# Patient Record
Sex: FEMALE | Race: WHITE | NOT HISPANIC OR LATINO | Employment: OTHER | URBAN - METROPOLITAN AREA
[De-identification: names, ages, dates, MRNs, and addresses within clinical notes are randomized per-mention and may not be internally consistent; named-entity substitution may affect disease eponyms.]

---

## 2018-01-12 NOTE — PROGRESS NOTES
Assessment    1  Encounter for preventive health examination (V70 0) (Z00 00)    Plan  Essential hypertension    · (1) CBC/ PLT (NO DIFF); Status:Active; Requested for:02Aug2016;    · (1) COMPREHENSIVE METABOLIC PANEL; Status:Active; Requested for:02Aug2016;    · (1) TSH WITH FT4 REFLEX; Status:Active; Requested for:02Aug2016;    · (1) URINALYSIS (will reflex a microscopy if leukocytes, occult blood, protein or nitrites  are not within normal limits); Status:Active; Requested for:02Aug2016; Health Maintenance    · Alcohol misuse can be a problem with advancing age ; Status:Complete;   Done:  01Aug2016   · Eat a low fat and low cholesterol diet ; Status:Complete;   Done: 70WLK4000   · It is important that you drink enough fluids to stay healthy ; Status:Complete;   Done:  90XLB6257   · The plan of care for falls is detailed in the plan and/or discussion section of today's note ;  Status:Complete;   Done: 01Aug2016   · There are many exercise options for seniors ; Status:Complete;   Done: 60RFT1448   · There ways to avoid falling ; Status:Complete;   Done: 08XQX2985   · These are things you can do to prevent falls in and around the home ; Status:Complete;    Done: 51CET4575   · We encourage you to begin to make lifestyle changes to help control your blood  pressure  These may include losing weight, increasing your activity level, limiting salt in  your diet, decreasing alcohol intake, and eating a diet low in fat and rich in fruits  and vegetables ; Status:Complete;   Done: 91FIC4013   · We recommend that you create an advance directive ; Status:Complete;   Done:  77HCL7001   · We recommend that you follow these steps to lower your risk of osteoporosis  ;  Status:Complete;   Done: 35MCT4778  Hyperlipidemia    · (1) LIPID PANEL FASTING W DIRECT LDL REFLEX; Status:Active; Requested  for:02Aug2016;   Nicotine dependence, Shortness of breath on exertion, Wheezing    · Complete PFT with DLCO; Status:Active;  Requested for:01Aug2016;   Screening for breast cancer    · * MAMMO SCREENING BILATERAL W CAD; Status:Hold For - Scheduling; Requested  for:01Aug2016;   Screening for colon cancer    · (1) OCCULT BLOOD, FECAL IMMUNOCHEMICAL TEST; Status:Active; Requested  for:01Aug2016;   Screening for genitourinary condition    · *VB-Urinary Incontinence Screen (Dx V81 6 Screen for UI); Status:Active; Requested  for:01Aug2016;   Screening for neurological condition    · *VB - Fall Risk Assessment  (Dx V80 09 Screen for Neurologic Disorder); Status:Active; Requested for:01Aug2016;   Screening for osteoporosis    · * DXA BONE DENSITY SPINE HIP AND PELVIS; Status:Hold For - Scheduling;  Requested for:01Aug2016;     Discussion/Summary    Depression screen - negative     screen - negative    Fall screen - negative    INGRAM, wheezing and nicotine dependence - patient not interested in quitting  Advised checking PFTs  Impression: Initial Annual Wellness Visit, with preventive exam as well as age and risk appropriate counseling completed  Cardiovascular screening and counseling: the risks and benefits of screening were discussed and screening is current  Diabetes screening and counseling: the risks and benefits of screening were discussed and screening is current  Colorectal cancer screening and counseling: the risks and benefits of screening were discussed, due for a colonoscopy (low risk) and due for fecal occult blood testing  Breast cancer screening and counseling: the risks and benefits of screening were discussed and due for a screening mammogram    Cervical cancer screening and counseling: screening not indicated and due to advanced age  Osteoporosis screening and counseling: the risks and benefits of screening were discussed, counseling was given on obtaining adequate amounts of calcium and vitamin D on a daily basis, counseling was given on the importance of regular weightbearing exercise and due for DXA axial skeleton  Abdominal aortic aneurysm screening and counseling: screening not indicated  Glaucoma screening and counseling: the risks and benefits of screening were discussed, screening is current and ophthalmologist referral    HIV screening and counseling: screening not indicated  Immunizations: influenza vaccine is up to date this year, influenza vaccination is recommended annually, the risks and benefits of pneumococcal vaccination were discussed with the patient, received prevnar 15, due for pneumovax 23 10/2016, hepatitis B vaccination series is not indicated at this time due to the patient's low risk of gabriela the disease, the risks and benefits of the Zostavax vaccine were discussed with the patient, the patient declines the Zostavax vaccine, the risks and benefits of the Td vaccine were discussed with the patient, the patient declines the Td vaccine, the risks and benefits of the Tdap vaccine were discussed with the patient and the patient declines the Tdap vaccine  Tobacco Cessation: an intermediate session is done today  Advance Directive Planning: not complete, she was encouraged to follow-up with me to discuss her questions and/or decisions  Advice and education were given regarding alcohol use, fall risk reduction, increasing physical activity and nutrition (non-diabetic)  She was referred to none today  Medical Equipment/Suppliers: none today  Patient Discussion: plan discussed with the patient, plan discussed with the patient's family, follow-up visit needed in 4 months, 40 minute visit, greater than half of the time was spent on counseling  History of Present Illness  HPI: 74yo female with h/o HTN, HLD and nicotine dependence here for annual wellness visit  She presents with her daughter  Welcome to [employer] and Wellness Visits: The patient is being seen for the welcome to medicare visit     Medicare Screening and Risk Factors   Hospitalizations: she has been previously hospitalizied, she has been hospitalized 1 times and Hospitalized 11/2015 for hyponatremia and ADIS  Once per lifetime medicare screening tests: not eligible  Medicare Screening Tests Risk Questions   Abdominal aortic aneurysm risk assessment: none indicated  Osteoporosis risk assessment: , female gender, over 48years of age, tobacco use and alcohol use  HIV risk assessment: none indicated  Drug and Alcohol Use: The patient is a current cigarette smoker  She smoked for 25 years  She is cutting back on tobacco use  The patient reports never drinking alcohol and rare alcohol use  Diet and Physical Activity: Current diet includes low fat food choices and low salt food choices  She exercises infrequently  Exercise: walking  (regular diet)   Mood Disorder and Cognitive Impairment Screening:   Depression screening was done using phq2 and score was 0  Functional Ability/Level of Safety: Hearing is normal bilaterally  The patient is currently unable to drive, but able to do activities of daily living without limitations, able to do instrumental activities of daily living without limitations and able to participate in social activities without limitations  Activities of daily living details: never learned to drive, but does not need help shopping, no meal preparation help needed, does not need help doing housework, does not need help doing laundry, does not need help managing medications and does not need help managing money  Fall risk factors: The patient fell 0 times in the past 12 months  Home safety risk factors:  Lives on 5th floor apartment, but no loose rugs, no poor household lighting, no uneven floors, no household clutter and grab bars in the bathroom  Advance Directives: Advance directives: no living will, no durable power of  for health care directives and no advance directives     Co-Managers and Medical Equipment/Suppliers: See Patient Care Team   Preventive Quality Program 65 and Older: Falls Risk: The patient fell 0 times in the past 12 months  The patient currently has no urinary incontinence symptoms  Date of last glaucoma screen was 2016      Patient Care Team    Care Team Member Role Specialty Office Number   [phone] [facility] [address] [facility] Attending Internal Medicine [phone]     Review of Systems    Constitutional: negative  ENT: negative  Cardiovascular: negative  Respiratory: shortness of breath during exertion, but no wheezing and no cough  Gastrointestinal: negative  Genitourinary: negative  Neurological: negative  Psychiatric: negative  Active Problems    1  Cataracts, bilateral (366 9) (H26 9)   2  Essential hypertension (401 9) (I10)   3  Hyperlipidemia (272 4) (E78 5)   4  Need for immunization against influenza (V04 81) (Z23)   5  Need for pneumococcal vaccination (V03 82) (Z23)   6  Preoperative clearance (V72 84) (Z01 818)    Past Medical History    · History of Elevated serum creatinine (790 99) (R79 89)   · History of acute bronchitis (V12 69) (Z87 09)   · History of acute renal failure (V13 09) (Z87 448)   · History of Hyponatremia (276 1) (E87 1)    The active problems and past medical history were reviewed and updated today  Surgical History    · Denied: History of Surgery    The surgical history was reviewed and updated today  Family History  Mother    · Family history of peptic ulcer (V18 59) (Z83 79)  Brother    · Family history of diabetes mellitus (V18 0) (Z83 3)   · Family history of stroke (V17 1) (Z82 3)    The family history was reviewed and updated today  Social History    · Alcohol use (V49 89) (Z78 9)   · Cigarette smoker (305 1) (F17 210)   · Occupation   · [address] [place] [address]   ·   The social history was reviewed and is unchanged  Current Meds   1  AmLODIPine Besylate 10 MG Oral Tablet; TAKE 1 TABLET DAILY AS DIRECTED; Therapy: 17RYZ9334 to (Evaluate:21Jan2017)  Requested for: 44Kiz7008; Last   Rx:[id number] Ordered   2  Atorvastatin Calcium 40 MG Oral Tablet; TAKE 1 TABLET DAILY; Therapy: 56RPW9360 to (Juju Price)  Requested for: 07SXC5763; Last   Rx:69Bft9285 Ordered   3  Cephalexin 250 MG Oral Tablet; TAKE 1 TABLET EVERY 6 HOURS DAILY; Therapy: 89ASW9524 to (Loco Noriega)  Requested for: 31YCL2674; Last   Rx:29Mar2016 Ordered   4  Dextromethorphan-Guaifenesin  MG/5ML Oral Solution; TAKE 5 ML EVERY 4   HOURS AS NEEDED; Therapy: 00EOW4794 to (Loco Noriega)  Requested for: 72HIB5241; Last   Rx:29Mar2016 Ordered   5  Lisinopril 20 MG Oral Tablet; TAKE 1 TABLET DAILY; Therapy: 03WLG1063 to (Evaluate:23Oct2016)  Requested for: 26Apr2016; Last   Rx:26Apr2016 Ordered   6  Metoprolol Tartrate 50 MG Oral Tablet; TAKE 1 and 1/2 TABLET EVERY 12 HOURS; Therapy: (Recorded:12Nov2015) to Recorded    The medication list was reviewed and updated today  Allergies    1  No Known Drug Allergies    Immunizations   1    Influenza  26-Oct-2015    PCV  26-Oct-2015     Vitals  Signs    Systolic: 034, RUE, Sitting  Diastolic: 72, RUE, Sitting   Systolic: 804  Diastolic: 82  Heart Rate: 82  Respiration: 16  Temperature: 98 3 F  O2 Saturation: 98  Height: 4 ft 11 in  Weight: 184 lb 2 08 oz  BMI Calculated: 37 19  BSA Calculated: 1 78    Physical Exam    Constitutional   General appearance: No acute distress, well appearing and well nourished  smells of smoke  Head and Face   Head and face: Normal   wears glasses  Eyes   Conjunctiva and lids: No swelling, erythema or discharge  Ears, Nose, Mouth, and Throat   External inspection of ears and nose: Normal     Otoscopic examination: Tympanic membranes translucent with normal light reflex  Canals patent without erythema  Hearing: Normal     Nasal mucosa, septum, and turbinates: Normal without edema or erythema  Lips, teeth, and gums: Abnormal   Examination of the lip showed they were normal with no lesions  Examination of the teeth revealed upper dentures  Oropharynx: Normal with no erythema, edema, exudate or lesions  Neck   Neck: Supple, symmetric, trachea midline, no masses  Thyroid: Normal, no thyromegaly  Pulmonary   Respiratory effort: No increased work of breathing or signs of respiratory distress  Auscultation of lungs: Abnormal   faint wheezing throughout  Cardiovascular   Auscultation of heart: Normal rate and rhythm, normal S1 and S2, no murmurs  Carotid pulses: 2+ bilaterally  Pedal pulses: 2+ bilaterally  Examination of extremities for edema and/or varicosities: Normal     Chest deferred by patient  Abdomen   Abdomen: Non-tender, no masses  The abdomen was obese  Bowel sounds were normal  The abdomen was soft and nontender  The abdomen was normal to percussion  Lymphatic   Palpation of lymph nodes in neck: No lymphadenopathy  Musculoskeletal   Gait and station: Normal     Neurologic No focal deficit  Cortical function: Normal mental status  There is no cognitive impairment  The patient achieved a score of 29 / 30 on the MMSE  Psychiatric   Orientation to person, place, and time: Normal     Mood and affect: Normal   Mood and Affect: anxious  Results/Data  PHQ-2 Adult Depression Screening [id number] 01:32PM User, s     Test Name Result Flag Reference   PHQ-2 Adult Depression Score 0     Over the last two weeks, how often have you been bothered by any of the following problems?   Little interest or pleasure in doing things: Not at all - 0  Feeling down, depressed, or hopeless: Not at all - 0   PHQ-2 Adult Depression Screening Negative         Procedure    Procedure: Visual Acuity Test    Results: 20/50 in both eyes without corrective device, 20/70 in the right eye without corrective device, 20/50 in the left eye without corrective device, 20/40 in both eyes with corrective device, 20/30 in the right eye with corrective device, 20/40 in the left eye with corrective device Wears glasses   Color vision was and the results were normal  [facility]        Future Appointments    Date/Time Provider Specialty Site   12/06/2016 01:00 PM Narendra Montes De Oca DO Internal Medicine [facility] [address] [facility] INTERNAL MED     Signatures   Electronically signed by : Carola Irvin DO; Aug  1 2016  3:14PM EST                       (Author)

## 2018-02-22 DIAGNOSIS — E78.2 MIXED HYPERLIPIDEMIA: Primary | ICD-10-CM

## 2018-02-22 RX ORDER — ATORVASTATIN CALCIUM 40 MG/1
TABLET, FILM COATED ORAL
Qty: 90 TABLET | Refills: 1 | Status: ON HOLD | OUTPATIENT
Start: 2018-02-22 | End: 2019-07-18 | Stop reason: SDUPTHER

## 2019-06-19 ENCOUNTER — APPOINTMENT (EMERGENCY)
Dept: RADIOLOGY | Facility: HOSPITAL | Age: 79
DRG: 037 | End: 2019-06-19
Payer: COMMERCIAL

## 2019-06-19 ENCOUNTER — HOSPITAL ENCOUNTER (INPATIENT)
Facility: HOSPITAL | Age: 79
LOS: 14 days | DRG: 037 | End: 2019-07-03
Attending: EMERGENCY MEDICINE | Admitting: INTERNAL MEDICINE
Payer: COMMERCIAL

## 2019-06-19 ENCOUNTER — TELEPHONE (OUTPATIENT)
Dept: UROLOGY | Facility: CLINIC | Age: 79
End: 2019-06-19

## 2019-06-19 DIAGNOSIS — R93.89 ABNORMAL CT SCAN: ICD-10-CM

## 2019-06-19 DIAGNOSIS — I65.22 CAROTID STENOSIS, LEFT: ICD-10-CM

## 2019-06-19 DIAGNOSIS — D64.9 ANEMIA, UNSPECIFIED TYPE: ICD-10-CM

## 2019-06-19 DIAGNOSIS — I65.22 SYMPTOMATIC STENOSIS OF LEFT CAROTID ARTERY: ICD-10-CM

## 2019-06-19 DIAGNOSIS — R29.90 STROKE-LIKE SYMPTOM: ICD-10-CM

## 2019-06-19 DIAGNOSIS — R53.1 WEAKNESS: Primary | ICD-10-CM

## 2019-06-19 DIAGNOSIS — R93.89 ABNORMAL CT SCAN: Primary | ICD-10-CM

## 2019-06-19 LAB
ABO GROUP BLD: NORMAL
ALBUMIN SERPL BCP-MCNC: 3 G/DL (ref 3.5–5)
ALP SERPL-CCNC: 99 U/L (ref 46–116)
ALT SERPL W P-5'-P-CCNC: 16 U/L (ref 12–78)
ANION GAP SERPL CALCULATED.3IONS-SCNC: 5 MMOL/L (ref 4–13)
APTT PPP: 25 SECONDS (ref 26–38)
AST SERPL W P-5'-P-CCNC: 31 U/L (ref 5–45)
ATRIAL RATE: 57 BPM
BILIRUB DIRECT SERPL-MCNC: 0.21 MG/DL (ref 0–0.2)
BILIRUB SERPL-MCNC: 0.49 MG/DL (ref 0.2–1)
BLD GP AB SCN SERPL QL: NEGATIVE
BUN SERPL-MCNC: 13 MG/DL (ref 5–25)
CALCIUM SERPL-MCNC: 8.1 MG/DL (ref 8.3–10.1)
CHLORIDE SERPL-SCNC: 106 MMOL/L (ref 100–108)
CO2 SERPL-SCNC: 22 MMOL/L (ref 21–32)
CREAT SERPL-MCNC: 1.01 MG/DL (ref 0.6–1.3)
ERYTHROCYTE [DISTWIDTH] IN BLOOD BY AUTOMATED COUNT: 22.9 % (ref 11.6–15.1)
FERRITIN SERPL-MCNC: 2 NG/ML (ref 8–388)
FERRITIN SERPL-MCNC: 3 NG/ML (ref 8–388)
FOLATE SERPL-MCNC: >20 NG/ML (ref 3.1–17.5)
GFR SERPL CREATININE-BSD FRML MDRD: 53 ML/MIN/1.73SQ M
GLUCOSE SERPL-MCNC: 117 MG/DL (ref 65–140)
GLUCOSE SERPL-MCNC: 150 MG/DL (ref 65–140)
HCT VFR BLD AUTO: 21.8 % (ref 34.8–46.1)
HGB BLD-MCNC: 5.3 G/DL (ref 11.5–15.4)
HGB BLD-MCNC: 5.8 G/DL (ref 11.5–15.4)
INR PPP: 1.14 (ref 0.86–1.17)
IRON SATN MFR SERPL: 2 %
IRON SERPL-MCNC: 9 UG/DL (ref 50–170)
MCH RBC QN AUTO: 15.2 PG (ref 26.8–34.3)
MCHC RBC AUTO-ENTMCNC: 24.3 G/DL (ref 31.4–37.4)
MCV RBC AUTO: 63 FL (ref 82–98)
P AXIS: 73 DEGREES
PLATELET # BLD AUTO: 248 THOUSANDS/UL (ref 149–390)
POTASSIUM SERPL-SCNC: 3.8 MMOL/L (ref 3.5–5.3)
PR INTERVAL: 150 MS
PROT SERPL-MCNC: 6.3 G/DL (ref 6.4–8.2)
PROTHROMBIN TIME: 14.7 SECONDS (ref 11.8–14.2)
QRS AXIS: 19 DEGREES
QRSD INTERVAL: 78 MS
QT INTERVAL: 434 MS
QTC INTERVAL: 422 MS
RBC # BLD AUTO: 3.49 MILLION/UL (ref 3.81–5.12)
RH BLD: POSITIVE
SODIUM SERPL-SCNC: 133 MMOL/L (ref 136–145)
SPECIMEN EXPIRATION DATE: NORMAL
T WAVE AXIS: 60 DEGREES
TIBC SERPL-MCNC: 381 UG/DL (ref 250–450)
VENTRICULAR RATE: 57 BPM
VIT B12 SERPL-MCNC: 417 PG/ML (ref 100–900)
WBC # BLD AUTO: 5.87 THOUSAND/UL (ref 4.31–10.16)

## 2019-06-19 PROCEDURE — 83540 ASSAY OF IRON: CPT | Performed by: INTERNAL MEDICINE

## 2019-06-19 PROCEDURE — 80076 HEPATIC FUNCTION PANEL: CPT | Performed by: INTERNAL MEDICINE

## 2019-06-19 PROCEDURE — 86900 BLOOD TYPING SEROLOGIC ABO: CPT | Performed by: EMERGENCY MEDICINE

## 2019-06-19 PROCEDURE — 84165 PROTEIN E-PHORESIS SERUM: CPT | Performed by: PATHOLOGY

## 2019-06-19 PROCEDURE — 93005 ELECTROCARDIOGRAM TRACING: CPT

## 2019-06-19 PROCEDURE — 82728 ASSAY OF FERRITIN: CPT | Performed by: INTERNAL MEDICINE

## 2019-06-19 PROCEDURE — 36415 COLL VENOUS BLD VENIPUNCTURE: CPT | Performed by: EMERGENCY MEDICINE

## 2019-06-19 PROCEDURE — 84165 PROTEIN E-PHORESIS SERUM: CPT | Performed by: INTERNAL MEDICINE

## 2019-06-19 PROCEDURE — 86850 RBC ANTIBODY SCREEN: CPT | Performed by: EMERGENCY MEDICINE

## 2019-06-19 PROCEDURE — 85610 PROTHROMBIN TIME: CPT | Performed by: EMERGENCY MEDICINE

## 2019-06-19 PROCEDURE — 99223 1ST HOSP IP/OBS HIGH 75: CPT | Performed by: INTERNAL MEDICINE

## 2019-06-19 PROCEDURE — 30233N1 TRANSFUSION OF NONAUTOLOGOUS RED BLOOD CELLS INTO PERIPHERAL VEIN, PERCUTANEOUS APPROACH: ICD-10-PCS | Performed by: INTERNAL MEDICINE

## 2019-06-19 PROCEDURE — 96374 THER/PROPH/DIAG INJ IV PUSH: CPT

## 2019-06-19 PROCEDURE — 96376 TX/PRO/DX INJ SAME DRUG ADON: CPT

## 2019-06-19 PROCEDURE — 99285 EMERGENCY DEPT VISIT HI MDM: CPT | Performed by: EMERGENCY MEDICINE

## 2019-06-19 PROCEDURE — 86901 BLOOD TYPING SEROLOGIC RH(D): CPT | Performed by: EMERGENCY MEDICINE

## 2019-06-19 PROCEDURE — 99222 1ST HOSP IP/OBS MODERATE 55: CPT | Performed by: UROLOGY

## 2019-06-19 PROCEDURE — 99285 EMERGENCY DEPT VISIT HI MDM: CPT

## 2019-06-19 PROCEDURE — 82607 VITAMIN B-12: CPT | Performed by: INTERNAL MEDICINE

## 2019-06-19 PROCEDURE — P9016 RBC LEUKOCYTES REDUCED: HCPCS

## 2019-06-19 PROCEDURE — 80048 BASIC METABOLIC PNL TOTAL CA: CPT | Performed by: EMERGENCY MEDICINE

## 2019-06-19 PROCEDURE — 86920 COMPATIBILITY TEST SPIN: CPT

## 2019-06-19 PROCEDURE — 74177 CT ABD & PELVIS W/CONTRAST: CPT

## 2019-06-19 PROCEDURE — 85730 THROMBOPLASTIN TIME PARTIAL: CPT | Performed by: EMERGENCY MEDICINE

## 2019-06-19 PROCEDURE — 70498 CT ANGIOGRAPHY NECK: CPT

## 2019-06-19 PROCEDURE — 70496 CT ANGIOGRAPHY HEAD: CPT

## 2019-06-19 PROCEDURE — 96361 HYDRATE IV INFUSION ADD-ON: CPT

## 2019-06-19 PROCEDURE — 36430 TRANSFUSION BLD/BLD COMPNT: CPT

## 2019-06-19 PROCEDURE — NS001 PR NO SIGNATURE OR ATTESTATION: Performed by: SURGERY

## 2019-06-19 PROCEDURE — 85027 COMPLETE CBC AUTOMATED: CPT | Performed by: EMERGENCY MEDICINE

## 2019-06-19 PROCEDURE — 82746 ASSAY OF FOLIC ACID SERUM: CPT | Performed by: INTERNAL MEDICINE

## 2019-06-19 PROCEDURE — 83550 IRON BINDING TEST: CPT | Performed by: INTERNAL MEDICINE

## 2019-06-19 PROCEDURE — 85018 HEMOGLOBIN: CPT | Performed by: INTERNAL MEDICINE

## 2019-06-19 PROCEDURE — 93010 ELECTROCARDIOGRAM REPORT: CPT | Performed by: INTERNAL MEDICINE

## 2019-06-19 PROCEDURE — 71260 CT THORAX DX C+: CPT

## 2019-06-19 PROCEDURE — 82948 REAGENT STRIP/BLOOD GLUCOSE: CPT

## 2019-06-19 RX ORDER — ATORVASTATIN CALCIUM 40 MG/1
40 TABLET, FILM COATED ORAL DAILY
Status: DISCONTINUED | OUTPATIENT
Start: 2019-06-19 | End: 2019-06-20

## 2019-06-19 RX ORDER — LANOLIN ALCOHOL/MO/W.PET/CERES
3 CREAM (GRAM) TOPICAL ONCE
Status: COMPLETED | OUTPATIENT
Start: 2019-06-19 | End: 2019-06-19

## 2019-06-19 RX ORDER — ONDANSETRON 2 MG/ML
4 INJECTION INTRAMUSCULAR; INTRAVENOUS ONCE
Status: COMPLETED | OUTPATIENT
Start: 2019-06-19 | End: 2019-06-19

## 2019-06-19 RX ORDER — METOPROLOL TARTRATE 50 MG/1
50 TABLET, FILM COATED ORAL EVERY 12 HOURS SCHEDULED
COMMUNITY
End: 2019-07-22 | Stop reason: HOSPADM

## 2019-06-19 RX ORDER — ONDANSETRON 2 MG/ML
INJECTION INTRAMUSCULAR; INTRAVENOUS
Status: DISPENSED
Start: 2019-06-19 | End: 2019-06-20

## 2019-06-19 RX ORDER — ACETAMINOPHEN 325 MG/1
650 TABLET ORAL EVERY 6 HOURS PRN
Status: DISCONTINUED | OUTPATIENT
Start: 2019-06-19 | End: 2019-06-30

## 2019-06-19 RX ORDER — ASPIRIN 81 MG/1
81 TABLET, CHEWABLE ORAL DAILY
COMMUNITY

## 2019-06-19 RX ORDER — ONDANSETRON 2 MG/ML
INJECTION INTRAMUSCULAR; INTRAVENOUS
Status: COMPLETED
Start: 2019-06-19 | End: 2019-06-19

## 2019-06-19 RX ORDER — PANTOPRAZOLE SODIUM 40 MG/1
40 TABLET, DELAYED RELEASE ORAL
Status: DISCONTINUED | OUTPATIENT
Start: 2019-06-20 | End: 2019-07-03 | Stop reason: HOSPADM

## 2019-06-19 RX ORDER — METOPROLOL TARTRATE 50 MG/1
50 TABLET, FILM COATED ORAL EVERY 12 HOURS SCHEDULED
Status: DISCONTINUED | OUTPATIENT
Start: 2019-06-19 | End: 2019-06-28

## 2019-06-19 RX ADMIN — ONDANSETRON 4 MG: 2 INJECTION INTRAMUSCULAR; INTRAVENOUS at 12:14

## 2019-06-19 RX ADMIN — IODIXANOL 80 ML: 320 INJECTION, SOLUTION INTRAVASCULAR at 14:19

## 2019-06-19 RX ADMIN — METOPROLOL TARTRATE 50 MG: 50 TABLET, FILM COATED ORAL at 21:23

## 2019-06-19 RX ADMIN — IOHEXOL 85 ML: 350 INJECTION, SOLUTION INTRAVENOUS at 12:15

## 2019-06-19 RX ADMIN — ONDANSETRON 4 MG: 2 INJECTION INTRAMUSCULAR; INTRAVENOUS at 14:30

## 2019-06-19 RX ADMIN — ATORVASTATIN CALCIUM 40 MG: 40 TABLET, FILM COATED ORAL at 16:12

## 2019-06-19 RX ADMIN — SODIUM CHLORIDE 1000 ML: 0.9 INJECTION, SOLUTION INTRAVENOUS at 13:18

## 2019-06-19 RX ADMIN — MELATONIN 3 MG: at 21:57

## 2019-06-19 NOTE — ED NOTES
Blood bank called at this time and will call primary RN once blood is ready to be requested        Justice Franks, MATTHEW  06/19/19 0515 Please order labs to be linked to appt

## 2019-06-19 NOTE — QUICK NOTE
Extreme iron deficiency, ferritin 3  Consult oncology  Consider to start venofer in am (receiving blood today)

## 2019-06-19 NOTE — H&P
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, will consult Urology    Anemia, unspecified  Assessment & Plan  Unclear, will obtain peripheral smear to rule out hemolysis, obtain LFTs  Iron storages, folate and B12 requested and pending  Guaiac in the ER negative, obtain FOBT x3  Consult Gastroenterology  One results of multiple status back including UPEP and SPEP consideration if appropriate to consult Hematology  Receiving blood transfusion in the ER, x2 units, H&H q 8 hours for 24 hours and then if stable discontinue  Start Protonix  Clear liquid diet for now    Nicotine dependence  Assessment & Plan  Extensive counseling was offered, at this time declined nicotine patch    Essential hypertension  Assessment & Plan  Continue with beta-blocker as per home dose, monitor  Chronic stroke on CT scan, off window for permissive hypertension    * Stroke-like symptom  Assessment & Plan  Presenting with right-sided weakness and right facial droop, CT scan of the head positive for left parietal lobe subacute to chronic stroke  The case was discussed by ED with Neurology, off window for tPA, last well known even before obtaining scan  Admitted with stroke protocol with appropriate vital signs and neuro checks  Obtain MRI  Obtain echocardiogram  Formal neurology consult is pending  For now will hold aspirin for possible GI bleed    Abnormal CT of the neck with 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, requested evaluation by vascular surgery for reviewing images especially concern of the left side    PT and OT evaluation  Fall precautions    VTE Prophylaxis: Hold until GI bleed is ruled out  / sequential compression device   Code Status:  Full code  POLST: POLST is not applicable to this patient  Discussion with family:  Daughters at bedside    Anticipated Length of Stay:  Patient will be admitted on an Inpatient basis with an anticipated length of stay of  greater than 2 midnights  Justification for Hospital Stay:  Refer to above    Total Time for Visit, including Counseling / Coordination of Care: 1 hour  Greater than 50% of this total time spent on direct patient counseling and coordination of care  Chief Complaint:   Weakness    History of Present Illness:    Mirza Carr is a 78 y o  female who presents with weakness  This is a 77-year-old female with past medical history of hypertension, hyperlipidemia, history of smoking, previous history of hyponatremia, who came to the hospital today complaining of extreme fatigue  She came to the hospital today because she knew that something was wrong  Upon further questioning she has not been feeling well for the past few weeks  However, family at bedside gives much better description of the events that led today, as per family, the patient had upper respiratory tract infection with weakness and cough about 2 weeks ago and she was seen by primary care physician and diagnosed with bronchitis and treated with supportive care and brief course of antibiotics which she has completed  She recovered slowly from the bronchitis, she is an everyday smoker, however, for the past few days she appears to be more weak and fatigued  The family asked multiple times the patient if there was any issues however she kept on declining  At this point, the patient was living at home independently  Today, the patient called her daughter stating that she was stumbling and unable to walk and she was also unable to hold objects with the right upper extremity  EMS was called    In ED, she was noted to have right-sided weakness, and EMS reported also facial droop on the right which was not present at the time of admission to the ER  Currently the right facial droop is not present  Pertinent positive in the ED, off window for tPA, positive CT of the head for subacute to chronic stroke, complete occlusion of the right carotid, abnormal CT of the right lung, severe anemia requiring blood transfusion  Review of Systems:    Review of Systems   Constitutional: Positive for fatigue  HENT:        No bleeding from mouth or nose   Respiratory: Negative for shortness of breath  Cardiovascular: Negative for chest pain  Gastrointestinal: Negative for blood in stool  Genitourinary: Negative for hematuria and vaginal bleeding  Skin:        No bruising or bleeding   Neurological: Positive for dizziness and weakness  All other systems reviewed and are negative  Past Medical and Surgical History:     Past Medical History:   Diagnosis Date    Hypertension        History reviewed  No pertinent surgical history  Meds/Allergies:    Prior to Admission medications    Medication Sig Start Date End Date Taking?  Authorizing Provider   aspirin 81 mg chewable tablet Chew 81 mg daily   Yes Historical Provider, MD   Garlic 359 MG TABS Take 100 mg by mouth daily   Yes Historical Provider, MD   metoprolol tartrate (LOPRESSOR) 50 mg tablet Take 50 mg by mouth every 12 (twelve) hours   Yes Historical Provider, MD   atorvastatin (LIPITOR) 40 mg tablet TAKE ONE TABLET BY MOUTH ONCE DAILY  Patient not taking: Reported on 6/19/2019 2/22/18   Diana Cote DO     Reconciled by ED    Allergies: No Known Allergies    Social History:     Marital Status: Single     Substance Use History:   Social History     Substance and Sexual Activity   Alcohol Use Not Currently     Social History     Tobacco Use   Smoking Status Current Every Day Smoker    Packs/day: 0 25    Years: 15 00    Pack years: 3 75    Types: Cigarettes     Social History     Substance and Sexual Activity   Drug Use Never       Family History:    non-contributory    Physical Exam:     Vitals:   Blood Pressure: (!) 185/80 (06/19/19 1545)  Pulse: 56 (06/19/19 1545)  Temperature: 97 7 °F (36 5 °C) (06/19/19 1530)  Temp Source: Oral (06/19/19 1530)  Respirations: 20 (06/19/19 1545)  Weight - Scale: 74 8 kg (165 lb) (06/19/19 1226)  SpO2: 95 % (06/19/19 1545)    Physical Exam   Constitutional: She is oriented to person, place, and time  She appears well-developed  No distress  HENT:   Head: Normocephalic and atraumatic  Eyes: Right eye exhibits no discharge  Left eye exhibits no discharge  Cardiovascular: Normal rate, regular rhythm and normal heart sounds  Exam reveals no friction rub  No murmur heard  Pulmonary/Chest: Effort normal and breath sounds normal  No stridor  No respiratory distress  She has no wheezes  Abdominal: Soft  She exhibits no distension  There is no tenderness  There is no guarding  Neurological: She is alert and oriented to person, place, and time  No cranial nerve deficit  Strength in right upper lower extremity 4/5, ataxia of both upper lower extremity noted   Skin: Skin is warm  There is pallor  Psychiatric: She has a normal mood and affect  Judgment and thought content normal            Additional Data:     Lab Results: I have personally reviewed pertinent reports        Results from last 7 days   Lab Units 06/19/19  1203   WBC Thousand/uL 5 87   HEMOGLOBIN g/dL 5 3*   HEMATOCRIT % 21 8*   PLATELETS Thousands/uL 248     Results from last 7 days   Lab Units 06/19/19  1203   SODIUM mmol/L 133*   POTASSIUM mmol/L 3 8   CHLORIDE mmol/L 106   CO2 mmol/L 22   BUN mg/dL 13   CREATININE mg/dL 1 01   ANION GAP mmol/L 5   CALCIUM mg/dL 8 1*   GLUCOSE RANDOM mg/dL 117     Results from last 7 days   Lab Units 06/19/19  1203   INR  1 14     Results from last 7 days   Lab Units 06/19/19  1238   POC GLUCOSE mg/dl 150*               Imaging: I have personally reviewed pertinent reports  CT chest abdomen pelvis w contrast   Final Result by Cindy Licea MD (06/19 5571)   No solid visceral injury seen      No free fluid seen      Area of groundglass density seen in the right upper lobe with Micro nodularity, probably on an inflammatory basis  Short interval follow-up suggested in 3 months demonstrate resolution      Rounded density seen in relation to the pubic symphysis, measuring about 2 7 cm,   This may represent a distended bursa related to arthritic changes in the symphysis pubis less likely due to urethral diverticulum               Workstation performed: [id number]         CT recon only cervical spine (No Charge)   Final Result by Eugene Lama MD (06/19 4123)      No acute osseous injury in the cervical spine                   Workstation performed: [id number]         CTA head and neck w wo contrast   Final Result by Eugene Lama MD (06/19 0537)      1  Area of cortical/subcortical hypoattenuation in the left parietal lobe, could represent subacute to chronic infarct  2   Occluded right internal carotid artery from its origin with retrograde filling of the intracranial supraclinoid right ICA  3   Remainder of major vasculature of [place] are patent without critical stenosis  4   Atherosclerotic disease at the left cervical carotid bifurcation and proximal cervical internal carotid artery with ulcerated plaque at the bifurcation  There is approximately 50% stenosis by NASCET  criteria  5   Mild area of groundglass and nodular opacity in the periphery of partially imaged right upper lobe, may indicate inflammatory or infectious etiology        6   Sphenoid and ethmoidal sinus disease as described                I personally discussed this study with PAULA Vera on 6/19/2019 at 1:35PM                         Workstation performed: [id number]         MRI Inpatient Order    (Results Pending)         Allscripts / Epic Records Reviewed: Yes     ** Please Note: This note has been constructed using a voice recognition system   **

## 2019-06-19 NOTE — ED ATTENDING ATTESTATION
Krystal Thompson MD, saw and evaluated the patient  All available labs and X-rays were ordered by me or the resident and have been reviewed by myself  I discussed the patient with the resident / non-physician and agree with the resident's / non-physician practitioner's findings and plan as documented in the resident's / non-physician practicitioner's note, except where noted  At this point, I agree with the current assessment done in the ED  I was present during key portions of all procedures performed unless otherwise stated  Chief Complaint   Patient presents with   [address] 21     Pt here after last time known well 06/14/19     This is a 51-year-old female presenting for evaluation of stroke-like symptoms  In the last 2 weeks she has had multiple falls  There is a questionable fall maybe today  She is on aspirin  There has been concerns that there in the family is noticing that she is not really using her right upper extremity normally, it has been going on for between 1 day and 1 week  They noticed a right eyebrow droop as well  There noticing that when she is trying to make coffee she is not using her right upper extremity appropriately but in the end still able to make coffee  Patient denies any fevers chills nausea vomiting chest pain shortness of breath  There have been multiple head strikes in the past 2 weeks but is unclear if there is any in the last 24 hours  PE:  Vitals:    06/24/19 0959 06/24/19 1000 06/24/19 1209 06/24/19 1515   BP: (!) 182/88 144/88 102/58 153/68   Pulse: 65 70 (!) 54 (!) 51   Resp:    18   Temp:    97 9 °F (36 6 °C)   TempSrc:    Oral   SpO2:  97%  98%   Weight:       Height:       General: VS reviewed  Appears in NAD  awake, alert  Well-nourished, well-developed  Appears stated age  Speaking normally in full sentences  Head: Normocephalic, atraumatic, nontender  Eyes: EOM-I  No diplopia  No hyphema  No subconjunctival hemorrhages    Symmetrical lids    ENT: Atraumatic external nose and ears  MMM  No malocclusion  No stridor  Normal phonation  No drooling  Normal swallowing  Neck: No JVD  CV: No pallor noted  Peripheral pulses +2 throughout  No chest wall tenderness  Lungs:   No tachypnea  No respiratory distress  MSK:   FROM   Skin: Dry, intact  Neuro: Awake, alert, GCS15, CN II-XII grossly intact  +pronator drift on the right  4+/5 strength RUE, 5/5 on Left UE  Sensation grossly intact  No obvious facial droop  Motor grossly intact  Psychiatric/Behavioral: Appropriate mood and affect   Exam: deferred  A:  - Stroke like symptoms? P:  - CTH for traumatic SDH?  - cardiac workup  - admit  - 13 point ROS was performed and all are normal unless stated in the history above  - Nursing note reviewed  Vitals reviewed  - Orders placed by myself and/or advanced practitioner / resident     - Previous chart was reviewed  - No language barrier    - History obtained from patient, EMS  - There are no limitations to the history obtained  - Critical care time: Not applicable for this patient  - Patient does not need initiation of IV thrombolytics: Last Known well was unknown    Final Diagnosis:  1  Weakness    2  Abnormal CT scan    3  Anemia, unspecified type    4  Stroke-like symptom    5   Symptomatic stenosis of left carotid artery        ED Course as of Jun 24 1749   Wed Jun 19, 2019   1334 L patietral infarct  Cta; r ica occluded from origin  Recon flow          1335 RUL might have pna        Medications   metoprolol tartrate (LOPRESSOR) tablet 50 mg (50 mg Oral Given 6/24/19 0959)   acetaminophen (TYLENOL) tablet 650 mg (650 mg Oral Given 6/24/19 0957)   pantoprazole (PROTONIX) EC tablet 40 mg (40 mg Oral Not Given 6/24/19 0509)   regadenoson (LEXISCAN) 0 4 mg/5 mL injection **ADS Override Pull** (has no administration in time range)   atorvastatin (LIPITOR) tablet 80 mg (80 mg Oral Given 6/24/19 1637)   melatonin tablet 6 mg (6 mg Oral Given 6/23/19 2101)   hydrALAZINE (APRESOLINE) injection 10 mg (10 mg Intravenous Given 6/23/19 1517)   ondansetron (ZOFRAN) injection 4 mg (4 mg Intravenous Given 6/24/19 0535)   oxyCODONE (ROXICODONE) IR tablet 2 5 mg (2 5 mg Oral Given 6/24/19 0535)   iron sucrose (VENOFER) 300 mg in sodium chloride 0 9 % 250 mL IVPB (300 mg Intravenous New Bag 6/24/19 1427)   ondansetron (ZOFRAN) injection 4 mg (4 mg Intravenous Given 6/19/19 1214)   iohexol (OMNIPAQUE) 350 MG/ML injection (MULTI-DOSE) 85 mL (85 mL Intravenous Given 6/19/19 1215)   sodium chloride 0 9 % bolus 1,000 mL (0 mL Intravenous Stopped 6/19/19 1525)   iodixanol (VISIPAQUE) 320 MG/ML injection 80 mL (80 mL Intravenous Given 6/19/19 1419)   ondansetron (ZOFRAN) injection 4 mg (4 mg Intravenous Given 6/19/19 1430)   melatonin tablet 3 mg (3 mg Oral Given 6/19/19 2157)   regadenoson (LEXISCAN) injection 0 4 mg (0 4 mg Intravenous Given 6/20/19 1523)   potassium chloride (K-DUR,KLOR-CON) CR tablet 40 mEq (40 mEq Oral Given 6/21/19 1027)   polyethylene glycol (GOLYTELY) bowel prep 4,000 mL (4,000 mL Oral Given 6/23/19 1809)   ALPRAZolam (XANAX) tablet 0 25 mg (0 25 mg Oral Given 6/22/19 2223)   simethicone (MYLICON) 40 mg in sterile water 60 mL (40 mg Irrigation Given 6/24/19 0842)     CT head wo contrast   Final Result      1  Right frontal extracranial soft tissue hematoma  No calvarial fracture or acute intracranial injury  2   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                    Workstation performed: [id number]         MRI brain wo contrast   Final Result      Superficial cortical infarct in the left parietal region and small deep watershed infarct in the left frontal region representing acute infarct in the distribution left MCA      No acute hemorrhage seen   No mass effect or midline shift seen   Flow void in the petrous and cavernous portion of the right ICA is not identified related to occlusion seen on the recent CT      Workstation performed: [id number]         VAS carotid complete study   Final Result      CT chest abdomen pelvis w contrast   Final Result   No solid visceral injury seen      No free fluid seen      Area of groundglass density seen in the right upper lobe with Micro nodularity, probably on an inflammatory basis  Short interval follow-up suggested in 3 months demonstrate resolution      Rounded density seen in relation to the pubic symphysis, measuring about 2 7 cm,   This may represent a distended bursa related to arthritic changes in the symphysis pubis less likely due to urethral diverticulum               Workstation performed: [id number]         CT recon only cervical spine (No Charge)   Final Result      No acute osseous injury in the cervical spine                   Workstation performed: [id number]         CTA head and neck w wo contrast   Final Result      1  Area of cortical/subcortical hypoattenuation in the left parietal lobe, could represent subacute to chronic infarct  2   Occluded right internal carotid artery from its origin with retrograde filling of the intracranial supraclinoid right ICA  3   Remainder of major vasculature of [place] are patent without critical stenosis  4   Atherosclerotic disease at the left cervical carotid bifurcation and proximal cervical internal carotid artery with ulcerated plaque at the bifurcation  There is approximately 50% stenosis by NASCET  criteria  5   Mild area of groundglass and nodular opacity in the periphery of partially imaged right upper lobe, may indicate inflammatory or infectious etiology        6   Sphenoid and ethmoidal sinus disease as described                I personally discussed this study with Ratna Donaldson on 6/19/2019 at 1:35PM                         Workstation performed: [id number]           Orders Placed This Encounter   Procedures    Occult blood 1-3, stool    CTA head and neck w wo contrast    CT recon only cervical spine (No Charge)    CT chest abdomen pelvis w contrast    MRI brain wo contrast    CT head wo contrast    Basic metabolic panel    CBC and Platelet    Protime-INR    APTT    Hepatic function panel    Lipid Panel with Direct LDL reflex    Hemoglobin A1c w/EAG Estimation    Protein electrophoresis, serum    Protein electrophoresis, urine    Ferritin    Hemoglobin Electrophoresis    Vitamin B12    Folate    Hemoglobin    Magnesium    Phosphorus    Basic metabolic panel    Iron Saturation %    Ferritin    CBC and differential    CBC and differential    Basic metabolic panel    Basic metabolic panel    CBC and differential    Hemolysis Smear    CBC and differential    Basic metabolic panel    Diet Regular; Regular House    Continuous cardiac monitoring    Continuous pulse oximetry    Fingerstick Glucose (POCT)    Vital Signs    Notify physician and Hold transfusion if:    Nursing communcation Continue IV as ordered   Notify admitting physician    Notify admitting physician on arrival    Neuro checks    Provide Stroke Education to Patients    Baseline NIH stroke scale on Admission    Reassess NIH stroke scale every 24 hours for 2 days    NIH stroke scale at Discharge    Incentive spirometry    Vital Signs q 1h x 4 hours    Vital Signs q 2 h x 4 hours    Vital Signs q 4h x 72 hours    Vital Signs q 8h if stable    Up with assistance    Daily weights    I/O    Insert peripheral IV    Maintain IV access    Place sequential compression device    Nursing Communication Do not use for medications  Order will not be received by Pharmacy or dispensed to patient   As long as she supervision, she may shower    Insert peripheral IV    Level 1-Full Code: all life saving measures are indicated    Inpatient consult to Neurology    Inpatient consult to Vascular Surgery    Inpatient consult to gastroenterology    Inpatient consult to Case Management    Inpatient Consult to Nutrition Services    Inpatient consult to Urology    Inpatient consult to Oncology    Inpatient consult to Cardiology    OT eval and treat    PT eval and treat    EKG RESULTS    NM myocardial perfusion spect (rx stress and/or rest)    Stress strip    ECG 12 lead    ECG 12 lead    Echo complete with contrast if indicated    Type and screen    Prepare RBC:Has consent been obtained? Yes, 2 Units    Inpatient Admission (expected length of stay for this patient Order details is greater than two midnights)    Fall precautions    Aspiration precautions    EGD    Colonoscopy     Labs Reviewed   BASIC METABOLIC PANEL - Abnormal       Result Value Ref Range Status    Sodium 133 (*) 136 - 145 mmol/L Final    Potassium 3 8  3 5 - 5 3 mmol/L Final    Chloride 106  100 - 108 mmol/L Final    CO2 22  21 - 32 mmol/L Final    ANION GAP 5  4 - 13 mmol/L Final    BUN 13  5 - 25 mg/dL Final    Creatinine 1 01  0 60 - 1 30 mg/dL Final    Comment: Standardized to IDMS reference method    Glucose 117  65 - 140 mg/dL Final    Comment:   If the patient is fasting, the ADA then defines impaired fasting glucose as > 100 mg/dL and diabetes as > or equal to 123 mg/dL  Specimen collection should occur prior to Sulfasalazine administration due to the potential for falsely depressed results  Specimen collection should occur prior to Sulfapyridine administration due to the potential for falsely elevated results      Calcium 8 1 (*) 8 3 - 10 1 mg/dL Final    eGFR 53  ml/min/1 73sq m Final    Narrative:     [place] guidelines for Chronic Kidney Disease (CKD):     Stage 1 with normal or high GFR (GFR > 90 mL/min/1 73 square meters)    Stage 2 Mild CKD (GFR = 60-89 mL/min/1 73 square meters)    Stage 3A Moderate CKD (GFR = 45-59 mL/min/1 73 square meters)    Stage 3B Moderate CKD (GFR = 30-44 mL/min/1 73 square meters)    Stage 4 Severe CKD (GFR = 15-29 mL/min/1 73 square meters)    Stage 5 End Stage CKD (GFR <15 mL/min/1 73 square meters)  Note: GFR calculation is accurate only with a steady state creatinine   CBC AND PLATELET - Abnormal    WBC 5 87  4 31 - 10 16 Thousand/uL Final    RBC 3 49 (*) 3 81 - 5 12 Million/uL Final    Hemoglobin 5 3 (*) 11 5 - 15 4 g/dL Final    Comment: This result has been called to [facility] by Joceline Joy on 06 19 2019 at 1226, and has been read back  Hematocrit 21 8 (*) 34 8 - 46 1 % Final    MCV 63 (*) 82 - 98 fL Final    MCH 15 2 (*) 26 8 - 34 3 pg Final    MCHC 24 3 (*) 31 4 - 37 4 g/dL Final    RDW 22 9 (*) 11 6 - 15 1 % Final    Platelets 786  091 - 390 Thousands/uL Final   PROTIME-INR - Abnormal    Protime 14 7 (*) 11 8 - 14 2 seconds Final    INR 1 14  0 86 - 1 17 Final   APTT - Abnormal    PTT 25 (*) 26 - 38 seconds Final    Comment: Therapeutic Heparin Range =  60-90 seconds   HEMOGLOBIN - Abnormal    Hemoglobin 5 8 (*) 11 5 - 15 4 g/dL Final    Comment: Verified by repeat analysis  This result has been called to [facility] by Minerva Tracy on 06 19 2019 at [phone], and has been read back  IRON SATURATION - Abnormal    Iron Saturation 2  % Final    TIBC 381  250 - 450 ug/dL Final    Iron 9 (*) 50 - 170 ug/dL Final    Comment:   Patients treated with metal-binding drugs (ie  Deferoxamine) may have depressed iron values  FERRITIN - Abnormal    Ferritin 3 (*) 8 - 388 ng/mL Final   POCT GLUCOSE - Abnormal    POC Glucose 150 (*) 65 - 140 mg/dl Final   OCCULT BLOOD 1-3, STOOL (DIAGNOSTIC)   PROTEIN ELECTROPHORESIS, URINE   HEMOGLOBIN ELECTROPHORESIS   TYPE AND SCREEN    ABO Grouping O   Final    Rh Factor Positive   Final    Antibody Screen Negative   Final    Specimen Expiration Date [id number]   Final   IRON PANEL    Narrative: The following orders were created for panel order Iron Panel    Procedure                               Abnormality         Status                     --------- -----------         ------                     Iron Saturation %[[id number]]            Abnormal            Final result               Ferritin[[id number]]                     Abnormal            Final result                 Please view results for these tests on the individual orders  Time reflects when diagnosis was documented in both MDM as applicable and the Disposition within this note     Time User Action Codes Description Comment    6/19/2019 11:50 AM Leon Purip, Hank Juares Add [R53 1] Weakness     6/19/2019  3:48 PM Jenny Faden Add [R93 89] Abnormal CT scan     6/19/2019  4:42 PM Jenny Faden Add [D64 9] Anemia, unspecified type     6/19/2019  5:44 PM Petrona Lat Add [R29 90] Stroke-like symptom     6/21/2019  2:41 PM Amrita Gibbons Add [I65 22] Symptomatic stenosis of left carotid artery       ED Disposition     ED Disposition Condition Date/Time Comment    Admit Stable Wed Jun 19, 2019  3:04 PM Case was discussed with MERRY and the patient's admission status was agreed to be Admission Status: inpatient status to the service of Dr Jarett Marshall   Follow-up Information    None       Current Discharge Medication List      CONTINUE these medications which have NOT CHANGED    Details   aspirin 81 mg chewable tablet Chew 81 mg daily      Garlic 105 MG TABS Take 100 mg by mouth daily      metoprolol tartrate (LOPRESSOR) 50 mg tablet Take 50 mg by mouth every 12 (twelve) hours      atorvastatin (LIPITOR) 40 mg tablet TAKE ONE TABLET BY MOUTH ONCE DAILY  Qty: 90 tablet, Refills: 1    Associated Diagnoses: Mixed hyperlipidemia           No discharge procedures on file  Prior to Admission Medications   Prescriptions Last Dose Informant Patient Reported? Taking?    Garlic 126 MG TABS   Yes Yes   Sig: Take 100 mg by mouth daily   aspirin 81 mg chewable tablet   Yes Yes   Sig: Chew 81 mg daily   atorvastatin (LIPITOR) 40 mg tablet Not Taking at Unknown time  No No   Sig: TAKE ONE TABLET BY MOUTH ONCE DAILY Patient not taking: Reported on 6/19/2019   metoprolol tartrate (LOPRESSOR) 50 mg tablet   Yes Yes   Sig: Take 50 mg by mouth every 12 (twelve) hours      Facility-Administered Medications: None       Portions of the record may have been created with voice recognition software  Occasional wrong word or "sound a like" substitutions may have occurred due to the inherent limitations of voice recognition software  Read the chart carefully and recognize, using context, where substitutions have occurred      Electronically signed by:  Adriana Juares

## 2019-06-19 NOTE — ASSESSMENT & PLAN NOTE
Continue with beta-blocker as per home dose, monitor  Chronic stroke on CT scan, off window for permissive hypertension

## 2019-06-19 NOTE — ED NOTES
Blood bank called x2 at this time  States will call primary RN when blood is ready to be released        Darshana Jacobs RN  06/19/19 7845

## 2019-06-19 NOTE — ASSESSMENT & PLAN NOTE
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

## 2019-06-19 NOTE — CONSULTS
Consultation - Vascular Surgery   Karel Cifuentes 78 y o  female MRN: [id number]  Unit/Bed#: ED 21 Encounter: [id number]    Assessment/Plan     Assessment:  Pt is a 79y o  F with new right sided weakness and facial droop found to have an occluded R ICA and 50% stenosis of her LCA    Plan:  -Recommend carotid duplex to evaluate remaining carotid, especially with right sided symptoms and soft appearing plaque on CTA  -Wound recommend anticoagulation for L ORI if cleared by neurology and internal medicine if Hg remains stable  -Will likely need L CEA once medically stable  -Cardiology pre-op clearance  -Neurology following  -F/u ECHO  -F/u MRI brain  -Primary per SLIM    History of Present Illness     HPI:  Karel Cifuentes is a 78 y o  female who presents with pmh HTN, HLD, who takes ASA at home and presents with new onset right sided weakness and facial droop which has been present for 1 week, but worsened today  She was evaluated by neurology and wasn't a candidate for TPA  She was also having several weeks of fatigue and falls  She had no symptoms of amaurosis fugax or previous stroke like symptoms  She is nauseous today, otherwise in her normal state of health  CTA head/neck: L parietal lobe subacute/chronic infarct  Occluded right internal carotid artery from its origin with retrograde filling into the intracranial R ICA  50% L ORI  Inpatient consult to Vascular Surgery  Consult performed by: Dori Luevano MD  Consult ordered by: Richie Waters MD          Review of Systems   Constitutional: Positive for activity change  Negative for appetite change, fatigue and fever  HENT: Negative  Eyes: Negative  Respiratory: Negative for chest tightness and shortness of breath  Cardiovascular: Negative  Gastrointestinal: Positive for nausea and vomiting  Endocrine: Negative  Genitourinary: Negative  Musculoskeletal: Negative  Skin: Negative      Neurological: Positive for facial asymmetry and weakness  Negative for dizziness, speech difficulty, light-headedness, numbness and headaches  Hematological: Negative  Psychiatric/Behavioral: Negative  Historical Information   Past Medical History:   Diagnosis Date    Hypertension      History reviewed  No pertinent surgical history  Social History   Social History     Substance and Sexual Activity   Alcohol Use Not Currently     Social History     Substance and Sexual Activity   Drug Use Never     Social History     Tobacco Use   Smoking Status Current Every Day Smoker    Packs/day: 0 25    Years: 15 00    Pack years: 3 75    Types: Cigarettes     Family History: non-contributory    Meds/Allergies   all current active meds have been reviewed  No Known Allergies    Objective   First Vitals:   Blood Pressure: (!) 207/100 (06/19/19 1154)  Pulse: 81 (06/19/19 1154)  Temperature: 98 °F (36 7 °C) (06/19/19 1226)  Temp Source: Oral (06/19/19 1226)  Respirations: (!) 32 (06/19/19 1154)  Weight - Scale: 74 8 kg (165 lb) (06/19/19 1226)  SpO2: 99 % (06/19/19 1154)    Current Vitals:   Blood Pressure: 167/73 (06/19/19 1600)  Pulse: (!) 52 (06/19/19 1600)  Temperature: 97 7 °F (36 5 °C) (06/19/19 1530)  Temp Source: Oral (06/19/19 1530)  Respirations: 22 (06/19/19 1600)  Weight - Scale: 74 8 kg (165 lb) (06/19/19 1226)  SpO2: 99 % (06/19/19 1600)      Intake/Output Summary (Last 24 hours) at 6/19/2019 1621  Last data filed at 6/19/2019 1525  Gross per 24 hour   Intake 1000 ml   Output    Net 1000 ml       Invasive Devices     Peripheral Intravenous Line            Peripheral IV 06/19/19 Left Antecubital less than 1 day    Peripheral IV 06/19/19 Left Forearm less than 1 day                Physical Exam   Constitutional: She is oriented to person, place, and time  She appears well-developed and well-nourished  No distress  HENT:   Head: Normocephalic and atraumatic  Eyes: Pupils are equal, round, and reactive to light  Neck: Normal range of motion  Cardiovascular: Normal rate and regular rhythm  Pulmonary/Chest: Effort normal  No respiratory distress  Abdominal: Soft  She exhibits no distension  There is no tenderness  Musculoskeletal: She exhibits no edema  4/5 RUE strength, 5/5 LUE strength  5/5 b/l lower extremity strength  Right sided facial droop   Neurological: She is alert and oriented to person, place, and time  Skin: Skin is warm and dry  Capillary refill takes less than 2 seconds  She is not diaphoretic  Psychiatric: She has a normal mood and affect  Lab Results:   I have personally reviewed pertinent lab results  , CBC:   Lab Results   Component Value Date    WBC 5 87 06/19/2019    HGB 5 8 (LL) 06/19/2019    HCT 21 8 (L) 06/19/2019    MCV 63 (L) 06/19/2019     06/19/2019    MCH 15 2 (L) 06/19/2019    MCHC 24 3 (L) 06/19/2019    RDW 22 9 (H) 06/19/2019   , CMP:     EKG, Pathology, and Other Studies: I have personally reviewed pertinent reports

## 2019-06-19 NOTE — ASSESSMENT & PLAN NOTE
Unclear, will obtain peripheral smear to rule out hemolysis, obtain LFTs  Iron storages, folate and B12 requested and pending  Guaiac in the ER negative, obtain FOBT x3  Consult Gastroenterology  One results of multiple status back including UPEP and SPEP consideration if appropriate to consult Hematology  Receiving blood transfusion in the ER, x2 units, H&H q 8 hours for 24 hours and then if stable discontinue  Start Protonix  Clear liquid diet for now

## 2019-06-19 NOTE — ED PROVIDER NOTES
History  Chief Complaint   Patient presents with   Vanessa 21     Pt here after last time known well 06/14/19     Patient is 70-year-old female with a history of hypertension, baby aspirin use who presents with right sided weakness in the setting of 2 weeks of recurrent falls  Initial EMS report was that patient had had several falls in the past week +/- a fall today and was noted to have right hand weakness and right facial droop  Also noted to have increased confusion from baseline  Stroke alert called but on arrival patient reported approximately 5 days of symptoms  She has had trouble grasping items with her right hand  Symptoms are moderate in severity and have been constant since onset  No associated headache, blurry/double vision, chest pain, shortness of breath  Prior to Admission Medications   Prescriptions Last Dose Informant Patient Reported? Taking? Garlic 011 MG TABS   Yes Yes   Sig: Take 100 mg by mouth daily   aspirin 81 mg chewable tablet   Yes Yes   Sig: Chew 81 mg daily   atorvastatin (LIPITOR) 40 mg tablet Not Taking at Unknown time  No No   Sig: TAKE ONE TABLET BY MOUTH ONCE DAILY   Patient not taking: Reported on 6/19/2019   metoprolol tartrate (LOPRESSOR) 50 mg tablet   Yes Yes   Sig: Take 50 mg by mouth every 12 (twelve) hours      Facility-Administered Medications: None       Past Medical History:   Diagnosis Date    Hypertension        History reviewed  No pertinent surgical history  History reviewed  No pertinent family history  I have reviewed and agree with the history as documented      Social History     Tobacco Use    Smoking status: Current Every Day Smoker     Packs/day: 0 25     Years: 50 00     Pack years: 12 50     Types: Cigarettes    Smokeless tobacco: Never Used   Substance Use Topics    Alcohol use: Yes     Frequency: Monthly or less     Drinks per session: 1 or 2    Drug use: Never        Review of Systems   Constitutional: Negative for chills, fatigue and fever  HENT: Negative for congestion and sore throat  Eyes: Negative for visual disturbance  Respiratory: Negative for cough and shortness of breath  Cardiovascular: Negative for chest pain  Gastrointestinal: Negative for abdominal pain, diarrhea, nausea and vomiting  Endocrine: Negative for polyuria  Genitourinary: Negative for difficulty urinating and dysuria  Musculoskeletal: Negative for arthralgias  Skin: Positive for pallor  Negative for rash  Neurological: Positive for weakness, light-headedness and numbness  Negative for dizziness, speech difficulty and headaches  All other systems reviewed and are negative  Physical Exam  ED Triage Vitals   Temperature Pulse Respirations Blood Pressure SpO2   06/19/19 1226 06/19/19 1154 06/19/19 1154 06/19/19 1154 06/19/19 1154   98 °F (36 7 °C) 81 (!) 32 (!) 207/100 99 %      Temp Source Heart Rate Source Patient Position - Orthostatic VS BP Location FiO2 (%)   06/19/19 1226 06/19/19 1226 06/19/19 1226 06/19/19 1226 --   Oral Monitor Lying Right arm       Pain Score       06/19/19 1315       No Pain             Orthostatic Vital Signs  Vitals:    06/25/19 1515 06/25/19 2148 06/26/19 0718 06/26/19 1532   BP: 161/73 154/72 157/71 (!) 178/79   Pulse: 58 60 66 59   Patient Position - Orthostatic VS:           Physical Exam   Constitutional: She is oriented to person, place, and time  She appears well-developed and well-nourished  No distress  HENT:   Head: Normocephalic and atraumatic  Eyes: Pupils are equal, round, and reactive to light  EOM are normal    Neck: Normal range of motion  Neck supple  Cardiovascular: Normal rate, regular rhythm and normal heart sounds  Pulmonary/Chest: Effort normal and breath sounds normal  No respiratory distress  Abdominal: Soft  Bowel sounds are normal  There is no tenderness  Musculoskeletal: Normal range of motion  Neurological: She is alert and oriented to person, place, and time  3+/5 strength RUE  Pronator drift RUE  No appreciable facial droop   Skin: Skin is warm and dry  There is pallor  Psychiatric: She has a normal mood and affect  Nursing note and vitals reviewed        ED Medications  Medications   metoprolol tartrate (LOPRESSOR) tablet 50 mg (50 mg Oral Given 6/26/19 0837)   acetaminophen (TYLENOL) tablet 650 mg (650 mg Oral Given 6/26/19 0837)   pantoprazole (PROTONIX) EC tablet 40 mg (40 mg Oral Given 6/26/19 0540)   regadenoson (LEXISCAN) 0 4 mg/5 mL injection **ADS Override Pull** (has no administration in time range)   atorvastatin (LIPITOR) tablet 80 mg (80 mg Oral Given 6/26/19 1729)   melatonin tablet 6 mg (6 mg Oral Given 6/25/19 2148)   hydrALAZINE (APRESOLINE) injection 10 mg (10 mg Intravenous Given 6/23/19 1517)   ondansetron (ZOFRAN) injection 4 mg (4 mg Intravenous Given 6/24/19 0535)   oxyCODONE (ROXICODONE) IR tablet 2 5 mg (2 5 mg Oral Given 6/24/19 0535)   iron sucrose (VENOFER) 300 mg in sodium chloride 0 9 % 250 mL IVPB (300 mg Intravenous New Bag 6/26/19 0838)   aspirin (ECOTRIN LOW STRENGTH) EC tablet 81 mg (81 mg Oral Given 6/26/19 0837)   enoxaparin (LOVENOX) subcutaneous injection 40 mg (40 mg Subcutaneous Given 6/26/19 0838)   ondansetron (ZOFRAN) injection 4 mg (4 mg Intravenous Given 6/19/19 1214)   iohexol (OMNIPAQUE) 350 MG/ML injection (MULTI-DOSE) 85 mL (85 mL Intravenous Given 6/19/19 1215)   sodium chloride 0 9 % bolus 1,000 mL (0 mL Intravenous Stopped 6/19/19 1525)   iodixanol (VISIPAQUE) 320 MG/ML injection 80 mL (80 mL Intravenous Given 6/19/19 1419)   ondansetron (ZOFRAN) injection 4 mg (4 mg Intravenous Given 6/19/19 1430)   melatonin tablet 3 mg (3 mg Oral Given 6/19/19 2157)   regadenoson (LEXISCAN) injection 0 4 mg (0 4 mg Intravenous Given 6/20/19 1523)   potassium chloride (K-DUR,KLOR-CON) CR tablet 40 mEq (40 mEq Oral Given 6/21/19 1027)   polyethylene glycol (GOLYTELY) bowel prep 4,000 mL (4,000 mL Oral Given 6/23/19 8555) ALPRAZolam Kandee Wadsworth) tablet 0 25 mg (0 25 mg Oral Given 6/22/19 2223)   simethicone (MYLICON) 40 mg in sterile water 60 mL (40 mg Irrigation Given 6/24/19 0842)       Diagnostic Studies  Results Reviewed     Procedure Component Value Units Date/Time    Hemoglobin [[id number]]  (Abnormal) Collected:  06/25/19 0936    Lab Status:  Final result Specimen:  Blood from Arm, Left Updated:  06/25/19 0945     Hemoglobin 9 1 g/dL     Hemoglobin [[id number]]  (Abnormal) Collected:  06/25/19 0051    Lab Status:  Final result Specimen:  Blood from Hand, Left Updated:  06/25/19 0103     Hemoglobin 8 5 g/dL     Hemoglobin [[id number]]  (Abnormal) Collected:  06/24/19 1736    Lab Status:  Final result Specimen:  Blood from Arm, Left Updated:  06/24/19 1757     Hemoglobin 9 5 g/dL     Hemoglobin [[id number]]  (Abnormal) Collected:  06/24/19 1057    Lab Status:  Final result Specimen:  Blood from Arm, Left Updated:  06/24/19 1106     Hemoglobin 9 3 g/dL     Hemoglobin [[id number]]  (Abnormal) Collected:  06/24/19 0108    Lab Status:  Final result Specimen:  Blood from Arm, Right Updated:  06/24/19 0119     Hemoglobin 8 7 g/dL     Hemoglobin [[id number]]  (Abnormal) Collected:  06/23/19 1601    Lab Status:  Final result Specimen:  Blood from Arm, Right Updated:  06/23/19 1609     Hemoglobin 9 2 g/dL     Hemoglobin [[id number]]  (Abnormal) Collected:  06/23/19 0820    Lab Status:  Final result Specimen:  Blood from Arm, Right Updated:  06/23/19 0829     Hemoglobin 8 9 g/dL     Hemoglobin [[id number]]  (Abnormal) Collected:  06/23/19 0041    Lab Status:  Final result Specimen:  Blood from Arm, Right Updated:  06/23/19 0050     Hemoglobin 8 4 g/dL     Hemoglobin [[id number]]  (Abnormal) Collected:  06/22/19 1613    Lab Status:  Final result Specimen:  Blood from Arm, Right Updated:  06/22/19 1634     Hemoglobin 8 7 g/dL     Hemoglobin [[id number]]  (Abnormal) Collected:  06/22/19 0850    Lab Status:  Final result Specimen:  Blood from Arm, Right Updated:  06/22/19 0911     Hemoglobin 8 9 g/dL     Hemoglobin [[id number]]  (Abnormal) Collected:  06/22/19 0003    Lab Status:  Final result Specimen:  Blood from Arm, Right Updated:  06/22/19 0017     Hemoglobin 8 3 g/dL     Hemoglobin [[id number]]  (Abnormal) Collected:  06/21/19 1626    Lab Status:  Final result Specimen:  Blood from Arm, Left Updated:  06/21/19 1636     Hemoglobin 8 8 g/dL     Protein electrophoresis, serum [[id number]]  (Abnormal) Collected:  06/19/19 1524    Lab Status:  Final result Specimen:  Blood from Arm, Left Updated:  06/21/19 1540     A/G Ratio 1 03     Albumin Electrophoresis 50 7 %      Albumin CONC 3 30 g/dl      Alpha 1 4 8 %      ALPHA 1 CONC 0 31 g/dL      Alpha 2 16 3 %      ALPHA 2 CONC 1 06 g/dL      Beta-1 6 4 %      BETA 1 CONC 0 42 g/dL      Beta-2 4 7 %      BETA 2 CONC 0 31 g/dL      Gamma Globulin 17 1 %      GAMMA CONC 1 11 g/dL      Total Protein 6 5 g/dL      SPEP Interpretation No monoclonal bands noted  Reviewed by: Zeina Disla MD **Electronic Signature**    Narrative:       **                                                                                                                                                                                                                                                                                                                                                                                                                                                                                                                                                                                                                                                                                                                                                                                                                                                                                               No monoclonal bands noted  Reviewed by: Nguyen Walden MD **Electronic Signature**    Hemoglobin [[id number]]  (Abnormal) Collected:  06/21/19 0835    Lab Status:  Final result Specimen:  Blood from Arm, Left Updated:  06/21/19 0850     Hemoglobin 9 0 g/dL     Hemoglobin [[id number]]  (Abnormal) Collected:  06/21/19 0015    Lab Status:  Final result Specimen:  Blood from Arm, Left Updated:  06/21/19 0023     Hemoglobin 8 3 g/dL     Hemoglobin [[id number]]  (Abnormal) Collected:  06/20/19 1809    Lab Status:  Final result Specimen:  Blood from Arm, Left Updated:  06/20/19 1821     Hemoglobin 8 6 g/dL     Hemoglobin [[id number]]  (Abnormal) Collected:  06/20/19 0855    Lab Status:  Final result Specimen:  Blood from Arm, Right Updated:  06/20/19 0912     Hemoglobin 8 4 g/dL     Hemoglobin A1c w/EAG Estimation [[id number]] Collected:  06/20/19 0547    Lab Status:  Final result Specimen:  Blood from Arm, Right Updated:  06/20/19 0730     Hemoglobin A1C 5 9 %       mg/dl     Lipid Panel with Direct LDL reflex [[id number]]  (Abnormal) Collected:  06/20/19 0547    Lab Status:  Final result Specimen:  Blood from Arm, Right Updated:  06/20/19 0716     Cholesterol 166 mg/dL      Triglycerides 115 mg/dL      HDL, Direct 37 mg/dL      LDL Calculated 106 mg/dL     Magnesium [[id number]]  (Normal) Collected:  06/20/19 0547    Lab Status:  Final result Specimen:  Blood from Arm, Right Updated:  06/20/19 0716     Magnesium 2 1 mg/dL     Phosphorus [[id number]]  (Normal) Collected:  06/20/19 0547    Lab Status:  Final result Specimen:  Blood from Arm, Right Updated:  06/20/19 0716     Phosphorus 2 9 mg/dL     Basic metabolic panel [[id number]]  (Abnormal) Collected:  06/20/19 0547    Lab Status:  Final result Specimen:  Blood from Arm, Right Updated:  06/20/19 0716     Sodium 139 mmol/L      Potassium 3 6 mmol/L      Chloride 111 mmol/L      CO2 24 mmol/L      ANION GAP 4 mmol/L      BUN 9 mg/dL      Creatinine 0 97 mg/dL      Glucose 80 mg/dL      Calcium 8 8 mg/dL eGFR 56 ml/min/1 73sq m     Narrative:       [place] guidelines for Chronic Kidney Disease (CKD):     Stage 1 with normal or high GFR (GFR > 90 mL/min/1 73 square meters)    Stage 2 Mild CKD (GFR = 60-89 mL/min/1 73 square meters)    Stage 3A Moderate CKD (GFR = 45-59 mL/min/1 73 square meters)    Stage 3B Moderate CKD (GFR = 30-44 mL/min/1 73 square meters)    Stage 4 Severe CKD (GFR = 15-29 mL/min/1 73 square meters)    Stage 5 End Stage CKD (GFR <15 mL/min/1 73 square meters)  Note: GFR calculation is accurate only with a steady state creatinine    Ferritin [[id number]]  (Abnormal) Collected:  06/19/19 1203    Lab Status:  Final result Specimen:  Blood from Arm, Left Updated:  06/19/19 1818     Ferritin 2 ng/mL     Vitamin B12 [[id number]]  (Normal) Collected:  06/19/19 1203    Lab Status:  Final result Specimen:  Blood from Arm, Left Updated:  06/19/19 1818     Vitamin B-12 417 pg/mL     Folate [[id number]]  (Abnormal) Collected:  06/19/19 1203    Lab Status:  Final result Specimen:  Blood from Arm, Left Updated:  06/19/19 1818     Folate >20 0 ng/mL     Hepatic function panel [[id number]]  (Abnormal) Collected:  06/19/19 1203    Lab Status:  Final result Specimen:  Blood from Arm, Left Updated:  06/19/19 1818     Total Bilirubin 0 49 mg/dL      Bilirubin, Direct 0 21 mg/dL      Alkaline Phosphatase 99 U/L      AST 31 U/L      ALT 16 U/L      Total Protein 6 3 g/dL      Albumin 3 0 g/dL     Iron Saturation % [[id number]]  (Abnormal) Collected:  06/19/19 1612    Lab Status:  Final result Specimen:  Blood Updated:  06/19/19 1630     Iron Saturation 2 %      TIBC 381 ug/dL      Iron 9 ug/dL     Ferritin [[id number]]  (Abnormal) Collected:  06/19/19 1612    Lab Status:  Final result Specimen:  Blood Updated:  06/19/19 1630     Ferritin 3 ng/mL     Hemoglobin [[id number]]  (Abnormal) Collected:  06/19/19 1524    Lab Status:  Final result Specimen:  Blood from Arm, Left Updated:  06/19/19 1559     Hemoglobin 5 8 g/dL     Protein electrophoresis, urine [[id number]]     Lab Status:  No result Specimen:  Urine     Hemoglobin Electrophoresis [[id number]]     Lab Status:  No result Specimen:  Blood     Occult blood 1-3, stool [[id number]]     Lab Status:  No result Specimen:  Stool     Fingerstick Glucose (POCT) [[id number]]  (Abnormal) Collected:  06/19/19 1238    Lab Status:  Final result Updated:  06/19/19 1240     POC Glucose 150 mg/dl     Basic metabolic panel [[id number]]  (Abnormal) Collected:  06/19/19 1203    Lab Status:  Final result Specimen:  Blood from Arm, Left Updated:  06/19/19 1238     Sodium 133 mmol/L      Potassium 3 8 mmol/L      Chloride 106 mmol/L      CO2 22 mmol/L      ANION GAP 5 mmol/L      BUN 13 mg/dL      Creatinine 1 01 mg/dL      Glucose 117 mg/dL      Calcium 8 1 mg/dL      eGFR 53 ml/min/1 73sq m     Narrative:       [place] guidelines for Chronic Kidney Disease (CKD):     Stage 1 with normal or high GFR (GFR > 90 mL/min/1 73 square meters)    Stage 2 Mild CKD (GFR = 60-89 mL/min/1 73 square meters)    Stage 3A Moderate CKD (GFR = 45-59 mL/min/1 73 square meters)    Stage 3B Moderate CKD (GFR = 30-44 mL/min/1 73 square meters)    Stage 4 Severe CKD (GFR = 15-29 mL/min/1 73 square meters)    Stage 5 End Stage CKD (GFR <15 mL/min/1 73 square meters)  Note: GFR calculation is accurate only with a steady state creatinine    Protime-INR [[id number]]  (Abnormal) Collected:  06/19/19 1203    Lab Status:  Final result Specimen:  Blood from Arm, Left Updated:  06/19/19 1231     Protime 14 7 seconds      INR 1 14    APTT [[id number]]  (Abnormal) Collected:  06/19/19 1203    Lab Status:  Final result Specimen:  Blood from Arm, Left Updated:  06/19/19 1231     PTT 25 seconds     CBC and Platelet [[id number]]  (Abnormal) Collected:  06/19/19 1203    Lab Status:  Final result Specimen:  Blood from Arm, Left Updated:  06/19/19 1226     WBC 5 87 Thousand/uL RBC 3 49 Million/uL      Hemoglobin 5 3 g/dL      Hematocrit 21 8 %      MCV 63 fL      MCH 15 2 pg      MCHC 24 3 g/dL      RDW 22 9 %      Platelets 309 Thousands/uL                  CT head wo contrast   Final Result by Sanaz Bermeo MD (06/24 5828)      1  Right frontal extracranial soft tissue hematoma  No calvarial fracture or acute intracranial injury  2   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  Workstation performed: [id number]         MRI brain wo contrast   Final Result by Em Monsivais MD (06/21 1235)      Superficial cortical infarct in the left parietal region and small deep watershed infarct in the left frontal region representing acute infarct in the distribution left MCA      No acute hemorrhage seen   No mass effect or midline shift seen   Flow void in the petrous and cavernous portion of the right ICA is not identified related to occlusion seen on the recent CT      Workstation performed: [id number]         VAS carotid complete study   Final Result by Kyle Vaughn MD (06/21 4345)      CT chest abdomen pelvis w contrast   Final Result by Em Monsivais MD (06/19 1912)   No solid visceral injury seen      No free fluid seen      Area of groundglass density seen in the right upper lobe with Micro nodularity, probably on an inflammatory basis  Short interval follow-up suggested in 3 months demonstrate resolution      Rounded density seen in relation to the pubic symphysis, measuring about 2 7 cm,     This may represent a distended bursa related to arthritic changes in the symphysis pubis less likely due to urethral diverticulum               Workstation performed: [id number]         CT recon only cervical spine (No Charge)   Final Result by Jacob Dior MD (06/19 3722)      No acute osseous injury in the cervical spine                   Workstation performed: [id number]         CTA head and neck w wo contrast   Final Result by Jess Peters MD Gadiel (06/19 2696)      1  Area of cortical/subcortical hypoattenuation in the left parietal lobe, could represent subacute to chronic infarct  2   Occluded right internal carotid artery from its origin with retrograde filling of the intracranial supraclinoid right ICA  3   Remainder of major vasculature of [place] are patent without critical stenosis  4   Atherosclerotic disease at the left cervical carotid bifurcation and proximal cervical internal carotid artery with ulcerated plaque at the bifurcation  There is approximately 50% stenosis by NASCET  criteria  5   Mild area of groundglass and nodular opacity in the periphery of partially imaged right upper lobe, may indicate inflammatory or infectious etiology  6   Sphenoid and ethmoidal sinus disease as described                I personally discussed this study with Della Hutchins on 6/19/2019 at 1:35PM                         Workstation performed: [id number]               Procedures  Procedures        ED Course                               MDM  Number of Diagnoses or Management Options  Weakness:   Diagnosis management comments: Patient is 68-year-old female with a history of hypertension, baby aspirin use who presents with right sided weakness in the setting of 2 weeks of recurrent falls  Exam shows 4/5 strength with drift RUE and RLE; neuro exam otherwise grossly intact; pallor; active emesis; no tenderness, deformity, or ecchymosis  Patient taken directly to CT for [facility] and CTA, which shows "Area of cortical/subcortical hypoattenuation in the left parietal lobe, could represent subacute to chronic infarct  Occluded right internal carotid artery from its origin with retrograde filling of the intracranial supraclinoid right ICA " TPA not indicated due to prolonged time from last normal   Labs performed and show anemia with HGB 5 3  CATHY hemoccult negative   2 U PRBCs ordered and patient admitted to medicine for further care  Disposition  Final diagnoses:   Weakness     Time reflects when diagnosis was documented in both MDM as applicable and the Disposition within this note     Time User Action Codes Description Comment    6/19/2019 11:50 AM Ayaz Jim Seen Add [R53 1] Weakness     6/19/2019  3:48 PM Chandni Blow Add [R93 89] Abnormal CT scan     6/19/2019  4:42 PM Chandni Blow Add [D64 9] Anemia, unspecified type     6/19/2019  5:44 PM Thom Hensley Add [R29 90] Stroke-like symptom     6/21/2019  2:41 PM Sheng Gustafson Add [I65 22] Symptomatic stenosis of left carotid artery       ED Disposition     ED Disposition Condition Date/Time Comment    Admit Stable Wed Jun 19, 2019  3:04 PM Case was discussed with MERRY and the patient's admission status was agreed to be Admission Status: inpatient status to the service of Dr Teresa Loving   Follow-up Information    None         Current Discharge Medication List      CONTINUE these medications which have NOT CHANGED    Details   aspirin 81 mg chewable tablet Chew 81 mg daily      Garlic 349 MG TABS Take 100 mg by mouth daily      metoprolol tartrate (LOPRESSOR) 50 mg tablet Take 50 mg by mouth every 12 (twelve) hours      atorvastatin (LIPITOR) 40 mg tablet TAKE ONE TABLET BY MOUTH ONCE DAILY  Qty: 90 tablet, Refills: 1    Associated Diagnoses: Mixed hyperlipidemia           No discharge procedures on file  ED Provider  Attending physically available and evaluated Shabana Porshagabriela  LOUIS managed the patient along with the ED Attending      Electronically Signed by         Adi Arechiga MD  06/26/19 9464

## 2019-06-19 NOTE — ED NOTES
Per request from blood bank, blood request sent to the blood bank at this time        Graciela Higuera RN  06/19/19 7679

## 2019-06-19 NOTE — CONSULTS
UROLOGY CONSULTATION NOTE     Patient Identifiers: Mana Lanza (MRN [id number])  Service Requesting Consultation: SLIM  Service Providing Consultation:  Urology, Aashish Arana MD    Date of Service: 6/19/2019    Reason for Consultation: Pelvic soft tissue lesion      ASSESSMENT:     78 y o  old female with  pelvic soft tissue area anterior to the urethra  PLAN:     Patient has acute inpatient needs unrelated to this pelvic lesion  Unlikely to be related to the urinary system given the lack of filling with contrast and the lack of a history of urinary symptoms or hematuria  Can certainly offer the patient outpatient cystoscopic evaluation with pelvic exam or pelvic MRI in the outpatient setting  Will recommend follow-up in our office  No acute urologic needs at this current time  Please call with questions    History of Present Illness:     Mana Lanza is a 78 y o  old with a history of tobacco abuse  Patient presented with stroke-like symptoms with right-sided weakness and droop  CT scan demonstrated a positive subacute to chronic stroke  Patient was admitted to the hospital   Abdomen pelvis CT scan demonstrated a small soft tissue lesion underneath the pubic symphysis anterior to the urethra; given the possibility of urethral diverticulum urology was consulted  Patient denies prior history of urologic issues with urinary frequency urgency, purulent drainage from the urethra, prior urinary tract infections or hematuria  She has never seen a urologist     Past Medical, Past Surgical History:     Past Medical History:   Diagnosis Date    Hypertension    :    History reviewed   No pertinent surgical history :    Medications, Allergies:     Current Facility-Administered Medications:     acetaminophen (TYLENOL) tablet 650 mg, 650 mg, Oral, Q6H PRN, Rosa Courtney MD    atorvastatin (LIPITOR) tablet 40 mg, 40 mg, Oral, Daily, Rosa Courtney MD, 40 mg at 06/19/19 1612    metoprolol tartrate (LOPRESSOR) tablet 50 mg, 50 mg, Oral, Q12H [address], Hortencia aWrner MD  [place]  [START ON 6/20/2019] pantoprazole (PROTONIX) EC tablet 40 mg, 40 mg, Oral, Early Morning, Hortencia Warner MD    Allergies:  No Known Allergies:    Social and Family History:   Social History:   Social History     Tobacco Use    Smoking status: Current Every Day Smoker     Packs/day: 0 25     Years: 50 00     Pack years: 12 50     Types: Cigarettes    Smokeless tobacco: Never Used   Substance Use Topics    Alcohol use: Yes     Frequency: Monthly or less     Drinks per session: 1 or 2    Drug use: Never     Social History     Tobacco Use   Smoking Status Current Every Day Smoker    Packs/day: 0 25    Years: 50 00    Pack years: 12 50    Types: Cigarettes   Smokeless Tobacco Never Used       Family History:  History reviewed  No pertinent family history :     Review of Systems:     General: Fever, chills, or night sweats: negative  Cardiac: Negative for chest pain  Pulmonary: Negative for shortness of breath  Gastrointestinal: Abdominal pain negative  Nausea, vomiting, or diarrhea negative,  Genitourinary: See HPI above  Patient does not have hematuria  All other systems queried were negative  Physical Exam:   General: Patient is pleasant and in NAD  Awake and alert  BP (!) 153/104   Pulse 63   Temp 97 7 °F (36 5 °C)   Resp 18   Wt 74 8 kg (165 lb)   SpO2 98%   BMI 33 33 kg/m²   Cardiac: Peripheral edema: negative  Pulmonary: Non-labored breathing  Abdomen: Soft, non-tender, non-distended  No surgical scars  No masses, tenderness, hernias noted  Genitourinary: Negative CVA tenderness, negative suprapubic tenderness      Labs:     Lab Results   Component Value Date    HGB 5 8 (LL) 06/19/2019    HCT 21 8 (L) 06/19/2019    WBC 5 87 06/19/2019     06/19/2019   ]    Lab Results   Component Value Date     01/04/2016    K 3 8 06/19/2019     06/19/2019    CO2 22 06/19/2019    BUN 13 06/19/2019 CREATININE 1 01 06/19/2019    CALCIUM 8 1 (L) 06/19/2019    GLUCOSE 97 01/04/2016   ]    Imaging:   I personally reviewed the images and report of the following studies, and reviewed them with the patient:    6/19/19  CT CHEST, ABDOMEN AND PELVIS WITH IV CONTRAST     INDICATION:   fall, severe anemia      COMPARISON:  None      TECHNIQUE: CT examination of the chest, abdomen and pelvis was performed  Axial, sagittal, and coronal 2D reformatted images were created from the source data and submitted for interpretation      Radiation dose length product (DLP) for this visit:  833 34 mGy-cm   This examination, like all CT scans performed in the [facility], was performed utilizing techniques to minimize radiation dose exposure, including the use of iterative   reconstruction and automated exposure control      IV Contrast:  80 mL of iodixanol (VISIPAQUE)  Enteric Contrast: Enteric contrast was administered      FINDINGS:     CHEST     LUNGS:  Areas of groundglass density seen in the right upper lobe with mild Micro nodularity  This is seen in image 55 series 604     PLEURA:  Unremarkable      HEART/GREAT VESSELS:  The ascending aorta measures 3 cm  The arch appears unremarkable in the descending aorta appears unremarkable     MEDIASTINUM AND LOUISE:  No significant mediastinal lymph node enlargement seen     CHEST WALL AND LOWER NECK:   Unremarkable      ABDOMEN     LIVER/BILIARY TREE:  Unremarkable      GALLBLADDER:  No calcified gallstones  No pericholecystic inflammatory change      SPLEEN:  Unremarkable      PANCREAS:  Unremarkable      ADRENAL GLANDS:  Unremarkable      KIDNEYS/URETERS:  Unremarkable  No hydronephrosis      STOMACH AND BOWEL:  Unremarkable      APPENDIX:  No findings to suggest appendicitis      ABDOMINOPELVIC CAVITY:  No ascites or free intraperitoneal air   No lymphadenopathy      VESSELS:  Unremarkable for patient's age      PELVIS     REPRODUCTIVE ORGANS:  The uterus is not identified     URINARY BLADDER:  The urinary bladder appear unremarkable     ABDOMINAL WALL/INGUINAL REGIONS:  Unremarkable      OSSEOUS STRUCTURES:  A rounded density seen inferior to the pubic symphysis, this probably represents a distended bursa related to arthritic changes  This is seen in image 119 series 2, less likely this may be a urethral diverticulum     IMPRESSION:  No solid visceral injury seen     No free fluid seen     Area of groundglass density seen in the right upper lobe with Micro nodularity, probably on an inflammatory basis  Short interval follow-up suggested in 3 months demonstrate resolution     Rounded density seen in relation to the pubic symphysis, measuring about 2 7 cm,   This may represent a distended bursa related to arthritic changes in the symphysis pubis less likely due to urethral diverticulum      Thank you for allowing me to participate in this patients care  Please do not hesitate to call with any additional questions    Irene Daniels MD

## 2019-06-20 ENCOUNTER — APPOINTMENT (INPATIENT)
Dept: RADIOLOGY | Facility: HOSPITAL | Age: 79
DRG: 037 | End: 2019-06-20
Payer: COMMERCIAL

## 2019-06-20 ENCOUNTER — APPOINTMENT (INPATIENT)
Dept: NON INVASIVE DIAGNOSTICS | Facility: HOSPITAL | Age: 79
DRG: 037 | End: 2019-06-20
Payer: COMMERCIAL

## 2019-06-20 LAB
ABO GROUP BLD BPU: NORMAL
ABO GROUP BLD BPU: NORMAL
ANION GAP SERPL CALCULATED.3IONS-SCNC: 4 MMOL/L (ref 4–13)
BASOPHILS # BLD AUTO: 0.09 THOUSANDS/ΜL (ref 0–0.1)
BASOPHILS NFR BLD AUTO: 1 % (ref 0–1)
BPU ID: NORMAL
BPU ID: NORMAL
BUN SERPL-MCNC: 9 MG/DL (ref 5–25)
CALCIUM SERPL-MCNC: 8.8 MG/DL (ref 8.3–10.1)
CHLORIDE SERPL-SCNC: 111 MMOL/L (ref 100–108)
CHOLEST SERPL-MCNC: 166 MG/DL (ref 50–200)
CO2 SERPL-SCNC: 24 MMOL/L (ref 21–32)
CREAT SERPL-MCNC: 0.97 MG/DL (ref 0.6–1.3)
CROSSMATCH: NORMAL
CROSSMATCH: NORMAL
EOSINOPHIL # BLD AUTO: 0.21 THOUSAND/ΜL (ref 0–0.61)
EOSINOPHIL NFR BLD AUTO: 2 % (ref 0–6)
ERYTHROCYTE [DISTWIDTH] IN BLOOD BY AUTOMATED COUNT: 24 % (ref 11.6–15.1)
EST. AVERAGE GLUCOSE BLD GHB EST-MCNC: 123 MG/DL
GFR SERPL CREATININE-BSD FRML MDRD: 56 ML/MIN/1.73SQ M
GLUCOSE SERPL-MCNC: 80 MG/DL (ref 65–140)
HBA1C MFR BLD: 5.9 % (ref 4.2–6.3)
HCT VFR BLD AUTO: 30.2 % (ref 34.8–46.1)
HDLC SERPL-MCNC: 37 MG/DL (ref 40–60)
HGB BLD-MCNC: 8.1 G/DL (ref 11.5–15.4)
HGB BLD-MCNC: 8.4 G/DL (ref 11.5–15.4)
HGB BLD-MCNC: 8.6 G/DL (ref 11.5–15.4)
IMM GRANULOCYTES # BLD AUTO: 0.06 THOUSAND/UL (ref 0–0.2)
IMM GRANULOCYTES NFR BLD AUTO: 1 % (ref 0–2)
LDLC SERPL CALC-MCNC: 106 MG/DL (ref 0–100)
LYMPHOCYTES # BLD AUTO: 1.67 THOUSANDS/ΜL (ref 0.6–4.47)
LYMPHOCYTES NFR BLD AUTO: 13 % (ref 14–44)
MAGNESIUM SERPL-MCNC: 2.1 MG/DL (ref 1.6–2.6)
MCH RBC QN AUTO: 17.4 PG (ref 26.8–34.3)
MCHC RBC AUTO-ENTMCNC: 26.8 G/DL (ref 31.4–37.4)
MCV RBC AUTO: 65 FL (ref 82–98)
MONOCYTES # BLD AUTO: 1.07 THOUSAND/ΜL (ref 0.17–1.22)
MONOCYTES NFR BLD AUTO: 9 % (ref 4–12)
NEUTROPHILS # BLD AUTO: 9.51 THOUSANDS/ΜL (ref 1.85–7.62)
NEUTS SEG NFR BLD AUTO: 74 % (ref 43–75)
NRBC BLD AUTO-RTO: 0 /100 WBCS
PHOSPHATE SERPL-MCNC: 2.9 MG/DL (ref 2.3–4.1)
PLATELET # BLD AUTO: 256 THOUSANDS/UL (ref 149–390)
POTASSIUM SERPL-SCNC: 3.6 MMOL/L (ref 3.5–5.3)
RBC # BLD AUTO: 4.65 MILLION/UL (ref 3.81–5.12)
SODIUM SERPL-SCNC: 139 MMOL/L (ref 136–145)
TRIGL SERPL-MCNC: 115 MG/DL
UNIT DISPENSE STATUS: NORMAL
UNIT DISPENSE STATUS: NORMAL
UNIT PRODUCT CODE: NORMAL
UNIT PRODUCT CODE: NORMAL
UNIT RH: NORMAL
UNIT RH: NORMAL
WBC # BLD AUTO: 12.61 THOUSAND/UL (ref 4.31–10.16)

## 2019-06-20 PROCEDURE — 78452 HT MUSCLE IMAGE SPECT MULT: CPT

## 2019-06-20 PROCEDURE — 84100 ASSAY OF PHOSPHORUS: CPT | Performed by: INTERNAL MEDICINE

## 2019-06-20 PROCEDURE — 99222 1ST HOSP IP/OBS MODERATE 55: CPT | Performed by: INTERNAL MEDICINE

## 2019-06-20 PROCEDURE — 83735 ASSAY OF MAGNESIUM: CPT | Performed by: INTERNAL MEDICINE

## 2019-06-20 PROCEDURE — 78452 HT MUSCLE IMAGE SPECT MULT: CPT | Performed by: INTERNAL MEDICINE

## 2019-06-20 PROCEDURE — 99223 1ST HOSP IP/OBS HIGH 75: CPT | Performed by: SURGERY

## 2019-06-20 PROCEDURE — 93018 CV STRESS TEST I&R ONLY: CPT | Performed by: INTERNAL MEDICINE

## 2019-06-20 PROCEDURE — 80048 BASIC METABOLIC PNL TOTAL CA: CPT | Performed by: INTERNAL MEDICINE

## 2019-06-20 PROCEDURE — 93017 CV STRESS TEST TRACING ONLY: CPT

## 2019-06-20 PROCEDURE — 99232 SBSQ HOSP IP/OBS MODERATE 35: CPT | Performed by: INTERNAL MEDICINE

## 2019-06-20 PROCEDURE — 85018 HEMOGLOBIN: CPT | Performed by: INTERNAL MEDICINE

## 2019-06-20 PROCEDURE — 85025 COMPLETE CBC W/AUTO DIFF WBC: CPT | Performed by: INTERNAL MEDICINE

## 2019-06-20 PROCEDURE — 80061 LIPID PANEL: CPT | Performed by: INTERNAL MEDICINE

## 2019-06-20 PROCEDURE — 93306 TTE W/DOPPLER COMPLETE: CPT | Performed by: INTERNAL MEDICINE

## 2019-06-20 PROCEDURE — 99223 1ST HOSP IP/OBS HIGH 75: CPT | Performed by: PSYCHIATRY & NEUROLOGY

## 2019-06-20 PROCEDURE — 93306 TTE W/DOPPLER COMPLETE: CPT

## 2019-06-20 PROCEDURE — 93016 CV STRESS TEST SUPVJ ONLY: CPT | Performed by: INTERNAL MEDICINE

## 2019-06-20 PROCEDURE — A9502 TC99M TETROFOSMIN: HCPCS

## 2019-06-20 PROCEDURE — 83036 HEMOGLOBIN GLYCOSYLATED A1C: CPT | Performed by: INTERNAL MEDICINE

## 2019-06-20 RX ORDER — ATORVASTATIN CALCIUM 80 MG/1
80 TABLET, FILM COATED ORAL
Status: DISCONTINUED | OUTPATIENT
Start: 2019-06-20 | End: 2019-07-03 | Stop reason: HOSPADM

## 2019-06-20 RX ORDER — ROSUVASTATIN CALCIUM 10 MG/1
40 TABLET, COATED ORAL
Status: DISCONTINUED | OUTPATIENT
Start: 2019-06-20 | End: 2019-06-20

## 2019-06-20 RX ORDER — ASPIRIN 81 MG/1
81 TABLET ORAL DAILY
Status: DISCONTINUED | OUTPATIENT
Start: 2019-06-20 | End: 2019-06-24

## 2019-06-20 RX ORDER — ATORVASTATIN CALCIUM 80 MG/1
80 TABLET, FILM COATED ORAL
Status: DISCONTINUED | OUTPATIENT
Start: 2019-06-21 | End: 2019-06-20

## 2019-06-20 RX ORDER — AMINOPHYLLINE DIHYDRATE 25 MG/ML
INJECTION, SOLUTION INTRAVENOUS
Status: DISCONTINUED
Start: 2019-06-20 | End: 2019-06-20 | Stop reason: WASHOUT

## 2019-06-20 RX ORDER — LANOLIN ALCOHOL/MO/W.PET/CERES
6 CREAM (GRAM) TOPICAL
Status: DISCONTINUED | OUTPATIENT
Start: 2019-06-20 | End: 2019-06-22 | Stop reason: SDUPTHER

## 2019-06-20 RX ORDER — ASPIRIN 81 MG/1
81 TABLET ORAL DAILY
Status: DISCONTINUED | OUTPATIENT
Start: 2019-06-20 | End: 2019-06-20

## 2019-06-20 RX ADMIN — PANTOPRAZOLE SODIUM 40 MG: 40 TABLET, DELAYED RELEASE ORAL at 05:40

## 2019-06-20 RX ADMIN — ATORVASTATIN CALCIUM 80 MG: 80 TABLET, FILM COATED ORAL at 17:56

## 2019-06-20 RX ADMIN — ATORVASTATIN CALCIUM 40 MG: 40 TABLET, FILM COATED ORAL at 09:43

## 2019-06-20 RX ADMIN — MELATONIN 6 MG: at 22:58

## 2019-06-20 RX ADMIN — METOPROLOL TARTRATE 50 MG: 50 TABLET, FILM COATED ORAL at 09:43

## 2019-06-20 RX ADMIN — ASPIRIN 81 MG: 81 TABLET, COATED ORAL at 17:48

## 2019-06-20 RX ADMIN — REGADENOSON 0.4 MG: 0.08 INJECTION, SOLUTION INTRAVENOUS at 15:23

## 2019-06-20 RX ADMIN — METOPROLOL TARTRATE 50 MG: 50 TABLET, FILM COATED ORAL at 21:52

## 2019-06-20 NOTE — PROGRESS NOTES
Progress Note - Vascular Surgery   Jenny Hayes 78 y o  female MRN: [id number]  Unit/Bed#: [facility] 721-01 Encounter: [id number]    Assessment:  Pt is a 79y o  F with new right sided weakness and facial droop found to have an occluded R ICA and 50% stenosis of her LCA    Plan:  F/u carotid duplex  Recommend anticoagulation once cleared by primary team and neurology  F/u MRI/ECHO  Cardiology consult for pre-op clearance  Plan for L CEA, timing TBD  Primary per SLIM    Subjective/Objective   Chief Complaint:     Subjective: DEN  Right sided weakness greatly improved  No new symptoms  Objective:     Blood pressure 133/70, pulse 59, temperature 98 2 °F (36 8 °C), resp  rate 18, weight 74 8 kg (165 lb), SpO2 96 %  ,Body mass index is 33 33 kg/m²  Intake/Output Summary (Last 24 hours) at 6/20/2019 0536  Last data filed at 6/20/2019 0400  Gross per 24 hour   Intake 1700 ml   Output 1250 ml   Net 450 ml       Invasive Devices     Peripheral Intravenous Line            Peripheral IV 06/19/19 Left Forearm 1 day    Peripheral IV 06/19/19 Left Antecubital less than 1 day                Physical Exam: NAD  Atraumatic/normocephalic  AAOX3  Normal mood and affect  Normal respiratory effort  Soft, NT, ND  Skin warm/dry  Right strength 4+/5, Left 5/5      Lab, Imaging and other studies:  I have personally reviewed pertinent lab results    , CBC:   Lab Results   Component Value Date    WBC 12 61 (H) 06/20/2019    HGB 8 1 (L) 06/20/2019    HCT 30 2 (L) 06/20/2019    MCV 65 (L) 06/20/2019     06/20/2019    MCH 17 4 (L) 06/20/2019    MCHC 26 8 (L) 06/20/2019    RDW 24 0 (H) 06/20/2019    NRBC 0 06/20/2019   , CMP:   Lab Results   Component Value Date    SODIUM 133 (L) 06/19/2019    K 3 8 06/19/2019     06/19/2019    CO2 22 06/19/2019    BUN 13 06/19/2019    CREATININE 1 01 06/19/2019    CALCIUM 8 1 (L) 06/19/2019    AST 31 06/19/2019    ALT 16 06/19/2019    ALKPHOS 99 06/19/2019    EGFR 53 06/19/2019   VTE Mechanical Prophylaxis: sequential compression device

## 2019-06-20 NOTE — ASSESSMENT & PLAN NOTE
Unclear, will obtain peripheral smear to rule out hemolysis, obtain LFTs  Iron storages, folate and B12 requested and pending  Guaiac in the ER negative, obtain FOBT x3  Gastroenterology will do colonoscopy and EGD on  Monday, NPO past midnight Sunday, needs to be on dual antiplatelet therapy after CEA  One results of multiple status back including UPEP and SPEP consideration if appropriate to consult Hematology  Receiving blood transfusion in the ER, x2 units, H&H q 8 hours for 24 hours and then if stable discontinue  Started Protonix  Clear liquid diet for now

## 2019-06-20 NOTE — CONSULTS
Consultation - [facility]  Isabel Hernández 78 y o  female MRN: [id number]  Unit/Bed#: [facility] 56-36 Encounter: [id number]        Consults    ASSESSMENT/ PLAN:    77 yo female with pmh HTN, tobacco abuse who presented to the ED with right sided weakness found to have left parietal lobe subacute to chronic stroke being evaluated for anemia     1  Microcytic anemia: baseline Hgb around 9-10 admitted with Hgb of 5 3  She has been having brown BMs and denies melena or hematochezia and stool was heme negative  She was given 2 units of PRBCs and her Hgb responded appropriately to 8 4 today  She has had poor appetite recently  She was found to have occluded right ICA and 50% stenosis of her left ICA, vascular surgery is planning carotid endarterectomy  Recommend EGD and colonoscopy after vascular surgery intervention  She thinks last colonoscopy was done 5 years ago  -monitor H&H  -transfuse as necessary  -f/u hemolysis smear   -okay to continue aspirin   -recommend heparin if anticoagulation needed  -continue regular diet  -plan for EGD/colonoscopy after carotid endarterectomy     Reason for Consult / Principal Problem: anemia    HPI: Isabel Hernández is a 77 yo female with a pmh HTN, tobacco abuse who presented to the ED with right sided weakness  GI was consulted for microcytic anemia  She admits to poor balance and falls over the past few weeks, this progressed to right sided arm weakness and facial droop  She called her daughter who called EMS  On presentation to the ED she had CT head and neck that revealed area of hypo attenuation in the left parietal lobe that could represent a subacute to chronic infarct  It also showed occluded right internal carotid artery and 50% stenosis of left internal carotid artery  Her Hgb on presentation was found to be 5 3, which is significantly decreased from her baseline of around 9-10  She denies any melena or hematochezia, abdominal pain   She does admit to decreased appetite recently  She has never had an EGD  She thinks her last colonoscopy was about 5 years ago and was normal      REVIEW OF SYSTEMS:     CONSTITUTIONAL: Denies any fever, chills, or rigors  poor appetite  No recent weight loss  HEENT: No earache or tinnitus  Denies hearing loss or visual disturbances  CARDIOVASCULAR: No chest pain or palpitations  RESPIRATORY: Denies any cough, hemoptysis, shortness of breath or dyspnea on exertion  GASTROINTESTINAL: As noted in the History of Present Illness  GENITOURINARY: No problems with urination  Denies any hematuria or dysuria  NEUROLOGIC: No dizziness or vertigo, denies headaches  MUSCULOSKELETAL: Denies any muscle or joint pain  SKIN: Denies skin rashes or itching  ENDOCRINE: Denies excessive thirst  Denies intolerance to heat or cold  PSYCHOSOCIAL: Denies depression or anxiety  Denies any recent memory loss  Historical Information   Past Medical History:   Diagnosis Date    Hypertension      History reviewed  No pertinent surgical history  Social History   Social History     Substance and Sexual Activity   Alcohol Use Yes    Frequency: Monthly or less    Drinks per session: 1 or 2     Social History     Substance and Sexual Activity   Drug Use Never     Social History     Tobacco Use   Smoking Status Current Every Day Smoker    Packs/day: 0 25    Years: 50 00    Pack years: 12 50    Types: Cigarettes   Smokeless Tobacco Never Used     History reviewed  No pertinent family history      Meds/Allergies     Medications Prior to Admission   Medication    aspirin 81 mg chewable tablet    Garlic 472 MG TABS    metoprolol tartrate (LOPRESSOR) 50 mg tablet    atorvastatin (LIPITOR) 40 mg tablet     Current Facility-Administered Medications   Medication Dose Route Frequency    acetaminophen (TYLENOL) tablet 650 mg  650 mg Oral Q6H PRN    atorvastatin (LIPITOR) tablet 40 mg  40 mg Oral Daily    metoprolol tartrate (LOPRESSOR) tablet 50 mg  50 mg Oral Q12H [address]    pantoprazole (PROTONIX) EC tablet 40 mg  40 mg Oral Early Morning       No Known Allergies        Objective     Blood pressure 156/85, pulse (!) 53, temperature 97 5 °F (36 4 °C), resp  rate 18, height 5' 2" (1 575 m), weight 74 6 kg (164 lb 7 4 oz), SpO2 97 %  Intake/Output Summary (Last 24 hours) at 6/20/2019 1119  Last data filed at 6/20/2019 6949  Gross per 24 hour   Intake 1940 ml   Output 1750 ml   Net 190 ml         PHYSICAL EXAM:      General Appearance:   A&Ox3, cooperative, no distress, appears stated age    HEENT:   Normocephalic, atraumatic  Right eye exhibits no discharge  Left eye exhibits no discharge  No scleral icterus     Neck:  Supple, symmetrical, trachea midline, no stridor    Lungs:   Clear to auscultation bilaterally; no rales, rhonchi or wheezing    Heart[de-identified]   S1 and S2 normal; regular rate and rhythm; no murmur, rub, or gallop     Abdomen:   Soft, non-tender, non-distended; normal bowel sounds; no masses, no organomegaly    Genitalia:   Deferred    Rectal:   Deferred    Extremities:  No cyanosis, clubbing or edema        Skin:  Skin color, texture, turgor normal, no rashes or lesions          Lab Results:   Admission on 06/19/2019   Component Date Value    Sodium 06/19/2019 133*    Potassium 06/19/2019 3 8     Chloride 06/19/2019 106     CO2 06/19/2019 22     ANION GAP 06/19/2019 5     BUN 06/19/2019 13     Creatinine 06/19/2019 1 01     Glucose 06/19/2019 117     Calcium 06/19/2019 8 1*    eGFR 06/19/2019 53     WBC 06/19/2019 5 87     RBC 06/19/2019 3 49*    Hemoglobin 06/19/2019 5 3*    Hematocrit 06/19/2019 21 8*    MCV 06/19/2019 63*    MCH 06/19/2019 15 2*    MCHC 06/19/2019 24 3*    RDW 06/19/2019 22 9*    Platelets 40/46/3501 248     Protime 06/19/2019 14 7*    INR 06/19/2019 1 14     PTT 06/19/2019 25*    ABO Grouping 06/19/2019 O     Rh Factor 06/19/2019 Positive     Antibody Screen 06/19/2019 Negative     Specimen Expiration Date 06/19/2019 20291686     Unit Product Code 06/20/2019 Q9366L05     Unit Number 06/20/2019 [id number]     Unit ABO 06/20/2019 O     Unit RH 06/20/2019 POS     Crossmatch 06/20/2019 Compatible     Unit Dispense Status 06/20/2019 Presumed Trans     Unit Product Code 06/20/2019 [id number]     Unit Number 06/20/2019 [id number]     Unit ABO 06/20/2019 O     Unit RH 06/20/2019 POS     Crossmatch 06/20/2019 Compatible     Unit Dispense Status 06/20/2019 Presumed Trans     POC Glucose 06/19/2019 150*    Ventricular Rate 06/19/2019 57     Atrial Rate 06/19/2019 57     NJ Interval 06/19/2019 150     QRSD Interval 06/19/2019 78     QT Interval 06/19/2019 434     QTC Interval 06/19/2019 422     P Axis 06/19/2019 73     QRS Axis 06/19/2019 19     T Wave Axis 06/19/2019 60     Total Bilirubin 06/19/2019 0 49     Bilirubin, Direct 06/19/2019 0 21*    Alkaline Phosphatase 06/19/2019 99     AST 06/19/2019 31     ALT 06/19/2019 16     Total Protein 06/19/2019 6 3*    Albumin 06/19/2019 3 0*    Hemoglobin 06/19/2019 5 8*    Ferritin 06/19/2019 2*    Vitamin B-12 06/19/2019 417     Folate 06/19/2019 >20 0*    Iron Saturation 06/19/2019 2     TIBC 06/19/2019 381     Iron 06/19/2019 9*    Ferritin 06/19/2019 3*    WBC 06/20/2019 12 61*    RBC 06/20/2019 4 65     Hemoglobin 06/20/2019 8 1*    Hematocrit 06/20/2019 30 2*    MCV 06/20/2019 65*    MCH 06/20/2019 17 4*    MCHC 06/20/2019 26 8*    RDW 06/20/2019 24 0*    Platelets 60/19/9157 256     nRBC 06/20/2019 0     Neutrophils Relative 06/20/2019 74     Immat GRANS % 06/20/2019 1     Lymphocytes Relative 06/20/2019 13*    Monocytes Relative 06/20/2019 9     Eosinophils Relative 06/20/2019 2     Basophils Relative 06/20/2019 1     Neutrophils Absolute 06/20/2019 9 51*    Immature Grans Absolute 06/20/2019 0 06     Lymphocytes Absolute 06/20/2019 1 67     Monocytes Absolute 06/20/2019 1 07     Eosinophils Absolute 06/20/2019 0 21     Basophils Absolute 06/20/2019 0 09     Cholesterol 06/20/2019 166     Triglycerides 06/20/2019 115     HDL, Direct 06/20/2019 37*    LDL Calculated 06/20/2019 106*    Hemoglobin A1C 06/20/2019 5 9     EAG 06/20/2019 123     Magnesium 06/20/2019 2 1     Phosphorus 06/20/2019 2 9     Sodium 06/20/2019 139     Potassium 06/20/2019 3 6     Chloride 06/20/2019 111*    CO2 06/20/2019 24     ANION GAP 06/20/2019 4     BUN 06/20/2019 9     Creatinine 06/20/2019 0 97     Glucose 06/20/2019 80     Calcium 06/20/2019 8 8     eGFR 06/20/2019 56     Hemoglobin 06/20/2019 8 4*       Imaging Studies: I have personally reviewed pertinent imaging studies  Cta Head And Neck W Wo Contrast    Result Date: 6/19/2019  Impression: 1  Area of cortical/subcortical hypoattenuation in the left parietal lobe, could represent subacute to chronic infarct  2   Occluded right internal carotid artery from its origin with retrograde filling of the intracranial supraclinoid right ICA  3   Remainder of major vasculature of [place] are patent without critical stenosis  4   Atherosclerotic disease at the left cervical carotid bifurcation and proximal cervical internal carotid artery with ulcerated plaque at the bifurcation  There is approximately 50% stenosis by NASCET  criteria  5   Mild area of groundglass and nodular opacity in the periphery of partially imaged right upper lobe, may indicate inflammatory or infectious etiology  6   Sphenoid and ethmoidal sinus disease as described     Ct Chest Abdomen Pelvis W Contrast    Result Date: 6/19/2019  Impression: No solid visceral injury seen No free fluid seen Area of groundglass density seen in the right upper lobe with Micro nodularity, probably on an inflammatory basis  Short interval follow-up suggested in 3 months demonstrate resolution Rounded density seen in relation to the pubic symphysis, measuring about 2 7 cm,     This may represent a distended bursa related to arthritic changes in the symphysis pubis less likely due to urethral diverticulum     Ct Recon Only Cervical Spine (no Charge)    Result Date: 6/19/2019  Impression: No acute osseous injury in the cervical spine          This patient was seen and examined by Dr Alvaro Baer  All angel medical decisions were made by Dr Alvaro Baer  Thank you for allowing us to participate in the care of this patient  We will follow up closely with you

## 2019-06-20 NOTE — PLAN OF CARE
Problem: PAIN - ADULT  Goal: Verbalizes/displays adequate comfort level or baseline comfort level  Description  Interventions:  - Encourage patient to monitor pain and request assistance  - Assess pain using appropriate pain scale  - Administer analgesics based on type and severity of pain and evaluate response  - Implement non-pharmacological measures as appropriate and evaluate response  - Consider cultural and social influences on pain and pain management  - Notify physician/advanced practitioner if interventions unsuccessful or patient reports new pain  Outcome: Progressing     Problem: DISCHARGE PLANNING  Goal: Discharge to home or other facility with appropriate resources  Description  INTERVENTIONS:  - Identify barriers to discharge w/patient and caregiver  - Arrange for needed discharge resources and transportation as appropriate  - Identify discharge learning needs (meds, wound care, etc )  - Arrange for interpretive services to assist at discharge as needed  - Refer to Case Management Department for coordinating discharge planning if the patient needs post-hospital services based on physician/advanced practitioner order or complex needs related to functional status, cognitive ability, or social support system  Outcome: Progressing     Problem: Knowledge Deficit  Goal: Patient/family/caregiver demonstrates understanding of disease process, treatment plan, medications, and discharge instructions  Description  Complete learning assessment and assess knowledge base  Interventions:  - Provide teaching at level of understanding  - Provide teaching via preferred learning methods  Outcome: Progressing     Problem: Neurological Deficit  Goal: Neurological status is stable or improving  Description  Interventions:  - Monitor and assess patient's level of consciousness, motor function, sensory function, and level of assistance needed for ADLs  - Monitor and report changes from baseline   Collaborate with interdisciplinary team to initiate plan and implement interventions as ordered  - Provide and maintain a safe environment  - Utilize seizure precautions  - Utilize fall precautions  - Utilize aspiration precautions  - Utilize bleeding precautions    Outcome: Progressing

## 2019-06-20 NOTE — SOCIAL WORK
CM met with patient to explain role and discuss DC planning  Patient lives alone in a senior high rise apt on the 5th floor with elevator access  There are 0 ABRAHAN  Patient functions independent PTA with no use of DME  No Hx of HHC or IP SNF  Patient reports to drinking only 1 drink on holidays and no other drug/substance use  No Hx of mental health  Patient relies on her daughters for transportation  PCP is Dr Russell Ye, she has prescription coverage, and prefers using [facility] on [address] in [place]  Upon DC, patient states one of her 3 daughters are able to provide transportation  Contact: DaughterRenay Phone: [phone]  Contact: DaughterEleonora Phone: [phone]    CM reviewed d/c planning process including the following: identifying help at home, patient preference for d/c planning needs, Discharge Lounge, [facility] Meds to Bed program, availability of treatment team to discuss questions or concerns patient and/or family may have regarding understanding medications and recognizing signs and symptoms once discharged  CM also encouraged patient to follow up with all recommended appointments after discharge  Patient advised of importance for patient and family to participate in managing patients medical well being  Patient/caregiver received discharge checklist   Content reviewed  Patient/caregiver encouraged to participate in discharge plan of care prior to discharge home  Sandra Cortes (Student)  Reviewed and approved by KACIE Downey

## 2019-06-20 NOTE — OCCUPATIONAL THERAPY NOTE
Occupational Therapy         Patient Name: Janine Reyes  YQTYQ'H Date: 6/20/2019        ATTEMPTED TO SEE PT FOR OT EVAL - PT OFF FLOOR AT THIS TIME- WILL RE-ATTEMPT AT [facility], OT

## 2019-06-20 NOTE — ASSESSMENT & PLAN NOTE
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

## 2019-06-20 NOTE — PHYSICAL THERAPY NOTE
Physical Therapy Cancellation Note  ORDERS FOR PT EVALUATION RECEIVED AND CHART REVIEW COMPLETED  PT CURRENTLY OFF FLOOR / STRESS TESTING  WILL CONTINUE TO FOLLOW AND COMPLETE PT EVALUATION AS MEDICAL STATUS DICTATES     Wong Gonzales, PT

## 2019-06-20 NOTE — RESTORATIVE TECHNICIAN NOTE
Restorative Specialist Mobility Note       Activity: Ambulate in pope, Ambulate in room, Bathroom privileges, Chair, Dangle, Stand at bedside(Educated/encouraged pt to ambulate with assistance 3-4 x's/day   Pt callbell, phone/tray within reach )     Assistive Device: None          Nahid HENRIQUEZ, Restorative Technician, [employer]

## 2019-06-20 NOTE — CONSULTS
Consultation - Yuriy Garcia 78 y o  female MRN: [id number]    Unit/Bed#: [facility] 721-01 Encounter: [id number]      Assessment/Plan     Assessment:  In summary, this is a 66-year-old female history of recently detected iron deficiency anemia  GI evaluation is planned  Parental iron replacement is arranged  We reviewed that some of her symptoms relate to iron deficiency anemia and should improve with replacement  Further treatment plan to be developed once her GI evaluation is completed  I reviewed the above with the patient and her daughter  They voiced understanding and agreement  Plan:  See above    History of Present Illness     HPI: Yuriy Garcia is a 78y o  year old female who presents with Increasing weakness  She had recently been treated for cough and upper respiratory infection  She was having increasing trouble with gait  Family noted some right-sided weakness  She presented to the hospital for evaluation  Contrasted CT of the head showed findings consistent with CVA  CT chest abdomen pelvis showed ground-glass density in right upper lobe with nodularity  Soft tissue near the pubic symphysis, distended bursa versus urethral diverticulum  On admission WBC 5 8, hemoglobin 5 3, MCV 63, platelet count 942  Iron saturation 2%, ferritin 3  Vitamin B12 417, folate greater than 20  Inpatient consult to Oncology  Consult performed by: Abimbola Cool DO  Consult ordered by: Denis Serna MD          Review of Systems   Constitutional: Negative for appetite change, diaphoresis, fatigue and fever  HENT: Negative for sinus pain  Eyes: Negative for discharge  Respiratory: Negative for cough and shortness of breath  Cardiovascular: Negative for chest pain  Gastrointestinal: Negative for abdominal pain, constipation and diarrhea  Endocrine: Negative for cold intolerance  Genitourinary: Negative for difficulty urinating and hematuria  Musculoskeletal: Negative for joint swelling  Skin: Negative for rash  Allergic/Immunologic: Negative for environmental allergies  Neurological: Negative for dizziness and headaches  Hematological: Negative for adenopathy  Psychiatric/Behavioral: Negative for agitation  Historical Information   Past Medical History:   Diagnosis Date    Hypertension      History reviewed  No pertinent surgical history  Social History   Social History     Substance and Sexual Activity   Alcohol Use Yes    Frequency: Monthly or less    Drinks per session: 1 or 2     Social History     Substance and Sexual Activity   Drug Use Never     Social History     Tobacco Use   Smoking Status Current Every Day Smoker    Packs/day: 0 25    Years: 50 00    Pack years: 12 50    Types: Cigarettes   Smokeless Tobacco Never Used     Family History: History reviewed  No pertinent family history  Meds/Allergies   all current active meds have been reviewed  No Known Allergies    Objective   Vitals: Blood pressure 109/71, pulse 64, temperature 98 1 °F (36 7 °C), resp  rate 18, height 5' 2" (1 575 m), weight 74 6 kg (164 lb 7 4 oz), SpO2 98 %  Intake/Output Summary (Last 24 hours) at 6/20/2019 1732  Last data filed at 6/20/2019 1230  Gross per 24 hour   Intake 1180 ml   Output 2150 ml   Net -970 ml     Invasive Devices     Peripheral Intravenous Line            Peripheral IV 06/19/19 Left Antecubital 1 day    Peripheral IV 06/19/19 Left Forearm 1 day                Physical Exam   Constitutional: She appears well-developed  HENT:   Head: Normocephalic  Eyes: Conjunctivae are normal    Neck: Neck supple  Cardiovascular: Normal rate  Pulmonary/Chest: Effort normal    Abdominal: Soft  Musculoskeletal: She exhibits no edema  Lymphadenopathy:     She has no cervical adenopathy  She has no axillary adenopathy  Neurological: She is alert  Skin: Skin is warm  Psychiatric: She has a normal mood and affect         Lab Results: I have personally reviewed pertinent reports  Imaging Studies: I have personally reviewed pertinent reports  EKG, Pathology, and Other Studies: I have personally reviewed pertinent reports  Code Status: Level 1 - Full Code  Advance Directive and Living Will:      Power of :    POLST:      Counseling / Coordination of Care  Total floor / unit time spent today 40 minutes  Greater than 50% of total time was spent with the patient and / or family counseling and / or coordination of care  A description of the counseling / coordination of care: see note

## 2019-06-20 NOTE — UTILIZATION REVIEW
Initial Clinical Review    Admission: Date/Time/Statement: 6/19/19 @ 1506     Orders Placed This Encounter   Procedures    Inpatient Admission (expected length of stay for this patient Order details is greater than two midnights)     Standing Status:   Standing     Number of Occurrences:   1     Order Specific Question:   Admitting Physician     Answer:   Aziza Oconnor [[id number]]     Order Specific Question:   Level of Care     Answer:   Med Surg [[id number]]     Order Specific Question:   Estimated length of stay     Answer:   More than 2 Midnights     Order Specific Question:   Certification     Answer:   I certify that inpatient services are medically necessary for this patient for a duration of greater than two midnights  See H&P and MD Progress Notes for additional information about the patient's course of treatment  ED Arrival Information     Expected Arrival Acuity Means of Arrival Escorted By Service Admission Type    - 6/19/2019 11:49 Emergent Ambulance [facility] Hospitalist Emergency    Arrival Complaint    -        Chief Complaint   Patient presents with   [address] 21     Pt here after last time known well 06/14/19     Assessment/Plan: MRS BURNETTE IS A 79 YO FEMALE WHO PRESENTS TO THE ED VIA EMS FROM HOME WITH C/O INCREASING FATIGUE, WEAKNESS AND FEELING SOMETHING WAS WRONG  HAS NOT FELT WELL FOR SEVERAL WEEKS  SHE DID HAVE BRONCHITIS AND URI AND WAS TREATED WITH ANTIBIOTICS  PT TOLD FAMILY SHE WAS STUMBLING, UNABLE TO WALK, UNABLE TO HOLD OBJECTS WITH RUE, R FACIAL DROOP PER EMS  IN ED, R FACIAL DROOP WAS NOT SEEN, PT HAD R SIDE WEAKNESS  OUT OF TIME FRAME FOR TPA  CT + SUBACUTE TO CHRONIC STROKE, COMPLETE OCCLUSION R CAROTID, ABNORMAL CT R LUNG, SEVERE ANEMIA HGB 5 3 ON ADMISTTION  - REQUIRING BLOOD TRANSFUSION  SHE IS ADMITTED TO MED SURG WITH STROKE LIKE SYMPTOMS - NEURO CHECKS, MRI BRAIN, ECHO, NEURO CONSULT, HOLD ASA  HTN - HOME BETA BLOCKER    ABNORMAL CT RUQ LUNG - CONS PULMONARY  ABNORMAL PELVIS - CONS UROLOGY  PMH:  HTN, HLD, H/O SMOKING, HYPONATREMIA      6/20 POSSIBLE R CAROTIDENDARTERECTOMY (CEA) - TIME UNDECIDED        ED Triage Vitals   Temperature Pulse Respirations Blood Pressure SpO2   06/19/19 1226 06/19/19 1154 06/19/19 1154 06/19/19 1154 06/19/19 1154   98 °F (36 7 °C) 81 (!) 32 (!) 207/100 99 %      Temp Source Heart Rate Source Patient Position - Orthostatic VS BP Location FiO2 (%)   06/19/19 1226 06/19/19 1226 06/19/19 1226 06/19/19 1226 --   Oral Monitor Lying Right arm       Pain Score       06/19/19 1315       No Pain        Wt Readings from Last 1 Encounters:   06/20/19 74 6 kg (164 lb 7 4 oz)     Additional Vital Signs:   06/20/19 11:14:39  97 5 °F (36 4 °C)  53Abnormal     156/85  109  97 %     06/20/19 07:46:27  97 9 °F (36 6 °C)  61    124/71  89  96 %     06/20/19 03:37:29  98 2 °F (36 8 °C)  59  18  133/70  91  96 %     06/20/19 02:16:37    63    142/61  88  96 %     06/20/19 0000  97 3 °F (36 3 °C)Abnormal   58  20  194/71Abnormal     96 %     06/19/19 2351  97 7 °F (36 5 °C)  54Abnormal   20  192/84Abnormal          06/19/19 23:11:50    52Abnormal     180/151Abnormal   161  98 %     06/19/19 22:00:33    54Abnormal     169/72  104  96 %     06/19/19 21:25:31    59    155/89  111  97 %     06/19/19 20:05:20  97 7 °F (36 5 °C)  60  18  164/64  97  97 %     06/19/19 1945      18           06/19/19 19:44:42  97 9 °F (36 6 °C)  61    167/64  98  98 %     06/19/19 18:57:22  97 7 °F (36 5 °C)  63  18  153/104Abnormal   120  98 %     06/19/19 1751    59  18  200/81Abnormal      97 %  None (Room air)   06/19/19 1645    50Abnormal   22  173/70Abnormal     98 %  None (Room air)   06/19/19 1640    56  20  201/83Abnormal      99 %  None (Room air)   06/19/19 1600    52Abnormal   22  167/73    99 %  None (Room air)   06/19/19 1545    56  20  185/80Abnormal     95 %  None (Room air)   06/19/19 1530  97 7 °F (36 5 °C)  56  16 183/75Abnormal     99 %  None (Room air)   06/19/19 1515  97 6 °F (36 4 °C)  52Abnormal   21  178/75Abnormal     100 %  None (Room air)   06/19/19 1500  97 4 °F (36 3 °C)Abnormal   54Abnormal   24Abnormal   180/74Abnormal     100 %  None (Room air)   06/19/19 1445  97 4 °F (36 3 °C)Abnormal   55  24Abnormal   184/74Abnormal     99 %  None (Room air)   06/19/19 1430    52Abnormal   24Abnormal   145/100    99 %  None (Room air)   06/19/19 1400    52Abnormal   27Abnormal   172/83Abnormal     99 %  None (Room air)   06/19/19 1345    52Abnormal   20  175/70Abnormal     99 %  None (Room air)   06/19/19 1330    54Abnormal   26Abnormal   176/77Abnormal     98 %  None (Room air)   06/19/19 1315    56  27Abnormal   189/89Abnormal     98 %  None (Room air)   06/19/19 1300    52Abnormal   20  170/70    97 %  None (Room air)   06/19/19 1245    58  29Abnormal   158/71    98 %  None (Room air)   06/19/19 1230    58  24Abnormal   156/66    98 %  None (Room air)   06/19/19 1226  98 °F (36 7 °C)  59  22  172/77Abnormal     96 %  None (Room air)   06/19/19 1210    81  22  170/83    97 %  None (Room air)     Pertinent Labs/Diagnostic Test Results:     6/19 CTA HEAD, NECK - 1  Area of cortical/subcortical hypoattenuation in the left parietal lobe, could represent subacute to chronic infarct  2   Occluded right internal carotid artery from its origin with retrograde filling of the intracranial supraclinoid right ICA  3   Remainder of major vasculature of [place] are patent without critical stenosis  4   Atherosclerotic disease at the left cervical carotid bifurcation and proximal cervical internal carotid artery with ulcerated plaque at the bifurcation  There is approximately 50% stenosis by NASCET  criteria  5   Mild area of groundglass and nodular opacity in the periphery of partially imaged right upper lobe, may indicate inflammatory or infectious etiology     6   Sphenoid and ethmoidal sinus disease as described    6/19 CT RECON C SPINE - No acute osseous injury in the cervical spine  6/19 CT CHEST, ABD, PELVIS - No solid visceral injury seen  No free fluid seen  Area of groundglass density seen in the right upper lobe with Micro nodularity, probably on an inflammatory basis  Short interval follow-up suggested in 3 months demonstrate resolution  Rounded density seen in relation to the pubic symphysis, measuring about 2 7 cm,   This may represent a distended bursa related to arthritic changes in the symphysis pubis less likely due to urethral diverticulum        6/19 MRI BRAIN - PENDING     6/19 CAROTID DUPLEX - PENDING     6/19 ECG - Sinus bradycardia  Otherwise normal ECG  When compared with ECG of 06-NOV-2015 11:36,  No significant change was found    6/20 NM STRESS TEST - PENDING     6/20 CARDIAC ECHO - PENDING     Results from last 7 days   Lab Units 06/20/19  0855 06/20/19  0222 06/19/19  1524 06/19/19  1203   WBC Thousand/uL  --  12 61*  --  5 87   HEMOGLOBIN g/dL 8 4* 8 1* 5 8* 5 3*   HEMATOCRIT %  --  30 2*  --  21 8*   PLATELETS Thousands/uL  --  256  --  248   NEUTROS ABS Thousands/µL  --  9 51*  --   --      Results from last 7 days   Lab Units 06/20/19  0547 06/19/19  1203   SODIUM mmol/L 139 133*   POTASSIUM mmol/L 3 6 3 8   CHLORIDE mmol/L 111* 106   CO2 mmol/L 24 22   ANION GAP mmol/L 4 5   BUN mg/dL 9 13   CREATININE mg/dL 0 97 1 01   EGFR ml/min/1 73sq m 56 53   CALCIUM mg/dL 8 8 8 1*   MAGNESIUM mg/dL 2 1  --    PHOSPHORUS mg/dL 2 9  --      Results from last 7 days   Lab Units 06/19/19  1203   AST U/L 31   ALT U/L 16   ALK PHOS U/L 99   TOTAL PROTEIN g/dL 6 3*   ALBUMIN g/dL 3 0*   TOTAL BILIRUBIN mg/dL 0 49   BILIRUBIN DIRECT mg/dL 0 21*     Results from last 7 days   Lab Units 06/19/19  1238   POC GLUCOSE mg/dl 150*     Results from last 7 days   Lab Units 06/20/19  0547 06/19/19  1203   GLUCOSE RANDOM mg/dL 80 117     Results from last 7 days   Lab Units 06/20/19  0572 HEMOGLOBIN A1C % 5 9   EAG mg/dl 123     Results from last 7 days   Lab Units 06/19/19  1203   PROTIME seconds 14 7*   INR  1 14   PTT seconds 25*     Results from last 7 days   Lab Units 06/19/19  1612 06/19/19  1203   FERRITIN ng/mL 3* 2*     ED Treatment:   Medication Administration from 06/19/2019 1145 to 06/19/2019 1809    Date/Time Order Dose Route Action   06/19/2019 1214 ondansetron (ZOFRAN) injection 4 mg 4 mg Intravenous Given   06/19/2019 1215 iohexol (OMNIPAQUE) 350 MG/ML injection (MULTI-DOSE) 85 mL 85 mL Intravenous Given   06/19/2019 1318 sodium chloride 0 9 % bolus 1,000 mL 1,000 mL Intravenous New Bag   06/19/2019 1419 iodixanol (VISIPAQUE) 320 MG/ML injection 80 mL 80 mL Intravenous Given   06/19/2019 1430 ondansetron (ZOFRAN) injection 4 mg 4 mg Intravenous Given   06/19/2019 1612 atorvastatin (LIPITOR) tablet 40 mg 40 mg Oral Given        Past Medical History:   Diagnosis Date    Hypertension      Present on Admission:   Stroke-like symptom   Anemia, unspecified   Abnormal CT scan   Essential hypertension   Nicotine dependence    Admitting Diagnosis: Weakness [R53 1]  Abnormal CT scan [R93 89]  Stroke-like symptom [R29 90]  Anemia, unspecified type [D64 9]     Age/Sex: 78 y o  female  Admission Orders:    Current Facility-Administered Medications:  acetaminophen 650 mg Oral Q6H PRN   atorvastatin 40 mg Oral Daily   metoprolol tartrate 50 mg Oral Q12H [facility]   pantoprazole 40 mg Oral Early Morning     TELE  Neuro checks Every 1 hour x 4 hours, then every 2 hours x 4, then every 4 hours x 72 hours - 6/22 Q 8 HR IF STABLE                  UP W/ ASSIST   DAILY WT   NIH STROKE SCALE   TRANSFUSION GUIDELINES   ASPIRATION PREC  FALL PREC  IP CONSULT TO NEUROLOGY  IP CONSULT TO VASCULAR SURGERY  IP CONSULT TO GASTROENTEROLOGY  IP CONSULT TO CASE MANAGEMENT  IP CONSULT TO NUTRITION SERVICES  IP CONSULT TO UROLOGY  IP CONSULT TO ONCOLOGY  IP CONSULT TO CARDIOLOGY  REGULAR DIET   H/H Q 8 HR   NM STRESS TEST   PT/OT/ST EVAL/TX     Network Utilization Review Department  Phone: [phone]; Fax [phone]  [email or web address]  ATTENTION: Please call with any questions or concerns to [phone]  and carefully listen to the prompts so that you are directed to the right person  Send all requests for admission clinical reviews, approved or denied determinations and any other requests to fax [phone]   All voicemails are confidential

## 2019-06-20 NOTE — PROGRESS NOTES
Progress Note Troy Cooper 1940, 78 y o  female MRN: [id number]    Unit/Bed#: 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, will consult Urology    Anemia, unspecified  Assessment & Plan  Unclear, will obtain peripheral smear to rule out hemolysis, obtain LFTs  Iron storages, folate and B12 requested and pending  Guaiac in the ER negative, obtain FOBT x3  Gastroenterology will do colonoscopy and EGD on  Monday, NPO past midnight Sunday, needs to be on dual antiplatelet therapy after CEA  One results of multiple status back including UPEP and SPEP consideration if appropriate to consult Hematology  Receiving blood transfusion in the ER, x2 units, H&H q 8 hours for 24 hours and then if stable discontinue  Started Protonix  Clear liquid diet for now    Nicotine dependence  Assessment & Plan  Extensive counseling was offered, at this time declined nicotine patch    Essential hypertension  Assessment & Plan  Continue with beta-blocker as per home dose, monitor  Chronic stroke on CT scan, off window for permissive hypertension    * Stroke-like symptom  Assessment & Plan  Presenting with right-sided weakness and right facial droop, CT scan of the head positive for left parietal lobe subacute to chronic stroke  The case was discussed by ED with Neurology, off window for tPA, last well known even before obtaining scan  Admitted with stroke protocol with appropriate vital signs and neuro checks  Obtain MRI  Obtain echocardiogram  Formal neurology consult is pending  For now will hold aspirin for possible GI bleed    Abnormal CT of the neck with 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, requested evaluation by vascular surgery for reviewing images especially concern of the left side    PT and OT evaluation  Fall precautions      VTE Pharmacologic Prophylaxis:   Pharmacologic: Heparin  Mechanical VTE Prophylaxis in Place: Yes    Patient Centered Rounds: I have performed bedside rounds with nursing staff today  Discussions with Specialists or Other Care Team Provider: discussed with GI, Cardiology notes reviewed,     Education and Discussions with Family / Patient: patient    Time Spent for Care: 30 minutes  More than 50% of total time spent on counseling and coordination of care as described above  Current Length of Stay: 1 day(s)    Current Patient Status: Inpatient   Certification Statement: The patient will continue to require additional inpatient hospital stay due to anemia, CEA repair after stroke    Discharge Plan: need CEA after colonoscopy and endoscopy, she is getting stress test 1st, and clearance from cardiology  Code Status: Level 1 - Full Code      Subjective:   I am doing well  I did not have stroke  I have no issues  Objective:     Vitals:   Temp (24hrs), Av 8 °F (36 6 °C), Min:97 3 °F (36 3 °C), Max:98 2 °F (36 8 °C)    Temp:  [97 3 °F (36 3 °C)-98 2 °F (36 8 °C)] 98 1 °F (36 7 °C)  HR:  [52-64] 64  Resp:  [18-20] 18  BP: (109-194)/() 109/71  SpO2:  [96 %-98 %] 98 %  Body mass index is 30 08 kg/m²  Input and Output Summary (last 24 hours): Intake/Output Summary (Last 24 hours) at 2019 1839  Last data filed at 2019 1749  Gross per 24 hour   Intake 1030 ml   Output 1500 ml   Net -470 ml       Physical Exam:     Physical Exam   Constitutional: She is oriented to person, place, and time  She appears well-developed and well-nourished  HENT:   Head: Normocephalic and atraumatic  Right Ear: External ear normal    Left Ear: External ear normal    Nose: Nose normal    Mouth/Throat: Oropharynx is clear and moist  No oropharyngeal exudate  Eyes: Conjunctivae and EOM are normal  Right eye exhibits no discharge  Left eye exhibits no discharge  No scleral icterus  Neck: Normal range of motion  Neck supple  Cardiovascular: Normal rate, regular rhythm, normal heart sounds and intact distal pulses  Exam reveals no friction rub  No murmur heard  Pulmonary/Chest: Effort normal and breath sounds normal  No stridor  No respiratory distress  She has no wheezes  She has no rales  She exhibits no tenderness  Abdominal: Soft  Bowel sounds are normal  She exhibits no distension and no mass  There is no tenderness  There is no rebound and no guarding  No hernia  Musculoskeletal: Normal range of motion  She exhibits no edema, tenderness or deformity  Neurological: She is alert and oriented to person, place, and time  She displays normal reflexes  No cranial nerve deficit or sensory deficit  She exhibits normal muscle tone  Coordination normal    Skin: Skin is warm and dry  No rash noted  No erythema  No pallor  Psychiatric: She has a normal mood and affect  Nursing note and vitals reviewed  Additional Data:     Labs:    Results from last 7 days   Lab Units 06/20/19  1809  06/20/19  0222   WBC Thousand/uL  --   --  12 61*   HEMOGLOBIN g/dL 8 6*   < > 8 1*   HEMATOCRIT %  --   --  30 2*   PLATELETS Thousands/uL  --   --  256   NEUTROS PCT %  --   --  74   LYMPHS PCT %  --   --  13*   MONOS PCT %  --   --  9   EOS PCT %  --   --  2    < > = values in this interval not displayed  Results from last 7 days   Lab Units 06/20/19  0547 06/19/19  1203   POTASSIUM mmol/L 3 6 3 8   CHLORIDE mmol/L 111* 106   CO2 mmol/L 24 22   BUN mg/dL 9 13   CREATININE mg/dL 0 97 1 01   CALCIUM mg/dL 8 8 8 1*   ALK PHOS U/L  --  99   ALT U/L  --  16   AST U/L  --  31     Results from last 7 days   Lab Units 06/19/19  1203   INR  1 14       * I Have Reviewed All Lab Data Listed Above  * Additional Pertinent Lab Tests Reviewed:  [place] 66 Admission Reviewed    Imaging:    Imaging Reports Reviewed Today Include:    Imaging Personally Reviewed by Myself Includes:       Recent Cultures (last 7 days):           Last 24 Hours Medication List:     Current Facility-Administered Medications:  acetaminophen 650 mg Oral Q6H PRN Irineo Verdin MD   aspirin 81 mg Oral Daily Radha Felix PA-C   atorvastatin 80 mg Oral Daily With Maria E Sun DO   metoprolol tartrate 50 mg Oral Q12H [facility] Irineo Verdin MD   pantoprazole 40 mg Oral Early Morning MD kayleigh Brandonadensonya            Today, Patient Was Seen By: Imtiaz Savage MD    ** Please Note: Dictation voice to text software may have been used in the creation of this document   **

## 2019-06-20 NOTE — CONSULTS
Consultation - Neurology   Kelsea Bruner 78 y o  female MRN: [id number]  Unit/Bed#: [facility] 721-01 Encounter: [id number]      Assessment/Plan   Assessment:  Kelsea Bruner is a 78 y o  female with vascular risk factors who was found to have a subacute/chronic infarction noted on CTA, right carotid occlusion, and left ICA stenosis  Suspect stroke secondary to symptomatic left ICA stenosis  Plan as follows  Plan:  - Stroke pathway   MRI brain pending    Echo pending    Carotid ultrasound pending    Safe to restart ASA from neurologic standpoint and per discussion with GI as she did not exhibit any active bleeding  Will defer to Vascular surgery if and when they would like to add Plavix to regimen  Agree with Plavix from neurologic standpoint   Atorvastatin 40 mg   Continue telemetry   PT/OT/ST   Frequent neuro checks  Continue to monitor and notify neurology with any changes  - GI planning for scope Monday   - Medical management and supportive care per primary team  Correction of any metabolic or infectious disturbances  Results:   Hemoglobin A1c: 5 9  Lipid panel: , HDL 37, otherwise within normal limits  History of Present Illness     Reason for Consult / Principal Problem: Stroke  Hx and PE limited by: Marquise historian     HPI: Kelsea Bruner is a 78 y o  female with a past medical history of hypertension, hyperlipidemia, tobacco abuse, and recent fall 2 weeks ago with head trauma, who presented to the ED yesterday afternoon for evaluation of stroke-like symptoms  Her chief complaint was that she was extremely fatigued, but she had also noted that her coordination and had been persistently affected since her fall 2 weeks ago  Her family had reported that today she called her daughter stating that she was having difficulty walking more than usual and not able to hold onto objects more than usual   Two weeks ago, the patient experienced a fall  She did hit the right side of her head    She was taking aspirin at home during that time  For approximately 1 week, the patient had been experiencing difficulty using her right hand  She noted that she was more clumsy than having difficulty coordinating movements  She has been having difficulty  performing daily activities like making coffee, and these things had worsened yesterday  Family noted that she had had multiple falls over the past 2 weeks  On the day she presented to the hospital, the patient's family described that she was also having more difficulty organizing her thoughts  The family reports that recently the patient had an upper respiratory infection at progressed into bronchitis  They had felt that she was declining over the past 2 weeks, combined with multiple falls  At the patient's baseline, she lives at home independently  She does not typically experience falls  A stroke alert was called on the patient's arrival to the ED, but was subsequently canceled due to 2 weeks of symptoms  EMS reportedly noted a right sided facial droop and right sided weakness on exam  On arrival to the ED, the patient's blood pressure was 207/100  She was normal sinus rhythm, heart rate in the 80s  Blood sugar per EMS was 146  CTA was significant for an area of cortical/subcortical hypoattenuation in the left parietal lobe, representing a subacute to chronic infarct  She is found to have an occluded right ICA  She was also found to have approximately 50% stenosis at the left cervical carotid bifurcation and proximal cervical ICA  In addition, she was found to have a ground-glass appearing nodular area in the right upper lobe  Her lab work was significant for hemoglobin 5 3  Hemoccult negative  She was mildly hyponatremic at 133  Calcium 8 1  Since admission, the patient has been evaluated by vascular surgery in regards to the findings on CTA  Vascular recommend dual antiplatelets when cleared by Neurology and Internal Medicine   Patient is to go for stress test today for cardiac clearance for procedure, so that she can undergo a Left CEA  Gastroenterology has been following the patient as well who determined that her anemia is likely secondary to anemia of chronic disease  They are planning to do an endoscopy/colonoscopy on Monday  Inpatient consult to Neurology  Consult performed by: Jordon Billy PA-C  Consult ordered by: Donovan Khoury MD          Review of Systems   A 12 point ROS was completed  Other than the above mentioned complaints in the HPI and those commented on below, all remaining systems were negative:  No complaints  Still with slight clumsiness with right hand  Historical Information   Past Medical History:   Diagnosis Date    Hypertension      History reviewed  No pertinent surgical history  Social History   Social History     Substance and Sexual Activity   Alcohol Use Yes    Frequency: Monthly or less    Drinks per session: 1 or 2     Social History     Substance and Sexual Activity   Drug Use Never     Social History     Tobacco Use   Smoking Status Current Every Day Smoker    Packs/day: 0 25    Years: 50 00    Pack years: 12 50    Types: Cigarettes   Smokeless Tobacco Never Used     Family History: History reviewed  No pertinent family history  Review of previous medical records was completed  Meds/Allergies   all current active meds have been reviewed, current meds:   Current Facility-Administered Medications   Medication Dose Route Frequency    acetaminophen (TYLENOL) tablet 650 mg  650 mg Oral Q6H PRN    atorvastatin (LIPITOR) tablet 40 mg  40 mg Oral Daily    metoprolol tartrate (LOPRESSOR) tablet 50 mg  50 mg Oral Q12H [facility]    pantoprazole (PROTONIX) EC tablet 40 mg  40 mg Oral Early Morning    and PTA meds:   Prior to Admission Medications   Prescriptions Last Dose Informant Patient Reported? Taking?    Garlic 800 MG TABS   Yes Yes   Sig: Take 100 mg by mouth daily   aspirin 81 mg chewable tablet   Yes Yes   Sig: Chew 81 mg daily   atorvastatin (LIPITOR) 40 mg tablet Not Taking at Unknown time  No No   Sig: TAKE ONE TABLET BY MOUTH ONCE DAILY   Patient not taking: Reported on 6/19/2019   metoprolol tartrate (LOPRESSOR) 50 mg tablet   Yes Yes   Sig: Take 50 mg by mouth every 12 (twelve) hours      Facility-Administered Medications: None       No Known Allergies    Objective   Vitals:Blood pressure 156/85, pulse (!) 53, temperature 97 5 °F (36 4 °C), resp  rate 18, height 5' 2" (1 575 m), weight 74 6 kg (164 lb 7 4 oz), SpO2 97 %  ,Body mass index is 30 08 kg/m²  Intake/Output Summary (Last 24 hours) at 6/20/2019 1134  Last data filed at 6/20/2019 1133  Gross per 24 hour   Intake 1940 ml   Output 2150 ml   Net -210 ml       Invasive Devices: Invasive Devices     Peripheral Intravenous Line            Peripheral IV 06/19/19 Left Forearm 1 day    Peripheral IV 06/19/19 Left Antecubital less than 1 day                Physical Exam   Constitutional: She is oriented to person, place, and time  She appears well-developed and well-nourished  No distress  Resting comfortably in the chair  HENT:   Head: Normocephalic and atraumatic  Right Ear: External ear normal    Left Ear: External ear normal    Nose: Nose normal    Mouth/Throat: Oropharynx is clear and moist    Eyes: Pupils are equal, round, and reactive to light  Conjunctivae and EOM are normal  Right eye exhibits no discharge  Left eye exhibits no discharge  No scleral icterus  Neck: Normal range of motion  Neck supple  Cardiovascular: Normal rate, regular rhythm and normal heart sounds  Exam reveals no gallop and no friction rub  No murmur heard  Pulmonary/Chest: Effort normal and breath sounds normal  No stridor  No respiratory distress  She has no wheezes  Musculoskeletal: Normal range of motion  She exhibits no edema, tenderness or deformity  Neurological: She is alert and oriented to person, place, and time  No sensory deficit  Finger-nose-finger test: Clumsy, but no ataxia  Reflex Scores:       Bicep reflexes are 2+ on the right side and 1+ on the left side  Brachioradialis reflexes are 2+ on the right side and 1+ on the left side  Patellar reflexes are 0 on the right side and 0 on the left side  Skin: Skin is warm and dry  No rash noted  She is not diaphoretic  Patchy areas of ecchymosis    Psychiatric: She has a normal mood and affect  Her behavior is normal  Judgment and thought content normal    Nursing note and vitals reviewed  Neurologic Exam     Mental Status   Oriented to person, place, and time  Level of consciousness: alert  Able to name object  Performs simple commands without difficulty  No evidence of dysarthria or aphasia  Cranial Nerves     CN II   Visual acuity: (Grossly intact)    CN III, IV, VI   Pupils are equal, round, and reactive to light  Extraocular motions are normal    Nystagmus: none   Conjugate gaze: present    CN V   Facial sensation intact  CN VII   Facial expression full, symmetric  CN VIII   Hearing: intact    CN IX, X   Palate: symmetric    CN XI   Right sternocleidomastoid strength: normal  Left sternocleidomastoid strength: normal    CN XII   Tongue: not atrophic  Fasciculations: absent  Tongue deviation: none    Motor Exam   Muscle bulk: normal  Right arm pronator drift: present  Left arm pronator drift: absent  Strength 4/5 right  strength, 5/5 right biceps and triceps  5/5 strength of LUE  Sensory Exam   Light touch normal      Gait, Coordination, and Reflexes     Gait  Gait: (Deferred)    Coordination   Finger-nose-finger test: Clumsy, but no ataxia  Tremor   Resting tremor: absent  Intention tremor: absent  Action tremor: absent    Reflexes   Right brachioradialis: 2+  Left brachioradialis: 1+  Right biceps: 2+  Left biceps: 1+  Right patellar: 0  Left patellar: 0      Lab Results: I have personally reviewed pertinent reports      Imaging Studies: I have personally reviewed pertinent reports  and I have personally reviewed pertinent films in PACS  EKG, Pathology, and Other Studies: I have personally reviewed pertinent reports      VTE Prophylaxis: Sequential compression device (Venodyne)     Code Status: Level 1 - Full Code

## 2019-06-20 NOTE — CONSULTS
Consultation - Cardiology Team One  Efrain Sandoval 78 y o  female MRN: [id number]  Unit/Bed#: [facility] 476-14 Encounter: [id number]    Inpatient consult to Cardiology  Consult performed by: Johanna Fagan PA-C  Consult ordered by: Anastasia Thayer MD        Physician Requesting Consult: Jovanni Martínez MD  Reason for Consult / Principal Problem:  Preoperative cardiac risk assessment  Assessment:  Preoperative cardiac risk assessment: For right-sided carotid endarterectomy  Patient denies any angina, anginal equivalent or signs or symptoms of decompensated CHF  She is able to carry out 4 Mets of activity prior to the right weakness she experiences this last week  Carotid artery stenosis/occlusion:  CTA revealed occluded right ICA with 50% stenosis of left ICA  Evaluated by vascular surgery with plans for right-sided endarterectomy timing TBD  Stroke-like symptoms:  Presented with right-sided weakness and right facial droop  Patient has had right-sided weakness for at least 1 week prior to seeking medical attention  Imaging revealed Subacute-chronic CVA of left parietal lobe  Patient outside tPA window  Neurology consulted  Severe anemia:  Hemoglobin 5 3 on admission s/p 2 units PRBCs  Ferritin 2, folate >20  Hemoglobin 8 1 today  Oncology consulted  Right upper lobe density:  Noted on CT for which Pulmonary has been consulted  Pelvic density:  2 7 cm new pubic symphysis noted on CT for which urology was consulted  No acute urological problems were felt therefore patient was advised to follow up outpatient for cystoscopic evaluation and pelvic MRI  Hypertension:  Average /80 on Lopressor 50 mg b i d  Hyperlipidemia: , , HDL 37,  on atorvastatin 40 mg  Tobacco abuse:  Complete smoking cessation advised    Plan/Recommendations:  · Patient without active signs or symptoms of CAD or CHF    · Echocardiogram done, report pending  · Nuclear stress test for ischemic evaluation prior to vascular surgery  · Further recommendations pending echocardiogram and stress test results  __________________________________________________________    CC:  Stroke alert, right-sided weakness    History of Present Illness   HPI: Mateusz Tellez is a 78y o  year old female who has a history of hypertension, hyperlipidemia, tobacco abuse presented to the emergency room at [place] [address] as a stroke alert 6/19 symptoms of right-sided weakness and right facial droop  Patient was treated for a URI 2 weeks ago  She lives independently and called her daughter yesterday stating that she was stumbling and unable to walk and unable to hold things in her right hand  EMS was summoned and noted right facial droop with right-sided weakness  By the time patient came to the ER the right facial droop was gone but she continue with right-sided weakness  In the ER CTA of the head and neck revealed area of cortical/subcortical hypoattenuation the left parietal lobe that could represent subacute to chronic infarct, occluded right ICA, ground-glass/nodular opacity right upper lobe  CT of the neck revealed no acute cervical abnormality  CT of the chest/abdomen/pelvis revealed ground-glass density right upper lobe, 2 7 cm density near this pubic symphysis  Labs revealed sodium 133, direct bilirubin 0 21, albumin 3 0, calcium 8 1, ferritin 2, folate >20 0, RBC 3 49, hemoglobin 5 3, 21 8  Sinus bradycardia was noted on EKG  Due to the severe anemia patient was transfused 2 units PRBCs  Patient was not given tPA as she was felt out of the window  Vascular surgery, Neurology, Urology and pulmonary have been consulted for findings on imaging  Patient was evaluated by vascular surgery with plans for left carotid endarterectomy timing to be determined  Cardiology has been consulted for preoperative cardiac risk assessment      Patient's home medication regimen includes aspirin 81 mg, Lopressor 50 mg b i d , atorvastatin 40 mg daily  Patient out of bed sitting in chair during consultation and denies any history of CAD or CHF  She has never seen a cardiologist before nor has she ever had any cardiac testing including stress test or echocardiogram   She lives by herself in an apartment and uses an elevator not stairs  She does her own cooking and cleaning  Her own shopping and is able to walk holding a cart through the whole store without any symptoms of chest pain or shortness of breath  Patient admits that over the last week she has been having weakness in her right arm and stumbling but this got worse yesterday prompting her to call her daughter  She has a history of hypertension hyperlipidemia for which she takes medications mentioned above  She denies any history of diabetes  She sees her family doctor at least 2 times a year  Family history:  Denies any family history of CAD or sudden cardiac death  Her brother had a stroke and is wheelchair-bound  Social history:  Smokes 0 5 PPD times 30 years  Drinks alcohol very rarely  Denies any illicit drug use  EKG reviewed personally:    6/19/19-sinus bradycardia at 57 beats per minute with no acute ischemic change  No significant change when compared to EKG from 11/2015  Telemetry reviewed personally:  Sinus rhythm with 2 short runs of PAT        Review of Systems   Constitution: Negative  Negative for chills  Cardiovascular: Negative for chest pain, dyspnea on exertion, leg swelling, near-syncope, orthopnea, palpitations, paroxysmal nocturnal dyspnea and syncope  Respiratory: Negative  Negative for shortness of breath  Musculoskeletal:        Right-sided arm weakness   Gastrointestinal: Negative for diarrhea, nausea and vomiting  Neurological: Positive for focal weakness  Negative for dizziness, light-headedness and weakness  Right upper extremity weakness   Psychiatric/Behavioral: Negative for altered mental status     All other systems reviewed and are negative  Historical Information   Past Medical History:   Diagnosis Date    Hypertension      History reviewed  No pertinent surgical history  Social History     Substance and Sexual Activity   Alcohol Use Yes    Frequency: Monthly or less    Drinks per session: 1 or 2     Social History     Substance and Sexual Activity   Drug Use Never     Social History     Tobacco Use   Smoking Status Current Every Day Smoker    Packs/day: 0 25    Years: 50 00    Pack years: 12 50    Types: Cigarettes   Smokeless Tobacco Never Used     Family History: History reviewed  No pertinent family history  Meds/Allergies   all current active meds have been reviewed, current meds:   Current Facility-Administered Medications   Medication Dose Route Frequency    acetaminophen (TYLENOL) tablet 650 mg  650 mg Oral Q6H PRN    atorvastatin (LIPITOR) tablet 40 mg  40 mg Oral Daily    metoprolol tartrate (LOPRESSOR) tablet 50 mg  50 mg Oral Q12H [address]    pantoprazole (PROTONIX) EC tablet 40 mg  40 mg Oral Early Morning    and PTA meds:   Prior to Admission Medications   Prescriptions Last Dose Informant Patient Reported? Taking? Garlic 480 MG TABS   Yes Yes   Sig: Take 100 mg by mouth daily   aspirin 81 mg chewable tablet   Yes Yes   Sig: Chew 81 mg daily   atorvastatin (LIPITOR) 40 mg tablet Not Taking at Unknown time  No No   Sig: TAKE ONE TABLET BY MOUTH ONCE DAILY   Patient not taking: Reported on 6/19/2019   metoprolol tartrate (LOPRESSOR) 50 mg tablet   Yes Yes   Sig: Take 50 mg by mouth every 12 (twelve) hours      Facility-Administered Medications: None          No Known Allergies    Objective   Vitals: Blood pressure 124/71, pulse 61, temperature 97 9 °F (36 6 °C), resp  rate 18, height 5' 2" (1 575 m), weight 74 6 kg (164 lb 7 4 oz), SpO2 96 %  ,     Body mass index is 30 08 kg/m²  ,     Systolic (80RUC), IOC:890 , Min:124 , BEZ:689     Diastolic (85ZPG), WFR:85, Min:61, Max:151      Intake/Output Summary (Last 24 hours) at 6/20/2019 0846  Last data filed at 6/20/2019 9974  Gross per 24 hour   Intake 1940 ml   Output 1750 ml   Net 190 ml     Weight (last 2 days)     Date/Time   Weight    06/20/19 0600   74 6 (164 46)    06/19/19 1226   74 8 (165)            Invasive Devices     Peripheral Intravenous Line            Peripheral IV 06/19/19 Left Forearm 1 day    Peripheral IV 06/19/19 Left Antecubital less than 1 day              Physical Exam   Constitutional: She is oriented to person, place, and time  She appears well-developed and well-nourished  On room air in no acute distress   HENT:   Head: Normocephalic and atraumatic  Neck: Normal range of motion  Neck supple  No JVD present  Cardiovascular: Normal rate, regular rhythm, normal heart sounds and intact distal pulses  No murmur heard  Pulmonary/Chest: Effort normal and breath sounds normal  No respiratory distress  She has no wheezes  She has no rales  Abdominal: Soft  Bowel sounds are normal  She exhibits no distension  There is no tenderness  Musculoskeletal: Normal range of motion  She exhibits no edema  Neurological: She is alert and oriented to person, place, and time  Right upper extremity strength 4/5  Left upper extremity strength 5/5   Skin: Skin is warm and dry  Psychiatric: She has a normal mood and affect  Her behavior is normal    Nursing note and vitals reviewed          LABORATORY RESULTS:      CBC with diff:   Results from last 7 days   Lab Units 06/20/19  0222 06/19/19  1524 06/19/19  1203   WBC Thousand/uL 12 61*  --  5 87   HEMOGLOBIN g/dL 8 1* 5 8* 5 3*   HEMATOCRIT % 30 2*  --  21 8*   MCV fL 65*  --  63*   PLATELETS Thousands/uL 256  --  248   MCH pg 17 4*  --  15 2*   MCHC g/dL 26 8*  --  24 3*   RDW % 24 0*  --  22 9*   NRBC AUTO /100 WBCs 0  --   --        CMP:  Results from last 7 days   Lab Units 06/20/19  0547 06/19/19  1203   POTASSIUM mmol/L 3 6 3 8   CHLORIDE mmol/L 111* 106 CO2 mmol/L 24 22   BUN mg/dL 9 13   CREATININE mg/dL 0 97 1 01   CALCIUM mg/dL 8 8 8 1*   AST U/L  --  31   ALT U/L  --  16   ALK PHOS U/L  --  99   EGFR ml/min/1 73sq m 56 53       BMP:  Results from last 7 days   Lab Units 06/20/19  0547 06/19/19  1203   POTASSIUM mmol/L 3 6 3 8   CHLORIDE mmol/L 111* 106   CO2 mmol/L 24 22   BUN mg/dL 9 13   CREATININE mg/dL 0 97 1 01   CALCIUM mg/dL 8 8 8 1*          No results found for: NTBNP         Results from last 7 days   Lab Units 06/20/19  0547   MAGNESIUM mg/dL 2 1          Results from last 7 days   Lab Units 06/20/19  0547   HEMOGLOBIN A1C % 5 9       Results from last 7 days   Lab Units 06/19/19  1203   INR  1 14     Lipid Profile:   Lab Results   Component Value Date    CHOL 320 10/29/2015     Lab Results   Component Value Date    HDL 37 (L) 06/20/2019    HDL 45 10/29/2015     Lab Results   Component Value Date    LDLCALC 106 (H) 06/20/2019    LDLCALC 220 (H) 10/29/2015     Lab Results   Component Value Date    TRIG 115 06/20/2019    TRIG 276 10/29/2015         Cardiac testing:   No results found for this or any previous visit  No results found for this or any previous visit  No procedure found  No results found for this or any previous visit  Imaging: I have personally reviewed pertinent reports  Cta Head And Neck W Wo Contrast    Result Date: 6/19/2019  Narrative: CTA NECK AND BRAIN WITH AND WITHOUT CONTRAST INDICATION: TIA, initial screening COMPARISON:   None  TECHNIQUE:  Routine CT imaging of the Brain without contrast   Post contrast imaging was performed after administration of iodinated contrast through the neck and brain  Post contrast axial 0 625 mm images timed to opacify the arterial system  3D rendering was performed on an independent workstation  MIP reconstructions performed  Coronal reconstructions were performed of the noncontrast portion of the brain  Radiation dose length product (DLP) for this visit:  446 58 mGy-cm     This examination, like all CT scans performed in the [facility], was performed utilizing techniques to minimize radiation dose exposure, including the use of iterative  reconstruction and automated exposure control  IV Contrast:  85 mL of iohexol (OMNIPAQUE)  IMAGE QUALITY:   Diagnostic FINDINGS: NONCONTRAST BRAIN PARENCHYMA:  No acute parenchymal hemorrhage  No intracranial mass, mass effect or midline shift  Area of cortical/subcortical hypoattenuation in the left parietal lobe VENTRICLES AND EXTRA-AXIAL SPACES:  Normal for the patient's age  VISUALIZED ORBITS PARANASAL SINUSES:  The orbits are unremarkable    Mild opacification of sphenoid sinus and posterior ethmoidal cells with aerated Magnevist suggestive of acute sinusitis  CERVICAL VASCULATURE AORTIC ARCH AND GREAT VESSELS:   Atherosclerotic calcification of aortic arch  Great vessel origins and visualized subclavian arteries are unremarkable  RIGHT VERTEBRAL ARTERY CERVICAL SEGMENT: Mild atherosclerotic narrowing at the origin  The vessel is patent throughout the neck without critical stenosis  LEFT VERTEBRAL ARTERY CERVICAL SEGMENT: Normal origin  Mild multifocal atherosclerotic disease without critical stenosis RIGHT EXTRACRANIAL CAROTID SEGMENT:Unremarkable common carotid artery  Occluded cervical and intracranial internal carotid artery with retrograde filling of supraclinoid ICA  LEFT EXTRACRANIAL CAROTID SEGMENT: Unremarkable common carotid artery partially calcified atherosclerotic disease at the bifurcation and proximal cervical internal carotid artery with ulcerated plaque at the bifurcation  Focal stenosis at the proximal internal carotid artery measuring 2 4mm at the point of maximal stenosis  Comparing this to the more distal cervical internal carotid artery which measures4 9mm, this corresponds to an approximately 50% stenosis  NASCET criteria was used to determine the degree of internal carotid artery diameter stenosis  INTRACRANIAL VASCULATURE INTERNAL CAROTID ARTERIES:Occluded intracranial right internal carotid artery with retrograde filling of supraclinoid ICA  There is mild to moderate atherosclerotic disease of cavernous and supraclinoid left internal carotid artery  Normal ophthalmic artery origins  ANTERIOR CIRCULATION:Hypoplastic right A1 segment  Normal left A1 segment  Bilateral anterior cerebral arteries are unremarkable MIDDLE CEREBRAL ARTERY CIRCULATION:  Bilateral M1 segments and major M2 branches are patent without critical stenosis  DISTAL VERTEBRAL ARTERIES:  Normal enhancing distal vertebral arteries  Posterior inferior cerebellar artery origins are patent  BASILAR ARTERY:  Basilar artery is normal in caliber  Normal superior cerebellar arteries  POSTERIOR CEREBRAL ARTERIES: Normal enhancement of bilateral posterior cerebral arteries without critical stenosis  Normal posterior communicating arteries  DURAL VENOUS SINUSES:  Normal  NON VASCULAR ANATOMY BONY STRUCTURES: No acute or aggressive appearing osseous abnormality  SOFT TISSUES OF THE NECK:  Normal   THORACIC INLET:  Mild area of groundglass and nodular opacity in the periphery of partially imaged right upper lobe  Impression: 1  Area of cortical/subcortical hypoattenuation in the left parietal lobe, could represent subacute to chronic infarct  2   Occluded right internal carotid artery from its origin with retrograde filling of the intracranial supraclinoid right ICA  3   Remainder of major vasculature of [place] are patent without critical stenosis  4   Atherosclerotic disease at the left cervical carotid bifurcation and proximal cervical internal carotid artery with ulcerated plaque at the bifurcation  There is approximately 50% stenosis by NASCET  criteria  5   Mild area of groundglass and nodular opacity in the periphery of partially imaged right upper lobe, may indicate inflammatory or infectious etiology   6   Sphenoid and ethmoidal sinus disease as described  I personally discussed this study with Sharyle Hammond on 6/19/2019 at 1:35PM  Workstation performed: [id number]     Ct Chest Abdomen Pelvis W Contrast    Result Date: 6/19/2019  Narrative: CT CHEST, ABDOMEN AND PELVIS WITH IV CONTRAST INDICATION:   fall, severe anemia  COMPARISON:  None  TECHNIQUE: CT examination of the chest, abdomen and pelvis was performed  Axial, sagittal, and coronal 2D reformatted images were created from the source data and submitted for interpretation  Radiation dose length product (DLP) for this visit:  833 34 mGy-cm   This examination, like all CT scans performed in the [facility], was performed utilizing techniques to minimize radiation dose exposure, including the use of iterative  reconstruction and automated exposure control  IV Contrast:  80 mL of iodixanol (VISIPAQUE) Enteric Contrast: Enteric contrast was administered  FINDINGS: CHEST LUNGS:  Areas of groundglass density seen in the right upper lobe with mild Micro nodularity  This is seen in image 55 series 604 PLEURA:  Unremarkable  HEART/GREAT VESSELS:  The ascending aorta measures 3 cm The arch appears unremarkable in the descending aorta appears unremarkable MEDIASTINUM AND LOUISE:  No significant mediastinal lymph node enlargement seen CHEST WALL AND LOWER NECK:   Unremarkable  ABDOMEN LIVER/BILIARY TREE:  Unremarkable  GALLBLADDER:  No calcified gallstones  No pericholecystic inflammatory change  SPLEEN:  Unremarkable  PANCREAS:  Unremarkable  ADRENAL GLANDS:  Unremarkable  KIDNEYS/URETERS:  Unremarkable  No hydronephrosis  STOMACH AND BOWEL:  Unremarkable  APPENDIX:  No findings to suggest appendicitis  ABDOMINOPELVIC CAVITY:  No ascites or free intraperitoneal air  No lymphadenopathy  VESSELS:  Unremarkable for patient's age   PELVIS REPRODUCTIVE ORGANS:  The uterus is not identified URINARY BLADDER:  The urinary bladder appear unremarkable ABDOMINAL WALL/INGUINAL REGIONS:  Unremarkable  OSSEOUS STRUCTURES:  A rounded density seen inferior to the pubic symphysis, this probably represents a distended bursa related to arthritic changes  This is seen in image 119 series 2, less likely this may be a urethral diverticulum     Impression: No solid visceral injury seen No free fluid seen Area of groundglass density seen in the right upper lobe with Micro nodularity, probably on an inflammatory basis  Short interval follow-up suggested in 3 months demonstrate resolution Rounded density seen in relation to the pubic symphysis, measuring about 2 7 cm,   This may represent a distended bursa related to arthritic changes in the symphysis pubis less likely due to urethral diverticulum Workstation performed: [id number]     Ct Recon Only Cervical Spine (no Charge)    Result Date: 6/19/2019  Narrative: CT CERVICAL SPINE - WITHOUT CONTRAST INDICATION:   TIA, initial screening fall   COMPARISON:  None  TECHNIQUE: Reconstructed CT examination of the cervical spine from the same date CTA head and neck Radiation dose length product (DLP) for this visit:  0 mGy-cm   This examination, like all CT scans performed in the [facility], was performed utilizing techniques to minimize radiation dose exposure, including the use of iterative reconstruction and automated exposure control  IMAGE QUALITY:  Diagnostic  FINDINGS: ALIGNMENT:  Levo scoliotic curvature of cervical spine  There is grade 1 anterolisthesis of C4 on C5  VERTEBRAL BODIES:  No fracture  DEGENERATIVE CHANGES:  Multilevel disc osteophyte complexes  More pronounced at level C6-7 with mild to moderate canal narrowing  PREVERTEBRAL AND PARASPINAL SOFT TISSUES:  Unremarkable  Occluded right cervical internal carotid artery, described in same date CTA head and neck   THORACIC INLET:  Normal      Impression: No acute osseous injury in the cervical spine  Workstation performed: [id number]       Counseling / Coordination of Care  Total floor / unit time spent today 45 minutes  Greater than 50% of total time was spent with the patient and / or family counseling and / or coordination of care  A description of the counseling / coordination of care: Review of history, current assessment, development of a plan  Code Status: Level 1 - Full Code    ** Please Note: Dragon 360 Dictation voice to text software may have been used in the creation of this document   **

## 2019-06-21 ENCOUNTER — APPOINTMENT (INPATIENT)
Dept: NON INVASIVE DIAGNOSTICS | Facility: HOSPITAL | Age: 79
DRG: 037 | End: 2019-06-21
Payer: COMMERCIAL

## 2019-06-21 ENCOUNTER — APPOINTMENT (INPATIENT)
Dept: RADIOLOGY | Facility: HOSPITAL | Age: 79
DRG: 037 | End: 2019-06-21
Payer: COMMERCIAL

## 2019-06-21 LAB
ALBUMIN SERPL ELPH-MCNC: 3.3 G/DL (ref 3.5–5)
ALBUMIN SERPL ELPH-MCNC: 50.7 % (ref 52–65)
ALPHA1 GLOB SERPL ELPH-MCNC: 0.31 G/DL (ref 0.1–0.4)
ALPHA1 GLOB SERPL ELPH-MCNC: 4.8 % (ref 2.5–5)
ALPHA2 GLOB SERPL ELPH-MCNC: 1.06 G/DL (ref 0.4–1.2)
ALPHA2 GLOB SERPL ELPH-MCNC: 16.3 % (ref 7–13)
ANION GAP SERPL CALCULATED.3IONS-SCNC: 7 MMOL/L (ref 4–13)
BASOPHILS # BLD AUTO: 0.06 THOUSANDS/ΜL (ref 0–0.1)
BASOPHILS NFR BLD AUTO: 1 % (ref 0–1)
BETA GLOB ABNORMAL SERPL ELPH-MCNC: 0.42 G/DL (ref 0.4–0.8)
BETA1 GLOB SERPL ELPH-MCNC: 6.4 % (ref 5–13)
BETA2 GLOB SERPL ELPH-MCNC: 4.7 % (ref 2–8)
BETA2+GAMMA GLOB SERPL ELPH-MCNC: 0.31 G/DL (ref 0.2–0.5)
BUN SERPL-MCNC: 8 MG/DL (ref 5–25)
CALCIUM SERPL-MCNC: 8.8 MG/DL (ref 8.3–10.1)
CHEST PAIN STATEMENT: NORMAL
CHLORIDE SERPL-SCNC: 111 MMOL/L (ref 100–108)
CO2 SERPL-SCNC: 25 MMOL/L (ref 21–32)
CREAT SERPL-MCNC: 0.99 MG/DL (ref 0.6–1.3)
EOSINOPHIL # BLD AUTO: 0.56 THOUSAND/ΜL (ref 0–0.61)
EOSINOPHIL NFR BLD AUTO: 6 % (ref 0–6)
ERYTHROCYTE [DISTWIDTH] IN BLOOD BY AUTOMATED COUNT: 24.5 % (ref 11.6–15.1)
GAMMA GLOB ABNORMAL SERPL ELPH-MCNC: 1.11 G/DL (ref 0.5–1.6)
GAMMA GLOB SERPL ELPH-MCNC: 17.1 % (ref 12–22)
GFR SERPL CREATININE-BSD FRML MDRD: 54 ML/MIN/1.73SQ M
GLUCOSE SERPL-MCNC: 84 MG/DL (ref 65–140)
HCT VFR BLD AUTO: 32.4 % (ref 34.8–46.1)
HGB BLD-MCNC: 8.3 G/DL (ref 11.5–15.4)
HGB BLD-MCNC: 8.6 G/DL (ref 11.5–15.4)
HGB BLD-MCNC: 8.8 G/DL (ref 11.5–15.4)
HGB BLD-MCNC: 9 G/DL (ref 11.5–15.4)
IGG/ALB SER: 1.03 {RATIO} (ref 1.1–1.8)
IMM GRANULOCYTES # BLD AUTO: 0.04 THOUSAND/UL (ref 0–0.2)
IMM GRANULOCYTES NFR BLD AUTO: 0 % (ref 0–2)
LYMPHOCYTES # BLD AUTO: 1.01 THOUSANDS/ΜL (ref 0.6–4.47)
LYMPHOCYTES NFR BLD AUTO: 10 % (ref 14–44)
MAX DIASTOLIC BP: 74 MMHG
MAX HEART RATE: 77 BPM
MAX PREDICTED HEART RATE: 141 BPM
MAX. SYSTOLIC BP: 158 MMHG
MCH RBC QN AUTO: 17.4 PG (ref 26.8–34.3)
MCHC RBC AUTO-ENTMCNC: 26.5 G/DL (ref 31.4–37.4)
MCV RBC AUTO: 66 FL (ref 82–98)
MONOCYTES # BLD AUTO: 0.69 THOUSAND/ΜL (ref 0.17–1.22)
MONOCYTES NFR BLD AUTO: 7 % (ref 4–12)
NEUTROPHILS # BLD AUTO: 7.56 THOUSANDS/ΜL (ref 1.85–7.62)
NEUTS SEG NFR BLD AUTO: 76 % (ref 43–75)
NRBC BLD AUTO-RTO: 0 /100 WBCS
PLATELET # BLD AUTO: 252 THOUSANDS/UL (ref 149–390)
POTASSIUM SERPL-SCNC: 3.4 MMOL/L (ref 3.5–5.3)
PROT PATTERN SERPL ELPH-IMP: ABNORMAL
PROT SERPL-MCNC: 6.5 G/DL (ref 6.4–8.2)
PROTOCOL NAME: NORMAL
RBC # BLD AUTO: 4.95 MILLION/UL (ref 3.81–5.12)
REASON FOR TERMINATION: NORMAL
SODIUM SERPL-SCNC: 143 MMOL/L (ref 136–145)
TARGET HR FORMULA: NORMAL
TIME IN EXERCISE PHASE: NORMAL
WBC # BLD AUTO: 9.92 THOUSAND/UL (ref 4.31–10.16)

## 2019-06-21 PROCEDURE — 93880 EXTRACRANIAL BILAT STUDY: CPT

## 2019-06-21 PROCEDURE — 85018 HEMOGLOBIN: CPT | Performed by: INTERNAL MEDICINE

## 2019-06-21 PROCEDURE — 99232 SBSQ HOSP IP/OBS MODERATE 35: CPT | Performed by: INTERNAL MEDICINE

## 2019-06-21 PROCEDURE — 70551 MRI BRAIN STEM W/O DYE: CPT

## 2019-06-21 PROCEDURE — 99233 SBSQ HOSP IP/OBS HIGH 50: CPT | Performed by: SURGERY

## 2019-06-21 PROCEDURE — 99232 SBSQ HOSP IP/OBS MODERATE 35: CPT | Performed by: PSYCHIATRY & NEUROLOGY

## 2019-06-21 PROCEDURE — 80048 BASIC METABOLIC PNL TOTAL CA: CPT | Performed by: INTERNAL MEDICINE

## 2019-06-21 PROCEDURE — G8978 MOBILITY CURRENT STATUS: HCPCS

## 2019-06-21 PROCEDURE — G8988 SELF CARE GOAL STATUS: HCPCS

## 2019-06-21 PROCEDURE — 93880 EXTRACRANIAL BILAT STUDY: CPT | Performed by: SURGERY

## 2019-06-21 PROCEDURE — 85025 COMPLETE CBC W/AUTO DIFF WBC: CPT | Performed by: INTERNAL MEDICINE

## 2019-06-21 PROCEDURE — G8987 SELF CARE CURRENT STATUS: HCPCS

## 2019-06-21 PROCEDURE — 97167 OT EVAL HIGH COMPLEX 60 MIN: CPT

## 2019-06-21 PROCEDURE — G8979 MOBILITY GOAL STATUS: HCPCS

## 2019-06-21 PROCEDURE — 97163 PT EVAL HIGH COMPLEX 45 MIN: CPT

## 2019-06-21 RX ORDER — POTASSIUM CHLORIDE 20 MEQ/1
40 TABLET, EXTENDED RELEASE ORAL ONCE
Status: COMPLETED | OUTPATIENT
Start: 2019-06-21 | End: 2019-06-21

## 2019-06-21 RX ADMIN — MELATONIN 6 MG: at 23:11

## 2019-06-21 RX ADMIN — ASPIRIN 81 MG: 81 TABLET, COATED ORAL at 09:00

## 2019-06-21 RX ADMIN — ATORVASTATIN CALCIUM 80 MG: 80 TABLET, FILM COATED ORAL at 15:55

## 2019-06-21 RX ADMIN — POTASSIUM CHLORIDE 40 MEQ: 1500 TABLET, EXTENDED RELEASE ORAL at 10:27

## 2019-06-21 RX ADMIN — METOPROLOL TARTRATE 50 MG: 50 TABLET, FILM COATED ORAL at 09:00

## 2019-06-21 RX ADMIN — METOPROLOL TARTRATE 50 MG: 50 TABLET, FILM COATED ORAL at 21:22

## 2019-06-21 RX ADMIN — PANTOPRAZOLE SODIUM 40 MG: 40 TABLET, DELAYED RELEASE ORAL at 06:14

## 2019-06-21 NOTE — PLAN OF CARE
Problem: PHYSICAL THERAPY ADULT  Goal: Performs mobility at highest level of function for planned discharge setting  See evaluation for individualized goals  Description  Treatment/Interventions: Functional transfer training, LE strengthening/ROM, Therapeutic exercise, Endurance training, Patient/family training, Equipment eval/education, Bed mobility, Gait training, Spoke to nursing  Equipment Recommended: [facility]  [facility]       See flowsheet documentation for full assessment, interventions and recommendations  Note:   Prognosis: Good  Problem List: Decreased strength, Impaired balance, Decreased endurance, Decreased mobility, Impaired judgement, Decreased safety awareness, Impaired vision, Decreased coordination  Assessment: Pt seen for high complexity PT evaluation due to decrease in functional mobility status compared to baseline  Pt with active PT eval/treat and up with assist orders at this time  Pt is a 77 yo F who presented to [facility] with extreme fatigue and R sided weakness on 6/19/19  PMH significant for HTN, HLD, hx smoking, hx hyponatremia  Pt resides alone in senior living high rise apartment on 5th floor with ramp and elevator entrance  Pt was I PTA with no AD for mobility  Pt presents with significant impulsivity, R>L LE strength deficits, balance impairments, decreased safety awareness and decreased insight into current status that contribute to limitations in functional mobility and transfers and put pt at increased risk for falls  Pt able to perform transfers with Min A, ambulate with chair follow and RW with Min A with LOB requiring Mod A to correct  Pt with significant gait deficits as noted above  Pt left upright in bedside chair with chair alarm intact, daughter present, and with all needs in reach  Pt will benefit from skilled therapy in order to address current impairments and functional limitations  PT to follow pt and recommending rehab once medically cleared    Barriers to Discharge: Decreased caregiver support     Recommendation: Other (Comment)(rehab)     PT - OK to Discharge: Yes    See flowsheet documentation for full assessment

## 2019-06-21 NOTE — PROGRESS NOTES
General Cardiology   Progress Note -  [facility]   Marga Aldridge 78 y o  female MRN: [id number]    Unit/Bed#: ZRQM 012-52 Encounter: [id number]    Assessment:  Preoperative cardiac risk assessment: For right-sided carotid endarterectomy  Patient denies any angina, anginal equivalent or signs or symptoms of decompensated CHF  She is able to carry out 4 Mets of activity prior to the right weakness she experiences this last week  · Nuclear stress test 6/20/19:  Normal perfusion imaging at rest and with stress  · Echocardiogram:  Preserved biventricular systolic function EF 40%, no wall motion abnormality, no significant valvular abnormality  Carotid artery stenosis/occlusion:  CTA revealed occluded right ICA with 50% stenosis of left ICA  Evaluated by vascular surgery with plans for right-sided endarterectomy timing TBD  Stroke-like symptoms:  Presented with right-sided weakness and right facial droop  Patient has had right-sided weakness for at least 1 week prior to seeking medical attention  Imaging revealed Subacute-chronic CVA of left parietal lobe  Patient outside tPA window  Neurology consulted  Severe anemia:  Hemoglobin 5 3 on admission s/p 2 units PRBCs  Ferritin 2, folate >20  Hemoglobin 8 1 today  Oncology and GI consulted  Right upper lobe density:  Noted on CT for which Pulmonary has been consulted  Pelvic density:  2 7 cm new pubic symphysis noted on CT for which urology was consulted  No acute urological problems were felt therefore patient was advised to follow up outpatient for cystoscopic evaluation and pelvic MRI  Hypertension:  Average /70 on Lopressor 50 mg b i d  Hyperlipidemia: , , HDL 37,  on atorvastatin 40 mg  Tobacco abuse:  Complete smoking cessation advised     Plan/Recommendations:  · Patient without signs or symptoms of CAD or acute decompensated heart failure    · Nuclear stress test echocardiogram revealed no evidence of ischemia with preserved biventricular systolic function  · Patient is medically optimized to proceed with surgery without any further cardiac testing  Subjective    Patient seen and examined  No acute events overnight  Patient denies any chest pain, shortness of breath, palpitations, lightheadedness or dizziness  Review of Systems   Constitution: Negative  Negative for chills  Cardiovascular: Negative for chest pain, dyspnea on exertion, leg swelling, near-syncope, orthopnea, palpitations, paroxysmal nocturnal dyspnea and syncope  Respiratory: Negative  Negative for shortness of breath  Gastrointestinal: Negative for diarrhea, nausea and vomiting  Neurological: Positive for weakness  Negative for dizziness and light-headedness  Right upper extremity   Psychiatric/Behavioral: Negative for altered mental status  All other systems reviewed and are negative  Objective:   Vitals: Blood pressure 153/70, pulse 59, temperature 98 1 °F (36 7 °C), resp  rate 18, height 5' 2" (1 575 m), weight 74 kg (163 lb 2 3 oz), SpO2 95 %  ,       Body mass index is 29 84 kg/m²  ,     Systolic (02LLH), JDB:809 , Min:109 , HTY:138     Diastolic (76TAW), ELD:76, Min:67, Max:71      Intake/Output Summary (Last 24 hours) at 6/21/2019 1212  Last data filed at 6/21/2019 0800  Gross per 24 hour   Intake 680 ml   Output    Net 680 ml     Weight (last 2 days)     Date/Time   Weight    06/21/19 0605   74 (163 14)    06/20/19 0600   74 6 (164 46)    06/19/19 1226   74 8 (165)            Telemetry Review:  Normal sinus rhythm      Physical Exam   Constitutional: She is oriented to person, place, and time  She appears well-developed and well-nourished  On room air in no acute distress   HENT:   Head: Normocephalic and atraumatic  Neck: Normal range of motion  Neck supple  No JVD present  Cardiovascular: Normal rate, regular rhythm, normal heart sounds and intact distal pulses  No murmur heard    Pulmonary/Chest: Effort normal and breath sounds normal  No respiratory distress  She has no wheezes  She has no rales  Abdominal: Soft  Bowel sounds are normal  She exhibits no distension  There is no tenderness  Musculoskeletal: Normal range of motion  She exhibits no edema  Neurological: She is alert and oriented to person, place, and time  Skin: Skin is warm and dry  Psychiatric: She has a normal mood and affect  Her behavior is normal    Nursing note and vitals reviewed        LABORATORY RESULTS      CBC with diff:   Results from last 7 days   Lab Units 06/21/19  0835 06/21/19  0445 06/21/19  0015 06/20/19  1809 06/20/19  0855 06/20/19  0222 06/19/19  1524 06/19/19  1203   WBC Thousand/uL  --  9 92  --   --   --  12 61*  --  5 87   HEMOGLOBIN g/dL 9 0* 8 6* 8 3* 8 6* 8 4* 8 1* 5 8* 5 3*   HEMATOCRIT %  --  32 4*  --   --   --  30 2*  --  21 8*   MCV fL  --  66*  --   --   --  65*  --  63*   PLATELETS Thousands/uL  --  252  --   --   --  256  --  248   MCH pg  --  17 4*  --   --   --  17 4*  --  15 2*   MCHC g/dL  --  26 5*  --   --   --  26 8*  --  24 3*   RDW %  --  24 5*  --   --   --  24 0*  --  22 9*   NRBC AUTO /100 WBCs  --  0  --   --   --  0  --   --        CMP:  Results from last 7 days   Lab Units 06/21/19 0445 06/20/19  0547 06/19/19  1203   POTASSIUM mmol/L 3 4* 3 6 3 8   CHLORIDE mmol/L 111* 111* 106   CO2 mmol/L 25 24 22   BUN mg/dL 8 9 13   CREATININE mg/dL 0 99 0 97 1 01   CALCIUM mg/dL 8 8 8 8 8 1*   AST U/L  --   --  31   ALT U/L  --   --  16   ALK PHOS U/L  --   --  99   EGFR ml/min/1 73sq m 54 56 53       BMP:  Results from last 7 days   Lab Units 06/21/19  0445 06/20/19  0547 06/19/19  1203   POTASSIUM mmol/L 3 4* 3 6 3 8   CHLORIDE mmol/L 111* 111* 106   CO2 mmol/L 25 24 22   BUN mg/dL 8 9 13   CREATININE mg/dL 0 99 0 97 1 01   CALCIUM mg/dL 8 8 8 8 8 1*       No results found for: NTBNP          Results from last 7 days   Lab Units 06/20/19  0547   MAGNESIUM mg/dL 2 1          Results from last 7 days   Lab Units 19  0547   HEMOGLOBIN A1C % 5 9              Results from last 7 days   Lab Units 19  1203   INR  1 14       Lipid Profile:   Lab Results   Component Value Date    CHOL 320 10/29/2015     Lab Results   Component Value Date    HDL 37 (L) 2019    HDL 45 10/29/2015     Lab Results   Component Value Date    LDLCALC 106 (H) 2019    LDLCALC 220 (H) 10/29/2015     Lab Results   Component Value Date    TRIG 115 2019    TRIG 276 10/29/2015       Cardiac testing:   Results for orders placed during the hospital encounter of 19   Echo complete with contrast if indicated    Narrative [address]  [facility], [address] [facility]  [phone]    Transthoracic Echocardiogram  2D, M-mode, Doppler, and Color Doppler    Study date:  2019    Patient: Martin Hills  MR number: [id number]  Account number: [de-identified]  : 1940  Age: 78 years  Gender: Female  Status: Inpatient  Location: Bedside  Height: 59 in  Weight: 165 lb  BP: 133/ 70 mmHg    Indications: Transient ischemic attack  Diagnoses: G45 9 - Transient cerebral ischemic attack, unspecified    Sonographer:  Harrison Arndt RDCS  Primary Physician:  Clare Ahn DO  Referring Physician:  Aubrey Aviles MD  Group:  [facility]  Interpreting Physician:  Jennifer Narayanan MD    SUMMARY    LEFT VENTRICLE:  Systolic function was normal  Ejection fraction was estimated to be 65 %  There were no regional wall motion abnormalities  AORTIC VALVE:  There was trace regurgitation  HISTORY: PRIOR HISTORY: Hypertension  Anemia  Hyperlipidemia  Smoker  PROCEDURE: The procedure was performed at the bedside  This was a routine study  The transthoracic approach was used  The study included complete 2D imaging, M-mode, complete spectral Doppler, and color Doppler  The heart rate was 65 bpm,  at the start of the study   Images were obtained from the parasternal, apical, subcostal, and suprasternal notch acoustic windows  Image quality was adequate  LEFT VENTRICLE: Size was normal  Systolic function was normal  Ejection fraction was estimated to be 65 %  There were no regional wall motion abnormalities  Wall thickness was normal  DOPPLER: There was an increased relative contribution  of atrial contraction to ventricular filling  Left ventricular diastolic function parameters were normal for the patient's age  RIGHT VENTRICLE: The size was normal  Systolic function was normal     LEFT ATRIUM: The atrium was dilated  RIGHT ATRIUM: Size was normal     MITRAL VALVE: Valve structure was normal  There was normal leaflet separation  DOPPLER: There was no evidence for stenosis  There was trace regurgitation  AORTIC VALVE: The valve was probably trileaflet  Leaflets exhibited mildly increased thickness, mild calcification, and normal cuspal separation  DOPPLER: There was no evidence for stenosis  There was trace regurgitation  TRICUSPID VALVE: The valve structure was normal  There was normal leaflet separation  DOPPLER: There was no evidence for stenosis  There was trace regurgitation  Estimated peak PA pressure was 30 mmHg  PULMONIC VALVE: Leaflets exhibited normal thickness, no calcification, and normal cuspal separation  DOPPLER: The transpulmonic velocity was within the normal range  There was trace regurgitation  PERICARDIUM: There was no pericardial effusion  The pericardium was normal in appearance  AORTA: The root exhibited normal size  Not well visualized      SYSTEM MEASUREMENT TABLES    2D  %FS: 35 72 %  Ao Diam: 2 7 cm  EDV(Teich): 82 55 ml  EF(Teich): 65 55 %  ESV(Teich): 28 44 ml  IVSd: 1 09 cm  LA Area: 22 09 cm2  LA Diam: 3 73 cm  LVEDV MOD A4C: 89 92 ml  LVEF MOD A4C: 68 86 %  LVESV MOD A4C: 28 ml  LVIDd: 4 29 cm  LVIDs: 2 76 cm  LVLd A4C: 7 45 cm  LVLs A4C: 5 89 cm  LVPWd: 0 87 cm  RA Area: 15 45 cm2  RVIDd: 3 48 cm  SV MOD A4C: 61 92 ml  SV(Teich): 54 11 ml    CW  TR Vmax: 2 57 m/s  TR maxP 33 mmHg    MM  TAPSE: 2 81 cm    PW  E': 0 07 m/s  E/E': 10 39  MV A Trell: 1 m/s  MV Dec [place]: 2 76 m/s2  MV DecT: 269 37 ms  MV E Trell: 0 74 m/s  MV E/A Ratio: 0 74  MV PHT: 78 12 ms  MVA By PHT: 2 82 cm2    [employer] Accredited Echocardiography Laboratory    Prepared and electronically signed by    Yanet Belle MD  Signed 2019 15:02:29       No results found for this or any previous visit  No results found for this or any previous visit  No procedure found  No results found for this or any previous visit  Meds/Allergies   all current active meds have been reviewed, current meds:   Current Facility-Administered Medications   Medication Dose Route Frequency    acetaminophen (TYLENOL) tablet 650 mg  650 mg Oral Q6H PRN    aspirin (ECOTRIN LOW STRENGTH) EC tablet 81 mg  81 mg Oral Daily    atorvastatin (LIPITOR) tablet 80 mg  80 mg Oral Daily With Dinner    melatonin tablet 6 mg  6 mg Oral HS    metoprolol tartrate (LOPRESSOR) tablet 50 mg  50 mg Oral Q12H RAYNA    pantoprazole (PROTONIX) EC tablet 40 mg  40 mg Oral Early Morning    [START ON 2019] polyethylene glycol (GOLYTELY) bowel prep 4,000 mL  4,000 mL Oral Once    and PTA meds:   Prior to Admission Medications   Prescriptions Last Dose Informant Patient Reported? Taking?    Garlic 186 MG TABS   Yes Yes   Sig: Take 100 mg by mouth daily   aspirin 81 mg chewable tablet   Yes Yes   Sig: Chew 81 mg daily   atorvastatin (LIPITOR) 40 mg tablet Not Taking at Unknown time  No No   Sig: TAKE ONE TABLET BY MOUTH ONCE DAILY   Patient not taking: Reported on 2019   metoprolol tartrate (LOPRESSOR) 50 mg tablet   Yes Yes   Sig: Take 50 mg by mouth every 12 (twelve) hours      Facility-Administered Medications: None     Medications Prior to Admission   Medication    aspirin 81 mg chewable tablet    Garlic 447 MG TABS    metoprolol tartrate (LOPRESSOR) 50 mg tablet    atorvastatin (LIPITOR) 40 mg tablet         Counseling / Coordination of Care  Total floor / unit time spent today 20 minutes  Greater than 50% of total time was spent with the patient and / or family counseling and / or coordination of care  ** Please Note: Dragon 360 Dictation voice to text software may have been used in the creation of this document   **

## 2019-06-21 NOTE — PHYSICAL THERAPY NOTE
PHYSICAL THERAPY EVALUATION          Patient Name: Efrain Sandoval  AQNMB'Y Date: 6/21/2019 06/21/19 1350   Note Type   Note type Eval only   Pain Assessment   Pain Assessment No/denies pain   Pain Score No Pain   Home Living   Type of [address] One level;Elevator   Bathroom Shower/Tub Tub/shower unit   Bathroom Toilet Standard   Bathroom Equipment Grab bars in [facility]   (none)   Additional Comments Pt lives alone in 5th floor senior high rise apartment with elevator/ramps to enter  Pt reports no use of DME for mobility PTA  Prior Function   Level of [place] Independent with ADLs and functional mobility   Lives With Alone   ADL Assistance Independent   IADLs Independent   Falls in the last 6 months 0   Vocational   (Pt daughter reports pt enjoys activities in community)   Comments Pt resides alone in senior living apartment on 5th floor with ramp/elevator enterance  Pt/daughter reports pt was I PTA  Restrictions/Precautions   Weight Bearing Precautions Per Order No   Other Precautions Impulsive; Fall Risk;Telemetry; Chair Alarm; Bed Alarm   General   Family/Caregiver Present Yes   Cognition   Arousal/Participation Alert   Orientation Level Oriented X4   Following Commands Follows one step commands with increased time or repetition   Comments Pt extremely impulsive resulting in difficulty following commands and decreased safety with use of RW for ambulation  Concerns for pt safety to return home alone and perform mobility at current functional status     RLE Assessment   RLE Assessment   (grossly 3+/5)   LLE Assessment   LLE Assessment   (grossly 4-/5)   Coordination   Movements are Fluid and Coordinated 0   Coordination and Movement Description dysmetria, dysdiadochokinesia   Bed Mobility   Additional Comments Pt received upright in chair and left upright in chair with all needs in reach and daughter present  Transfers   Sit to Stand 4  Minimal assistance   Additional items Assist x 1;Verbal cues; Impulsive   Stand to Sit 4  Minimal assistance   Additional items Verbal cues; Impulsive   Additional Comments RW  Pt with significant impulsivity requiring cues for position of RW, hand placement, safety  Ambulation/Elevation   Gait pattern Trendelburg; Foward flexed;Scissoring;Decreased foot clearance;Narrow LUIS; Improper Weight shift  (R trendelenburg, slow, R LE in ER)   Gait Assistance 4  Minimal assist  (Mod A upon LOB)   Additional items Assist x 1;Verbal cues  (assist of another for chair follow)   Assistive Device Rolling walker   Distance 30 ft x 2   Stair Management Assistance Not tested   Balance   Static Sitting Good   Dynamic Sitting Fair +   Static Standing Fair -   Dynamic Standing Poor +   Ambulatory Poor +   Endurance Deficit   Endurance Deficit Yes   Endurance Deficit Description fatigue, weakness   Activity Tolerance   Activity Tolerance Patient tolerated treatment well   Nurse Made Aware MATTHEW Montaño cleared pt to be seen by PT   Assessment   Prognosis Good   Problem List Decreased strength; Impaired balance;Decreased endurance;Decreased mobility; Impaired judgement;Decreased safety awareness; Impaired vision;Decreased coordination   Assessment Pt seen for high complexity PT evaluation due to decrease in functional mobility status compared to baseline  Pt with active PT eval/treat and up with assist orders at this time  Pt is a 77 yo F who presented to [place] [id number] with extreme fatigue and R sided weakness on 6/19/19  PMH significant for HTN, HLD, hx smoking, hx hyponatremia  Pt resides alone in senior living high rise apartment on 5th floor with ramp and elevator entrance  Pt was I PTA with no AD for mobility    Pt presents with significant impulsivity, R>L LE strength deficits, balance impairments, decreased safety awareness and decreased insight into current status that contribute to limitations in functional mobility and transfers and put pt at increased risk for falls  Pt able to perform transfers with Min A, ambulate with chair follow and RW with Min A with LOB requiring Mod A to correct  Pt with significant gait deficits as noted above  Pt left upright in bedside chair with chair alarm intact, daughter present, and with all needs in reach  Pt will benefit from skilled therapy in order to address current impairments and functional limitations  PT to follow pt and recommending rehab once medically cleared  Barriers to Discharge Decreased caregiver support   Goals   Patient Goals to go home   STG Expiration Date 07/05/19   Short Term Goal #1 1  Pt will demonstrate ability to perform all aspects of bed mobility with Mod I in order to return to home safely  2  Pt will demonstrate ability to perform functional transfers with Mod I in order to return to PLOF in home safely  3  Pt will demonstrate ability to ambulate 200 ft with least restrictive AD with Mod I in order to increase independence  4  Pt will demonstrate improved sitting/standing balance by one grade order to decrease risk of falls in home  6  Pt will increase b/l LE strength by one grade in order to increase ease of functional mobility and transfers  Plan   Treatment/Interventions Functional transfer training;LE strengthening/ROM; Therapeutic exercise; Endurance training;Patient/family training;Equipment eval/education; Bed mobility;Gait training;Spoke to nursing   PT Frequency Other (Comment)  (4-6x/wk)   Recommendation   Recommendation Other (Comment)  (rehab)   Equipment Recommended Yovanny Ricks   PT - OK to Discharge Yes   Additional Comments to rehab once medically cleared   Modified Mirian Scale   Modified Mirian Scale 4   Barthel Index   Feeding 5   Bathing 0   Grooming Score 0   Dressing Score 5   Bladder Score 10   Bowels Score 10   Toilet Use Score 5   Transfers (Bed/Chair) Score 10   Mobility (Level Surface) Score 0 Stairs Score 0   Barthel Index Score 45   Maggi Mcgrath, PT

## 2019-06-21 NOTE — PLAN OF CARE
Problem: OCCUPATIONAL THERAPY ADULT  Goal: Performs self-care activities at highest level of function for planned discharge setting  See evaluation for individualized goals  Description  Treatment Interventions: ADL retraining, Functional transfer training, UE strengthening/ROM, Endurance training, Cognitive reorientation, Patient/family training, Equipment evaluation/education, Compensatory technique education, Continued evaluation, Activityengagement          See flowsheet documentation for full assessment, interventions and recommendations  Note:   Limitation: Decreased ADL status, Decreased UE strength, Decreased Safe judgement during ADL, Decreased cognition, Decreased endurance, Decreased self-care trans, Decreased high-level ADLs  Prognosis: Good  Assessment: Pt is a 78year old female seen for initial OT evaluation s/p admission to [facility] 6/19/19 with 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 to symptomatic L ICA stenosis  MRI brain shows acute infarction (watershed distribution)  Additional active problems include anemia, nicotine dependence, and HTN  Pt with active OT orders and up with assistance orders  Per RN Nedra Cortes, pt okay to see for OT and OOB mobility  Pt resides in an apartment in a [facility] with ramped entrance and elevator access  Pt was I with ADLs, IADLs, and functional mobility without use of DME PTA  Pt is currently demonstrating the following occupational deficits: eating with set-up, grooming with set-up, UB bathing with Tara, LB bathing with modA, UB dressing with Tara, LB dressing with modA, toileting with Tara, functional transfers with Tara, and functional mobility with Tara  Factors currently limiting pt's independence in these areas include: cognitive deficits, poor safety awareness, poor insight into deficits, functional mobility, balance, R sided weakness, R sided coordination deficits, and unsupportive home environment (alone)    Overall, pt scored 45/100 on the Barthel Index  From an OT standpoint, recommend STR upon d/c  Pt is to continue to benefit from skilled occupational therapy services while in the hospital to maximize functioning and independence with daily activities  See below for OT goals to be addressed 3-5x/wk       OT Discharge Recommendation: Short Term Rehab  OT - OK to Discharge: Yes(if to STR when medically stable)

## 2019-06-21 NOTE — PLAN OF CARE
Problem: PAIN - ADULT  Goal: Verbalizes/displays adequate comfort level or baseline comfort level  Description  Interventions:  - Encourage patient to monitor pain and request assistance  - Assess pain using appropriate pain scale  - Administer analgesics based on type and severity of pain and evaluate response  - Implement non-pharmacological measures as appropriate and evaluate response  - Consider cultural and social influences on pain and pain management  - Notify physician/advanced practitioner if interventions unsuccessful or patient reports new pain  Outcome: Progressing     Problem: DISCHARGE PLANNING  Goal: Discharge to home or other facility with appropriate resources  Description  INTERVENTIONS:  - Identify barriers to discharge w/patient and caregiver  - Arrange for needed discharge resources and transportation as appropriate  - Identify discharge learning needs (meds, wound care, etc )  - Arrange for interpretive services to assist at discharge as needed  - Refer to Case Management Department for coordinating discharge planning if the patient needs post-hospital services based on physician/advanced practitioner order or complex needs related to functional status, cognitive ability, or social support system  Outcome: Progressing     Problem: Knowledge Deficit  Goal: Patient/family/caregiver demonstrates understanding of disease process, treatment plan, medications, and discharge instructions  Description  Complete learning assessment and assess knowledge base  Interventions:  - Provide teaching at level of understanding  - Provide teaching via preferred learning methods  Outcome: Progressing     Problem: Neurological Deficit  Goal: Neurological status is stable or improving  Description  Interventions:  - Monitor and assess patient's level of consciousness, motor function, sensory function, and level of assistance needed for ADLs  - Monitor and report changes from baseline   Collaborate with interdisciplinary team to initiate plan and implement interventions as ordered  - Provide and maintain a safe environment  - Utilize seizure precautions  - Utilize fall precautions  - Utilize aspiration precautions  - Utilize bleeding precautions  Outcome: Progressing     Problem: Nutrition/Hydration-ADULT  Goal: Nutrient/Hydration intake appropriate for improving, restoring or maintaining nutritional needs  Description  Monitor and assess patient's nutrition/hydration status for malnutrition (ex- brittle hair, bruises, dry skin, pale skin and conjunctiva, muscle wasting, smooth red tongue, and disorientation)  Collaborate with interdisciplinary team and initiate plan and interventions as ordered  Monitor patient's weight and dietary intake as ordered or per policy  Utilize nutrition screening tool and intervene per policy  Determine patient's food preferences and provide high-protein, high-caloric foods as appropriate  INTERVENTIONS:  - Monitor oral intake, urinary output, labs, and treatment plans  - Assess nutrition and hydration status and recommend course of action  - Evaluate amount of meals eaten  - Assist patient with eating if necessary   - Allow adequate time for meals  - Recommend/ encourage appropriate diets, oral nutritional supplements, and vitamin/mineral supplements  - Order, calculate, and assess calorie counts as needed  - Recommend, monitor, and adjust tube feedings and TPN/PPN based on assessed needs  - Assess need for intravenous fluids  - Provide specific nutrition/hydration education as appropriate  - Include patient/family/caregiver in decisions related to nutrition  Outcome: Progressing     Problem: Potential for Falls  Goal: Patient will remain free of falls  Description  INTERVENTIONS:  - Assess patient frequently for physical needs  -  Identify cognitive and physical deficits and behaviors that affect risk of falls  -  [place] fall precautions as indicated by assessment    - Educate patient/family on patient safety including physical limitations  - Instruct patient to call for assistance with activity based on assessment  - Modify environment to reduce risk of injury  - Consider OT/PT consult to assist with strengthening/mobility  Outcome: Progressing

## 2019-06-21 NOTE — PROGRESS NOTES
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, will consult Urology    Anemia, unspecified  Assessment & Plan  Unclear, will obtain peripheral smear to rule out hemolysis, obtain LFTs  Iron storages, folate and B12 requested and pending  Guaiac in the ER negative, obtain FOBT x3  Gastroenterology will do colonoscopy and EGD on  Monday, NPO past midnight Sunday, needs to be on dual antiplatelet therapy after CEA  One results of multiple status back including UPEP and SPEP consideration if appropriate to consult Hematology  Receiving blood transfusion in the ER, x2 units, H&H q 8 hours for 24 hours and then if stable discontinue  Started Protonix  Clear liquid diet for now    Nicotine dependence  Assessment & Plan  Extensive counseling was offered, at this time declined nicotine patch    Essential hypertension  Assessment & Plan  Continue with beta-blocker as per home dose, monitor  Chronic stroke on CT scan, off window for permissive hypertension    * Stroke-like symptom  Assessment & Plan  Presenting with right-sided weakness and right facial droop, CT scan of the head positive for left parietal lobe subacute to chronic stroke  The case was discussed by ED with Neurology, off window for tPA, last well known even before obtaining scan  Admitted with stroke protocol with appropriate vital signs and neuro checks  Obtain MRI  Obtain echocardiogram  Formal neurology consult is pending  For now will hold aspirin for possible GI bleed    Abnormal CT of the neck with 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, requested evaluation by vascular surgery for reviewing images especially concern of the left side    PT and OT evaluation  Fall precautions           VTE Pharmacologic Prophylaxis:   Pharmacologic: Pharmacologic VTE Prophylaxis contraindicated due to stroke  Mechanical VTE Prophylaxis in Place: Yes    Patient Centered Rounds: I have performed bedside rounds with nursing staff today  Discussions with Specialists or Other Care Team Provider: neurology    Education and Discussions with Family / Patient: patient    Time Spent for Care: 30 minutes  More than 50% of total time spent on counseling and coordination of care as described above  Current Length of Stay: 2 day(s)    Current Patient Status: Inpatient   Certification Statement: The patient will continue to require additional inpatient hospital stay due to need EGD, colonoscopy on monday, CEA on wed    Discharge Plan: pending above procedures     Code Status: Level 1 - Full Code      Subjective:   I am doing well  No complaints today  Eating well  No issues with pain, breathing  Objective:     Vitals:   Temp (24hrs), Av 9 °F (36 6 °C), Min:97 2 °F (36 2 °C), Max:98 2 °F (36 8 °C)    Temp:  [97 2 °F (36 2 °C)-98 2 °F (36 8 °C)] 97 2 °F (36 2 °C)  HR:  [59-70] 60  Resp:  [16-18] 18  BP: (109-175)/(64-71) 172/64  SpO2:  [95 %-100 %] 100 %  Body mass index is 29 84 kg/m²  Input and Output Summary (last 24 hours): Intake/Output Summary (Last 24 hours) at 2019 1628  Last data filed at 2019 1200  Gross per 24 hour   Intake 680 ml   Output    Net 680 ml       Physical Exam:     Physical Exam   Constitutional: She is oriented to person, place, and time  She appears well-developed and well-nourished  No distress  HENT:   Head: Normocephalic and atraumatic  Right Ear: External ear normal    Left Ear: External ear normal    Nose: Nose normal    Mouth/Throat: Oropharynx is clear and moist  No oropharyngeal exudate     Eyes: Pupils are equal, round, and reactive to light  Conjunctivae and EOM are normal  Right eye exhibits no discharge  Left eye exhibits no discharge  No scleral icterus  Neck: Normal range of motion  Neck supple  No JVD present  No tracheal deviation present  No thyromegaly present  Cardiovascular: Normal rate, regular rhythm, normal heart sounds and intact distal pulses  Exam reveals no gallop and no friction rub  No murmur heard  Pulmonary/Chest: Effort normal and breath sounds normal  No stridor  No respiratory distress  She has no wheezes  She has no rales  She exhibits no tenderness  Abdominal: Soft  Bowel sounds are normal  She exhibits no distension and no mass  There is no tenderness  There is no rebound and no guarding  Musculoskeletal: Normal range of motion  She exhibits no edema, tenderness or deformity  Lymphadenopathy:     She has no cervical adenopathy  Neurological: She is alert and oriented to person, place, and time  She displays normal reflexes  No cranial nerve deficit or sensory deficit  She exhibits normal muscle tone  Coordination normal    Skin: Skin is warm and dry  She is not diaphoretic  No erythema  No pallor  Psychiatric: She has a normal mood and affect  Her behavior is normal  Judgment and thought content normal    Nursing note and vitals reviewed        Additional Data:     Labs:    Results from last 7 days   Lab Units 06/21/19  0835 06/21/19  0445   WBC Thousand/uL  --  9 92   HEMOGLOBIN g/dL 9 0* 8 6*   HEMATOCRIT %  --  32 4*   PLATELETS Thousands/uL  --  252   NEUTROS PCT %  --  76*   LYMPHS PCT %  --  10*   MONOS PCT %  --  7   EOS PCT %  --  6     Results from last 7 days   Lab Units 06/21/19  0445  06/19/19  1203   POTASSIUM mmol/L 3 4*   < > 3 8   CHLORIDE mmol/L 111*   < > 106   CO2 mmol/L 25   < > 22   BUN mg/dL 8   < > 13   CREATININE mg/dL 0 99   < > 1 01   CALCIUM mg/dL 8 8   < > 8 1*   ALK PHOS U/L  --   --  99   ALT U/L  --   --  16   AST U/L  --   --  31    < > = values in this interval not displayed  Results from last 7 days   Lab Units 06/19/19  1203   INR  1 14       * I Have Reviewed All Lab Data Listed Above  * Additional Pertinent Lab Tests Reviewed: Williams Abel Admission Reviewed    Imaging:    Imaging Reports Reviewed Today Include:    Imaging Personally Reviewed by Myself Includes:     Recent Cultures (last 7 days):           Last 24 Hours Medication List:     Current Facility-Administered Medications:  acetaminophen 650 mg Oral Q6H PRN Carloz Price MD   aspirin 81 mg Oral Daily Radha Felix PA-C   atorvastatin 80 mg Oral Daily With Maria E Sun DO   melatonin 6 mg Oral HS Juanita Bowens PA-C   metoprolol tartrate 50 mg Oral Q12H [address] Carloz Price MD   pantoprazole 40 mg Oral Early Morning Carloz Price MD   [START ON 6/23/2019] polyethylene glycol 4,000 mL Oral Once Caleb Llamas PA-C        Today, Patient Was Seen By: Monique Torres MD    ** Please Note: Dictation voice to text software may have been used in the creation of this document   **

## 2019-06-21 NOTE — RESTORATIVE TECHNICIAN NOTE
Restorative Specialist Mobility Note       Activity: Ambulate in pope, Ambulate in room, Bathroom privileges, Chair, Dangle, Stand at bedside(Educated/encouraged pt to ambulate with assistance 3-4 x's/day   Pt callbell, phone/tray within reach )     Assistive Device: Other (Comment)(HHA x1)             Saul Culp BS, Restorative Technician, [employer]

## 2019-06-21 NOTE — PROGRESS NOTES
Progress Note - Neurology   Radha King 78 y o  female [id number]  Unit/Bed#: [facility] 721/[facility] 721-01    Assessment:  Radha King is a 78 y o  female found to have an acute infarction in the left parietal infarction, secondary to symptomatic left ICA stenosis  I personally reviewed MRI brain which shows the acute infarction (watershed distribution)    Plan:  - Continue aspirin 81 mg and atorvastatin 40 mg for stroke prevention for now  - Agree with Plavix, pending clearance from GI  - Vascular surgery planning for left CEA this coming wednesday  - GI following, plan for endoscopy/colonoscopy on Monday   - Continue telemetry   - PT/OT as needed   -  An outpatient hospital follow up appointment has been requested with the stroke team  Office will call the patient to schedule     Neurology will sign off  Please call us back if you have any further questions/concerns  Subjective:   No significant events overnight  She is doing well and says that her right sided symptoms have improved, she states  No new symptoms  He has intermittently become hypertensive since admission  Most recent blood pressure was 153/70  Past Medical History:   Diagnosis Date    Hypertension      History reviewed  No pertinent surgical history  History reviewed  No pertinent family history    Social History     Socioeconomic History    Marital status: Single     Spouse name: None    Number of children: None    Years of education: None    Highest education level: None   Occupational History    None   Social Needs    Financial resource strain: None    Food insecurity:     Worry: None     Inability: None    Transportation needs:     Medical: None     Non-medical: None   Tobacco Use    Smoking status: Current Every Day Smoker     Packs/day: 0 25     Years: 50 00     Pack years: 12 50     Types: Cigarettes    Smokeless tobacco: Never Used   Substance and Sexual Activity    Alcohol use: Yes     Frequency: Monthly or less Drinks per session: 1 or 2    Drug use: Never    Sexual activity: Not Currently   Lifestyle    Physical activity:     Days per week: None     Minutes per session: None    Stress: None   Relationships    Social connections:     Talks on phone: None     Gets together: None     Attends Alevism service: None     Active member of club or organization: None     Attends meetings of clubs or organizations: None     Relationship status: None    Intimate partner violence:     Fear of current or ex partner: None     Emotionally abused: None     Physically abused: None     Forced sexual activity: None   Other Topics Concern    None   Social History Narrative    None         Medications: All current active meds have been reviewed and current meds:  Scheduled Meds:  Current Facility-Administered Medications:  acetaminophen 650 mg Oral Q6H PRN Hortencia Warner MD   aspirin 81 mg Oral Daily Radha Felix PA-C   atorvastatin 80 mg Oral Daily With Maria E Sun DO   melatonin 6 mg Oral HS Juanita Bowens PA-C   metoprolol tartrate 50 mg Oral Q12H [address] Hortencia Warner MD   pantoprazole 40 mg Oral Early Morning Hortencia Warner MD   [START ON 6/23/2019] polyethylene glycol 4,000 mL Oral Once Javid Mendoza PA-C     Continuous Infusions:   PRN Meds:   acetaminophen       ROS:   Review of Systems  Review of systems negative except mentioned above in HPI  Vitals:   /70   Pulse 59   Temp 98 1 °F (36 7 °C)   Resp 18   Ht 5' 2" (1 575 m)   Wt 74 kg (163 lb 2 3 oz)   SpO2 95%   BMI 29 84 kg/m²     Physical Exam:   Physical Exam   General - follows commands  Speech - fluent, no aphasia noted    Neuro:   Cranial nerves: PERRL, EOMI, R facial droop noted, facial sensation intact, tongue midline  Motor: 4-/5 RUE and RLE    Labs: I have personally reviewed pertinent reports     Recent Results (from the past 24 hour(s))   Stress strip    Collection Time: 06/20/19  3:23 PM   Result Value Ref Range Protocol Name [address] SIT     Time In Exercise Phase 00:03:31     MAX   SYSTOLIC  mmHg    Max Diastolic Bp 74 mmHg    Max Heart Rate 77 BPM    Max Predicted Heart Rate 141 BPM    Reason for Termination Protocol Complete     Test Indication      Target Hr Formular (220 - Age)*100%     Arrhy During Ex      ECG Interp Before Ex      ECG Interp during Ex      Ex Summary Comment      Chest Pain Statement none     Overall Hr Response To Exercise      Overall BP Response To Exercise     Hemoglobin    Collection Time: 06/20/19  6:09 PM   Result Value Ref Range    Hemoglobin 8 6 (L) 11 5 - 15 4 g/dL   Hemoglobin    Collection Time: 06/21/19 12:15 AM   Result Value Ref Range    Hemoglobin 8 3 (L) 11 5 - 15 4 g/dL   CBC and differential    Collection Time: 06/21/19  4:45 AM   Result Value Ref Range    WBC 9 92 4 31 - 10 16 Thousand/uL    RBC 4 95 3 81 - 5 12 Million/uL    Hemoglobin 8 6 (L) 11 5 - 15 4 g/dL    Hematocrit 32 4 (L) 34 8 - 46 1 %    MCV 66 (L) 82 - 98 fL    MCH 17 4 (L) 26 8 - 34 3 pg    MCHC 26 5 (L) 31 4 - 37 4 g/dL    RDW 24 5 (H) 11 6 - 15 1 %    Platelets 180 070 - 005 Thousands/uL    nRBC 0 /100 WBCs    Neutrophils Relative 76 (H) 43 - 75 %    Immat GRANS % 0 0 - 2 %    Lymphocytes Relative 10 (L) 14 - 44 %    Monocytes Relative 7 4 - 12 %    Eosinophils Relative 6 0 - 6 %    Basophils Relative 1 0 - 1 %    Neutrophils Absolute 7 56 1 85 - 7 62 Thousands/µL    Immature Grans Absolute 0 04 0 00 - 0 20 Thousand/uL    Lymphocytes Absolute 1 01 0 60 - 4 47 Thousands/µL    Monocytes Absolute 0 69 0 17 - 1 22 Thousand/µL    Eosinophils Absolute 0 56 0 00 - 0 61 Thousand/µL    Basophils Absolute 0 06 0 00 - 0 10 Thousands/µL   Basic metabolic panel    Collection Time: 06/21/19  4:45 AM   Result Value Ref Range    Sodium 143 136 - 145 mmol/L    Potassium 3 4 (L) 3 5 - 5 3 mmol/L    Chloride 111 (H) 100 - 108 mmol/L    CO2 25 21 - 32 mmol/L    ANION GAP 7 4 - 13 mmol/L    BUN 8 5 - 25 mg/dL    Creatinine 0 99 0 60 - 1 30 mg/dL    Glucose 84 65 - 140 mg/dL    Calcium 8 8 8 3 - 10 1 mg/dL    eGFR 54 ml/min/1 73sq m   Hemoglobin    Collection Time: 06/21/19  8:35 AM   Result Value Ref Range    Hemoglobin 9 0 (L) 11 5 - 15 4 g/dL       Imaging: I have personally reviewed pertinent imaging and I have personally reviewed PACS reports  EKG, Pathology, and Other Studies: I have personally reviewed pertinent reports         VTE Prophylaxis: Sequential compression device Alan Quinteros     N/A

## 2019-06-21 NOTE — PROGRESS NOTES
Progress Note - Vascular Surgery   Diego Beatty 78 y o  female MRN: [id number]  Unit/Bed#: [facility] 721-01 Encounter: [id number]    Assessment:  67yo F presenting with left hemispheric stroke/TIA, manifestign as right arm weakness, noted to have occluded R ICA And 50% stenosis of her left ICA with a vulnerable appearing plaque and likely source of atheroembolic stroke  Plan:  F/u carotid duplex  Recommend anticoagulation once cleared by primary team/neuro/GI  GI Monday for EGD/c-scope  F/u MRI  Cardiology consult for pre-op clearance  Plan for L CEA, timing should follow GI intervention  Primary per SLIM    Subjective/Objective   Chief Complaint:     Subjective: only small amt right sided weakness remains in forearm, otherwise well, had echo and stress test yesterday which were wnl    Objective:     Blood pressure 152/68, pulse 63, temperature 98 2 °F (36 8 °C), resp  rate 18, height 5' 2" (1 575 m), weight 74 kg (163 lb 2 3 oz), SpO2 95 %  ,Body mass index is 29 84 kg/m²  Intake/Output Summary (Last 24 hours) at 6/21/2019 0658  Last data filed at 6/20/2019 1749  Gross per 24 hour   Intake 680 ml   Output 900 ml   Net -220 ml       Invasive Devices     Peripheral Intravenous Line            Peripheral IV 06/19/19 Left Antecubital 1 day                Physical Exam:  NAD, alert and oriented x3  Normocephalic, atraumatic  MMM, EOMI, PERRLA  Norm resp effort on RA  RRR  Abd soft, NT/ND  No calf tenderness or peripheral edema  Motor/sensation intact in distal extremities  4+/5 strength RUE, 5/5 strength throughout rest  CN grossly intact  -rash/lesions        Lab, Imaging and other studies:  I have personally reviewed pertinent lab results    , CBC:   Lab Results   Component Value Date    WBC 9 92 06/21/2019    HGB 8 6 (L) 06/21/2019    HCT 32 4 (L) 06/21/2019    MCV 66 (L) 06/21/2019     06/21/2019    MCH 17 4 (L) 06/21/2019    MCHC 26 5 (L) 06/21/2019    RDW 24 5 (H) 06/21/2019    NRBC 0 06/21/2019   , CMP: Lab Results   Component Value Date    SODIUM 143 06/21/2019    K 3 4 (L) 06/21/2019     (H) 06/21/2019    CO2 25 06/21/2019    BUN 8 06/21/2019    CREATININE 0 99 06/21/2019    CALCIUM 8 8 06/21/2019    EGFR 54 06/21/2019   VTE Mechanical Prophylaxis: sequential compression device

## 2019-06-21 NOTE — OCCUPATIONAL THERAPY NOTE
[address] Evaluation     Patient Name: Andreina MEI Date: 6/21/2019  Problem List  Patient Active Problem List   Diagnosis    Cataracts, bilateral    Essential hypertension    Hyperlipidemia    Nicotine dependence    Stroke-like symptom    Anemia, unspecified    Abnormal CT scan     Past Medical History  Past Medical History:   Diagnosis Date    Hypertension      Past Surgical History  History reviewed  No pertinent surgical history  06/21/19 1351   Note Type   Note type Eval only   Restrictions/Precautions   Weight Bearing Precautions Per Order No   Other Precautions Cognitive; Chair Alarm; Bed Alarm;Multiple lines;Telemetry; Impulsive; Fall Risk;Aspiration   Pain Assessment   Pain Assessment No/denies pain   Pain Score No Pain   Home Living   Type of Home Apartment  (0STE)   Home Layout Able to live on main level with bedroom/bathroom; Elevator;Ramped entrance   Bathroom Shower/Tub Tub/shower unit   Bathroom Toilet Standard   Bathroom Equipment Grab bars in [facility]   (no use of DME PTA)   Additional Comments Pt resides alone in an apartment with ramped entrance and elevator access  Prior Function   Level of [place] Independent with ADLs and functional mobility   Lives With Alone   ADL Assistance Independent   IADLs Independent   Falls in the last 6 months 0   Vocational Retired   Lifestyle   Autonomy Pt reports being I with ADLs, IADLs, and functional mobility without use of DME PTA  Reciprocal Relationships reports she has tons of friends "both men and women" in her building   Service to Others retired   Intrinsic Gratification reports that she enjoys gossiping   Psychosocial   Psychosocial (WDL) WDL   Subjective   Subjective Pt very easily distractable and impulsive, often requiring redirection and cues for safety     ADL   Eating Assistance 5  Supervision/Setup   Grooming Assistance 5  Supervision/Setup   UB Bathing Assistance 4  Minimal Assistance   LB Bathing Assistance 3  Moderate Assistance   UB Dressing Assistance 4  Minimal [address] 3  Moderate [address] [place] [address]  4  Minimal Assistance   Bed Mobility   Additional Comments Pt seated upright in recliner upon therapist entry  Pt left seated upright in recliner with all needs within reach, chair alarm activated  Transfers   Sit to Stand 4  Minimal assistance   Additional items Assist x 1; Increased time required;Verbal cues   Stand to Sit 4  Minimal assistance   Additional items Assist x 1; Increased time required;Verbal cues   Functional Mobility   Functional Mobility 3  Moderate assistance   Additional Comments Ax1 with RW  Narrow stride with scissoring  Unsteady and requires chair follow  Requires frequent cues for safe rw use  Additional items Rolling walker   Balance   Static Sitting Fair +   Dynamic Sitting Fair   Static Standing Fair -   Dynamic Standing Poor   Ambulatory Poor   Activity Tolerance   Activity Tolerance Patient tolerated treatment well   Medical Staff Made Aware Spoke with CM regarding d/c rec   Nurse Made Aware Per RN Zigmund Najjar, pt okay to see for OT and OOB mobility   RUE Assessment   RUE Assessment X  (4-/5 grossly)   LUE Assessment   LUE Assessment WFL  (5/5)   Hand Function   Gross Motor Coordination   (R side ataxic)   Fine Motor Coordination   (R side impairments)   Vision-Basic Assessment   Current Vision Wears glasses all the time   Visual History   (cataract in L eye, R eye pt reports 20/20)   Patient Visual Report Other (Comment)  (reports no changes in vision)   Vision - Complex Assessment   Ocular Range of Motion WFL   Head Position WDL   Tracking Able to track stimulus in all quads without difficulty   Perception   Inattention/Neglect   (potentially mild inattention of R side)   Cognition   Overall Cognitive Status Impaired   Arousal/Participation Alert; Cooperative;Responsive   Attention Attends with cues to redirect   Orientation Level Oriented X4   Memory Decreased recall of recent events;Decreased recall of precautions   Following Commands Follows one step commands with increased time or repetition   Comments Pt very impulsive and with poor safety awareness, requiring frequent cues for safety  Pt easily distracted, requiring cues for attention  Poor insight into deficits  Assessment   Limitation Decreased ADL status; Decreased UE strength;Decreased Safe judgement during ADL;Decreased cognition;Decreased endurance;Decreased self-care trans;Decreased high-level ADLs   Prognosis Good   Assessment Pt is a 78year old female seen for initial OT evaluation s/p admission to [facility] 6/19/19 with 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 to symptomatic L ICA stenosis  MRI brain shows acute infarction (watershed distribution)  Additional active problems include anemia, nicotine dependence, and HTN  Pt with active OT orders and up with assistance orders  Per RN Flor Maier, pt okay to see for OT and OOB mobility  Pt resides in an apartment in a [facility] with ramped entrance and elevator access  Pt was I with ADLs, IADLs, and functional mobility without use of DME PTA  Pt is currently demonstrating the following occupational deficits: eating with set-up, grooming with set-up, UB bathing with Tara, LB bathing with modA, UB dressing with Tara, LB dressing with modA, toileting with Tara, functional transfers with Tara, and functional mobility with Tara  Factors currently limiting pt's independence in these areas include: cognitive deficits, poor safety awareness, poor insight into deficits, functional mobility, balance, R sided weakness, R sided coordination deficits, and unsupportive home environment (alone)  Overall, pt scored 45/100 on the Barthel Index  From an OT standpoint, recommend STR upon d/c    Pt is to continue to benefit from skilled occupational therapy services while in the hospital to maximize functioning and independence with daily activities  See below for OT goals to be addressed 3-5x/wk  Goals   Patient Goals to go home   Plan   Treatment Interventions ADL retraining;Functional transfer training;UE strengthening/ROM; Endurance training;Cognitive reorientation;Patient/family training;Equipment evaluation/education; Compensatory technique education;Continued evaluation; Activityengagement   Goal Expiration Date 06/30/19   Treatment Day 1   OT Frequency 3-5x/wk   Recommendation   OT Discharge Recommendation Short Term Rehab   OT - OK to Discharge Yes  (if to STR when medically stable)   Barthel Index   Feeding 5   Bathing 0   Grooming Score 0   Dressing Score 5   Bladder Score 10   Bowels Score 10   Toilet Use Score 5   Transfers (Bed/Chair) Score 10   Mobility (Level Surface) Score 0   Stairs Score 0   Barthel Index Score 45   Modified Warrick Scale   Modified Warrick Scale 4     GOALS    1) Pt will improve activity tolerance to G for min 30 min txment sessions for increase engagement in functional tasks    2) Pt will complete UB/LB dressing/self care (including fasteners) w/ mod I using adaptive device and DME as needed    3) Pt will complete bathing w/ Mod I w/ use of AE and DME as needed    4) Pt will complete toileting w/ mod I w/ G hygiene/thoroughness using DME as needed    5) Pt will improve functional transfers to Mod I on/off all surfaces using DME as needed w/ G balance/safety     6) Pt will improve functional mobility during ADL/IADL/leisure tasks to Mod I using DME as needed w/ G balance/safety     7) Pt will participate in simulated IADL management task to increase independence to Mod I w/ G safety and endurance    8) Pt will be attentive 100% of the time during ongoing cognitive assessment w/ G participation to assist w/ safe d/c planning/recommendations    9) Pt will demonstrate G carryover of pt/caregiver education and training as appropriate w/o cues w/ good tolerance to increase safety during functional tasks    10) Pt will maintain standing upright I'ly with use of DME as appropriate for at least 10 minutes while completing a dynamic functional activity with good safety, balance, and endurance  11) Pt will attend to a functional activity for at least 20 minutes with no more than 1 cue for attention/redirection      MATEUSZ Grier, OTR/L

## 2019-06-21 NOTE — SOCIAL WORK
Met with pt to discuss her discharge plan  Informed her of therapy's recommendation for inpt acute rehab  List provided for her to review  She stated that she is not sure if she would like to go to inpt rehab  Informed her of options of home therapy and outpt therapy  She stated that she would like to discuss the inpt acute rehab option with her dgts and requested CM to follow up with her in regards to her which option she choose of rehab

## 2019-06-22 LAB
ANION GAP SERPL CALCULATED.3IONS-SCNC: 6 MMOL/L (ref 4–13)
BASOPHILS # BLD AUTO: 0.08 THOUSANDS/ΜL (ref 0–0.1)
BASOPHILS NFR BLD AUTO: 1 % (ref 0–1)
BLD SMEAR INTERP: NORMAL
BUN SERPL-MCNC: 10 MG/DL (ref 5–25)
CALCIUM SERPL-MCNC: 8.8 MG/DL (ref 8.3–10.1)
CHLORIDE SERPL-SCNC: 112 MMOL/L (ref 100–108)
CO2 SERPL-SCNC: 26 MMOL/L (ref 21–32)
CREAT SERPL-MCNC: 0.92 MG/DL (ref 0.6–1.3)
EOSINOPHIL # BLD AUTO: 0.66 THOUSAND/ΜL (ref 0–0.61)
EOSINOPHIL NFR BLD AUTO: 9 % (ref 0–6)
ERYTHROCYTE [DISTWIDTH] IN BLOOD BY AUTOMATED COUNT: 25.2 % (ref 11.6–15.1)
GFR SERPL CREATININE-BSD FRML MDRD: 59 ML/MIN/1.73SQ M
GLUCOSE SERPL-MCNC: 70 MG/DL (ref 65–140)
HCT VFR BLD AUTO: 31 % (ref 34.8–46.1)
HGB BLD-MCNC: 8.3 G/DL (ref 11.5–15.4)
HGB BLD-MCNC: 8.4 G/DL (ref 11.5–15.4)
HGB BLD-MCNC: 8.7 G/DL (ref 11.5–15.4)
HGB BLD-MCNC: 8.9 G/DL (ref 11.5–15.4)
IMM GRANULOCYTES # BLD AUTO: 0.03 THOUSAND/UL (ref 0–0.2)
IMM GRANULOCYTES NFR BLD AUTO: 0 % (ref 0–2)
LYMPHOCYTES # BLD AUTO: 1.1 THOUSANDS/ΜL (ref 0.6–4.47)
LYMPHOCYTES NFR BLD AUTO: 15 % (ref 14–44)
MCH RBC QN AUTO: 17.9 PG (ref 26.8–34.3)
MCHC RBC AUTO-ENTMCNC: 27.1 G/DL (ref 31.4–37.4)
MCV RBC AUTO: 66 FL (ref 82–98)
MONOCYTES # BLD AUTO: 0.81 THOUSAND/ΜL (ref 0.17–1.22)
MONOCYTES NFR BLD AUTO: 11 % (ref 4–12)
NEUTROPHILS # BLD AUTO: 4.89 THOUSANDS/ΜL (ref 1.85–7.62)
NEUTS SEG NFR BLD AUTO: 64 % (ref 43–75)
NRBC BLD AUTO-RTO: 0 /100 WBCS
PLATELET # BLD AUTO: 267 THOUSANDS/UL (ref 149–390)
POTASSIUM SERPL-SCNC: 3.7 MMOL/L (ref 3.5–5.3)
RBC # BLD AUTO: 4.68 MILLION/UL (ref 3.81–5.12)
SODIUM SERPL-SCNC: 144 MMOL/L (ref 136–145)
WBC # BLD AUTO: 7.57 THOUSAND/UL (ref 4.31–10.16)

## 2019-06-22 PROCEDURE — 99232 SBSQ HOSP IP/OBS MODERATE 35: CPT | Performed by: INTERNAL MEDICINE

## 2019-06-22 PROCEDURE — 80048 BASIC METABOLIC PNL TOTAL CA: CPT | Performed by: PHYSICIAN ASSISTANT

## 2019-06-22 PROCEDURE — 85018 HEMOGLOBIN: CPT | Performed by: INTERNAL MEDICINE

## 2019-06-22 PROCEDURE — 85025 COMPLETE CBC W/AUTO DIFF WBC: CPT | Performed by: INTERNAL MEDICINE

## 2019-06-22 RX ORDER — HYDRALAZINE HYDROCHLORIDE 20 MG/ML
5 INJECTION INTRAMUSCULAR; INTRAVENOUS EVERY 6 HOURS PRN
Status: DISCONTINUED | OUTPATIENT
Start: 2019-06-22 | End: 2019-06-22

## 2019-06-22 RX ORDER — LANOLIN ALCOHOL/MO/W.PET/CERES
6 CREAM (GRAM) TOPICAL
Status: DISCONTINUED | OUTPATIENT
Start: 2019-06-22 | End: 2019-07-03 | Stop reason: HOSPADM

## 2019-06-22 RX ORDER — HYDRALAZINE HYDROCHLORIDE 20 MG/ML
10 INJECTION INTRAMUSCULAR; INTRAVENOUS EVERY 4 HOURS PRN
Status: DISCONTINUED | OUTPATIENT
Start: 2019-06-22 | End: 2019-06-28

## 2019-06-22 RX ORDER — ALPRAZOLAM 0.25 MG/1
0.25 TABLET ORAL ONCE AS NEEDED
Status: COMPLETED | OUTPATIENT
Start: 2019-06-22 | End: 2019-06-22

## 2019-06-22 RX ORDER — LANOLIN ALCOHOL/MO/W.PET/CERES
6 CREAM (GRAM) TOPICAL
Status: DISCONTINUED | OUTPATIENT
Start: 2019-06-22 | End: 2019-06-22

## 2019-06-22 RX ADMIN — MELATONIN 6 MG: at 23:25

## 2019-06-22 RX ADMIN — ASPIRIN 81 MG: 81 TABLET, COATED ORAL at 09:08

## 2019-06-22 RX ADMIN — METOPROLOL TARTRATE 50 MG: 50 TABLET, FILM COATED ORAL at 09:08

## 2019-06-22 RX ADMIN — ALPRAZOLAM 0.25 MG: 0.25 TABLET ORAL at 22:23

## 2019-06-22 RX ADMIN — HYDRALAZINE HYDROCHLORIDE 5 MG: 20 INJECTION INTRAMUSCULAR; INTRAVENOUS at 18:57

## 2019-06-22 RX ADMIN — PANTOPRAZOLE SODIUM 40 MG: 40 TABLET, DELAYED RELEASE ORAL at 05:25

## 2019-06-22 RX ADMIN — METOPROLOL TARTRATE 50 MG: 50 TABLET, FILM COATED ORAL at 21:17

## 2019-06-22 RX ADMIN — ATORVASTATIN CALCIUM 80 MG: 80 TABLET, FILM COATED ORAL at 18:15

## 2019-06-22 NOTE — PROGRESS NOTES
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, will consult Urology    Anemia, unspecified  Assessment & Plan  Unclear, will obtain peripheral smear to rule out hemolysis, obtain LFTs  Iron storages, folate and B12 requested and pending  Guaiac in the ER negative, obtain FOBT x3  One results of multiple status back including UPEP and SPEP consideration if appropriate to consult Hematology  Receiving blood transfusion in the ER, x2 units, H&H q 8 hours for 24 hours and then if stable discontinue  Started Protonix  Will start clear liquid diet Sunday, she will get her prep starting tomorrow for colonoscopy    Gastroenterology will do colonoscopy and EGD on  Monday, NPO past midnight Sunday, needs to be on dual antiplatelet therapy after CEA  Plan for CEA on Wed    Nicotine dependence  Assessment & Plan  Extensive counseling was offered, at this time declined nicotine patch    Essential hypertension  Assessment & Plan  Continue with beta-blocker as per home dose, monitor  Chronic stroke on CT scan, off window for permissive hypertension    * Stroke-like symptom  Assessment & Plan  Presenting with right-sided weakness and right facial droop, CT scan of the head positive for left parietal lobe subacute to chronic stroke  The case was discussed by ED with Neurology, off window for tPA, last well known even before obtaining scan  Admitted with stroke protocol with appropriate vital signs and neuro checks  Obtain MRI  Obtain echocardiogram  Formal neurology consult is pending  For now will hold aspirin for possible GI bleed    Abnormal CT of the neck with 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, requested evaluation by vascular surgery for reviewing images especially concern of the left side    PT and OT evaluation  Fall precautions         VTE Pharmacologic Prophylaxis:   Pharmacologic: Pharmacologic VTE Prophylaxis contraindicated due to stroke  Mechanical VTE Prophylaxis in Place: Yes    Patient Centered Rounds: I have performed bedside rounds with nursing staff today  Discussions with Specialists or Other Care Team Provider: neurology    Education and Discussions with Family / Patient: patient    Time Spent for Care: 30 minutes  More than 50% of total time spent on counseling and coordination of care as described above  Current Length of Stay: 3 day(s)    Current Patient Status: Inpatient   Certification Statement: The patient will continue to require additional inpatient hospital stay due to need EGD, colonoscopy on monday, CEA on wed    Discharge Plan: pending above procedures     Code Status: Level 1 - Full Code      Subjective:   I am doing well  No complaints today  Eating well  No issues with pain, breathing  Objective:     Vitals:   Temp (24hrs), Av 7 °F (36 5 °C), Min:97 2 °F (36 2 °C), Max:98 1 °F (36 7 °C)    Temp:  [97 2 °F (36 2 °C)-98 1 °F (36 7 °C)] 97 9 °F (36 6 °C)  HR:  [53-62] 62  Resp:  [18-20] 18  BP: (134-172)/(64-82) 159/81  SpO2:  [98 %-100 %] 98 %  Body mass index is 29 6 kg/m²  Input and Output Summary (last 24 hours): Intake/Output Summary (Last 24 hours) at 2019 1022  Last data filed at 2019 0920  Gross per 24 hour   Intake 720 ml   Output 1600 ml   Net -880 ml       Physical Exam:     Physical Exam   Constitutional: She is oriented to person, place, and time  She appears well-developed and well-nourished  No distress  HENT:   Head: Normocephalic and atraumatic     Right Ear: External ear normal    Left Ear: External ear normal    Nose: Nose normal  Mouth/Throat: Oropharynx is clear and moist  No oropharyngeal exudate  Eyes: Pupils are equal, round, and reactive to light  Conjunctivae and EOM are normal  Right eye exhibits no discharge  Left eye exhibits no discharge  No scleral icterus  Neck: Normal range of motion  Neck supple  No JVD present  No tracheal deviation present  No thyromegaly present  Cardiovascular: Normal rate, regular rhythm, normal heart sounds and intact distal pulses  Exam reveals no gallop and no friction rub  No murmur heard  Pulmonary/Chest: Effort normal and breath sounds normal  No stridor  No respiratory distress  She has no wheezes  She has no rales  She exhibits no tenderness  Abdominal: Soft  Bowel sounds are normal  She exhibits no distension and no mass  There is no tenderness  There is no rebound and no guarding  Musculoskeletal: Normal range of motion  She exhibits no edema, tenderness or deformity  Lymphadenopathy:     She has no cervical adenopathy  Neurological: She is alert and oriented to person, place, and time  She displays normal reflexes  No cranial nerve deficit or sensory deficit  She exhibits normal muscle tone  Coordination normal    Skin: Skin is warm and dry  She is not diaphoretic  No erythema  No pallor  Psychiatric: She has a normal mood and affect  Her behavior is normal  Judgment and thought content normal    Nursing note and vitals reviewed        Additional Data:     Labs:    Results from last 7 days   Lab Units 06/22/19  0850 06/22/19  0530   WBC Thousand/uL  --  7 57   HEMOGLOBIN g/dL 8 9* 8 4*   HEMATOCRIT %  --  31 0*   PLATELETS Thousands/uL  --  267   NEUTROS PCT %  --  64   LYMPHS PCT %  --  15   MONOS PCT %  --  11   EOS PCT %  --  9*     Results from last 7 days   Lab Units 06/22/19  0530  06/19/19  1203   POTASSIUM mmol/L 3 7   < > 3 8   CHLORIDE mmol/L 112*   < > 106   CO2 mmol/L 26   < > 22   BUN mg/dL 10   < > 13   CREATININE mg/dL 0 92   < > 1 01   CALCIUM mg/dL 8 8   < > 8 1*   ALK PHOS U/L  --   --  99   ALT U/L  --   --  16   AST U/L  --   --  31    < > = values in this interval not displayed  Results from last 7 days   Lab Units 06/19/19  1203   INR  1 14       * I Have Reviewed All Lab Data Listed Above  * Additional Pertinent Lab Tests Reviewed: [address] Admission Reviewed    Imaging:    Imaging Reports Reviewed Today Include:    Imaging Personally Reviewed by Myself Includes:     Recent Cultures (last 7 days):           Last 24 Hours Medication List:     Current Facility-Administered Medications:  acetaminophen 650 mg Oral Q6H PRN Richie Waters MD   aspirin 81 mg Oral Daily Radha Felix PA-C   atorvastatin 80 mg Oral Daily With Maria E Sun DO   melatonin 6 mg Oral HS Juanita Bowens PA-C   metoprolol tartrate 50 mg Oral Q12H [address] Richie Waters MD   pantoprazole 40 mg Oral Early Morning Richie Waters MD   [START ON 6/23/2019] polyethylene glycol 4,000 mL Oral Once Shirley Carter PA-C        Today, Patient Was Seen By: Kira García MD    ** Please Note: Dictation voice to text software may have been used in the creation of this document   **

## 2019-06-22 NOTE — PLAN OF CARE
Problem: PAIN - ADULT  Goal: Verbalizes/displays adequate comfort level or baseline comfort level  Description  Interventions:  - Encourage patient to monitor pain and request assistance  - Assess pain using appropriate pain scale  - Administer analgesics based on type and severity of pain and evaluate response  - Implement non-pharmacological measures as appropriate and evaluate response  - Consider cultural and social influences on pain and pain management  - Notify physician/advanced practitioner if interventions unsuccessful or patient reports new pain  Outcome: Progressing     Problem: DISCHARGE PLANNING  Goal: Discharge to home or other facility with appropriate resources  Description  INTERVENTIONS:  - Identify barriers to discharge w/patient and caregiver  - Arrange for needed discharge resources and transportation as appropriate  - Identify discharge learning needs (meds, wound care, etc )  - Arrange for interpretive services to assist at discharge as needed  - Refer to Case Management Department for coordinating discharge planning if the patient needs post-hospital services based on physician/advanced practitioner order or complex needs related to functional status, cognitive ability, or social support system  Outcome: Progressing     Problem: Knowledge Deficit  Goal: Patient/family/caregiver demonstrates understanding of disease process, treatment plan, medications, and discharge instructions  Description  Complete learning assessment and assess knowledge base  Interventions:  - Provide teaching at level of understanding  - Provide teaching via preferred learning methods  Outcome: Progressing     Problem: Neurological Deficit  Goal: Neurological status is stable or improving  Description  Interventions:  - Monitor and assess patient's level of consciousness, motor function, sensory function, and level of assistance needed for ADLs  - Monitor and report changes from baseline   Collaborate with interdisciplinary team to initiate plan and implement interventions as ordered  - Provide and maintain a safe environment  - Utilize seizure precautions  - Utilize fall precautions  - Utilize aspiration precautions  - Utilize bleeding precautions  Outcome: Progressing     Problem: Nutrition/Hydration-ADULT  Goal: Nutrient/Hydration intake appropriate for improving, restoring or maintaining nutritional needs  Description  Monitor and assess patient's nutrition/hydration status for malnutrition (ex- brittle hair, bruises, dry skin, pale skin and conjunctiva, muscle wasting, smooth red tongue, and disorientation)  Collaborate with interdisciplinary team and initiate plan and interventions as ordered  Monitor patient's weight and dietary intake as ordered or per policy  Utilize nutrition screening tool and intervene per policy  Determine patient's food preferences and provide high-protein, high-caloric foods as appropriate  INTERVENTIONS:  - Monitor oral intake, urinary output, labs, and treatment plans  - Assess nutrition and hydration status and recommend course of action  - Evaluate amount of meals eaten  - Assist patient with eating if necessary   - Allow adequate time for meals  - Recommend/ encourage appropriate diets, oral nutritional supplements, and vitamin/mineral supplements  - Order, calculate, and assess calorie counts as needed  - Recommend, monitor, and adjust tube feedings and TPN/PPN based on assessed needs  - Assess need for intravenous fluids  - Provide specific nutrition/hydration education as appropriate  - Include patient/family/caregiver in decisions related to nutrition  Outcome: Progressing     Problem: Potential for Falls  Goal: Patient will remain free of falls  Description  INTERVENTIONS:  - Assess patient frequently for physical needs  -  Identify cognitive and physical deficits and behaviors that affect risk of falls  -  Whick fall precautions as indicated by assessment    - Educate patient/family on patient safety including physical limitations  - Instruct patient to call for assistance with activity based on assessment  - Modify environment to reduce risk of injury  - Consider OT/PT consult to assist with strengthening/mobility  Outcome: Progressing

## 2019-06-22 NOTE — ASSESSMENT & PLAN NOTE
Presenting with right-sided weakness and right facial droop, CT scan of the head positive for left parietal lobe subacute to chronic stroke  The case was discussed by ED with Neurology, out of window for tPA, last well known even before obtaining scan  Admitted with stroke protocol with appropriate vital signs and neuro checks  MRI confirmed superficial cortical infarct in the left parietal region and small deep watershed infarct in the left frontal region representing acute infarct in the distribution left MCA; no acute hemorrhage seen; no mass effect or midline shift seen  Echocardiogram showed EF of 65%  For now will hold aspirin for possible GI bleed, plavix as well  Colonoscopy significant for polypectomy with clipping to prevent excess bleeding   EGD showed multiple AVM's in the stomach and duodenum induced coagulation with argon plasma   Abnormal CT of the neck with 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, requested evaluation by vascular surgery for reviewing images especially concern of the left side    Plan for CEA per vascular surgery  PT and OT evaluation  Fall precautions

## 2019-06-22 NOTE — QUICK NOTE
Was contacted by patient's nurse that patient's BP was 189/77  That was over 2 hours ago  Repeat manual pressure 182/90  I discussed with the on-call Neurologist, Dr Kanika Elliott, who advised goal SBP at this time 140-160 given her time since stroke  PRN IV hydralazine ordered

## 2019-06-22 NOTE — ASSESSMENT & PLAN NOTE
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

## 2019-06-22 NOTE — ASSESSMENT & PLAN NOTE
Continue with beta-blocker as per home dose, monitor  Keep -160 for permissive hypertension, continue with metoprolol 50 mg q12hrs  Hydralazine prn 10 mg q4hrs

## 2019-06-23 LAB
HGB BLD-MCNC: 8.4 G/DL (ref 11.5–15.4)
HGB BLD-MCNC: 8.9 G/DL (ref 11.5–15.4)
HGB BLD-MCNC: 9.2 G/DL (ref 11.5–15.4)

## 2019-06-23 PROCEDURE — 85018 HEMOGLOBIN: CPT | Performed by: INTERNAL MEDICINE

## 2019-06-23 PROCEDURE — 99232 SBSQ HOSP IP/OBS MODERATE 35: CPT | Performed by: SURGERY

## 2019-06-23 PROCEDURE — 99233 SBSQ HOSP IP/OBS HIGH 50: CPT | Performed by: INTERNAL MEDICINE

## 2019-06-23 RX ADMIN — HYDRALAZINE HYDROCHLORIDE 10 MG: 20 INJECTION INTRAMUSCULAR; INTRAVENOUS at 15:17

## 2019-06-23 RX ADMIN — METOPROLOL TARTRATE 50 MG: 50 TABLET, FILM COATED ORAL at 20:46

## 2019-06-23 RX ADMIN — ATORVASTATIN CALCIUM 80 MG: 80 TABLET, FILM COATED ORAL at 15:25

## 2019-06-23 RX ADMIN — MELATONIN 6 MG: at 21:01

## 2019-06-23 RX ADMIN — METOPROLOL TARTRATE 50 MG: 50 TABLET, FILM COATED ORAL at 08:34

## 2019-06-23 RX ADMIN — ASPIRIN 81 MG: 81 TABLET, COATED ORAL at 08:34

## 2019-06-23 RX ADMIN — POLYETHYLENE GLYCOL 3350, SODIUM SULFATE ANHYDROUS, SODIUM BICARBONATE, SODIUM CHLORIDE, POTASSIUM CHLORIDE 4000 ML: 236; 22.74; 6.74; 5.86; 2.97 POWDER, FOR SOLUTION ORAL at 18:09

## 2019-06-23 RX ADMIN — PANTOPRAZOLE SODIUM 40 MG: 40 TABLET, DELAYED RELEASE ORAL at 05:30

## 2019-06-23 NOTE — RESTORATIVE TECHNICIAN NOTE
Restorative Specialist Mobility Note       Activity: Ambulate in pope, Ambulate in room, Bathroom privileges, Chair, Dangle, Stand at bedside(Educated/encouraged pt to ambulate with assistance 3-4 x's/day   Pt callbell, phone/tray within reach )     Assistive Device: Front wheel walker          [employer] BS, Restorative Technician, [employer]

## 2019-06-23 NOTE — PLAN OF CARE
Problem: PAIN - ADULT  Goal: Verbalizes/displays adequate comfort level or baseline comfort level  Description  Interventions:  - Encourage patient to monitor pain and request assistance  - Assess pain using appropriate pain scale  - Administer analgesics based on type and severity of pain and evaluate response  - Implement non-pharmacological measures as appropriate and evaluate response  - Consider cultural and social influences on pain and pain management  - Notify physician/advanced practitioner if interventions unsuccessful or patient reports new pain  Outcome: Progressing     Problem: DISCHARGE PLANNING  Goal: Discharge to home or other facility with appropriate resources  Description  INTERVENTIONS:  - Identify barriers to discharge w/patient and caregiver  - Arrange for needed discharge resources and transportation as appropriate  - Identify discharge learning needs (meds, wound care, etc )  - Arrange for interpretive services to assist at discharge as needed  - Refer to Case Management Department for coordinating discharge planning if the patient needs post-hospital services based on physician/advanced practitioner order or complex needs related to functional status, cognitive ability, or social support system  Outcome: Progressing     Problem: Knowledge Deficit  Goal: Patient/family/caregiver demonstrates understanding of disease process, treatment plan, medications, and discharge instructions  Description  Complete learning assessment and assess knowledge base  Interventions:  - Provide teaching at level of understanding  - Provide teaching via preferred learning methods  Outcome: Progressing     Problem: Neurological Deficit  Goal: Neurological status is stable or improving  Description  Interventions:  - Monitor and assess patient's level of consciousness, motor function, sensory function, and level of assistance needed for ADLs  - Monitor and report changes from baseline   Collaborate with interdisciplinary team to initiate plan and implement interventions as ordered  - Provide and maintain a safe environment  - Utilize seizure precautions  - Utilize fall precautions  - Utilize aspiration precautions  - Utilize bleeding precautions  Outcome: Progressing     Problem: Nutrition/Hydration-ADULT  Goal: Nutrient/Hydration intake appropriate for improving, restoring or maintaining nutritional needs  Description  Monitor and assess patient's nutrition/hydration status for malnutrition (ex- brittle hair, bruises, dry skin, pale skin and conjunctiva, muscle wasting, smooth red tongue, and disorientation)  Collaborate with interdisciplinary team and initiate plan and interventions as ordered  Monitor patient's weight and dietary intake as ordered or per policy  Utilize nutrition screening tool and intervene per policy  Determine patient's food preferences and provide high-protein, high-caloric foods as appropriate  INTERVENTIONS:  - Monitor oral intake, urinary output, labs, and treatment plans  - Assess nutrition and hydration status and recommend course of action  - Evaluate amount of meals eaten  - Assist patient with eating if necessary   - Allow adequate time for meals  - Recommend/ encourage appropriate diets, oral nutritional supplements, and vitamin/mineral supplements  - Order, calculate, and assess calorie counts as needed  - Recommend, monitor, and adjust tube feedings and TPN/PPN based on assessed needs  - Assess need for intravenous fluids  - Provide specific nutrition/hydration education as appropriate  - Include patient/family/caregiver in decisions related to nutrition  Outcome: Progressing     Problem: Potential for Falls  Goal: Patient will remain free of falls  Description  INTERVENTIONS:  - Assess patient frequently for physical needs  -  Identify cognitive and physical deficits and behaviors that affect risk of falls  -  [place] fall precautions as indicated by assessment    - Educate patient/family on patient safety including physical limitations  - Instruct patient to call for assistance with activity based on assessment  - Modify environment to reduce risk of injury  - Consider OT/PT consult to assist with strengthening/mobility  Outcome: Progressing

## 2019-06-23 NOTE — PROGRESS NOTES
Progress Note - Stephanie Enciso 78 y o  female MRN: [id number]    Unit/Bed#: [facility] 721-01 Encounter: [id number]      Assessment:  Symptomatic Left carotid stenosis (approximately 50% with ulcerated appearing plaque) with hypo attenuation left parietal lobe suspicious for subacute/chronic infarct  Occluded R internal carotid artery  HTN  Anemia  Hyperlipidemia    Plan:  Left carotid intervention later this week  Nuclear stress test with EF 63% with no obvious perfusion defects  Appreciate cardiology input  Patient scheduled for endoscopy tomorrow  Subjective:   Patient offers no complaints  Patient feels that right-sided weakness resolved  Objective:     Vitals: Blood pressure 158/75, pulse 66, temperature 97 9 °F (36 6 °C), resp  rate 18, height 5' 2" (1 575 m), weight 73 4 kg (161 lb 13 1 oz), SpO2 97 %  ,Body mass index is 29 6 kg/m²        Intake/Output Summary (Last 24 hours) at 6/23/2019 1023  Last data filed at 6/23/2019 0848  Gross per 24 hour   Intake 320 ml   Output 1375 ml   Net -1055 ml       Physical Exam: /75   Pulse 66   Temp 97 9 °F (36 6 °C)   Resp 18   Ht 5' 2" (1 575 m)   Wt 73 4 kg (161 lb 13 1 oz)   SpO2 97%   BMI 29 60 kg/m²     General Appearance:    Alert, cooperative, no distress, appears stated age   Head:    Normocephalic, without obvious abnormality, atraumatic   Eyes:    PERRL, conjunctiva/corneas clear, EOM's intact, fundi     benign, both eyes   Ears:    Normal TM's and external ear canals, both ears   Nose:   Nares normal, septum midline, mucosa normal, no drainage    or sinus tenderness   Throat:   Lips, mucosa, and tongue normal; teeth and gums normal   Neck:   Supple, symmetrical, trachea midline, no adenopathy;     thyroid:  no enlargement/tenderness/nodules; no carotid    bruit or JVD   Back:     Symmetric, no curvature, ROM normal, no CVA tenderness   Lungs:     Clear to auscultation bilaterally, respirations unlabored   Chest Wall:    No tenderness or deformity    Heart:    Regular rate and rhythm, S1 and S2 normal, no murmur, rub   or gallop       Abdomen:     Soft, non-tender, bowel sounds active all four quadrants,     no masses, no organomegaly           Extremities:   Extremities normal, atraumatic, no cyanosis or edema   Pulses:   2+ and symmetric all extremities   Skin:   Skin color, texture, turgor normal, no rashes or lesions   Lymph nodes:   Cervical, supraclavicular, and axillary nodes normal   Neurologic:   CNII-XII intact, normal strength, sensation and reflexes     throughout        Invasive Devices     Peripheral Intravenous Line            Peripheral IV 06/21/19 Left Wrist 2 days                Lab, Imaging and other studies: I have personally reviewed pertinent reports     and I have personally reviewed pertinent films in PACS

## 2019-06-23 NOTE — PROGRESS NOTES
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, will consult Urology    Anemia, unspecified  Assessment & Plan  Unclear, will obtain peripheral smear to rule out hemolysis, obtain LFTs  Iron storages, folate and B12 requested has iron def anemia  Guaiac in the ER negative, obtain FOBT x3  One results of multiple status back including UPEP and SPEP consideration if appropriate to consult Hematology  Receiving blood transfusion in the ER, x2 units, H&H q 8 hours for 24 hours and then if stable discontinue  Started Protonix  Will start clear liquid diet Sunday, she will get her prep starting tomorrow for colonoscopy    Gastroenterology will do colonoscopy and EGD on  Monday, NPO past midnight Sunday, needs to be on dual antiplatelet therapy after CEA  Plan for CEA on Wed  Hematology will follow     Nicotine dependence  Assessment & Plan  Extensive counseling was offered, at this time declined nicotine patch    Essential hypertension  Assessment & Plan  Continue with beta-blocker as per home dose, monitor  Keep -160 for permissive hypertension, continue with metoprolol 50 mg q12hrs  Hydralazine prn 10 mg q4hrs     * Stroke-like symptom  Assessment & Plan  Presenting with right-sided weakness and right facial droop, CT scan of the head positive for left parietal lobe subacute to chronic stroke  The case was discussed by ED with Neurology, off window for tPA, last well known even before obtaining scan  Admitted with stroke protocol with appropriate vital signs and neuro checks  Obtain MRI  Obtain echocardiogram  For now will hold aspirin for possible GI bleed, plavix as well  She is going for colonoscopy and EGD tomorrow    Abnormal CT of the neck with 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, requested evaluation by vascular surgery for reviewing images especially concern of the left side    PT and OT evaluation  Fall precautions         VTE Pharmacologic Prophylaxis:   Pharmacologic: Pharmacologic VTE Prophylaxis contraindicated due to stroke  Mechanical VTE Prophylaxis in Place: Yes    Patient Centered Rounds: I have performed bedside rounds with nursing staff today  Discussions with Specialists or Other Care Team Provider: neurology    Education and Discussions with Family / Patient: patient    Time Spent for Care: 30 minutes  More than 50% of total time spent on counseling and coordination of care as described above  Current Length of Stay: 4 day(s)    Current Patient Status: Inpatient   Certification Statement: The patient will continue to require additional inpatient hospital stay due to need EGD, colonoscopy on monday, CEA on wed    Discharge Plan: pending above procedures     Code Status: Level 1 - Full Code      Subjective:   I am doing well  No complaints today  Eating well  No issues with pain, breathing  Objective:     Vitals:   Temp (24hrs), Av 7 °F (36 5 °C), Min:97 2 °F (36 2 °C), Max:98 1 °F (36 7 °C)    Temp:  [97 2 °F (36 2 °C)-98 1 °F (36 7 °C)] 97 7 °F (36 5 °C)  HR:  [45-68] 63  Resp:  [18] 18  BP: (151-195)/() 190/80  SpO2:  [88 %-99 %] 90 %  Body mass index is 29 6 kg/m²  Input and Output Summary (last 24 hours): Intake/Output Summary (Last 24 hours) at 2019 1529  Last data filed at 2019 1239  Gross per 24 hour   Intake 440 ml   Output 1375 ml   Net -935 ml       Physical Exam:     Physical Exam   Constitutional: She is oriented to person, place, and time  She appears well-developed and well-nourished  No distress     HENT:   Head: Normocephalic and atraumatic  Right Ear: External ear normal    Left Ear: External ear normal    Nose: Nose normal    Mouth/Throat: Oropharynx is clear and moist  No oropharyngeal exudate  Eyes: Pupils are equal, round, and reactive to light  Conjunctivae and EOM are normal  Right eye exhibits no discharge  Left eye exhibits no discharge  No scleral icterus  Neck: Normal range of motion  Neck supple  No JVD present  No tracheal deviation present  No thyromegaly present  Cardiovascular: Normal rate, regular rhythm, normal heart sounds and intact distal pulses  Exam reveals no gallop and no friction rub  No murmur heard  Pulmonary/Chest: Effort normal and breath sounds normal  No stridor  No respiratory distress  She has no wheezes  She has no rales  She exhibits no tenderness  Abdominal: Soft  Bowel sounds are normal  She exhibits no distension and no mass  There is no tenderness  There is no rebound and no guarding  Musculoskeletal: Normal range of motion  She exhibits no edema, tenderness or deformity  Lymphadenopathy:     She has no cervical adenopathy  Neurological: She is alert and oriented to person, place, and time  She displays normal reflexes  No cranial nerve deficit or sensory deficit  She exhibits normal muscle tone  Coordination normal    Skin: Skin is warm and dry  She is not diaphoretic  No erythema  No pallor  Psychiatric: She has a normal mood and affect  Her behavior is normal  Judgment and thought content normal    Nursing note and vitals reviewed  Additional Data:     Labs:    Results from last 7 days   Lab Units 06/23/19  0820  06/22/19  0530   WBC Thousand/uL  --   --  7 57   HEMOGLOBIN g/dL 8 9*   < > 8 4*   HEMATOCRIT %  --   --  31 0*   PLATELETS Thousands/uL  --   --  267   NEUTROS PCT %  --   --  64   LYMPHS PCT %  --   --  15   MONOS PCT %  --   --  11   EOS PCT %  --   --  9*    < > = values in this interval not displayed       Results from last 7 days   Lab Units 06/22/19  0530  06/19/19  1203   POTASSIUM mmol/L 3 7   < > 3 8   CHLORIDE mmol/L 112*   < > 106   CO2 mmol/L 26   < > 22   BUN mg/dL 10   < > 13   CREATININE mg/dL 0 92   < > 1 01   CALCIUM mg/dL 8 8   < > 8 1*   ALK PHOS U/L  --   --  99   ALT U/L  --   --  16   AST U/L  --   --  31    < > = values in this interval not displayed  Results from last 7 days   Lab Units 06/19/19  1203   INR  1 14       * I Have Reviewed All Lab Data Listed Above  * Additional Pertinent Lab Tests Reviewed: [facility] Admission Reviewed    Imaging:    Imaging Reports Reviewed Today Include:    Imaging Personally Reviewed by Myself Includes:     Recent Cultures (last 7 days):           Last 24 Hours Medication List:     Current Facility-Administered Medications:  acetaminophen 650 mg Oral Q6H PRN Jaime Beckman MD   aspirin 81 mg Oral Daily Radha Felix PA-C   atorvastatin 80 mg Oral Daily With Maria E Sun DO   hydrALAZINE 10 mg Intravenous Q4H PRN Juanita Bowens PA-C   melatonin 6 mg Oral HS Juanita Bowens PA-C   metoprolol tartrate 50 mg Oral Q12H [facility] Jaime Beckman MD   pantoprazole 40 mg Oral Early Morning Jaime Beckman MD   polyethylene glycol 4,000 mL Oral Once Aron Saldaña PA-C        Today, Patient Was Seen By: Nenita Izaguirre MD    ** Please Note: Dictation voice to text software may have been used in the creation of this document   **

## 2019-06-23 NOTE — PROGRESS NOTES
Pt very anxious; express concerned regarding upcoming schedule procedures    BP currently elevated; schedule BP medication administered, will recheck BP, SLIM made aware

## 2019-06-24 ENCOUNTER — APPOINTMENT (INPATIENT)
Dept: RADIOLOGY | Facility: HOSPITAL | Age: 79
DRG: 037 | End: 2019-06-24
Payer: COMMERCIAL

## 2019-06-24 ENCOUNTER — ANESTHESIA (INPATIENT)
Dept: GASTROENTEROLOGY | Facility: HOSPITAL | Age: 79
DRG: 037 | End: 2019-06-24
Payer: COMMERCIAL

## 2019-06-24 ENCOUNTER — APPOINTMENT (OUTPATIENT)
Dept: GASTROENTEROLOGY | Facility: HOSPITAL | Age: 79
DRG: 037 | End: 2019-06-24
Payer: COMMERCIAL

## 2019-06-24 ENCOUNTER — ANESTHESIA EVENT (INPATIENT)
Dept: GASTROENTEROLOGY | Facility: HOSPITAL | Age: 79
DRG: 037 | End: 2019-06-24
Payer: COMMERCIAL

## 2019-06-24 PROBLEM — I65.22 SYMPTOMATIC STENOSIS OF LEFT CAROTID ARTERY: Status: ACTIVE | Noted: 2019-06-19

## 2019-06-24 PROBLEM — D50.9 IRON DEFICIENCY ANEMIA: Status: ACTIVE | Noted: 2019-06-24

## 2019-06-24 LAB
ANION GAP SERPL CALCULATED.3IONS-SCNC: 7 MMOL/L (ref 4–13)
BASOPHILS # BLD AUTO: 0.06 THOUSANDS/ΜL (ref 0–0.1)
BASOPHILS NFR BLD AUTO: 1 % (ref 0–1)
BUN SERPL-MCNC: 9 MG/DL (ref 5–25)
CALCIUM SERPL-MCNC: 8.9 MG/DL (ref 8.3–10.1)
CHLORIDE SERPL-SCNC: 108 MMOL/L (ref 100–108)
CO2 SERPL-SCNC: 26 MMOL/L (ref 21–32)
CREAT SERPL-MCNC: 0.81 MG/DL (ref 0.6–1.3)
EOSINOPHIL # BLD AUTO: 0.29 THOUSAND/ΜL (ref 0–0.61)
EOSINOPHIL NFR BLD AUTO: 3 % (ref 0–6)
ERYTHROCYTE [DISTWIDTH] IN BLOOD BY AUTOMATED COUNT: 26.6 % (ref 11.6–15.1)
GFR SERPL CREATININE-BSD FRML MDRD: 69 ML/MIN/1.73SQ M
GLUCOSE SERPL-MCNC: 122 MG/DL (ref 65–140)
HCT VFR BLD AUTO: 32.9 % (ref 34.8–46.1)
HGB BLD-MCNC: 8.7 G/DL (ref 11.5–15.4)
HGB BLD-MCNC: 8.7 G/DL (ref 11.5–15.4)
HGB BLD-MCNC: 9.3 G/DL (ref 11.5–15.4)
HGB BLD-MCNC: 9.5 G/DL (ref 11.5–15.4)
IMM GRANULOCYTES # BLD AUTO: 0.05 THOUSAND/UL (ref 0–0.2)
IMM GRANULOCYTES NFR BLD AUTO: 1 % (ref 0–2)
LYMPHOCYTES # BLD AUTO: 0.83 THOUSANDS/ΜL (ref 0.6–4.47)
LYMPHOCYTES NFR BLD AUTO: 9 % (ref 14–44)
MCH RBC QN AUTO: 17.4 PG (ref 26.8–34.3)
MCHC RBC AUTO-ENTMCNC: 26.4 G/DL (ref 31.4–37.4)
MCV RBC AUTO: 66 FL (ref 82–98)
MONOCYTES # BLD AUTO: 0.74 THOUSAND/ΜL (ref 0.17–1.22)
MONOCYTES NFR BLD AUTO: 8 % (ref 4–12)
NEUTROPHILS # BLD AUTO: 7.01 THOUSANDS/ΜL (ref 1.85–7.62)
NEUTS SEG NFR BLD AUTO: 78 % (ref 43–75)
NRBC BLD AUTO-RTO: 0 /100 WBCS
PLATELET # BLD AUTO: 302 THOUSANDS/UL (ref 149–390)
POTASSIUM SERPL-SCNC: 3.2 MMOL/L (ref 3.5–5.3)
RBC # BLD AUTO: 4.99 MILLION/UL (ref 3.81–5.12)
SODIUM SERPL-SCNC: 141 MMOL/L (ref 136–145)
WBC # BLD AUTO: 8.98 THOUSAND/UL (ref 4.31–10.16)

## 2019-06-24 PROCEDURE — 43270 EGD LESION ABLATION: CPT | Performed by: INTERNAL MEDICINE

## 2019-06-24 PROCEDURE — 70450 CT HEAD/BRAIN W/O DYE: CPT

## 2019-06-24 PROCEDURE — 85018 HEMOGLOBIN: CPT | Performed by: INTERNAL MEDICINE

## 2019-06-24 PROCEDURE — 45385 COLONOSCOPY W/LESION REMOVAL: CPT | Performed by: INTERNAL MEDICINE

## 2019-06-24 PROCEDURE — 0W3P8ZZ CONTROL BLEEDING IN GASTROINTESTINAL TRACT, VIA NATURAL OR ARTIFICIAL OPENING ENDOSCOPIC: ICD-10-PCS | Performed by: INTERNAL MEDICINE

## 2019-06-24 PROCEDURE — 88305 TISSUE EXAM BY PATHOLOGIST: CPT | Performed by: PATHOLOGY

## 2019-06-24 PROCEDURE — 85025 COMPLETE CBC W/AUTO DIFF WBC: CPT | Performed by: INTERNAL MEDICINE

## 2019-06-24 PROCEDURE — 80048 BASIC METABOLIC PNL TOTAL CA: CPT | Performed by: INTERNAL MEDICINE

## 2019-06-24 PROCEDURE — 99233 SBSQ HOSP IP/OBS HIGH 50: CPT | Performed by: SURGERY

## 2019-06-24 PROCEDURE — 0DBN8ZZ EXCISION OF SIGMOID COLON, VIA NATURAL OR ARTIFICIAL OPENING ENDOSCOPIC: ICD-10-PCS | Performed by: INTERNAL MEDICINE

## 2019-06-24 PROCEDURE — 99232 SBSQ HOSP IP/OBS MODERATE 35: CPT

## 2019-06-24 RX ORDER — SODIUM CHLORIDE 9 MG/ML
INJECTION, SOLUTION INTRAVENOUS CONTINUOUS PRN
Status: DISCONTINUED | OUTPATIENT
Start: 2019-06-24 | End: 2019-06-24 | Stop reason: SURG

## 2019-06-24 RX ORDER — PROPOFOL 10 MG/ML
INJECTION, EMULSION INTRAVENOUS CONTINUOUS PRN
Status: DISCONTINUED | OUTPATIENT
Start: 2019-06-24 | End: 2019-06-24 | Stop reason: SURG

## 2019-06-24 RX ORDER — LIDOCAINE HYDROCHLORIDE 10 MG/ML
INJECTION, SOLUTION INFILTRATION; PERINEURAL AS NEEDED
Status: DISCONTINUED | OUTPATIENT
Start: 2019-06-24 | End: 2019-06-24 | Stop reason: SURG

## 2019-06-24 RX ORDER — ONDANSETRON 2 MG/ML
4 INJECTION INTRAMUSCULAR; INTRAVENOUS EVERY 4 HOURS PRN
Status: DISCONTINUED | OUTPATIENT
Start: 2019-06-24 | End: 2019-06-28

## 2019-06-24 RX ORDER — OXYCODONE HYDROCHLORIDE 5 MG/1
2.5 TABLET ORAL EVERY 4 HOURS PRN
Status: DISCONTINUED | OUTPATIENT
Start: 2019-06-24 | End: 2019-07-03 | Stop reason: HOSPADM

## 2019-06-24 RX ORDER — PROPOFOL 10 MG/ML
INJECTION, EMULSION INTRAVENOUS AS NEEDED
Status: DISCONTINUED | OUTPATIENT
Start: 2019-06-24 | End: 2019-06-24 | Stop reason: SURG

## 2019-06-24 RX ADMIN — ONDANSETRON 4 MG: 2 INJECTION INTRAMUSCULAR; INTRAVENOUS at 05:35

## 2019-06-24 RX ADMIN — ACETAMINOPHEN 650 MG: 325 TABLET ORAL at 09:57

## 2019-06-24 RX ADMIN — OXYCODONE HYDROCHLORIDE 2.5 MG: 5 TABLET ORAL at 05:35

## 2019-06-24 RX ADMIN — LIDOCAINE HYDROCHLORIDE 100 MG: 10 INJECTION, SOLUTION INFILTRATION; PERINEURAL at 08:13

## 2019-06-24 RX ADMIN — MELATONIN 6 MG: at 22:30

## 2019-06-24 RX ADMIN — METOPROLOL TARTRATE 50 MG: 50 TABLET, FILM COATED ORAL at 22:30

## 2019-06-24 RX ADMIN — METOPROLOL TARTRATE 50 MG: 50 TABLET, FILM COATED ORAL at 09:59

## 2019-06-24 RX ADMIN — SODIUM CHLORIDE: 9 INJECTION, SOLUTION INTRAVENOUS at 08:08

## 2019-06-24 RX ADMIN — Medication 40 MG: at 08:42

## 2019-06-24 RX ADMIN — IRON SUCROSE 300 MG: 20 INJECTION, SOLUTION INTRAVENOUS at 14:27

## 2019-06-24 RX ADMIN — PROPOFOL 100 MG: 10 INJECTION, EMULSION INTRAVENOUS at 08:14

## 2019-06-24 RX ADMIN — PROPOFOL 100 MCG/KG/MIN: 10 INJECTION, EMULSION INTRAVENOUS at 08:16

## 2019-06-24 RX ADMIN — ATORVASTATIN CALCIUM 80 MG: 80 TABLET, FILM COATED ORAL at 16:37

## 2019-06-24 NOTE — PROGRESS NOTES
Progress Note - Vascular Surgery   Beny Inch 78 y o  female MRN: [id number]  Unit/Bed#: [facility] 721-01 Encounter: [id number]    Assessment:  Symptomatic Left carotid stenosis (approximately 50% with ulcerated appearing plaque) with hypo attenuation left parietal lobe suspicious for subacute/chronic infarct  Occluded R internal carotid artery, HTN, Anemia, Hyperlipidemia    Plan:  GI for EGD/C scope today to evaluate for source of anemia  Plan for Left carotid intervention later this week  ASA/statin  Cardiology- stress test-no ischemia or scar, proceed with acceptable risk  Primary per SLIM    Subjective/Objective   Chief Complaint:     Subjective: Hymera Cadet yesterday night, hematoma over right eyebrow, CT head with no intracranial bleeding  Otherwise full strength regained on right side  Objective:     Blood pressure 97/66, pulse 56, temperature 97 5 °F (36 4 °C), resp  rate 20, height 5' 2" (1 575 m), weight 70 kg (154 lb 5 2 oz), SpO2 100 %  ,Body mass index is 28 23 kg/m²  Intake/Output Summary (Last 24 hours) at 6/24/2019 0645  Last data filed at 6/24/2019 0340  Gross per 24 hour   Intake 2360 ml   Output 1800 ml   Net 560 ml       Invasive Devices     Peripheral Intravenous Line            Peripheral IV 06/21/19 Left Wrist 2 days                Physical Exam: NAD  Atraumatic/normocephalic  AAOX3  Normal mood and affect  Normal respiratory effort  Soft, NT, ND  Skin warm/dry  Cap refil <2 sec  Right forehead hematoma, firm, purple  B/l upper extremities 5/5 strength     Lab, Imaging and other studies:  I have personally reviewed pertinent lab results    , CBC:   Lab Results   Component Value Date    HGB 8 7 (L) 06/24/2019   , CMP: No results found for: SODIUM, K, CL, CO2, ANIONGAP, BUN, CREATININE, GLUCOSE, CALCIUM, AST, ALT, ALKPHOS, PROT, BILITOT, EGFR  VTE Mechanical Prophylaxis: sequential compression device

## 2019-06-24 NOTE — PROGRESS NOTES
Evaluated patient after unwitnessed fall  Patient states she was sitting on the toilet when she leaned forward to reach for toilet paper and fell forward, striking her head on the floor  She denies LOC,  lightheadedness, dizziness, HA, neck pain, or any other injury  Patient with possible GI bleed going for colonoscopy and EGD today  Takes 81 mg ASA daily  On exam, patient has full ROM of head and neck without pain  C-spine non-tender to palpation  She has a ecchymotic, raised abrasion on the R frontal area that is tender when palpated  No other visible signs of injury and she offers no other complaints  Will d/c ASA for the time being and send for stat CTH

## 2019-06-24 NOTE — PROGRESS NOTES
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, Urology recommending outpatient cystoscopic evaluation     Anemia, unspecified  Assessment & Plan  Peripheral smear showed schistocytes   Fe and ferritin low   Will replete with Venofer infusion   Guaiac in the ER negative, obtain FOBT x3  Receiving blood transfusion in the ER, x2 units, H&H q 8 hours for 24 hours and then if stable discontinue  Started Protonix  Gastroenterology will do colonoscopy and EGD on  Monday, NPO past midnight Sunday, needs to be on dual antiplatelet therapy after CEA  Findings as above  Plan for CEA on Wed  Hematology will follow     Nicotine dependence  Assessment & Plan  Extensive counseling was offered, at this time declined nicotine patch    Essential hypertension  Assessment & Plan  Continue with beta-blocker as per home dose, monitor  Keep -160 for permissive hypertension, continue with metoprolol 50 mg q12hrs  Hydralazine prn 10 mg q4hrs     * Stroke-like symptom  Assessment & Plan  Presenting with right-sided weakness and right facial droop, CT scan of the head positive for left parietal lobe subacute to chronic stroke  The case was discussed by ED with Neurology, out of window for tPA, last well known even before obtaining scan  Admitted with stroke protocol with appropriate vital signs and neuro checks  MRI confirmed superficial cortical infarct in the left parietal region and small deep watershed infarct in the left frontal region representing acute infarct in the distribution left MCA; no acute hemorrhage seen; no mass effect or midline shift seen  Echocardiogram showed EF of 65%  For now will hold aspirin for possible GI bleed, plavix as well  Colonoscopy significant for polypectomy with clipping to prevent excess bleeding   EGD showed multiple AVM's in the stomach and duodenum induced coagulation with argon plasma   Abnormal CT of the neck with 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, requested evaluation by vascular surgery for reviewing images especially concern of the left side  Plan for CEA Wednesday    PT and OT evaluation  Fall precautions      VTE Pharmacologic Prophylaxis:   Pharmacologic: none in setting of GI bleed  Mechanical VTE Prophylaxis in Place: Yes    Patient Centered Rounds: I have performed bedside rounds with nursing staff today  Discussions with Specialists or Other Care Team Provider: GI    Education and Discussions with Family / Patient: patient    Time Spent for Care: 15 minutes  More than 50% of total time spent on counseling and coordination of care as described above  Current Length of Stay: 5 day(s)    Current Patient Status: Inpatient   Certification Statement: The patient will continue to require additional inpatient hospital stay due to vascular intervention     Discharge Plan / Estimated Discharge Date: after surgery     Code Status: Level 1 - Full Code      Subjective:   Mrs Gwendolyn Peña is our 79 yo female who presented with stroke like symptoms  Objective:     Vitals:   Temp (24hrs), Av 6 °F (36 4 °C), Min:97 5 °F (36 4 °C), Max:97 7 °F (36 5 °C)    Temp:  [97 5 °F (36 4 °C)-97 7 °F (36 5 °C)] 97 6 °F (36 4 °C)  HR:  [54-70] 54  Resp:  [16-20] 18  BP: ()/() 102/58  SpO2:  [90 %-100 %] 97 %  Body mass index is 28 23 kg/m²  Input and Output Summary (last 24 hours):        Intake/Output Summary (Last 24 hours) at 2019 1340  Last data filed at 2019 1209  Gross per 24 hour Intake 2320 ml   Output 2000 ml   Net 320 ml       Physical Exam:     Physical Exam   Constitutional: She is oriented to person, place, and time  She appears well-developed and well-nourished  HENT:   Head: Normocephalic and atraumatic  Eyes: Pupils are equal, round, and reactive to light  EOM are normal    Cardiovascular: Normal rate and regular rhythm  Neurological: She is alert and oriented to person, place, and time  Psychiatric: She has a normal mood and affect  Her behavior is normal  Judgment and thought content normal        Additional Data:     Labs:    Results from last 7 days   Lab Units 06/24/19  1057 06/24/19  0610   WBC Thousand/uL  --  8 98   HEMOGLOBIN g/dL 9 3* 8 7*   HEMATOCRIT %  --  32 9*   PLATELETS Thousands/uL  --  302   NEUTROS PCT %  --  78*   LYMPHS PCT %  --  9*   MONOS PCT %  --  8   EOS PCT %  --  3     Results from last 7 days   Lab Units 06/24/19  0610  06/19/19  1203   POTASSIUM mmol/L 3 2*   < > 3 8   CHLORIDE mmol/L 108   < > 106   CO2 mmol/L 26   < > 22   BUN mg/dL 9   < > 13   CREATININE mg/dL 0 81   < > 1 01   CALCIUM mg/dL 8 9   < > 8 1*   ALK PHOS U/L  --   --  99   ALT U/L  --   --  16   AST U/L  --   --  31    < > = values in this interval not displayed  Results from last 7 days   Lab Units 06/19/19  1203   INR  1 14       * I Have Reviewed All Lab Data Listed Above  * Additional Pertinent Lab Tests Reviewed:  [facility] Admission Reviewed    Imaging:    Imaging Reports Reviewed Today Include: none  Imaging Personally Reviewed by Myself Includes:  MRI    Recent Cultures (last 7 days):           Last 24 Hours Medication List:     Current Facility-Administered Medications:  acetaminophen 650 mg Oral Q6H PRN Behzad Delgado MD   atorvastatin 80 mg Oral Daily With Mikey Barrera MD   hydrALAZINE 10 mg Intravenous Q4H PRN Behzad Delgado MD   iron sucrose 300 mg Intravenous Once per day on Mon Wed Fri Marquis Steele MD   melatonin 6 mg Oral HS Tiffany Ronquillo MD   metoprolol tartrate 50 mg Oral Q12H [address] Tiffany Ronquillo MD   ondansetron 4 mg Intravenous Q4H PRN Tiffany Ronquillo MD   oxyCODONE 2 5 mg Oral Q4H PRN Tiffany Ronquillo MD   pantoprazole 40 mg Oral Early Morning Tiffany Ronquillo MD        Today, Patient Was Seen By: Kayy Solis    ** Please Note: This note has been constructed using a voice recognition system   **

## 2019-06-24 NOTE — NURSING NOTE
Was called by charge nurse and informed that patient had unwitnessed fall in the bathroom  Patient reported that she fell forward from sitting position on the toilet and hit her head  Right frontal forehead does have black and blue radha and firm raised bump  SLIM TERESA Grant Held paged to make aware     Awaiting call back

## 2019-06-24 NOTE — UTILIZATION REVIEW
Continued Stay Review    Date: 6-24-19                         Current Patient Class: inpatient  Current Level of Care: medical surgical     HPI:79 y o  female initially admitted on 6-19-19     Assessment/Plan    78year old female admitted for stroke symptoms and  symptomatic left carotid stenosis  MRI confirmed superficial cortical infarct in the left parietal region and small deep watershed infarct in the left frontal region representing acute infarct in the distribution left MCA; no acute hemorrhage seen; no mass effect or midline shift seen  CTA  Occluded right interal carotid artery  Plan CEA on Wednesday 6-26  Anemia of unclear etiology  [facility] will do EGD and colonoscopy today for evaluate the source of her anemia  Peripheral smear showed schistocytes  guaiac negative  Patient  fell off the toilet last night and has hematoma over her right eyebrow  No other injury  Receiving IV iron sucrose today          Pertinent Labs/Diagnostic Results:   Results from last 7 days   Lab Units 06/24/19  1057 06/24/19  0610 06/24/19  0108 06/23/19  1601 06/22/19  0530 06/21/19  0445 06/20/19  0222   WBC Thousand/uL  --  8 98  --   --  7 57 9 92 12 61*   HEMOGLOBIN g/dL 9 3* 8 7* 8 7* 9 2* 8 4* 8 6* 8 1*   HEMATOCRIT %  --  32 9*  --   --  31 0* 32 4* 30 2*   PLATELETS Thousands/uL  --  302  --   --  267 252 256   NEUTROS ABS Thousands/µL  --  7 01  --   --  4 89 7 56 9 51*         Results from last 7 days   Lab Units 06/24/19  0610 06/22/19  0530 06/21/19  0445 06/20/19  0547   SODIUM mmol/L 141 144 143 139   POTASSIUM mmol/L 3 2* 3 7 3 4* 3 6   CHLORIDE mmol/L 108 112* 111* 111*   CO2 mmol/L 26 26 25 24   ANION GAP mmol/L 7 6 7 4   BUN mg/dL 9 10 8 9   CREATININE mg/dL 0 81 0 92 0 99 0 97   EGFR ml/min/1 73sq m 69 59 54 56   CALCIUM mg/dL 8 9 8 8 8 8 8 8   MAGNESIUM mg/dL  --   --   --  2 1   PHOSPHORUS mg/dL  --   --   --  2 9     Results from last 7 days   Lab Units 06/19/19  1524 06/19/19  1203   AST U/L  --  31 ALT U/L  --  16   ALK PHOS U/L  --  99   TOTAL PROTEIN g/dL 6 5 6 3*   ALBUMIN g/dL  --  3 0*   TOTAL BILIRUBIN mg/dL  --  0 49   BILIRUBIN DIRECT mg/dL  --  0 21*     Results from last 7 days   Lab Units 06/19/19  1238   POC GLUCOSE mg/dl 150*     Results from last 7 days   Lab Units 06/24/19  0610 06/22/19  0530 06/21/19  0445 06/20/19  0547 06/19/19  1203   GLUCOSE RANDOM mg/dL 122 70 84 80 117     Results from last 7 days   Lab Units 06/20/19  0547   HEMOGLOBIN A1C % 5 9   EAG mg/dl 123       Results from last 7 days   Lab Units 06/19/19  1203   PROTIME seconds 14 7*   INR  1 14   PTT seconds 25*     Results from last 7 days   Lab Units 06/19/19  1612 06/19/19  1203   FERRITIN ng/mL 3* 2*     Vital Signs:     Date/Time  Temp  Pulse  Resp  BP  MAP (mmHg)  SpO2  O2 Device   06/24/19 1209    54Abnormal     102/58  73       06/24/19 1000    70    144/88  107  97 %     06/24/19 0959    65    182/88Abnormal          06/24/19 0910    61  18  140/65    99 %  None (Room air)   06/24/19 0855    57  16  145/67    100 %  None (Room air)   06/24/19 0756  97 6 °F (36 4 °C)  64  16  173/93Abnormal     98 %  None (Room air)     Intake/Output      06/22/19 0700 - 06/23/19 0659 06/23/19 0700 - 06/24/19 0659 06/24/19 0700 - 06/25/19 0659   Intake (ml) 440 2360 200   Output (ml) 1225 1800 700   Net (ml) -785 560 -500   Last Weight  70 kg (154 lb 5 2 oz)          Medications:     acetaminophen 650 mg Oral Q6H PRN    atorvastatin 80 mg Oral Daily With Dinner    hydrALAZINE 10 mg Intravenous Q4H PRN    iron sucrose 300 mg Intravenous Once per day on Mon Wed Fri Last Rate: 300 mg (06/24/19 1427)   melatonin 6 mg Oral HS    metoprolol tartrate 50 mg Oral Q12H RAYNA    ondansetron 4 mg Intravenous Q4H PRN    oxyCODONE 2 5 mg Oral Q4H PRN    pantoprazole 40 mg Oral Early Morning        Discharge Plan: To be determined     Network Utilization Review Department  Phone: [phone];  Fax [phone]  [email or web address]  ATTENTION: Please call with any questions or concerns to [phone]  and carefully listen to the prompts so that you are directed to the right person  Send all requests for admission clinical reviews, approved or denied determinations and any other requests to fax [phone]   All voicemails are confidential

## 2019-06-24 NOTE — SOCIAL WORK
Met with pt to discuss her discharge plan and follow up on her decision for inpt rehab  She stated that she spoke with her Shai Galindo and they would like to wait till after her carotid surgery to determine her needs post op

## 2019-06-25 LAB
ANION GAP SERPL CALCULATED.3IONS-SCNC: 8 MMOL/L (ref 4–13)
BASOPHILS # BLD AUTO: 0.09 THOUSANDS/ΜL (ref 0–0.1)
BASOPHILS NFR BLD AUTO: 1 % (ref 0–1)
BUN SERPL-MCNC: 10 MG/DL (ref 5–25)
CALCIUM SERPL-MCNC: 9.1 MG/DL (ref 8.3–10.1)
CHLORIDE SERPL-SCNC: 112 MMOL/L (ref 100–108)
CO2 SERPL-SCNC: 22 MMOL/L (ref 21–32)
CREAT SERPL-MCNC: 1.02 MG/DL (ref 0.6–1.3)
EOSINOPHIL # BLD AUTO: 0.61 THOUSAND/ΜL (ref 0–0.61)
EOSINOPHIL NFR BLD AUTO: 6 % (ref 0–6)
ERYTHROCYTE [DISTWIDTH] IN BLOOD BY AUTOMATED COUNT: 26.9 % (ref 11.6–15.1)
GFR SERPL CREATININE-BSD FRML MDRD: 52 ML/MIN/1.73SQ M
GLUCOSE SERPL-MCNC: 86 MG/DL (ref 65–140)
HCT VFR BLD AUTO: 34.5 % (ref 34.8–46.1)
HGB BLD-MCNC: 8.5 G/DL (ref 11.5–15.4)
HGB BLD-MCNC: 9.1 G/DL (ref 11.5–15.4)
HGB BLD-MCNC: 9.1 G/DL (ref 11.5–15.4)
IMM GRANULOCYTES # BLD AUTO: 0.03 THOUSAND/UL (ref 0–0.2)
IMM GRANULOCYTES NFR BLD AUTO: 0 % (ref 0–2)
LYMPHOCYTES # BLD AUTO: 2.27 THOUSANDS/ΜL (ref 0.6–4.47)
LYMPHOCYTES NFR BLD AUTO: 24 % (ref 14–44)
MCH RBC QN AUTO: 17.8 PG (ref 26.8–34.3)
MCHC RBC AUTO-ENTMCNC: 26.4 G/DL (ref 31.4–37.4)
MCV RBC AUTO: 68 FL (ref 82–98)
MONOCYTES # BLD AUTO: 0.9 THOUSAND/ΜL (ref 0.17–1.22)
MONOCYTES NFR BLD AUTO: 10 % (ref 4–12)
NEUTROPHILS # BLD AUTO: 5.59 THOUSANDS/ΜL (ref 1.85–7.62)
NEUTS SEG NFR BLD AUTO: 59 % (ref 43–75)
NRBC BLD AUTO-RTO: 0 /100 WBCS
PLATELET # BLD AUTO: 318 THOUSANDS/UL (ref 149–390)
POTASSIUM SERPL-SCNC: 3.8 MMOL/L (ref 3.5–5.3)
RBC # BLD AUTO: 5.11 MILLION/UL (ref 3.81–5.12)
SODIUM SERPL-SCNC: 142 MMOL/L (ref 136–145)
WBC # BLD AUTO: 9.49 THOUSAND/UL (ref 4.31–10.16)

## 2019-06-25 PROCEDURE — 85018 HEMOGLOBIN: CPT | Performed by: INTERNAL MEDICINE

## 2019-06-25 PROCEDURE — 99232 SBSQ HOSP IP/OBS MODERATE 35: CPT | Performed by: INTERNAL MEDICINE

## 2019-06-25 PROCEDURE — 99233 SBSQ HOSP IP/OBS HIGH 50: CPT | Performed by: SURGERY

## 2019-06-25 PROCEDURE — 97116 GAIT TRAINING THERAPY: CPT

## 2019-06-25 PROCEDURE — 85025 COMPLETE CBC W/AUTO DIFF WBC: CPT | Performed by: INTERNAL MEDICINE

## 2019-06-25 PROCEDURE — 80048 BASIC METABOLIC PNL TOTAL CA: CPT | Performed by: INTERNAL MEDICINE

## 2019-06-25 PROCEDURE — 97530 THERAPEUTIC ACTIVITIES: CPT

## 2019-06-25 PROCEDURE — 97110 THERAPEUTIC EXERCISES: CPT

## 2019-06-25 PROCEDURE — 97535 SELF CARE MNGMENT TRAINING: CPT

## 2019-06-25 RX ORDER — ASPIRIN 81 MG/1
81 TABLET ORAL DAILY
Status: DISCONTINUED | OUTPATIENT
Start: 2019-06-25 | End: 2019-07-03 | Stop reason: HOSPADM

## 2019-06-25 RX ADMIN — ATORVASTATIN CALCIUM 80 MG: 80 TABLET, FILM COATED ORAL at 15:33

## 2019-06-25 RX ADMIN — METOPROLOL TARTRATE 50 MG: 50 TABLET, FILM COATED ORAL at 21:48

## 2019-06-25 RX ADMIN — MELATONIN 6 MG: at 21:48

## 2019-06-25 RX ADMIN — PANTOPRAZOLE SODIUM 40 MG: 40 TABLET, DELAYED RELEASE ORAL at 06:11

## 2019-06-25 RX ADMIN — METOPROLOL TARTRATE 50 MG: 50 TABLET, FILM COATED ORAL at 08:38

## 2019-06-25 RX ADMIN — ASPIRIN 81 MG: 81 TABLET, COATED ORAL at 11:18

## 2019-06-25 NOTE — PROGRESS NOTES
Progress Note - Vascular Surgery   Yuriy Garcia 78 y o  female MRN: [id number]  Unit/Bed#: [facility] 56-36 Encounter: [id number]    Assessment:  78year old F with left MCA infarct, with occluded R ICA and   50% stenosis of L ICA, also with anemia s/p EGD and colonoscopy    Plan:  Continue aspirin and statin  Trend hemoglobin  Surgical planning - plan for Left carotid endarterectomy on Friday    Subjective/Objective     Subjective: No acute events  Feels well  Objective:    Blood pressure 152/68, pulse 57, temperature 97 9 °F (36 6 °C), temperature source Oral, resp  rate 18, height 5' 2" (1 575 m), weight 70 kg (154 lb 5 2 oz), SpO2 95 %  ,Body mass index is 28 23 kg/m²  Intake/Output Summary (Last 24 hours) at 6/25/2019 0635  Last data filed at 6/24/2019 1741  Gross per 24 hour   Intake 705 05 ml   Output 900 ml   Net -194 95 ml       Invasive Devices     Peripheral Intravenous Line            Peripheral IV 06/24/19 Right Hand less than 1 day                Physical Exam:   General: NAD, AAOx3  Eyes: PERRL  ENT: moist mucous membranes  Tongue midline  Neck: supple  CV: RRR +S1/S2  Chest: breath sounds bilaterally  Abdomen: soft, NT ND  Extremities: 5/5  strength, elbow flexion and extension BUE      Results from last 7 days   Lab Units 06/25/19  0051 06/24/19  1736 06/24/19  1057 06/24/19  0610  06/22/19  0530  06/21/19  0445   WBC Thousand/uL  --   --   --  8 98  --  7 57  --  9 92   HEMOGLOBIN g/dL 8 5* 9 5* 9 3* 8 7*   < > 8 4*   < > 8 6*   HEMATOCRIT %  --   --   --  32 9*  --  31 0*  --  32 4*   PLATELETS Thousands/uL  --   --   --  302  --  267  --  252    < > = values in this interval not displayed       Results from last 7 days   Lab Units 06/24/19  0610 06/22/19  0530 06/21/19  0445   POTASSIUM mmol/L 3 2* 3 7 3 4*   CHLORIDE mmol/L 108 112* 111*   CO2 mmol/L 26 26 25   BUN mg/dL 9 10 8   CREATININE mg/dL 0 81 0 92 0 99   CALCIUM mg/dL 8 9 8 8 8 8     Results from last 7 days   Lab Units 06/19/19  1203   INR  1 14   PTT seconds 25*

## 2019-06-25 NOTE — PROGRESS NOTES
Met with patient to discuss follow up care with outpatient neurology  Discharge planning per CM  Patient may be discharged to inpatient rehab when medically cleared, not determined as of yet  Afterwards she plans to return home where she resides alone  I provided her a stroke education binder and my card  I reviewed stroke types, symptoms, medications, risk factor management, and resources with her  Answered all her questions  She verbalizes understanding of information  She does not have any additional questions or concerns at this time  Will follow up after discharge

## 2019-06-25 NOTE — PLAN OF CARE
Problem: PHYSICAL THERAPY ADULT  Goal: Performs mobility at highest level of function for planned discharge setting  See evaluation for individualized goals  Description  Treatment/Interventions: Functional transfer training, LE strengthening/ROM, Therapeutic exercise, Endurance training, Patient/family training, Equipment eval/education, Bed mobility, Gait training, Spoke to nursing  Equipment Recommended: Fay Matos       See flowsheet documentation for full assessment, interventions and recommendations  Outcome: Progressing  Note:   Prognosis: Good  Problem List: Decreased strength, Decreased endurance, Impaired balance, Decreased mobility, Decreased coordination, Impaired judgement, Decreased safety awareness  Assessment: Pt is making steady progress with the use of a rolling walker  Cues required for hand placement with transfers  Gait is limited by R sided weakness as well as trendelenburg gait  Min assist required for walker management  Impulsive as well while using rolling walker  Completed seated BLE exercises to conclude session  Chair alarm active post session  Pt would benefit from continued physical therapy to maximize functional mobility and safety  Barriers to Discharge: Decreased caregiver support     Recommendation: (Rehab)     PT - OK to Discharge: Yes    See flowsheet documentation for full assessment

## 2019-06-25 NOTE — PROGRESS NOTES
Progress Note - Andreina Ocampo 78 y o  female MRN: [id number]    Unit/Bed#: [facility] 721-01 Encounter: [id number]    Subjective:     Patient seen and examined at bedside  She reports feeling well today  She denies any BMs since the procedure  She denies abdominal pain, nausea, vomiting  She ate breakfast      Objective:     Vitals: Blood pressure 152/79, pulse 69, temperature (!) 97 3 °F (36 3 °C), resp  rate 18, height 5' 2" (1 575 m), weight 70 kg (154 lb 5 2 oz), SpO2 93 %  ,Body mass index is 28 23 kg/m²  Intake/Output Summary (Last 24 hours) at 6/25/2019 1159  Last data filed at 6/25/2019 0801  Gross per 24 hour   Intake 505 05 ml   Output 1200 ml   Net -694 95 ml       Physical Exam:     General Appearance: Alert, appears stated age and cooperative  Lungs: Clear to auscultation bilaterally  Heart: Regular rate and rhythm  Abdomen: Soft, non-tender, non-distended; bowel sounds normal  Extremities: No cyanosis, edema    Invasive Devices     Peripheral Intravenous Line            Peripheral IV 06/24/19 Right Hand 1 day                Lab Results:  Results from last 7 days   Lab Units 06/25/19  0936 06/25/19  0604   WBC Thousand/uL  --  9 49   HEMOGLOBIN g/dL 9 1* 9 1*   HEMATOCRIT %  --  34 5*   PLATELETS Thousands/uL  --  318   NEUTROS PCT %  --  59   LYMPHS PCT %  --  24   MONOS PCT %  --  10   EOS PCT %  --  6     Results from last 7 days   Lab Units 06/25/19  0604  06/19/19  1203   POTASSIUM mmol/L 3 8   < > 3 8   CHLORIDE mmol/L 112*   < > 106   CO2 mmol/L 22   < > 22   BUN mg/dL 10   < > 13   CREATININE mg/dL 1 02   < > 1 01   CALCIUM mg/dL 9 1   < > 8 1*   ALK PHOS U/L  --   --  99   ALT U/L  --   --  16   AST U/L  --   --  31    < > = values in this interval not displayed  Results from last 7 days   Lab Units 06/19/19  1203   INR  1 14           Imaging Studies: I have personally reviewed pertinent imaging studies  Cta Head And Neck W Wo Contrast    Result Date: 6/19/2019  Impression: 1    Area of cortical/subcortical hypoattenuation in the left parietal lobe, could represent subacute to chronic infarct  2   Occluded right internal carotid artery from its origin with retrograde filling of the intracranial supraclinoid right ICA  3   Remainder of major vasculature of [place] are patent without critical stenosis  4   Atherosclerotic disease at the left cervical carotid bifurcation and proximal cervical internal carotid artery with ulcerated plaque at the bifurcation  There is approximately 50% stenosis by NASCET  criteria  5   Mild area of groundglass and nodular opacity in the periphery of partially imaged right upper lobe, may indicate inflammatory or infectious etiology  6   Sphenoid and ethmoidal sinus disease as described  I personally discussed this study with Brown Chavez on 6/19/2019 at 1:35PM  Workstation performed: [id number]     Ct Head Wo Contrast    Result Date: 6/24/2019  Impression: 1  Right frontal extracranial soft tissue hematoma  No calvarial fracture or acute intracranial injury  2   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  Workstation performed: [id number]     Mri Brain Wo Contrast    Result Date: 6/21/2019  Impression: Superficial cortical infarct in the left parietal region and small deep watershed infarct in the left frontal region representing acute infarct in the distribution left MCA No acute hemorrhage seen No mass effect or midline shift seen Flow void in the petrous and cavernous portion of the right ICA is not identified related to occlusion seen on the recent CT Workstation performed: [id number]     Ct Chest Abdomen Pelvis W Contrast    Result Date: 6/19/2019  Impression: No solid visceral injury seen No free fluid seen Area of groundglass density seen in the right upper lobe with Micro nodularity, probably on an inflammatory basis    Short interval follow-up suggested in 3 months demonstrate resolution Rounded density seen in relation to the pubic symphysis, measuring about 2 7 cm,   This may represent a distended bursa related to arthritic changes in the symphysis pubis less likely due to urethral diverticulum Workstation performed: [id number]     Ct Recon Only Cervical Spine (no Charge)    Result Date: 6/19/2019  Impression: No acute osseous injury in the cervical spine  Workstation performed: [id number]       Assessment and Plan:    Discussed plan with SLIM    1) Iron deficiency anemia: status post EGD and colonoscopy 6/24 with findings of 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

## 2019-06-25 NOTE — PLAN OF CARE
Problem: OCCUPATIONAL THERAPY ADULT  Goal: Performs self-care activities at highest level of function for planned discharge setting  See evaluation for individualized goals  Description  Treatment Interventions: ADL retraining, Functional transfer training, UE strengthening/ROM, Endurance training, Cognitive reorientation, Patient/family training, Equipment evaluation/education, Compensatory technique education, Continued evaluation, Activityengagement          See flowsheet documentation for full assessment, interventions and recommendations  Outcome: Progressing  Note:   Limitation: Decreased ADL status, Decreased UE strength, Decreased Safe judgement during ADL, Decreased cognition, Decreased endurance, Decreased self-care trans, Decreased high-level ADLs  Prognosis: Good  Assessment: Pt participated in occupational therapy with focus on activity tolerance, functional transfers/mob, standing balance and tolerance for pt engagement in functional self-care task/UB grooming  Pt cleared by RN for pt participation in occupational therapy  Pt received sitting out of bed to bedside chair and agreeable to therapy following pt Identifiers confirmed  Pt reported her goal was to return to home and that she did not want to go to rehab  Pt stated " I do everything at home"   Pt requires   for sitting balance 2* pt pushing with  UE and pt balance deficits  Pt tolerated session well without complaints of pain, fatigue or loss of balance noted  Pt will benefit from in-pt rehab to continue to address pt noted deficits with decreased overall strength, balance and coordination which currently impair pt ADL and functional mob          OT Discharge Recommendation: Short Term Rehab(pt likely to refuse )  OT - OK to Discharge: Yes(if to STR when medically stable)

## 2019-06-25 NOTE — PROGRESS NOTES
Progress Note Marixa Marc 1940, 78 y o  female MRN: [id number]    Unit/Bed#: [facility] 721-01 Encounter: [id number]    Primary Care Provider: Jaime Llamas DO   Date and time admitted to hospital: 6/19/2019 11:50 AM        * Stroke-like symptom  Assessment & Plan  Presenting with right-sided weakness and right facial droop, CT scan of the head positive for left parietal lobe subacute to chronic stroke  The case was discussed by ED with Neurology, out of window for tPA, last well known even before obtaining scan  Admitted with stroke protocol with appropriate vital signs and neuro checks  MRI confirmed superficial cortical infarct in the left parietal region and small deep watershed infarct in the left frontal region representing acute infarct in the distribution left MCA; no acute hemorrhage seen; no mass effect or midline shift seen  Echocardiogram showed EF of 65%  For now will hold aspirin for possible GI bleed, plavix as well  Colonoscopy significant for polypectomy with clipping to prevent excess bleeding   EGD showed multiple AVM's in the stomach and duodenum induced coagulation with argon plasma   Abnormal CT of the neck with 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, requested evaluation by vascular surgery for reviewing images especially concern of the left side    Plan for CEA per vascular surgery  PT and OT evaluation  Fall precautions    Iron deficiency anemia  Assessment & Plan  IV Venofer  Outpatient Hematology follow-up    Abnormal CT scan  Assessment & Plan  Abnormal upper portion of the right lung, patient is currently a smoker outpatient Pulmonary follow-up    Abnormal pelvis, 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, Urology recommending outpatient cystoscopic evaluation     Anemia, unspecified  Assessment & Plan  Iron-deficiency anemia  IV iron  Hematology input noted      Nicotine dependence  Assessment & Plan  Smoking cessation discussed    Essential hypertension  Assessment & Plan  Continue with beta-blocker as per home dose, monitor  Keep -160 for permissive hypertension, continue with metoprolol 50 mg q12hrs  Hydralazine prn 10 mg q4hrs         VTE Pharmacologic Prophylaxis:   Pharmacologic: Enoxaparin (Lovenox)  Mechanical VTE Prophylaxis in Place: Yes    Patient Centered Rounds: I have performed bedside rounds with nursing staff today  Discussions with Specialists or Other Care Team Provider:     Education and Discussions with Family / Patient:  Discussed with the patient    Time Spent for Care: 30 minutes  More than 50% of total time spent on counseling and coordination of care as described above  Current Length of Stay: 6 day(s)    Current Patient Status: Inpatient   Certification Statement: The patient will continue to require additional inpatient hospital stay due to As mentioned    Discharge Plan:  Awaiting CEA by vascular surgery    Code Status: Level 1 - Full Code      Subjective:     Comfortably sitting up in chair  Reports feeling okay  Ambulating the room and hallway  History chart labs medications reviewed    Objective:     Vitals:   Temp (24hrs), Av 8 °F (36 6 °C), Min:97 3 °F (36 3 °C), Max:98 2 °F (36 8 °C)    Temp:  [97 3 °F (36 3 °C)-98 2 °F (36 8 °C)] 98 2 °F (36 8 °C)  HR:  [57-74] 58  Resp:  [18] 18  BP: (146-161)/(68-79) 161/73  SpO2:  [93 %-95 %] 94 %  Body mass index is 28 23 kg/m²  Input and Output Summary (last 24 hours):        Intake/Output Summary (Last 24 hours) at 2019 1656  Last data filed at 2019 1300  Gross per 24 hour   Intake 580 ml   Output 300 ml   Net 280 ml       Physical Exam:     Physical Exam    Comfortably sitting up in chair  Neck supple  Lungs diminished breath sounds bases  Heart sounds S1-S2 noted  Abdomen soft  Awake obeys simple commands  Pulses noted  No pedal edema  No rash        Additional Data:     Labs:    Results from last 7 days   Lab Units 19  0936 19  0604   WBC Thousand/uL  --  9 49 HEMOGLOBIN g/dL 9 1* 9 1*   HEMATOCRIT %  --  34 5*   PLATELETS Thousands/uL  --  318   NEUTROS PCT %  --  59   LYMPHS PCT %  --  24   MONOS PCT %  --  10   EOS PCT %  --  6     Results from last 7 days   Lab Units 06/25/19  0604  06/19/19  1203   SODIUM mmol/L 142   < > 133*   POTASSIUM mmol/L 3 8   < > 3 8   CHLORIDE mmol/L 112*   < > 106   CO2 mmol/L 22   < > 22   BUN mg/dL 10   < > 13   CREATININE mg/dL 1 02   < > 1 01   ANION GAP mmol/L 8   < > 5   CALCIUM mg/dL 9 1   < > 8 1*   ALBUMIN g/dL  --   --  3 0*   TOTAL BILIRUBIN mg/dL  --   --  0 49   ALK PHOS U/L  --   --  99   ALT U/L  --   --  16   AST U/L  --   --  31   GLUCOSE RANDOM mg/dL 86   < > 117    < > = values in this interval not displayed  Results from last 7 days   Lab Units 06/19/19  1203   INR  1 14     Results from last 7 days   Lab Units 06/19/19  1238   POC GLUCOSE mg/dl 150*     Results from last 7 days   Lab Units 06/20/19  0547   HEMOGLOBIN A1C % 5 9               * I Have Reviewed All Lab Data Listed Above  * Additional Pertinent Lab Tests Reviewed:  All Labs Within Last 24 Hours Reviewed    Imaging:    Imaging Reports Reviewed Today Include:  Imaging studies reviewed  Imaging Personally Reviewed by Myself Includes:      Recent Cultures (last 7 days):           Last 24 Hours Medication List:     Current Facility-Administered Medications:  acetaminophen 650 mg Oral Q6H PRN Cheryl Rangel MD    aspirin 81 mg Oral Daily Sary Caly PA-C    atorvastatin 80 mg Oral Daily With Maribeth Duran MD    hydrALAZINE 10 mg Intravenous Q4H PRN Cheryl Rangel MD    iron sucrose 300 mg Intravenous Once per day on Mon Wed Fri Juanita De Santiago MD Last Rate: Stopped (06/24/19 1557)   melatonin 6 mg Oral HS Cheryl Rangel MD    metoprolol tartrate 50 mg Oral Q12H [address] Cheryl Rangel MD    ondansetron 4 mg Intravenous Q4H PRN Cheryl Rangel MD    oxyCODONE 2 5 mg Oral Q4H PRN Cheryl Rangel MD    pantoprazole 40 mg Oral Early Morning Delano Jonny Arenas MD         Today, Patient Was Seen By: Paco Sandoval MD    ** Please Note: Dictation voice to text software may have been used in the creation of this document   **

## 2019-06-25 NOTE — OCCUPATIONAL THERAPY NOTE
Occupational Therapy Treatment Note     06/25/19 1720   Restrictions/Precautions   Weight Bearing Precautions Per Order No   Other Precautions Chair Alarm; Fall Risk   Lifestyle   Autonomy Pt reports being I with ADLs, IADLs, and functional mobility without use of DME PTA  Reciprocal Relationships reports she has tons of friends "both men and women" in her building   Service to Others retired   Intrinsic Gratification reports that she enjoys gossiping   Pain Assessment   Pain Assessment No/denies pain   Pain Score No Pain   ADL   Where Assessed Standing at sink   Grooming Assistance 5  Supervision/Setup   Grooming Deficit Setup;Verbal cueing;Standing with assistive device; Increased time to complete;Supervision/safety   Transfers   Sit to Stand 4  Minimal assistance   Additional items Assist x 1   Stand to Sit 5  Supervision   Additional items Increased time required   Functional Mobility   Functional Mobility 4  Minimal assistance   Additional items Rolling walker   Cognition   Overall Cognitive Status WFL   Arousal/Participation Alert; Cooperative   Attention Attends with cues to redirect   Orientation Level Oriented X4   Memory Decreased recall of recent events;Decreased recall of precautions   Following Commands Follows one step commands with increased time or repetition   Assessment   Assessment Pt participated in occupational therapy with focus on activity tolerance, functional transfers/mob, standing balance and tolerance for pt engagement in functional self-care task/UB grooming  Pt cleared by RN for pt participation in occupational therapy  Pt received sitting out of bed to bedside chair and agreeable to therapy following pt Identifiers confirmed  Pt reported her goal was to return to home and that she did not want to go to rehab  Pt stated " I do everything at home"   Pt requires   for sitting balance 2* pt pushing with  UE and pt balance deficits   Pt tolerated session well without complaints of pain, fatigue or loss of balance noted  Pt will benefit from in-pt rehab to continue to address pt noted deficits with decreased overall strength, balance and coordination which currently impair pt ADL and functional mob  Plan   Treatment Interventions ADL retraining;Functional transfer training;Patient/family training; Endurance training   Goal Expiration Date 06/30/19   Treatment Day 2   OT Frequency 3-5x/wk   Recommendation   OT Discharge Recommendation Short Term Rehab  (pt likely to refuse )   Barthel Index   Feeding 5   Bathing 0   Grooming Score 0   Dressing Score 5   Bladder Score 10   Bowels Score 10   Toilet Use Score 5   Transfers (Bed/Chair) Score 10   Mobility (Level Surface) Score 0   Stairs Score 0   Barthel Index Score 45   Modified [place] Scale   Modified [place]ian Scale 4       Malcom Schulz  MORENO/L

## 2019-06-25 NOTE — PHYSICAL THERAPY NOTE
Physical Therapy Progress Note     06/25/19 1055   Pain Assessment   Pain Assessment No/denies pain   Pain Score No Pain   Restrictions/Precautions   Weight Bearing Precautions Per Order No   Other Precautions Chair Alarm; Fall Risk   General   Family/Caregiver Present No   Cognition   Overall Cognitive Status WFL   Arousal/Participation Alert; Cooperative   Subjective   Subjective Pt denies pain and agrees to participate  Transfers   Sit to Stand 4  Minimal assistance   Additional items Assist x 1;Verbal cues   Stand to Sit 5  Supervision   Additional items Assist x 1;Verbal cues   Ambulation/Elevation   Gait pattern   (R trendelenburg, RLE external rotation, slow)   Gait Assistance 4  Minimal assist   Additional items Assist x 1;Verbal cues   Assistive Device Rolling walker   Distance 30 feet and 60 feet x 2   Stair Management Assistance Not tested   Balance   Static Sitting Fair   Static Standing Fair -   Dynamic Standing Poor +   Ambulatory Poor +   Endurance Deficit   Endurance Deficit Yes   Endurance Deficit Description R sided weakness   Activity Tolerance   Activity Tolerance Patient limited by fatigue   Nurse [facility] to see per RN [facility]   Exercises   Hip Flexion Sitting;20 reps;AROM; Bilateral   Knee AROM Long Arc Quad Sitting;20 reps;AROM; Bilateral   Ankle Pumps Sitting;20 reps;AROM; Bilateral   Assessment   Prognosis Good   Problem List Decreased strength;Decreased endurance; Impaired balance;Decreased mobility; Decreased coordination; Impaired judgement;Decreased safety awareness   Assessment Pt is making steady progress with the use of a rolling walker  Cues required for hand placement with transfers  Gait is limited by R sided weakness as well as trendelenburg gait  Min assist required for walker management  Impulsive as well while using rolling walker  Completed seated BLE exercises to conclude session  Chair alarm active post session    Pt would benefit from continued physical therapy to maximize functional mobility and safety  Goals   Patient Goals To go home   STG Expiration Date 07/05/19   Short Term Goal #1 1  Pt will demonstrate ability to perform all aspects of bed mobility with Mod I in order to return to home safely  2  Pt will demonstrate ability to perform functional transfers with Mod I in order to return to PLOF in home safely  3  Pt will demonstrate ability to ambulate 200 ft with least restrictive AD with Mod I in order to increase independence  4  Pt will demonstrate improved sitting/standing balance by one grade order to decrease risk of falls in home  6  Pt will increase b/l LE strength by one grade in order to increase ease of functional mobility and transfers  Treatment Day 1   Plan   Treatment/Interventions Functional transfer training;LE strengthening/ROM; Therapeutic exercise; Endurance training;Patient/family training;Bed mobility;Gait training;Spoke to nursing   Progress Progressing toward goals   PT Frequency   (4-6x/week)   Recommendation   Recommendation   (Rehab)   Equipment Recommended Conan Boxer  (SANDRA)     Luisk Nichelle, PTA

## 2019-06-26 LAB
ERYTHROCYTE [DISTWIDTH] IN BLOOD BY AUTOMATED COUNT: 26.7 % (ref 11.6–15.1)
HCT VFR BLD AUTO: 30.2 % (ref 34.8–46.1)
HGB BLD-MCNC: 8.1 G/DL (ref 11.5–15.4)
MCH RBC QN AUTO: 18 PG (ref 26.8–34.3)
MCHC RBC AUTO-ENTMCNC: 26.8 G/DL (ref 31.4–37.4)
MCV RBC AUTO: 67 FL (ref 82–98)
PLATELET # BLD AUTO: 282 THOUSANDS/UL (ref 149–390)
RBC # BLD AUTO: 4.51 MILLION/UL (ref 3.81–5.12)
WBC # BLD AUTO: 7.9 THOUSAND/UL (ref 4.31–10.16)

## 2019-06-26 PROCEDURE — 99232 SBSQ HOSP IP/OBS MODERATE 35: CPT | Performed by: SURGERY

## 2019-06-26 PROCEDURE — 85027 COMPLETE CBC AUTOMATED: CPT | Performed by: PHYSICIAN ASSISTANT

## 2019-06-26 PROCEDURE — 99232 SBSQ HOSP IP/OBS MODERATE 35: CPT | Performed by: INTERNAL MEDICINE

## 2019-06-26 RX ADMIN — MELATONIN 6 MG: at 21:11

## 2019-06-26 RX ADMIN — ATORVASTATIN CALCIUM 80 MG: 80 TABLET, FILM COATED ORAL at 17:29

## 2019-06-26 RX ADMIN — ENOXAPARIN SODIUM 40 MG: 40 INJECTION SUBCUTANEOUS at 08:38

## 2019-06-26 RX ADMIN — IRON SUCROSE 300 MG: 20 INJECTION, SOLUTION INTRAVENOUS at 08:38

## 2019-06-26 RX ADMIN — ASPIRIN 81 MG: 81 TABLET, COATED ORAL at 08:37

## 2019-06-26 RX ADMIN — PANTOPRAZOLE SODIUM 40 MG: 40 TABLET, DELAYED RELEASE ORAL at 05:40

## 2019-06-26 RX ADMIN — ACETAMINOPHEN 650 MG: 325 TABLET ORAL at 08:37

## 2019-06-26 RX ADMIN — METOPROLOL TARTRATE 50 MG: 50 TABLET, FILM COATED ORAL at 21:11

## 2019-06-26 RX ADMIN — METOPROLOL TARTRATE 50 MG: 50 TABLET, FILM COATED ORAL at 08:37

## 2019-06-26 NOTE — PLAN OF CARE
Problem: PAIN - ADULT  Goal: Verbalizes/displays adequate comfort level or baseline comfort level  Description  Interventions:  - Encourage patient to monitor pain and request assistance  - Assess pain using appropriate pain scale  - Administer analgesics based on type and severity of pain and evaluate response  - Implement non-pharmacological measures as appropriate and evaluate response  - Consider cultural and social influences on pain and pain management  - Notify physician/advanced practitioner if interventions unsuccessful or patient reports new pain  Outcome: Progressing     Problem: DISCHARGE PLANNING  Goal: Discharge to home or other facility with appropriate resources  Description  INTERVENTIONS:  - Identify barriers to discharge w/patient and caregiver  - Arrange for needed discharge resources and transportation as appropriate  - Identify discharge learning needs (meds, wound care, etc )  - Arrange for interpretive services to assist at discharge as needed  - Refer to Case Management Department for coordinating discharge planning if the patient needs post-hospital services based on physician/advanced practitioner order or complex needs related to functional status, cognitive ability, or social support system  Outcome: Progressing     Problem: Knowledge Deficit  Goal: Patient/family/caregiver demonstrates understanding of disease process, treatment plan, medications, and discharge instructions  Description  Complete learning assessment and assess knowledge base  Interventions:  - Provide teaching at level of understanding  - Provide teaching via preferred learning methods  Outcome: Progressing     Problem: Neurological Deficit  Goal: Neurological status is stable or improving  Description  Interventions:  - Monitor and assess patient's level of consciousness, motor function, sensory function, and level of assistance needed for ADLs  - Monitor and report changes from baseline   Collaborate with interdisciplinary team to initiate plan and implement interventions as ordered  - Provide and maintain a safe environment  - Utilize seizure precautions  - Utilize fall precautions  - Utilize aspiration precautions  - Utilize bleeding precautions  Outcome: Progressing     Problem: Nutrition/Hydration-ADULT  Goal: Nutrient/Hydration intake appropriate for improving, restoring or maintaining nutritional needs  Description  Monitor and assess patient's nutrition/hydration status for malnutrition (ex- brittle hair, bruises, dry skin, pale skin and conjunctiva, muscle wasting, smooth red tongue, and disorientation)  Collaborate with interdisciplinary team and initiate plan and interventions as ordered  Monitor patient's weight and dietary intake as ordered or per policy  Utilize nutrition screening tool and intervene per policy  Determine patient's food preferences and provide high-protein, high-caloric foods as appropriate  INTERVENTIONS:  - Monitor oral intake, urinary output, labs, and treatment plans  - Assess nutrition and hydration status and recommend course of action  - Evaluate amount of meals eaten  - Assist patient with eating if necessary   - Allow adequate time for meals  - Recommend/ encourage appropriate diets, oral nutritional supplements, and vitamin/mineral supplements  - Order, calculate, and assess calorie counts as needed  - Recommend, monitor, and adjust tube feedings and TPN/PPN based on assessed needs  - Assess need for intravenous fluids  - Provide specific nutrition/hydration education as appropriate  - Include patient/family/caregiver in decisions related to nutrition  Outcome: Progressing     Problem: Potential for Falls  Goal: Patient will remain free of falls  Description  INTERVENTIONS:  - Assess patient frequently for physical needs  -  Identify cognitive and physical deficits and behaviors that affect risk of falls  -  Ewen fall precautions as indicated by assessment    - Educate patient/family on patient safety including physical limitations  - Instruct patient to call for assistance with activity based on assessment  - Modify environment to reduce risk of injury  - Consider OT/PT consult to assist with strengthening/mobility  Outcome: Progressing     Problem: Prexisting or High Potential for Compromised Skin Integrity  Goal: Skin integrity is maintained or improved  Description  INTERVENTIONS:  - Identify patients at risk for skin breakdown  - Assess and monitor skin integrity  - Assess and monitor nutrition and hydration status  - Monitor labs (i e  albumin)  - Assess for incontinence   - Turn and reposition patient  - Assist with mobility/ambulation  - Relieve pressure over bony prominences  - Avoid friction and shearing  - Provide appropriate hygiene as needed including keeping skin clean and dry  - Evaluate need for skin moisturizer/barrier cream  - Collaborate with interdisciplinary team (i e  Nutrition, Rehabilitation, etc )   - Patient/family teaching  Outcome: Progressing

## 2019-06-26 NOTE — PROGRESS NOTES
Progress Note - Vascular Surgery   Mirza Carr 78 y o  female MRN: [id number]  Unit/Bed#: [facility] 56-36 Encounter: [id number]    Assessment:  78year old F with left MCA infarct, with occluded R ICA and   50% stenosis of L ICA, also with anemia s/p EGD and colonoscopy    Plan:  L CEA on Friday  Cont ASA and statin, plavix postop    Subjective/Objective     Subjective: DEN  No deficits  Some head pain from fall  Objective:    Blood pressure 157/71, pulse 66, temperature 97 9 °F (36 6 °C), resp  rate 16, height 5' 2" (1 575 m), weight 72 3 kg (159 lb 6 3 oz), SpO2 97 %  ,Body mass index is 29 15 kg/m²  Intake/Output Summary (Last 24 hours) at 6/26/2019 0805  Last data filed at 6/26/2019 0553  Gross per 24 hour   Intake 440 ml   Output 400 ml   Net 40 ml       Invasive Devices     Peripheral Intravenous Line            Peripheral IV 06/24/19 Right Hand 1 day                Physical Exam:   Gen: NAD, AAOx3  Neuro: No deficits  CV: RRR  Pulm: no resp distress  Abd: Soft, non-distended, non-tender        Results from last 7 days   Lab Units 06/26/19  0547 06/25/19  0936 06/25/19  0604  06/24/19  0610   WBC Thousand/uL 7 90  --  9 49  --  8 98   HEMOGLOBIN g/dL 8 1* 9 1* 9 1*   < > 8 7*   HEMATOCRIT % 30 2*  --  34 5*  --  32 9*   PLATELETS Thousands/uL 282  --  318  --  302    < > = values in this interval not displayed       Results from last 7 days   Lab Units 06/25/19  0604 06/24/19  0610 06/22/19  0530   POTASSIUM mmol/L 3 8 3 2* 3 7   CHLORIDE mmol/L 112* 108 112*   CO2 mmol/L 22 26 26   BUN mg/dL 10 9 10   CREATININE mg/dL 1 02 0 81 0 92   CALCIUM mg/dL 9 1 8 9 8 8     Results from last 7 days   Lab Units 06/19/19  1203   INR  1 14   PTT seconds 25*

## 2019-06-26 NOTE — PROGRESS NOTES
Vascular Nurse Navigator Education Rounds    Very pleasant patient is appropriate and accepting to education  Nurse Navigator role explained  Patient was educated with Review of written materials provided, Teachback, Explanation, Demonstration and Question & Answer  Patient is a smoker, as such Smoking effects on the lungs, tobacco triggers and Smoking cessation was reviewed  She states she quit last Wednesday, and her plan is to try and quit cold turkey  I strongly encouraged her to follow up with her PCP after discharge to include him in her transition of care and  explore additional cessation support as statistically speaking cold turkey is not the most effective option  She agrees to calling her PCP  Education on what to expect in the hospital on Friday and Saturday, infection prevention, activity limitations, when to call the office, importance of follow up, and incisional care complete  Patient is noted to have a fall while in the bathroom this admission  She states has 3 daughters and multiple grandchildren in the area that can assist her once she gets home, so I suggested she use their help to get settled in at home over the first few days post-discharge  She agreed to me calling her daughter Regino Stallworth to also review information  Provided patient with copy of discharge instructions to continue to review

## 2019-06-26 NOTE — PROGRESS NOTES
Progress Note Uli Slaughter 1940, 78 y o  female MRN: [id number]    Unit/Bed#: [facility] 721-01 Encounter: [id number]    Primary Care Provider: Michi Cabral DO   Date and time admitted to hospital: 6/19/2019 11:50 AM        * Stroke-like symptom  Assessment & Plan  Presenting with right-sided weakness and right facial droop, CT scan of the head positive for left parietal lobe subacute to chronic stroke  The case was discussed by ED with Neurology, out of window for tPA, last well known even before obtaining scan  Admitted with stroke protocol with appropriate vital signs and neuro checks  MRI confirmed superficial cortical infarct in the left parietal region and small deep watershed infarct in the left frontal region representing acute infarct in the distribution left MCA; no acute hemorrhage seen; no mass effect or midline shift seen  Echocardiogram showed EF of 65%  For now will hold aspirin for possible GI bleed, plavix as well  Colonoscopy significant for polypectomy with clipping to prevent excess bleeding   EGD showed multiple AVM's in the stomach and duodenum induced coagulation with argon plasma   Abnormal CT of the neck with 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, requested evaluation by vascular surgery for reviewing images especially concern of the left side    Plan for CEA per vascular surgery  PT and OT evaluation  Fall precautions    Iron deficiency anemia  Assessment & Plan  IV Venofer  Outpatient Hematology follow-up    Abnormal CT scan  Assessment & Plan  Abnormal upper portion of the right lung, patient is currently a smoker outpatient Pulmonary follow-up    Abnormal pelvis, 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, Urology recommending outpatient cystoscopic evaluation     Anemia, unspecified  Assessment & Plan  Iron-deficiency anemia  Iron supplementation  Hematology input noted    Nicotine dependence  Assessment & Plan  Smoking cessation discussed        VTE Pharmacologic Prophylaxis: Pharmacologic: Enoxaparin (Lovenox)  Mechanical VTE Prophylaxis in Place: Yes    Patient Centered Rounds: I have performed bedside rounds with nursing staff today  Discussions with Specialists or Other Care Team Provider:     Education and Discussions with Family / Patient:  Discussed with the patient    Time Spent for Care: 30 minutes  More than 50% of total time spent on counseling and coordination of care as described above  Current Length of Stay: 7 day(s)    Current Patient Status: Inpatient   Certification Statement: The patient will continue to require additional inpatient hospital stay due to As mentioned    Discharge Plan:  Awaiting surgery per vascular    Code Status: Level 1 - Full Code      Subjective:     Sitting up in chair  Reports feeling okay    Objective:     Vitals:   Temp (24hrs), Av 1 °F (36 7 °C), Min:97 9 °F (36 6 °C), Max:98 2 °F (36 8 °C)    Temp:  [97 9 °F (36 6 °C)-98 2 °F (36 8 °C)] 97 9 °F (36 6 °C)  HR:  [58-66] 66  Resp:  [16-18] 16  BP: (154-161)/(71-73) 157/71  SpO2:  [94 %-97 %] 97 %  Body mass index is 29 15 kg/m²  Input and Output Summary (last 24 hours): Intake/Output Summary (Last 24 hours) at 2019 1503  Last data filed at 2019 0900  Gross per 24 hour   Intake 460 ml   Output 400 ml   Net 60 ml       Physical Exam:     Physical Exam    Comfortably sitting up in chair  Neck supple  Lungs diminished breath sounds at the bases  Heart sounds S1 and S2 noted  Abdomen soft nontender  Awake alert obeys simple commands  Pulses noted  No pedal edema  No rash    Additional Data:     Labs:    Results from last 7 days   Lab Units 19  0547  19  0604   WBC Thousand/uL 7 90  --  9 49   HEMOGLOBIN g/dL 8 1*   < > 9 1*   HEMATOCRIT % 30 2*  --  34 5*   PLATELETS Thousands/uL 282  --  318   NEUTROS PCT %  --   --  59   LYMPHS PCT %  --   --  24   MONOS PCT %  --   --  10   EOS PCT %  --   --  6    < > = values in this interval not displayed  Results from last 7 days   Lab Units 06/25/19  0604   SODIUM mmol/L 142   POTASSIUM mmol/L 3 8   CHLORIDE mmol/L 112*   CO2 mmol/L 22   BUN mg/dL 10   CREATININE mg/dL 1 02   ANION GAP mmol/L 8   CALCIUM mg/dL 9 1   GLUCOSE RANDOM mg/dL 86             Results from last 7 days   Lab Units 06/20/19  0547   HEMOGLOBIN A1C % 5 9               * I Have Reviewed All Lab Data Listed Above  * Additional Pertinent Lab Tests Reviewed: All Labs Within Last 24 Hours Reviewed    Imaging:    Imaging Reports Reviewed Today Include:   Imaging Personally Reviewed by Myself Includes:     Recent Cultures (last 7 days):           Last 24 Hours Medication List:     Current Facility-Administered Medications:  acetaminophen 650 mg Oral Q6H PRN Ileana Patton MD    aspirin 81 mg Oral Daily Barbara Garcia PA-C    atorvastatin 80 mg Oral Daily With Ragland MD Pamela    enoxaparin 40 mg Subcutaneous Q24H Edith Crowder MD    hydrALAZINE 10 mg Intravenous Q4H PRN Ileana Patton MD    iron sucrose 300 mg Intravenous Once per day on Mon Wed Fri Marina Reid MD Last Rate: 300 mg (06/26/19 0838)   melatonin 6 mg Oral HS Ileana Patton MD    metoprolol tartrate 50 mg Oral Q12H [address] Ileana Patton MD    ondansetron 4 mg Intravenous Q4H PRN Ileana Patton MD    oxyCODONE 2 5 mg Oral Q4H PRN Ileana Patton MD    pantoprazole 40 mg Oral Early Morning Ileana Patton MD         Today, Patient Was Seen By: Pamela Carvajal MD    ** Please Note: Dictation voice to text software may have been used in the creation of this document   **

## 2019-06-27 LAB
ABO GROUP BLD: NORMAL
ANION GAP SERPL CALCULATED.3IONS-SCNC: 7 MMOL/L (ref 4–13)
BLD GP AB SCN SERPL QL: NEGATIVE
BUN SERPL-MCNC: 19 MG/DL (ref 5–25)
CALCIUM SERPL-MCNC: 9 MG/DL (ref 8.3–10.1)
CHLORIDE SERPL-SCNC: 112 MMOL/L (ref 100–108)
CO2 SERPL-SCNC: 25 MMOL/L (ref 21–32)
CREAT SERPL-MCNC: 0.96 MG/DL (ref 0.6–1.3)
ERYTHROCYTE [DISTWIDTH] IN BLOOD BY AUTOMATED COUNT: 27.1 % (ref 11.6–15.1)
GFR SERPL CREATININE-BSD FRML MDRD: 56 ML/MIN/1.73SQ M
GLUCOSE SERPL-MCNC: 79 MG/DL (ref 65–140)
HCT VFR BLD AUTO: 28.8 % (ref 34.8–46.1)
HGB BLD-MCNC: 7.7 G/DL (ref 11.5–15.4)
MCH RBC QN AUTO: 18.2 PG (ref 26.8–34.3)
MCHC RBC AUTO-ENTMCNC: 26.7 G/DL (ref 31.4–37.4)
MCV RBC AUTO: 68 FL (ref 82–98)
PLATELET # BLD AUTO: 255 THOUSANDS/UL (ref 149–390)
POTASSIUM SERPL-SCNC: 3.7 MMOL/L (ref 3.5–5.3)
RBC # BLD AUTO: 4.24 MILLION/UL (ref 3.81–5.12)
RH BLD: POSITIVE
SODIUM SERPL-SCNC: 144 MMOL/L (ref 136–145)
SPECIMEN EXPIRATION DATE: NORMAL
WBC # BLD AUTO: 7.98 THOUSAND/UL (ref 4.31–10.16)

## 2019-06-27 PROCEDURE — 86923 COMPATIBILITY TEST ELECTRIC: CPT

## 2019-06-27 PROCEDURE — 85027 COMPLETE CBC AUTOMATED: CPT | Performed by: PHYSICIAN ASSISTANT

## 2019-06-27 PROCEDURE — NC001 PR NO CHARGE: Performed by: SURGERY

## 2019-06-27 PROCEDURE — 86900 BLOOD TYPING SEROLOGIC ABO: CPT | Performed by: PHYSICIAN ASSISTANT

## 2019-06-27 PROCEDURE — 86850 RBC ANTIBODY SCREEN: CPT | Performed by: PHYSICIAN ASSISTANT

## 2019-06-27 PROCEDURE — 86901 BLOOD TYPING SEROLOGIC RH(D): CPT | Performed by: PHYSICIAN ASSISTANT

## 2019-06-27 PROCEDURE — 99232 SBSQ HOSP IP/OBS MODERATE 35: CPT | Performed by: INTERNAL MEDICINE

## 2019-06-27 PROCEDURE — 80048 BASIC METABOLIC PNL TOTAL CA: CPT | Performed by: PHYSICIAN ASSISTANT

## 2019-06-27 RX ORDER — SODIUM CHLORIDE 9 MG/ML
84 INJECTION, SOLUTION INTRAVENOUS CONTINUOUS
Status: DISCONTINUED | OUTPATIENT
Start: 2019-06-28 | End: 2019-06-30

## 2019-06-27 RX ORDER — CHLORHEXIDINE GLUCONATE 0.12 MG/ML
15 RINSE ORAL EVERY 12 HOURS SCHEDULED
Status: DISCONTINUED | OUTPATIENT
Start: 2019-06-27 | End: 2019-06-28 | Stop reason: HOSPADM

## 2019-06-27 RX ADMIN — ASPIRIN 81 MG: 81 TABLET, COATED ORAL at 09:00

## 2019-06-27 RX ADMIN — METOPROLOL TARTRATE 50 MG: 50 TABLET, FILM COATED ORAL at 21:34

## 2019-06-27 RX ADMIN — SODIUM CHLORIDE 84 ML/HR: 0.9 INJECTION, SOLUTION INTRAVENOUS at 23:57

## 2019-06-27 RX ADMIN — CHLORHEXIDINE GLUCONATE 0.12% ORAL RINSE 15 ML: 1.2 LIQUID ORAL at 17:52

## 2019-06-27 RX ADMIN — ENOXAPARIN SODIUM 40 MG: 40 INJECTION SUBCUTANEOUS at 09:00

## 2019-06-27 RX ADMIN — METOPROLOL TARTRATE 50 MG: 50 TABLET, FILM COATED ORAL at 09:00

## 2019-06-27 RX ADMIN — MELATONIN 6 MG: at 21:34

## 2019-06-27 RX ADMIN — ATORVASTATIN CALCIUM 80 MG: 80 TABLET, FILM COATED ORAL at 17:52

## 2019-06-27 RX ADMIN — PANTOPRAZOLE SODIUM 40 MG: 40 TABLET, DELAYED RELEASE ORAL at 06:14

## 2019-06-27 NOTE — QUICK NOTE
Vascular re-consulted GI due to decline in hemoglobin (9 1 -> 8 1 -> 7 7)  Patient states she is having brown stool  No evidence of overt GI bleeding  It is still OK to start Plavix post-operatively from GI standpoint  She should monitor for overt GI bleeding  Recommend checking hemoglobin every week x 1 month, then follow-up in GI office  If hemoglobin declines or there is evidence of overt GI bleeding, we would order capsule endoscopy to assess for small bowel AVMs

## 2019-06-27 NOTE — PROGRESS NOTES
Progress Note Mouna Rdz 1940, 78 y o  female MRN: [id number]    Unit/Bed#: [facility] 721-01 Encounter: [id number]    Primary Care Provider: Dayle Meigs, DO   Date and time admitted to hospital: 6/19/2019 11:50 AM        * Stroke-like symptom  Assessment & Plan  Presenting with right-sided weakness and right facial droop, CT scan of the head positive for left parietal lobe subacute to chronic stroke  The case was discussed by ED with Neurology, out of window for tPA, last well known even before obtaining scan  Admitted with stroke protocol with appropriate vital signs and neuro checks  MRI confirmed superficial cortical infarct in the left parietal region and small deep watershed infarct in the left frontal region representing acute infarct in the distribution left MCA; no acute hemorrhage seen; no mass effect or midline shift seen  Echocardiogram showed EF of 65%  For now will hold aspirin for possible GI bleed, plavix as well  Colonoscopy significant for polypectomy with clipping to prevent excess bleeding   EGD showed multiple AVM's in the stomach and duodenum induced coagulation with argon plasma   Abnormal CT of the neck with 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, requested evaluation by vascular surgery for reviewing images especially concern of the left side    Plan for CEA per vascular surgery  PT and OT evaluation  Fall precautions    Iron deficiency anemia  Assessment & Plan  IV Venofer  Outpatient Hematology follow-up    Abnormal CT scan  Assessment & Plan  Abnormal upper portion of the right lung, patient is currently a smoker outpatient Pulmonary follow-up    Abnormal pelvis, 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, Urology recommending outpatient cystoscopic evaluation     Anemia, unspecified  Assessment & Plan  Iron-deficiency anemia  Iron supplementation  Hematology input noted    Nicotine dependence  Assessment & Plan  Smoking cessation discussed        VTE Pharmacologic Prophylaxis: Pharmacologic: Enoxaparin (Lovenox)  Mechanical VTE Prophylaxis in Place: Yes    Patient Centered Rounds: I have performed bedside rounds with nursing staff today  Discussions with Specialists or Other Care Team Provider:     Education and Discussions with Family / Patient:  Discussed with the patient, daughter at bedside updated    Time Spent for Care: 30 minutes  More than 50% of total time spent on counseling and coordination of care as described above  Current Length of Stay: 8 day(s)    Current Patient Status: Inpatient   Certification Statement: The patient will continue to require additional inpatient hospital stay due to As mentioned    Discharge Plan:  Awaiting surgery per vascular    Code Status: Level 1 - Full Code      Subjective:     Sitting up in chair  Reports feeling better  Denies chest pain shortness of palpitations  Agreeable to ambulation as able    Objective:     Vitals:   Temp (24hrs), Av 8 °F (36 6 °C), Min:97 °F (36 1 °C), Max:98 6 °F (37 °C)    Temp:  [97 °F (36 1 °C)-98 6 °F (37 °C)] 98 6 °F (37 °C)  HR:  [56-60] 60  Resp:  [16-19] 16  BP: (138-175)/(63-80) 146/64  SpO2:  [98 %-99 %] 99 %  Body mass index is 28 99 kg/m²  Input and Output Summary (last 24 hours):        Intake/Output Summary (Last 24 hours) at 2019 1555  Last data filed at 2019 1300  Gross per 24 hour   Intake 537 ml   Output 2 ml   Net 535 ml       Physical Exam:     Physical Exam    Comfortably sitting up in chair  Neck supple  Lungs diminished breath sounds bilateral bases , no additional sounds sounds  Heart sounds S1-S2 noted  Abdomen soft  Awake alert obeys simple verbal commands  Pulses noted  No pedal edema  No rash      Additional Data:     Labs:    Results from last 7 days   Lab Units 19  0529  19  0604   WBC Thousand/uL 7 98   < > 9 49   HEMOGLOBIN g/dL 7 7*   < > 9 1*   HEMATOCRIT % 28 8*   < > 34 5*   PLATELETS Thousands/uL 255   < > 318   NEUTROS PCT %  --   --  59   LYMPHS PCT %  --   --  24   MONOS PCT %  --   --  10   EOS PCT %  --   --  6    < > = values in this interval not displayed  Results from last 7 days   Lab Units 06/27/19  0529   SODIUM mmol/L 144   POTASSIUM mmol/L 3 7   CHLORIDE mmol/L 112*   CO2 mmol/L 25   BUN mg/dL 19   CREATININE mg/dL 0 96   ANION GAP mmol/L 7   CALCIUM mg/dL 9 0   GLUCOSE RANDOM mg/dL 79                           * I Have Reviewed All Lab Data Listed Above  * Additional Pertinent Lab Tests Reviewed: All Labs Within Last 24 Hours Reviewed    Imaging:    Imaging Reports Reviewed Today Include:   Imaging Personally Reviewed by Myself Includes:     Recent Cultures (last 7 days):           Last 24 Hours Medication List:     Current Facility-Administered Medications:  acetaminophen 650 mg Oral Q6H PRN Mendez Hernandez MD    aspirin 81 mg Oral Daily Efrain Primrose, PA-C    atorvastatin 80 mg Oral Daily With Elvira Gann MD    chlorhexidine 15 mL Swish & Spit Q12H [address] Efrain Primrose, PA-C    enoxaparin 40 mg Subcutaneous Q24H Edison Guaman MD    hydrALAZINE 10 mg Intravenous Q4H PRN Mendez Hernandez MD    iron sucrose 300 mg Intravenous Once per day on Mon Wed Fri Jeovany Moreno MD Last Rate: 300 mg (06/26/19 0838)   melatonin 6 mg Oral HS Mendez Hernandez MD    metoprolol tartrate 50 mg Oral Q12H [address] Mendez Hernandez MD    ondansetron 4 mg Intravenous Q4H PRN Mendez Hernandez MD    oxyCODONE 2 5 mg Oral Q4H PRN Mendez Hernandez MD    pantoprazole 40 mg Oral Early Morning Mendez Hernandez MD    [START ON 6/28/2019] sodium chloride 84 mL/hr Intravenous Continuous Efrain Primrose, PA-C         Today, Patient Was Seen By: Ramirez Brito MD    ** Please Note: Dictation voice to text software may have been used in the creation of this document   **

## 2019-06-27 NOTE — PLAN OF CARE
Problem: PAIN - ADULT  Goal: Verbalizes/displays adequate comfort level or baseline comfort level  Description  Interventions:  - Encourage patient to monitor pain and request assistance  - Assess pain using appropriate pain scale  - Administer analgesics based on type and severity of pain and evaluate response  - Implement non-pharmacological measures as appropriate and evaluate response  - Consider cultural and social influences on pain and pain management  - Notify physician/advanced practitioner if interventions unsuccessful or patient reports new pain  Outcome: Progressing     Problem: DISCHARGE PLANNING  Goal: Discharge to home or other facility with appropriate resources  Description  INTERVENTIONS:  - Identify barriers to discharge w/patient and caregiver  - Arrange for needed discharge resources and transportation as appropriate  - Identify discharge learning needs (meds, wound care, etc )  - Arrange for interpretive services to assist at discharge as needed  - Refer to Case Management Department for coordinating discharge planning if the patient needs post-hospital services based on physician/advanced practitioner order or complex needs related to functional status, cognitive ability, or social support system  Outcome: Progressing     Problem: Knowledge Deficit  Goal: Patient/family/caregiver demonstrates understanding of disease process, treatment plan, medications, and discharge instructions  Description  Complete learning assessment and assess knowledge base  Interventions:  - Provide teaching at level of understanding  - Provide teaching via preferred learning methods  Outcome: Progressing     Problem: Neurological Deficit  Goal: Neurological status is stable or improving  Description  Interventions:  - Monitor and assess patient's level of consciousness, motor function, sensory function, and level of assistance needed for ADLs  - Monitor and report changes from baseline   Collaborate with interdisciplinary team to initiate plan and implement interventions as ordered  - Provide and maintain a safe environment  - Utilize seizure precautions  - Utilize fall precautions  - Utilize aspiration precautions  - Utilize bleeding precautions  Outcome: Progressing     Problem: Nutrition/Hydration-ADULT  Goal: Nutrient/Hydration intake appropriate for improving, restoring or maintaining nutritional needs  Description  Monitor and assess patient's nutrition/hydration status for malnutrition (ex- brittle hair, bruises, dry skin, pale skin and conjunctiva, muscle wasting, smooth red tongue, and disorientation)  Collaborate with interdisciplinary team and initiate plan and interventions as ordered  Monitor patient's weight and dietary intake as ordered or per policy  Utilize nutrition screening tool and intervene per policy  Determine patient's food preferences and provide high-protein, high-caloric foods as appropriate  INTERVENTIONS:  - Monitor oral intake, urinary output, labs, and treatment plans  - Assess nutrition and hydration status and recommend course of action  - Evaluate amount of meals eaten  - Assist patient with eating if necessary   - Allow adequate time for meals  - Recommend/ encourage appropriate diets, oral nutritional supplements, and vitamin/mineral supplements  - Order, calculate, and assess calorie counts as needed  - Recommend, monitor, and adjust tube feedings and TPN/PPN based on assessed needs  - Assess need for intravenous fluids  - Provide specific nutrition/hydration education as appropriate  - Include patient/family/caregiver in decisions related to nutrition  Outcome: Progressing     Problem: Potential for Falls  Goal: Patient will remain free of falls  Description  INTERVENTIONS:  - Assess patient frequently for physical needs  -  Identify cognitive and physical deficits and behaviors that affect risk of falls  -  [place] fall precautions as indicated by assessment    - Educate patient/family on patient safety including physical limitations  - Instruct patient to call for assistance with activity based on assessment  - Modify environment to reduce risk of injury  - Consider OT/PT consult to assist with strengthening/mobility  Outcome: Progressing     Problem: Prexisting or High Potential for Compromised Skin Integrity  Goal: Skin integrity is maintained or improved  Description  INTERVENTIONS:  - Identify patients at risk for skin breakdown  - Assess and monitor skin integrity  - Assess and monitor nutrition and hydration status  - Monitor labs (i e  albumin)  - Assess for incontinence   - Turn and reposition patient  - Assist with mobility/ambulation  - Relieve pressure over bony prominences  - Avoid friction and shearing  - Provide appropriate hygiene as needed including keeping skin clean and dry  - Evaluate need for skin moisturizer/barrier cream  - Collaborate with interdisciplinary team (i e  Nutrition, Rehabilitation, etc )   - Patient/family teaching  Outcome: Progressing

## 2019-06-27 NOTE — PROGRESS NOTES
06/27/19 Adeola Sinclair Involvement Patient active with [employer]   Spiritual Beliefs/Perceptions   Concept of God Accepting   Support Systems Children   Stress Factors   Patient Stress Factors Health changes   Coping Responses   Patient Coping Open/discussion   Plan of Care   Comments Cultivated a relationship of care and support with patient  Explored support system and provided prayer     Assessment Completed by: Unit visit

## 2019-06-27 NOTE — DISCHARGE INSTRUCTIONS
DISCHARGE INSTRUCTIONS                       CAROTID ENDARTERECTOMY  Following discharge from the hospital, you may have some questions about your operation, your activities or your general condition  These instructions may answer some of your questions and help you adjust during the first few weeks following your operation  ACTIVITY: Resume your normal activities and exercise schedule as tolerated  If you were driving prior to surgery, you may resume driving one week following discharge from the hospital  You may ride in a car upon discharge  DIET: Resume your normal diet  Try to eat low fat and low cholesterol foods  RECURRENT SYMPTOMS: If you develop any new numbness, weakness, blindness or difficulty with speech after discharge call 911 or go to an emergency department immediately  INCISION: Your surgeon may have chosen to use a type of adhesive glue to close your incision  The glue is used to cover the incision, assist in closure, and prevent contamination  This adhesive will darken and peel away on its own within one to two weeks  You may shower the day after your surgery, but do not scrub the incision  If you do not have this adhesive glue, you may include the operated area in a shower on the third day following surgery  It is normal to have swelling or discoloration around the incision  If increasing redness or pain develops, call our office immediately  Numbness in the region of the incision may occur following the surgery  This normally resolves in six to twelve months  FOLLOW UP STUDIES: Doppler ultrasound studies are very important to your post-operative care  Your surgeon will arrange them at your first postoperative visit  The first study is due 3 months after surgery      Appt w/ Dr Emely Guy Doctor: 7/17/19 at 9:30am, [facility] [employer]  [address] [place] [address] [address] [place] Siu, [address] [place] [address]    [phone] Willa Fears [address] [place] [address] , [address], [place], [address]  [address], [address], [place], [address]  [address], JORGE LUIS [address]  [address] [facility], [facility], [address]  [address]  [address], [address]  [facility] 19, [address] [facility], [address] [facility] [address]   [address] [facility], [address], [place], [address]  One [facility], [address] [facility], [facility], Bautista Molina 6

## 2019-06-27 NOTE — PROGRESS NOTES
Progress Note - Vascular Surgery   Virgel Aschoff 78 y o  female MRN: [id number]  [facility] Encounter: [id number]    Assessment:  78 F with left MCA infarct, 70 - 84% LICA stenosis, and R ICA occlusion    Plan:  Plan for left carotid endarterectomy Friday  NPO after midnight tonight  ASA/Statin  Plan to start plavix post-operatively    Subjective/Objective     Subjective: No acute events  Feels well  Objective:    Blood pressure 139/64, pulse 56, temperature (!) 97 °F (36 1 °C), resp  rate 19, height 5' 2" (1 575 m), weight 71 9 kg (158 lb 8 2 oz), SpO2 99 %  ,Body mass index is 28 99 kg/m²        Intake/Output Summary (Last 24 hours) at 6/27/2019 0810  Last data filed at 6/26/2019 2059  Gross per 24 hour   Intake 540 ml   Output 3 ml   Net 537 ml       Invasive Devices     Peripheral Intravenous Line            Peripheral IV 06/24/19 Right Hand 2 days                Physical Exam:   General: NAD, AAOx3  Eyes: PERRL  Right eye ecchymosis  ENT: moist mucous membranes  Neck: supple  CV: RRR +S1/S2  Chest: breath sounds bilaterally  Abdomen: soft, NT ND  Extremities: atraumatic, no edema      Results from last 7 days   Lab Units 06/27/19  0529 06/26/19  0547 06/25/19  0936 06/25/19  0604   WBC Thousand/uL 7 98 7 90  --  9 49   HEMOGLOBIN g/dL 7 7* 8 1* 9 1* 9 1*   HEMATOCRIT % 28 8* 30 2*  --  34 5*   PLATELETS Thousands/uL 255 282  --  318     Results from last 7 days   Lab Units 06/27/19  0529 06/25/19  0604 06/24/19  0610   POTASSIUM mmol/L 3 7 3 8 3 2*   CHLORIDE mmol/L 112* 112* 108   CO2 mmol/L 25 22 26   BUN mg/dL 19 10 9   CREATININE mg/dL 0 96 1 02 0 81   CALCIUM mg/dL 9 0 9 1 8 9

## 2019-06-28 ENCOUNTER — ANESTHESIA EVENT (INPATIENT)
Dept: PERIOP | Facility: HOSPITAL | Age: 79
DRG: 037 | End: 2019-06-28
Payer: COMMERCIAL

## 2019-06-28 ENCOUNTER — ANESTHESIA (INPATIENT)
Dept: PERIOP | Facility: HOSPITAL | Age: 79
DRG: 037 | End: 2019-06-28
Payer: COMMERCIAL

## 2019-06-28 ENCOUNTER — APPOINTMENT (INPATIENT)
Dept: NON INVASIVE DIAGNOSTICS | Facility: HOSPITAL | Age: 79
DRG: 037 | End: 2019-06-28
Payer: COMMERCIAL

## 2019-06-28 DIAGNOSIS — K55.20 AVM (ARTERIOVENOUS MALFORMATION) OF SMALL BOWEL, ACQUIRED: ICD-10-CM

## 2019-06-28 DIAGNOSIS — K31.819 AVM (ARTERIOVENOUS MALFORMATION) OF STOMACH, ACQUIRED: Primary | ICD-10-CM

## 2019-06-28 LAB
ANION GAP SERPL CALCULATED.3IONS-SCNC: 7 MMOL/L (ref 4–13)
BUN SERPL-MCNC: 16 MG/DL (ref 5–25)
CALCIUM SERPL-MCNC: 8.7 MG/DL (ref 8.3–10.1)
CHLORIDE SERPL-SCNC: 112 MMOL/L (ref 100–108)
CO2 SERPL-SCNC: 27 MMOL/L (ref 21–32)
CREAT SERPL-MCNC: 0.92 MG/DL (ref 0.6–1.3)
ERYTHROCYTE [DISTWIDTH] IN BLOOD BY AUTOMATED COUNT: 27.5 % (ref 11.6–15.1)
GFR SERPL CREATININE-BSD FRML MDRD: 59 ML/MIN/1.73SQ M
GLUCOSE SERPL-MCNC: 155 MG/DL (ref 65–140)
GLUCOSE SERPL-MCNC: 80 MG/DL (ref 65–140)
HCT VFR BLD AUTO: 29.2 % (ref 34.8–46.1)
HGB BLD-MCNC: 7.7 G/DL (ref 11.5–15.4)
KCT BLD-ACNC: 224 SEC (ref 89–137)
KCT BLD-ACNC: 224 SEC (ref 89–137)
KCT BLD-ACNC: 248 SEC (ref 89–137)
MCH RBC QN AUTO: 18 PG (ref 26.8–34.3)
MCHC RBC AUTO-ENTMCNC: 26.4 G/DL (ref 31.4–37.4)
MCV RBC AUTO: 68 FL (ref 82–98)
PLATELET # BLD AUTO: 245 THOUSANDS/UL (ref 149–390)
POTASSIUM SERPL-SCNC: 3.7 MMOL/L (ref 3.5–5.3)
RBC # BLD AUTO: 4.27 MILLION/UL (ref 3.81–5.12)
SODIUM SERPL-SCNC: 146 MMOL/L (ref 136–145)
SPECIMEN SOURCE: ABNORMAL
WBC # BLD AUTO: 9.77 THOUSAND/UL (ref 4.31–10.16)

## 2019-06-28 PROCEDURE — 85347 COAGULATION TIME ACTIVATED: CPT

## 2019-06-28 PROCEDURE — 85027 COMPLETE CBC AUTOMATED: CPT | Performed by: PHYSICIAN ASSISTANT

## 2019-06-28 PROCEDURE — 03CJ0ZZ EXTIRPATION OF MATTER FROM LEFT COMMON CAROTID ARTERY, OPEN APPROACH: ICD-10-PCS | Performed by: INTERNAL MEDICINE

## 2019-06-28 PROCEDURE — 93882 EXTRACRANIAL UNI/LTD STUDY: CPT

## 2019-06-28 PROCEDURE — 97530 THERAPEUTIC ACTIVITIES: CPT

## 2019-06-28 PROCEDURE — 03CN0ZZ EXTIRPATION OF MATTER FROM LEFT EXTERNAL CAROTID ARTERY, OPEN APPROACH: ICD-10-PCS | Performed by: INTERNAL MEDICINE

## 2019-06-28 PROCEDURE — NC001 PR NO CHARGE: Performed by: SURGERY

## 2019-06-28 PROCEDURE — 82948 REAGENT STRIP/BLOOD GLUCOSE: CPT

## 2019-06-28 PROCEDURE — 35301 RECHANNELING OF ARTERY: CPT | Performed by: SURGERY

## 2019-06-28 PROCEDURE — C1781 MESH (IMPLANTABLE): HCPCS | Performed by: SURGERY

## 2019-06-28 PROCEDURE — 97110 THERAPEUTIC EXERCISES: CPT

## 2019-06-28 PROCEDURE — 99232 SBSQ HOSP IP/OBS MODERATE 35: CPT | Performed by: INTERNAL MEDICINE

## 2019-06-28 PROCEDURE — 03UL0KZ SUPPLEMENT LEFT INTERNAL CAROTID ARTERY WITH NONAUTOLOGOUS TISSUE SUBSTITUTE, OPEN APPROACH: ICD-10-PCS | Performed by: INTERNAL MEDICINE

## 2019-06-28 PROCEDURE — 03CL0ZZ EXTIRPATION OF MATTER FROM LEFT INTERNAL CAROTID ARTERY, OPEN APPROACH: ICD-10-PCS | Performed by: INTERNAL MEDICINE

## 2019-06-28 PROCEDURE — 80048 BASIC METABOLIC PNL TOTAL CA: CPT | Performed by: PHYSICIAN ASSISTANT

## 2019-06-28 DEVICE — XENOSURE BIOLOGIC PATCH, 0.8CM X 8CM, EIFU
Type: IMPLANTABLE DEVICE | Site: CAROTID | Status: FUNCTIONAL
Brand: XENOSURE BIOLOGIC PATCH

## 2019-06-28 RX ORDER — ALBUMIN, HUMAN INJ 5% 5 %
SOLUTION INTRAVENOUS CONTINUOUS PRN
Status: DISCONTINUED | OUTPATIENT
Start: 2019-06-28 | End: 2019-06-28

## 2019-06-28 RX ORDER — ALBUMIN, HUMAN INJ 5% 5 %
12.5 SOLUTION INTRAVENOUS ONCE
Status: COMPLETED | OUTPATIENT
Start: 2019-06-28 | End: 2019-06-28

## 2019-06-28 RX ORDER — SODIUM CHLORIDE, SODIUM LACTATE, POTASSIUM CHLORIDE, CALCIUM CHLORIDE 600; 310; 30; 20 MG/100ML; MG/100ML; MG/100ML; MG/100ML
INJECTION, SOLUTION INTRAVENOUS CONTINUOUS PRN
Status: DISCONTINUED | OUTPATIENT
Start: 2019-06-28 | End: 2019-06-28 | Stop reason: SURG

## 2019-06-28 RX ORDER — HEPARIN SODIUM 1000 [USP'U]/ML
INJECTION, SOLUTION INTRAVENOUS; SUBCUTANEOUS AS NEEDED
Status: DISCONTINUED | OUTPATIENT
Start: 2019-06-28 | End: 2019-06-28 | Stop reason: SURG

## 2019-06-28 RX ORDER — HYDRALAZINE HYDROCHLORIDE 20 MG/ML
15 INJECTION INTRAMUSCULAR; INTRAVENOUS
Status: DISCONTINUED | OUTPATIENT
Start: 2019-06-28 | End: 2019-06-30

## 2019-06-28 RX ORDER — PROTAMINE SULFATE 10 MG/ML
INJECTION, SOLUTION INTRAVENOUS AS NEEDED
Status: DISCONTINUED | OUTPATIENT
Start: 2019-06-28 | End: 2019-06-28 | Stop reason: SURG

## 2019-06-28 RX ORDER — SODIUM CHLORIDE, SODIUM LACTATE, POTASSIUM CHLORIDE, CALCIUM CHLORIDE 600; 310; 30; 20 MG/100ML; MG/100ML; MG/100ML; MG/100ML
75 INJECTION, SOLUTION INTRAVENOUS CONTINUOUS
Status: DISCONTINUED | OUTPATIENT
Start: 2019-06-28 | End: 2019-06-30

## 2019-06-28 RX ORDER — ONDANSETRON 2 MG/ML
4 INJECTION INTRAMUSCULAR; INTRAVENOUS ONCE AS NEEDED
Status: COMPLETED | OUTPATIENT
Start: 2019-06-28 | End: 2019-06-28

## 2019-06-28 RX ORDER — LIDOCAINE HYDROCHLORIDE 10 MG/ML
INJECTION, SOLUTION INFILTRATION; PERINEURAL AS NEEDED
Status: DISCONTINUED | OUTPATIENT
Start: 2019-06-28 | End: 2019-06-28 | Stop reason: SURG

## 2019-06-28 RX ORDER — ROCURONIUM BROMIDE 10 MG/ML
INJECTION, SOLUTION INTRAVENOUS AS NEEDED
Status: DISCONTINUED | OUTPATIENT
Start: 2019-06-28 | End: 2019-06-28 | Stop reason: SURG

## 2019-06-28 RX ORDER — OXYCODONE HYDROCHLORIDE 5 MG/1
5 TABLET ORAL EVERY 4 HOURS PRN
Status: DISCONTINUED | OUTPATIENT
Start: 2019-06-28 | End: 2019-07-03 | Stop reason: HOSPADM

## 2019-06-28 RX ORDER — SODIUM CHLORIDE 9 MG/ML
INJECTION, SOLUTION INTRAVENOUS CONTINUOUS PRN
Status: DISCONTINUED | OUTPATIENT
Start: 2019-06-28 | End: 2019-06-28

## 2019-06-28 RX ORDER — BUPIVACAINE HYDROCHLORIDE 5 MG/ML
INJECTION, SOLUTION PERINEURAL AS NEEDED
Status: DISCONTINUED | OUTPATIENT
Start: 2019-06-28 | End: 2019-06-28 | Stop reason: HOSPADM

## 2019-06-28 RX ORDER — LABETALOL 20 MG/4 ML (5 MG/ML) INTRAVENOUS SYRINGE
5
Status: COMPLETED | OUTPATIENT
Start: 2019-06-28 | End: 2019-06-29

## 2019-06-28 RX ORDER — FENTANYL CITRATE/PF 50 MCG/ML
25 SYRINGE (ML) INJECTION
Status: DISCONTINUED | OUTPATIENT
Start: 2019-06-28 | End: 2019-06-28 | Stop reason: HOSPADM

## 2019-06-28 RX ORDER — PROPOFOL 10 MG/ML
INJECTION, EMULSION INTRAVENOUS AS NEEDED
Status: DISCONTINUED | OUTPATIENT
Start: 2019-06-28 | End: 2019-06-28 | Stop reason: SURG

## 2019-06-28 RX ORDER — SODIUM CHLORIDE 9 MG/ML
100 INJECTION, SOLUTION INTRAVENOUS CONTINUOUS
Status: DISCONTINUED | OUTPATIENT
Start: 2019-06-28 | End: 2019-06-30

## 2019-06-28 RX ORDER — GLYCOPYRROLATE 0.2 MG/ML
INJECTION INTRAMUSCULAR; INTRAVENOUS AS NEEDED
Status: DISCONTINUED | OUTPATIENT
Start: 2019-06-28 | End: 2019-06-28 | Stop reason: SURG

## 2019-06-28 RX ORDER — ONDANSETRON 2 MG/ML
INJECTION INTRAMUSCULAR; INTRAVENOUS AS NEEDED
Status: DISCONTINUED | OUTPATIENT
Start: 2019-06-28 | End: 2019-06-28 | Stop reason: SURG

## 2019-06-28 RX ORDER — EPHEDRINE SULFATE 50 MG/ML
INJECTION INTRAVENOUS AS NEEDED
Status: DISCONTINUED | OUTPATIENT
Start: 2019-06-28 | End: 2019-06-28 | Stop reason: SURG

## 2019-06-28 RX ORDER — ONDANSETRON 2 MG/ML
4 INJECTION INTRAMUSCULAR; INTRAVENOUS EVERY 6 HOURS PRN
Status: DISCONTINUED | OUTPATIENT
Start: 2019-06-28 | End: 2019-07-03 | Stop reason: HOSPADM

## 2019-06-28 RX ORDER — DEXAMETHASONE SODIUM PHOSPHATE 10 MG/ML
INJECTION, SOLUTION INTRAMUSCULAR; INTRAVENOUS AS NEEDED
Status: DISCONTINUED | OUTPATIENT
Start: 2019-06-28 | End: 2019-06-28 | Stop reason: SURG

## 2019-06-28 RX ORDER — DIPHENHYDRAMINE HYDROCHLORIDE 50 MG/ML
12.5 INJECTION INTRAMUSCULAR; INTRAVENOUS ONCE AS NEEDED
Status: DISCONTINUED | OUTPATIENT
Start: 2019-06-28 | End: 2019-06-28 | Stop reason: HOSPADM

## 2019-06-28 RX ORDER — CEFAZOLIN SODIUM 1 G/50ML
SOLUTION INTRAVENOUS AS NEEDED
Status: DISCONTINUED | OUTPATIENT
Start: 2019-06-28 | End: 2019-06-28 | Stop reason: SURG

## 2019-06-28 RX ORDER — CEFAZOLIN SODIUM 1 G/50ML
1000 SOLUTION INTRAVENOUS ONCE
Status: DISCONTINUED | OUTPATIENT
Start: 2019-06-28 | End: 2019-06-28 | Stop reason: HOSPADM

## 2019-06-28 RX ORDER — NEOSTIGMINE METHYLSULFATE 1 MG/ML
INJECTION INTRAVENOUS AS NEEDED
Status: DISCONTINUED | OUTPATIENT
Start: 2019-06-28 | End: 2019-06-28 | Stop reason: SURG

## 2019-06-28 RX ORDER — FENTANYL CITRATE 50 UG/ML
INJECTION, SOLUTION INTRAMUSCULAR; INTRAVENOUS AS NEEDED
Status: DISCONTINUED | OUTPATIENT
Start: 2019-06-28 | End: 2019-06-28 | Stop reason: SURG

## 2019-06-28 RX ORDER — METOPROLOL TARTRATE 50 MG/1
50 TABLET, FILM COATED ORAL EVERY 12 HOURS SCHEDULED
Status: DISCONTINUED | OUTPATIENT
Start: 2019-06-28 | End: 2019-06-30

## 2019-06-28 RX ORDER — HYDROMORPHONE HCL/PF 1 MG/ML
0.5 SYRINGE (ML) INJECTION
Status: DISCONTINUED | OUTPATIENT
Start: 2019-06-28 | End: 2019-06-28 | Stop reason: HOSPADM

## 2019-06-28 RX ORDER — CLOPIDOGREL BISULFATE 75 MG/1
75 TABLET ORAL DAILY
Status: DISCONTINUED | OUTPATIENT
Start: 2019-06-29 | End: 2019-07-03 | Stop reason: HOSPADM

## 2019-06-28 RX ADMIN — HEPARIN SODIUM 1000 UNITS: 1000 INJECTION INTRAVENOUS; SUBCUTANEOUS at 16:37

## 2019-06-28 RX ADMIN — ENOXAPARIN SODIUM 40 MG: 40 INJECTION SUBCUTANEOUS at 09:33

## 2019-06-28 RX ADMIN — ALBUMIN (HUMAN): 12.5 SOLUTION INTRAVENOUS at 14:42

## 2019-06-28 RX ADMIN — PROPOFOL 30 MG: 10 INJECTION, EMULSION INTRAVENOUS at 16:01

## 2019-06-28 RX ADMIN — SODIUM CHLORIDE 84 ML/HR: 0.9 INJECTION, SOLUTION INTRAVENOUS at 13:18

## 2019-06-28 RX ADMIN — IRON SUCROSE 300 MG: 20 INJECTION, SOLUTION INTRAVENOUS at 09:33

## 2019-06-28 RX ADMIN — LIDOCAINE HYDROCHLORIDE 50 MG: 10 INJECTION, SOLUTION INFILTRATION; PERINEURAL at 14:21

## 2019-06-28 RX ADMIN — EPHEDRINE SULFATE 10 MG: 50 INJECTION, SOLUTION INTRAVENOUS at 14:33

## 2019-06-28 RX ADMIN — EPHEDRINE SULFATE 5 MG: 50 INJECTION, SOLUTION INTRAVENOUS at 17:33

## 2019-06-28 RX ADMIN — CEFAZOLIN SODIUM 1000 MG: 1 SOLUTION INTRAVENOUS at 14:45

## 2019-06-28 RX ADMIN — PHENYLEPHRINE HYDROCHLORIDE 200 MCG: 10 INJECTION INTRAVENOUS at 15:21

## 2019-06-28 RX ADMIN — EPHEDRINE SULFATE 5 MG: 50 INJECTION, SOLUTION INTRAVENOUS at 14:38

## 2019-06-28 RX ADMIN — PHENYLEPHRINE HYDROCHLORIDE 20 MCG/MIN: 10 INJECTION INTRAVENOUS at 15:31

## 2019-06-28 RX ADMIN — ASPIRIN 81 MG: 81 TABLET, COATED ORAL at 09:33

## 2019-06-28 RX ADMIN — ONDANSETRON 4 MG: 2 INJECTION INTRAMUSCULAR; INTRAVENOUS at 17:11

## 2019-06-28 RX ADMIN — FENTANYL CITRATE 50 MCG: 50 INJECTION, SOLUTION INTRAMUSCULAR; INTRAVENOUS at 14:29

## 2019-06-28 RX ADMIN — ALBUMIN (HUMAN) 12.5 G: 12.5 SOLUTION INTRAVENOUS at 18:27

## 2019-06-28 RX ADMIN — PROPOFOL 100 MG: 10 INJECTION, EMULSION INTRAVENOUS at 14:21

## 2019-06-28 RX ADMIN — GLYCOPYRROLATE 0.2 MG: 0.2 INJECTION, SOLUTION INTRAMUSCULAR; INTRAVENOUS at 14:40

## 2019-06-28 RX ADMIN — ROCURONIUM BROMIDE 10 MG: 10 INJECTION, SOLUTION INTRAVENOUS at 16:17

## 2019-06-28 RX ADMIN — CHLORHEXIDINE GLUCONATE 0.12% ORAL RINSE 15 ML: 1.2 LIQUID ORAL at 09:33

## 2019-06-28 RX ADMIN — HEPARIN SODIUM 7000 UNITS: 1000 INJECTION INTRAVENOUS; SUBCUTANEOUS at 15:46

## 2019-06-28 RX ADMIN — SODIUM CHLORIDE: 0.9 INJECTION, SOLUTION INTRAVENOUS at 14:13

## 2019-06-28 RX ADMIN — FENTANYL CITRATE 50 MCG: 50 INJECTION, SOLUTION INTRAMUSCULAR; INTRAVENOUS at 14:21

## 2019-06-28 RX ADMIN — PROTAMINE SULFATE 40 MG: 10 INJECTION, SOLUTION INTRAVENOUS at 17:11

## 2019-06-28 RX ADMIN — GLYCOPYRROLATE 0.4 MG: 0.2 INJECTION, SOLUTION INTRAMUSCULAR; INTRAVENOUS at 17:47

## 2019-06-28 RX ADMIN — ONDANSETRON 4 MG: 2 INJECTION INTRAMUSCULAR; INTRAVENOUS at 18:36

## 2019-06-28 RX ADMIN — ROCURONIUM BROMIDE 50 MG: 10 INJECTION, SOLUTION INTRAVENOUS at 14:21

## 2019-06-28 RX ADMIN — SODIUM CHLORIDE, SODIUM LACTATE, POTASSIUM CHLORIDE, AND CALCIUM CHLORIDE: .6; .31; .03; .02 INJECTION, SOLUTION INTRAVENOUS at 14:27

## 2019-06-28 RX ADMIN — NEOSTIGMINE METHYLSULFATE 3 MG: 1 INJECTION, SOLUTION INTRAVENOUS at 17:47

## 2019-06-28 RX ADMIN — DEXAMETHASONE SODIUM PHOSPHATE 5 MG: 10 INJECTION, SOLUTION INTRAMUSCULAR; INTRAVENOUS at 14:31

## 2019-06-28 RX ADMIN — FENTANYL CITRATE 25 MCG: 50 INJECTION INTRAMUSCULAR; INTRAVENOUS at 18:45

## 2019-06-28 RX ADMIN — SODIUM CHLORIDE, SODIUM LACTATE, POTASSIUM CHLORIDE, AND CALCIUM CHLORIDE 75 ML/HR: .6; .31; .03; .02 INJECTION, SOLUTION INTRAVENOUS at 19:35

## 2019-06-28 RX ADMIN — HEPARIN SODIUM 1000 UNITS: 1000 INJECTION INTRAVENOUS; SUBCUTANEOUS at 16:30

## 2019-06-28 RX ADMIN — EPHEDRINE SULFATE 10 MG: 50 INJECTION, SOLUTION INTRAVENOUS at 17:40

## 2019-06-28 RX ADMIN — PANTOPRAZOLE SODIUM 40 MG: 40 TABLET, DELAYED RELEASE ORAL at 05:49

## 2019-06-28 RX ADMIN — ALBUMIN (HUMAN): 12.5 SOLUTION INTRAVENOUS at 15:01

## 2019-06-28 RX ADMIN — METOPROLOL TARTRATE 50 MG: 50 TABLET, FILM COATED ORAL at 09:33

## 2019-06-28 RX ADMIN — PHENYLEPHRINE HYDROCHLORIDE 25 MCG/MIN: 10 INJECTION INTRAVENOUS at 18:45

## 2019-06-28 NOTE — PLAN OF CARE
Problem: PAIN - ADULT  Goal: Verbalizes/displays adequate comfort level or baseline comfort level  Description  Interventions:  - Encourage patient to monitor pain and request assistance  - Assess pain using appropriate pain scale  - Administer analgesics based on type and severity of pain and evaluate response  - Implement non-pharmacological measures as appropriate and evaluate response  - Consider cultural and social influences on pain and pain management  - Notify physician/advanced practitioner if interventions unsuccessful or patient reports new pain  Outcome: Progressing     Problem: DISCHARGE PLANNING  Goal: Discharge to home or other facility with appropriate resources  Description  INTERVENTIONS:  - Identify barriers to discharge w/patient and caregiver  - Arrange for needed discharge resources and transportation as appropriate  - Identify discharge learning needs (meds, wound care, etc )  - Arrange for interpretive services to assist at discharge as needed  - Refer to Case Management Department for coordinating discharge planning if the patient needs post-hospital services based on physician/advanced practitioner order or complex needs related to functional status, cognitive ability, or social support system  Outcome: Progressing     Problem: Knowledge Deficit  Goal: Patient/family/caregiver demonstrates understanding of disease process, treatment plan, medications, and discharge instructions  Description  Complete learning assessment and assess knowledge base  Interventions:  - Provide teaching at level of understanding  - Provide teaching via preferred learning methods  Outcome: Progressing     Problem: Neurological Deficit  Goal: Neurological status is stable or improving  Description  Interventions:  - Monitor and assess patient's level of consciousness, motor function, sensory function, and level of assistance needed for ADLs  - Monitor and report changes from baseline   Collaborate with interdisciplinary team to initiate plan and implement interventions as ordered  - Provide and maintain a safe environment  - Utilize seizure precautions  - Utilize fall precautions  - Utilize aspiration precautions  - Utilize bleeding precautions  Outcome: Progressing     Problem: Nutrition/Hydration-ADULT  Goal: Nutrient/Hydration intake appropriate for improving, restoring or maintaining nutritional needs  Description  Monitor and assess patient's nutrition/hydration status for malnutrition (ex- brittle hair, bruises, dry skin, pale skin and conjunctiva, muscle wasting, smooth red tongue, and disorientation)  Collaborate with interdisciplinary team and initiate plan and interventions as ordered  Monitor patient's weight and dietary intake as ordered or per policy  Utilize nutrition screening tool and intervene per policy  Determine patient's food preferences and provide high-protein, high-caloric foods as appropriate  INTERVENTIONS:  - Monitor oral intake, urinary output, labs, and treatment plans  - Assess nutrition and hydration status and recommend course of action  - Evaluate amount of meals eaten  - Assist patient with eating if necessary   - Allow adequate time for meals  - Recommend/ encourage appropriate diets, oral nutritional supplements, and vitamin/mineral supplements  - Order, calculate, and assess calorie counts as needed  - Recommend, monitor, and adjust tube feedings and TPN/PPN based on assessed needs  - Assess need for intravenous fluids  - Provide specific nutrition/hydration education as appropriate  - Include patient/family/caregiver in decisions related to nutrition  Outcome: Progressing     Problem: Potential for Falls  Goal: Patient will remain free of falls  Description  INTERVENTIONS:  - Assess patient frequently for physical needs  -  Identify cognitive and physical deficits and behaviors that affect risk of falls  -  [place] fall precautions as indicated by assessment    - Educate patient/family on patient safety including physical limitations  - Instruct patient to call for assistance with activity based on assessment  - Modify environment to reduce risk of injury  - Consider OT/PT consult to assist with strengthening/mobility  Outcome: Progressing     Problem: Prexisting or High Potential for Compromised Skin Integrity  Goal: Skin integrity is maintained or improved  Description  INTERVENTIONS:  - Identify patients at risk for skin breakdown  - Assess and monitor skin integrity  - Assess and monitor nutrition and hydration status  - Monitor labs (i e  albumin)  - Assess for incontinence   - Turn and reposition patient  - Assist with mobility/ambulation  - Relieve pressure over bony prominences  - Avoid friction and shearing  - Provide appropriate hygiene as needed including keeping skin clean and dry  - Evaluate need for skin moisturizer/barrier cream  - Collaborate with interdisciplinary team (i e  Nutrition, Rehabilitation, etc )   - Patient/family teaching  Outcome: Progressing

## 2019-06-28 NOTE — PROGRESS NOTES
Progress Note - Vascular Surgery   Mana Lanza 78 y o  female MRN: [id number]  Unit/Bed#: [facility] 721-01 Encounter: [id number]    Assessment:  78 F with left MCA infarct, 70 - 89% LICA stenosis, and R ICA occlusion    Plan:  Left carotid endarterectomy today  NPO  ASA/Statin continue  Plavix to start post-operatively  Appreciate GI input  F/u AM Hgb    Subjective/Objective     Subjective:   No acute events  No deficits  No complaints  Objective:    Blood pressure 149/66, pulse 57, temperature 98 6 °F (37 °C), resp  rate 16, height 5' 2" (1 575 m), weight 72 3 kg (159 lb 6 3 oz), SpO2 99 %  ,Body mass index is 29 15 kg/m²  Intake/Output Summary (Last 24 hours) at 6/28/2019 0719  Last data filed at 6/28/2019 0136  Gross per 24 hour   Intake 1654 ml   Output 150 ml   Net 1504 ml       Invasive Devices     Peripheral Intravenous Line            Peripheral IV 06/24/19 Right Hand 3 days    Peripheral IV 06/27/19 Left Hand less than 1 day                Physical Exam:   Gen: NAD, AAOx3  Neuro: No deficits     CV: RRR  Pulm: no resp distress  Abd: Soft, non-distended, non-tender        Results from last 7 days   Lab Units 06/28/19  0548 06/27/19  0529 06/26/19  0547   WBC Thousand/uL 9 77 7 98 7 90   HEMOGLOBIN g/dL 7 7* 7 7* 8 1*   HEMATOCRIT % 29 2* 28 8* 30 2*   PLATELETS Thousands/uL 245 255 282     Results from last 7 days   Lab Units 06/28/19  0548 06/27/19  0529 06/25/19  0604   POTASSIUM mmol/L 3 7 3 7 3 8   CHLORIDE mmol/L 112* 112* 112*   CO2 mmol/L 27 25 22   BUN mg/dL 16 19 10   CREATININE mg/dL 0 92 0 96 1 02   CALCIUM mg/dL 8 7 9 0 9 1

## 2019-06-28 NOTE — OP NOTE
OPERATIVE REPORT  PATIENT NAME: Virgel Aschoff    :  1940  MRN: [id number]  Pt Location: [facility] OR ROOM 03    SURGERY DATE: 2019    Surgeon(s) and Role:     Jesse Solis MD - Primary     * Osmany Carson MD - Assisting    Preop Diagnosis:  L MCA CVA  Symptomatic stenosis of left carotid artery [I65 22]    Post-Op Diagnosis Codes:  same    Procedure(s) (LRB):  Left carotid endarterectomy with bovine patch angioplasty    Specimen(s):  None    Estimated Blood Loss:   100 mL    Drains:  Urethral Catheter Latex 16 Fr  (Active)   Number of days: 0       Anesthesia Type:   General ETA    Operative Indications:  Patient is a 79 yo F who presented with RUE weakness/decreased hand dextermity, nearly resolved now, found to have R ICA occlusion, L ICA stenosis, and L CVA  Presents for surgical management of left symptomatic ICA stenosis    Operative Findings:  High grade stenosis of the left internal carotid artery origin with mixed calcific and soft appearing plaque in the lumen    Complications:   None    Procedure and Technique:  After informed consent was obtained, the patient was brought to the operating room and placed in the supine position  Perioperative IV antibiotics were given  She was given anesthesia and endotracheally intubated  Ultrasound was used to examine the carotid artery  The patient was placed with the head turned laterally and slightly extended, with a shoulder roll  She was then prepped and draped in the usual sterile fashion exposing the neck  A timeout was performed  A marking pen was used to radha the incision overlying the carotid artery  A 15-blade was used to make the incision  Cautery was used to dissect through the soft tissue and platysma  The sternocleidomastoid muscle was identified, dissected free along its medial border, and retracted laterally  Next, the internal jugular vein was identified and freed along its medial border    There was a large number of lymph nodes superiorly  Several smaller facial veins were identified, ligated with 2-0 silk sutures, and transected  The internal jugular vein was then retracted laterally, exposing the carotid artery  The dissection was continued at the common carotid artery  It was freed circumferentially and encircled with a vessel loop  Next, the external carotid artery and superior thyroid artery were identified, freed circumferentially, and encircled with vessel loops  The external carotid artery was noted to be heavily calcified proximally and tortuous  Lastly, the internal carotid artery was dissected free and encircled distally with a vessel loop  8000 units of IV heparin were given  After this was given time to circulate, an ACT was performed  An additional 1000 units were given later in the procedure  The vessel loops were pulled taught, starting with the internal carotid artery  An 11-blade was used to make a longitudinal arteriotomy in the common carotid artery and this was extended distally along the internal carotid artery with Cedeño scissors  An 8-shunt was selected and a silk suture was tied around its midpoint  The shunt was then inserted, first into the internal carotid artery, and then into the common carotid artery  Excellent back-bleeding was noted from the internal carotid artery  Next, an endarterectomy spatula was used to create the endarterectomy plane  This was continued towards the common carotid artery  The plaque was sharply transected with Cedeño scissors  The plane was then continued distally in the internal carotid artery  Again the plaque was sharply transected with Cedeño scissors  Lastly, the endarterectomy was continued to the external carotid artery, which was everted, and the plaque was pulled free  The endarterectomy bed was flushed with heparinized saline and all loose debris were pulled free  All endpoints were checked and noted to be adherent without any flaps or debris    A Xenosure bovine pericardial patch was selected and was sewn in using two 6-0 prolene sutures, tacked down at either end and run to the center of the patch  Prior to completion, the shunt was removed, the arteries were bled and back-bled, and the endarterectomy bed was flushed copiously with heparinized saline  The patch was completed and the vessel loops were loosened, starting with the external carotid, followed by the common carotid artery  After a few seconds, the internal carotid artery was released and cerebral flow was restored  Next, intraoperative duplex ultrasound was performed, which showed widely patent common and internal carotid arteries with appropriate waveforms throughout the carotid system  The internal carotid velocities remained somewhat high at 446 systolic, however, the lumen was widely patent  Elevated velocities likely due to tortuosity  The wound was then copiously irrigated with antibiotic saline  It was then checked for hemostasis  40mg of protamine was given  Floseal was used to aid with hemostasis  The platysma was then closed with 3-0 vicryl running suture and the skin was closed with 4-0 monocryl running subcuticular layer  The incision was dressed with Histoacryl glue  The patient was allowed to awaken and was extubated  She was moving all four extremities and following commands  She was then transferred to the PACU for postoperative care       I was present for the entire procedure    Patient Disposition:  PACU     SIGNATURE: Bettie Solis MD  DATE: June 28, 2019  TIME: 6:01 PM    Vascular Quality Initiative - Carotid Endarterectomy     Urgency:  Urgent    Anesthesia: General Type: Conventional    Side: left    Patch Type: Bovine Pericardial     If Prosthetic, Patch : Kobi    Shunt: Yes- Routine    Skin Prep: Chlorhexidine    Drain: no  Heparin: yes    Protamine: yes      Dextran: no     Re-explore artery after closure: no    Total Procedure time: 150min    Monitoring:   EEG: no   Stump Pressure: no   Other: no    Completion Study:   Doppler: no   Duplex: yes   Arteriogram: no    Concomitant Procedure:   Proximal Endovascular: no   Distal Endovascular: no   CABG: no   Other Arterial Op: no

## 2019-06-28 NOTE — OCCUPATIONAL THERAPY NOTE
Occupational Therapy Cancel Note:    Occupational therapy attempted however pt off floor at this time  OT will attempt again next treatment       Sulaiman Carrington

## 2019-06-28 NOTE — ASSESSMENT & PLAN NOTE
Presenting with right-sided weakness and right facial droop, CT scan of the head positive for left parietal lobe subacute to chronic stroke  The case was discussed by ED with Neurology, out of window for tPA, last well known even before obtaining scan  Admitted with stroke protocol with appropriate vital signs and neuro checks  MRI confirmed superficial cortical infarct in the left parietal region and small deep watershed infarct in the left frontal region representing acute infarct in the distribution left MCA; no acute hemorrhage seen; no mass effect or midline shift seen  Echocardiogram showed EF of 65%  For now will hold aspirin for possible GI bleed, plavix as well  Colonoscopy significant for polypectomy with clipping to prevent excess bleeding   EGD showed multiple AVM's in the stomach and duodenum induced coagulation with argon plasma   Abnormal CT of the neck with 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, requested evaluation by vascular surgery for reviewing images especially concern of the left side    Plan for CEA per vascular surgery today  PT and OT evaluation  Fall precautions

## 2019-06-28 NOTE — PROGRESS NOTES
Progress Note Delos Goodell 1940, 78 y o  female MRN: [id number]    Unit/Bed#: [facility] 721-01 Encounter: [id number]    Primary Care Provider: Alla Champion DO   Date and time admitted to hospital: 6/19/2019 11:50 AM        * Stroke-like symptom  Assessment & Plan  Presenting with right-sided weakness and right facial droop, CT scan of the head positive for left parietal lobe subacute to chronic stroke  The case was discussed by ED with Neurology, out of window for tPA, last well known even before obtaining scan  Admitted with stroke protocol with appropriate vital signs and neuro checks  MRI confirmed superficial cortical infarct in the left parietal region and small deep watershed infarct in the left frontal region representing acute infarct in the distribution left MCA; no acute hemorrhage seen; no mass effect or midline shift seen  Echocardiogram showed EF of 65%  For now will hold aspirin for possible GI bleed, plavix as well  Colonoscopy significant for polypectomy with clipping to prevent excess bleeding   EGD showed multiple AVM's in the stomach and duodenum induced coagulation with argon plasma   Abnormal CT of the neck with 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, requested evaluation by vascular surgery for reviewing images especially concern of the left side    Plan for CEA per vascular surgery today  PT and OT evaluation  Fall precautions    Iron deficiency anemia  Assessment & Plan  IV Venofer  Outpatient Hematology follow-up    Abnormal CT scan  Assessment & Plan  Abnormal upper portion of the right lung, patient is currently a smoker outpatient Pulmonary follow-up    Abnormal pelvis, 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, Urology recommending outpatient cystoscopic evaluation     Anemia, unspecified  Assessment & Plan  Iron-deficiency anemia  Iron supplementation  Hematology input noted    Nicotine dependence  Assessment & Plan  Smoking cessation discussed    Essential hypertension  Assessment & Plan  Monitor blood pressures  Avoid hypotension        VTE Pharmacologic Prophylaxis:   Pharmacologic: Enoxaparin (Lovenox)  Mechanical VTE Prophylaxis in Place: Yes    Patient Centered Rounds: I have performed bedside rounds with nursing staff today  Discussions with Specialists or Other Care Team Provider:     Education and Discussions with Family / Patient:  Discussed with the patient    Time Spent for Care: 30 minutes  More than 50% of total time spent on counseling and coordination of care as described above  Current Length of Stay: 9 day(s)    Current Patient Status: Inpatient   Certification Statement: The patient will continue to require additional inpatient hospital stay due to As mentioned    Discharge Plan:  Awaiting surgery by vascular today    Code Status: Level 1 - Full Code      Subjective:     Comfortably sitting up in chair  Reports feeling okay  Denies chest pain shortness breath palpitations  Ambulating the room and hallway    Objective:     Vitals:   Temp (24hrs), Av 1 °F (36 7 °C), Min:97 5 °F (36 4 °C), Max:98 6 °F (37 °C)    Temp:  [97 5 °F (36 4 °C)-98 6 °F (37 °C)] 97 5 °F (36 4 °C)  HR:  [57-75] 75  Resp:  [16-18] 18  BP: (142-149)/(64-80) 142/80  SpO2:  [99 %] 99 %  Body mass index is 29 15 kg/m²  Input and Output Summary (last 24 hours):        Intake/Output Summary (Last 24 hours) at 2019 1203  Last data filed at 2019 0900  Gross per 24 hour   Intake 1417 ml   Output 350 ml   Net 1067 ml       Physical Exam:     Physical Exam    Comfortably sitting up in chair  Neck supple  Lungs diminished breath sounds at the bases no additional sounds  Heart sounds S1 and S2 noted  Abdomen soft  Awake alert obeys simple commands  Pulses noted  No pedal edema  No rash    Additional Data:     Labs:    Results from last 7 days   Lab Units 19  0548  19  0604   WBC Thousand/uL 9 77   < > 9 49   HEMOGLOBIN g/dL 7 7*   < > 9 1*   HEMATOCRIT % 29 2*   < > 34 5*   PLATELETS Thousands/uL 245   < > 318   NEUTROS PCT %  --   --  59   LYMPHS PCT %  --   --  24   MONOS PCT %  --   --  10   EOS PCT %  --   --  6    < > = values in this interval not displayed  Results from last 7 days   Lab Units 06/28/19  0548   SODIUM mmol/L 146*   POTASSIUM mmol/L 3 7   CHLORIDE mmol/L 112*   CO2 mmol/L 27   BUN mg/dL 16   CREATININE mg/dL 0 92   ANION GAP mmol/L 7   CALCIUM mg/dL 8 7   GLUCOSE RANDOM mg/dL 80         * I Have Reviewed All Lab Data Listed Above  * Additional Pertinent Lab Tests Reviewed: All Labs Within Last 24 Hours Reviewed    Imaging:    Imaging Reports Reviewed Today Include:   Imaging Personally Reviewed by Myself Includes:     Recent Cultures (last 7 days):           Last 24 Hours Medication List:     Current Facility-Administered Medications:  acetaminophen 650 mg Oral Q6H PRN Kelli Castle MD    aspirin 81 mg Oral Daily Kojo Stallings PA-C    atorvastatin 80 mg Oral Daily With Aster Smith MD    chlorhexidine 15 mL Swish & Spit Q12H [address] Kojo Stallings PA-C    enoxaparin 40 mg Subcutaneous Q24H Chavez Jackson MD    hydrALAZINE 10 mg Intravenous Q4H PRN Kelli Castle MD    iron sucrose 300 mg Intravenous Once per day on Mon Wed Fri Sai Mera MD Last Rate: 300 mg (06/28/19 0933)   melatonin 6 mg Oral HS Kelli Castle MD    metoprolol tartrate 50 mg Oral Q12H [address] Delano Rodriguez MD    ondansetron 4 mg Intravenous Q4H PRN Kelli Castle MD    oxyCODONE 2 5 mg Oral Q4H PRN Kelli Castle MD    pantoprazole 40 mg Oral Early Morning Kelli Castle MD    sodium chloride 84 mL/hr Intravenous Continuous Kojo Stallings PA-C Last Rate: 84 mL/hr (06/27/19 3227)        Today, Patient Was Seen By: Casey Briceno MD    ** Please Note: Dictation voice to text software may have been used in the creation of this document   **

## 2019-06-28 NOTE — PLAN OF CARE
Problem: PHYSICAL THERAPY ADULT  Goal: Performs mobility at highest level of function for planned discharge setting  See evaluation for individualized goals  Description  Treatment/Interventions: Functional transfer training, LE strengthening/ROM, Therapeutic exercise, Endurance training, Patient/family training, Equipment eval/education, Bed mobility, Gait training, Spoke to nursing  Equipment Recommended: Laverne Gonzales       See flowsheet documentation for full assessment, interventions and recommendations  Outcome: Progressing  Note:   Prognosis: Good  Problem List: Decreased strength, Decreased endurance, Impaired balance, Decreased mobility, Decreased coordination, Impaired judgement, Decreased safety awareness  Assessment: Pt is making steady progress with the use of a rolling walker  Pt requires cues for safety with transfers  Min assist required with ambulation trials due to walker management  Completed seated BLE exercises to conclude session  Chair alarm active post session  Pt would benefit from continued physical therapy to maximize functional mobility and safety  Barriers to Discharge: Decreased caregiver support     Recommendation: (Rehab)     PT - OK to Discharge: Yes    See flowsheet documentation for full assessment

## 2019-06-28 NOTE — PHYSICAL THERAPY NOTE
Physical Therapy Progress Note     06/28/19 1022   Pain Assessment   Pain Assessment No/denies pain   Pain Score No Pain   Restrictions/Precautions   Weight Bearing Precautions Per Order No   Other Precautions Chair Alarm; Fall Risk   General   Family/Caregiver Present No   Cognition   Overall Cognitive Status WFL   Arousal/Participation Alert;Arousable   Subjective   Subjective Pt denies pain and agrees to participate  Transfers   Sit to Stand 4  Minimal assistance   Additional items Assist x 1;Verbal cues   Stand to Sit 5  Supervision   Additional items Assist x 1;Verbal cues   Ambulation/Elevation   Gait pattern   (R trendelenburg, RLE externally rotated, slow)   Gait Assistance 4  Minimal assist   Additional items Assist x 1;Verbal cues   Assistive Device Rolling walker   Distance 80 feet x 2   Balance   Static Sitting Fair   Static Standing Fair -   Dynamic Standing Poor +   Ambulatory Poor +   Activity Tolerance   Activity Tolerance Patient tolerated treatment well   Nurse [address] Roman to see per MATTHEW Johnston   Exercises   Hip Flexion Sitting;20 reps;AROM; Bilateral   Knee AROM Long Arc Quad Sitting;20 reps;AROM; Bilateral   Ankle Pumps Sitting;20 reps;AROM; Bilateral   Assessment   Prognosis Good   Problem List Decreased strength;Decreased endurance; Impaired balance;Decreased mobility; Decreased coordination; Impaired judgement;Decreased safety awareness   Assessment Pt is making steady progress with the use of a rolling walker  Pt requires cues for safety with transfers  Min assist required with ambulation trials due to walker management  Completed seated BLE exercises to conclude session  Chair alarm active post session  Pt would benefit from continued physical therapy to maximize functional mobility and safety  Goals   Patient Goals To go home   STG Expiration Date 07/05/19   Short Term Goal #1 1   Pt will demonstrate ability to perform all aspects of bed mobility with Mod I in order to return to home safely  2  Pt will demonstrate ability to perform functional transfers with Mod I in order to return to PLOF in home safely  3  Pt will demonstrate ability to ambulate 200 ft with least restrictive AD with Mod I in order to increase independence  4  Pt will demonstrate improved sitting/standing balance by one grade order to decrease risk of falls in home  6  Pt will increase b/l LE strength by one grade in order to increase ease of functional mobility and transfers  Treatment Day 2   Plan   Treatment/Interventions LE strengthening/ROM; Functional transfer training; Therapeutic exercise; Endurance training;Patient/family training;Bed mobility;Gait training;Spoke to nursing   Progress Progressing toward goals   PT Frequency   (3-5x/week)   Recommendation   Recommendation   (Rehab)   Equipment Recommended Gavino Letters  (RW)     Kj Reeves PTA

## 2019-06-29 LAB
ANION GAP SERPL CALCULATED.3IONS-SCNC: 8 MMOL/L (ref 4–13)
APTT PPP: 34 SECONDS (ref 23–37)
BUN SERPL-MCNC: 13 MG/DL (ref 5–25)
CALCIUM SERPL-MCNC: 8.2 MG/DL (ref 8.3–10.1)
CHLORIDE SERPL-SCNC: 111 MMOL/L (ref 100–108)
CO2 SERPL-SCNC: 23 MMOL/L (ref 21–32)
CREAT SERPL-MCNC: 0.84 MG/DL (ref 0.6–1.3)
ERYTHROCYTE [DISTWIDTH] IN BLOOD BY AUTOMATED COUNT: 27.9 % (ref 11.6–15.1)
GFR SERPL CREATININE-BSD FRML MDRD: 66 ML/MIN/1.73SQ M
GLUCOSE SERPL-MCNC: 142 MG/DL (ref 65–140)
HCT VFR BLD AUTO: 23.5 % (ref 34.8–46.1)
HCT VFR BLD AUTO: 24 % (ref 34.8–46.1)
HCT VFR BLD AUTO: 28.7 % (ref 34.8–46.1)
HCT VFR BLD AUTO: 28.8 % (ref 34.8–46.1)
HGB BLD-MCNC: 6.4 G/DL (ref 11.5–15.4)
HGB BLD-MCNC: 6.4 G/DL (ref 11.5–15.4)
HGB BLD-MCNC: 7.9 G/DL (ref 11.5–15.4)
HGB BLD-MCNC: 8.3 G/DL (ref 11.5–15.4)
INR PPP: 1.24 (ref 0.84–1.19)
MCH RBC QN AUTO: 18.3 PG (ref 26.8–34.3)
MCHC RBC AUTO-ENTMCNC: 26.7 G/DL (ref 31.4–37.4)
MCV RBC AUTO: 69 FL (ref 82–98)
PLATELET # BLD AUTO: 220 THOUSANDS/UL (ref 149–390)
POTASSIUM SERPL-SCNC: 3.9 MMOL/L (ref 3.5–5.3)
PROTHROMBIN TIME: 15.2 SECONDS (ref 11.6–14.5)
RBC # BLD AUTO: 3.49 MILLION/UL (ref 3.81–5.12)
SODIUM SERPL-SCNC: 142 MMOL/L (ref 136–145)
WBC # BLD AUTO: 11.5 THOUSAND/UL (ref 4.31–10.16)

## 2019-06-29 PROCEDURE — 85730 THROMBOPLASTIN TIME PARTIAL: CPT | Performed by: SURGERY

## 2019-06-29 PROCEDURE — 85027 COMPLETE CBC AUTOMATED: CPT | Performed by: SURGERY

## 2019-06-29 PROCEDURE — P9016 RBC LEUKOCYTES REDUCED: HCPCS

## 2019-06-29 PROCEDURE — 85018 HEMOGLOBIN: CPT | Performed by: PHYSICIAN ASSISTANT

## 2019-06-29 PROCEDURE — 99232 SBSQ HOSP IP/OBS MODERATE 35: CPT | Performed by: INTERNAL MEDICINE

## 2019-06-29 PROCEDURE — 80048 BASIC METABOLIC PNL TOTAL CA: CPT | Performed by: SURGERY

## 2019-06-29 PROCEDURE — 85014 HEMATOCRIT: CPT | Performed by: PHYSICIAN ASSISTANT

## 2019-06-29 PROCEDURE — 85610 PROTHROMBIN TIME: CPT | Performed by: SURGERY

## 2019-06-29 PROCEDURE — 85014 HEMATOCRIT: CPT | Performed by: INTERNAL MEDICINE

## 2019-06-29 PROCEDURE — NC001 PR NO CHARGE: Performed by: SURGERY

## 2019-06-29 PROCEDURE — 85018 HEMOGLOBIN: CPT | Performed by: INTERNAL MEDICINE

## 2019-06-29 PROCEDURE — 99024 POSTOP FOLLOW-UP VISIT: CPT | Performed by: SURGERY

## 2019-06-29 RX ADMIN — ENOXAPARIN SODIUM 40 MG: 40 INJECTION SUBCUTANEOUS at 09:14

## 2019-06-29 RX ADMIN — METOPROLOL TARTRATE 50 MG: 50 TABLET, FILM COATED ORAL at 09:14

## 2019-06-29 RX ADMIN — MELATONIN 6 MG: at 21:02

## 2019-06-29 RX ADMIN — ATORVASTATIN CALCIUM 80 MG: 80 TABLET, FILM COATED ORAL at 16:31

## 2019-06-29 RX ADMIN — PANTOPRAZOLE SODIUM 40 MG: 40 TABLET, DELAYED RELEASE ORAL at 04:37

## 2019-06-29 RX ADMIN — SODIUM CHLORIDE, SODIUM LACTATE, POTASSIUM CHLORIDE, AND CALCIUM CHLORIDE 75 ML/HR: .6; .31; .03; .02 INJECTION, SOLUTION INTRAVENOUS at 22:22

## 2019-06-29 RX ADMIN — ASPIRIN 81 MG: 81 TABLET, COATED ORAL at 09:14

## 2019-06-29 RX ADMIN — METOPROLOL TARTRATE 50 MG: 50 TABLET, FILM COATED ORAL at 21:02

## 2019-06-29 RX ADMIN — CLOPIDOGREL BISULFATE 75 MG: 75 TABLET ORAL at 09:14

## 2019-06-29 RX ADMIN — ACETAMINOPHEN 650 MG: 325 TABLET ORAL at 04:37

## 2019-06-29 RX ADMIN — LABETALOL 20 MG/4 ML (5 MG/ML) INTRAVENOUS SYRINGE 5 MG: at 21:05

## 2019-06-29 RX ADMIN — LABETALOL 20 MG/4 ML (5 MG/ML) INTRAVENOUS SYRINGE 5 MG: at 16:33

## 2019-06-29 RX ADMIN — HYDRALAZINE HYDROCHLORIDE 15 MG: 20 INJECTION INTRAMUSCULAR; INTRAVENOUS at 22:21

## 2019-06-29 NOTE — PROGRESS NOTES
Progress Note Berenice Causey 1940, 78 y o  female MRN: [id number]    Unit/Bed#: [facility] 506-01 Encounter: [id number]    Primary Care Provider: Grisel Mayers,    Date and time admitted to hospital: 6/19/2019 11:50 AM        * Stroke-like symptom  Assessment & Plan  Presenting with right-sided weakness and right facial droop, CT scan of the head positive for left parietal lobe subacute to chronic stroke  The case was discussed by ED with Neurology, out of window for tPA, last well known even before obtaining scan  Admitted with stroke protocol with appropriate vital signs and neuro checks  MRI confirmed superficial cortical infarct in the left parietal region and small deep watershed infarct in the left frontal region representing acute infarct in the distribution left MCA; no acute hemorrhage seen; no mass effect or midline shift seen  Echocardiogram showed EF of 65%  For now will hold aspirin for possible GI bleed, plavix as well  Colonoscopy significant for polypectomy with clipping to prevent excess bleeding   EGD showed multiple AVM's in the stomach and duodenum induced coagulation with argon plasma   Abnormal CT of the neck with 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, requested evaluation by vascular surgery for reviewing images especially concern of the left side    Status post carotid endarterectomy  Postoperative care per vascular surgery    Symptomatic stenosis of left carotid artery  Assessment & Plan  As above    Anemia, unspecified  Assessment & Plan  Iron-deficiency anemia  Iron supplementation  Status post transfusion, 1 unit packed red cells, continue to monitor hemoglobin closely      Essential hypertension  Assessment & Plan  Monitor blood pressures  Avoid hypotension    Iron deficiency anemia  Assessment & Plan  Received IV Venofer  Transfusion of packed red cells early this morning  Continue to monitor hemoglobin    Abnormal CT scan  Assessment & Plan  Abnormal upper portion of the right lung, patient is currently a smoker outpatient Pulmonary follow-up    Abnormal pelvis, 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, Urology recommending outpatient cystoscopic evaluation     Nicotine dependence  Assessment & Plan  Smoking cessation discussed        VTE Pharmacologic Prophylaxis:   Pharmacologic: Heparin  Mechanical VTE Prophylaxis in Place: Yes    Patient Centered Rounds: I have performed bedside rounds with nursing staff today  Education and Discussions with Family / Patient:  Patient, plan of care    Time Spent for Care: 20 minutes  More than 50% of total time spent on counseling and coordination of care as described above  Current Length of Stay: 10 day(s)    Current Patient Status: Inpatient   Certification Statement: The patient will continue to require additional inpatient hospital stay due to Monitor hemoglobin, postoperative care    Discharge Plan:  SNF when stable    Code Status: Level 1 - Full Code      Subjective:   Denies any acute complaints  Completed her transfusion today without incident  Objective:     Vitals:   Temp (24hrs), Av 7 °F (36 5 °C), Min:97 3 °F (36 3 °C), Max:98 1 °F (36 7 °C)    Temp:  [97 3 °F (36 3 °C)-98 1 °F (36 7 °C)] 98 °F (36 7 °C)  HR:  [48-88] 67  Resp:  [15-22] 16  BP: (107-169)/(36-77) 169/77  SpO2:  [95 %-100 %] 100 %  Body mass index is 31 21 kg/m²  Input and Output Summary (last 24 hours): Intake/Output Summary (Last 24 hours) at 2019 1514  Last data filed at 2019 1436  Gross per 24 hour   Intake 5829 3 ml   Output 1825 ml   Net 4004 3 ml       Physical Exam:     Physical Exam   Constitutional: She is oriented to person, place, and time  She appears well-developed and well-nourished  HENT:   Head: Normocephalic and atraumatic  Eyes: Pupils are equal, round, and reactive to light  EOM are normal    Neck:   Incisions clean and dry   Cardiovascular: Normal rate and regular rhythm     Pulmonary/Chest: Effort normal    Abdominal: Soft    Musculoskeletal: Normal range of motion  She exhibits no edema  Neurological: She is alert and oriented to person, place, and time  Skin: Skin is warm and dry  Additional Data:     Labs:    Results from last 7 days   Lab Units 06/29/19  1003  06/29/19  0430  06/25/19  0604   WBC Thousand/uL  --   --  11 50*   < > 9 49   HEMOGLOBIN g/dL 7 9*   < > 6 4*   < > 9 1*   HEMATOCRIT % 28 7*   < > 24 0*   < > 34 5*   PLATELETS Thousands/uL  --   --  220   < > 318   NEUTROS PCT %  --   --   --   --  59   LYMPHS PCT %  --   --   --   --  24   MONOS PCT %  --   --   --   --  10   EOS PCT %  --   --   --   --  6    < > = values in this interval not displayed  Results from last 7 days   Lab Units 06/29/19  0430   SODIUM mmol/L 142   POTASSIUM mmol/L 3 9   CHLORIDE mmol/L 111*   CO2 mmol/L 23   BUN mg/dL 13   CREATININE mg/dL 0 84   ANION GAP mmol/L 8   CALCIUM mg/dL 8 2*   GLUCOSE RANDOM mg/dL 142*     Results from last 7 days   Lab Units 06/29/19  0430   INR  1 24*     Results from last 7 days   Lab Units 06/28/19  1842   POC GLUCOSE mg/dl 155*                   * I Have Reviewed All Lab Data Listed Above  * Additional Pertinent Lab Tests Reviewed:  All Labs Within Last 24 Hours Reviewed      Recent Cultures (last 7 days):           Last 24 Hours Medication List:     Current Facility-Administered Medications:  acetaminophen 650 mg Oral Q6H PRN Robbin Garza MD    aspirin 81 mg Oral Daily Robbin Garza MD    atorvastatin 80 mg Oral Daily With Patricio Munoz MD    clopidogrel 75 mg Oral Daily Robbin Garza MD    enoxaparin 40 mg Subcutaneous Q24H [facility] Robbin Garza MD    Labetalol HCl 5 mg Intravenous Q15 Min PRN Robbin Garza MD    And        hydrALAZINE 15 mg Intravenous Q15 Min PRN Robbin Garza MD    And        niCARdipine 1-15 mg/hr Intravenous Continuous PRN Robbin Garza MD    iron sucrose 300 mg Intravenous Once per day on Mon Wed Fri Robbin Garza MD Last Rate: 300 mg (06/28/19 0933)   lactated ringers 75 mL/hr Intravenous Continuous Osmany Carson MD Last Rate: 75 mL/hr (06/29/19 0955)   melatonin 6 mg Oral HS Osmany Crason MD    metoprolol tartrate 50 mg Oral Q12H Asha Landrum MD    ondansetron 4 mg Intravenous Q6H PRN Osmany Carson MD    oxyCODONE 2 5 mg Oral Q4H PRN Osmany Carson MD    oxyCODONE 5 mg Oral Q4H PRN Osmany Carson MD    pantoprazole 40 mg Oral Early Morning Osmany Carson MD    phenol 1 spray Mouth/Throat Q2H PRN Kar Ochoa MD    sodium chloride 500 mL Intravenous Once Osmany Carson MD Last Rate: Stopped (06/28/19 2346)   Followed by        phenylephine  mcg/min Intravenous Titrated Osmany Carson MD Last Rate: Stopped (06/28/19 1900)   sodium chloride 84 mL/hr Intravenous Continuous Osmany Carson MD Last Rate: 84 mL/hr (06/28/19 1318)   sodium chloride 100 mL/hr Intravenous Continuous Elliott Moncada CRNA Last Rate: Stopped (06/28/19 1934)        Today, Patient Was Seen By: Diego Murphy MD    ** Please Note: Dictation voice to text software may have been used in the creation of this document   **

## 2019-06-29 NOTE — PHYSICAL THERAPY NOTE
Physical Therapy Cancellation Note    Pt's Hgb 6 4 this morning, currently receiving blood transfusion  Will hold PT at this time and f/u as able/as appropriate      Angel Perez, PT

## 2019-06-29 NOTE — ASSESSMENT & PLAN NOTE
Presenting with right-sided weakness and right facial droop, CT scan of the head positive for left parietal lobe subacute to chronic stroke  The case was discussed by ED with Neurology, out of window for tPA, last well known even before obtaining scan  Admitted with stroke protocol with appropriate vital signs and neuro checks  MRI confirmed superficial cortical infarct in the left parietal region and small deep watershed infarct in the left frontal region representing acute infarct in the distribution left MCA; no acute hemorrhage seen; no mass effect or midline shift seen  Echocardiogram showed EF of 65%  For now will hold aspirin for possible GI bleed, plavix as well  Colonoscopy significant for polypectomy with clipping to prevent excess bleeding   EGD showed multiple AVM's in the stomach and duodenum induced coagulation with argon plasma   Abnormal CT of the neck with 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, requested evaluation by vascular surgery for reviewing images especially concern of the left side    Status post carotid endarterectomy  Postoperative care per vascular surgery

## 2019-06-29 NOTE — ASSESSMENT & PLAN NOTE
Iron-deficiency anemia  Iron supplementation  Status post transfusion, 1 unit packed red cells, continue to monitor hemoglobin closely

## 2019-06-29 NOTE — ASSESSMENT & PLAN NOTE
Received IV Venofer  Transfusion of packed red cells early this morning  Continue to monitor hemoglobin

## 2019-06-29 NOTE — PLAN OF CARE
Problem: PAIN - ADULT  Goal: Verbalizes/displays adequate comfort level or baseline comfort level  Description  Interventions:  - Encourage patient to monitor pain and request assistance  - Assess pain using appropriate pain scale  - Administer analgesics based on type and severity of pain and evaluate response  - Implement non-pharmacological measures as appropriate and evaluate response  - Consider cultural and social influences on pain and pain management  - Notify physician/advanced practitioner if interventions unsuccessful or patient reports new pain  Outcome: Progressing     Problem: DISCHARGE PLANNING  Goal: Discharge to home or other facility with appropriate resources  Description  INTERVENTIONS:  - Identify barriers to discharge w/patient and caregiver  - Arrange for needed discharge resources and transportation as appropriate  - Identify discharge learning needs (meds, wound care, etc )  - Arrange for interpretive services to assist at discharge as needed  - Refer to Case Management Department for coordinating discharge planning if the patient needs post-hospital services based on physician/advanced practitioner order or complex needs related to functional status, cognitive ability, or social support system  Outcome: Progressing     Problem: Knowledge Deficit  Goal: Patient/family/caregiver demonstrates understanding of disease process, treatment plan, medications, and discharge instructions  Description  Complete learning assessment and assess knowledge base  Interventions:  - Provide teaching at level of understanding  - Provide teaching via preferred learning methods  Outcome: Progressing     Problem: Neurological Deficit  Goal: Neurological status is stable or improving  Description  Interventions:  - Monitor and assess patient's level of consciousness, motor function, sensory function, and level of assistance needed for ADLs  - Monitor and report changes from baseline   Collaborate with interdisciplinary team to initiate plan and implement interventions as ordered  - Provide and maintain a safe environment  - Utilize seizure precautions  - Utilize fall precautions  - Utilize aspiration precautions  - Utilize bleeding precautions  Outcome: Progressing     Problem: Nutrition/Hydration-ADULT  Goal: Nutrient/Hydration intake appropriate for improving, restoring or maintaining nutritional needs  Description  Monitor and assess patient's nutrition/hydration status for malnutrition (ex- brittle hair, bruises, dry skin, pale skin and conjunctiva, muscle wasting, smooth red tongue, and disorientation)  Collaborate with interdisciplinary team and initiate plan and interventions as ordered  Monitor patient's weight and dietary intake as ordered or per policy  Utilize nutrition screening tool and intervene per policy  Determine patient's food preferences and provide high-protein, high-caloric foods as appropriate  INTERVENTIONS:  - Monitor oral intake, urinary output, labs, and treatment plans  - Assess nutrition and hydration status and recommend course of action  - Evaluate amount of meals eaten  - Assist patient with eating if necessary   - Allow adequate time for meals  - Recommend/ encourage appropriate diets, oral nutritional supplements, and vitamin/mineral supplements  - Order, calculate, and assess calorie counts as needed  - Recommend, monitor, and adjust tube feedings and TPN/PPN based on assessed needs  - Assess need for intravenous fluids  - Provide specific nutrition/hydration education as appropriate  - Include patient/family/caregiver in decisions related to nutrition  Outcome: Progressing     Problem: Potential for Falls  Goal: Patient will remain free of falls  Description  INTERVENTIONS:  - Assess patient frequently for physical needs  -  Identify cognitive and physical deficits and behaviors that affect risk of falls  -  [place] fall precautions as indicated by assessment    - Educate patient/family on patient safety including physical limitations  - Instruct patient to call for assistance with activity based on assessment  - Modify environment to reduce risk of injury  - Consider OT/PT consult to assist with strengthening/mobility  Outcome: Progressing     Problem: Prexisting or High Potential for Compromised Skin Integrity  Goal: Skin integrity is maintained or improved  Description  INTERVENTIONS:  - Identify patients at risk for skin breakdown  - Assess and monitor skin integrity  - Assess and monitor nutrition and hydration status  - Monitor labs (i e  albumin)  - Assess for incontinence   - Turn and reposition patient  - Assist with mobility/ambulation  - Relieve pressure over bony prominences  - Avoid friction and shearing  - Provide appropriate hygiene as needed including keeping skin clean and dry  - Evaluate need for skin moisturizer/barrier cream  - Collaborate with interdisciplinary team (i e  Nutrition, Rehabilitation, etc )   - Patient/family teaching  Outcome: Progressing

## 2019-06-30 LAB
ABO GROUP BLD BPU: NORMAL
ANION GAP SERPL CALCULATED.3IONS-SCNC: 7 MMOL/L (ref 4–13)
BASOPHILS # BLD AUTO: 0.07 THOUSANDS/ΜL (ref 0–0.1)
BASOPHILS NFR BLD AUTO: 1 % (ref 0–1)
BPU ID: NORMAL
BUN SERPL-MCNC: 10 MG/DL (ref 5–25)
CALCIUM SERPL-MCNC: 8.6 MG/DL (ref 8.3–10.1)
CHLORIDE SERPL-SCNC: 108 MMOL/L (ref 100–108)
CO2 SERPL-SCNC: 25 MMOL/L (ref 21–32)
CREAT SERPL-MCNC: 0.74 MG/DL (ref 0.6–1.3)
CROSSMATCH: NORMAL
EOSINOPHIL # BLD AUTO: 0.26 THOUSAND/ΜL (ref 0–0.61)
EOSINOPHIL NFR BLD AUTO: 2 % (ref 0–6)
ERYTHROCYTE [DISTWIDTH] IN BLOOD BY AUTOMATED COUNT: 30.4 % (ref 11.6–15.1)
GFR SERPL CREATININE-BSD FRML MDRD: 77 ML/MIN/1.73SQ M
GLUCOSE SERPL-MCNC: 114 MG/DL (ref 65–140)
HCT VFR BLD AUTO: 29.7 % (ref 34.8–46.1)
HGB BLD-MCNC: 8.4 G/DL (ref 11.5–15.4)
IMM GRANULOCYTES # BLD AUTO: 0.17 THOUSAND/UL (ref 0–0.2)
IMM GRANULOCYTES NFR BLD AUTO: 1 % (ref 0–2)
LYMPHOCYTES # BLD AUTO: 1.97 THOUSANDS/ΜL (ref 0.6–4.47)
LYMPHOCYTES NFR BLD AUTO: 15 % (ref 14–44)
MCH RBC QN AUTO: 20.4 PG (ref 26.8–34.3)
MCHC RBC AUTO-ENTMCNC: 28.3 G/DL (ref 31.4–37.4)
MCV RBC AUTO: 72 FL (ref 82–98)
MONOCYTES # BLD AUTO: 1.4 THOUSAND/ΜL (ref 0.17–1.22)
MONOCYTES NFR BLD AUTO: 11 % (ref 4–12)
NEUTROPHILS # BLD AUTO: 9.3 THOUSANDS/ΜL (ref 1.85–7.62)
NEUTS SEG NFR BLD AUTO: 70 % (ref 43–75)
NRBC BLD AUTO-RTO: 0 /100 WBCS
PLATELET # BLD AUTO: 232 THOUSANDS/UL (ref 149–390)
POTASSIUM SERPL-SCNC: 3.3 MMOL/L (ref 3.5–5.3)
RBC # BLD AUTO: 4.12 MILLION/UL (ref 3.81–5.12)
SODIUM SERPL-SCNC: 140 MMOL/L (ref 136–145)
UNIT DISPENSE STATUS: NORMAL
UNIT PRODUCT CODE: NORMAL
UNIT RH: NORMAL
WBC # BLD AUTO: 13.17 THOUSAND/UL (ref 4.31–10.16)

## 2019-06-30 PROCEDURE — 85025 COMPLETE CBC W/AUTO DIFF WBC: CPT | Performed by: INTERNAL MEDICINE

## 2019-06-30 PROCEDURE — G8979 MOBILITY GOAL STATUS: HCPCS

## 2019-06-30 PROCEDURE — 80048 BASIC METABOLIC PNL TOTAL CA: CPT | Performed by: INTERNAL MEDICINE

## 2019-06-30 PROCEDURE — 99024 POSTOP FOLLOW-UP VISIT: CPT | Performed by: SURGERY

## 2019-06-30 PROCEDURE — 99232 SBSQ HOSP IP/OBS MODERATE 35: CPT | Performed by: INTERNAL MEDICINE

## 2019-06-30 PROCEDURE — 97168 OT RE-EVAL EST PLAN CARE: CPT

## 2019-06-30 PROCEDURE — 97164 PT RE-EVAL EST PLAN CARE: CPT

## 2019-06-30 PROCEDURE — G8978 MOBILITY CURRENT STATUS: HCPCS

## 2019-06-30 RX ORDER — SENNOSIDES 8.6 MG
2 TABLET ORAL ONCE
Status: COMPLETED | OUTPATIENT
Start: 2019-06-30 | End: 2019-06-30

## 2019-06-30 RX ORDER — LABETALOL 20 MG/4 ML (5 MG/ML) INTRAVENOUS SYRINGE
10 EVERY 4 HOURS PRN
Status: DISCONTINUED | OUTPATIENT
Start: 2019-06-30 | End: 2019-07-03 | Stop reason: HOSPADM

## 2019-06-30 RX ORDER — CALCIUM CARBONATE 200(500)MG
500 TABLET,CHEWABLE ORAL DAILY PRN
Status: DISCONTINUED | OUTPATIENT
Start: 2019-06-30 | End: 2019-07-03 | Stop reason: HOSPADM

## 2019-06-30 RX ORDER — NIFEDIPINE 30 MG/1
30 TABLET, EXTENDED RELEASE ORAL DAILY
Status: DISCONTINUED | OUTPATIENT
Start: 2019-06-30 | End: 2019-07-03 | Stop reason: HOSPADM

## 2019-06-30 RX ORDER — POTASSIUM CHLORIDE 20 MEQ/1
20 TABLET, EXTENDED RELEASE ORAL 2 TIMES DAILY
Status: COMPLETED | OUTPATIENT
Start: 2019-06-30 | End: 2019-07-02

## 2019-06-30 RX ORDER — CARVEDILOL 6.25 MG/1
6.25 TABLET ORAL 2 TIMES DAILY WITH MEALS
Status: DISCONTINUED | OUTPATIENT
Start: 2019-06-30 | End: 2019-07-01

## 2019-06-30 RX ORDER — HYDRALAZINE HYDROCHLORIDE 20 MG/ML
10 INJECTION INTRAMUSCULAR; INTRAVENOUS EVERY 6 HOURS PRN
Status: DISCONTINUED | OUTPATIENT
Start: 2019-06-30 | End: 2019-07-03 | Stop reason: HOSPADM

## 2019-06-30 RX ADMIN — POTASSIUM CHLORIDE 20 MEQ: 1500 TABLET, EXTENDED RELEASE ORAL at 17:18

## 2019-06-30 RX ADMIN — HYDRALAZINE HYDROCHLORIDE 15 MG: 20 INJECTION INTRAMUSCULAR; INTRAVENOUS at 05:05

## 2019-06-30 RX ADMIN — Medication 1 SPRAY: at 10:03

## 2019-06-30 RX ADMIN — ATORVASTATIN CALCIUM 80 MG: 80 TABLET, FILM COATED ORAL at 15:36

## 2019-06-30 RX ADMIN — HYDRALAZINE HYDROCHLORIDE 10 MG: 20 INJECTION INTRAMUSCULAR; INTRAVENOUS at 14:42

## 2019-06-30 RX ADMIN — METOPROLOL TARTRATE 50 MG: 50 TABLET, FILM COATED ORAL at 08:12

## 2019-06-30 RX ADMIN — CARVEDILOL 6.25 MG: 6.25 TABLET, FILM COATED ORAL at 15:36

## 2019-06-30 RX ADMIN — ASPIRIN 81 MG: 81 TABLET, COATED ORAL at 08:12

## 2019-06-30 RX ADMIN — CLOPIDOGREL BISULFATE 75 MG: 75 TABLET ORAL at 08:12

## 2019-06-30 RX ADMIN — SENNOSIDES 17.2 MG: 8.6 TABLET, FILM COATED ORAL at 10:05

## 2019-06-30 RX ADMIN — MELATONIN 6 MG: at 21:01

## 2019-06-30 RX ADMIN — ONDANSETRON 4 MG: 2 INJECTION INTRAMUSCULAR; INTRAVENOUS at 09:58

## 2019-06-30 RX ADMIN — ENOXAPARIN SODIUM 40 MG: 40 INJECTION SUBCUTANEOUS at 08:12

## 2019-06-30 RX ADMIN — PANTOPRAZOLE SODIUM 40 MG: 40 TABLET, DELAYED RELEASE ORAL at 05:17

## 2019-06-30 RX ADMIN — ACETAMINOPHEN 650 MG: 325 TABLET ORAL at 06:44

## 2019-06-30 RX ADMIN — NIFEDIPINE 30 MG: 30 TABLET, FILM COATED, EXTENDED RELEASE ORAL at 15:36

## 2019-06-30 NOTE — PLAN OF CARE
Problem: PHYSICAL THERAPY ADULT  Goal: Performs mobility at highest level of function for planned discharge setting  See evaluation for individualized goals  Description  Treatment/Interventions: Functional transfer training, LE strengthening/ROM, Therapeutic exercise, Endurance training, Patient/family training, Equipment eval/education, Bed mobility, Gait training, Spoke to nursing  Equipment Recommended: Jun [place]       See flowsheet documentation for full assessment, interventions and recommendations  Note:   Prognosis: Good  Problem List: Decreased strength, Decreased endurance, Decreased mobility, Impaired balance, Decreased coordination, Impaired judgement, Decreased safety awareness, Obesity  Assessment: Patient seen today for a physical therapy re-evaluation  Patient is a 78year old female who presented to [facility] with extreme fatigue and R sided weakness on 6/19/19  PMH significant for HTN, HLD, hx smoking, hx hyponatremia  Pt resides alone in senior living high rise apartment on 5th floor with ramp and elevator entrance  Pt was I PTA with no AD for mobility  Strength testing found the following findings: RLE: 4-/5, LLE: 4/5 and pt  Standing dynamic balance is poor+  Patient transferred from sit to stand w/ mod A x1 today and ambulated 15'  w/ RW and min Ax1  While ambulating, pt demonstrated decreased ishan, decreased foot clearance and poor walker safety with vc for hand positioning  Overall, patient presents with strength deficits, balance impairments, decreased safety awareness and decreased insight into current status that contribute to limitations in functional mobility and transfers and put pt at increased risk for falls  Patient will benefit from skilled physical therapy to maximize functional potential  Recommend D/C to rehab at this time     Barriers to Discharge: Decreased caregiver support  Barriers to Discharge Comments: decrease in mobility from baseline  Recommendation: (rehab)     PT - OK to Discharge: Yes(to rehab when medically stable)    See flowsheet documentation for full assessment

## 2019-06-30 NOTE — PLAN OF CARE
Problem: PAIN - ADULT  Goal: Verbalizes/displays adequate comfort level or baseline comfort level  Description  Interventions:  - Encourage patient to monitor pain and request assistance  - Assess pain using appropriate pain scale  - Administer analgesics based on type and severity of pain and evaluate response  - Implement non-pharmacological measures as appropriate and evaluate response  - Consider cultural and social influences on pain and pain management  - Notify physician/advanced practitioner if interventions unsuccessful or patient reports new pain  Outcome: Progressing     Problem: DISCHARGE PLANNING  Goal: Discharge to home or other facility with appropriate resources  Description  INTERVENTIONS:  - Identify barriers to discharge w/patient and caregiver  - Arrange for needed discharge resources and transportation as appropriate  - Identify discharge learning needs (meds, wound care, etc )  - Arrange for interpretive services to assist at discharge as needed  - Refer to Case Management Department for coordinating discharge planning if the patient needs post-hospital services based on physician/advanced practitioner order or complex needs related to functional status, cognitive ability, or social support system  Outcome: Progressing     Problem: Knowledge Deficit  Goal: Patient/family/caregiver demonstrates understanding of disease process, treatment plan, medications, and discharge instructions  Description  Complete learning assessment and assess knowledge base  Interventions:  - Provide teaching at level of understanding  - Provide teaching via preferred learning methods  Outcome: Progressing     Problem: Neurological Deficit  Goal: Neurological status is stable or improving  Description  Interventions:  - Monitor and assess patient's level of consciousness, motor function, sensory function, and level of assistance needed for ADLs  - Monitor and report changes from baseline   Collaborate with interdisciplinary team to initiate plan and implement interventions as ordered  - Provide and maintain a safe environment  - Utilize seizure precautions  - Utilize fall precautions  - Utilize aspiration precautions  - Utilize bleeding precautions  Outcome: Progressing     Problem: Nutrition/Hydration-ADULT  Goal: Nutrient/Hydration intake appropriate for improving, restoring or maintaining nutritional needs  Description  Monitor and assess patient's nutrition/hydration status for malnutrition (ex- brittle hair, bruises, dry skin, pale skin and conjunctiva, muscle wasting, smooth red tongue, and disorientation)  Collaborate with interdisciplinary team and initiate plan and interventions as ordered  Monitor patient's weight and dietary intake as ordered or per policy  Utilize nutrition screening tool and intervene per policy  Determine patient's food preferences and provide high-protein, high-caloric foods as appropriate  INTERVENTIONS:  - Monitor oral intake, urinary output, labs, and treatment plans  - Assess nutrition and hydration status and recommend course of action  - Evaluate amount of meals eaten  - Assist patient with eating if necessary   - Allow adequate time for meals  - Recommend/ encourage appropriate diets, oral nutritional supplements, and vitamin/mineral supplements  - Order, calculate, and assess calorie counts as needed  - Recommend, monitor, and adjust tube feedings and TPN/PPN based on assessed needs  - Assess need for intravenous fluids  - Provide specific nutrition/hydration education as appropriate  - Include patient/family/caregiver in decisions related to nutrition  Outcome: Progressing     Problem: Potential for Falls  Goal: Patient will remain free of falls  Description  INTERVENTIONS:  - Assess patient frequently for physical needs  -  Identify cognitive and physical deficits and behaviors that affect risk of falls  -  [place] fall precautions as indicated by assessment    - Educate patient/family on patient safety including physical limitations  - Instruct patient to call for assistance with activity based on assessment  - Modify environment to reduce risk of injury  - Consider OT/PT consult to assist with strengthening/mobility  Outcome: Progressing     Problem: Prexisting or High Potential for Compromised Skin Integrity  Goal: Skin integrity is maintained or improved  Description  INTERVENTIONS:  - Identify patients at risk for skin breakdown  - Assess and monitor skin integrity  - Assess and monitor nutrition and hydration status  - Monitor labs (i e  albumin)  - Assess for incontinence   - Turn and reposition patient  - Assist with mobility/ambulation  - Relieve pressure over bony prominences  - Avoid friction and shearing  - Provide appropriate hygiene as needed including keeping skin clean and dry  - Evaluate need for skin moisturizer/barrier cream  - Collaborate with interdisciplinary team (i e  Nutrition, Rehabilitation, etc )   - Patient/family teaching  Outcome: Progressing

## 2019-06-30 NOTE — OCCUPATIONAL THERAPY NOTE
[address] RE- Evaluation     Patient Name: Gwendolyn Peña  EMWSH'X Date: 6/30/2019  Problem List  Patient Active Problem List   Diagnosis    Cataracts, bilateral    Essential hypertension    Hyperlipidemia    Nicotine dependence    Stroke-like symptom    Anemia, unspecified    Abnormal CT scan    Symptomatic stenosis of left carotid artery    Iron deficiency anemia     Past Medical History  Past Medical History:   Diagnosis Date    Hypertension      Past Surgical History  History reviewed  No pertinent surgical history  06/30/19 1440   Note Type   Note type Re-eval   Restrictions/Precautions   Weight Bearing Precautions Per Order No   Other Precautions Chair Alarm; Bed Alarm;Cognitive; Impulsive; Fall Risk;Telemetry   Pain Assessment   Pain Assessment No/denies pain   Pain Score No Pain   Home Living   Type of Home Apartment   Home Layout Able to live on main level with bedroom/bathroom; Ramped entrance;Elevator   Bathroom Shower/Tub Tub/shower unit   Bathroom Toilet Standard   Bathroom Equipment Grab bars in shower   Prior Function   Level of [place] Independent with ADLs and functional mobility   Lives With Alone   ADL Assistance Independent   IADLs Independent   Falls in the last 6 months 0   Vocational Retired   Psychosocial   Psychosocial (WDL) X   Patient Behaviors/Mood Cooperative   Psychosocial Additional Assessments Yes   Subjective   Subjective " yeah this arm is crazy"   ADL   Grooming Assistance 5  Supervision/Setup   UB Bathing Assistance 4  Minimal Assistance   UB Bathing Deficit Right arm;Left arm; Chest;Abdomen   LB Bathing Assistance 3  Moderate Assistance   UB Dressing Assistance 3  Moderate Assistance   UB Dressing Deficit Fasteners   LB Dressing Assistance 2  Maximal Assistance   LB Dressing Deficit Don/doff L sock; Don/doff R sock; Tie shoes   Toileting Assistance  4  Minimal Assistance   Transfers   Sit to Stand 4  Minimal assistance   Additional items Assist x 1;Increased time required;Verbal cues; Impulsive   Stand to Sit 4  Minimal assistance   Additional items Assist x 1; Increased time required;Verbal cues; Bed elevated   Stand pivot 4  Minimal assistance   Additional items Assist x 1; Increased time required;Verbal cues   Functional Mobility   Functional Mobility 4  Minimal assistance   Additional Comments functional transfer/mobility assessment/training: Pt mobilized short distances within room using RW with min A and 50% visual/verbal cues for safety with device and using safe body positions  Pt demosntrate very poor hand placement and udnerstanding of how to manage RW for SPt tranfers  Pt descends into chair before beinga t a safe distance to do so  Balance   Static Standing Fair -   Activity Tolerance   Activity Tolerance Patient limited by fatigue   Nurse Made Aware Yes   RUE Assessment   RUE Assessment X   Edema   RUE Edema None   LUE Assessment   LUE Assessment WNL   Hand Function   Gross Motor Coordination Impaired   Fine Motor Coordination Impaired-OT provided patient with several activities/exercises to perform to improve R hand motor planning/FMC/dexterity  (ace wrap to tie/untie knots, gauze to roll, and turn pages in book with R hand)   Sensation   Light Touch Partial deficits in the RUE   Sharp/Dull No apparent deficits   Stereognosis Severe deficits in the RUE   Additional Comments RUE: dysmetria, dyspraxia, poor kinesthesia/proprioceptive awareness   Proprioception   Proprioception Severe deficits in the RUE   Vision-Basic Assessment   Current Vision Wears glasses all the time   Vision - Complex Assessment   Ocular Range of Motion [facility]   Visual Fields   (intact)   Additional Comments clock drawing: numbers were illegible 2* to poor R hand dexterity/FMC  nubmers were not aligned in correct place    Pt then asked to perform a cancellation scanning sheet with G attention to the Glorine Danyel during mobility tasks pt neglects to attend to R hemibody Perception   Inattention/Neglect Cues to attend right visual field;Cues to attend to right side of body   Motor Planning   (RUE dysprasia)   Cognition   Overall Cognitive Status Impaired   Arousal/Participation Alert; Cooperative   Attention Difficulty attending to directions   Orientation Level Oriented X4   Memory Decreased recall of recent events;Decreased recall of precautions   Following Commands Follows one step commands with increased time or repetition   Comments Pt is impulsive, demonstrates poor insight into her deficits and consequences of her balance deficits and how this impats her overall safety  Assessment   Limitation Decreased ADL status; Decreased Safe judgement during ADL;Decreased cognition;Decreased endurance;Decreased fine motor control;Decreased self-care trans;Decreased high-level ADLs   Prognosis Good   Assessment Pt is a 78 y o  female seen for OT RE evaluation s/p admit to [facility] on 6/19/2019 w/ Stroke-like symptom- RUE weakness/decreased hand dexterity  Pt is POD #2 LCEA  Past Medical History significant for known R ICA occlusion, LICA stenosis, HTN  Pt with active OT orders and up with assistance orders  Performed at least two patient identifiers during session including name and wristband  Prior to admission, pt was independent living alone in an apartment, I with self care/AdLS, IADLs, + driving  Upon RE evaluation: Pt requires min A for functional transfers/mobility with RW, LB ADLs mod/max A, toileting min/mod A, UB ADLS min A, grooming min A 2* the following deficits impacting occupational performance: decreased ROM, decreased strength, decreased balance, decreased tolerance, impaired FMC, impaired sensation, impaired depth perception, impaired proprioception, impaired attention, impaired memory, impaired sequencing, impaired problem solving, impulsivity and decreased safety awareness   Pt to benefit from continued skilled OT tx while in the hospital to address deficits as defined above and maximize level of functional independence w ADL's and functional mobility  Occupational Performance areas to address include: eating, grooming, bathing/shower, toilet hygiene, dressing, functional mobility, community mobility and clothing management  OT reviewed established goals from IE and updated them as appropriate  Plan is still acute rehab to maximize I in ADLs/IADLs for safe transition back in the community  Goals   Short Term Goal #1 see below for updated OT goals   Plan   Treatment Interventions ADL retraining;Functional transfer training;Cognitive reorientation;Patient/family training; Endurance training;Neuromuscular reeducation; Activityengagement; Energy conservation;Continued evaluation; Fine motor coordination activities   Goal Expiration Date 07/10/19   OT Frequency 3-5x/wk   Recommendation   OT Discharge Recommendation Short Term Rehab   OT - OK to Discharge Yes   Barthel Index   Feeding 5   Bathing 0   Grooming Score 5   Dressing Score 5   Bladder Score 10   Bowels Score 10   Toilet Use Score 5   Transfers (Bed/Chair) Score 10   Mobility (Level Surface) Score 0   Stairs Score 0   Barthel Index Score 50       REVIEW OF OT GOALS:      1) Pt will improve activity tolerance to G for min 30 min txment sessions for increase engagement in functional tasks: MET     2) Pt will complete UB/LB dressing/self care (including fasteners) w/ mod I using adaptive device and DME as needed: NOT MET, ONGOING     3) Pt will complete bathing w/ Mod I w/ use of AE and DME as needed: NOT MET, ONGOING     4) Pt will complete toileting w/ mod I w/ G hygiene/thoroughness using DME as needed: NOT MET, ONGOING     5) Pt will improve functional transfers to Mod I on/off all surfaces using DME as needed w/ G balance/safety: NOT MET, ONGOING     6) Pt will improve functional mobility during ADL/IADL/leisure tasks to Mod I using DME as needed w/ G balance/safety, NOT MET ONGOING     7) Pt will participate in simulated IADL management task to increase independence to Mod I w/ G safety and endurance, NOT MET, ONGONIG     8) Pt will be attentive 100% of the time during ongoing cognitive assessment w/ G participation to assist w/ safe d/c planning/recommendation: NOT MET, ONGOING     9) Pt will demonstrate G carryover of pt/caregiver education and training as appropriate w/o cues w/ good tolerance to increase safety during functional tasks: NOT MET, ONGOING     10) Pt will maintain standing upright I'ly with use of DME as appropriate for at least 10 minutes while completing a dynamic functional activity with good safety, balance, and endurance  NOT MET, ONGOING     11) Pt will attend to a functional activity for at least 20 minutes with no more than 1 cue for attention/redirection  NOT MET, ONGOING      CONTINUE WITH THE ABOVE GOALS TO  WITHIN 10 DAYS FROM THIS RE          Kathleen Lam MS,OTR/L

## 2019-06-30 NOTE — PROGRESS NOTES
Progress Note Diana Yañez 1940, 78 y o  female MRN: [id number]    Unit/Bed#: [facility] 506-01 Encounter: [id number]    Primary Care Provider: Jose Flores DO   Date and time admitted to hospital: 6/19/2019 11:50 AM        * Stroke-like symptom  Assessment & Plan  Presenting with right-sided weakness and right facial droop, CT scan of the head positive for left parietal lobe subacute to chronic stroke  The case was discussed by ED with Neurology, out of window for tPA, last well known even before obtaining scan  Admitted with stroke protocol with appropriate vital signs and neuro checks  MRI confirmed superficial cortical infarct in the left parietal region and small deep watershed infarct in the left frontal region representing acute infarct in the distribution left MCA; no acute hemorrhage seen; no mass effect or midline shift seen  Echocardiogram showed EF of 65%  For now will hold aspirin for possible GI bleed, plavix as well  Colonoscopy significant for polypectomy with clipping to prevent excess bleeding   EGD showed multiple AVM's in the stomach and duodenum induced coagulation with argon plasma   Abnormal CT of the neck with 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, requested evaluation by vascular surgery for reviewing images especially concern of the left side    Status post carotid endarterectomy  Postoperative care per vascular surgery    Symptomatic stenosis of left carotid artery  Assessment & Plan  As above    Anemia, unspecified  Assessment & Plan  Iron-deficiency anemia, likely secondary to gastric AVMs which have been cauterized  Continue iron supplementation  Status post transfusion, 1 unit packed red cells, continue to monitor hemoglobin closely      Essential hypertension  Assessment & Plan  Monitor blood pressures  Avoid hypotension    Iron deficiency anemia  Assessment & Plan  Received IV Venofer  Transfusion of packed red cells early this morning  Continue to monitor hemoglobin    Abnormal CT scan  Assessment & Plan  Abnormal upper portion of the right lung, patient is currently a smoker outpatient Pulmonary follow-up    Abnormal pelvis, 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, Urology recommending outpatient cystoscopic evaluation         VTE Pharmacologic Prophylaxis:   Pharmacologic: Heparin  Mechanical VTE Prophylaxis in Place: Yes    Patient Centered Rounds: I have performed bedside rounds with nursing staff today  Education and Discussions with Family / Patient:  Patient, plan of care    Time Spent for Care: 15 minutes  More than 50% of total time spent on counseling and coordination of care as described above  Current Length of Stay: 11 day(s)    Current Patient Status: Inpatient   Certification Statement: The patient will continue to require additional inpatient hospital stay due to Discharge planning    Discharge Plan:  Possible rehab    Code Status: Level 1 - Full Code      Subjective:   Reports mild nausea this morning without vomiting or abdominal pain  Objective:     Vitals:   Temp (24hrs), Av 3 °F (36 8 °C), Min:97 6 °F (36 4 °C), Max:98 6 °F (37 °C)    Temp:  [97 6 °F (36 4 °C)-98 6 °F (37 °C)] 98 5 °F (36 9 °C)  HR:  [57-83] 62  Resp:  [18] 18  BP: (138-188)/(59-94) 168/72  SpO2:  [92 %-98 %] 95 %  Body mass index is 32 58 kg/m²  Input and Output Summary (last 24 hours): Intake/Output Summary (Last 24 hours) at 2019 1212  Last data filed at 2019 1037  Gross per 24 hour   Intake 1913 75 ml   Output 2100 ml   Net -186 25 ml       Physical Exam:     Physical Exam   Constitutional: She is oriented to person, place, and time  She appears well-developed and well-nourished  HENT:   Head: Normocephalic and atraumatic  Eyes: Pupils are equal, round, and reactive to light  EOM are normal    Neck: Neck supple  Cardiovascular: Normal rate and regular rhythm  Pulmonary/Chest: Effort normal    Abdominal: Soft     Musculoskeletal: Normal range of motion  She exhibits no edema  Neurological: She is alert and oriented to person, place, and time  Skin: Skin is warm  Capillary refill takes less than 2 seconds  Additional Data:     Labs:    Results from last 7 days   Lab Units 06/30/19  0512   WBC Thousand/uL 13 17*   HEMOGLOBIN g/dL 8 4*   HEMATOCRIT % 29 7*   PLATELETS Thousands/uL 232   NEUTROS PCT % 70   LYMPHS PCT % 15   MONOS PCT % 11   EOS PCT % 2     Results from last 7 days   Lab Units 06/30/19  0512   SODIUM mmol/L 140   POTASSIUM mmol/L 3 3*   CHLORIDE mmol/L 108   CO2 mmol/L 25   BUN mg/dL 10   CREATININE mg/dL 0 74   ANION GAP mmol/L 7   CALCIUM mg/dL 8 6   GLUCOSE RANDOM mg/dL 114     Results from last 7 days   Lab Units 06/29/19  0430   INR  1 24*     Results from last 7 days   Lab Units 06/28/19  1842   POC GLUCOSE mg/dl 155*                   * I Have Reviewed All Lab Data Listed Above  * Additional Pertinent Lab Tests Reviewed:  All Labs Within Last 24 Hours Reviewed    Recent Cultures (last 7 days):           Last 24 Hours Medication List:     Current Facility-Administered Medications:  aspirin 81 mg Oral Daily Shani Sidhu MD    atorvastatin 80 mg Oral Daily With Murphy Salvador MD    calcium carbonate 500 mg Oral Daily PRN Gustavo Lan MD    clopidogrel 75 mg Oral Daily Shani Sidhu MD    enoxaparin 40 mg Subcutaneous Q24H [address] Shani Sidhu MD    iron sucrose 300 mg Intravenous Once per day on Mon Wed Fri Shani Sidhu MD Last Rate: 300 mg (06/28/19 0933)   melatonin 6 mg Oral HS Shani Sidhu MD    metoprolol tartrate 50 mg Oral Q12H [address] Shani Sidhu MD    ondansetron 4 mg Intravenous Q6H PRN Shani Sidhu MD    oxyCODONE 2 5 mg Oral Q4H PRN Shani Sidhu MD    oxyCODONE 5 mg Oral Q4H PRN Shani Sidhu MD    pantoprazole 40 mg Oral Early Morning Shani Sidhu MD    phenol 1 spray Mouth/Throat Q2H PRN Gustavo Lan MD         Today, Patient Was Seen By: Herlinda Ardon MD    ** Please Note: Dictation voice to text software may have been used in the creation of this document   **

## 2019-06-30 NOTE — PHYSICAL THERAPY NOTE
Physical Therapy Evaluation     Patient's Name: Janine Reyes    Admitting Diagnosis  Weakness [R53 1]  Abnormal CT scan [R93 89]  Stroke-like symptom [R29 90]  Anemia, unspecified type [D64 9]    Problem List  Patient Active Problem List   Diagnosis    Cataracts, bilateral    Essential hypertension    Hyperlipidemia    Nicotine dependence    Stroke-like symptom    Anemia, unspecified    Abnormal CT scan    Symptomatic stenosis of left carotid artery    Iron deficiency anemia       Past Medical History  Past Medical History:   Diagnosis Date    Hypertension        Past Surgical History  History reviewed  No pertinent surgical history  06/30/19 1154   Note Type   Note type Re-eval   Pain Assessment   Pain Assessment No/denies pain   Pain Score No Pain   Home Living   Type of Home Apartment   Home Layout Able to live on main level with bedroom/bathroom; Ramped entrance;Elevator   Additional Comments pt  resides alone in apartment with elevator access/ramped enterance   Prior Function   Level of [place] Independent with ADLs and functional mobility   Lives With Alone   ADL Assistance Independent   IADLs Independent   Falls in the last 6 months 0   Vocational Retired   Restrictions/Precautions   [facility] Bearing Precautions Per Order No   Other Precautions Cognitive; Impulsive; Bed Alarm; Chair Alarm; Fall Risk;Telemetry  (nausea)   General   Family/Caregiver Present No   Cognition   Overall Cognitive Status Impaired   Arousal/Participation Alert   Attention Attends with cues to redirect   Orientation Level Oriented X4   Following Commands Follows one step commands with increased time or repetition   RLE Assessment   RLE Assessment WFL   LLE Assessment   LLE Assessment WFL   Bed Mobility   Additional Comments pt  seated upright in recliner upon entry  Transfers   Sit to Stand 3  Moderate assistance   Additional items Assist x 1; Increased time required;Verbal cues   Stand to Sit 4  Minimal assistance Additional items Assist x 1  (uncontrolled descent)   Additional Comments pt  required cues for hand placement on walker   Ambulation/Elevation   Gait pattern Excessively slow; Short stride  (R trendelenberg)   Gait Assistance 4  Minimal assist   Additional items Assist x 1   Assistive Device Rolling walker   Distance 15'   (pt  limited by nausea during re-eval)   Stair Management Assistance Not tested   Balance   Static Sitting Fair   Dynamic Sitting Fair   Static Standing Fair -   Dynamic Standing Poor +   Ambulatory Poor +   Endurance Deficit   Endurance Deficit Yes   Endurance Deficit Description R sided weakness, limited activity tolerance   Activity Tolerance   Activity Tolerance Patient limited by fatigue   Nurse Made Aware yes   Assessment   Prognosis Good   Problem List Decreased strength;Decreased endurance;Decreased mobility; Impaired balance;Decreased coordination; Impaired judgement;Decreased safety awareness; Obesity   Assessment Patient seen today for a physical therapy re-evaluation  Patient is a 78year old female who presented to [facility] with extreme fatigue and R sided weakness on 6/19/19  PMH significant for HTN, HLD, hx smoking, hx hyponatremia  Pt resides alone in senior living high rise apartment on 5th floor with ramp and elevator entrance  Pt was I PTA with no AD for mobility  Strength testing found the following findings: RLE: 4-/5, LLE: 4/5 and pt  Standing dynamic balance is poor+  Patient transferred from sit to stand w/ mod A x1 today and ambulated 15'  w/ RW and min Ax1  While ambulating, pt demonstrated decreased ishna, decreased foot clearance and poor walker safety with vc for hand positioning  Overall, patient presents with strength deficits, balance impairments, decreased safety awareness and decreased insight into current status that contribute to limitations in functional mobility and transfers and put pt at increased risk for falls   Patient will benefit from skilled physical therapy to maximize functional potential  Recommend D/C to rehab at this time  Barriers to Discharge Decreased caregiver support   Barriers to Discharge Comments decrease in mobility from baseline   Goals   Patient Goals to go home   STG Expiration Date/STG 07/14/19    Pt will demonstrate ability to perform all aspects of bed mobility with Mod I in order to return to home safely  2  Pt will demonstrate ability to perform functional transfers with Mod I in order to return to PLOF in home safely  3  Pt will demonstrate ability to ambulate 200 ft with least restrictive AD with Mod I in order to increase independence  4  Pt will demonstrate improved sitting/standing balance by one grade order to decrease risk of falls in home  6  Pt will increase b/l LE strength by one grade in order to increase ease of functional mobility and transfers   Plan   Treatment/Interventions LE strengthening/ROM; Elevations; Therapeutic exercise; Endurance training;Functional transfer training;Bed mobility;Gait training   PT Frequency   (3-5x/week)   Recommendation   Recommendation   (rehab)   Equipment Recommended Walker   PT - OK to Discharge Yes  (to rehab when medically stable)   Barthel Index   Feeding 5   Bathing 0   Grooming Score 5   Dressing Score 5   Bladder Score 10   Bowels Score 10   Toilet Use Score 5   Transfers (Bed/Chair) Score 10   Mobility (Level Surface) Score 0   Stairs Score 0   Barthel Index Score 50         Allyn Dalal, PT

## 2019-06-30 NOTE — PLAN OF CARE
Problem: OCCUPATIONAL THERAPY ADULT  Goal: Performs self-care activities at highest level of function for planned discharge setting  See evaluation for individualized goals  Description  Treatment Interventions: ADL retraining, Functional transfer training, UE strengthening/ROM, Endurance training, Cognitive reorientation, Patient/family training, Equipment evaluation/education, Compensatory technique education, Continued evaluation, Activityengagement          See flowsheet documentation for full assessment, interventions and recommendations  Outcome: Progressing  Note:   Limitation: Decreased ADL status, Decreased Safe judgement during ADL, Decreased cognition, Decreased endurance, Decreased fine motor control, Decreased self-care trans, Decreased high-level ADLs  Prognosis: Good  Assessment: Pt is a 78 y o  female seen for OT RE evaluation s/p admit to [facility] on 6/19/2019 w/ Stroke-like symptom- RUE weakness/decreased hand dexterity  Pt is POD #2 LCEA  Past Medical History significant for known R ICA occlusion, LICA stenosis, HTN  Pt with active OT orders and up with assistance orders  Performed at least two patient identifiers during session including name and wristband  Prior to admission, pt was independent living alone in an apartment, I with self care/AdLS, IADLs, + driving  Upon RE evaluation: Pt requires min A for functional transfers/mobility with RW, LB ADLs mod/max A, toileting min/mod A, UB ADLS min A, grooming min A 2* the following deficits impacting occupational performance: decreased ROM, decreased strength, decreased balance, decreased tolerance, impaired FMC, impaired sensation, impaired depth perception, impaired proprioception, impaired attention, impaired memory, impaired sequencing, impaired problem solving, impulsivity and decreased safety awareness   Pt to benefit from continued skilled OT tx while in the hospital to address deficits as defined above and maximize level of functional independence w ADL's and functional mobility  Occupational Performance areas to address include: eating, grooming, bathing/shower, toilet hygiene, dressing, functional mobility, community mobility and clothing management  OT reviewed established goals from IE and updated them as appropriate  Plan is still acute rehab to maximize I in ADLs/IADLs for safe transition back in the community  OT Discharge Recommendation: Short Term Rehab  OT - OK to Discharge:  Yes

## 2019-06-30 NOTE — PROGRESS NOTES
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% LICA stenosis and R ICA occlusion s/p L CEA    Plan:  Continue ASA/Plavix/Statin  Discontinue AL, rosario, IV fluids  Supportive care per primary    Subjective/Objective     Subjective: No acute events  Some nausea and vomiting this morning  Objective:    Blood pressure 170/70, pulse 83, temperature 98 5 °F (36 9 °C), temperature source Oral, resp  rate 18, height 5' 2" (1 575 m), weight 80 8 kg (178 lb 2 1 oz), SpO2 94 %  ,Body mass index is 32 58 kg/m²  Intake/Output Summary (Last 24 hours) at 6/30/2019 0750  Last data filed at 6/30/2019 0300  Gross per 24 hour   Intake 2959 3 ml   Output 1525 ml   Net 1434 3 ml       Invasive Devices     Peripheral Intravenous Line            Peripheral IV 06/27/19 Left Hand 2 days    Peripheral IV 06/28/19 Right Wrist 1 day          Arterial Line            Arterial Line 06/28/19 Right Radial 1 day          Drain            Urethral Catheter Latex 16 Fr  1 day                Physical Exam:   General: NAD, AAOx3  Eyes: PERRL  ENT: moist mucous membranes  Tongue midline  Neck: supple  Incision c/d/i with mild edema  CV: RRR +S1/S2  Chest: breath sounds bilaterally  Abdomen: soft, NT ND  Extremities: atraumatic, no edema      Results from last 7 days   Lab Units 06/30/19  0512 06/29/19  1630 06/29/19  1003  06/29/19  0430 06/28/19  0548   WBC Thousand/uL 13 17*  --   --   --  11 50* 9 77   HEMOGLOBIN g/dL 8 4* 8 3* 7 9*   < > 6 4* 7 7*   HEMATOCRIT % 29 7* 28 8* 28 7*   < > 24 0* 29 2*   PLATELETS Thousands/uL 232  --   --   --  220 245    < > = values in this interval not displayed       Results from last 7 days   Lab Units 06/30/19  0512 06/29/19  0430 06/28/19  0548   POTASSIUM mmol/L 3 3* 3 9 3 7   CHLORIDE mmol/L 108 111* 112*   CO2 mmol/L 25 23 27   BUN mg/dL 10 13 16   CREATININE mg/dL 0 74 0 84 0 92   CALCIUM mg/dL 8 6 8 2* 8 7     Results from last 7 days   Lab Units 06/29/19  0430   INR  1 24*   PTT seconds 34

## 2019-07-01 PROBLEM — I63.9 CVA (CEREBRAL VASCULAR ACCIDENT) (HCC): Status: ACTIVE | Noted: 2019-06-19

## 2019-07-01 LAB
ANION GAP SERPL CALCULATED.3IONS-SCNC: 7 MMOL/L (ref 4–13)
BASOPHILS # BLD AUTO: 0.09 THOUSANDS/ΜL (ref 0–0.1)
BASOPHILS NFR BLD AUTO: 1 % (ref 0–1)
BUN SERPL-MCNC: 12 MG/DL (ref 5–25)
CALCIUM SERPL-MCNC: 9.4 MG/DL (ref 8.3–10.1)
CHLORIDE SERPL-SCNC: 109 MMOL/L (ref 100–108)
CO2 SERPL-SCNC: 26 MMOL/L (ref 21–32)
CREAT SERPL-MCNC: 0.86 MG/DL (ref 0.6–1.3)
EOSINOPHIL # BLD AUTO: 0.26 THOUSAND/ΜL (ref 0–0.61)
EOSINOPHIL NFR BLD AUTO: 2 % (ref 0–6)
ERYTHROCYTE [DISTWIDTH] IN BLOOD BY AUTOMATED COUNT: 31.4 % (ref 11.6–15.1)
GFR SERPL CREATININE-BSD FRML MDRD: 64 ML/MIN/1.73SQ M
GLUCOSE SERPL-MCNC: 95 MG/DL (ref 65–140)
HCT VFR BLD AUTO: 32.7 % (ref 34.8–46.1)
HGB BLD-MCNC: 9.3 G/DL (ref 11.5–15.4)
IMM GRANULOCYTES # BLD AUTO: 0.11 THOUSAND/UL (ref 0–0.2)
IMM GRANULOCYTES NFR BLD AUTO: 1 % (ref 0–2)
LYMPHOCYTES # BLD AUTO: 2.59 THOUSANDS/ΜL (ref 0.6–4.47)
LYMPHOCYTES NFR BLD AUTO: 21 % (ref 14–44)
MCH RBC QN AUTO: 20.7 PG (ref 26.8–34.3)
MCHC RBC AUTO-ENTMCNC: 28.4 G/DL (ref 31.4–37.4)
MCV RBC AUTO: 73 FL (ref 82–98)
MONOCYTES # BLD AUTO: 1.36 THOUSAND/ΜL (ref 0.17–1.22)
MONOCYTES NFR BLD AUTO: 11 % (ref 4–12)
NEUTROPHILS # BLD AUTO: 8 THOUSANDS/ΜL (ref 1.85–7.62)
NEUTS SEG NFR BLD AUTO: 64 % (ref 43–75)
NRBC BLD AUTO-RTO: 0 /100 WBCS
PLATELET # BLD AUTO: 294 THOUSANDS/UL (ref 149–390)
POTASSIUM SERPL-SCNC: 3.7 MMOL/L (ref 3.5–5.3)
RBC # BLD AUTO: 4.5 MILLION/UL (ref 3.81–5.12)
SODIUM SERPL-SCNC: 142 MMOL/L (ref 136–145)
WBC # BLD AUTO: 12.41 THOUSAND/UL (ref 4.31–10.16)

## 2019-07-01 PROCEDURE — 97110 THERAPEUTIC EXERCISES: CPT

## 2019-07-01 PROCEDURE — 85025 COMPLETE CBC W/AUTO DIFF WBC: CPT | Performed by: INTERNAL MEDICINE

## 2019-07-01 PROCEDURE — 97535 SELF CARE MNGMENT TRAINING: CPT

## 2019-07-01 PROCEDURE — 80048 BASIC METABOLIC PNL TOTAL CA: CPT | Performed by: INTERNAL MEDICINE

## 2019-07-01 PROCEDURE — 99024 POSTOP FOLLOW-UP VISIT: CPT | Performed by: SURGERY

## 2019-07-01 PROCEDURE — 97530 THERAPEUTIC ACTIVITIES: CPT

## 2019-07-01 PROCEDURE — 97116 GAIT TRAINING THERAPY: CPT

## 2019-07-01 PROCEDURE — 99232 SBSQ HOSP IP/OBS MODERATE 35: CPT | Performed by: INTERNAL MEDICINE

## 2019-07-01 PROCEDURE — G0515 COGNITIVE SKILLS DEVELOPMENT: HCPCS

## 2019-07-01 PROCEDURE — 99223 1ST HOSP IP/OBS HIGH 75: CPT | Performed by: PHYSICAL MEDICINE & REHABILITATION

## 2019-07-01 RX ORDER — CARVEDILOL 3.12 MG/1
3.12 TABLET ORAL 2 TIMES DAILY WITH MEALS
Status: DISCONTINUED | OUTPATIENT
Start: 2019-07-01 | End: 2019-07-03 | Stop reason: HOSPADM

## 2019-07-01 RX ADMIN — CARVEDILOL 3.12 MG: 3.12 TABLET, FILM COATED ORAL at 17:28

## 2019-07-01 RX ADMIN — ENOXAPARIN SODIUM 40 MG: 40 INJECTION SUBCUTANEOUS at 08:30

## 2019-07-01 RX ADMIN — CLOPIDOGREL BISULFATE 75 MG: 75 TABLET ORAL at 08:28

## 2019-07-01 RX ADMIN — PANTOPRAZOLE SODIUM 40 MG: 40 TABLET, DELAYED RELEASE ORAL at 05:14

## 2019-07-01 RX ADMIN — POTASSIUM CHLORIDE 20 MEQ: 1500 TABLET, EXTENDED RELEASE ORAL at 08:28

## 2019-07-01 RX ADMIN — POTASSIUM CHLORIDE 20 MEQ: 1500 TABLET, EXTENDED RELEASE ORAL at 17:28

## 2019-07-01 RX ADMIN — IRON SUCROSE 300 MG: 20 INJECTION, SOLUTION INTRAVENOUS at 08:30

## 2019-07-01 RX ADMIN — ATORVASTATIN CALCIUM 80 MG: 80 TABLET, FILM COATED ORAL at 17:28

## 2019-07-01 RX ADMIN — ASPIRIN 81 MG: 81 TABLET, COATED ORAL at 08:28

## 2019-07-01 RX ADMIN — MELATONIN 6 MG: at 21:04

## 2019-07-01 NOTE — ASSESSMENT & PLAN NOTE
Iron-deficiency anemia, likely secondary to gastric AVMs which have been cauterized  Continue iron supplementation  Status post transfusion, 1 unit packed red cells, continue to monitor hemoglobin closely

## 2019-07-01 NOTE — PROGRESS NOTES
Vascular Surgery    Progress Note Qing Joel 1940, 78 y o  female MRN: [id number]    Unit/Bed#: [facility] 506-01 Encounter: [id number]    Primary Care Provider: Wells Sacks, DO   Date and time admitted to hospital: 6/19/2019 11:50 AM        Symptomatic stenosis of left carotid artery  Assessment & Plan  POD#3 Left CEA    Plan:  Continue ASA, Plavix, Lipitor  PT/OT  Case management for rehab planning  [facility]    Anemia, unspecified  Assessment & Plan  AMPARO  Gastric/Duodenal AVMs     S/P EGD argon beam ablation AVM's 6/24    Plan:  Medical Management      Subjective:  Pt doing well, no events overnight    Vitals:  /65 (BP Location: Left arm)   Pulse 86   Temp 97 9 °F (36 6 °C) (Oral)   Resp 18   Ht 5' 2" (1 575 m)   Wt 72 6 kg (160 lb 0 9 oz)   SpO2 93%   BMI 29 27 kg/m²     I/Os:  I/O last 3 completed shifts: In: 4473 8 [P O :587; I V :3536 8;  Blood:350]  Out: 3113 [DUMTX:4169]  I/O this shift:  In: -   Out: 1325 [Urine:1325]    Lab Results and Cultures:   Lab Results   Component Value Date    WBC 12 41 (H) 07/01/2019    HGB 9 3 (L) 07/01/2019    HCT 32 7 (L) 07/01/2019    MCV 73 (L) 07/01/2019     07/01/2019     Lab Results   Component Value Date    GLUCOSE 97 01/04/2016    CALCIUM 9 4 07/01/2019     01/04/2016    K 3 7 07/01/2019    CO2 26 07/01/2019     (H) 07/01/2019    BUN 12 07/01/2019    CREATININE 0 86 07/01/2019     Lab Results   Component Value Date    INR 1 24 (H) 06/29/2019    INR 1 14 06/19/2019    PROTIME 15 2 (H) 06/29/2019    PROTIME 14 7 (H) 06/19/2019        Blood Culture: No results found for: BLOODCX,   Urinalysis:   Lab Results   Component Value Date    COLORU Yellow 11/06/2015    CLARITYU Clear 11/06/2015    SPECGRAV 1 015 11/06/2015    PHUR 5 5 11/06/2015    LEUKOCYTESUR Trace (A) 11/06/2015    NITRITE Negative 11/06/2015    PROTEINUA Negative 11/06/2015    GLUCOSEU Negative 11/06/2015    KETONESU Negative 11/06/2015 BILIRUBINUR Negative 11/06/2015    BLOODU Negative 11/06/2015   ,   Urine Culture: No results found for: URINECX,   Wound Culure: No results found for: WOUNDCULT    Medications:  Current Facility-Administered Medications   Medication Dose Route Frequency    aspirin (ECOTRIN LOW STRENGTH) EC tablet 81 mg  81 mg Oral Daily    atorvastatin (LIPITOR) tablet 80 mg  80 mg Oral Daily With Dinner    calcium carbonate (TUMS) chewable tablet 500 mg  500 mg Oral Daily PRN    carvedilol (COREG) tablet 6 25 mg  6 25 mg Oral BID With Meals    clopidogrel (PLAVIX) tablet 75 mg  75 mg Oral Daily    enoxaparin (LOVENOX) subcutaneous injection 40 mg  40 mg Subcutaneous Q24H RAYNA    hydrALAZINE (APRESOLINE) injection 10 mg  10 mg Intravenous Q6H PRN    iron sucrose (VENOFER) 300 mg in sodium chloride 0 9 % 250 mL IVPB  300 mg Intravenous Once per day on Mon Wed Fri    Labetalol HCl (NORMODYNE) injection 10 mg  10 mg Intravenous Q4H PRN    melatonin tablet 6 mg  6 mg Oral HS    NIFEdipine (PROCARDIA XL) 24 hr tablet 30 mg  30 mg Oral Daily    ondansetron (ZOFRAN) injection 4 mg  4 mg Intravenous Q6H PRN    oxyCODONE (ROXICODONE) IR tablet 2 5 mg  2 5 mg Oral Q4H PRN    oxyCODONE (ROXICODONE) IR tablet 5 mg  5 mg Oral Q4H PRN    pantoprazole (PROTONIX) EC tablet 40 mg  40 mg Oral Early Morning    phenol (CHLORASEPTIC) 1 4 % mucosal liquid 1 spray  1 spray Mouth/Throat Q2H PRN    potassium chloride (K-DUR,KLOR-CON) CR tablet 20 mEq  20 mEq Oral BID     Physical Exam:    General appearance: alert and oriented, in no acute distress  Neurologic: Left tongue deviation, facial droop, Right arm persistently week all present from pre-op   Left Arm/Leg 5/5 M/S  Lungs: clear to auscultation bilaterally  Heart: regular rate and rhythm, S1, S2 normal, no murmur, click, rub or gallop  Abdomen: soft, non-tender; bowel sounds normal; no masses,  no organomegaly  Extremities: extremities normal, warm and well-perfused; no cyanosis, clubbing, or edema    Wound/Incision:  Left neck incision clean, dry, and intact, small hematoma superior to incision        Odin Beckwith PA-C  7/1/2019

## 2019-07-01 NOTE — ASSESSMENT & PLAN NOTE
Blood pressures have been elevated postoperatively  Started on nifedipine 30 mg daily  Will continue  Also metoprolol changed to carvedilol 6 25, will decrease did carvedilol 3 125 today  Continue to monitor BP closely and titrate

## 2019-07-01 NOTE — PLAN OF CARE
Problem: OCCUPATIONAL THERAPY ADULT  Goal: Performs self-care activities at highest level of function for planned discharge setting  See evaluation for individualized goals  Description  Treatment Interventions: ADL retraining, Functional transfer training, UE strengthening/ROM, Endurance training, Cognitive reorientation, Patient/family training, Equipment evaluation/education, Compensatory technique education, Continued evaluation, Activityengagement          See flowsheet documentation for full assessment, interventions and recommendations  Outcome: Progressing  Note:   Limitation: Decreased ADL status, Decreased Safe judgement during ADL, Decreased cognition, Decreased endurance, Decreased fine motor control, Decreased self-care trans, Decreased high-level ADLs  Prognosis: Good  Assessment: Patient participated in skilled OT with focus on adl/self care skills, functional transfer skills and mobility, activity tolerance, functional balance, adaptive techniques for dressing LB  Patient presents seated in recliner agreeable to engage in OT  Patient requires intermittent reorientation to specifics with unfamiliar information  Patient uses white orientation board for discussion related to day, date, attending staff  Patient is pleasant and motivated with all aspects of therapy  Patient would benefit from [address] with focus of increasing functional strength and endurance, increasing safety and independence with functional transfers and mobility, increase independence with self care/adl tasks for carryover into her daily routine        OT Discharge Recommendation: Short Term Rehab  OT - OK to Discharge: (when medically cleared)  Moni [facility]

## 2019-07-01 NOTE — ASSESSMENT & PLAN NOTE
Continue IV iron while inpatient  Status post 1 unit packed red cells postoperatively    Hemoglobin significantly improved

## 2019-07-01 NOTE — CONSULTS
PHYSICAL MEDICINE AND REHABILITATION CONSULT NOTE  Lola Faith 78 y o  female MRN: [id number]  Unit/Bed#: [facility] 506-01 Encounter: [id number]    Requested by (Physician/Service): Becki Charlton MD  Reason for Consultation:  Assessment of rehabilitation needs    Chief complaint: "this right arm is the problem for me, my legs are great"     HPI: Lola Faith is a 78 y o  female with a PMH of HTN, HLD, history of smoking who presented on 6/19/2019 with feeling weak and fatigued  Family reported that she was being treated for bronchitis over the past couple of weeks  EMS noted that she had right sided weakness and right sided facial droop  CT of the head showed subacute to chronic infarcts as well as occlusion of the right carotid artery  MRI showed Superficial cortical infarct in the left parietal region and small deep watershed infarct in the left frontal region representing acute infarct in the distribution left MCA  Vascular was consulted and recommended left CEA once medically stable  She was found to be anemic for which GI and hematology was consulted  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  Induced coagulation was done with argon plasma  She did require a transfusion and is currently on IV iron  On 6/24/2019 she fell off the toilet while reaching for the toilet paper  CT of the head showed right frontal extracranial soft tissue hematoma, no hemorrhage  She denies any dizziness or LOC prior to falling  She is on ASA and plavix for stroke prevention  PM&R were consulted for rehabilitation recommendations        Subjective: The patient was seen in her room  She reports she really is hoping to be at rehab for a short stay as she wants to return home  She reports that she has supportive children that will be able to help her  She lives in a [facility] with elevator access  She reports pain to touch at her incision site and difficulty with fine motor right hand    She denies all other complaints      Review of Systems: A 10-point review of systems was performed  Negative except as listed above  - Chart reviewed  - Labs reviewed  - Imaging reviewed     Assessment/Plan:   Rehab  - Continue PT/OT while inpatient   - DVT ppx:  lovenox and SCD  - The patient is a good candidate for acute inpatient rehabilitation once medically stable  In addition to being able to tolerate 3 hours of therapy she will need medical management of incision care, blood pressure management, hemodynamic monitoring, and neurological monitoring  CVA  - ASA/plavix/statin  - ECHO EF of 65%  - follow up with neurology outpt     Left carotid stenosis  - monitor incision healing  - Plavix/ASA/statin  - Vascular following    Right UE weakness/ataxia  - PT/OT    HTN  - started on nifedipine as blood pressure have been elevated postop  - metoprolol was changed to carvedilol  - BP being monitored closely    Anemia  - due to gastric AVMs that were cauterized  - Continues on iron supplementation  - s/p 1 UPRBCs  - monitoring hemoglobin    Abnormal CT scan lung  - will need pulmonary follow up outpt  - encouraged IS    Thank you for allowing the PM&R service to participate in the care of this patient  We will continue to follow Luz Ramires progress with you  Please do not hesitate to call with questions or concerns       PT OT   Prior Function   Level of [place] Independent with ADLs and functional mobility   Lives With Alone   ADL Assistance Independent   IADLs Independent   Falls in the last 6 months 0   Vocational Retired   Restrictions/Precautions   [facility] Bearing Precautions Per Order No   Other Precautions Cognitive; Impulsive; Bed Alarm; Chair Alarm; Fall Risk;Telemetry  (nausea)   General   Family/Caregiver Present No   Cognition   Overall Cognitive Status Impaired   Arousal/Participation Alert   Attention Attends with cues to redirect   Orientation Level Oriented X4   Following Commands Follows one step commands with increased time or repetition   RLE Assessment   RLE Assessment WFL   LLE Assessment   LLE Assessment WFL   Bed Mobility   Additional Comments pt  seated upright in recliner upon entry  Transfers   Sit to Stand 3  Moderate assistance   Additional items Assist x 1; Increased time required;Verbal cues   Stand to Sit 4  Minimal assistance   Additional items Assist x 1  (uncontrolled descent)   Additional Comments pt  required cues for hand placement on walker   Ambulation/Elevation   Gait pattern Excessively slow; Short stride  (R trendelenberg)   Gait Assistance 4  Minimal assist   Additional items Assist x 1   Assistive Device Rolling walker   Distance 15'   (pt  limited by nausea during re-eval)   Stair Management Assistance Not tested   Balance   Static Sitting Fair   Dynamic Sitting Fair   Static Standing Fair -   Dynamic Standing Poor +   Ambulatory Poor +   Endurance Deficit   Endurance Deficit Yes   Endurance Deficit Description R sided weakness, limited activity tolerance   Activity Tolerance   Activity Tolerance Patient limited by fatigue   Nurse Made Aware yes   Assessment   Prognosis Good   Problem List Decreased strength;Decreased endurance;Decreased mobility; Impaired balance;Decreased coordination; Impaired judgement;Decreased safety awareness; Obesity   Assessment Patient seen today for a physical therapy re-evaluation  Patient is a 78year old female who presented to [facility] with extreme fatigue and R sided weakness on 6/19/19  PMH significant for HTN, HLD, hx smoking, hx hyponatremia  Pt resides alone in senior living high rise apartment on 5th floor with ramp and elevator entrance  Pt was I PTA with no AD for mobility  Strength testing found the following findings: RLE: 4-/5, LLE: 4/5 and pt  Standing dynamic balance is poor+  Patient transferred from sit to stand w/ mod A x1 today and ambulated 15'  w/ RW and min Ax1   While ambulating, pt demonstrated decreased ishan, decreased foot clearance and poor walker safety with vc for hand positioning  Overall, patient presents with strength deficits, balance impairments, decreased safety awareness and decreased insight into current status that contribute to limitations in functional mobility and transfers and put pt at increased risk for falls  Patient will benefit from skilled physical therapy to maximize functional potential  Recommend D/C to rehab at this time  Barriers to Discharge Decreased caregiver support   Barriers to Discharge Comments decrease in mobility from baseline   Goals   Patient Goals to go home   STG Expiration Date/STG 07/14/19     Pt will demonstrate ability to perform all aspects of bed mobility with Mod I in order to return to home safely  2  Pt will demonstrate ability to perform functional transfers with Mod I in order to return to PLOF in home safely   3  Pt will demonstrate ability to ambulate 200 ft with least restrictive AD with Mod I in order to increase independence  4  Pt will demonstrate improved sitting/standing balance by one grade order to decrease risk of falls in home   6  Pt will increase b/l LE strength by one grade in order to increase ease of functional mobility and transfers   Plan   Treatment/Interventions LE strengthening/ROM; Elevations; Therapeutic exercise; Endurance training;Functional transfer training;Bed mobility;Gait training   PT Frequency    (3-5x/week)   Recommendation   Recommendation    (rehab)   Equipment Recommended [place]   PT - OK to Discharge Yes  (to rehab when medically stable)      Subjective   Subjective " yeah this arm is crazy"   ADL   Grooming Assistance 5  Supervision/Setup   UB Bathing Assistance 4  Minimal Assistance   UB Bathing Deficit Right arm;Left arm; Chest;Abdomen   LB Bathing Assistance 3  Moderate Assistance   UB Dressing Assistance 3  Moderate Assistance   UB Dressing Deficit Fasteners   LB Dressing Assistance 2  Maximal Assistance   LB Dressing Deficit Don/doff L sock;Don/doff R sock; Tie shoes   Toileting Assistance  4  Minimal Assistance   Transfers   Sit to Stand 4  Minimal assistance   Additional items Assist x 1; Increased time required;Verbal cues; Impulsive   Stand to Sit 4  Minimal assistance   Additional items Assist x 1; Increased time required;Verbal cues; Bed elevated   Stand pivot 4  Minimal assistance   Additional items Assist x 1; Increased time required;Verbal cues   Functional Mobility   Functional Mobility 4  Minimal assistance   Additional Comments functional transfer/mobility assessment/training: Pt mobilized short distances within room using RW with min A and 50% visual/verbal cues for safety with device and using safe body positions  Pt demosntrate very poor hand placement and udnerstanding of how to manage RW for SPt tranfers  Pt descends into chair before beinga t a safe distance to do so  Balance   Static Standing Fair -   Activity Tolerance   Activity Tolerance Patient limited by fatigue   Nurse Made Aware Yes   RUE Assessment   RUE Assessment X   Edema   RUE Edema None   LUE Assessment   LUE Assessment WNL   Hand Function   Gross Motor Coordination Impaired   Fine Motor Coordination Impaired-OT provided patient with several activities/exercises to perform to improve R hand motor planning/FMC/dexterity  (ace wrap to tie/untie knots, gauze to roll, and turn pages in book with R hand)   Sensation   Light Touch Partial deficits in the RUE   Sharp/Dull No apparent deficits   Stereognosis Severe deficits in the RUE   Additional Comments RUE: dysmetria, dyspraxia, poor kinesthesia/proprioceptive awareness   Proprioception   Proprioception Severe deficits in the RUE   Vision-Basic Assessment   Current Vision Wears glasses all the time   Vision - Complex Assessment   Ocular Range of Motion [facility]   Visual Fields    (intact)   Additional Comments clock drawing: numbers were illegible 2* to poor R hand dexterity/FMC  nubmers were not aligned in correct place    Pt then asked to perform a cancellation scanning sheet with G attention to the Jayson Rasheed during mobility tasks pt neglects to attend to R hemibody   Perception   Inattention/Neglect Cues to attend right visual field;Cues to attend to right side of body   Motor Planning    (RUE dysprasia)   Cognition   Overall Cognitive Status Impaired   Arousal/Participation Alert; Cooperative   Attention Difficulty attending to directions   Orientation Level Oriented X4   Memory Decreased recall of recent events;Decreased recall of precautions   Following Commands Follows one step commands with increased time or repetition   Comments Pt is impulsive, demonstrates poor insight into her deficits and consequences of her balance deficits and how this impats her overall safety  Assessment   Limitation Decreased ADL status; Decreased Safe judgement during ADL;Decreased cognition;Decreased endurance;Decreased fine motor control;Decreased self-care trans;Decreased high-level ADLs   Prognosis Good   Assessment Pt is a 78 y o  female seen for OT RE evaluation s/p admit to [facility] on 6/19/2019 w/ Stroke-like symptom- RUE weakness/decreased hand dexterity  Pt is POD #2 LCEA  Past Medical History significant for known R ICA occlusion, LICA stenosis, HTN  Pt with active OT orders and up with assistance orders  Performed at least two patient identifiers during session including name and wristband  Prior to admission, pt was independent living alone in an apartment, I with self care/AdLS, IADLs, + driving   Upon RE evaluation: Pt requires min A for functional transfers/mobility with RW, LB ADLs mod/max A, toileting min/mod A, UB ADLS min A, grooming min A 2* the following deficits impacting occupational performance: decreased ROM, decreased strength, decreased balance, decreased tolerance, impaired FMC, impaired sensation, impaired depth perception, impaired proprioception, impaired attention, impaired memory, impaired sequencing, impaired problem solving, impulsivity and decreased safety awareness  Pt to benefit from continued skilled OT tx while in the hospital to address deficits as defined above and maximize level of functional independence w ADL's and functional mobility  Occupational Performance areas to address include: eating, grooming, bathing/shower, toilet hygiene, dressing, functional mobility, community mobility and clothing management  OT reviewed established goals from IE and updated them as appropriate  Plan is still acute rehab to maximize I in ADLs/IADLs for safe transition back in the community  Goals   Short Term Goal #1 see below for updated OT goals   Plan   Treatment Interventions ADL retraining;Functional transfer training;Cognitive reorientation;Patient/family training; Endurance training;Neuromuscular reeducation; Activityengagement; Energy conservation;Continued evaluation; Fine motor coordination activities   Goal Expiration Date 07/10/19   OT Frequency 3-5x/wk   Recommendation   OT Discharge Recommendation Short Term Rehab          Physical Exam:  General: alert, no apparent distress, cooperative and comfortable  HEENT:  Head: Normocephalic, no lesions, without obvious abnormality  right eye ecchymosis  CARDIAC:  regular rate and rhythm  ABDOMEN:  soft, non-tender  Bowel sounds normal  No masses, no organomegaly  EXTREMITIES:  ecchymotic areas bilateral arms  NEURO:   RUE weakness and ataxia on FTN testing  PSYCH:  Normal   INCISION:  healing well  Musculoskeletal - Strength:   Right  Left  Site  Right  Left  Site    4 5  S Ab: Shoulder Abductors  5  5  HF: Hip Flexors    4 5  EF: Elbow Flexors  5 5 KF: Knee Flexors    4 5  EE: Elbow Extensors  5  5  KE: Knee Extensors       5  5  DR: Dorsi Flexors    4 5  FF: Finger Flexors  5  5  PF: Plantar Flexors    3+ 5  HI: Hand Intrinsics  5  5  EHL: Extensor Hallucis Longus     Marlene Delarosa Lives with: lives alone    [facility]  The living area: elevator  Equipment in home: None  There No steps to enter the home  Patient/family's goals: Return to previous home/apartment  The patient will have 24 hour ARC Supervision/physical assistance: supervision/physical assistance available upon discharge      Allergies:   No Known Allergies     Past Medical History:   Past Surgical History:   Family History:   Social history:   Past Medical History:   Diagnosis Date    Hypertension     Past Surgical History:   Procedure Laterality Date    CAROTID ENDARTARECTOMY Left 6/28/2019    Procedure: ENDARTERECTOMY ARTERY CAROTID WITH PATCH ANGIOPLASTY;  Surgeon: Emory Solis MD;  Location: [facility];  Service: Vascular     History reviewed  No pertinent family history     Social History     Socioeconomic History    Marital status: Single     Spouse name: None    Number of children: None    Years of education: None    Highest education level: None   Occupational History    None   Social Needs    Financial resource strain: None    Food insecurity:     Worry: None     Inability: None    Transportation needs:     Medical: None     Non-medical: None   Tobacco Use    Smoking status: Current Every Day Smoker     Packs/day: 0 25     Years: 50 00     Pack years: 12 50     Types: Cigarettes    Smokeless tobacco: Never Used   Substance and Sexual Activity    Alcohol use: Yes     Frequency: Monthly or less     Drinks per session: 1 or 2    Drug use: Never    Sexual activity: Not Currently   Lifestyle    Physical activity:     Days per week: None     Minutes per session: None    Stress: None   Relationships    Social connections:     Talks on phone: None     Gets together: None     Attends [employer] witness service: None     Active member of club or organization: None     Attends meetings of clubs or organizations: None     Relationship status: None    Intimate partner violence:     Fear of current or ex partner: None     Emotionally abused: None     Physically abused: None     Forced sexual activity: None   Other Topics Concern    None   Social History Narrative    None          Vital Signs: Reviewed    Temp:  [97 9 °F (36 6 °C)-98 7 °F (37 1 °C)] 98 7 °F (37 1 °C)  HR:  [67-86] 83  Resp:  [18] 18  BP: ()/(56-82) 129/75   Intake/Output Summary (Last 24 hours) at 7/1/2019 1356  Last data filed at 7/1/2019 1100  Gross per 24 hour   Intake 982 ml   Output 2075 ml   Net -1093 ml        Laboratory: Reviewed    Lab Results   Component Value Date    HGB 9 3 (L) 07/01/2019    HGB 10 3 (L) 11/08/2015    HCT 32 7 (L) 07/01/2019    HCT 31 2 (L) 11/08/2015    WBC 12 41 (H) 07/01/2019    WBC 10 52 (H) 11/08/2015     Lab Results   Component Value Date    BUN 12 07/01/2019    BUN 15 01/04/2016     01/04/2016    K 3 7 07/01/2019    K 3 8 01/04/2016     (H) 07/01/2019     (H) 01/04/2016    GLUCOSE 97 01/04/2016    CREATININE 0 86 07/01/2019    CREATININE 1 17 01/04/2016     Lab Results   Component Value Date    PROTIME 15 2 (H) 06/29/2019    INR 1 24 (H) 06/29/2019        Imaging: Reviewed  Cta Head And Neck W Wo Contrast    Result Date: 6/19/2019  Impression: 1  Area of cortical/subcortical hypoattenuation in the left parietal lobe, could represent subacute to chronic infarct  2   Occluded right internal carotid artery from its origin with retrograde filling of the intracranial supraclinoid right ICA  3   Remainder of major vasculature of [place] are patent without critical stenosis  4   Atherosclerotic disease at the left cervical carotid bifurcation and proximal cervical internal carotid artery with ulcerated plaque at the bifurcation  There is approximately 50% stenosis by NASCET  criteria  5   Mild area of groundglass and nodular opacity in the periphery of partially imaged right upper lobe, may indicate inflammatory or infectious etiology   6   Sphenoid and ethmoidal sinus disease as described  I personally discussed this study with Jenaro Payne on 6/19/2019 at 1:35PM  Workstation performed: [id number]     Ct Head Wo Contrast    Result Date: 6/24/2019  Impression: 1  Right frontal extracranial soft tissue hematoma  No calvarial fracture or acute intracranial injury  2   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  Workstation performed: [id number]     Mri Brain Wo Contrast    Result Date: 6/21/2019  Impression: Superficial cortical infarct in the left parietal region and small deep watershed infarct in the left frontal region representing acute infarct in the distribution left MCA No acute hemorrhage seen No mass effect or midline shift seen Flow void in the petrous and cavernous portion of the right ICA is not identified related to occlusion seen on the recent CT Workstation performed: [id number]     Ct Chest Abdomen Pelvis W Contrast    Result Date: 6/19/2019  Impression: No solid visceral injury seen No free fluid seen Area of groundglass density seen in the right upper lobe with Micro nodularity, probably on an inflammatory basis  Short interval follow-up suggested in 3 months demonstrate resolution Rounded density seen in relation to the pubic symphysis, measuring about 2 7 cm,     This may represent a distended bursa related to arthritic changes in the symphysis pubis less likely due to urethral diverticulum Workstation performed: [id number]     Ct Recon Only Cervical Spine (no Charge)    Result Date: 6/19/2019  Impression: No acute osseous injury in the cervical spine  Workstation performed: [id number]       Current Medications:     Current Facility-Administered Medications:     aspirin (ECOTRIN LOW STRENGTH) EC tablet 81 mg, 81 mg, Oral, Daily, Gayla Matt MD, 81 mg at 07/01/19 4116    atorvastatin (LIPITOR) tablet 80 mg, 80 mg, Oral, Daily With Pop Dorado MD, 80 mg at 06/30/19 1536    calcium carbonate (TUMS) chewable tablet 500 mg, 500 mg, Oral, Daily PRN, Destiny Dawkins MD    carvedilol (COREG) tablet 3 125 mg, 3 125 mg, Oral, BID With Meals, Gwendolyn Herring MD    clopidogrel (PLAVIX) tablet 75 mg, 75 mg, Oral, Daily, Laura Hong MD, 75 mg at 07/01/19 0828    enoxaparin (LOVENOX) subcutaneous injection 40 mg, 40 mg, Subcutaneous, Q24H [address], Laura Hong MD, 40 mg at 07/01/19 0830    hydrALAZINE (APRESOLINE) injection 10 mg, 10 mg, Intravenous, Q6H PRN, Gwendolyn Herring MD, 10 mg at 06/30/19 1442    iron sucrose (VENOFER) 300 mg in sodium chloride 0 9 % 250 mL IVPB, 300 mg, Intravenous, Once per day on Mon Wed Fri, Laura Hong MD, Stopped at 07/01/19 1011    Labetalol HCl (NORMODYNE) injection 10 mg, 10 mg, Intravenous, Q4H PRN, Gwendolyn Herring MD    melatonin tablet 6 mg, 6 mg, Oral, HS, Laura Hong MD, 6 mg at 06/30/19 2101    NIFEdipine (PROCARDIA XL) 24 hr tablet 30 mg, 30 mg, Oral, Daily, Gwendolyn Herring MD, 30 mg at 06/30/19 1536    ondansetron (ZOFRAN) injection 4 mg, 4 mg, Intravenous, Q6H PRN, Laura Hong MD, 4 mg at 06/30/19 0958    oxyCODONE (ROXICODONE) IR tablet 2 5 mg, 2 5 mg, Oral, Q4H PRN, Laura Hong MD, 2 5 mg at 06/24/19 0535    oxyCODONE (ROXICODONE) IR tablet 5 mg, 5 mg, Oral, Q4H PRN, Laura Hong MD    pantoprazole (PROTONIX) EC tablet 40 mg, 40 mg, Oral, Early Morning, Laura Hong MD, 40 mg at 07/01/19 0514    phenol (CHLORASEPTIC) 1 4 % mucosal liquid 1 spray, 1 spray, Mouth/Throat, Q2H PRN, Gwendolyn Herring MD, 1 spray at 06/30/19 1003    potassium chloride (K-DUR,KLOR-CON) CR tablet 20 mEq, 20 mEq, Oral, BID, Gwendolyn Herring MD, 20 mEq at 07/01/19 5634

## 2019-07-01 NOTE — ASSESSMENT & PLAN NOTE
AMPARO  Gastric/Duodenal AVMs     S/P EGD argon beam ablation AVM's 6/24    Plan:  Medical Management

## 2019-07-01 NOTE — ASSESSMENT & PLAN NOTE
POD#3 Left CEA    Plan:  Continue ASA, Plavix, Lipitor  PT/OT  Case management for rehab planning  [facility]patient [facility]

## 2019-07-01 NOTE — ASSESSMENT & PLAN NOTE
Presenting with right-sided weakness and right facial droop, CT scan of the head positive for left parietal lobe subacute to chronic stroke  The case was discussed by ED with Neurology, out of window for tPA, last well known even before obtaining scan  Admitted with stroke protocol with appropriate vital signs and neuro checks  MRI confirmed superficial cortical infarct in the left parietal region and small deep watershed infarct in the left frontal region representing acute infarct in the distribution left MCA; no acute hemorrhage seen; no mass effect or midline shift seen  Echocardiogram showed EF of 65%  For now will hold aspirin for possible GI bleed, plavix as well  Colonoscopy significant for polypectomy with clipping to prevent excess bleeding   EGD showed multiple AVM's in the stomach and duodenum induced coagulation with argon plasma   Abnormal CT of the neck with 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, requested evaluation by vascular surgery for reviewing images especially concern of the left side    Status post carotid endarterectomy  Postoperative care per vascular surgery    Discharge planning to acute rehab, consult physical medicine

## 2019-07-01 NOTE — OCCUPATIONAL THERAPY NOTE
Occupational Therapy Treatment Note:       07/01/19 1703   Restrictions/Precautions   Other Precautions Impulsive;Cognitive; Chair Alarm; Bed Alarm;Multiple lines;Telemetry; Fall Risk   Pain Assessment   Pain Assessment No/denies pain   Pain Score No Pain   ADL   Where Assessed Other (Comment)  (seated and in stance)   Grooming Assistance 5  Supervision/Setup   Grooming Deficit Setup;Verbal cueing; Increased time to complete;Wash/dry hands; Wash/dry face   UB Bathing Assistance 4  Minimal Assistance   UB Bathing Deficit Setup;Verbal cueing; Increased time to complete   UB Bathing Comments assist with back area  (seated)   LB Bathing Assistance 3  Moderate Assistance   LB Bathing Deficit Setup; Impulsive;Verbal cueing;Supervision/safety; Increased time to complete;Right upper leg;Right lower leg including foot; Left lower leg including foot   UB Dressing Assistance 4  Minimal Assistance   UB Dressing Deficit Setup;Supervision/safety; Increased time to complete; Thread RUE; Thread LUE   LB Dressing Assistance 3  Moderate Assistance   LB Dressing Deficit Setup; Requires assistive device for steadying; Impulsive;Verbal cueing;Supervision/safety; Increased time to complete   Toileting Assistance  3  Moderate Assistance   Toileting Deficit Setup;Steadying;Verbal cueing;Supervison/safety; Increased time to complete   Transfers   Sit to Stand 4  Minimal assistance   Additional items Assist x 1; Armrests; Increased time required; Impulsive;Verbal cues   Stand to Sit 4  Minimal assistance   Additional items Assist x 1; Armrests; Increased time required; Impulsive;Verbal cues   Stand pivot 4  Minimal assistance   Additional items Assist x 1; Increased time required;Verbal cues   Functional Mobility   Functional Mobility 4  Minimal assistance   Additional items Rolling walker   Cognition   Overall Cognitive Status Impaired   Arousal/Participation Alert; Cooperative   Attention Attends with cues to redirect   Orientation Level Oriented X4   Memory Decreased recall of recent events;Decreased recall of precautions   Following Commands Follows one step commands without difficulty   Activity Tolerance   Activity Tolerance Patient limited by fatigue   Medical Staff Made Aware ok to treat for OT per NSG   Assessment   Assessment Patient participated in skilled OT with focus on adl/self care skills, functional transfer skills and mobility, activity tolerance, functional balance, adaptive techniques for dressing LB  Patient presents seated in recliner agreeable to engage in OT  Patient requires intermittent reorientation to specifics with unfamiliar information  Patient uses white orientation board for discussion related to day, date, attending staff  Patient is pleasant and motivated with all aspects of therapy  Patient would benefit from [address] with focus of increasing functional strength and endurance, increasing safety and independence with functional transfers and mobility, increase independence with self care/adl tasks for carryover into her daily routine  Plan   Treatment Interventions ADL retraining;Functional transfer training; Endurance training;Cognitive reorientation   Goal Expiration Date 07/10/19   Treatment Day 3   OT Frequency 3-5x/wk   Recommendation   OT Discharge Recommendation Short Term Rehab   OT - OK to Discharge   (when medically cleared)   Barthel Index   Feeding 5   Bathing 0   Grooming Score 5   Dressing Score 5   Bladder Score 10   Bowels Score 10   Toilet Use Score 5   Transfers (Bed/Chair) Score 10   Mobility (Level Surface) Score 10   Stairs Score 0   Barthel Index Score 60   Modified Bosque Scale   Modified Mirian Scale 4   TIFFANIE Quinn

## 2019-07-01 NOTE — PROGRESS NOTES
Progress Note Troy Cooper 1940, 78 y o  female MRN: [id number]    Unit/Bed#: [facility] 506-01 Encounter: [id number]    Primary Care Provider: Juan Perla DO   Date and time admitted to hospital: 6/19/2019 11:50 AM        * CVA (cerebral vascular accident) [facility])  Assessment & Plan  Presenting with right-sided weakness and right facial droop, CT scan of the head positive for left parietal lobe subacute to chronic stroke  The case was discussed by ED with Neurology, out of window for tPA, last well known even before obtaining scan  Admitted with stroke protocol with appropriate vital signs and neuro checks  MRI confirmed superficial cortical infarct in the left parietal region and small deep watershed infarct in the left frontal region representing acute infarct in the distribution left MCA; no acute hemorrhage seen; no mass effect or midline shift seen  Echocardiogram showed EF of 65%  For now will hold aspirin for possible GI bleed, plavix as well  Colonoscopy significant for polypectomy with clipping to prevent excess bleeding   EGD showed multiple AVM's in the stomach and duodenum induced coagulation with argon plasma   Abnormal CT of the neck with 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, requested evaluation by vascular surgery for reviewing images especially concern of the left side    Status post carotid endarterectomy  Postoperative care per vascular surgery  Discharge planning to acute rehab, consult physical medicine    Symptomatic stenosis of left carotid artery  Assessment & Plan  As above    Anemia, unspecified  Assessment & Plan  Iron-deficiency anemia, likely secondary to gastric AVMs which have been cauterized  Continue iron supplementation  Status post transfusion, 1 unit packed red cells, continue to monitor hemoglobin closely  Essential hypertension  Assessment & Plan  Blood pressures have been elevated postoperatively  Started on nifedipine 30 mg daily    Will continue  Also metoprolol changed to carvedilol 6 25, will decrease did carvedilol 3 125 today  Continue to monitor BP closely and titrate  Iron deficiency anemia  Assessment & Plan  Continue IV iron while inpatient  Status post 1 unit packed red cells postoperatively  Hemoglobin significantly improved    Abnormal CT scan  Assessment & Plan  Abnormal upper portion of the right lung, patient is currently a smoker outpatient Pulmonary follow-up    Abnormal pelvis, 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, Urology recommending outpatient cystoscopic evaluation         VTE Pharmacologic Prophylaxis:   Pharmacologic: Heparin  Mechanical VTE Prophylaxis in Place: Yes    Patient Centered Rounds: I have performed bedside rounds with nursing staff today  Discussions with Specialists or Other Care Team Provider:  Vascular surgery    Education and Discussions with Family / Patient:  Patient, plan of care    Time Spent for Care: 20 minutes  More than 50% of total time spent on counseling and coordination of care as described above  Current Length of Stay: 12 day(s)    Current Patient Status: Inpatient   Certification Statement: The patient will continue to require additional inpatient hospital stay due to Awaiting bed at rehab center    Discharge Plan:  Medically stable, waiting better rehab center    Code Status: Level 1 - Full Code      Subjective:   Denies any acute complaints  Does reports some mild tenderness at the incision site  Objective:     Vitals:   Temp (24hrs), Av 3 °F (36 8 °C), Min:97 9 °F (36 6 °C), Max:98 7 °F (37 1 °C)    Temp:  [97 9 °F (36 6 °C)-98 7 °F (37 1 °C)] 98 7 °F (37 1 °C)  HR:  [67-86] 83  Resp:  [18] 18  BP: ()/(56-82) 129/75  SpO2:  [93 %-97 %] 96 %  Body mass index is 29 27 kg/m²  Input and Output Summary (last 24 hours):        Intake/Output Summary (Last 24 hours) at 2019 1341  Last data filed at 2019 1100  Gross per 24 hour   Intake 982 ml   Output 2075 ml   Net -1093 ml Physical Exam:     Physical Exam   Constitutional: She is oriented to person, place, and time  She appears well-developed and well-nourished  HENT:   Head: Normocephalic  Ecchymotic area over the right orbit   Eyes: Pupils are equal, round, and reactive to light  EOM are normal    Neck: Neck supple  No JVD present  Incisions clean and dry without drainage   Cardiovascular: Normal rate and regular rhythm  Pulmonary/Chest: Effort normal    Abdominal: Soft  Musculoskeletal: Normal range of motion  She exhibits no edema  Neurological: She is alert and oriented to person, place, and time  No cranial nerve deficit  Decreased fine motor control of the right upper extremity   Skin: Skin is warm and dry  Minimal oleksandr-incisional ecchymosis       Additional Data:     Labs:    Results from last 7 days   Lab Units 07/01/19  0530   WBC Thousand/uL 12 41*   HEMOGLOBIN g/dL 9 3*   HEMATOCRIT % 32 7*   PLATELETS Thousands/uL 294   NEUTROS PCT % 64   LYMPHS PCT % 21   MONOS PCT % 11   EOS PCT % 2     Results from last 7 days   Lab Units 07/01/19  0530   SODIUM mmol/L 142   POTASSIUM mmol/L 3 7   CHLORIDE mmol/L 109*   CO2 mmol/L 26   BUN mg/dL 12   CREATININE mg/dL 0 86   ANION GAP mmol/L 7   CALCIUM mg/dL 9 4   GLUCOSE RANDOM mg/dL 95     Results from last 7 days   Lab Units 06/29/19  0430   INR  1 24*     Results from last 7 days   Lab Units 06/28/19  1842   POC GLUCOSE mg/dl 155*                   * I Have Reviewed All Lab Data Listed Above  * Additional Pertinent Lab Tests Reviewed:  All Labs Within Last 24 Hours Reviewed    Recent Cultures (last 7 days):           Last 24 Hours Medication List:     Current Facility-Administered Medications:  aspirin 81 mg Oral Daily Evan Dalton MD    atorvastatin 80 mg Oral Daily With Dejuan Swift MD    calcium carbonate 500 mg Oral Daily PRN Geri Bolivar MD    carvedilol 3 125 mg Oral BID With Meals Geri Bolivar MD    clopidogrel 75 mg Oral Daily Elyssa Johnston MD    enoxaparin 40 mg Subcutaneous Q24H [address] Elyssa Johnston MD    hydrALAZINE 10 mg Intravenous Q6H PRN Donna Golden MD    iron sucrose 300 mg Intravenous Once per day on Mon Wed Fri Elyssa Johnston MD Last Rate: Stopped (07/01/19 1011)   Labetalol HCl 10 mg Intravenous Q4H PRN Donna Golden MD    melatonin 6 mg Oral HS Elyssa Johnston MD    NIFEdipine 30 mg Oral Daily Donna Golden MD    ondansetron 4 mg Intravenous Q6H PRN Elyssa Johnston MD    oxyCODONE 2 5 mg Oral Q4H PRN Elyssa Johnston MD    oxyCODONE 5 mg Oral Q4H PRN Elyssa Johnston MD    pantoprazole 40 mg Oral Early Morning Elyssa Johnston MD    phenol 1 spray Mouth/Throat Q2H PRN Donna Golden MD    potassium chloride 20 mEq Oral BID Donna Golden MD         Today, Patient Was Seen By: Willow Gomez MD    ** Please Note: Dictation voice to text software may have been used in the creation of this document   **

## 2019-07-01 NOTE — SOCIAL WORK
CM met w/pt, daughter Arturo Ramos [phone] and family member Randi Evleyn @ bedside to discuss rehab choices  Per pt and daughter request, [facility] referrals sent to (1) [facility], (2) [facility] and (3) [facility]  CM following

## 2019-07-01 NOTE — UTILIZATION REVIEW
Elective Surgical Continued Stay Review    Date: 6/29/19    POD#: #1 Left carotid endarterectomy with bovine patch angioplasty     Current Patient Class: IP   Current Level of Care: LEVEL 1 STEPDOWN    Assessment/Plan: 78 y o  female, initial surgery date 6/28/19  PT CAME IN WITH A CVA AND HAD A L CEA ON 6/28 WITH A TRANSFUSION OF 1 U PRBCs POST OP   NEURO EXAM POST OP IS UNCHANGED  STILL RESIDUAL R FACIAL DROOP  SPEECH WNL  Pertinent Labs/Diagnostic Results:   Results for Miles Moulton (MRN [id number]) as of 7/1/2019 13:49   Ref  Range 6/28/2019 05:48 6/29/2019 04:30   Sodium Latest Ref Range: 136 - 145 mmol/L 146 (H) 142   Potassium Latest Ref Range: 3 5 - 5 3 mmol/L 3 7 3 9   Chloride Latest Ref Range: 100 - 108 mmol/L 112 (H) 111 (H)   CO2 Latest Ref Range: 21 - 32 mmol/L 27 23   Anion Gap Latest Ref Range: 4 - 13 mmol/L 7 8   BUN Latest Ref Range: 5 - 25 mg/dL 16 13   Creatinine Latest Ref Range: 0 60 - 1 30 mg/dL 0 92 0 84   Glucose, Random Latest Ref Range: 65 - 140 mg/dL 80 142 (H)   Calcium Latest Ref Range: 8 3 - 10 1 mg/dL 8 7 8 2 (L)   eGFR Latest Units: ml/min/1 73sq m 59 66   Results for Miles Moulton (MRN [id number]) as of 7/1/2019 13:49   Ref   Range 6/28/2019 05:48 6/29/2019 04:30 6/29/2019 05:06 6/29/2019 10:03 6/29/2019 16:30   WBC Latest Ref Range: 4 31 - 10 16 Thousand/uL 9 77 11 50 (H)      Red Blood Cell Count Latest Ref Range: 3 81 - 5 12 Million/uL 4 27 3 49 (L)      Hemoglobin Latest Ref Range: 11 5 - 15 4 g/dL 7 7 (L) 6 4 (LL) 6 4 (LL) 7 9 (L) 8 3 (L)   HCT Latest Ref Range: 34 8 - 46 1 % 29 2 (L) 24 0 (L) 23 5 (L) 28 7 (L) 28 8 (L)     Vital Signs:   06/29/19 2319  97 6 °F (36 4 °C)  78  18  138/59  92 %  None (Room air)   06/29/19 2221        175/94Abnormal        06/29/19 2102    65    183/70Abnormal        06/29/19 1857  98 2 °F (36 8 °C)  61  18  142/66  98 %     06/29/19 1500  98 6 °F (37 °C)  57  18  171/76Abnormal   98 %     06/29/19 1144  98 °F (36 7 °C)  67 16  169/77  100 %     06/29/19 0912  97 5 °F (36 4 °C)  68  18  149/64  96 %  None (Room air)   06/29/19 0700  98 °F (36 7 °C)  83  18  142/66       06/29/19 0638  98 1 °F (36 7 °C)  85  18  138/60       06/29/19 0622  97 7 °F (36 5 °C)  77  18  134/58  96 %  None (Room air)   06/29/19 0605  98 1 °F (36 7 °C)  88  18  132/59       06/29/19 0422    68  18  130/60       06/29/19 0300  97 3 °F (36 3 °C)Abnormal   63  18  124/59  95 %  None (Room air)     Medications:   Scheduled Meds:   Current Facility-Administered Medications:  ACETAMINOPHEN  650 MG  PRN   X 1 6/29 D/C 6/30   aspirin 81 mg Oral Daily     atorvastatin 80 mg Oral Daily With Dinner     calcium carbonate 500 mg Oral Daily PRN     carvedilol 3 125 mg Oral BID With Meals     clopidogrel 75 mg Oral Daily     enoxaparin 40 mg Subcutaneous Q24H RAYNA     hydrALAZINE 10 mg Intravenous Q6H PRN  X1 6/29   iron sucrose 300 mg Intravenous Once per day on Mon Wed Fri Last Rate: Stopped (07/01/19 1011)    Labetalol HCl 10 mg Intravenous Q4H PRN     melatonin 6 mg Oral HS     NIFEdipine 30 mg Oral Daily     ondansetron 4 mg Intravenous Q6H PRN     oxyCODONE 2 5 mg Oral Q4H PRN     oxyCODONE 5 mg Oral Q4H PRN     pantoprazole 40 mg Oral Early Morning     phenol 1 spray Mouth/Throat Q2H PRN     potassium chloride 20 mEq Oral BID       Discharge Plan: [facility] Utilization Review Department  Phone: [phone]; Fax [phone]  [email or web address]  ATTENTION: Please call with any questions or concerns to [phone]  and carefully listen to the prompts so that you are directed to the right person  Send all requests for admission clinical reviews, approved or denied determinations and any other requests to fax [phone]   All voicemails are confidential

## 2019-07-01 NOTE — PLAN OF CARE
Problem: PHYSICAL THERAPY ADULT  Goal: Performs mobility at highest level of function for planned discharge setting  See evaluation for individualized goals  Description  Treatment/Interventions: Functional transfer training, LE strengthening/ROM, Therapeutic exercise, Endurance training, Patient/family training, Equipment eval/education, Bed mobility, Gait training, Spoke to nursing  Equipment Recommended: [place] Raisin       See flowsheet documentation for full assessment, interventions and recommendations  Outcome: Progressing  Note:   Prognosis: Good  Problem List: Decreased strength, Decreased endurance, Decreased mobility, Impaired balance, Decreased coordination, Impaired judgement, Decreased safety awareness, Obesity  Assessment: The pt  was able to progress her ambulatory distance today, but she continues to require instruction and assistance throughout  She did have improved carry-over for instruction for proper use of the rolling walker, but she does continue to require occasional instruction  She also continues to fatigue with ambulation  She remains a high fall risk as she also demonstrated decreased safety awareness and limited insight into her deficits as she is highly impulsive  Barriers to Discharge: Inaccessible home environment, Decreased caregiver support  Barriers to Discharge Comments: decrease in mobility from baseline  Recommendation: Post acute IP rehab     PT - OK to Discharge: Yes(to rehab when medically stable)    See flowsheet documentation for full assessment

## 2019-07-01 NOTE — SOCIAL WORK
PM&R consult requested by [facility]  CM advised SLIM re: same  Pt's family updated  CM rec'd t/c from pt's daughter Nancy Reyes who advised family not agreeable to [facility] or [place]  Family reviewing list for additional choices  [facility] following PM&R consult

## 2019-07-01 NOTE — PHYSICAL THERAPY NOTE
Physical Therapy Progress Note     07/01/19 5095   Pain Assessment   Pain Assessment No/denies pain   Pain Score No Pain   Restrictions/Precautions   Other Precautions Telemetry;Cognitive; Chair Alarm; Bed Alarm; Impulsive; Fall Risk  (Alarm active post session )   Subjective   Subjective The pt  states that she is doing better, but that her right arm is not working  Transfers   Sit to Stand 4  Minimal assistance   Additional items Assist x 1; Increased time required;Verbal cues   Stand to Sit 4  Minimal assistance   Additional items Assist x 1; Increased time required;Verbal cues   Ambulation/Elevation   Gait pattern Excessively slow; Short stride; Inconsistent ishan;Decreased foot clearance  (Right Trendelenberg )   Gait Assistance 4  Minimal assist   Additional items Assist x 1;Verbal cues   Assistive Device Rolling walker   Distance 70 feet, 60 feet  Balance   Static Sitting Fair +   Dynamic Sitting Fair   Static Standing Fair -   Ambulatory Poor +   Activity Tolerance   Activity Tolerance Patient tolerated treatment well;Patient limited by fatigue   Nurse One Viktoria Alberto RN; Shukri Batista RN  Exercises   TKR Sitting;Bilateral;AROM;10 reps  (x2 sets )   Assessment   Prognosis Good   Problem List Decreased strength;Decreased endurance;Decreased mobility; Impaired balance;Decreased coordination; Impaired judgement;Decreased safety awareness; Obesity   Assessment The pt  was able to progress her ambulatory distance today, but she continues to require instruction and assistance throughout  She did have improved carry-over for instruction for proper use of the rolling walker, but she does continue to require occasional instruction  She also continues to fatigue with ambulation  She remains a high fall risk as she also demonstrated decreased safety awareness and limited insight into her deficits as she is highly impulsive     Barriers to Discharge Inaccessible home environment;Decreased caregiver support   Goals   Patient Goals To go home  STG Expiration Date 07/14/19   Treatment Day 3   Plan   Treatment/Interventions LE strengthening/ROM; Functional transfer training; Therapeutic exercise; Endurance training;Patient/family training;Bed mobility;Gait training;Cognitive reorientation   Progress Progressing toward goals   PT Frequency   (4-6x a week )   Recommendation   Recommendation Post acute IP rehab   Equipment Recommended J Luis Crawford PTA

## 2019-07-01 NOTE — SOCIAL WORK
CM updated daughter Terry Meng re: [facility] following  [facility] not in network  [facility] [place] [facility] [place] or [address] [place] [address]  Terry Meng will discuss same with sister and let CM know re: decision

## 2019-07-02 LAB
ANION GAP SERPL CALCULATED.3IONS-SCNC: 6 MMOL/L (ref 4–13)
BASOPHILS # BLD AUTO: 0.08 THOUSANDS/ΜL (ref 0–0.1)
BASOPHILS NFR BLD AUTO: 1 % (ref 0–1)
BUN SERPL-MCNC: 20 MG/DL (ref 5–25)
CALCIUM SERPL-MCNC: 8.6 MG/DL (ref 8.3–10.1)
CHLORIDE SERPL-SCNC: 110 MMOL/L (ref 100–108)
CO2 SERPL-SCNC: 24 MMOL/L (ref 21–32)
CREAT SERPL-MCNC: 0.8 MG/DL (ref 0.6–1.3)
EOSINOPHIL # BLD AUTO: 0.44 THOUSAND/ΜL (ref 0–0.61)
EOSINOPHIL NFR BLD AUTO: 4 % (ref 0–6)
ERYTHROCYTE [DISTWIDTH] IN BLOOD BY AUTOMATED COUNT: 32.2 % (ref 11.6–15.1)
GFR SERPL CREATININE-BSD FRML MDRD: 70 ML/MIN/1.73SQ M
GLUCOSE SERPL-MCNC: 94 MG/DL (ref 65–140)
HCT VFR BLD AUTO: 30 % (ref 34.8–46.1)
HGB BLD-MCNC: 8.3 G/DL (ref 11.5–15.4)
IMM GRANULOCYTES # BLD AUTO: 0.09 THOUSAND/UL (ref 0–0.2)
IMM GRANULOCYTES NFR BLD AUTO: 1 % (ref 0–2)
LYMPHOCYTES # BLD AUTO: 2.06 THOUSANDS/ΜL (ref 0.6–4.47)
LYMPHOCYTES NFR BLD AUTO: 19 % (ref 14–44)
MAGNESIUM SERPL-MCNC: 2 MG/DL (ref 1.6–2.6)
MCH RBC QN AUTO: 20.6 PG (ref 26.8–34.3)
MCHC RBC AUTO-ENTMCNC: 27.7 G/DL (ref 31.4–37.4)
MCV RBC AUTO: 75 FL (ref 82–98)
MONOCYTES # BLD AUTO: 1.22 THOUSAND/ΜL (ref 0.17–1.22)
MONOCYTES NFR BLD AUTO: 11 % (ref 4–12)
NEUTROPHILS # BLD AUTO: 6.83 THOUSANDS/ΜL (ref 1.85–7.62)
NEUTS SEG NFR BLD AUTO: 64 % (ref 43–75)
NRBC BLD AUTO-RTO: 0 /100 WBCS
PLATELET # BLD AUTO: 278 THOUSANDS/UL (ref 149–390)
POTASSIUM SERPL-SCNC: 3.6 MMOL/L (ref 3.5–5.3)
RBC # BLD AUTO: 4.02 MILLION/UL (ref 3.81–5.12)
SODIUM SERPL-SCNC: 140 MMOL/L (ref 136–145)
WBC # BLD AUTO: 10.72 THOUSAND/UL (ref 4.31–10.16)

## 2019-07-02 PROCEDURE — 99232 SBSQ HOSP IP/OBS MODERATE 35: CPT | Performed by: INTERNAL MEDICINE

## 2019-07-02 PROCEDURE — 80048 BASIC METABOLIC PNL TOTAL CA: CPT | Performed by: INTERNAL MEDICINE

## 2019-07-02 PROCEDURE — 97530 THERAPEUTIC ACTIVITIES: CPT

## 2019-07-02 PROCEDURE — 97535 SELF CARE MNGMENT TRAINING: CPT

## 2019-07-02 PROCEDURE — 99024 POSTOP FOLLOW-UP VISIT: CPT | Performed by: SURGERY

## 2019-07-02 PROCEDURE — 85025 COMPLETE CBC W/AUTO DIFF WBC: CPT | Performed by: INTERNAL MEDICINE

## 2019-07-02 PROCEDURE — 83735 ASSAY OF MAGNESIUM: CPT | Performed by: INTERNAL MEDICINE

## 2019-07-02 RX ORDER — FERROUS SULFATE 325(65) MG
325 TABLET ORAL 2 TIMES DAILY WITH MEALS
Status: DISCONTINUED | OUTPATIENT
Start: 2019-07-02 | End: 2019-07-03 | Stop reason: HOSPADM

## 2019-07-02 RX ADMIN — ATORVASTATIN CALCIUM 80 MG: 80 TABLET, FILM COATED ORAL at 17:20

## 2019-07-02 RX ADMIN — NIFEDIPINE 30 MG: 30 TABLET, FILM COATED, EXTENDED RELEASE ORAL at 08:40

## 2019-07-02 RX ADMIN — CARVEDILOL 3.12 MG: 3.12 TABLET, FILM COATED ORAL at 17:20

## 2019-07-02 RX ADMIN — POTASSIUM CHLORIDE 20 MEQ: 1500 TABLET, EXTENDED RELEASE ORAL at 08:38

## 2019-07-02 RX ADMIN — ENOXAPARIN SODIUM 40 MG: 40 INJECTION SUBCUTANEOUS at 08:38

## 2019-07-02 RX ADMIN — CARVEDILOL 3.12 MG: 3.12 TABLET, FILM COATED ORAL at 08:38

## 2019-07-02 RX ADMIN — FERROUS SULFATE TAB 325 MG (65 MG ELEMENTAL FE) 325 MG: 325 (65 FE) TAB at 17:20

## 2019-07-02 RX ADMIN — MELATONIN 6 MG: at 22:08

## 2019-07-02 RX ADMIN — ASPIRIN 81 MG: 81 TABLET, COATED ORAL at 08:40

## 2019-07-02 RX ADMIN — PANTOPRAZOLE SODIUM 40 MG: 40 TABLET, DELAYED RELEASE ORAL at 05:19

## 2019-07-02 RX ADMIN — CLOPIDOGREL BISULFATE 75 MG: 75 TABLET ORAL at 08:40

## 2019-07-02 NOTE — ASSESSMENT & PLAN NOTE
Left carotid artery stenosis, complete right carotid artery stenosis  Status post left CEA on 06/28  Continue aspirin Plavix and statin, outpatient vascular surgery follow-up

## 2019-07-02 NOTE — PROGRESS NOTES
Progress Note Sara Base 1940, 78 y o  female MRN: [id number]    Unit/Bed#: [facility] 506-01 Encounter: [id number]    Primary Care Provider: Don Costa DO   Date and time admitted to hospital: 6/19/2019 11:50 AM        * CVA (cerebral vascular accident) [facility])  Assessment & Plan  Presenting with right-sided weakness and right facial droop, CT scan of the head positive for left parietal lobe subacute to chronic stroke  MRI confirmed superficial cortical infarct in the left parietal region and small deep watershed infarct in the left frontal region representing acute infarct in the distribution left MCA  Echocardiogram showed EF of 65%  Continue aspirin, Plavix and  statin    Awaiting acute rehab placement    Symptomatic stenosis of left carotid artery  Assessment & Plan  Left carotid artery stenosis, complete right carotid artery stenosis  Status post left CEA on 06/28  Continue aspirin Plavix and statin, outpatient vascular surgery follow-up    Iron deficiency anemia  Assessment & Plan  Status post EGD and colonoscopy on 06/24  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 treated with argon plasma coagulation  Status post 1 unit packed red cells postoperatively  Stable H&H  Continue iron supplement      Abnormal CT scan  Assessment & Plan  Abnormal upper portion of the right lung, patient is currently a smoker outpatient Pulmonary follow-up  Repeat chest CT scan 3 months    Abnormal pelvis, 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, Urology recommending outpatient cystoscopic evaluation     Anemia, unspecified  Assessment & Plan  Iron-deficiency anemia, likely secondary to gastric AVMs which have been cauterized  Continue iron supplementation  Status post transfusion, 1 unit packed red cells, continue to monitor hemoglobin closely  Essential hypertension  Assessment & Plan  Blood pressures have been elevated postoperatively  Started on nifedipine 30 mg daily    Will continue  Also metoprolol changed to carvedilol Continue to monitor BP            VTE Pharmacologic Prophylaxis:   Pharmacologic: Enoxaparin (Lovenox)  Mechanical VTE Prophylaxis in Place: Yes    Patient Centered Rounds: I have performed bedside rounds with nursing staff today  Discussions with Specialists or Other Care Team Provider:     Education and Discussions with Family / Patient:  Patient    Time Spent for Care: 30 minutes  More than 50% of total time spent on counseling and coordination of care as described above  Current Length of Stay: 13 day(s)    Current Patient Status: Inpatient   Certification Statement: The patient will continue to require additional inpatient hospital stay due to Awaiting acute rehab placement    Discharge Plan / Estimated Discharge Date:  Awaiting rehab placement    Code Status: Level 1 - Full Code      Subjective:   Patient seen and examined  Comfortable sitting in chair  Right hand weakness improving  No chest pain or shortness of breath    Objective:     Vitals:   Temp (24hrs), Av 2 °F (36 8 °C), Min:97 5 °F (36 4 °C), Max:98 6 °F (37 °C)    Temp:  [97 5 °F (36 4 °C)-98 6 °F (37 °C)] 97 5 °F (36 4 °C)  HR:  [76-83] 83  Resp:  [16-18] 16  BP: (103-163)/(52-71) 126/59  SpO2:  [94 %-98 %] 98 %  Body mass index is 29 23 kg/m²  Input and Output Summary (last 24 hours):        Intake/Output Summary (Last 24 hours) at 2019 1518  Last data filed at 2019 1418  Gross per 24 hour   Intake 1280 ml   Output 1000 ml   Net 280 ml       Physical Exam:     Physical Exam  Patient is awake alert oriented no acute distress  Ecchymosis over right orbit  Left neck incision clean and dry  Lung clear to auscultation bilateral  Heart positive S1-S2 no murmur  Abdomen soft nontender  Lower extremities no edema    Additional Data:     Labs:    Results from last 7 days   Lab Units 19  0449   WBC Thousand/uL 10 72*   HEMOGLOBIN g/dL 8 3*   HEMATOCRIT % 30 0*   PLATELETS Thousands/uL 278   NEUTROS PCT % 64   LYMPHS PCT % 19 MONOS PCT % 11   EOS PCT % 4     Results from last 7 days   Lab Units 07/02/19  0449   POTASSIUM mmol/L 3 6   CHLORIDE mmol/L 110*   CO2 mmol/L 24   BUN mg/dL 20   CREATININE mg/dL 0 80   CALCIUM mg/dL 8 6     Results from last 7 days   Lab Units 06/29/19  0430   INR  1 24*       * I Have Reviewed All Lab Data Listed Above  * Additional Pertinent Lab Tests Reviewed: Rober[address] 66 Admission Reviewed    Imaging:    Imaging Reports Reviewed Today Include:   Imaging Personally Reviewed by Myself Includes:     Recent Cultures (last 7 days):           Last 24 Hours Medication List:     Current Facility-Administered Medications:  aspirin 81 mg Oral Daily Bipin Kruger MD   atorvastatin 80 mg Oral Daily With Ana Cristina Allen MD   calcium carbonate 500 mg Oral Daily PRN José Luis Jeronimo MD   carvedilol 3 125 mg Oral BID With Meals José Luis Jeronimo MD   clopidogrel 75 mg Oral Daily Bipin Kruger MD   enoxaparin 40 mg Subcutaneous Q24H [facility] Bipin Kruger MD   ferrous sulfate 325 mg Oral BID With Meals Herbert Lopez DO   hydrALAZINE 10 mg Intravenous Q6H PRN José Luis Jeronimo MD   Labetalol HCl 10 mg Intravenous Q4H PRN José Luis Jeronimo MD   melatonin 6 mg Oral HS Bipin Kruger MD   NIFEdipine 30 mg Oral Daily José Luis Jeronimo MD   ondansetron 4 mg Intravenous Q6H PRN Bipin Kruger MD   oxyCODONE 2 5 mg Oral Q4H PRN Bipin Kruger MD   oxyCODONE 5 mg Oral Q4H PRN Bipin Kruger MD   pantoprazole 40 mg Oral Early Morning Bipin Kruger MD   phenol 1 spray Mouth/Throat Q2H PRN José Luis Jeronimo MD        Today, Patient Was Seen By: Herbert Lopez DO    ** Please Note: This note has been constructed using a voice recognition system   **

## 2019-07-02 NOTE — INCIDENTAL FINDINGS
The following findings require follow up:  Radiographic finding   Finding:  Lung opacity   Follow up required:  Chest CT scan   Follow up should be done within 3 month(s)    Please notify the following clinician to assist with the follow up:   Dr Recinos Courser, DO

## 2019-07-02 NOTE — ASSESSMENT & PLAN NOTE
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

## 2019-07-02 NOTE — PROGRESS NOTES
Vascular Nurse Navigator Education Rounds    Patient is pleasant appropriate and accepting to education  Patient was educated with Review of written materials provided, Teachback, Explanation, Demonstration and Question & Answer  Patient is a former smoker, quit 2 weeks ago, as such Smoking effects on the lungs, tobacco triggers and Smoking cessation was reviewed  Education on infection prevention, activity limitations, when to call the office, importance of follow up, and incisional care complete  Patient was seen preoperatively during this admission and education was passed on to her daughter

## 2019-07-02 NOTE — PROGRESS NOTES
Vascular Surgery    Progress Note Dee Baker 1940, 78 y o  female MRN: [id number]    Unit/Bed#: [facility] 506-01 Encounter: [id number]    Primary Care Provider: Enmanuel Trotter DO   Date and time admitted to hospital: 6/19/2019 11:50 AM        Symptomatic stenosis of left carotid artery  Assessment & Plan  POD#3 Left CEA    Plan:  Continue ASA, Plavix, Lipitor  PT/OT  Case management for rehab planning  Outpatient follow-up in the Vascular Center    Anemia, unspecified  Assessment & Plan  AMPARO  Gastric/Duodenal AVMs     S/P EGD argon beam ablation AVM's 6/24    Plan:  Medical Management      Subjective:  Pt doing well, no events overnight    Vitals:  /58 (BP Location: Left arm)   Pulse 80   Temp 98 °F (36 7 °C) (Oral)   Resp 18   Ht 5' 2" (1 575 m)   Wt 72 5 kg (159 lb 13 3 oz)   SpO2 94%   BMI 29 23 kg/m²     I/Os:  I/O last 3 completed shifts: In: 2389 5 [P O :807; I V :1317 5;  IV Piggyback:265]  Out: 0373 [Urine:3075]  I/O this shift:  In: 200 [P O :200]  Out: 300 [Urine:300]    Lab Results and Cultures:   Lab Results   Component Value Date    WBC 10 72 (H) 07/02/2019    HGB 8 3 (L) 07/02/2019    HCT 30 0 (L) 07/02/2019    MCV 75 (L) 07/02/2019     07/02/2019     Lab Results   Component Value Date    GLUCOSE 97 01/04/2016    CALCIUM 8 6 07/02/2019     01/04/2016    K 3 6 07/02/2019    CO2 24 07/02/2019     (H) 07/02/2019    BUN 20 07/02/2019    CREATININE 0 80 07/02/2019     Lab Results   Component Value Date    INR 1 24 (H) 06/29/2019    INR 1 14 06/19/2019    PROTIME 15 2 (H) 06/29/2019    PROTIME 14 7 (H) 06/19/2019        Blood Culture: No results found for: BLOODCX,   Urinalysis:   Lab Results   Component Value Date    COLORU Yellow 11/06/2015    CLARITYU Clear 11/06/2015    SPECGRAV 1 015 11/06/2015    PHUR 5 5 11/06/2015    LEUKOCYTESUR Trace (A) 11/06/2015    NITRITE Negative 11/06/2015    PROTEINUA Negative 11/06/2015    GLUCOSEU Negative 11/06/2015    Christian Mixon Negative 11/06/2015    BILIRUBINUR Negative 11/06/2015    BLOODU Negative 11/06/2015   ,   Urine Culture: No results found for: URINECX,   Wound Culure: No results found for: WOUNDCULT    Medications:  Current Facility-Administered Medications   Medication Dose Route Frequency    aspirin (ECOTRIN LOW STRENGTH) EC tablet 81 mg  81 mg Oral Daily    atorvastatin (LIPITOR) tablet 80 mg  80 mg Oral Daily With Dinner    calcium carbonate (TUMS) chewable tablet 500 mg  500 mg Oral Daily PRN    carvedilol (COREG) tablet 3 125 mg  3 125 mg Oral BID With Meals    clopidogrel (PLAVIX) tablet 75 mg  75 mg Oral Daily    enoxaparin (LOVENOX) subcutaneous injection 40 mg  40 mg Subcutaneous Q24H RAYNA    hydrALAZINE (APRESOLINE) injection 10 mg  10 mg Intravenous Q6H PRN    iron sucrose (VENOFER) 300 mg in sodium chloride 0 9 % 250 mL IVPB  300 mg Intravenous Once per day on Mon Wed Fri    Labetalol HCl (NORMODYNE) injection 10 mg  10 mg Intravenous Q4H PRN    melatonin tablet 6 mg  6 mg Oral HS    NIFEdipine (PROCARDIA XL) 24 hr tablet 30 mg  30 mg Oral Daily    ondansetron (ZOFRAN) injection 4 mg  4 mg Intravenous Q6H PRN    oxyCODONE (ROXICODONE) IR tablet 2 5 mg  2 5 mg Oral Q4H PRN    oxyCODONE (ROXICODONE) IR tablet 5 mg  5 mg Oral Q4H PRN    pantoprazole (PROTONIX) EC tablet 40 mg  40 mg Oral Early Morning    phenol (CHLORASEPTIC) 1 4 % mucosal liquid 1 spray  1 spray Mouth/Throat Q2H PRN    potassium chloride (K-DUR,KLOR-CON) CR tablet 20 mEq  20 mEq Oral BID       Physical Exam:    General appearance: alert and oriented, in no acute distress  Neurologic: Persistent RUE weakness and R facial droop, LUE/BLE M/S 5/5  Lungs: clear to auscultation bilaterally  Heart: regular rate and rhythm, S1, S2 normal, no murmur, click, rub or gallop  Abdomen: soft, non-tender; bowel sounds normal; no masses,  no organomegaly  Extremities: extremities normal, warm and well-perfused; no cyanosis, clubbing, or edema    Wound/Incision:  Left neck incision clean, dry, and intact        Ulises Go PA-C  7/2/2019

## 2019-07-02 NOTE — ASSESSMENT & PLAN NOTE
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

## 2019-07-02 NOTE — OCCUPATIONAL THERAPY NOTE
Occupational Therapy Treatment Note:     07/02/19 1100   Restrictions/Precautions   Weight Bearing Precautions Per Order No   Other Precautions Cognitive; Chair Alarm; Fall Risk   Pain Assessment   Pain Score No Pain   ADL   Where Assessed Chair   Grooming Assistance 4  Minimal Assistance   Grooming Deficit Brushing hair   Grooming Comments Requires VC and TC for functional grasp patterns   LB Dressing Assistance 4  Minimal Assistance   LB Dressing Deficit Don/doff R sock; Don/doff L sock; Thread RLE into underwear;Pull up over hips; Thread LLE into underwear   LB Dressing Comments Requires VC and TC for functional grasp patterns   Functional Standing Tolerance   Time ~3 mins static and dynmaic standing   Activity SElf care tasks   Comments RW level   Bed Mobility   Additional Comments Pt seated OOB upon presentation and at end of session   Transfers   Sit to Stand 4  Minimal assistance   Additional items Assist x 1; Increased time required;Verbal cues   Stand to Sit 4  Minimal assistance   Additional items Assist x 1; Increased time required;Verbal cues   Stand pivot 4  Minimal assistance   Additional items Assist x 1; Increased time required;Verbal cues   Additional Comments RW level   Coordination   Fine Motor impaired on R   Dexterity impaired on R   In Hand Manipulation impaired on R   Cognition   Overall Cognitive Status Impaired   Arousal/Participation Alert; Cooperative   Attention Attends with cues to redirect   Orientation Level Oriented X4   Memory Decreased recall of recent events;Decreased recall of precautions   Following Commands Follows one step commands without difficulty   Comments DEcreased insight into deficits, requires increased VC and TC throguhout   Activity Tolerance   Activity Tolerance Patient tolerated treatment well   Medical Staff Made Aware NSG aware   Assessment   Assessment Pt was seen this date for OT tx session focusing on self care tasks, tranfers, energy conservation techniques, KAMRAN coordination and education, overall activity tolerance  PT presents seate dOOB in chair, continues to requires min A for funciontal mobility and trnafer tasks with VC at RW level  PT continues to require VC nd TC for funciotnal greasp and release patterns with self care tasks  Continues to requires cues for safety 2* impulsive tendencidies and poor insight into deficts  Pt resting in chair atne end of session with alalrm on and all needs in reach  Would benefit from conintued OT tx to improve overall funciontal abilities     Plan   Treatment Interventions ADL retraining   Goal Expiration Date 07/10/19   Treatment Day 4   OT Frequency 3-5x/wk   Recommendation   OT Discharge Recommendation Short Term [address] [facility],[address], [address]

## 2019-07-02 NOTE — ASSESSMENT & PLAN NOTE
Presenting with right-sided weakness and right facial droop, CT scan of the head positive for left parietal lobe subacute to chronic stroke  MRI confirmed superficial cortical infarct in the left parietal region and small deep watershed infarct in the left frontal region representing acute infarct in the distribution left MCA  Echocardiogram showed EF of 65%  Continue aspirin, Plavix and  statin      Awaiting acute rehab placement

## 2019-07-02 NOTE — SOCIAL WORK
Cm received messaged from Winston Zimmerman at [facility], pt approved for admission pending insurance auth from [facility]

## 2019-07-02 NOTE — PLAN OF CARE
Problem: OCCUPATIONAL THERAPY ADULT  Goal: Performs self-care activities at highest level of function for planned discharge setting  See evaluation for individualized goals  Description  Treatment Interventions: ADL retraining, Functional transfer training, UE strengthening/ROM, Endurance training, Cognitive reorientation, Patient/family training, Equipment evaluation/education, Compensatory technique education, Continued evaluation, Activityengagement          See flowsheet documentation for full assessment, interventions and recommendations  Outcome: Progressing  Note:   Limitation: Decreased ADL status, Decreased Safe judgement during ADL, Decreased cognition, Decreased endurance, Decreased fine motor control, Decreased self-care trans, Decreased high-level ADLs  Prognosis: Good  Assessment: Pt was seen this date for OT tx session focusing on self care tasks, tranfers, energy conservation techniques, RUE coordination and education, overall activity tolerance  PT presents seate dOOB in chair, continues to requires min A for funciontal mobility and trnafer tasks with VC at RW level  PT continues to require VC nd TC for funciotnal greasp and release patterns with self care tasks  Continues to requires cues for safety 2* impulsive tendencidies and poor insight into deficts  Pt resting in chair atne end of session with estrada on and all needs in reach  Would benefit from conintued OT tx to improve overall funciontal abilities       OT Discharge Recommendation: Short Term Rehab  OT - OK to Discharge: (when medically cleared)

## 2019-07-02 NOTE — ASSESSMENT & PLAN NOTE
Blood pressures have been elevated postoperatively  Started on nifedipine 30 mg daily    Will continue  Also metoprolol changed to carvedilol   Continue to monitor BP

## 2019-07-03 ENCOUNTER — HOSPITAL ENCOUNTER (INPATIENT)
Facility: HOSPITAL | Age: 79
LOS: 19 days | Discharge: HOME | DRG: 057 | End: 2019-07-22
Attending: PHYSICAL MEDICINE & REHABILITATION | Admitting: PHYSICAL MEDICINE & REHABILITATION
Payer: COMMERCIAL

## 2019-07-03 VITALS
TEMPERATURE: 97.3 F | HEART RATE: 74 BPM | BODY MASS INDEX: 29.45 KG/M2 | HEIGHT: 62 IN | WEIGHT: 160.05 LBS | RESPIRATION RATE: 18 BRPM | DIASTOLIC BLOOD PRESSURE: 62 MMHG | OXYGEN SATURATION: 98 % | SYSTOLIC BLOOD PRESSURE: 122 MMHG

## 2019-07-03 DIAGNOSIS — E78.2 MIXED HYPERLIPIDEMIA: ICD-10-CM

## 2019-07-03 DIAGNOSIS — I63.9 CVA (CEREBRAL VASCULAR ACCIDENT) (HCC): Primary | ICD-10-CM

## 2019-07-03 DIAGNOSIS — D64.9 ANEMIA, UNSPECIFIED: ICD-10-CM

## 2019-07-03 DIAGNOSIS — I10 ESSENTIAL HYPERTENSION: ICD-10-CM

## 2019-07-03 PROCEDURE — 99024 POSTOP FOLLOW-UP VISIT: CPT | Performed by: SURGERY

## 2019-07-03 PROCEDURE — 99239 HOSP IP/OBS DSCHRG MGMT >30: CPT | Performed by: INTERNAL MEDICINE

## 2019-07-03 PROCEDURE — NC001 PR NO CHARGE

## 2019-07-03 RX ORDER — CARVEDILOL 3.12 MG/1
3.12 TABLET ORAL 2 TIMES DAILY WITH MEALS
Status: DISCONTINUED | OUTPATIENT
Start: 2019-07-04 | End: 2019-07-22 | Stop reason: HOSPADM

## 2019-07-03 RX ORDER — FERROUS SULFATE 325(65) MG
325 TABLET ORAL 2 TIMES DAILY WITH MEALS
Status: CANCELLED | OUTPATIENT
Start: 2019-07-03

## 2019-07-03 RX ORDER — CALCIUM CARBONATE 200(500)MG
500 TABLET,CHEWABLE ORAL DAILY PRN
Status: CANCELLED | OUTPATIENT
Start: 2019-07-03

## 2019-07-03 RX ORDER — ASPIRIN 81 MG/1
81 TABLET ORAL DAILY
Status: DISCONTINUED | OUTPATIENT
Start: 2019-07-04 | End: 2019-07-22 | Stop reason: HOSPADM

## 2019-07-03 RX ORDER — ATORVASTATIN CALCIUM 80 MG/1
80 TABLET, FILM COATED ORAL
Status: CANCELLED | OUTPATIENT
Start: 2019-07-03

## 2019-07-03 RX ORDER — CALCIUM CARBONATE 200(500)MG
500 TABLET,CHEWABLE ORAL DAILY PRN
Status: DISCONTINUED | OUTPATIENT
Start: 2019-07-03 | End: 2019-07-22 | Stop reason: HOSPADM

## 2019-07-03 RX ORDER — OXYCODONE HYDROCHLORIDE 5 MG/1
5 TABLET ORAL EVERY 4 HOURS PRN
Status: DISCONTINUED | OUTPATIENT
Start: 2019-07-03 | End: 2019-07-19

## 2019-07-03 RX ORDER — LANOLIN ALCOHOL/MO/W.PET/CERES
6 CREAM (GRAM) TOPICAL
Status: CANCELLED | OUTPATIENT
Start: 2019-07-03

## 2019-07-03 RX ORDER — NIFEDIPINE 30 MG/1
30 TABLET, EXTENDED RELEASE ORAL DAILY
Status: DISCONTINUED | OUTPATIENT
Start: 2019-07-04 | End: 2019-07-22 | Stop reason: HOSPADM

## 2019-07-03 RX ORDER — PANTOPRAZOLE SODIUM 40 MG/1
40 TABLET, DELAYED RELEASE ORAL
Status: CANCELLED | OUTPATIENT
Start: 2019-07-04

## 2019-07-03 RX ORDER — ATORVASTATIN CALCIUM 80 MG/1
80 TABLET, FILM COATED ORAL
Status: DISCONTINUED | OUTPATIENT
Start: 2019-07-04 | End: 2019-07-22 | Stop reason: HOSPADM

## 2019-07-03 RX ORDER — CARVEDILOL 3.12 MG/1
3.12 TABLET ORAL 2 TIMES DAILY WITH MEALS
Status: CANCELLED | OUTPATIENT
Start: 2019-07-03

## 2019-07-03 RX ORDER — FERROUS SULFATE 325(65) MG
325 TABLET ORAL 2 TIMES DAILY WITH MEALS
Status: DISCONTINUED | OUTPATIENT
Start: 2019-07-04 | End: 2019-07-22 | Stop reason: HOSPADM

## 2019-07-03 RX ORDER — CLOPIDOGREL BISULFATE 75 MG/1
75 TABLET ORAL DAILY
Status: DISCONTINUED | OUTPATIENT
Start: 2019-07-04 | End: 2019-07-22 | Stop reason: HOSPADM

## 2019-07-03 RX ORDER — OXYCODONE HYDROCHLORIDE 5 MG/1
2.5 TABLET ORAL EVERY 4 HOURS PRN
Status: DISCONTINUED | OUTPATIENT
Start: 2019-07-03 | End: 2019-07-19

## 2019-07-03 RX ORDER — OXYCODONE HYDROCHLORIDE 5 MG/1
5 TABLET ORAL EVERY 4 HOURS PRN
Status: CANCELLED | OUTPATIENT
Start: 2019-07-03

## 2019-07-03 RX ORDER — LANOLIN ALCOHOL/MO/W.PET/CERES
6 CREAM (GRAM) TOPICAL
Status: DISCONTINUED | OUTPATIENT
Start: 2019-07-03 | End: 2019-07-22 | Stop reason: HOSPADM

## 2019-07-03 RX ORDER — NIFEDIPINE 30 MG/1
30 TABLET, EXTENDED RELEASE ORAL DAILY
Status: CANCELLED | OUTPATIENT
Start: 2019-07-04

## 2019-07-03 RX ORDER — CLOPIDOGREL BISULFATE 75 MG/1
75 TABLET ORAL DAILY
Status: CANCELLED | OUTPATIENT
Start: 2019-07-04

## 2019-07-03 RX ORDER — OXYCODONE HYDROCHLORIDE 5 MG/1
2.5 TABLET ORAL EVERY 4 HOURS PRN
Status: CANCELLED | OUTPATIENT
Start: 2019-07-03

## 2019-07-03 RX ORDER — ASPIRIN 81 MG/1
81 TABLET ORAL DAILY
Status: CANCELLED | OUTPATIENT
Start: 2019-07-04

## 2019-07-03 RX ORDER — PANTOPRAZOLE SODIUM 40 MG/1
40 TABLET, DELAYED RELEASE ORAL
Status: DISCONTINUED | OUTPATIENT
Start: 2019-07-04 | End: 2019-07-22 | Stop reason: HOSPADM

## 2019-07-03 RX ADMIN — CLOPIDOGREL BISULFATE 75 MG: 75 TABLET ORAL at 09:48

## 2019-07-03 RX ADMIN — FERROUS SULFATE TAB 325 MG (65 MG ELEMENTAL FE) 325 MG: 325 (65 FE) TAB at 17:10

## 2019-07-03 RX ADMIN — FERROUS SULFATE TAB 325 MG (65 MG ELEMENTAL FE) 325 MG: 325 (65 FE) TAB at 07:32

## 2019-07-03 RX ADMIN — ASPIRIN 81 MG: 81 TABLET, COATED ORAL at 09:48

## 2019-07-03 RX ADMIN — ENOXAPARIN SODIUM 40 MG: 40 INJECTION SUBCUTANEOUS at 09:48

## 2019-07-03 RX ADMIN — MELATONIN 6 MG: at 21:18

## 2019-07-03 RX ADMIN — CARVEDILOL 3.12 MG: 3.12 TABLET, FILM COATED ORAL at 17:10

## 2019-07-03 RX ADMIN — NIFEDIPINE 30 MG: 30 TABLET, FILM COATED, EXTENDED RELEASE ORAL at 09:48

## 2019-07-03 RX ADMIN — CARVEDILOL 3.12 MG: 3.12 TABLET, FILM COATED ORAL at 07:32

## 2019-07-03 RX ADMIN — PANTOPRAZOLE SODIUM 40 MG: 40 TABLET, DELAYED RELEASE ORAL at 05:05

## 2019-07-03 RX ADMIN — ATORVASTATIN CALCIUM 80 MG: 80 TABLET, FILM COATED ORAL at 17:10

## 2019-07-03 NOTE — DISCHARGE SUMMARY
Discharge Summary - [facility] Internal Medicine    Patient Information: Mana Lanza 78 y o  female MRN: [id number]  Unit/Bed#: [facility] 506-01 Encounter: [id number]    Discharging Physician / Practitioner: Danna Davalos DO  PCP: Rakesh Myers DO  Admission Date: 6/19/2019  Discharge Date: 07/03/19    Disposition:     Other: [facility]    Reason for Admission:   Acute left MCA stroke  Symptomatic left carotid artery stenosis    Discharge Diagnoses:     Principal Problem:    CVA (cerebral vascular accident) ([address] )  Active Problems:    Symptomatic stenosis of left carotid artery    Essential hypertension    Nicotine dependence    Anemia, unspecified    Abnormal CT scan    Iron deficiency anemia  Resolved Problems:    * No resolved hospital problems  *      Consultations During Hospital Stay:  · Vascular surgery  · Urology  · Cardiology  · GI  · Neurology  · Hematology  · PMR    Procedures Performed:   Left carotid endarterectomy on 06/28  Cardiac echo with EF 65%  Nuclear stress test negative for ischemia    Brain MRI  Superficial cortical infarct in the left parietal region and small deep watershed infarct in the left frontal region representing acute infarct in the distribution left MCA    The neck CTA with occluded right internal carotid artery and stenosis in left cervical carotid bifurcation  Carotid ultrasound with total occlusion of right internal carotid artery and 70-99% stenosis on the left    Chest abdomen pelvis CT scan  Area of groundglass density seen in the right upper lobe with Micro nodularity, probably on an inflammatory basis  Short interval follow-up suggested in 3 months demonstrate resolution     Rounded density seen in relation to the pubic symphysis, measuring about 2 7 cm,     This may represent a distended bursa related to arthritic changes in the symphysis pubis less likely due to urethral    EGD and colonoscopy on 06/24 with multiple AVM in the stomach and duodenum treated with argon plasma coagulation  Significant Findings / Test Results:     · As above    Incidental Findings:   · Lung density  · Pubic symphysis density    Test Results Pending at Discharge (will require follow up):   · none     Outpatient Tests Requested:  · Repeat chest CT scan in 3 months  · Outpatient urology follow-up    Complications:  none    Hospital Course:     Yuriy Garcia is a 78 y o  female patient who originally presented to the hospital on 6/19/2019 due to weakness she was noted to have right hand weakness as well as right-sided facial droop  Head and neck CTA was consistent with subacute to chronic stroke and complete occlusion of right carotid  Patient was admitted to the hospital under stroke pathway, MRI of the brain confirmed acute stroke in left MCA  Evaluated by Neurology, patient was started on aspirin, Plavix and statin, cardiac echo without source of emboli  Vascular surgery consulted regarding carotid artery disease, patient underwent left carotid endarterectomy on 00/99 without complication    She  was found to have iron deficiency anemia, GI consulted, she underwent EGD and colonoscopy with positive above finding, hematology consulted she was started on IV iron with transition to oral before discharge  Sigmoid colon polyp biopsy consistent with tubular adenoma no sign of malignancy    Nifedepine was added for better blood pressure control      Currently patient is in stable condition, she will be discharged to acute rehab center in [facility]  Normal CT scan finding with recommendation to repeat chest CT scan in 3 months and outpatient urology follow-up    Condition at Discharge: stable     Discharge Day Visit / Exam:     Subjective:    Patient seen and examined earlier today  Comfortable sitting in chair  No event overnight  Vitals: Blood Pressure: 122/62 (07/03/19 1500)  Pulse: 74 (07/03/19 1500)  Temperature: (!) 97 3 °F (36 3 °C) (07/03/19 1500)  Temp Source: Oral (07/03/19 1500)  Respirations: 18 (07/03/19 1500)  Height: 5' 2" (157 5 cm) (06/28/19 1959)  Weight - Scale: 72 6 kg (160 lb 0 9 oz) (07/03/19 0500)  SpO2: 98 % (07/03/19 1500)  Exam:   Physical Exam  Patient is awake alert oriented no acute distress  Ecchymosis over right orbit, improving  Left neck incision clean and dry  Lung clear to auscultation bilateral  Heart positive S1-S2 no murmur  Abdomen soft nontender  Lower extremities no edema  Discussion with Family:  Daughter at bedside    Discharge instructions/Information to patient and family:   See after visit summary for information provided to patient and family  Provisions for Follow-Up Care:  See after visit summary for information related to follow-up care and any pertinent home health orders  Planned Readmission:  no     Discharge Statement:  I spent 38 minutes discharging the patient  This time was spent on the day of discharge  I had direct contact with the patient on the day of discharge  Greater than 50% of the total time was spent examining patient, answering all patient questions, arranging and discussing plan of care with patient as well as directly providing post-discharge instructions  Additional time then spent on discharge activities  Discharge Medications:  See after visit summary for reconciled discharge medications provided to patient and family        ** Please Note: This note has been constructed using a voice recognition system **

## 2019-07-03 NOTE — ASSESSMENT & PLAN NOTE
POD#3 Left CEA    Plan:  Continue ASA, Plavix, Lipitor  PT/OT  Case management for rehab planning, insurance auth for [facility] pending  Outpatient follow-up in the Vascular Center

## 2019-07-03 NOTE — CONSULTS
Consultation - Mateusz Tellez 78 y o  female MRN: [id number]    Unit/Bed#: [facility] 457-01 Encounter: [id number]        History of Present Illness     HPI: Mateusz Tellez is a 78y o  year old female with history of HTN, HLD, smoker, previous hyponatremia who presented with extreme fatigue with a hemoglobin of 5 3, RUE weakness/right facial droop  She was transfused  She was out of the window for tPA  CTA of the head/neck showed an area of cortical/subcortical hypoattenuation in the left parietal lobe (subacute to chronic infarct); occluded right internal carotid artery from its origin with retrograde filling of the intracranial supraclinoid right ICA; left cervical carotid bifurcation and proximal cervical internal carotid artery with ulcerated plaque at the bifurcation with 50% stenosis; mild area of groundglass and nodular opacity in the periphery of partially imaged right upper lobe indicating inflammatory or infectious etiology; sphenoid and ethmoidal sinus disease  MRI of the brain showed infarct left parietal/left frontal region  CT chest, abdomen and pelvis showed an area of groundglass density seen in the right upper lobe with micro nodularity, probably on an inflammatory basis; rounded density seen in relation to the pubic symphysis, measuring about 2 7 cm, representing a distended bursa related to arthritic changes in the symphysis pubis less likely due to urethral diverticulum  Neurology, Vascular surgery and H-O saw in consult  She was started on IV iron by H-O   GI saw and cleared her for ASA  She had an EGD/colonoscopy done showing nonbleeding AVM in stomach duodenum which were treated with APC and a polyp in the sigmoid colon that was cold-snared and Hemoclipped  Cardiology saw her for the purposes of cardiac clearance for CEA and ordered a nuclear stress test which was negative  She had a left CEA on 6/28/19 and GI cleared her to have Plavix post-operatively        At home, she takes Lopressor for her HTN  During her hospital stay the Lopressor was stopped  Coreg and Nifedepine were started  Urology saw for 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 found on the CT scan and they advised just to see them back in the office for possible cystogram  She had a fall on 6/24/19 and she hit her head  CT head was negative  She has remaining ecchymosis of the right orbit and right forehead  Denies CP, SOB, dizziness, headache, blurry vision/vision changes  Last BM was today  Appetite good  No c/o pain    ROS:  As in HPI, otherwise negative 12 point ROS       Historical Information   Past Medical History:   Diagnosis Date    Hypertension      Past Surgical History:   Procedure Laterality Date    CAROTID ENDARTARECTOMY Left 6/28/2019    Procedure: ENDARTERECTOMY ARTERY CAROTID WITH PATCH ANGIOPLASTY;  Surgeon: Donna Solis MD;  Location: [facility];  Service: Vascular     Social History   Social History     Substance and Sexual Activity   Alcohol Use Yes    Frequency: Monthly or less    Drinks per session: 1 or 2     Social History     Substance and Sexual Activity   Drug Use Never     Social History     Tobacco Use   Smoking Status Current Every Day Smoker    Packs/day: 0 25    Years: 50 00    Pack years: 12 50    Types: Cigarettes   Smokeless Tobacco Never Used     No family history on file      Meds/Allergies   current meds:  Current Facility-Administered Medications   Medication Dose Route Frequency    [START ON 7/4/2019] aspirin (ECOTRIN LOW STRENGTH) EC tablet 81 mg  81 mg Oral Daily    [START ON 7/4/2019] atorvastatin (LIPITOR) tablet 80 mg  80 mg Oral Daily With Dinner    calcium carbonate (TUMS) chewable tablet 500 mg  500 mg Oral Daily PRN    [START ON 7/4/2019] carvedilol (COREG) tablet 3 125 mg  3 125 mg Oral BID With Meals    [START ON 7/4/2019] clopidogrel (PLAVIX) tablet 75 mg  75 mg Oral Daily    [START ON 7/4/2019] enoxaparin (LOVENOX) subcutaneous injection 40 mg  40 mg Subcutaneous Q24H [address]    [START ON 7/4/2019] ferrous sulfate tablet 325 mg  325 mg Oral BID With Meals    melatonin tablet 6 mg  6 mg Oral HS    [START ON 7/4/2019] NIFEdipine (PROCARDIA XL) 24 hr tablet 30 mg  30 mg Oral Daily    oxyCODONE (ROXICODONE) IR tablet 2 5 mg  2 5 mg Oral Q4H PRN    oxyCODONE (ROXICODONE) IR tablet 5 mg  5 mg Oral Q4H PRN    [START ON 7/4/2019] pantoprazole (PROTONIX) EC tablet 40 mg  40 mg Oral Early Morning       PTA meds:   Medications Prior to Admission   Medication    aspirin 81 mg chewable tablet    atorvastatin (LIPITOR) 40 mg tablet    Garlic 918 MG TABS    metoprolol tartrate (LOPRESSOR) 50 mg tablet     No Known Allergies    Objective   Vitals: Blood pressure 137/65, pulse 76, temperature 97 5 °F (36 4 °C), temperature source Oral, resp  rate 16, height 5' (1 524 m), SpO2 99 %      Physical Exam      Constitutional:  NAD; pleasant; nontoxic appearing  HEENT:  AT/NC; oropharynx negative for thrush on tongue   Neck: normal movement; trachea midline; no JVD; left CEA incision is healing w/o erythema/drainage = does have some mild swelling  CV:  +S1, S2;  RRR; no rub/murmur  Pulmonary:  BBS without crackles/wheeze/rhonci; resp are unlabored w/o use of accessory muscles  Abdominal:  soft, +BS, ND/NT  Skin:  no rashes  Musculoskeletal:  no edema LEs  :  no rosario  Neurological/Psych:  AAO x 3; KAM 4+/5; no depression/anxiety      Lab Results:   Results from last 7 days   Lab Units 07/02/19  0449 07/01/19  0530   WBC Thousand/uL 10 72* 12 41*   HEMOGLOBIN g/dL 8 3* 9 3*   HEMATOCRIT % 30 0* 32 7*   PLATELETS Thousands/uL 278 294     Results from last 7 days   Lab Units 07/02/19  0449 07/01/19  0530   SODIUM mmol/L 140 142   POTASSIUM mmol/L 3 6 3 7   CHLORIDE mmol/L 110* 109*   CO2 mmol/L 24 26   BUN mg/dL 20 12   CREATININE mg/dL 0 80 0 86   CALCIUM mg/dL 8 6 9 4         Results from last 7 days   Lab Units 06/29/19  0430   INR  1 24*         Results from last 7 days   Lab Units 06/28/19  1842   POC GLUCOSE mg/dl 155*       Labs reviewed    Imaging: reviewed  EKG, Pathology, and Other Studies: I have personally reviewed pertinent reports  VTE Prophylaxis: Enoxaparin (Lovenox)    Code Status: Level 1 - Full Code     Reviewed with patient their advanced directive wishes while being in hospital during this encounter  Patient demonstrates understanding of the directives they wish and these are in line with what is noted in the current hospital record  Level 1: Full Code    Assessment/Plan      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; s/p left CEA 6/28/19:  continue ASA/Plavix, Lipitor = for home Cards advised change Lipitor to Crestor 40mg daily  Iron deficiency anemia; s/p transfusion and iron IV: continue iron PO supplements     AVM stomach/duodenum tx with APC; cold snare sigmoid colon polypectomy: continue PPI    HTN: at home was on Lopressor and now is switched to Coreg and Nifedipine    RUL ground glass density: for Pulm to see OP    Urethral diverticulum: to see Uro as OP for cystogram    Nicotine abuse: counseled for cessation    Fall 2/2 LOB 6/24/19: CT head was negative; has remaining ecchymosis right forehead/orbit    ABLA:  still fluctuating bwtn the 8s and 9s; will watch      Counseling / Coordination of Care  Total time spent: At least 60 minutes, with more than 50% spent counseling/coordinating care  Counseling includes discussion with patient re: progress  and discussion with patient of his/her current medical state/information  Coordination of patient's care was performed in conjunction with primary service  Time invested included review of patient's labs, vitals, and management of their comorbidities with continued monitoring  In addition, this patient was discussed with medical team including physician and advanced extenders  The care of the patient was extensively discussed and appropriate treatment plan was formulated unique for this patient     ** Please Note: Dragon 360 Dictation voice to text software may have been used in the creation of this document   **

## 2019-07-03 NOTE — PROGRESS NOTES
Vascular Surgery    Progress Note Winifred Rivera 1940, 78 y o  female MRN: [id number]    Unit/Bed#: [facility] 506-01 Encounter: [id number]    Primary Care Provider: Tali Ortiz DO   Date and time admitted to hospital: 6/19/2019 11:50 AM      Symptomatic stenosis of left carotid artery  Assessment & Plan  POD#3 Left CEA    Plan:  Continue ASA, Plavix, Lipitor  PT/OT  Case management for rehab planning, insurance auth for [facility] pending  [facility]    Anemia, unspecified  Assessment & Plan  AMPARO  Gastric/Duodenal AVMs     S/P EGD argon beam ablation AVM's 6/24    Plan:  Medical Management      Subjective:  Pt doing well, right hand weakness improving    Vitals:  /68   Pulse 74   Temp 98 5 °F (36 9 °C)   Resp 18   Ht 5' 2" (1 575 m)   Wt 72 6 kg (160 lb 0 9 oz)   SpO2 98%   BMI 29 27 kg/m²     I/Os:  I/O last 3 completed shifts: In: 2035 [P O :1740;  I V :30; IV Piggyback:265]  Out: 1000 [Urine:1000]  I/O this shift:  In: -   Out: 1200 [Urine:1200]    Lab Results and Cultures:   Lab Results   Component Value Date    WBC 10 72 (H) 07/02/2019    HGB 8 3 (L) 07/02/2019    HCT 30 0 (L) 07/02/2019    MCV 75 (L) 07/02/2019     07/02/2019     Lab Results   Component Value Date    GLUCOSE 97 01/04/2016    CALCIUM 8 6 07/02/2019     01/04/2016    K 3 6 07/02/2019    CO2 24 07/02/2019     (H) 07/02/2019    BUN 20 07/02/2019    CREATININE 0 80 07/02/2019     Lab Results   Component Value Date    INR 1 24 (H) 06/29/2019    INR 1 14 06/19/2019    PROTIME 15 2 (H) 06/29/2019    PROTIME 14 7 (H) 06/19/2019        Blood Culture: No results found for: BLOODCX,   Urinalysis:   Lab Results   Component Value Date    COLORU Yellow 11/06/2015    CLARITYU Clear 11/06/2015    SPECGRAV 1 015 11/06/2015    PHUR 5 5 11/06/2015    LEUKOCYTESUR Trace (A) 11/06/2015    NITRITE Negative 11/06/2015    PROTEINUA Negative 11/06/2015    GLUCOSEU Negative 11/06/2015    KETONESU Negative 11/06/2015    BILIRUBINUR Negative 11/06/2015    BLOODU Negative 11/06/2015   ,   Urine Culture: No results found for: URINECX,   Wound Culure: No results found for: WOUNDCULT    Medications:  Current Facility-Administered Medications   Medication Dose Route Frequency    aspirin (ECOTRIN LOW STRENGTH) EC tablet 81 mg  81 mg Oral Daily    atorvastatin (LIPITOR) tablet 80 mg  80 mg Oral Daily With Dinner    calcium carbonate (TUMS) chewable tablet 500 mg  500 mg Oral Daily PRN    carvedilol (COREG) tablet 3 125 mg  3 125 mg Oral BID With Meals    clopidogrel (PLAVIX) tablet 75 mg  75 mg Oral Daily    enoxaparin (LOVENOX) subcutaneous injection 40 mg  40 mg Subcutaneous Q24H [address]    ferrous sulfate tablet 325 mg  325 mg Oral BID With Meals    hydrALAZINE (APRESOLINE) injection 10 mg  10 mg Intravenous Q6H PRN    Labetalol HCl (NORMODYNE) injection 10 mg  10 mg Intravenous Q4H PRN    melatonin tablet 6 mg  6 mg Oral HS    NIFEdipine (PROCARDIA XL) 24 hr tablet 30 mg  30 mg Oral Daily    ondansetron (ZOFRAN) injection 4 mg  4 mg Intravenous Q6H PRN    oxyCODONE (ROXICODONE) IR tablet 2 5 mg  2 5 mg Oral Q4H PRN    oxyCODONE (ROXICODONE) IR tablet 5 mg  5 mg Oral Q4H PRN    pantoprazole (PROTONIX) EC tablet 40 mg  40 mg Oral Early Morning    phenol (CHLORASEPTIC) 1 4 % mucosal liquid 1 spray  1 spray Mouth/Throat Q2H PRN       Physical Exam:    General appearance: alert and oriented, in no acute distress  Neurologic: Right hand  improved, LUE/BLE 5/5  Lungs: clear to auscultation bilaterally  Heart: regular rate and rhythm, S1, S2 normal, no murmur, click, rub or gallop  Abdomen: soft, non-tender; bowel sounds normal; no masses,  no organomegaly  Extremities: extremities normal, warm and well-perfused; no cyanosis, clubbing, or edema    Wound/Incision:  Left neck incision clean, dry, and intact    Ted Hathaway PA-C  7/3/2019

## 2019-07-03 NOTE — SOCIAL WORK
Cm received call from Seth Moeller at [facility], insurance approval received and pt will go to room 457 after 2pm today  Report to be called to [phone]

## 2019-07-03 NOTE — PROGRESS NOTES
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  Date of Onset: 6/19/19   Date of surgery: see below:    Procedures:  6/24/19: EGD and colonoscopy  6/28/19: Left carotid endarterectomy with bovine patch angioplasty    PATIENT INFORMATION  Name: Radha King Phone #: [phone] (home)   Address: [address] [place]  YOB: 1940 Age: 78 y o  SS#   Marital Status: Single  Ethnicity:    Employment Status: retired  Extended Emergency Contact Information  Primary Emergency Contact: [address] Phone: [phone]  Relation: Child  Secondary Emergency Contact: Coleen Noland  Mobile Phone: [phone]  Relation: Daughter  Advance Directive: Level 1 Full Code, Unknown Advanced Directive     INSURANCE/COVERAGE:     Primary Payor: Mirna Perez REP / Plan: Nidhi Phillips [facility] REP / Product Type: Medicare PPO /   Secondary Payer: PRIVATE PAY   Payer Contact: Ruthellen Lanes Payer Contact:   Contact Phone: [phone]  Contact Fax: [phone] Contact Phone:   Authorization #: [id number]  Coverage Dates: 7/3/19 - 7/5/19  LCD: 7/5/19 with update due that day  MEDICARE #: [id number]  Medicare Days:  Medical Record #: [id number]    REFERRAL SOURCE:   Referring provider: Lisy Roach DO  Referring facility: [address] [place] [address]   Room: [facility] 506/[facility] [address]  PCP: Darshana Peterson DO PCP phone number: [phone]    MEDICAL INFORMATION  HPI: Patient is a 78year old female who originally presented to the [facility] on 6/19/19  She has fallen multiple times in the last two weeks   On the day of admission, the patient was noted to have right hand weakness as well as a right sided facial droop  She was unable to hold objects in her hand and with increased confusion from her baseline  Upon admission her blood pressure was also noted to be 207/100  Per the family, the patient has also had an upper respiratory tract infection recently and was diagnosed with bronchitis  At this time she had already completed her course of antibiotics  The patient was noted to be out of the window for tPA  The patient did have a positive CT of the head for subacute to chronic stroke as well as complete occlusion of the right carotid  She was also found to have approximately 50% stenosis at the left cervical carotid bifurcation and proximal cervical ICA  In addition, she was found to have a ground-glass appearing nodular area in the right upper lobe  Her lab work was significant for hemoglobin 5 3  Hemoccult negative  She did require 2 units of PRBCs  Vascular recommend dual antiplatelets when cleared by Neurology and Internal Medicine  Patient was to go for a stress test for cardiac clearance for procedure, so that she can undergo a Left CEA  Gastroenterology has been following the patient as well who determined that her anemia is likely secondary to anemia of chronic disease  An abdominal pelvis CT scan demonstrated a small soft tissue lesion underneath the pubic symphysis anterior to the urethra; given the possibility of urethral diverticulum urology was consulted  On 6/21/19 an echo was completed which revealed preserved biventricular systolic function EF 14%, no wall motion abnormality and no significant valvular abnormality  On 6/24/19 it was noted that the patient fell off of the toilet and hit her head  Her right frontal forehead was noted to have a black and blue radha and firm raised bump  On 6/24/19 the patient had both an EGD as well as a colonoscopy completed  She had AVMs in the stomach and duodenum treated with APC (argon plasma coagulation)    There was also a small polyp removed from the colon  On 6/27/19 Vascular was re-consulted secondary to a decline in her hemoglobin (9 1 à 8 1à 7 7)  Per GI there was no evidence of GI bleeding and it was okay to start Plavix post-operatively, as the patient was scheduled for a CEA on 6/28/19  On 6/28/19 the patient had a left carotid endarterectomy with bovine patch angioplasty which was done by Dr Angela Solis  On 6/29/19 the patients hemoglobin was noted to be 6 4  The patient was transfused with 1 unit of PRBCs and his hemoglobin then duke to 7 9  At this time the patients hemoglobin has stabilized and her attending is clearing the patient for discharge to rehab  Insurance authorization has been obtained and the patient will transfer to [address] [place] [address] in [facility] later on today  Past Medical History:   Past Surgical History:    Allergies:     Past Medical History:   Diagnosis Date    Hypertension     Past Surgical History:   Procedure Laterality Date    CAROTID ENDARTARECTOMY Left 6/28/2019    Procedure: ENDARTERECTOMY ARTERY CAROTID WITH PATCH ANGIOPLASTY;  Surgeon: Angela Solis MD;  Location: [facility];  Service: Vascular     No Known Allergies      Comorbidities: right sided weakness, right sided facial droop, left ICA stenosis, tobacco abuse, acute blood loss anemia, gastric/duodenal AVMs s/p ablation, iron deficiency anemia, hypertension    CURRENT VITAL SIGNS:   Temp:  [97 5 °F (36 4 °C)-98 5 °F (36 9 °C)] 97 5 °F (36 4 °C)  HR:  [74-81] 77  Resp:  [16-18] 18  BP: (146-180)/(68-82) 149/68   Intake/Output Summary (Last 24 hours) at 7/3/2019 1312  Last data filed at 7/3/2019 1248  Gross per 24 hour   Intake 1210 ml   Output 1900 ml   Net -690 ml        LABORATORY RESULTS:      Lab Results   Component Value Date    HGB 8 3 (L) 07/02/2019    HGB 10 3 (L) 11/08/2015    HCT 30 0 (L) 07/02/2019    HCT 31 2 (L) 11/08/2015    WBC 10 72 (H) 07/02/2019    WBC 10 52 (H) 11/08/2015     Lab Results   Component Value Date    BUN 20 07/02/2019    BUN 15 01/04/2016     01/04/2016    K 3 6 07/02/2019    K 3 8 01/04/2016     (H) 07/02/2019     (H) 01/04/2016    GLUCOSE 97 01/04/2016    CREATININE 0 80 07/02/2019    CREATININE 1 17 01/04/2016     Lab Results   Component Value Date    PROTIME 15 2 (H) 06/29/2019    INR 1 24 (H) 06/29/2019        DIAGNOSTIC STUDIES:  Cta Head And Neck W Wo Contrast    Result Date: 6/19/2019  Impression: 1  Area of cortical/subcortical hypoattenuation in the left parietal lobe, could represent subacute to chronic infarct  2   Occluded right internal carotid artery from its origin with retrograde filling of the intracranial supraclinoid right ICA  3   Remainder of major vasculature of [place] are patent without critical stenosis  4   Atherosclerotic disease at the left cervical carotid bifurcation and proximal cervical internal carotid artery with ulcerated plaque at the bifurcation  There is approximately 50% stenosis by NASCET  criteria  5   Mild area of groundglass and nodular opacity in the periphery of partially imaged right upper lobe, may indicate inflammatory or infectious etiology  6   Sphenoid and ethmoidal sinus disease as described  I personally discussed this study with Yanet Balbuena on 6/19/2019 at 1:35PM  Workstation performed: [id number]     Ct Head Wo Contrast    Result Date: 6/24/2019  Impression: 1  Right frontal extracranial soft tissue hematoma  No calvarial fracture or acute intracranial injury  2   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   Workstation performed: [id number]     Mri Brain Wo Contrast    Result Date: 6/21/2019  Impression: Superficial cortical infarct in the left parietal region and small deep watershed infarct in the left frontal region representing acute infarct in the distribution left MCA No acute hemorrhage seen No mass effect or midline shift seen Flow void in the petrous and cavernous portion of the right ICA is not identified related to occlusion seen on the recent CT Workstation performed: [id number]     Ct Chest Abdomen Pelvis W Contrast    Result Date: 6/19/2019  Impression: No solid visceral injury seen No free fluid seen Area of groundglass density seen in the right upper lobe with Micro nodularity, probably on an inflammatory basis  Short interval follow-up suggested in 3 months demonstrate resolution Rounded density seen in relation to the pubic symphysis, measuring about 2 7 cm,     This may represent a distended bursa related to arthritic changes in the symphysis pubis less likely due to urethral diverticulum Workstation performed: [id number]     Ct Recon Only Cervical Spine (no Charge)    Result Date: 6/19/2019  Impression: No acute osseous injury in the cervical spine  Workstation performed: [id number]       PRECAUTIONS/SPECIAL NEEDS:  Tobacco:   Social History     Tobacco Use   Smoking Status Current Every Day Smoker    Packs/day: 0 25    Years: 50 00    Pack years: 12 50    Types: Cigarettes   Smokeless Tobacco Never Used   , Alcohol:    Social History     Substance and Sexual Activity   Alcohol Use Yes    Frequency: Monthly or less    Drinks per session: 1 or 2   , Edema Management, Safety Concerns, Pain Management, IV: Type: peripheral Location: right antecubital Reason: medications and fluids, Language Preference: English and fall precautions     MEDICATIONS:     Current Facility-Administered Medications:     aspirin (ECOTRIN LOW STRENGTH) EC tablet 81 mg, 81 mg, Oral, Daily, Irma Gonzalez MD, 81 mg at 07/03/19 0948    atorvastatin (LIPITOR) tablet 80 mg, 80 mg, Oral, Daily With Miky Herrera MD, 80 mg at 07/02/19 1720    calcium carbonate (TUMS) chewable tablet 500 mg, 500 mg, Oral, Daily PRN, Erika Low MD    carvedilol (COREG) tablet 3 125 mg, 3 125 mg, Oral, BID With Meals, Erika Low MD, 3 125 mg at 07/03/19 0732   clopidogrel (PLAVIX) tablet 75 mg, 75 mg, Oral, Daily, Galilea Kulkarni MD, 75 mg at 07/03/19 0948    enoxaparin (LOVENOX) subcutaneous injection 40 mg, 40 mg, Subcutaneous, Q24H [address], Galilea Kulkarni MD, 40 mg at 07/03/19 1902    ferrous sulfate tablet 325 mg, 325 mg, Oral, BID With Meals, Ashleigh Calabrese DO, 325 mg at 07/03/19 0732    hydrALAZINE (APRESOLINE) injection 10 mg, 10 mg, Intravenous, Q6H PRN, Obie Brandt MD, 10 mg at 06/30/19 1442    Labetalol HCl (NORMODYNE) injection 10 mg, 10 mg, Intravenous, Q4H PRN, Obie Brandt MD    melatonin tablet 6 mg, 6 mg, Oral, HS, Galilea Kulkarni MD, 6 mg at 07/02/19 2208    NIFEdipine (PROCARDIA XL) 24 hr tablet 30 mg, 30 mg, Oral, Daily, Obie Brandt MD, 30 mg at 07/03/19 0948    ondansetron (ZOFRAN) injection 4 mg, 4 mg, Intravenous, Q6H PRN, Galilea Kulkarni MD, 4 mg at 06/30/19 2893    oxyCODONE (ROXICODONE) IR tablet 2 5 mg, 2 5 mg, Oral, Q4H PRN, Galilea Kulkarni MD, 2 5 mg at 06/24/19 0535    oxyCODONE (ROXICODONE) IR tablet 5 mg, 5 mg, Oral, Q4H PRN, Galilea Kulkarni MD    pantoprazole (PROTONIX) EC tablet 40 mg, 40 mg, Oral, Early Morning, Galilea Kulkarni MD, 40 mg at 07/03/19 0505    phenol (CHLORASEPTIC) 1 4 % mucosal liquid 1 spray, 1 spray, Mouth/Throat, Q2H PRN, Obie Brandt MD, 1 spray at 06/30/19 1003    SKIN INTEGRITY:   left neck incision - C/D/I    PRIOR LEVEL OF FUNCTION:  She lives in [facility] [facility] - she is on the 5th floor  [facility] is single and lives alone  Self Care: Independent, Indoor Mobility: Independent, Stairs (in/outdoor): Independent and Cognition: Independent    HOME ENVIRONMENT:  The living area: can live on one level  There is a ramp to enter the apartment, and elevator access to the 5th floor  The patient will have 24 hour supervision/physical assistance available upon discharge      Per the daughter, the daughter will be able to provide 24/7 support for the patient at time of discharge  PREVIOUS DME:  Equipment in home (previous DME): None    FUNCTIONAL STATUS:  Physical Therapy Occupational Therapy Speech Therapy   7/1/19    Transfers   Sit to Stand 4  Minimal assistance   Additional items Assist x 1; Increased time required;Verbal cues   Stand to Sit 4  Minimal assistance   Additional items Assist x 1; Increased time required;Verbal cues   Ambulation/Elevation   Gait pattern Excessively slow; Short stride; Inconsistent ishan;Decreased foot clearance  (Right Trendelenberg )   Gait Assistance 4  Minimal assist   Additional items Assist x 1;Verbal cues   Assistive Device Rolling walker   Distance 70 feet, 60 feet  Balance   Static Sitting Fair +   Dynamic Sitting Fair   Static Standing Fair -   Ambulatory Poor +   Activity Tolerance   Activity Tolerance Patient tolerated treatment well;Patient limited by fatigue   Nurse One Viktoria Alberto RN; Flaco Mercado RN  Exercises   TKR Sitting;Bilateral;AROM;10 reps  (x2 sets )   Assessment   Prognosis Good   Problem List Decreased strength;Decreased endurance;Decreased mobility; Impaired balance;Decreased coordination; Impaired judgement;Decreased safety awareness; Obesity   Assessment The pt  was able to progress her ambulatory distance today, but she continues to require instruction and assistance throughout  She did have improved carry-over for instruction for proper use of the rolling walker, but she does continue to require occasional instruction  She also continues to fatigue with ambulation  She remains a high fall risk as she also demonstrated decreased safety awareness and limited insight into her deficits as she is highly impulsive  7/2/19    ADL   Where Assessed Chair   Grooming Assistance 4  Minimal Assistance   Grooming Deficit Brushing hair   Grooming Comments Requires VC and TC for functional grasp patterns   LB Dressing Assistance 4  Minimal Assistance   LB Dressing Deficit Don/doff R sock; Don/doff L sock; Thread RLE into underwear;Pull up over hips; Thread LLE into underwear   LB Dressing Comments Requires VC and TC for functional grasp patterns   Functional Standing Tolerance   Time ~3 mins static and dynmaic standing   Activity SElf care tasks   Comments RW level   Bed Mobility   Additional Comments Pt seated OOB upon presentation and at end of session   Transfers   Sit to Stand 4  Minimal assistance   Additional items Assist x 1; Increased time required;Verbal cues   Stand to Sit 4  Minimal assistance   Additional items Assist x 1; Increased time required;Verbal cues   Stand pivot 4  Minimal assistance   Additional items Assist x 1; Increased time required;Verbal cues   Additional Comments RW level   Coordination   Fine Motor impaired on R   Dexterity impaired on R   In Hand Manipulation impaired on R   Cognition   Overall Cognitive Status Impaired   Arousal/Participation Alert; Cooperative   Attention Attends with cues to redirect   Orientation Level Oriented X4   Memory Decreased recall of recent events;Decreased recall of precautions   Following Commands Follows one step commands without difficulty   Comments DEcreased insight into deficits, requires increased VC and TC throguhout   Activity Tolerance   Activity Tolerance Patient tolerated treatment well   Medical Staff Made Aware NSG aware   Assessment   Assessment Pt was seen this date for OT tx session focusing on self care tasks, tranfers, energy conservation techniques, RUE coordination and education, overall activity tolerance  PT presents seate dOOB in chair, continues to requires min A for funciontal mobility and trnafer tasks with VC at RW level  PT continues to require VC nd TC for funciotnal greasp and release patterns with self care tasks  Continues to requires cues for safety 2* impulsive tendencidies and poor insight into deficts  Pt resting in chair atne end of session with estrada on and all needs in reach   Would benefit from conintued OT tx to improve overall funciontal abilities  CURRENT GAP IN FUNCTION     Prior to Admission:     Functional Status: Patient was independent with mobility/ambulation, transfers, ADL's, IADL's  Estimated length of stay: 10 to 14 days    Anticipated Post-Discharge Disposition/Treatment  Disposition: Return to previous home/apartment  Outpatient Services: Physical Therapy (PT) and Occupational Therapy (OT)    BARRIERS TO DISCHARGE  Lovenox, Weakness, Pain, Balance Difficulty, Fatigue, Financial Resources, Equipment Needs, Resource Availability and Lives Alone    INTERVENTIONS FOR DISCHARGE  Adaptive equipment, Patient/Family/Caregiver Education, [employer], Support Group, Financial Assistance, Arrange DME needs, Home Modifcations, Medication Changes per MD recommendations, Therapy exercises, Center of balance support  and Energy conservation education     REQUIRED THERAPY:  Patient will require PT, OT and ST 60 minutes each per day, five days per week to achieve rehab goals  REQUIRED FUNCTIONAL AND MEDICAL MANAGEMENT FOR INPATIENT REHABILITATION:  Skin:  left neck incision - C/D/I, Pain Management: Overall pain is well controlled, Deep Vein Thrombosis (DVT) Prophylaxis:  lovenox, and further internal medicine management of additional medical conditions while on the ARC, PT/OT/ST intervention, patient/family education and training, and any needed consults PRN    RECOMMENDED LEVEL OF CARE:  Patient is a 78year old female who originally presented to the [facility] on 6/19/19 with complaints of right hand weakness as well as a right sided facial droop  She was unable to hold objects in her hand and with increased confusion from her baseline  After an extensive workup, the patient was found to have a left MCA CVA  Prior to admission, the patient was fully independent with both ADLS and IADLS as well as functional mobility    At this time the patient currently requires min assist with LB ADLS and grooming  In addition, she also requires min assist with transfers and ambulation  With the use of a rolling walker the patient is able to ambulate 70 feet, and then an additional 60 feet  At this time close medical management and PM&R management is recommended for the patient while on the ARC  Participate in the stroke education series as well as the AARPS program is also encouraged  Nursing management will be required to monitor bowel/bladder function to prevent incontinent episodes and skin breakdown as well as education on medication changes  Inpatient acute rehab is recommended at this time for the patient to maximize overall strength, endurance, self care, and mobility upon discharge to home with the support of her family

## 2019-07-04 PROBLEM — I69.359 HEMIPARESIS AND SPEECH AND LANGUAGE DEFICIT AS LATE EFFECT OF CEREBROVASCULAR ACCIDENT (CVA) (HCC): Status: ACTIVE | Noted: 2019-07-04

## 2019-07-04 PROBLEM — Z78.9 IMPAIRED MOBILITY AND ADLS: Status: ACTIVE | Noted: 2019-07-04

## 2019-07-04 PROBLEM — I69.328 HEMIPARESIS AND SPEECH AND LANGUAGE DEFICIT AS LATE EFFECT OF CEREBROVASCULAR ACCIDENT (CVA) (HCC): Status: ACTIVE | Noted: 2019-07-04

## 2019-07-04 PROBLEM — Z74.09 IMPAIRED MOBILITY AND ADLS: Status: ACTIVE | Noted: 2019-07-04

## 2019-07-04 PROCEDURE — 97112 NEUROMUSCULAR REEDUCATION: CPT

## 2019-07-04 PROCEDURE — 99223 1ST HOSP IP/OBS HIGH 75: CPT | Performed by: PHYSICAL MEDICINE & REHABILITATION

## 2019-07-04 PROCEDURE — 97167 OT EVAL HIGH COMPLEX 60 MIN: CPT

## 2019-07-04 PROCEDURE — 97112 NEUROMUSCULAR REEDUCATION: CPT | Performed by: PHYSICAL THERAPIST

## 2019-07-04 PROCEDURE — 97535 SELF CARE MNGMENT TRAINING: CPT

## 2019-07-04 PROCEDURE — 97163 PT EVAL HIGH COMPLEX 45 MIN: CPT | Performed by: PHYSICAL THERAPIST

## 2019-07-04 PROCEDURE — 97530 THERAPEUTIC ACTIVITIES: CPT | Performed by: PHYSICAL THERAPIST

## 2019-07-04 PROCEDURE — 97116 GAIT TRAINING THERAPY: CPT | Performed by: PHYSICAL THERAPIST

## 2019-07-04 RX ORDER — POLYETHYLENE GLYCOL 3350 17 G/17G
17 POWDER, FOR SOLUTION ORAL DAILY PRN
Status: DISCONTINUED | OUTPATIENT
Start: 2019-07-04 | End: 2019-07-22 | Stop reason: HOSPADM

## 2019-07-04 RX ORDER — DOCUSATE SODIUM 100 MG/1
100 CAPSULE, LIQUID FILLED ORAL 2 TIMES DAILY
Status: DISCONTINUED | OUTPATIENT
Start: 2019-07-04 | End: 2019-07-22 | Stop reason: HOSPADM

## 2019-07-04 RX ORDER — SENNOSIDES 8.6 MG
2 TABLET ORAL
Status: DISCONTINUED | OUTPATIENT
Start: 2019-07-04 | End: 2019-07-22 | Stop reason: HOSPADM

## 2019-07-04 RX ADMIN — DOCUSATE SODIUM 100 MG: 100 CAPSULE, LIQUID FILLED ORAL at 17:17

## 2019-07-04 RX ADMIN — ENOXAPARIN SODIUM 40 MG: 40 INJECTION SUBCUTANEOUS at 08:22

## 2019-07-04 RX ADMIN — Medication 325 MG: at 17:13

## 2019-07-04 RX ADMIN — CARVEDILOL 3.12 MG: 3.12 TABLET, FILM COATED ORAL at 07:34

## 2019-07-04 RX ADMIN — PANTOPRAZOLE SODIUM 40 MG: 40 TABLET, DELAYED RELEASE ORAL at 05:42

## 2019-07-04 RX ADMIN — ASPIRIN 81 MG: 81 TABLET, COATED ORAL at 08:21

## 2019-07-04 RX ADMIN — MELATONIN 6 MG: at 21:39

## 2019-07-04 RX ADMIN — CARVEDILOL 3.12 MG: 3.12 TABLET, FILM COATED ORAL at 17:13

## 2019-07-04 RX ADMIN — Medication 325 MG: at 07:34

## 2019-07-04 RX ADMIN — SENNOSIDES 17.2 MG: 8.6 TABLET, FILM COATED ORAL at 21:39

## 2019-07-04 RX ADMIN — ATORVASTATIN CALCIUM 80 MG: 80 TABLET, FILM COATED ORAL at 17:13

## 2019-07-04 RX ADMIN — CLOPIDOGREL BISULFATE 75 MG: 75 TABLET ORAL at 08:22

## 2019-07-04 RX ADMIN — NIFEDIPINE 30 MG: 30 TABLET, FILM COATED, EXTENDED RELEASE ORAL at 08:22

## 2019-07-04 NOTE — PLAN OF CARE
Problem: Potential for Falls  Goal: Patient will remain free of falls  Description  INTERVENTIONS:  - Assess patient frequently for physical needs  -  Identify cognitive and physical deficits and behaviors that affect risk of falls    -  [place] fall precautions as indicated by assessment   - Educate patient/family on patient safety including physical limitations  - Instruct patient to call for assistance with activity based on assessment  - Modify environment to reduce risk of injury  - Consider OT/PT consult to assist with strengthening/mobility  Outcome: Progressing

## 2019-07-04 NOTE — PROGRESS NOTES
PT Evaluation       07/04/19 1500   Patient Data   Rehab Impairment Right body involvement (left brain)   Etiologic Diagnosis L MCA CVA; L CEA   Date of Onset 06/19/19   Home Setup   Type of Home Apartment   Method of Entry Ramp   Number of Stairs 0   Number of Stairs in Home 0   First Floor Bathroom Full   First Floor Setup Available Yes   Home Modifications Necessary? No   Prior Level of Function   Indoor-Mobility (Ambulation) 3  Independent - Patient completed the activities by him/herself, with or without an assistive device, with no assistance from a helper  Stairs 3  Independent - Patient completed the activities by him/herself, with or without an assistive device, with no assistance from a helper  Restrictions/Precautions   Precautions Fall Risk;Cognitive;Supervision on toilet/commode   Pain Assessment   Pain Assessment No/denies pain   Pain Score No Pain   QI: Roll Left and Right   Assistance Needed Physical assistance   Assistance Provided by [place] Less than 25%   Roll Left and Right CARE Score 3   QI: Sit to [address] Provided by [place] No physical assistance   Sit to Lying CARE Score 4   QI: Lying to Sitting on Side of Bed   Assistance Needed Supervision   Assistance Provided by [place] No physical assistance   Lying to Sitting on Side of Bed CARE Score 4   QI: Sit to Stand   Assistance Needed Incidental touching   Assistance Provided by [place] No physical assistance   Sit to Stand CARE Score 4   QI: Chair/Bed-to-Chair Transfer   Assistance Needed Physical assistance   Assistance Provided by [place] 25%-49%   Chair/Bed-to-Chair Transfer CARE Score 3   QI: Car Transfer   Assistance Needed Incidental touching   Assistance Provided by [place] No physical assistance   Car Transfer CARE Score 4   Transfer Bed/Chair/Wheelchair   Positioning Concerns Cognition; Hemiplegia   Limitations Noted In Balance; Coordination;LE Strength;UE Strength;Problem Solving; Sequencing;Sensation   Adaptive Equipment Roller Walker   Stand Pivot Minimal Assist   Sit to Stand Minimal   Stand to Sit Minimal   Supine to Sit Supervision   Sit to Supine Supervision   Findings overall min A w/ RW    Bed, Chair, Wheelchair Transfer (FIM) 4 - Patient completes 75% of all tasks   QI: Walk 10 Feet   Assistance Needed Physical assistance   Assistance Provided by [place] 50%-74%   Comment no AD   Walk 10 Feet CARE Score 2   QI: Walk 50 Feet with Two Turns   Assistance Needed Physical assistance; Adaptive equipment   Assistance Provided by Penelope Less than 25%   Walk 50 Feet with Two Turns CARE Score 3   QI: Walk 150 Feet   Assistance Needed Physical assistance; Adaptive equipment   Assistance Provided by Penelope Less than 25%   Walk 150 Feet CARE Score 3   QI: Walking 10 Feet on Uneven Surfaces   Reason if not Attempted Safety concerns   Walking 10 Feet on Uneven Surfaces CARE Score 88   Ambulation   Does the patient walk? 2  Yes   Primary Discharge Mode of Locomotion Walk   Walk Assist Level Moderate Assist;Minimum Assist;Contact Guard   Gait Pattern Trendelenburg; Inconsistant Dolly; Slow Dolly;R hemiparesis; Decreased R stance; Improper weight shift   Assist Device Renee Whiteside Walked (feet) 200 ft   Limitations Noted In Balance; Coordination;Device Management; Endurance; Heel Strike;Posture;Sensation; Sequencing;Speed;Strength;Swing   Findings pt amb 10 ft w/ HHA @ mod A level  w/ RW pt is able to ambulate CG/min A   Walking (FIM) 1 - Patient ambulates less than 49 feet regardless of assist/device/set up   QI: Wheel 50 Feet with Two Turns   Reason if not Attempted Activity not applicable   Wheel 50 Feet with Two Turns CARE Score 9   QI: Wheel 150 Feet   Reason if not Attempted Activity not applicable   Wheel 034 Feet CARE Score 9   Wheelchair mobility   QI: Does the patient use a wheelchair? 0   No   QI: 1 Step (Curb)   Reason if not Attempted Safety concerns   1 Step (Curb) CARE Score 88   QI: 4 Steps   Assistance Needed Physical assistance   Assistance Provided by [place] 25%-49%   4 Steps CARE Score 3   QI: 12 Steps   Reason if not Attempted Safety concerns   12 Steps CARE Score 88   Stairs   Type Bear Dance Steps   # of Steps 4   Assist Devices Bilateral Rail   Findings min A    Stairs (FIM) 2 - Patient goes up and down 4 - 11 stairs regardless of assist/device/setup   Comprehension   QI: Comprehension 3  Usually Understands: Understands most conversations, but misses some part/intent of message  Requires cues at times to understand  Comprehension (FIM) 4 - Understands basic info/conversation 75-90% of time   Expression   QI: Expression 4  Express complex messages without difficulty and with speech that is clear and easy to [place]   Expression (FIM) 5 - Needs help/cues only RARELY (< 10% of the time)   Social Interaction   Social Interaction (FIM) 6 - Interacts appropriately with others BUT requires extra  time   Problem Solving   Problem solving (FIM) 3 - Solves basic problmes 50-74% of time   Memory   Memory (FIM) 4 - Recognizes/recalls/performs 75-89%   RLE Assessment   RLE Assessment X  (grossly 3/5)   LLE Assessment   LLE Assessment   (grossly 3+/5)   Coordination   Movements are Fluid and Coordinated 0   Coordination and Movement Description dysmetria w/ R finger to nose and R heel to shin  Unable to perform alternating toe taps  Sensation   Light Touch Partial deficits in the RLE   Propioception Partial deficits in the RLE   Cognition   Arousal/Participation Alert; Cooperative   Perception   Inattention/Neglect Cues to attend to right side of body   Objective Measure   PT Measure(s) TUG= 24 sec w/ RW , indicative of high fall risk      PT Findings Interventions: Nustep L1x 10 min, standing marching w/ LLE to increase WBing RLE, stepping w/ RLE onto 8" step and back down to ground, supine R hip abduction, supine R heel slides, sidelying R clamshells, bridges, R single leg bridges Discharge Information   Patient's Discharge Plan home, able to get 24/7 S from daughter if needed   Patient's Rehab Expectations to walk better and go home  Barriers to Discharge Home Limited Family Support;Decreased Cognitive Function;Decreased Strength;Decreased Endurance; Safety Considerations   Impressions Pt is 77 y/o female admitted to [facility] s/p L MCA CVA  She presents w/ R sided weakness, impaired sensation, impaired proprioception, impaired balance, impaired coordination, visual deficits, impaired gait pattern  PLOF was I  She presents w/ significant (+) trendelenberg gait pattern as well as R hip externally rotated occasionally kicking into RW  Trialed gait training w/o AD, increased instability noted, required mod A - pt did better w/ L handrail in hallway  Due to impaired proprioception she has difficulty following verbal commands for exercises and does better w/ demonstration or tactile cueing  Based on her TUG score she is considered high fall risk @ this time  Pt demonstrates good rehab potential to meet S level goals; estimated LOS 2 wks  Pt requires skilled PT services to address above impairments and maximize CVA recovery      PT Therapy Minutes   PT Time In 1230   PT Time Out 1400   PT Total Time (minutes) 90   PT Mode of treatment - Individual (minutes) 90   PT Mode of treatment - Concurrent (minutes) 0   PT Mode of treatment - Group (minutes) 0   PT Mode of treatment - Co-treat (minutes) 0   PT Mode of Teatment - Total time(minutes) 90 minutes

## 2019-07-04 NOTE — PROGRESS NOTES
07/04/19 0830   Patient Data   Rehab Impairment  Right Body Involvement (Left Brain)   Etiologic Diagnosis L MCA CVA; L CEA   Date of Onset 06/19/19   Support System   Relationship Daughters   Home Setup   Type of Home Apartment   Method of Entry Ramp   Number of Stairs 0   Number of Stairs in Home 0   First Floor Bathroom Full;Tub; Shower;Combo;Grab Bars   First Floor Bathroom Accessibility Grab bars by toilet;Grab bars in tub/shower   First Floor Setup Available Yes   Home Modifications Necessary? No   Home Modification Comment Pt lives at [facility]   Prior IADL Participation   Money Management Identify Money;Estimate Costs;Estimate Change;Combine Bills;Manage Checkbook   Meal Preparation Full Participation   Laundry Full Participation   Home Cleaning Full Participation   Prior Level of Function   Self-Care 3  Independent - Patient completed the activities by him/herself, with or without an assistive device, with no assistance from a helper  Indoor-Mobility (Ambulation) 3  Independent - Patient completed the activities by him/herself, with or without an assistive device, with no assistance from a helper  Functional Cognition 3  Independent - Patient completed the activities by him/herself, with or without an assistive device, with no assistance from a helper  Prior Assistance Needed for Driving; Shopping   Pain Assessment   Pain Assessment No/denies pain   Pain Score No Pain   QI: Eating   Assistance Needed Set-up / clean-up   Assistance Provided by [place] No physical assistance   Eating CARE Score 5   Eating Assessment   Food To Mouth Yes   Able To Cut Yes   Positioning Upright   Safety Needs Increase Time   Meal Assessed Breakfast   QI: Swallowing/Nutritional Status Regular food   Current Diet Regular   Intake Mode PO   Dentures Upper   Finishes Timely Yes   Opens Packages Yes   Eating (FIM) 5 - Patient needs help to open contianers or set up tray   QI: Oral Hygiene   Assistance Needed Physical assistance   Assistance Provided by [place] 25%-49%   Oral Hygiene CARE Score 3   Grooming   Able To Initiate Tasks; Acquire Items;Comb/Brush Hair;Wash/Dry Face;Brush/Clean Teeth;Wash/Dry Hands   Limitation Noted In Coordination;Problem Solving;Neglect; Safety; Sequencing;Strength;Timeliness   Grooming (FIM) 4 - Patient completed  4/5 tasks   QI: Shower/Bathe Self   Assistance Needed Physical assistance   Assistance Provided by [place] 25%-49%   Shower/Bathe Self CARE Score 3   Bathing   Assessed Bath Style Sponge Bath   Anticipated D/C Bath Style Tub   Able to Gather/Transport No   Able to [employer] Temperature Yes   Able to Wash/Rinse/Dry (body part) Left Arm;Right Arm;L Upper Leg;R Upper Leg;Abdomen;Perineal Area; Buttocks; Chest   Limitations Noted in Balance; Coordination; Endurance;Neglect;Problem Solving;ROM;Safety; Sequencing;Strength;Timeliness;Vision   Positioning Seated   Bathing (FIM) 4 - Patient completes 8/10 or 9/10 parts   QI: Upper Body Dressing   Assistance Needed Physical assistance   Assistance Provided by [place] 25%-49%   Upper Body Dressing CARE Score 3   QI: Lower Body Dressing   Assistance Needed Physical assistance   Assistance Provided by [place] 25%-49%   Lower Body Dressing CARE Score 3   QI: Putting On/Taking Off Footwear   Reason if not Attempted Activity not applicable   Putting On/Taking Off Footwear CARE Score 9   QI: Picking Up Object   Reason if not Attempted Safety concerns   Picking Up Object CARE Score 88   Dressing/Undressing Clothing   Remove UB Clothes Other  (Nightgown)   Remove LB Clothes Socks; [address] LB Clothes Pants; Undergarment;Socks   Limitations Noted In Balance; Coordination;Buttoning; Endurance;Neglect;Problem Solving; Safety; Sequencing;Strength;ROM; Vision;Timeliness   Positioning Supported Sit   UB Dressing (FIM) 4 - Patient completes 75% of all tasks   LB Dressing (FIM) 4 - Patient completes 75% of all tasks   QI: Toileting Hygiene   Assistance Needed Physical assistance   Assistance Provided by [place] 25%-49%   Toileting Hygiene CARE Score 3   Toileting   Able to [address] down yes, up yes  Able to Manage Clothing Closures No   Manage Hygiene Bladder   Limitations Noted In Balance; Coordination;Problem Solving;ROM;Safety; Sequencing;UE Strength;LE Strength   Adaptive Equipment Grab Bar   Toileting (FIM) 4 - Patient completes 75% of all tasks   QI: Roll Left and Right   Assistance Needed Incidental touching   Assistance Provided by [place] Less than 25%   Roll Left and Right CARE Score 3   Bed Mobility   Able to Roll Left to Right;Right to Left   Adaptive Equipment Side Rails   QI: Sit to Lying   Reason if not Attempted Activity not applicable   Sit to Lying CARE Score 9   QI: Lying to Sitting on Side of Bed   Assistance Needed Incidental touching   Assistance Provided by [place] Less than 25%   Lying to Sitting on Side of Bed CARE Score 3   QI: Sit to Stand   Assistance Needed Physical assistance   Assistance Provided by [place] 25%-49%   Sit to Stand CARE Score 3   QI: Chair/Bed-to-Chair Transfer   Assistance Needed Physical assistance   Assistance Provided by [place] 25%-49%   Chair/Bed-to-Chair Transfer CARE Score 3   QI: Car Transfer   Reason if not Attempted Activity not applicable   Car Transfer CARE Score 9   Transfer Bed/Chair/Wheelchair   Positioning Concerns Cognition; Hemiplegia; Neglect   Limitations Noted In Balance;Confidence; Coordination; Endurance;Problem Solving;Sensation;UE Strength; Sequencing;LE Strength   Adaptive Equipment Roller Walker   Stand Pivot Minimal Assist   Sit to Stand Minimal   Stand to Sit Minimal   Supine to Sit Minimal   Bed, Chair, Wheelchair Transfer (FIM) 4 - Patient completes 75% of all tasks   QI: Toilet Transfer   Assistance Needed Physical assistance   Assistance Provided by [place] 25%-49%   Toilet Transfer CARE Score 3   Toilet Transfer   Surface Assessed Standard Toilet   Transfer Technique Stand Pivot   Limitations Noted In Balance; Endurance;Problem Solving;ROM;Safety; Sensation; Sequencing;UE Strength;LE Strength;Confidence   Adaptive Equipment Grab Bar   Positioning Concerns Grab Bars;Cognition; Safety   Toilet Transfer (FIM) 4 - Patient completes 75% of all tasks   Tub/Shower Transfer   Not Assessed Sponge Bath;Safety   Comprehension   Assist Devices Glasses   Auditory Basic   Visual Basic   QI: Comprehension 3  Usually Understands: Understands most conversations, but misses some part/intent of message  Requires cues at times to understand  Comprehension (FIM) 4 - Understands basic info/conversation 75-90% of time   Expression   Verbal Basic   Non-Verbal Basic   Intelligibility Sentence   QI: Expression 4  Express complex messages without difficulty and with speech that is clear and easy to [place]   Expression (FIM) 5 - Needs help/cues only RARELY (< 10% of the time)   Social Interaction   Cooperation with staff   Participation Individual   Behaviors observed Appropriate   Social Interaction (FIM) 6 - Interacts appropriately with others BUT requires extra  time   Problem Solving   Routine Manages call bell;Manages ADL   Problem solving (FIM) 3 - Solves basic problmes 50-74% of time   Memory   Recognize People Yes   Remember Routine Yes   Initiates Tasks No   Short-Term Impaired   Long Term Intact   Recalls Precaution No   Memory (FIM) 4 - Recognizes/recalls/performs 75-89%   RUE Assessment   RUE Assessment X   Strength - RUE   R Gross Arm Strength   (4-5 strength at shldr, forearm, wrist and hand; elbow 5/5 )   LUE Assessment   LUE Assessment X   Strength - LUE   L Gross Arm Strength 5/5  (Shldr 4+/5)   Coordination   Movements are Fluid and Coordinated 0   Coordination and Movement Description Slowed and rigid movements with RUE  Decreased motor control, incoordination  + pronator drift on RUE  Decreased release with R hand      Sensation   Light Touch Severe deficits in the RUE   Stereognosis Partial deficits in the RUE   Propioception Severe deficits in the RUE   Cognition   Overall Cognitive Status Impaired   Arousal/Participation Alert; Cooperative   Attention Attends with cues to redirect   Orientation Level Oriented to person;Oriented to place;Oriented to situation   Memory Decreased recall of recent events;Decreased recall of precautions   Following Commands Follows one step commands without difficulty   Vision   Occulomotor Tracking   Visual Field Cut Education; Compensatory techniques   Visual Field Cut Comments Inconsistent responses, but L field deficit appears present with testing    Vision Comments L eye cataracts   Objective Measure   OT Findings Pt is a 77 y/o F s/p L MCA CVA and ICA occulasion who is currently limited with ADLs, txfrs and IADLs due to impaired RUE strength, sensation  and coordination  Pt also with decreased awareness of deficits and poor insight to limitiations which further impairs safety with functional tasks  Additionally, limitations with visual skills noted further imparing overall participation with functional mob  Pt at min A level for ADLs and txfrs and likely to achieve S level at time of D/C  Pt can have 24 hr S from daughter  Pt is an excellent rehab candidate and would benefit from OT as per POC detailed      OT Therapy Minutes   OT Time In 0830   OT Time Out 1000   OT Total Time (minutes) 90   OT Mode of treatment - Individual (minutes) 90   OT Mode of treatment - Concurrent (minutes) 0   OT Mode of treatment - Group (minutes) 0   OT Mode of treatment - Co-treat (minutes) 0   OT Mode of Teatment - Total time(minutes) 90 minutes

## 2019-07-04 NOTE — PROGRESS NOTES
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; s/p left CEA 6/28/19:  continue ASA/Plavix, Lipitor = for home Cards advised change Lipitor to Crestor 40mg daily      Iron deficiency anemia; s/p transfusion and iron IV: continue iron PO supplements      AVM stomach/duodenum tx with APC; cold snare sigmoid colon polypectomy: continue PPI     HTN: at home was on Lopressor and now is switched to Coreg and Nifedipine     RUL ground glass density: for Pulm to see OP     Urethral diverticulum: to see Uro as OP for cystogram     Nicotine abuse: counseled for cessation     Fall 2/2 LOB 6/24/19: CT head was negative; has remaining ecchymosis right forehead/orbit     ABLA:  still fluctuating bwtn the 8s and 9s; will watch      Subjective/ HPI: Patient seen and examined  Patients overnight issues or events were reviewed with nursing or staff during rounds or morning huddle session  New or overnight issues include the following: HTN: Currently well controlled  Continue present medications  CVA: No new stroke symptoms  Continue antiplatelet and statin  Anemia: Will continue to monitor  Pt currently reports that she is doing well      ROS:     GI: denies abdominal pain, change bowel habits or reflux symptoms  Neuro: Denies any headache, new vision changes, new neuropathies,new weaknesses   Respiratory: No Cough, SOB, denies wheeze  Cardiovascular: No CP, palpitations , denies perception of rapid heartbeat  : denies any new urinary burning or frequency    Review of Scheduled Meds:    Current Facility-Administered Medications:  aspirin 81 mg Oral Daily Ed Arreola MD   atorvastatin 80 mg Oral Daily With Carly Aguillon MD   calcium carbonate 500 mg Oral Daily PRN Darrick Garcia MD   carvedilol 3 125 mg Oral BID With Meals Magalis Montaño MD   clopidogrel 75 mg Oral Daily Ed Arreola MD   enoxaparin 40 mg Subcutaneous Q24H [address] Ed Arreola MD   ferrous sulfate 325 mg Oral BID With Meals Magalis Montaño MD   melatonin 6 mg Oral HS Ed Arreola MD   NIFEdipine 30 mg Oral Daily Ed Arreola MD   oxyCODONE 2 5 mg Oral Q4H PRN Ed Arreola MD   oxyCODONE 5 mg Oral Q4H PRN Ed Arreola MD   pantoprazole 40 mg Oral Early Morning Magalis Montaño MD       Labs:     Results from last 7 days   Lab Units 07/02/19  0449 07/01/19  0530   WBC Thousand/uL 10 72* 12 41*   HEMOGLOBIN g/dL 8 3* 9 3*   HEMATOCRIT % 30 0* 32 7*   PLATELETS Thousands/uL 278 294     Results from last 7 days   Lab Units 07/02/19  0449 07/01/19  0530   SODIUM mmol/L 140 142   POTASSIUM mmol/L 3 6 3 7   CHLORIDE mmol/L 110* 109*   CO2 mmol/L 24 26   BUN mg/dL 20 12   CREATININE mg/dL 0 80 0 86   CALCIUM mg/dL 8 6 9 4         Results from last 7 days   Lab Units 06/29/19  0430   INR  1 24*          Results from last 7 days   Lab Units 06/28/19  1842   POC GLUCOSE mg/dl 155*       Imaging:     No orders to display       *Labs reviewed  *Radiology studies reviewed  *Medications reviewed and reconciled as needed  *Please refer to order section for additional ordered labs studies  *Case discussed with primary attending during morning huddle case rounds    Physical Examination:  Vitals:   Vitals:    07/03/19 1900 07/04/19 0515 07/04/19 0600 07/04/19 0823   BP: 137/65 144/64  143/63   BP Location: Right arm Left arm  Left arm   Pulse: 76 94  86   Resp: 16 20     Temp: 97 5 °F (36 4 °C) 97 6 °F (36 4 °C)     TempSrc: Oral Oral     SpO2:  96%     Weight:   71 8 kg (158 lb 4 6 oz)    Height: 5' (1 524 m)          General Appearance: NAD, conversive  Eyes: No icterus; conjunctiva normal, PERRLA  HENT: oropharynx clear; mucous membranes moist; no ulcerations, normal hard and soft palette  Neck: trachea midline, range of motion full  Supple, no lymphadenopathy or thyromegaly  Lungs: CTA, normal respiratory effort, no retractions, expiratory effort normal  CV: regular rate, no rubs no gallops, PMI normal place and intensity  ABD: soft non tender, no masses , no hepatic or splenomegaly  EXT: DP pulses intact, no lymphadenopathy, no edema  Skin: normal turgor, normal texture, no rash, no ulcers  Psych: affect normal, No anxiety,   Neuro: AAOx3            Total time spent: At least 40 minutes, with more than 50% spent counseling/coordinating care  Counseling includes discussion with patient re: progress  and discussion with patient of his/her current medical state/information  Coordination of patient's care was performed in conjunction with primary service  Time invested included review of patient's labs, vitals, and management of their comorbidities with continued monitoring  In addition, this patient was discussed with medical team including physician and advanced extenders  The care of the patient was extensively discussed and appropriate treatment plan was formulated unique for this patient  ** Please Note: Dragon 360 Dictation voice to text software may have been used in the creation of this document   **

## 2019-07-04 NOTE — PLAN OF CARE
Problem: SAFETY ADULT  Goal: Maintain or return to baseline ADL function  Description  INTERVENTIONS:  -  Assess patient's ability to carry out ADLs; assess patient's baseline for ADL function and identify physical deficits which impact ability to perform ADLs (bathing, care of mouth/teeth, toileting, grooming, dressing, etc )  - Assess/evaluate cause of self-care deficits   - Assess range of motion  - Assess patient's mobility; develop plan if impaired  - Assess patient's need for assistive devices and provide as appropriate  - Encourage maximum independence but intervene and supervise when necessary  ¯ Involve family in performance of ADLs  ¯ Assess for home care needs following discharge   ¯ Request OT consult to assist with ADL evaluation and planning for discharge  ¯ Provide patient education as appropriate  Outcome: Progressing

## 2019-07-04 NOTE — ASSESSMENT & PLAN NOTE
· Secondary to recent stroke  · Acute comprehensive interdisciplinary inpatient rehabilitation including PT, OT, SLP, RN, CM, SW, dietary, psychology, etc   · Patient will require supervision/assistance at home due to deficits from recent stroke   Discussed extensively with family; patient is to d/c home with daughter

## 2019-07-04 NOTE — ASSESSMENT & PLAN NOTE
Temp:  [97 5 °F (36 4 °C)-98 4 °F (36 9 °C)] 97 9 °F (36 6 °C)  HR:  [60-66] 66  Resp:  [16-18] 18  BP: (122-166)/(56-77) 122/56    · Carvedilol  · Hydralazine prn  · IM service managing anti-hypertensives

## 2019-07-04 NOTE — ASSESSMENT & PLAN NOTE
Results from last 7 days   Lab Units 07/22/19  0458 07/19/19  1019 07/18/19  0541   HEMOGLOBIN g/dL 8 8* 9 5* 8 1*     · Continue ferrous sulfate  · Monitor CBC intermittently  · Transfuse for Hgb <7    · Transusion performed on 7/11, with appropriate response noted

## 2019-07-04 NOTE — H&P
PHYSICAL MEDICINE AND REHABILITATION H&P/ADMISSION NOTE  Malachi Whitaker 78 y o  female MRN: [id number]  Unit/Bed#: -01 Encounter: [id number]     Rehab Diagnosis: Impairment of mobility, safety and Activities of Daily Living (ADLs) due to Stroke:  01 2  Right Body Involvement (Left Brain)    History of Present Illness:   Malachi Whitaker is a 78 y o  female who presented to the [facility] with right sided weakness, right facial droop  Imaging subsequently revealed left sided infarcts to the parietal and frontal regions  In addition patient noetd to have ulcerated plaque at the left cervical carotied bifurcation; CTA revealed a right ICA occlusion  Vascular surgery was consulted, and recommendation made for a left CEA, performed on 6/28  During admission patient was noted to have ABLA, for which scope was performed revealing AVMs in the stomach and duodenum; argon plasma coagulation was performed  She was also transfused at the time  Of note, during her acute admission patient had a fall off of comode; CTOH showed right frontal hematoma, but no active bleed  Patient was found to be below functional baseline, and accepted to [facility] on 7/03/19  Subjective: Patient denies any CP or SOB  Reports feeling well, and excited for rehab  Denies any headaches  No parasthesias, numbness/tingling  Review of Systems: A 10-point review of systems was performed  Negative except as listed above      Plan:     * Impaired mobility and ADLs  Assessment & Plan  · Secondary to recent stroke  · Acute comprehensive interdisciplinary inpatient rehabilitation including PT, OT, SLP, RN, CM, SW, dietary, psychology, etc         Symptomatic stenosis of left carotid artery  Assessment & Plan  · S/p CEA on 6/28  · Monitor for hematoma formation, incision care    Anemia, unspecified  Assessment & Plan  Results from last 7 days   Lab Units 07/02/19  0449 07/01/19  0530 06/30/19  0512   HEMOGLOBIN g/dL 8 3* 9 3* 8 4* · Continue ferrous sulfate  · Monitor CBC intermittently; recheck in AM  · Transfuse for Hgb <7      CVA (cerebral vascular accident) ([facility] [address] )  Assessment & Plan  · ASA, Plavix  · Statin  · Neurology f/u as outpatient      Hyperlipidemia  Assessment & Plan  · Continue statin    Essential hypertension  Assessment & Plan  Temp:  [97 3 °F (36 3 °C)-97 6 °F (36 4 °C)] 97 6 °F (36 4 °C)  HR:  [74-94] 86  Resp:  [16-20] 20  BP: (122-144)/(62-65) 143/63    · Carvedilol  · Hydralazine prn  · IM service managing anti-hypertensives    # Skin  · Encourage regular turning as patient at risk for skin breakdown  · Staff to continue patient education on Q2h turning  · Rehabilitation team to perform skin checks regularly     # Bowel  · Patient reports no constipation  · To ensure regular BMs, bowel regimen consisting of:  colace , dulcolax suppository  and miralax     # Bladder  · Patient voiding spontaneously    # Pain  · Continue oxycodone     # Rehab Psych   · There are no psychological or psychiatric problems identified    # Other  - Diet/Nutrition:        Diet Orders   (From admission, onward)            Start     Ordered    07/03/19 1804  Diet Regular; Regular House  Diet effective now     Question Answer Comment   Diet Type Regular    Regular Regular House    RD to adjust diet per protocol?  Yes        07/03/19 1803        - DVT prophy: Sequential compression device (Venodyne)  and Enoxaparin (Lovenox)  - GI ppx: Pantaprazole  - Nausea: None  - Supplements: Calcium  - Sleep: Melatonin    Disposition: TBD    CODE: Level 1: Full Code   Drug regimen reviewed, all potential adverse effects identified and addressed:    Scheduled Meds:    Current Facility-Administered Medications:  aspirin 81 mg Oral Daily Ed Arreola MD   atorvastatin 80 mg Oral Daily With RAJENDRA Wesley MD   calcium carbonate 500 mg Oral Daily PRN Ed Arreola MD   carvedilol 3 125 mg Oral BID With Meals Lowell Jackson MD   clopidogrel 75 mg Oral Daily Ed Arreola MD   docusate sodium 100 mg Oral BID Amina Whipple PA-C   enoxaparin 40 mg Subcutaneous Q24H [address] dE Arreola MD   ferrous sulfate 325 mg Oral BID With Meals Ed Arreola MD   melatonin 6 mg Oral HS Ed Arreola MD   NIFEdipine 30 mg Oral Daily Ed Arreola MD   oxyCODONE 2 5 mg Oral Q4H PRN Ed Arreola MD   oxyCODONE 5 mg Oral Q4H PRN Ed Arreola MD   pantoprazole 40 mg Oral Early Morning Ed Arreola MD   polyethylene glycol 17 g Oral Daily PRN Amina Whipple PA-C   senna 2 tablet Oral HS Amina Whipple PA-C        Restrictions include:  none Fall precautions      Functional History - Prior to Admission:      Functional Status: Patient was independent with mobility/ambulation, transfers, ADL's, IADL's  Functional Status Upon Admission to [facility]  Mobility: min transfers  Transfers: min transfers  ADLs: min transfers    Micheline lives alone  [facility]  The living area: ramped  Equipment in home: None  There No steps to enter the home  Patient/family's goals: Return to previous home/apartment  The patient will have 24 hour supervision/physical assistance available upon discharge      Physical Exam:  Temp:  [97 3 °F (36 3 °C)-97 6 °F (36 4 °C)] 97 6 °F (36 4 °C)  HR:  [74-94] 86  Resp:  [16-20] 20  BP: (122-144)/(62-65) 143/63  SpO2:  [96 %-99 %] 96 %    General: alert, no apparent distress, cooperative and comfortable  HEENT:  Eye: ecchymosis around right eye  LUNGS:  normal air entry, lungs clear to auscultation  ABDOMEN:  soft, non-tender  Bowel sounds normal  No masses, no organomegaly  EXTREMITIES:  extremities normal, warm and well-perfused; no cyanosis, clubbing, or edema  NEURO:   reflexes normal and symmetric and awake, alert, oriented, following commands appropritately, dysmetria to RUE noted, fluency/comprehension, and repetition are intact  PSYCH:  Alert and oriented, appropriate affect    MMT: Strength:   Right  Left  Site  Right  Left  Site    4 5  S Ab: Shoulder Abductors  4  5  HF: Hip Flexors    4 5  EF: Elbow Flexors  5  5 KF: Knee Flexors    4  5  EE: Elbow Extensors  5  5  KE: Knee Extensors    5  5  WE: Wrist Extensors  5  5  DR: Dorsi Flexors    5  5  FF: Finger Flexors  5  5  PF: Plantar Flexors    5  5  HI: Hand Intrinsics  5  5  EHL: Extensor Hallucis Longus       Laboratory:    Results from last 7 days   Lab Units 07/02/19 0449 07/01/19  0530 06/30/19  0512   HEMOGLOBIN g/dL 8 3* 9 3* 8 4*   HEMATOCRIT % 30 0* 32 7* 29 7*   WBC Thousand/uL 10 72* 12 41* 13 17*     Results from last 7 days   Lab Units 07/02/19 0449 07/01/19 0530 06/30/19  0512   BUN mg/dL 20 12 10   SODIUM mmol/L 140 142 140   POTASSIUM mmol/L 3 6 3 7 3 3*   CHLORIDE mmol/L 110* 109* 108   CREATININE mg/dL 0 80 0 86 0 74     Results from last 7 days   Lab Units 06/29/19  0430   PROTIME seconds 15 2*   INR  1 24*        Wt Readings from Last 1 Encounters:   07/04/19 71 8 kg (158 lb 4 6 oz)     Estimated body mass index is 30 91 kg/m² as calculated from the following:    Height as of this encounter: 5' (1 524 m)  Weight as of this encounter: 71 8 kg (158 lb 4 6 oz)  Imaging: reviewed     Rehabilitation Prognosis: good     Tolerance for three hours of therapy a day: good     Family/Patient Goals:  Patient/family's goals: Return to previous home/apartment  Patient will receive PT, OT, and ST 60 minutes each per day, five days per week to achieve rehab goals or participate in 900 minutes of therapy within a 7 day week period  Mobility Goals: Modified [place]  Transfer Goals: Modified [place]  Activities of Daily Living (ADLs) Goals: Modified [place]    Discharge Planning:  Rehabilitation and discharge goals discussed with the patient and/or family  Case Managment and Social Work to review patient/family resources and to coordinate Discharge Planning      Estimated length of stay: 10 to 14 days    Patient and Family Education and Training:  Rehabilitation and discharge goals discussed with the patient and/or family  Patient/family education/training needs to be discussed in weekly team meeting  Equipment/DME needs: Therapy teams to assess and evaluate for additional equipment/DME needs throughout rehabilitation stay    Past Medical History:   Past Surgical History:   Family History:   Social history:   Past Medical History:   Diagnosis Date    CVA (cerebral vascular accident) ([address] )     Hypertension     Past Surgical History:   Procedure Laterality Date    CAROTID ENDARTARECTOMY Left 6/28/2019    Procedure: ENDARTERECTOMY ARTERY CAROTID WITH PATCH ANGIOPLASTY;  Surgeon: Thomas Solis MD;  Location: [facility];  Service: Vascular     History reviewed  No pertinent family history     Social History     Socioeconomic History    Marital status: Single     Spouse name: None    Number of children: None    Years of education: None    Highest education level: None   Occupational History    None   Social Needs    Financial resource strain: None    Food insecurity:     Worry: None     Inability: None    Transportation needs:     Medical: None     Non-medical: None   Tobacco Use    Smoking status: Former Smoker     Packs/day: 0 25     Years: 50 00     Pack years: 12 50     Types: Cigarettes    Smokeless tobacco: Never Used   Substance and Sexual Activity    Alcohol use: Not Currently     Frequency: Monthly or less     Drinks per session: 1 or 2    Drug use: Never    Sexual activity: Not Currently   Lifestyle    Physical activity:     Days per week: None     Minutes per session: None    Stress: None   Relationships    Social connections:     Talks on phone: None     Gets together: None     Attends Latter day service: None     Active member of club or organization: None     Attends meetings of clubs or organizations: None     Relationship status: None    Intimate partner violence:     Fear of current or ex partner: None     Emotionally abused: None     Physically abused: None     Forced sexual activity: None   Other Topics Concern    None   Social History Narrative    None          Current Medical Diagnosis Allergies   Patient Active Problem List   Diagnosis    Cataracts, bilateral    Essential hypertension    Hyperlipidemia    Nicotine dependence    CVA (cerebral vascular accident) ([address] )    Anemia, unspecified    Abnormal CT scan    Symptomatic stenosis of left carotid artery    Iron deficiency anemia    Impaired mobility and ADLs    No Known Allergies        Medical Necessity Criteria for [facility] Admission: Anemia, Hypertension, Bowel/Bladder Management and Incision/Wound care  In addition, the preadmission screen, post-admission physical evaluation, overall plan of care and admissions order demonstrate a reasonable expectation that the following criteria were met at the time of admission to the [facility]  1  The patient requires active and ongoing therapeutic intervention of multiple therapy disciplines (physical therapy, occupational therapy, speech-language pathology, or prosthetics/orthotics), one of which is physical or occupational therapy  2  Patient requires an intensive rehabilitation therapy program, as defined in Chapter 1, section 110 2 2 of the CMS Medicare Policy Manual  This intensive rehabilitation therapy program will consist of at least 3 hours of therapy per day at least 5 days per week or at least 15 hours of intensive rehabilitation therapy within a 7 consecutive day period, beginning with the date of admission to the [facility]  3  The patient is reasonably expected to actively participate in, and benefit significantly from, the intensive rehabilitation therapy program as defined in Chapter 1, section 110 2 2 of the CMS Medicare Policy Manual at this time of admission to the [facility]   She can reasonably be expected to make measurable improvement (that will be of practical value to improve the patients functional capacity or adaptation to impairments) as a result of the rehabilitation treatment, as defined in section 110 3, and such improvement can be expected to be made within the prescribed period of time  As noted in the CMS Medicare Policy Manual, the patient need not be expected to achieve complete independence in the domain of self-care nor be expected to return to his or her prior level of functioning in order to meet this standard  4  The patient must require physician supervision by a rehabilitation physician  As such, a rehabilitation physician will conduct face-to-face visits with the patient at least 3 days per week throughout the patients stay in the [facility] to assess the patient both medically and functionally, as well as to modify the course of treatment as needed to maximize the patients capacity to benefit from the rehabilitation process  5  The patient requires an intensive and coordinated interdisciplinary approach to providing rehabilitation, as defined in Chapter 1, section 110 2 5 of the CMS Medicare Policy Manual  This will be achieved through periodic team conferences, conducted at least once in a 7-day period, and comprising of an interdisciplinary team of medical professionals consisting of: a rehabilitation physician, registered nurse,  and/or , and a licensed/certified therapist from each therapy discipline involved in treating the patient  Changes Since Pre-admission Assessment: None -This patient's participation in rehab continues to be reasonable, necessary and appropriate  CMS Required Post-Admission Physician Evaluation Elements  History and Physical, including medical history, functional history and active comorbidities as in above text      PostAdmission Physician Evaluation:  The patient has the potential to make improvement and is in need of physical, occupational, and/or therapy services   The patient may also need nutritional services  Given the patient's complex medical condition and risk of further medical complications, rehabilitative services cannot be safely provided at a lower level of care, such as a skilled nursing facility  I have reviewed the patient's functional and medical status at the time of the preadmission screening and they are the same as on the day of this admission  I acknowledge that I have personally performed a full physical examination on this patient within 24 hours of admission  The patient and/or family demonstrated understanding the rehabilitation program and the discharge process after we discussed them      Agree in entirety: yes  Minor adaptions: none    Major changes: none     Crissy Escobedo MD  Physical Medicine and Rehabilitation    ** Please Note: Fluency Direct voice to text software may have been used in the creation of this document   **

## 2019-07-05 LAB
ANION GAP SERPL CALCULATED.3IONS-SCNC: 5 MMOL/L (ref 4–13)
BASOPHILS # BLD AUTO: 0.09 THOUSANDS/ΜL (ref 0–0.1)
BASOPHILS NFR BLD AUTO: 1 % (ref 0–1)
BUN SERPL-MCNC: 16 MG/DL (ref 5–25)
CALCIUM SERPL-MCNC: 8.6 MG/DL (ref 8.3–10.1)
CHLORIDE SERPL-SCNC: 109 MMOL/L (ref 100–108)
CO2 SERPL-SCNC: 26 MMOL/L (ref 21–32)
CREAT SERPL-MCNC: 0.86 MG/DL (ref 0.6–1.3)
EOSINOPHIL # BLD AUTO: 0.59 THOUSAND/ΜL (ref 0–0.61)
EOSINOPHIL NFR BLD AUTO: 6 % (ref 0–6)
ERYTHROCYTE [DISTWIDTH] IN BLOOD BY AUTOMATED COUNT: 34.5 % (ref 11.6–15.1)
GFR SERPL CREATININE-BSD FRML MDRD: 64 ML/MIN/1.73SQ M
GLUCOSE P FAST SERPL-MCNC: 100 MG/DL (ref 65–99)
GLUCOSE SERPL-MCNC: 100 MG/DL (ref 65–140)
HCT VFR BLD AUTO: 30.8 % (ref 34.8–46.1)
HGB BLD-MCNC: 8.5 G/DL (ref 11.5–15.4)
IMM GRANULOCYTES # BLD AUTO: 0.08 THOUSAND/UL (ref 0–0.2)
IMM GRANULOCYTES NFR BLD AUTO: 1 % (ref 0–2)
LYMPHOCYTES # BLD AUTO: 2 THOUSANDS/ΜL (ref 0.6–4.47)
LYMPHOCYTES NFR BLD AUTO: 19 % (ref 14–44)
MCH RBC QN AUTO: 21.6 PG (ref 26.8–34.3)
MCHC RBC AUTO-ENTMCNC: 27.6 G/DL (ref 31.4–37.4)
MCV RBC AUTO: 78 FL (ref 82–98)
MONOCYTES # BLD AUTO: 1.14 THOUSAND/ΜL (ref 0.17–1.22)
MONOCYTES NFR BLD AUTO: 11 % (ref 4–12)
NEUTROPHILS # BLD AUTO: 6.7 THOUSANDS/ΜL (ref 1.85–7.62)
NEUTS SEG NFR BLD AUTO: 62 % (ref 43–75)
NRBC BLD AUTO-RTO: 0 /100 WBCS
PLATELET # BLD AUTO: 381 THOUSANDS/UL (ref 149–390)
POTASSIUM SERPL-SCNC: 3.6 MMOL/L (ref 3.5–5.3)
RBC # BLD AUTO: 3.93 MILLION/UL (ref 3.81–5.12)
SODIUM SERPL-SCNC: 140 MMOL/L (ref 136–145)
WBC # BLD AUTO: 10.6 THOUSAND/UL (ref 4.31–10.16)

## 2019-07-05 PROCEDURE — G0515 COGNITIVE SKILLS DEVELOPMENT: HCPCS

## 2019-07-05 PROCEDURE — 97112 NEUROMUSCULAR REEDUCATION: CPT

## 2019-07-05 PROCEDURE — 97110 THERAPEUTIC EXERCISES: CPT

## 2019-07-05 PROCEDURE — 92523 SPEECH SOUND LANG COMPREHEN: CPT

## 2019-07-05 PROCEDURE — 99232 SBSQ HOSP IP/OBS MODERATE 35: CPT | Performed by: PHYSICAL MEDICINE & REHABILITATION

## 2019-07-05 PROCEDURE — 80048 BASIC METABOLIC PNL TOTAL CA: CPT | Performed by: INTERNAL MEDICINE

## 2019-07-05 PROCEDURE — 85025 COMPLETE CBC W/AUTO DIFF WBC: CPT | Performed by: INTERNAL MEDICINE

## 2019-07-05 PROCEDURE — 97116 GAIT TRAINING THERAPY: CPT

## 2019-07-05 RX ADMIN — Medication 325 MG: at 09:21

## 2019-07-05 RX ADMIN — ENOXAPARIN SODIUM 40 MG: 40 INJECTION SUBCUTANEOUS at 09:21

## 2019-07-05 RX ADMIN — MELATONIN 6 MG: at 22:18

## 2019-07-05 RX ADMIN — CLOPIDOGREL BISULFATE 75 MG: 75 TABLET ORAL at 09:21

## 2019-07-05 RX ADMIN — NIFEDIPINE 30 MG: 30 TABLET, FILM COATED, EXTENDED RELEASE ORAL at 09:24

## 2019-07-05 RX ADMIN — Medication 325 MG: at 16:03

## 2019-07-05 RX ADMIN — ATORVASTATIN CALCIUM 80 MG: 80 TABLET, FILM COATED ORAL at 16:03

## 2019-07-05 RX ADMIN — DOCUSATE SODIUM 100 MG: 100 CAPSULE, LIQUID FILLED ORAL at 09:21

## 2019-07-05 RX ADMIN — SENNOSIDES 17.2 MG: 8.6 TABLET, FILM COATED ORAL at 22:19

## 2019-07-05 RX ADMIN — DOCUSATE SODIUM 100 MG: 100 CAPSULE, LIQUID FILLED ORAL at 18:12

## 2019-07-05 RX ADMIN — ASPIRIN 81 MG: 81 TABLET, COATED ORAL at 09:21

## 2019-07-05 RX ADMIN — PANTOPRAZOLE SODIUM 40 MG: 40 TABLET, DELAYED RELEASE ORAL at 06:00

## 2019-07-05 RX ADMIN — CARVEDILOL 3.12 MG: 3.12 TABLET, FILM COATED ORAL at 09:24

## 2019-07-05 RX ADMIN — CARVEDILOL 3.12 MG: 3.12 TABLET, FILM COATED ORAL at 16:03

## 2019-07-05 NOTE — PROGRESS NOTES
07/05/19 1430   Pain Assessment   Pain Assessment No/denies pain   Pain Score No Pain   Restrictions/Precautions   Precautions Cognitive; Fall Risk;Visual deficit;Supervision on toilet/commode  (R side inattention)   Cognition   Overall Cognitive Status Impaired   Arousal/Participation Alert; Cooperative   Attention Attends with cues to redirect   Orientation Level Oriented to person;Oriented to place;Oriented to situation   Memory Decreased short term memory;Decreased recall of recent events;Decreased recall of precautions   Following Commands Follows one step commands without difficulty   Comments Difficulty completing new tasks even with visual, verbal, and manual cues   Subjective   Subjective Pt agreeable to therapy session with no complaints  She states "I need to work on my R arm and leg as well as my balance and my endurance "   QI: Sit to Stand   Assistance Needed Incidental touching   Assistance Provided by [place] No physical assistance   Comment EDE ROSARIO from therapist   Sit to Stand CARE Score 4   QI: Chair/Bed-to-Chair Transfer   Assistance Needed Physical assistance   Assistance Provided by [place] 25%-49%   Comment minAx1 HHA L side; pt tends to increase ishan when approaching chair and requires verbal cueing to sit slowly in chair   Chair/Bed-to-Chair Transfer CARE Score 3   Transfer Bed/Chair/Wheelchair   Limitations Noted In Balance; Coordination; Endurance;Problem Solving; Sequencing;UE Strength;LE Strength;Vision   Adaptive Equipment Hand Hold   Stand Pivot Minimal Assist   Sit to [employer] Guard   Stand to [facility]   Findings Performed all transfers and amb today with L HHA   Bed, Chair, Wheelchair Transfer (FIM) 4 - Patient completes 75% of all tasks   QI: Walk 10 Feet   Assistance Needed Physical assistance   Assistance Provided by [place] 50%-74%   Comment modAx1-minAx1 HHA L side; pt demos difficulty following visual and verbal cueing for proper gait pattern   Walk 10 Feet CARE Score 2   QI: Walk 50 Feet with Two Turns   Assistance Needed Physical assistance   Assistance Provided by [place] 50%-74%   Comment modAx1-minAx1 HHA L side; R trendelenberg gait noted with decreased R heel strike, R out-toeing, narrow LUIS   Walk 50 Feet with Two Turns CARE Score 2   QI: Walk 150 Feet   Assistance Needed Physical assistance   Assistance Provided by [place] 50%-74%   Comment modAx1-minAx1 HHA L side; pt able to attend to verbal and visual cues properly approx 15% of time to correct gait pattern   Walk 150 Feet CARE Score 2   Ambulation   Does the patient walk? 2  Yes   Primary Discharge Mode of Locomotion Walk   Walk Assist Level Moderate Assist;Minimum Assist   Gait Pattern Inconsistant [place];Decreased foot clearance; Forward Flexion;Narrow LUIS;Step to;Trendelenburg;Decreased R stance; Improper weight shift   Assist Device Hand Hold  (L side)   Distance Walked (feet) 150 ft  (60'x1 not limited back to pt's room)   Limitations Noted In Balance; Coordination; Endurance; Heel Strike;Posture; Safety; Sequencing;Speed;Strength;Swing   Findings no w/c follow   Walking (FIM) 3 - Patient completes 50 - 74% of all tasks, needs more than steadying or light touch AND distance 150 feet or more, no rest   Therapeutic Interventions   Flexibility Passive B hamstring and gastroc stretching performed 6 minutes total   Neuromuscular Re-Education Forward stepping onto target with R LE emphasizing heel strike, pt had difficulty following commands even with visual, verbal and manual cues; L and R sidestepping performed emphasizing keeping R hip in neutral and to prevent external rotation, however, pt had difficulty following cues and required redirection multiple times;  Performed step ups on 4"  steps with L LE on 1st step and bringing R LE from ground onto second step emphasizing heel strike and back down to ground with blue TB around pt's R ankle for added resistance   Equipment Use   NuStep lvl 2, 12 minutes, SPM >60 to achieve 13/20 on FÉLIX scale   Assessment   Treatment Assessment Pt participated in skilled PT session focusing on functional mobility training as well as neuromuscular reeducation of pt's R LE  Gait trng performed with HHA on pt's L side today with pt requiring verbal cueing for proper gait dynamics approx 95% of time as pt has difficulty following verbal and visual cueing with increased difficulty in following cues during dual tasking  Did not trial amb with RW today  Pt demo's difficulty following cues to complete tasks even with increased time, therefore, recommend to perform partial/segmental task movements to help increase pt's understanding of tasks  Plan to address pt's decreased pelvic stabilization next session to improve pt's trendenlenberg gait as well as perform activities focusing on neuroplasticity principles in order to achieve pt's LTG's and decrease caregiver burden and risk of falls upon d/c  Family/Caregiver Present no   Problem List Decreased strength;Decreased endurance;Decreased mobility; Impaired balance;Decreased coordination; Impaired judgement;Decreased safety awareness; Obesity   Barriers to Discharge Inaccessible home environment;Decreased caregiver support   PT Barriers   Functional Limitation Car transfers; Ramp negotiation;Stair negotiation;Standing;Transfers; Walking   Plan   Treatment/Interventions Functional transfer training;LE strengthening/ROM; Therapeutic exercise; Endurance training;Gait training   Progress Progressing toward goals   Recommendation   Recommendation 24 hour supervision/assist;Outpatient PT; Home with family support   Equipment Recommended Other (Comment)  (least restrictive most appropriate restrictive device)   PT - OK to Discharge No   PT Therapy Minutes   PT Time In 1430   PT Time Out 1530   PT Total Time (minutes) 60   PT Mode of treatment - Individual (minutes) 50   PT Mode of treatment - Concurrent (minutes) 10   PT Mode of treatment - Group (minutes) 0 PT Mode of treatment - Co-treat (minutes) 0   PT Mode of Teatment - Total time(minutes) 60 minutes   Therapy Time missed   Time missed?  No

## 2019-07-05 NOTE — PROGRESS NOTES
Physical Medicine and Rehabilitation Progress Note  Lola Faith 78 y o  female MRN: [id number]  Unit/Bed#: -67 Encounter: [id number]    HPI: Lola Faith is a 78 y o  female who [facility] [facility] [address] with right sided weakness, right facial droop  Imaging subsequently revealed left sided infarcts to the parietal and frontal regions  In addition patient noetd to have ulcerated plaque at the left cervical carotied bifurcation; CTA revealed a right ICA occlusion  Vascular surgery was consulted, and recommendation made for a left CEA, performed on 6/28  During admission patient was noted to have ABLA, for which scope was performed revealing AVMs in the stomach and duodenum; argon plasma coagulation was performed  She was also transfused at the time  Of note, during her acute admission patient had a fall off of comode; CTOH showed right frontal hematoma, but no active bleed  Patient was found to be below functional baseline, and accepted to [facility] on 7/03/19  Chief Complaint: f/u stroke    Interval: No acute events overnight  Denies any CP or SOB  Patient without complaint  No new weakness  No new numbness or tingling  ROS: A 10 point ROS was performed; negative except as noted above       Assessment/Plan:      * Impaired mobility and ADLs  Assessment & Plan  · Secondary to recent stroke  · Acute comprehensive interdisciplinary inpatient rehabilitation including PT, OT, SLP, RN, CM, SW, dietary, psychology, etc         Symptomatic stenosis of left carotid artery  Assessment & Plan  · S/p CEA on 6/28  · Monitor for hematoma formation, incision care    Anemia, unspecified  Assessment & Plan  Results from last 7 days   Lab Units 07/05/19  0617 07/02/19  0449 07/01/19  0530   HEMOGLOBIN g/dL 8 5* 8 3* 9 3*     · Continue ferrous sulfate  · Monitor CBC intermittently  · Transfuse for Hgb <7      CVA (cerebral vascular accident) ([facility] [address] )  Assessment & Plan  · ASA, Plavix  · Statin  · Neurology f/u as outpatient      Hyperlipidemia  Assessment & Plan  · Continue statin    Essential hypertension  Assessment & Plan  Temp:  [97 9 °F (36 6 °C)-98 5 °F (36 9 °C)] 98 5 °F (36 9 °C)  HR:  [69-80] 69  Resp:  [18-20] 18  BP: (138-171)/(60-78) 139/66    · Carvedilol  · Hydralazine prn  · IM service managing anti-hypertensives    # Skin  · Encourage regular turning as patient at risk for skin breakdown  · Staff to continue patient education on Q2h turning  · Rehabilitation team to perform skin checks regularly     # Bowel  · Patient reports no constipation  · To ensure regular BMs, bowel regimen consisting of:  colace , dulcolax suppository  and miralax     # Bladder  · Patient voiding spontaneously    # Pain  · Continue oxycodone     # Rehab Psych   · There are no psychological or psychiatric problems identified    # Other  - Diet/Nutrition:        Diet Orders   (From admission, onward)            Start     Ordered    07/03/19 1804  Diet Regular; Regular House  Diet effective now     Question Answer Comment   Diet Type Regular    Regular Regular House    RD to adjust diet per protocol?  Yes        07/03/19 1803        - DVT prophy: Sequential compression device (Venodyne)  and Enoxaparin (Lovenox)  - GI ppx: Pantaprazole  - Nausea: None  - Supplements: None  - Sleep: None    Disposition: TBD    CODE: Level 1: Full Code Scheduled Meds:    Current Facility-Administered Medications:  aspirin 81 mg Oral Daily Ed Arreola MD   atorvastatin 80 mg Oral Daily With AskBot MD Maryjane   calcium carbonate 500 mg Oral Daily PRN Ed Arreola MD   carvedilol 3 125 mg Oral BID With Meals Ed Arreola MD   clopidogrel 75 mg Oral Daily Ed Arreola MD   docusate sodium 100 mg Oral BID Amina Whipple PA-C   enoxaparin 40 mg Subcutaneous Q24H [address] Ed Arreola MD   ferrous sulfate 325 mg Oral BID With Meals Magalis Montaño MD   melatonin 6 mg Oral HS Magalis Montaño MD NIFEdipine 30 mg Oral Daily Ed Arreola MD   oxyCODONE 2 5 mg Oral Q4H PRN Ed Arreola MD   oxyCODONE 5 mg Oral Q4H PRN Ed Arreola MD   pantoprazole 40 mg Oral Early Morning Ed Arreola MD   polyethylene glycol 17 g Oral Daily PRN Amina Whipple PA-C   senna 2 tablet Oral HS Amina Whipple PA-C        Objective:    Functional Update:  Mobility: min-mod assist  Transfers: min asssit  ADLs: min assist    Allergies per EMR    Physical Exam:  Temp:  [97 9 °F (36 6 °C)-98 5 °F (36 9 °C)] 98 5 °F (36 9 °C)  HR:  [69-80] 69  Resp:  [18-20] 18  BP: (138-171)/(60-78) 139/66  SpO2:  [96 %-99 %] 96 %    General: alert, no apparent distress, cooperative and comfortable  HEENT:  Eye: ecchymosis over right eye improving  LUNGS:  no abnormal respiratory pattern, no retractions noted, non-labored breathing   ABDOMEN:  soft, non-tender  Bowel sounds normal  No masses, no organomegaly  EXTREMITIES:  extremities normal, warm and well-perfused; no cyanosis, clubbing, or edema  NEURO:   mental status, speech normal, alert and oriented x3  PSYCH:  Alert and oriented, appropriate affect  Physical examination is otherwise unchanged from previous encounter, except as noted above      Diagnostic Studies: Reviewed, no new imaging  No orders to display       Laboratory: Reviewed  Results from last 7 days   Lab Units 07/05/19  0617 07/02/19 0449 07/01/19  0530   HEMOGLOBIN g/dL 8 5* 8 3* 9 3*   HEMATOCRIT % 30 8* 30 0* 32 7*   WBC Thousand/uL 10 60* 10 72* 12 41*     Results from last 7 days   Lab Units 07/05/19  0617 07/02/19  0449 07/01/19  0530   BUN mg/dL 16 20 12   SODIUM mmol/L 140 140 142   POTASSIUM mmol/L 3 6 3 6 3 7   CHLORIDE mmol/L 109* 110* 109*   CREATININE mg/dL 0 86 0 80 0 86     Results from last 7 days   Lab Units 06/29/19  0430   PROTIME seconds 15 2*   INR  1 24*        Patient Active Problem List   Diagnosis    Cataracts, bilateral    Essential hypertension    Hyperlipidemia    Nicotine dependence    CVA (cerebral vascular accident) ([facility] [address] [facility]    Anemia, unspecified    Abnormal CT scan    Symptomatic stenosis of left carotid artery    Iron deficiency anemia    Impaired mobility and ADLs       ** Please Note: Fluency Direct voice to text software may have been used in the creation of this document   **

## 2019-07-05 NOTE — PROGRESS NOTES
SLP Cognitive Assessment       07/05/19 1030   Pain Assessment   Pain Assessment No/denies pain   Restrictions/Precautions   Precautions Cognitive; Fall Risk;Supervision on toilet/commode;Visual deficit   Cognitive Linguisitic Assessments   Cognitive Linquistic Quick Test (CLQT) Pt completing formalized cognitive linguistic assessment, CLQT+  Overall score when compared to age matched peers between 79 - 80 yrs  Score was 1 8 out of 4 0, which indicates cognitive skills to be moderately impaired  The following cognitive domain scores are as follows: Attention: score of 18, indicating severe impairment; Memory: score of 120, indicating mild impairment; Executive Functions: score of 6, indicating severe impairment; Language: score of 27, indicating mild impairment; Visuospatial Skills: score of 14, indicating severe impairment; Clock Drawing: score of 4, indicating severe impairment  Of note, pt was below criterion cutoff scores on 6 out of 10 tasks completed during this assesssment  Of note,all tasks which pt demonstrated most difficulty in completing were visual perceptual in nature  Pt inconsistently demonstrated R inattention at times vs then noting L inattention to complete some activities  Pt also noted to not be able to correct or verbalize what was not "right" especially when completing the clock drawing  Pt knew that hands needed to be drawn onto the clock, but due to how the numbers were placed inside the [place] (all numbers on the R hand side), unable to determine how the hands would be placed onto the clock  Per results from assessment, SLP services are recommended at this time to maximize overall cognitive linguistic skills while in the acute rehab setting      Memory Skills   Memory (FIM) 4 - Recalls 2 of 3 steps   Social Interaction (FIM) 6 - Interacts appropriately with others BUT requires extra  time   Speech/Language/Cognition Assessmetn   Treatment Assessment Pt completing formalized cognitive linguistic assessment (CLQT), in which overall score was 1 8 out of 4 0, indicating cognitive skills to be moderately impaired when compared to age matched peers between 65-80 yrs  Of note, the cognitive domains which were deemed to be severe impairment were attention, executive functions, visuospatial skills and clock drawing  Mild deficits noted in memory and language upon completing those cognitive domains  Pt's LTM biographical recall is [facility], as well as ability to recall daily events, in which pt was able to recall PT's name from yesterday as well as all activities completed  Also recounted watching the [employer] w/ dtr, etc  Pt was highly independent prior to admission, where she completed all I ADL's except for financial management, which pt reports dtr completing at this time  Educated pt on role of SLP while on the acute rehab center and pt is agreeable to participate and complete "whatever I need to do to get back home " Pt will benefit from SLP services at this time to maximize overall functional independence given cognitive linguistic skills at this time  SLP Therapy Minutes   SLP Time In 1030   SLP Time Out 1130   SLP Total Time (minutes) 60   SLP Mode of treatment - Individual (minutes) 60   SLP Mode of treatment - Concurrent (minutes) 0   SLP Mode of treatment - Group (minutes) 0   SLP Mode of treatment - Co-treat (minutes) 0   SLP Mode of Teatment - Total time(minutes) 60 minutes   Therapy Time missed   Time missed?  No   Daily FIM Score   Problem solving (FIM) 3 - Solves basic problmes 50-74% of time   Comprehension (FIM) 3 - Understands basic info/conversation 50-74% of time   Expression (FIM) 5 - Needs help/cues only RARELY (< 10% of the time)

## 2019-07-05 NOTE — PLAN OF CARE
Problem: Potential for Falls  Goal: Patient will remain free of falls  Description  INTERVENTIONS:  - Assess patient frequently for physical needs  -  Identify cognitive and physical deficits and behaviors that affect risk of falls    -  [place] fall precautions as indicated by assessment   - Educate patient/family on patient safety including physical limitations  - Instruct patient to call for assistance with activity based on assessment  - Modify environment to reduce risk of injury  - Consider OT/PT consult to assist with strengthening/mobility  Outcome: Progressing     Problem: PAIN - ADULT  Goal: Verbalizes/displays adequate comfort level or baseline comfort level  Description  Interventions:  - Encourage patient to monitor pain and request assistance  - Assess pain using appropriate pain scale  - Administer analgesics based on type and severity of pain and evaluate response  - Implement non-pharmacological measures as appropriate and evaluate response  - Consider cultural and social influences on pain and pain management  - Notify physician/advanced practitioner if interventions unsuccessful or patient reports new pain  Outcome: Progressing     Problem: INFECTION - ADULT  Goal: Absence or prevention of progression during hospitalization  Description  INTERVENTIONS:  - Assess and monitor for signs and symptoms of infection  - Monitor lab/diagnostic results  - Monitor all insertion sites, i e  indwelling lines, tubes, and drains  - Monitor endotracheal (as able) and nasal secretions for changes in amount and color  - [place] appropriate cooling/warming therapies per order  - Administer medications as ordered  - Instruct and encourage patient and family to use good hand hygiene technique  - Identify and instruct in appropriate isolation precautions for identified infection/condition  Outcome: Progressing     Problem: SAFETY ADULT  Goal: Maintain or return to baseline ADL function  Description  INTERVENTIONS:  -  Assess patient's ability to carry out ADLs; assess patient's baseline for ADL function and identify physical deficits which impact ability to perform ADLs (bathing, care of mouth/teeth, toileting, grooming, dressing, etc )  - Assess/evaluate cause of self-care deficits   - Assess range of motion  - Assess patient's mobility; develop plan if impaired  - Assess patient's need for assistive devices and provide as appropriate  - Encourage maximum independence but intervene and supervise when necessary  ¯ Involve family in performance of ADLs  ¯ Assess for home care needs following discharge   ¯ Request OT consult to assist with ADL evaluation and planning for discharge  ¯ Provide patient education as appropriate  Outcome: Progressing  Goal: Maintain or return mobility status to optimal level  Description  INTERVENTIONS:  - Assess patient's baseline mobility status (ambulation, transfers, stairs, etc )    - Identify cognitive and physical deficits and behaviors that affect mobility  - Identify mobility aids required to assist with transfers and/or ambulation (gait belt, sit-to-stand, lift, walker, cane, etc )  - [place] fall precautions as indicated by assessment  - Record patient progress and toleration of activity level on Mobility SBAR; progress patient to next Phase/Stage  - Instruct patient to call for assistance with activity based on assessment  - Request Rehabilitation consult to assist with strengthening/weightbearing, etc   Outcome: Progressing     Problem: DISCHARGE PLANNING  Goal: Discharge to home or other facility with appropriate resources  Description  INTERVENTIONS:  - Identify barriers to discharge w/patient and caregiver  - Arrange for needed discharge resources and transportation as appropriate  - Identify discharge learning needs (meds, wound care, etc )  - Arrange for interpretive services to assist at discharge as needed  - Refer to Case Management Department for coordinating discharge planning if the patient needs post-hospital services based on physician/advanced practitioner order or complex needs related to functional status, cognitive ability, or social support system  Outcome: Progressing     Problem: Prexisting or High Potential for Compromised Skin Integrity  Goal: Skin integrity is maintained or improved  Description  INTERVENTIONS:  - Identify patients at risk for skin breakdown  - Assess and monitor skin integrity  - Assess and monitor nutrition and hydration status  - Monitor labs (i e  albumin)  - Assess for incontinence   - Turn and reposition patient  - Assist with mobility/ambulation  - Relieve pressure over bony prominences  - Avoid friction and shearing  - Provide appropriate hygiene as needed including keeping skin clean and dry  - Evaluate need for skin moisturizer/barrier cream  - Collaborate with interdisciplinary team (i e  Nutrition, Rehabilitation, etc )   - Patient/family teaching  Outcome: Progressing

## 2019-07-05 NOTE — SOCIAL WORK
Met with Pt to review rehab routine, and CM role  Pt was very pleased with her progress so far, as well as how nice everyone has been  Pt shared that she lives in [address], so there are 0STE, and she has an elevator to her apartment  Pt states that her one daughter, Dariana Membreno, works at [employer] [facility], and visits daily  Pts other two daughters are also local, and all three are very supportive  Pt states that they can be available for her every day, if needed  Pt has never dealt with [facility] [address] nor outpt services, nor does she have any DME  Pt reports that she has a lot of support, and is involved with the [facility] center in her building  Pt utilizes RYAN, Yuriy Hong, and was interested in [facility] meds to beds program   Pt understands the team meeting process, as well as potential LOS  Following to assist with d/c planning needs  Insurance review sent to Abilio Uribe [facility], fax# [phone], requesting additional time for Pt

## 2019-07-05 NOTE — TREATMENT PLAN
Individualized Plan of [address] 78 y o  female MRN: [id number]  Unit/Bed#: -01 Encounter: [id number]     PATIENT INFORMATION  ADMISSION DATE: 7/3/2019  5:57 PM JANES CATEGORY:Stroke:  01 1  Left Body Involvement (Right Brain)   ADMISSION DIAGNOSIS: CVA (cerebral vascular accident) ([facility] [address] ) [I63 [id number]]  EXPECTED LOS: 10 to 14 days     MEDICAL/FUNCTIONAL PROGNOSIS  Based on my assessment of the patient's medical conditions and current functional status, the prognosis for attaining medical and functional goals or the IRF stay is:  Good    Medical Goals: Patient will be medically stable for discharge to [facility] upon completion of rehab program    [address]: Home - with supervision (although will attempt to get patient to mod-I, as she could potentially be home alone if she attains that level)    ANTICIPATED FOLLOW-UP SERVICE:   Outpatient Therapy Services: PT, OT and SLP      Home Health Services: PT, OT, SLP and Nursing    DISCIPLINE SPECIFIC PLANS:  Required Disciplines & Services: Rehabillitation Nursing, Case Management, Dietay/Nutrition, Prosthetics/Orthostics and Psychology  REQUIRED THERAPY:  Therapy Hours per Day Days per Week Total Days   Physical Therapy 1 5 5   Occupational Therapy 1 5 5   Speech/Language Therapy 1 5 5   NOTE: Additional therapy time(s) may be added as appropriate to meet patient needs and to achieve functional goals      Patient will either participate in above therapy regimen or participate in 900 minutes of therapy within 7 day week consisting of PT, OT and SLP due to the following medical procedure/condition:Stroke:  01 1  Left Body Involvement (Right Brain)    ANTICIPATED FUNCTIONAL OUTCOMES:  ADL: Patient will require supervision with ADLs with least restrictive device upon completion of rehab program   Bladder/Bowel:  supervision   Transfers: Patient will require supervision with transfers with least restrictive device upon completion of rehab program   Locomotion: Patient will require supervision with locomotion with least restrictive device upon completion of rehab program   Cognitive:   supervision     DISCHARGE PLANNING NEEDS  Equipment needs: Discharge needs to be reviewed with team    REHAB ANTICIPATED PARTICIPATION RESTRICTIONS:  Assist with Mobility, Inability to Drive and Rquires Assist with ADLS

## 2019-07-05 NOTE — PROGRESS NOTES
07/05/19 1230   Pain Assessment   Pain Assessment No/denies pain   Pain Score No Pain   Restrictions/Precautions   Precautions Fall Risk;Cognitive;Supervision on toilet/commode;Visual deficit   QI: Sit to Stand   Assistance Needed Incidental touching   Sit to Stand CARE Score 4   QI: Chair/Bed-to-Chair Transfer   Assistance Needed Physical assistance   Assistance Provided by [place] 25%-49%   Chair/Bed-to-Chair Transfer CARE Score 3   Transfer Bed/Chair/Wheelchair   Limitations Noted In Balance;Confidence; Coordination; Endurance;Problem Solving;Sensation;UE Strength;LE Strength   Adaptive Equipment None   Findings Pt completed fxnl xfers without use of AD at mod A level HHA on L  Pt presents with dec attention to R with proprioceptive deficits and impaired coordination limiting fxnl indep   Bed, Chair, Wheelchair Transfer (FIM) 3 - Patient completes 50 - 74% of all tasks   Cognition   Overall Cognitive Status Impaired   Comments Pt demo difficulty following motor commands req repetition and [place] A at times to follow appropriately   Vision   Vision Comments OT to formally assess   Assessment   Treatment Assessment Pt engaged in skilled OT tx session with focus on RUE fxnl testing of strength, coordination, and in hand manipulation and additional focus on xfers and edu on stroke recovery  See above/below for further details  Pt tolerates treatment well however presents with greatest deficits in RUE FMC/FMS, GMC/GMS, and proprioceptive awareness  severe deficits of light touch to distal R UE noted with inc sensation for deep touch  Pt reports her dtrs are local but 2/3 of the work FT (one works at hospital day shift until 3, her other dtr goes into work at 5p) and 1/3 dtrs is home however cares for her great grandson during the day  Pt's goal is to return home to her place with support as needed      OT Therapy Minutes   OT Time In 1230   OT Time Out 1340   OT Total Time (minutes) 70   OT Mode of treatment - Individual (minutes) 70   OT Mode of treatment - Concurrent (minutes) 0   OT Mode of treatment - Group (minutes) 0   OT Mode of treatment - Co-treat (minutes) 0   OT Mode of Teatment - Total time(minutes) 70 minutes   Therapy Time missed   Time missed? No           Subjective "I just want to go home, but I know I have a lot to work on"          Objective    Impairment Observations:      UE ROM and Strength Testing (2nd position):     1  Dynamometer (2nd position) and Pinch Meter (in lbs)    R UE Impaired     Gross Grasp: 55 lbs     Pincer: 6 lbs     Tripod: 7lbs     Lateral: 6lbs      L UE Intact  Gross Grasp: 58 lbs   Pincer: 10 lbs   Tripod: 6lbs   Lateral: 7lbs    2     9-hole Peg Test:     RUE (impaired):    65 seconds      LUE:    41 95 seconds         3   Functional Dexterity    LUE 49seconds    RUE 4min 5 seconds          Assessment/Plan

## 2019-07-05 NOTE — PROGRESS NOTES
SLP TAA       07/05/19 1030   Patient Data   Rehab Impairment Stroke: Right Body involvement (Left Brain)   Etiologic Diagnosis L MCA CVA 2* to L ICA occlusion   Date of Onset 06/19/19   Prior Level of Function   Functional Cognition 3  Independent - Patient completed the activities by him/herself, with or without an assistive device, with no assistance from a helper  Prior Assistance Needed for Driving;Money Management  (dtrs complete for pt)   Restrictions/Precautions   Precautions Cognitive; Fall Risk;Supervision on toilet/commode;Visual deficit   Pain Assessment   Pain Assessment No/denies pain   Comprehension   Assist Devices Glasses   Auditory Basic;Complex   Visual Basic;Complex   Findings Pt completing formalized cognitive assessment  Refer to SLP Rehab note for details  QI: Comprehension 3  Usually Understands: Understands most conversations, but misses some part/intent of message  Requires cues at times to understand  Comprehension (FIM) 3 - Understands basic info/conversation 50-74% of time   Expression   Verbal Basic;Complex   Non-Verbal Basic;Complex   Intelligibility Sentence   Findings Pt completing formalized cognitive assessment  Refer to SLP Rehab note for details  QI: Expression 4  Express complex messages without difficulty and with speech that is clear and easy to [place]   Expression (FIM) 5 - Needs help/cues only RARELY (< 10% of the time)   Social Interaction   Cooperation with staff   Participation Individual   Behaviors observed Appropriate   Findings Pt completing formalized cognitive assessment  Refer to SLP Rehab note for details  Social Interaction (FIM) 6 - Interacts appropriately with others BUT requires extra  time   Problem Solving   Complex Manages discharge planning;Manages medications   Routine Manages call bell   Findings Pt completing formalized cognitive assessment  Refer to SLP Rehab note for details      Problem solving (FIM) 3 - Solves basic problmes 50-74% of time   Memory   Recognize People Yes   Remember Routine Yes   Initiates Tasks Yes   Short-Term Impaired   Long Term Intact   Recalls Precaution Yes   Findings Pt completing formalized cognitive assessment  Refer to SLP Rehab note for details  Memory (FIM) 4 - Recalls 2 of 3 steps   Discharge Information   Patient's Discharge Plan home w/ family support   Patient's Rehab Expectations "to get back home and doing what I was doing before"   Barriers to Discharge Home Decreased Cognitive Function;Decreased Strength;Decreased Endurance; Safety Considerations; Other  (visual perceptual deficits)   Impressions Pt is a good rehab candidate to achieve supervision level goals for cognitive linguistic skills w/ anticipated d/c home w/ family support  Pt will benefit from SLP services at this time to maximize overall cognitive linguistic skills      SLP Therapy Minutes   SLP Time In 1030   SLP Time Out 1130   SLP Total Time (minutes) 60   SLP Mode of treatment - Individual (minutes) 60   SLP Mode of treatment - Concurrent (minutes) 0   SLP Mode of treatment - Group (minutes) 0   SLP Mode of treatment - Co-treat (minutes) 0   SLP Mode of Teatment - Total time(minutes) 60 minutes

## 2019-07-05 NOTE — PROGRESS NOTES
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; s/p left CEA 6/28/19:  continue ASA/Plavix, Lipitor = for home Cards advised change Lipitor to Crestor 40mg daily      Iron deficiency anemia; s/p transfusion and iron IV: continue iron PO supplements      AVM stomach/duodenum tx with APC; cold snare sigmoid colon polypectomy: continue PPI     HTN: at home was on Lopressor and now is switched to Coreg and Nifedipine     RUL ground glass density: for Pulm to see OP     Urethral diverticulum: to see Uro as OP for cystogram     Nicotine abuse: counseled for cessation     Fall 2/2 LOB 6/24/19: CT head was negative; has remaining ecchymosis right forehead/orbit     ABLA:  still fluctuating bwtn the 8s and 9s; will watch    Leukocytosis: Mild  Likely reactive  No signs of infx  Subjective/ HPI: Patient seen and examined  Patients overnight issues or events were reviewed with nursing or staff during rounds or morning huddle session  New or overnight issues include the following: HTN: Currently well controlled  Continue present medications  CVA: No new stroke symptoms  Continue antiplatelet and statin  Anemia: Will continue to monitor  Pt currently reports that she is doing well       ROS:     GI: denies abdominal pain, change bowel habits or reflux symptoms  Neuro: Denies any headache, new vision changes, new neuropathies,new weaknesses   Respiratory: No Cough, SOB, denies wheeze  Cardiovascular: No CP, palpitations , denies perception of rapid heartbeat  : denies any new urinary burning or frequency    Review of Scheduled Meds:    Current Facility-Administered Medications:  aspirin 81 mg Oral Daily Ed Arreola MD   atorvastatin 80 mg Oral Daily With Jessica De MD   calcium carbonate 500 mg Oral Daily PRN Ed Arreola MD   carvedilol 3 125 mg Oral BID With Meals Shivani Khan MD   clopidogrel 75 mg Oral Daily dE Arreola MD   docusate sodium 100 mg Oral BID Amina Whipple PA-C   enoxaparin 40 mg Subcutaneous Q24H [address] Ed Arreola MD   ferrous sulfate 325 mg Oral BID With Meals Ed Arreola MD   melatonin 6 mg Oral HS Ed Arreola MD   NIFEdipine 30 mg Oral Daily Ed Arreola MD   oxyCODONE 2 5 mg Oral Q4H PRN Ed Arreola MD   oxyCODONE 5 mg Oral Q4H PRN Ed Arreola MD   pantoprazole 40 mg Oral Early Morning Ed Arreola MD   polyethylene glycol 17 g Oral Daily PRN Amina Whipple PA-C   senna 2 tablet Oral HS Amina Whipple PA-C       Labs:     Results from last 7 days   Lab Units 07/05/19  0712 07/02/19  0449   WBC Thousand/uL 10 60* 10 72*   HEMOGLOBIN g/dL 8 5* 8 3*   HEMATOCRIT % 30 8* 30 0*   PLATELETS Thousands/uL 381 278     Results from last 7 days   Lab Units 07/05/19  0617 07/02/19  0449   SODIUM mmol/L 140 140   POTASSIUM mmol/L 3 6 3 6   CHLORIDE mmol/L 109* 110*   CO2 mmol/L 26 24   BUN mg/dL 16 20   CREATININE mg/dL 0 86 0 80   CALCIUM mg/dL 8 6 8 6         Results from last 7 days   Lab Units 06/29/19  0430   INR  1 24*          Results from last 7 days   Lab Units 06/28/19  1842   POC GLUCOSE mg/dl 155*       Imaging:     No orders to display       *Labs reviewed  *Radiology studies reviewed  *Medications reviewed and reconciled as needed  *Please refer to order section for additional ordered labs studies  *Case discussed with primary attending during morning huddle case rounds    Physical Examination:  Vitals:   Vitals:    07/04/19 1841 07/04/19 2048 07/04/19 2300 07/05/19 0619   BP: 152/68 160/73 142/60 138/62   BP Location: Left arm Left arm Left arm Right arm   Pulse:  76  75   Resp:  20  18   Temp:  97 9 °F (36 6 °C)  98 5 °F (36 9 °C)   TempSrc:  Oral  Oral   SpO2:  99%  96%   Weight:       Height: General Appearance: NAD, conversive  Eyes: No icterus; conjunctiva normal, PERRLA  HENT: oropharynx clear; mucous membranes moist; no ulcerations, normal hard and soft palette  Neck: trachea midline, range of motion full  Supple, no lymphadenopathy or thyromegaly  Lungs: CTA, normal respiratory effort, no retractions, expiratory effort normal  CV: regular rate, no rubs no gallops, PMI normal place and intensity  ABD: soft non tender, no masses , no hepatic or splenomegaly  EXT: DP pulses intact, no lymphadenopathy, no edema  Skin: normal turgor, normal texture, no rash, no ulcers  Psych: affect normal, No anxiety,   Neuro: AAOx3            Total time spent: At least 40 minutes, with more than 50% spent counseling/coordinating care  Counseling includes discussion with patient re: progress  and discussion with patient of his/her current medical state/information  Coordination of patient's care was performed in conjunction with primary service  Time invested included review of patient's labs, vitals, and management of their comorbidities with continued monitoring  In addition, this patient was discussed with medical team including physician and advanced extenders  The care of the patient was extensively discussed and appropriate treatment plan was formulated unique for this patient  ** Please Note: Dragon 360 Dictation voice to text software may have been used in the creation of this document   **

## 2019-07-05 NOTE — PLAN OF CARE
Problem: Potential for Falls  Goal: Patient will remain free of falls  Description  INTERVENTIONS:  - Assess patient frequently for physical needs  -  Identify cognitive and physical deficits and behaviors that affect risk of falls    -  [place] fall precautions as indicated by assessment   - Educate patient/family on patient safety including physical limitations  - Instruct patient to call for assistance with activity based on assessment  - Modify environment to reduce risk of injury  - Consider OT/PT consult to assist with strengthening/mobility  Outcome: Progressing     Problem: PAIN - ADULT  Goal: Verbalizes/displays adequate comfort level or baseline comfort level  Description  Interventions:  - Encourage patient to monitor pain and request assistance  - Assess pain using appropriate pain scale  - Administer analgesics based on type and severity of pain and evaluate response  - Implement non-pharmacological measures as appropriate and evaluate response  - Consider cultural and social influences on pain and pain management  - Notify physician/advanced practitioner if interventions unsuccessful or patient reports new pain  Outcome: Progressing     Problem: INFECTION - ADULT  Goal: Absence or prevention of progression during hospitalization  Description  INTERVENTIONS:  - Assess and monitor for signs and symptoms of infection  - Monitor lab/diagnostic results  - Monitor all insertion sites, i e  indwelling lines, tubes, and drains  - Monitor endotracheal (as able) and nasal secretions for changes in amount and color  - [place] appropriate cooling/warming therapies per order  - Administer medications as ordered  - Instruct and encourage patient and family to use good hand hygiene technique  - Identify and instruct in appropriate isolation precautions for identified infection/condition  Outcome: Progressing     Problem: SAFETY ADULT  Goal: Maintain or return to baseline ADL function  Description  INTERVENTIONS:  -  Assess patient's ability to carry out ADLs; assess patient's baseline for ADL function and identify physical deficits which impact ability to perform ADLs (bathing, care of mouth/teeth, toileting, grooming, dressing, etc )  - Assess/evaluate cause of self-care deficits   - Assess range of motion  - Assess patient's mobility; develop plan if impaired  - Assess patient's need for assistive devices and provide as appropriate  - Encourage maximum independence but intervene and supervise when necessary  ¯ Involve family in performance of ADLs  ¯ Assess for home care needs following discharge   ¯ Request OT consult to assist with ADL evaluation and planning for discharge  ¯ Provide patient education as appropriate  Outcome: Progressing  Goal: Maintain or return mobility status to optimal level  Description  INTERVENTIONS:  - Assess patient's baseline mobility status (ambulation, transfers, stairs, etc )    - Identify cognitive and physical deficits and behaviors that affect mobility  - Identify mobility aids required to assist with transfers and/or ambulation (gait belt, sit-to-stand, lift, walker, cane, etc )  - [place] fall precautions as indicated by assessment  - Record patient progress and toleration of activity level on Mobility SBAR; progress patient to next Phase/Stage  - Instruct patient to call for assistance with activity based on assessment  - Request Rehabilitation consult to assist with strengthening/weightbearing, etc   Outcome: Progressing     Problem: DISCHARGE PLANNING  Goal: Discharge to home or other facility with appropriate resources  Description  INTERVENTIONS:  - Identify barriers to discharge w/patient and caregiver  - Arrange for needed discharge resources and transportation as appropriate  - Identify discharge learning needs (meds, wound care, etc )  - Arrange for interpretive services to assist at discharge as needed  - Refer to Case Management Department for coordinating discharge planning if the patient needs post-hospital services based on physician/advanced practitioner order or complex needs related to functional status, cognitive ability, or social support system  Outcome: Progressing     Problem: Prexisting or High Potential for Compromised Skin Integrity  Goal: Skin integrity is maintained or improved  Description  INTERVENTIONS:  - Identify patients at risk for skin breakdown  - Assess and monitor skin integrity  - Assess and monitor nutrition and hydration status  - Monitor labs (i e  albumin)  - Assess for incontinence   - Turn and reposition patient  - Assist with mobility/ambulation  - Relieve pressure over bony prominences  - Avoid friction and shearing  - Provide appropriate hygiene as needed including keeping skin clean and dry  - Evaluate need for skin moisturizer/barrier cream  - Collaborate with interdisciplinary team (i e  Nutrition, Rehabilitation, etc )   - Patient/family teaching  Outcome: Progressing

## 2019-07-06 PROCEDURE — 97112 NEUROMUSCULAR REEDUCATION: CPT

## 2019-07-06 PROCEDURE — 97116 GAIT TRAINING THERAPY: CPT

## 2019-07-06 PROCEDURE — 97535 SELF CARE MNGMENT TRAINING: CPT

## 2019-07-06 PROCEDURE — 97110 THERAPEUTIC EXERCISES: CPT

## 2019-07-06 PROCEDURE — G0515 COGNITIVE SKILLS DEVELOPMENT: HCPCS

## 2019-07-06 RX ORDER — BISACODYL 10 MG
10 SUPPOSITORY, RECTAL RECTAL DAILY PRN
Status: DISCONTINUED | OUTPATIENT
Start: 2019-07-06 | End: 2019-07-22 | Stop reason: HOSPADM

## 2019-07-06 RX ADMIN — BISACODYL 10 MG: 10 SUPPOSITORY RECTAL at 13:52

## 2019-07-06 RX ADMIN — NIFEDIPINE 30 MG: 30 TABLET, FILM COATED, EXTENDED RELEASE ORAL at 08:57

## 2019-07-06 RX ADMIN — POLYETHYLENE GLYCOL 3350 17 G: 17 POWDER, FOR SOLUTION ORAL at 08:58

## 2019-07-06 RX ADMIN — ASPIRIN 81 MG: 81 TABLET, COATED ORAL at 08:57

## 2019-07-06 RX ADMIN — DOCUSATE SODIUM 100 MG: 100 CAPSULE, LIQUID FILLED ORAL at 08:57

## 2019-07-06 RX ADMIN — ENOXAPARIN SODIUM 40 MG: 40 INJECTION SUBCUTANEOUS at 08:57

## 2019-07-06 RX ADMIN — CARVEDILOL 3.12 MG: 3.12 TABLET, FILM COATED ORAL at 16:43

## 2019-07-06 RX ADMIN — PANTOPRAZOLE SODIUM 40 MG: 40 TABLET, DELAYED RELEASE ORAL at 06:50

## 2019-07-06 RX ADMIN — CARVEDILOL 3.12 MG: 3.12 TABLET, FILM COATED ORAL at 06:50

## 2019-07-06 RX ADMIN — MELATONIN 6 MG: at 21:29

## 2019-07-06 RX ADMIN — CLOPIDOGREL BISULFATE 75 MG: 75 TABLET ORAL at 08:57

## 2019-07-06 RX ADMIN — ATORVASTATIN CALCIUM 80 MG: 80 TABLET, FILM COATED ORAL at 16:43

## 2019-07-06 RX ADMIN — Medication 325 MG: at 06:50

## 2019-07-06 RX ADMIN — SENNOSIDES 17.2 MG: 8.6 TABLET, FILM COATED ORAL at 21:29

## 2019-07-06 RX ADMIN — DOCUSATE SODIUM 100 MG: 100 CAPSULE, LIQUID FILLED ORAL at 17:49

## 2019-07-06 NOTE — PROGRESS NOTES
07/06/19 0930   Pain Assessment   Pain Assessment No/denies pain   Pain Score No Pain   Restrictions/Precautions   Precautions Cognitive; Fall Risk;Supervision on toilet/commode;Visual deficit   Memory Skills   Memory (FIM) 4 - Recognizes/recalls/performs 75-89%   Social Interaction (FIM) 7 - Interacts appropriately without assistive device, medication or helper   Speech/Language/Cognition Assessmetn   Treatment Assessment Pt participated in 1:1 skilled ST session targeting cognitive linguistic skills  Pt oriented to person, place, time, and situation  Pt was supervision for recall for tasks completed in OT session just before ST session  Given 4 written words, pt ID'ed words that didn't belong in 14/15 trials independently  Target visual memory via a picture scene  Pt answered recall questions about the picture scene in 6/10 trials independently; increased to 9/10 given min-mod verbal cues and binary choice  Pt participated in various tasks related to medication management  Given prescription labels, pt answered questions in 13/14 trials independently; increased to 14/14 given min verbal cues  Pt and SLP reviewed current list of medication  Pt reported she previously took medication for blood pressure (unsure name) and aspirin daily  Pt ID'ed new or old medication in 10/11 trials  She recalled medication function in 6/11 trials independently  Pt participated in a mock pill organization task given 5 different medications/dosages  Pt sorted pills in organizer correctly in 2/5 trials independently; increased to 5/5 given min-mod verbal and visual cues  Min-mod increased difficulty with problem solving (I e  Increased frequency like '2-3x per day,' bedtime- sort in PM slot)  Min difficulty with R sided inattention 1-2x t/o trial; however, improved given min verbal cue  Of note, pt required increased time to complete mock pill organization task- suspect due to cognitive and visual perceptual deficits   Recommend continued skilled ST services to maximize cognitive linguistic skills in order to decrease burden of care  SLP Therapy Minutes   SLP Time In 0930   SLP Time Out 1030   SLP Total Time (minutes) 60   SLP Mode of treatment - Individual (minutes) 60   SLP Mode of treatment - Concurrent (minutes) 0   SLP Mode of treatment - Group (minutes) 0   SLP Mode of treatment - Co-treat (minutes) 0   SLP Mode of Teatment - Total time(minutes) 60 minutes   Therapy Time missed   Time missed?  No   Daily FIM Score   Problem solving (FIM) 4 - Solves basic problems 75-89% of time   Comprehension (FIM) 4 - Understands basic info/conversation 75-90% of time   Expression (FIM) 5 - Needs help/cues only RARELY (< 10% of the time)

## 2019-07-06 NOTE — PROGRESS NOTES
07/06/19 0830   Pain Assessment   Pain Assessment No/denies pain   Pain Score No Pain   Restrictions/Precautions   Precautions Cognitive; Fall Risk;Supervision on toilet/commode;Visual deficit   QI: Oral Hygiene   Assistance Needed Incidental touching   Oral Hygiene CARE Score 4   Grooming   Findings in stance at sink for hand hygiene, inc time for hand to target faucet with RUE; dysmetria noted   Grooming (FIM) 4 - Patient requires steadying assist or light touching   Bathing   Findings  OT had scheduled ADL tx today, however RN assisted pt with washing and dressing prior to OT arrival  OT with plans to complete ADL routine tomorrow to assess fxnl performance   QI: Sit to Stand   Assistance Needed Incidental touching   Sit to Stand CARE Score 4   QI: Chair/Bed-to-Chair Transfer   Assistance Needed Physical assistance   Assistance Provided by [place] 25%-49%   Chair/Bed-to-Chair Transfer CARE Score 3   Transfer Bed/Chair/Wheelchair   Adaptive Equipment Hand Hold   Findings HHA on L    Bed, Chair, Wheelchair Transfer (FIM) 4 - Patient completes 75% of all tasks   QI: [address] Needed Physical assistance   Assistance Provided by [place] 25%-49%   Toileting Hygiene CARE Score 3   Toileting   Findings min A to steady with clothing management with LOB x 1 with b/l UE release   Toileting (FIM) 4 - Patient requires steadying assist or light touching   QI: Toilet Transfer   Assistance Needed Physical assistance   Assistance Provided by [place] 25%-49%   Toilet Transfer CARE Score 3   Toilet Transfer   Toilet Transfer (FIM) 4 - Patient requires steadying assist or light touching   Neuromuscular Education   Functional Movement Patterns REOGO ASSISTED THERAPY: "[id number]" Pt engaged in [address] assisted therapy for mass motor practice/repetition to inc fxnl coordination, targeting, proprioceptive awareness, and overall fxnl use of RUE to inc indep with ADL/IADL fxn   Pt participated in FIT session with use of cylindrical mast control and lateral trunk supports in place to dec compensatory mvmt patterns  Pt tolerated x 50 forward thrust and x 25 of forward reach in guided motion mode for mass practice to improve motor planning and overall motor control with fxnl reach  Pt then engaged in game of [place]bles on mode easy to address fxnl targeting, coordination, and  skills  Pt demo difficulty with RUE initiation and motor planning req [place] A to initiate fxnl reach  Pt demo diffiuclty achieving target with dysmetria noted  Pt completed x 3 rounds with inc time for sequencing and coordination movement of RUE  Continued recommendation for RUE NMR on reogo   Assessment   Treatment Assessment Pt engaged in skilled OT tx session with focus on fxnl xfers, fxnl mob, ADL fxn, and RUE NMR  See above for further details  Pt on REOGO assisted therapy demo impaired initiation of RUE reaching/ROM in structured task  Pt with full ROM of RUE available however pt demo difficulty w/ reaching to achieve/obtain target  Pt would benefit from use of bimanual task training to improve automaticity of R UE grasp/realease/reach to inc indep and safety with ADL/IADL  Continue per OT POC   Problem List Decreased strength;Decreased endurance; Impaired balance;Decreased mobility; Decreased coordination;Decreased cognition; Impaired judgement;Decreased safety awareness; Impaired sensation   Barriers to Discharge Decreased caregiver support   Plan   Treatment/Interventions ADL retraining;Functional transfer training; Therapeutic exercise; Endurance training;Cognitive reorientation;Patient/family training;Equipment eval/education; Compensatory technique education  (NMR)   Progress Progressing toward goals   OT Therapy Minutes   OT Time In 0830   OT Time Out 0930   OT Total Time (minutes) 60   OT Mode of treatment - Individual (minutes) 60   OT Mode of treatment - Concurrent (minutes) 0   OT Mode of treatment - Group (minutes) 0   OT Mode of treatment - Co-treat (minutes) 0   OT Mode of Teatment - Total time(minutes) 60 minutes   Therapy Time missed   Time missed?  No

## 2019-07-06 NOTE — PROGRESS NOTES
07/06/19 1235   Pain Assessment   Pain Assessment No/denies pain   Restrictions/Precautions   Precautions Cognitive; Fall Risk;Supervision on toilet/commode   Cognition   Overall Cognitive Status Impaired   Arousal/Participation Alert; Cooperative   Attention Attends with cues to redirect   Orientation Level Oriented X4   Memory Decreased recall of recent events;Decreased short term memory;Decreased recall of precautions   Following Commands Follows one step commands with increased time or repetition   Subjective   Subjective Pt states she will do whatever she needs to to get better  Rama Gomez to get home to her apt  Complaints of being bloated due to constapation attributed to iron pill  QI: Sit to Stand   Assistance Needed Verbal cues; Incidental touching   Sit to Stand CARE Score 4   QI: Chair/Bed-to-Chair Transfer   Assistance Needed Physical assistance;Verbal cues   Assistance Provided by [place] 25%-49%   Chair/Bed-to-Chair Transfer CARE Score 3   Transfer Bed/Chair/Wheelchair   Limitations Noted In UE Strength; Sequencing;Problem Solving;Coordination;Balance   Adaptive Equipment Roller Walker   Stand Pivot Contact Guard;Minimal Assist   Sit to Stand Contact Guard;Minimal Assist   Stand to Sit Contact Guard;Minimal Assist   Findings poor carryover of hand placment  Dec attn to R UE  Impulsive at times  Needs cues and steading A      Bed, Chair, Wheelchair Transfer (FIM) 4 - Patient completes 75% of all tasks   QI: Walk 10 Feet   Assistance Needed Physical assistance;Verbal cues   Assistance Provided by [place] 25%-49%   Comment without AD   Walk 10 Feet CARE Score 3   QI: Walk 50 Feet with Two Turns   Assistance Needed Verbal cues; Incidental touching;Physical assistance   Assistance Provided by [place] Less than 25%   Comment with RW   Walk 50 Feet with Two Turns CARE Score 3   QI: Walk 150 Feet   Assistance Needed Verbal cues; Incidental touching;Physical assistance   Assistance Provided by [place] Less than 25% Comment with RW   Walk 150 Feet CARE Score 3   QI: Walking 10 Feet on Uneven Surfaces   Reason if not Attempted Safety concerns   Walking 10 Feet on Uneven Surfaces CARE Score 88   Ambulation   Does the patient walk? 2  Yes   Primary Discharge Mode of Locomotion Walk   Walk Assist Level Minimum Assist;Moderate Assist   Gait Pattern Trendelenburg;Narrow LUIS; Decreased foot clearance; Inconsistant Dolly; Improper weight shift  (R inattention)   Assist Device Senior Living; IntenseDebate  (HHA)   Distance Walked (feet) 50 ft  (HHA mod A; 1x100 and 2x150 with RW, min A)   Limitations Noted In Balance; Coordination;Device Management;Midline Orientation;Posture; Sequencing;Strength   Findings + R hip drop  Pt reports to be baseline  Very NBOS with R leg crossover at times  Improved stability with RW support however needs A and cues for RW mgmt and safety  Walking (FIM) 2 - Patient ambulates between 50 - 149 feet regardless of assist/device/set up   Wheelchair mobility   QI: Does the patient use a wheelchair? 0  No   QI: 1 Step (Curb)   Reason if not Attempted Safety concerns   1 Step (Curb) CARE Score 88   QI: 4 Steps   Reason if not Attempted Safety concerns   4 Steps CARE Score 88   QI: 12 Steps   Comment pt does not have steps at home  Reason if not Attempted Activity not applicable   12 Steps CARE Score 9   QI: Picking Up Object   Reason if not Attempted Safety concerns   Picking Up Object CARE Score 88   Therapeutic Interventions   Strengthening Seated B LE TE 2# LAQ and hip flexion x20 each  Red tband abd and HS curls x20 each   Flexibility manual stretch to R LE DF 5x10 sec   Balance standing dyanmic tasks with R UE x5min   Neuromuscular Re-Education Standing reaching and motor planning tasks with R UE  Seated and standing cup taps 2x10 each  Equipment Use   NuStep L1 x10 min    Assessment   Treatment Assessment Pt 78 yr old female with L MCA  infarct sp L CEA   Presents with R body weakness and significant R UE dec attn and motor planning  PT session focus on balance training, gait and strengthening  Pt motivated for therapy and willing to do what is asks however can pbe impulsive with dec insight to defcits  Amb trials with and without RW  Imporved stability with Rw however pt amb without AD PTA  See gait notes for details  B LE coordination tasks improved with inc trials  Cues for hand placement with transfers  Dec carryover evident  Pt to highly benefit from continued skilled PT to topimize balance coordination gait and safety for utilmate DC home with home services when goals met  However pt does live alone  Pt enjoys nustep  Problem List Decreased strength;Decreased endurance; Impaired balance;Decreased coordination;Decreased cognition; Impaired judgement;Decreased safety awareness   Barriers to Discharge Decreased caregiver support   Plan   Treatment/Interventions Therapeutic exercise;Patient/family training;LE strengthening/ROM  (NMReed)   Progress Progressing toward goals   Recommendation   Recommendation Home PT   PT Therapy Minutes   PT Time In 1235   PT Time Out 1335   PT Total Time (minutes) 60   PT Mode of treatment - Individual (minutes) 60   PT Mode of treatment - Concurrent (minutes) 0   PT Mode of treatment - Group (minutes) 0   PT Mode of treatment - Co-treat (minutes) 0   PT Mode of Teatment - Total time(minutes) 60 minutes   Therapy Time missed   Time missed?  No

## 2019-07-06 NOTE — PLAN OF CARE
Problem: Potential for Falls  Goal: Patient will remain free of falls  Description  INTERVENTIONS:  - Assess patient frequently for physical needs  -  Identify cognitive and physical deficits and behaviors that affect risk of falls    -  [place] fall precautions as indicated by assessment   - Educate patient/family on patient safety including physical limitations  - Instruct patient to call for assistance with activity based on assessment  - Modify environment to reduce risk of injury  - Consider OT/PT consult to assist with strengthening/mobility  Outcome: Progressing     Problem: PAIN - ADULT  Goal: Verbalizes/displays adequate comfort level or baseline comfort level  Description  Interventions:  - Encourage patient to monitor pain and request assistance  - Assess pain using appropriate pain scale  - Administer analgesics based on type and severity of pain and evaluate response  - Implement non-pharmacological measures as appropriate and evaluate response  - Consider cultural and social influences on pain and pain management  - Notify physician/advanced practitioner if interventions unsuccessful or patient reports new pain  Outcome: Progressing     Problem: INFECTION - ADULT  Goal: Absence or prevention of progression during hospitalization  Description  INTERVENTIONS:  - Assess and monitor for signs and symptoms of infection  - Monitor lab/diagnostic results  - Monitor all insertion sites, i e  indwelling lines, tubes, and drains  - Monitor endotracheal (as able) and nasal secretions for changes in amount and color  - [place] appropriate cooling/warming therapies per order  - Administer medications as ordered  - Instruct and encourage patient and family to use good hand hygiene technique  - Identify and instruct in appropriate isolation precautions for identified infection/condition  Outcome: Progressing     Problem: SAFETY ADULT  Goal: Maintain or return to baseline ADL function  Description  INTERVENTIONS:  -  Assess patient's ability to carry out ADLs; assess patient's baseline for ADL function and identify physical deficits which impact ability to perform ADLs (bathing, care of mouth/teeth, toileting, grooming, dressing, etc )  - Assess/evaluate cause of self-care deficits   - Assess range of motion  - Assess patient's mobility; develop plan if impaired  - Assess patient's need for assistive devices and provide as appropriate  - Encourage maximum independence but intervene and supervise when necessary  ¯ Involve family in performance of ADLs  ¯ Assess for home care needs following discharge   ¯ Request OT consult to assist with ADL evaluation and planning for discharge  ¯ Provide patient education as appropriate  Outcome: Progressing  Goal: Maintain or return mobility status to optimal level  Description  INTERVENTIONS:  - Assess patient's baseline mobility status (ambulation, transfers, stairs, etc )    - Identify cognitive and physical deficits and behaviors that affect mobility  - Identify mobility aids required to assist with transfers and/or ambulation (gait belt, sit-to-stand, lift, walker, cane, etc )  - [place] fall precautions as indicated by assessment  - Record patient progress and toleration of activity level on Mobility SBAR; progress patient to next Phase/Stage  - Instruct patient to call for assistance with activity based on assessment  - Request Rehabilitation consult to assist with strengthening/weightbearing, etc   Outcome: Progressing     Problem: DISCHARGE PLANNING  Goal: Discharge to home or other facility with appropriate resources  Description  INTERVENTIONS:  - Identify barriers to discharge w/patient and caregiver  - Arrange for needed discharge resources and transportation as appropriate  - Identify discharge learning needs (meds, wound care, etc )  - Arrange for interpretive services to assist at discharge as needed  - Refer to Case Management Department for coordinating discharge planning if the patient needs post-hospital services based on physician/advanced practitioner order or complex needs related to functional status, cognitive ability, or social support system  Outcome: Progressing     Problem: Prexisting or High Potential for Compromised Skin Integrity  Goal: Skin integrity is maintained or improved  Description  INTERVENTIONS:  - Identify patients at risk for skin breakdown  - Assess and monitor skin integrity  - Assess and monitor nutrition and hydration status  - Monitor labs (i e  albumin)  - Assess for incontinence   - Turn and reposition patient  - Assist with mobility/ambulation  - Relieve pressure over bony prominences  - Avoid friction and shearing  - Provide appropriate hygiene as needed including keeping skin clean and dry  - Evaluate need for skin moisturizer/barrier cream  - Collaborate with interdisciplinary team (i e  Nutrition, Rehabilitation, etc )   - Patient/family teaching  Outcome: Progressing

## 2019-07-06 NOTE — PROGRESS NOTES
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; s/p left CEA 6/28/19:  continue ASA/Plavix, Lipitor = for home Cards advised change Lipitor to Crestor 40mg daily      Iron deficiency anemia; s/p transfusion and iron IV: continue iron PO supplements      AVM stomach/duodenum tx with APC; cold snare sigmoid colon polypectomy: continue PPI     HTN: at home was on Lopressor and now is switched to Coreg and Nifedipine     RUL ground glass density: for Pulm to see OP     Urethral diverticulum: to see Uro as OP for cystogram     Nicotine abuse: counseled for cessation     Fall 2/2 LOB 6/24/19: CT head was negative; has remaining ecchymosis right forehead/orbit     ABLA:  still fluctuating bwtn the 8s and 9s; will watch    Leukocytosis: Mild  Likely reactive  No signs of infx  Subjective/ HPI: Patient seen and examined  Patients overnight issues or events were reviewed with nursing or staff during rounds or morning huddle session  New or overnight issues include the following: HTN: Currently well controlled  Continue present medications  CVA: No new stroke symptoms  Continue antiplatelet and statin  Anemia: Will continue to monitor  Pt currently reports that she is doing well       ROS:     GI: denies abdominal pain, change bowel habits or reflux symptoms  Neuro: Denies any headache, new vision changes, new neuropathies,new weaknesses   Respiratory: No Cough, SOB, denies wheeze  Cardiovascular: No CP, palpitations , denies perception of rapid heartbeat  : denies any new urinary burning or frequency    Review of Scheduled Meds:    Current Facility-Administered Medications:  aspirin 81 mg Oral Daily Ed Arreola MD   atorvastatin 80 mg Oral Daily With Su Marc MD   calcium carbonate 500 mg Oral Daily PRN Ed Arreola MD   carvedilol 3 125 mg Oral BID With Meals José Antonio Schwab MD   clopidogrel 75 mg Oral Daily Ed Arreola MD   docusate sodium 100 mg Oral BID Amina Whipple PA-C   enoxaparin 40 mg Subcutaneous Q24H [address] Ed Arreola MD   ferrous sulfate 325 mg Oral BID With Meals Ed Arreola MD   melatonin 6 mg Oral HS Ed Arreola MD   NIFEdipine 30 mg Oral Daily Ed Arreola MD   oxyCODONE 2 5 mg Oral Q4H PRN Ed Arreola MD   oxyCODONE 5 mg Oral Q4H PRN Ed Arreola MD   pantoprazole 40 mg Oral Early Morning Ed Arreola MD   polyethylene glycol 17 g Oral Daily PRN Amina Whipple PA-C   senna 2 tablet Oral HS Amina Whipple PA-C       Labs:     Results from last 7 days   Lab Units 07/05/19  0617 07/02/19  0449   WBC Thousand/uL 10 60* 10 72*   HEMOGLOBIN g/dL 8 5* 8 3*   HEMATOCRIT % 30 8* 30 0*   PLATELETS Thousands/uL 381 278     Results from last 7 days   Lab Units 07/05/19  0617 07/02/19  0449   SODIUM mmol/L 140 140   POTASSIUM mmol/L 3 6 3 6   CHLORIDE mmol/L 109* 110*   CO2 mmol/L 26 24   BUN mg/dL 16 20   CREATININE mg/dL 0 86 0 80   CALCIUM mg/dL 8 6 8 6                        Imaging:     No orders to display       *Labs reviewed  *Radiology studies reviewed  *Medications reviewed and reconciled as needed  *Please refer to order section for additional ordered labs studies  *Case discussed with primary attending during morning huddle case rounds    Physical Examination:  Vitals:   Vitals:    07/05/19 1603 07/05/19 2051 07/06/19 0526 07/06/19 0600   BP: 133/58 124/62 126/56    BP Location:  Left arm Right arm    Pulse: 68 64 74    Resp:  16 18    Temp:  98 1 °F (36 7 °C) 98 1 °F (36 7 °C)    TempSrc:  Oral Oral    SpO2:  99% 93%    Weight:    72 2 kg (159 lb 2 8 oz)   Height:           General Appearance: NAD, conversive  Eyes: No icterus; conjunctiva normal, PERRLA  HENT: oropharynx clear; mucous membranes moist; no ulcerations, normal hard and soft palette  Neck: trachea midline, range of motion full  Supple, no lymphadenopathy or thyromegaly  Lungs: CTA, normal respiratory effort, no retractions, expiratory effort normal  CV: regular rate, no rubs no gallops, PMI normal place and intensity  ABD: soft non tender, no masses , no hepatic or splenomegaly  EXT: DP pulses intact, no lymphadenopathy, no edema  Skin: normal turgor, normal texture, no rash, no ulcers  Psych: affect normal, No anxiety,   Neuro: AAOx3            Total time spent: At least 40 minutes, with more than 50% spent counseling/coordinating care  Counseling includes discussion with patient re: progress  and discussion with patient of his/her current medical state/information  Coordination of patient's care was performed in conjunction with primary service  Time invested included review of patient's labs, vitals, and management of their comorbidities with continued monitoring  In addition, this patient was discussed with medical team including physician and advanced extenders  The care of the patient was extensively discussed and appropriate treatment plan was formulated unique for this patient  ** Please Note: Dragon 360 Dictation voice to text software may have been used in the creation of this document   **

## 2019-07-07 PROCEDURE — 97535 SELF CARE MNGMENT TRAINING: CPT

## 2019-07-07 PROCEDURE — 97530 THERAPEUTIC ACTIVITIES: CPT

## 2019-07-07 PROCEDURE — 97110 THERAPEUTIC EXERCISES: CPT

## 2019-07-07 PROCEDURE — 97112 NEUROMUSCULAR REEDUCATION: CPT

## 2019-07-07 PROCEDURE — G0515 COGNITIVE SKILLS DEVELOPMENT: HCPCS

## 2019-07-07 PROCEDURE — 97116 GAIT TRAINING THERAPY: CPT

## 2019-07-07 RX ADMIN — NIFEDIPINE 30 MG: 30 TABLET, FILM COATED, EXTENDED RELEASE ORAL at 08:31

## 2019-07-07 RX ADMIN — DOCUSATE SODIUM 100 MG: 100 CAPSULE, LIQUID FILLED ORAL at 08:31

## 2019-07-07 RX ADMIN — PANTOPRAZOLE SODIUM 40 MG: 40 TABLET, DELAYED RELEASE ORAL at 05:35

## 2019-07-07 RX ADMIN — CARVEDILOL 3.12 MG: 3.12 TABLET, FILM COATED ORAL at 17:06

## 2019-07-07 RX ADMIN — ASPIRIN 81 MG: 81 TABLET, COATED ORAL at 08:31

## 2019-07-07 RX ADMIN — CARVEDILOL 3.12 MG: 3.12 TABLET, FILM COATED ORAL at 08:31

## 2019-07-07 RX ADMIN — SENNOSIDES 17.2 MG: 8.6 TABLET, FILM COATED ORAL at 21:31

## 2019-07-07 RX ADMIN — DOCUSATE SODIUM 100 MG: 100 CAPSULE, LIQUID FILLED ORAL at 17:06

## 2019-07-07 RX ADMIN — ATORVASTATIN CALCIUM 80 MG: 80 TABLET, FILM COATED ORAL at 17:06

## 2019-07-07 RX ADMIN — MELATONIN 6 MG: at 21:31

## 2019-07-07 RX ADMIN — ENOXAPARIN SODIUM 40 MG: 40 INJECTION SUBCUTANEOUS at 08:31

## 2019-07-07 RX ADMIN — CLOPIDOGREL BISULFATE 75 MG: 75 TABLET ORAL at 08:31

## 2019-07-07 RX ADMIN — Medication 325 MG: at 08:31

## 2019-07-07 RX ADMIN — Medication 325 MG: at 17:06

## 2019-07-07 NOTE — PROGRESS NOTES
07/07/19 0930   Pain Assessment   Pain Assessment No/denies pain   Pain Score No Pain   Restrictions/Precautions   Precautions Cognitive; Fall Risk;Supervision on toilet/commode   Dressing/Undressing Clothing   Findings Pt engaged in dressing tasks seated in chair and in stance; Pt demo dressing apraxia with dec coordination and dec spatial awareness/proprioceptive sense noted  Pt req A to guide R UE to thread 2* inc apraxia with vision occluded  Pt req cues to carryover hemidressing tech with LB dressing  Pt req A to thread RLE to pants   UB Dressing (FIM) 4 - Patient completes 75% of all tasks   LB Dressing (FIM) 4 - Patient completes 75% of all tasks   QI: Sit to Stand   Assistance Needed Incidental touching   Sit to Stand CARE Score 4   QI: Chair/Bed-to-Chair Transfer   Assistance Needed Incidental touching   Chair/Bed-to-Chair Transfer CARE Score 4   Transfer Bed/Chair/Wheelchair   Findings use of RW; pt with noted dec attention to R body with xfers and mobility   Bed, Chair, Wheelchair Transfer (FIM) 4 - Patient requires steadying assist or light touching   Toileting   Findings Pt reports she had a BM last night   Exercise Tools   Theraputty Red (med soft) Tputty: Pt engaged in [facility] [facility] and [facility] task with heavy work to [facility] proprioceptive sense of R UE to inc fxnl control/use for ADL/IADL fxn  Pt performed intrinsic and pincer grasp strengthening to remove small beads with RUE only and L UE stabilized behind back to encourage unilateral (R) use  Pt req inc time to locate/remove with impaired hand to target noted  Pt req demonstration on tech for bead removal to inc overall motor planning  Pt then performed closed chain strengthening with large and small tputty stamper tool for inc prop input to proximal UE   Pt req [place] A to guide UE to position, repetition completed for mass practice   Other Exercise Tool 1 Hand Gripper (1st Setting): Pt engaged in fxnl targeting, reaching, and gross grasp task to  soft therapy ball with hand gripper in RUE and sustain grasp to place to target while crossing midline  Pt req A to position gripper in hand with dec attention and awareness of positioning of tool req frequent repositioning  Pt req [place] A initially, fading A with repetition  Coordination   Gross Motor Reciprocal ball bounce: Pt engaged in reciprocal ball toss to inc automaticity of R UE for bimanual tasks to inc indep with ADL/IADL fxn  Pt demo improved reaction time and coordination of ball bounce with use of b/l UEs to allow for feedback  Pt completed independent ball bounce with 90% accuracy to bounce/catch ball from floor level  Assessment   Treatment Assessment Pt engaged in skilled OT tx with focus on FMC/GMC, GMS/FMS, R UE NMR,  ADL fxn, fxnl xfers, and pt education  see above for further details  Pt demo greatest deficits in motor planning, coordination, and propriocetion/sensory awareness limiting indep and safety with ADL/IADL fxn and xfers  continue per OT POC   OT Therapy Minutes   OT Time In 0930   OT Time Out 1030   OT Total Time (minutes) 60   OT Mode of treatment - Individual (minutes) 30   OT Mode of treatment - Concurrent (minutes) 30   OT Mode of treatment - Group (minutes) 0   OT Mode of treatment - Co-treat (minutes) 0   OT Mode of Teatment - Total time(minutes) 60 minutes   Therapy Time missed   Time missed?  No

## 2019-07-07 NOTE — PROGRESS NOTES
07/07/19 1230   Pain Assessment   Pain Assessment 0-10   Pain Score No Pain   Restrictions/Precautions   Precautions Bed/chair alarms;Cognitive; Fall Risk;Supervision on toilet/commode   Weight Bearing Restrictions No   ROM Restrictions No   Cognition   Overall Cognitive Status Impaired   Arousal/Participation Alert; Cooperative   Attention Attends with cues to redirect   Orientation Level Oriented X4   Memory Decreased short term memory;Decreased recall of recent events;Decreased recall of precautions   Following Commands Follows one step commands with increased time or repetition   Subjective   Subjective No c/o pain  QI: Roll Left and Right   Reason if not Attempted Activity not applicable   Roll Left and Right CARE Score 9   QI: Sit to Lying   Reason if not Attempted Activity not applicable   Sit to Lying CARE Score 9   QI: Lying to Sitting on Side of Bed   Reason if not Attempted Activity not applicable   Lying to Sitting on Side of Bed CARE Score 9   QI: Sit to Stand   Assistance Needed Incidental touching   Assistance Provided by [place] Less than 25%   Comment CG   Sit to Stand CARE Score 3   QI: Chair/Bed-to-Chair Transfer   Assistance Needed Incidental touching   Assistance Provided by [place] Less than 25%   Comment CG using RW   Chair/Bed-to-Chair Transfer CARE Score 3   Transfer Bed/Chair/Wheelchair   Limitations Noted In Balance; Coordination; Endurance;UE Strength;LE Strength   Adaptive Equipment Roller Walker   Stand Pivot Contact Guard   Sit to [employer]   Stand to [facility] [place], Chair, Wheelchair Transfer (FIM) 4 - Patient requires steadying assist or light touching   QI: Car Transfer   Reason if not Attempted Activity not applicable   Car Transfer CARE Score 9   QI: Walk 10 Feet   Assistance Needed Incidental touching   Assistance Provided by [place] Less than 25%   Comment CG using RW   Walk 10 Feet CARE Score 3   QI: Walk 50 Feet with Two Turns   Assistance Needed Incidental touching   Assistance Provided by [place] Less than 25%   Comment CG using RW   Walk 50 Feet with Two Turns CARE Score 3   QI: Walk 150 Feet   Assistance Needed Incidental touching   Assistance Provided by [place] Less than 25%   Comment CG using RW   Walk 150 Feet CARE Score 3   QI: Walking 10 Feet on Uneven Surfaces   Reason if not Attempted Activity not applicable   Walking 10 Feet on Uneven Surfaces CARE Score 9   Ambulation   Does the patient walk? 2  Yes   Primary Discharge Mode of Locomotion Walk   Walk Assist Level Contact Guard   Gait Pattern Inconsistant Dolly;Decreased foot clearance;Narrow LUIS; Trendelenburg; Improper weight shift   Assist Device Roller Walker   Distance Walked (feet) 210 ft  (210 feet x 2)   Limitations Noted In Balance; Coordination;Device Management; Endurance;Strength;Speed;Swing   Walking (FIM) 4 - Patient requires steadying assist or light touching AND distance 150 feet or more, no rest   QI: Wheel 50 Feet with Two Turns   Reason if not Attempted Activity not applicable   Wheel 50 Feet with Two Turns CARE Score 9   QI: Wheel 150 Feet   Reason if not Attempted Activity not applicable   Wheel 033 Feet CARE Score 9   Wheelchair mobility   QI: Does the patient use a wheelchair? 0  No   Wheelchair (FIM) 0 - Activity does not occur   QI: 1 Step (Curb)   Reason if not Attempted Activity not applicable   1 Step (Curb) CARE Score 9   QI: 4 Steps   Assistance Needed Incidental touching   Assistance Provided by [place] Less than 25%   Comment CG   4 Steps CARE Score 3   QI: 12 Steps   Assistance Needed Incidental touching   Assistance Provided by [place] Less than 25%   Comment Ascended/descended 12 steps with CG using 2HR in  steps     12 Steps CARE Score 3   Stairs   Type Stairs   # of Steps 12   Weight Bearing Precautions Fall Risk   Assist Devices Bilateral Rail   Stairs (FIM) 4 - Patient requires steadying assist or light touching AND patient goes up and down full flight (12- 14 stairs) QI: Picking Up Object   Reason if not Attempted Activity not applicable   Picking Up Object CARE Score 9   QI: Toilet Transfer   Reason if not Attempted Activity not applicable   Toilet Transfer CARE Score 9   Toilet Transfer   Toilet Transfer (FIM) 0 - Activity does not occur   Therapeutic Interventions   Strengthening Seated ther ex on BLE's using 2 5# ankle weights for LAQ, hip flexion, hip abduction using red theraband, hip adduction with ball squeezes and ankle DF/PF for 3 sets of 10 reps each  Standing ther ex on BLE's using 2 5# ankle weights and with the support of RW for marching in place, partial squats, heel raises for 3 sets of 10 reps each  Equipment Use   NuStep Level 2 x 10 minutes   Assessment   Treatment Assessment Patient tolerated therapy very well with rest breaks in between  She was very pleasant and very cooperative during therapy session  She ambulated 210 feet x 2 using RW in a circuit with CG  CG with sit to stand transfers and SPT using RW  Ascended/descended 12 steps with BHR with CG ( steps)  No LOB noted  She will benefit from continued skilled PT services to improve BLE's strength to facilitate safety and [place] with transfers and gait, to improve endurance with gait and improve standing balance to reduce risk for falls  Family/Caregiver Present no   Problem List Decreased strength;Decreased endurance; Impaired balance;Decreased mobility; Decreased coordination; Impaired judgement;Decreased safety awareness   Barriers to Discharge Decreased caregiver support   PT Barriers   Physical Impairment Decreased strength;Decreased endurance; Impaired balance;Decreased mobility; Decreased coordination; Impaired judgement;Decreased safety awareness   Functional Limitation Car transfers;Stair negotiation;Standing;Transfers; Walking   Plan   Treatment/Interventions Functional transfer training;LE strengthening/ROM; Elevations; Therapeutic exercise; Endurance training;Bed mobility;Gait training   Progress Progressing toward goals   Recommendation   Recommendation Home PT   Equipment Recommended Walker   PT Therapy Minutes   PT Time In 1230   PT Time Out 1400   PT Total Time (minutes) 90   PT Mode of treatment - Individual (minutes) 90   PT Mode of treatment - Concurrent (minutes) 0   PT Mode of treatment - Group (minutes) 0   PT Mode of treatment - Co-treat (minutes) 0   PT Mode of Teatment - Total time(minutes) 90 minutes   Therapy Time missed   Time missed?  No

## 2019-07-07 NOTE — PROGRESS NOTES
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; s/p left CEA 6/28/19:  continue ASA/Plavix, Lipitor = for home Cards advised change Lipitor to Crestor 40mg daily      Iron deficiency anemia; s/p transfusion and iron IV: continue iron PO supplements      AVM stomach/duodenum tx with APC; cold snare sigmoid colon polypectomy: continue PPI     HTN: at home was on Lopressor and now is switched to Coreg and Nifedipine     RUL ground glass density: for Pulm to see OP     Urethral diverticulum: to see Uro as OP for cystogram     Nicotine abuse: counseled for cessation     Fall 2/2 LOB 6/24/19: CT head was negative; has remaining ecchymosis right forehead/orbit     ABLA:  still fluctuating bwtn the 8s and 9s; will watch    Leukocytosis: Mild  Likely reactive  No signs of infx  Subjective/ HPI: Patient seen and examined  Patients overnight issues or events were reviewed with nursing or staff during rounds or morning huddle session  New or overnight issues include the following: HTN: Currently well controlled  Continue present medications  CVA: No new stroke symptoms  Continue antiplatelet and statin  Anemia: Will continue to monitor  Pt currently reports that she is doing well       ROS:     GI: denies abdominal pain, change bowel habits or reflux symptoms  Neuro: Denies any headache, new vision changes, new neuropathies,new weaknesses   Respiratory: No Cough, SOB, denies wheeze  Cardiovascular: No CP, palpitations , denies perception of rapid heartbeat  : denies any new urinary burning or frequency    Review of Scheduled Meds:    Current Facility-Administered Medications:  aspirin 81 mg Oral Daily Ed Arreola MD   atorvastatin 80 mg Oral Daily With Dinner Meg Saez MD   bisacodyl 10 mg Rectal Daily PRN Amina Whipple PA-C   calcium carbonate 500 mg Oral Daily PRN Ed Arreola MD   carvedilol 3 125 mg Oral BID With Meals Garrett Gonsalez MD   clopidogrel 75 mg Oral Daily Ed Arreola MD   docusate sodium 100 mg Oral BID Amina Whipple PA-C   enoxaparin 40 mg Subcutaneous Q24H [address] Ed Arreola MD   ferrous sulfate 325 mg Oral BID With Meals Ed Arreola MD   melatonin 6 mg Oral HS Ed Arreola MD   NIFEdipine 30 mg Oral Daily Ed Arreola MD   oxyCODONE 2 5 mg Oral Q4H PRN Ed Arreola MD   oxyCODONE 5 mg Oral Q4H PRN Ed Arreola MD   pantoprazole 40 mg Oral Early Morning Ed Arreola MD   polyethylene glycol 17 g Oral Daily PRN Amina Whipple PA-C   senna 2 tablet Oral HS Amina Whipple PA-C       Labs:     Results from last 7 days   Lab Units 07/05/19  0617 07/02/19  0449   WBC Thousand/uL 10 60* 10 72*   HEMOGLOBIN g/dL 8 5* 8 3*   HEMATOCRIT % 30 8* 30 0*   PLATELETS Thousands/uL 381 278     Results from last 7 days   Lab Units 07/05/19  0617 07/02/19  0449   SODIUM mmol/L 140 140   POTASSIUM mmol/L 3 6 3 6   CHLORIDE mmol/L 109* 110*   CO2 mmol/L 26 24   BUN mg/dL 16 20   CREATININE mg/dL 0 86 0 80   CALCIUM mg/dL 8 6 8 6                        Imaging:     No orders to display       *Labs reviewed  *Radiology studies reviewed  *Medications reviewed and reconciled as needed  *Please refer to order section for additional ordered labs studies  *Case discussed with primary attending during morning huddle case rounds    Physical Examination:  Vitals:   Vitals:    07/06/19 1643 07/06/19 2104 07/07/19 0500 07/07/19 0650   BP: 146/68 141/58 113/52    BP Location:  Left arm     Pulse: 78 69 70    Resp:  18 20    Temp:  99 °F (37 2 °C) 97 9 °F (36 6 °C)    TempSrc:  Oral Oral    SpO2:  97% 95%    Weight:    72 3 kg (159 lb 6 3 oz)   Height:           General Appearance: NAD, conversive  Eyes: No icterus; conjunctiva normal, PERRLA  HENT: oropharynx clear; mucous membranes moist; no ulcerations, normal hard and soft palette  Neck: trachea midline, range of motion full  Supple, no lymphadenopathy or thyromegaly  Lungs: CTA, normal respiratory effort, no retractions, expiratory effort normal  CV: regular rate, no rubs no gallops, PMI normal place and intensity  ABD: soft non tender, no masses , no hepatic or splenomegaly  EXT: DP pulses intact, no lymphadenopathy, no edema  Skin: normal turgor, normal texture, no rash, no ulcers  Psych: affect normal, No anxiety,   Neuro: AAOx3            Total time spent: At least 40 minutes, with more than 50% spent counseling/coordinating care  Counseling includes discussion with patient re: progress  and discussion with patient of his/her current medical state/information  Coordination of patient's care was performed in conjunction with primary service  Time invested included review of patient's labs, vitals, and management of their comorbidities with continued monitoring  In addition, this patient was discussed with medical team including physician and advanced extenders  The care of the patient was extensively discussed and appropriate treatment plan was formulated unique for this patient  ** Please Note: Dragon 360 Dictation voice to text software may have been used in the creation of this document   **

## 2019-07-07 NOTE — PROGRESS NOTES
07/07/19 1030   Pain Assessment   Pain Assessment No/denies pain   Pain Score No Pain   Restrictions/Precautions   Precautions Bed/chair alarms;Cognitive; Fall Risk;Supervision on toilet/commode   Executive Function Skills   Problem Solving X   Simple Functional Tasks Minimal   Planning Reduced planning skills   Organization Mildly disorganized   Memory Skills   Orientation Level Oriented X4   Short Term Recent Recall Mild Impairment   Short Term Working Recall Mild Impairment   Memory (FIM) 4 - Recognizes/recalls/performs 75-89%   Social Interaction (FIM) 5 - Interacts appropriately with others 90% of time   Speech/Language/Cognition Assessmetn   Treatment Assessment Pt seen for continued cognitive-linguistic tx w/noted orientationx4  Pt began session w/2-step, 4-component auditory commands related 2 visual boxed containing 3 shapes, letters and numbers  Pt accurately completed 2/5 sets in entirely w/partial success for other 3 sets as pt able to ID targeted item for 5/6 steps but not the task  Pt exhibited improvement to 4/5 sets given repetition of direction  Targeting written comprehension of bigger/smaller, pt accurately ID 18/20 words from Fo2 to answer ?'s  Pt then engaged in auditory STM/categorization task during which she was presented w/4 word sets, which she repeated aloud x1 at a time per instruction and independently utilized her fingers as paired visual cues  Pt recalled 35/40 words across the 10 sets w/improvement to 37/40 given verbal cues  She also accurately ID'd 12/14 words given f/u ? related to word placement  Session concluded w/review of HW sheets targeting STM and visual attention, which pt requested  Pt will continue to benefit from skilled ST intervention to maximize overall skills at tis time     SLP Therapy Minutes   SLP Time In 56   SLP Time Out 1125   SLP Total Time (minutes) 55   SLP Mode of treatment - Individual (minutes) 55   SLP Mode of treatment - Concurrent (minutes) 0   SLP Mode of treatment - Group (minutes) 0   SLP Mode of treatment - Co-treat (minutes) 0   SLP Mode of Teatment - Total time(minutes) 55 minutes   Therapy Time missed   Time missed?  No   Daily FIM Score   Problem solving (FIM) 5 - Solves basic problems 90% of time   Comprehension (FIM) 5 - Understands basic directions and conversation   Expression (FIM) 6 - Expresses complex/abstract but requires:  more time

## 2019-07-07 NOTE — PLAN OF CARE
Problem: Potential for Falls  Goal: Patient will remain free of falls  Description  INTERVENTIONS:  - Assess patient frequently for physical needs  -  Identify cognitive and physical deficits and behaviors that affect risk of falls    -  [place] fall precautions as indicated by assessment   - Educate patient/family on patient safety including physical limitations  - Instruct patient to call for assistance with activity based on assessment  - Modify environment to reduce risk of injury  - Consider OT/PT consult to assist with strengthening/mobility  Outcome: Progressing     Problem: PAIN - ADULT  Goal: Verbalizes/displays adequate comfort level or baseline comfort level  Description  Interventions:  - Encourage patient to monitor pain and request assistance  - Assess pain using appropriate pain scale  - Administer analgesics based on type and severity of pain and evaluate response  - Implement non-pharmacological measures as appropriate and evaluate response  - Consider cultural and social influences on pain and pain management  - Notify physician/advanced practitioner if interventions unsuccessful or patient reports new pain  Outcome: Progressing     Problem: INFECTION - ADULT  Goal: Absence or prevention of progression during hospitalization  Description  INTERVENTIONS:  - Assess and monitor for signs and symptoms of infection  - Monitor lab/diagnostic results  - Monitor all insertion sites, i e  indwelling lines, tubes, and drains  - Monitor endotracheal (as able) and nasal secretions for changes in amount and color  - [place] appropriate cooling/warming therapies per order  - Administer medications as ordered  - Instruct and encourage patient and family to use good hand hygiene technique  - Identify and instruct in appropriate isolation precautions for identified infection/condition  Outcome: Progressing     Problem: SAFETY ADULT  Goal: Maintain or return to baseline ADL function  Description  INTERVENTIONS:  -  Assess patient's ability to carry out ADLs; assess patient's baseline for ADL function and identify physical deficits which impact ability to perform ADLs (bathing, care of mouth/teeth, toileting, grooming, dressing, etc )  - Assess/evaluate cause of self-care deficits   - Assess range of motion  - Assess patient's mobility; develop plan if impaired  - Assess patient's need for assistive devices and provide as appropriate  - Encourage maximum independence but intervene and supervise when necessary  ¯ Involve family in performance of ADLs  ¯ Assess for home care needs following discharge   ¯ Request OT consult to assist with ADL evaluation and planning for discharge  ¯ Provide patient education as appropriate  Outcome: Progressing  Goal: Maintain or return mobility status to optimal level  Description  INTERVENTIONS:  - Assess patient's baseline mobility status (ambulation, transfers, stairs, etc )    - Identify cognitive and physical deficits and behaviors that affect mobility  - Identify mobility aids required to assist with transfers and/or ambulation (gait belt, sit-to-stand, lift, walker, cane, etc )  - [place] fall precautions as indicated by assessment  - Record patient progress and toleration of activity level on Mobility SBAR; progress patient to next Phase/Stage  - Instruct patient to call for assistance with activity based on assessment  - Request Rehabilitation consult to assist with strengthening/weightbearing, etc   Outcome: Progressing     Problem: DISCHARGE PLANNING  Goal: Discharge to home or other facility with appropriate resources  Description  INTERVENTIONS:  - Identify barriers to discharge w/patient and caregiver  - Arrange for needed discharge resources and transportation as appropriate  - Identify discharge learning needs (meds, wound care, etc )  - Arrange for interpretive services to assist at discharge as needed  - Refer to Case Management Department for coordinating discharge planning if the patient needs post-hospital services based on physician/advanced practitioner order or complex needs related to functional status, cognitive ability, or social support system  Outcome: Progressing     Problem: Prexisting or High Potential for Compromised Skin Integrity  Goal: Skin integrity is maintained or improved  Description  INTERVENTIONS:  - Identify patients at risk for skin breakdown  - Assess and monitor skin integrity  - Assess and monitor nutrition and hydration status  - Monitor labs (i e  albumin)  - Assess for incontinence   - Turn and reposition patient  - Assist with mobility/ambulation  - Relieve pressure over bony prominences  - Avoid friction and shearing  - Provide appropriate hygiene as needed including keeping skin clean and dry  - Evaluate need for skin moisturizer/barrier cream  - Collaborate with interdisciplinary team (i e  Nutrition, Rehabilitation, etc )   - Patient/family teaching  Outcome: Progressing

## 2019-07-08 LAB
ANION GAP SERPL CALCULATED.3IONS-SCNC: 6 MMOL/L (ref 4–13)
BASOPHILS # BLD AUTO: 0.09 THOUSANDS/ΜL (ref 0–0.1)
BASOPHILS NFR BLD AUTO: 1 % (ref 0–1)
BUN SERPL-MCNC: 22 MG/DL (ref 5–25)
CALCIUM SERPL-MCNC: 8.6 MG/DL (ref 8.3–10.1)
CHLORIDE SERPL-SCNC: 110 MMOL/L (ref 100–108)
CO2 SERPL-SCNC: 26 MMOL/L (ref 21–32)
CREAT SERPL-MCNC: 0.89 MG/DL (ref 0.6–1.3)
EOSINOPHIL # BLD AUTO: 0.55 THOUSAND/ΜL (ref 0–0.61)
EOSINOPHIL NFR BLD AUTO: 6 % (ref 0–6)
ERYTHROCYTE [DISTWIDTH] IN BLOOD BY AUTOMATED COUNT: 36 % (ref 11.6–15.1)
GFR SERPL CREATININE-BSD FRML MDRD: 62 ML/MIN/1.73SQ M
GLUCOSE P FAST SERPL-MCNC: 92 MG/DL (ref 65–99)
GLUCOSE SERPL-MCNC: 92 MG/DL (ref 65–140)
HCT VFR BLD AUTO: 28.3 % (ref 34.8–46.1)
HGB BLD-MCNC: 7.9 G/DL (ref 11.5–15.4)
IMM GRANULOCYTES # BLD AUTO: 0.06 THOUSAND/UL (ref 0–0.2)
IMM GRANULOCYTES NFR BLD AUTO: 1 % (ref 0–2)
LYMPHOCYTES # BLD AUTO: 1.83 THOUSANDS/ΜL (ref 0.6–4.47)
LYMPHOCYTES NFR BLD AUTO: 18 % (ref 14–44)
MCH RBC QN AUTO: 22.4 PG (ref 26.8–34.3)
MCHC RBC AUTO-ENTMCNC: 27.9 G/DL (ref 31.4–37.4)
MCV RBC AUTO: 80 FL (ref 82–98)
MONOCYTES # BLD AUTO: 1.16 THOUSAND/ΜL (ref 0.17–1.22)
MONOCYTES NFR BLD AUTO: 12 % (ref 4–12)
NEUTROPHILS # BLD AUTO: 6.39 THOUSANDS/ΜL (ref 1.85–7.62)
NEUTS SEG NFR BLD AUTO: 62 % (ref 43–75)
NRBC BLD AUTO-RTO: 0 /100 WBCS
PLATELET # BLD AUTO: 382 THOUSANDS/UL (ref 149–390)
PMV BLD AUTO: 11.3 FL (ref 8.9–12.7)
POTASSIUM SERPL-SCNC: 3.6 MMOL/L (ref 3.5–5.3)
RBC # BLD AUTO: 3.53 MILLION/UL (ref 3.81–5.12)
SODIUM SERPL-SCNC: 142 MMOL/L (ref 136–145)
WBC # BLD AUTO: 10.08 THOUSAND/UL (ref 4.31–10.16)

## 2019-07-08 PROCEDURE — 97116 GAIT TRAINING THERAPY: CPT

## 2019-07-08 PROCEDURE — 97530 THERAPEUTIC ACTIVITIES: CPT

## 2019-07-08 PROCEDURE — 97535 SELF CARE MNGMENT TRAINING: CPT

## 2019-07-08 PROCEDURE — G0515 COGNITIVE SKILLS DEVELOPMENT: HCPCS

## 2019-07-08 PROCEDURE — 97110 THERAPEUTIC EXERCISES: CPT

## 2019-07-08 PROCEDURE — 85025 COMPLETE CBC W/AUTO DIFF WBC: CPT | Performed by: INTERNAL MEDICINE

## 2019-07-08 PROCEDURE — 97112 NEUROMUSCULAR REEDUCATION: CPT

## 2019-07-08 PROCEDURE — 99232 SBSQ HOSP IP/OBS MODERATE 35: CPT | Performed by: PHYSICAL MEDICINE & REHABILITATION

## 2019-07-08 PROCEDURE — 80048 BASIC METABOLIC PNL TOTAL CA: CPT | Performed by: INTERNAL MEDICINE

## 2019-07-08 RX ADMIN — Medication 325 MG: at 16:16

## 2019-07-08 RX ADMIN — PANTOPRAZOLE SODIUM 40 MG: 40 TABLET, DELAYED RELEASE ORAL at 05:32

## 2019-07-08 RX ADMIN — CARVEDILOL 3.12 MG: 3.12 TABLET, FILM COATED ORAL at 08:29

## 2019-07-08 RX ADMIN — CARVEDILOL 3.12 MG: 3.12 TABLET, FILM COATED ORAL at 16:15

## 2019-07-08 RX ADMIN — Medication 325 MG: at 08:28

## 2019-07-08 RX ADMIN — SENNOSIDES 17.2 MG: 8.6 TABLET, FILM COATED ORAL at 22:28

## 2019-07-08 RX ADMIN — ASPIRIN 81 MG: 81 TABLET, COATED ORAL at 08:29

## 2019-07-08 RX ADMIN — DOCUSATE SODIUM 100 MG: 100 CAPSULE, LIQUID FILLED ORAL at 08:29

## 2019-07-08 RX ADMIN — CLOPIDOGREL BISULFATE 75 MG: 75 TABLET ORAL at 08:28

## 2019-07-08 RX ADMIN — NIFEDIPINE 30 MG: 30 TABLET, FILM COATED, EXTENDED RELEASE ORAL at 08:29

## 2019-07-08 RX ADMIN — ATORVASTATIN CALCIUM 80 MG: 80 TABLET, FILM COATED ORAL at 16:14

## 2019-07-08 RX ADMIN — ENOXAPARIN SODIUM 40 MG: 40 INJECTION SUBCUTANEOUS at 08:28

## 2019-07-08 RX ADMIN — MELATONIN 6 MG: at 22:28

## 2019-07-08 NOTE — PCC OCCUPATIONAL THERAPY
Pt is demonstrating  progress with occupational therapy and is progressing toward  Long term goals for ADL, IADL, and functional transfers/mobility  Pt continues to present with impairments in standing balance/tolerance, UE strength, FMC, GMC, memory, insight, safety , judgement , attention , sequencing , sensation , visual perceptual skills , depth perception , R/L discrimination , task initiation , task termination , (R) attention and proprioception   Occupational performance remains limited by fatigue, impulsivity, (R) UE dysmetria, (R) visual deficits , decreased caregiver support and risk for falls  Pt will continue to benefit from skilled acute rehab OT services to address above mentioned barriers and maximize functional independence in baseline areas of occupation to meet established treatment goals with overall decreased burden of care  Pt at this time has trialed 2 full sessions for meal prep tasks; pt at this time does not demo ability to cook at indep level  Pt overall min A with mod/mAx VCs for safety and R awareness  OT to f/u with dtrs regarding recommendations for 24/7 S and A at d/c

## 2019-07-08 NOTE — PROGRESS NOTES
Physical Medicine and Rehabilitation Progress Note  Marga Aldridge 78 y o  female MRN: [id number]  Unit/Bed#: -66 Encounter: [id number]    HPI: Marga Aldridge is a 78 y o  female who [facility] with right sided weakness, right facial droop  Imaging subsequently revealed left sided infarcts to the parietal and frontal regions  In addition patient noetd to have ulcerated plaque at the left cervical carotied bifurcation; CTA revealed a right ICA occlusion  Vascular surgery was consulted, and recommendation made for a left CEA, performed on 6/28  During admission patient was noted to have ABLA, for which scope was performed revealing AVMs in the stomach and duodenum; argon plasma coagulation was performed  She was also transfused at the time  Of note, during her acute admission patient had a fall off of comode; CTOH showed right frontal hematoma, but no active bleed  Patient was found to be below functional baseline, and accepted to [facility] on 7/03/19  Chief Complaint: f/u stroke    Interval: No acute events overnight  Denies any CP or SOB  Patient without complaint  No new weakness  No new numbness or tingling  ROS: A 10 point ROS was performed; negative except as noted above       Assessment/Plan:    * Impaired mobility and ADLs  Assessment & Plan  · Secondary to recent stroke  · Acute comprehensive interdisciplinary inpatient rehabilitation including PT, OT, SLP, RN, CM, SW, dietary, psychology, etc         Symptomatic stenosis of left carotid artery  Assessment & Plan  · S/p CEA on 6/28  · Monitor for hematoma formation, incision care    Anemia, unspecified  Assessment & Plan  Results from last 7 days   Lab Units 07/08/19  0540 07/05/19  0617 07/02/19  0449   HEMOGLOBIN g/dL 7 9* 8 5* 8 3*     · Continue ferrous sulfate  · Monitor CBC intermittently  · Transfuse for Hgb <7      CVA (cerebral vascular accident) ([facility] [address] )  Assessment & Plan  · ASA, Plavix  · Statin  · Neurology f/u as outpatient      Hyperlipidemia  Assessment & Plan  · Continue statin    Essential hypertension  Assessment & Plan  Temp:  [97 5 °F (36 4 °C)-98 3 °F (36 8 °C)] 98 3 °F (36 8 °C)  HR:  [65-75] 67  Resp:  [18-20] 18  BP: (118-155)/(59-80) 152/67    · Carvedilol  · Hydralazine prn  · IM service managing anti-hypertensives    # Skin  · Encourage regular turning as patient at risk for skin breakdown  · Staff to continue patient education on Q2h turning  · Rehabilitation team to perform skin checks regularly     # Bowel  · Patient reports no constipation  · To ensure regular BMs, bowel regimen consisting of:  colace , dulcolax suppository  and miralax     # Bladder  · Patient voiding spontaneously    # Pain  · Continue oxycodone     # Rehab Psych   · There are no psychological or psychiatric problems identified    # Other  - Diet/Nutrition:        Diet Orders   (From admission, onward)            Start     Ordered    07/03/19 1804  Diet Regular; Regular House  Diet effective now     Question Answer Comment   Diet Type Regular    Regular Regular House    RD to adjust diet per protocol?  Yes        07/03/19 1803        - DVT prophy: Sequential compression device (Venodyne)  and Enoxaparin (Lovenox)  - GI ppx: Pantaprazole  - Nausea: None  - Supplements: None  - Sleep: None    Disposition: TBD    CODE: Level 1: Full Code Scheduled Meds:    Current Facility-Administered Medications:  aspirin 81 mg Oral Daily Ed Arreola MD   atorvastatin 80 mg Oral Daily With Luis Sheehan MD   bisacodyl 10 mg Rectal Daily PRN Amina Whipple PA-C   calcium carbonate 500 mg Oral Daily PRN Ed Arreola MD   carvedilol 3 125 mg Oral BID With Meals Ed Arreola MD   clopidogrel 75 mg Oral Daily Ed Arreola MD   docusate sodium 100 mg Oral BID Amina Whipple PA-C   enoxaparin 40 mg Subcutaneous Q24H [address] Ed Arreola MD   ferrous sulfate 325 mg Oral BID With Meals Neville Jones MD   melatonin 6 mg Oral HS Ed Arreola MD   NIFEdipine 30 mg Oral Daily Ed Arreola MD   oxyCODONE 2 5 mg Oral Q4H PRN Ed Arreola MD   oxyCODONE 5 mg Oral Q4H PRN Ed Arreola MD   pantoprazole 40 mg Oral Early Morning Ed Arreola MD   polyethylene glycol 17 g Oral Daily PRN Amina Whipple PA-C   senna 2 tablet Oral HS Amina Whipple PA-C        Objective:    Functional Update:  Mobility: min-mod assist  Transfers: min asssit  ADLs: min assist    Allergies per EMR    Physical Exam:  Temp:  [97 5 °F (36 4 °C)-98 3 °F (36 8 °C)] 98 3 °F (36 8 °C)  HR:  [65-75] 67  Resp:  [18-20] 18  BP: (118-155)/(59-80) 152/67  SpO2:  [97 %-100 %] 97 %    General: alert, no apparent distress, cooperative and comfortable  HEENT:  Eye: ecchymosis over right eye improving  LUNGS:  no abnormal respiratory pattern, no retractions noted, non-labored breathing   ABDOMEN:  soft, non-tender  Bowel sounds normal  No masses, no organomegaly  EXTREMITIES:  extremities normal, warm and well-perfused; no cyanosis, clubbing, or edema  NEURO:   mental status, speech normal, alert and oriented x3  PSYCH:  Alert and oriented, appropriate affect  Physical examination is otherwise unchanged from previous encounter, except as noted above      Diagnostic Studies: Reviewed, no new imaging  No orders to display       Laboratory: Reviewed  Results from last 7 days   Lab Units 07/08/19 0540 07/05/19 0617 07/02/19  0449   HEMOGLOBIN g/dL 7 9* 8 5* 8 3*   HEMATOCRIT % 28 3* 30 8* 30 0*   WBC Thousand/uL 10 08 10 60* 10 72*     Results from last 7 days   Lab Units 07/08/19  0540 07/05/19  0617 07/02/19  0449   BUN mg/dL 22 16 20   SODIUM mmol/L 142 140 140   POTASSIUM mmol/L 3 6 3 6 3 6   CHLORIDE mmol/L 110* 109* 110*   CREATININE mg/dL 0 89 0 86 0 80            Patient Active Problem List   Diagnosis    Cataracts, bilateral    Essential hypertension    Hyperlipidemia    Nicotine dependence    CVA [facility]cerebral vascular accident) ([facility] [address] )    Anemia, unspecified    Abnormal CT scan    Symptomatic stenosis of left carotid artery    Iron deficiency anemia    Impaired mobility and ADLs       ** Please Note: Fluency Direct voice to text software may have been used in the creation of this document   **

## 2019-07-08 NOTE — PROGRESS NOTES
Internal Medicine Progress Note  Patient: Marga Aldridge  Age/sex: 78 y o  female  Medical Record #: [id number]      ASSESSMENT/PLAN: (Interval History)  Marga Aldridge is seen and examined and management for following issues:    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; s/p left CEA 6/28/19:  continue ASA/Plavix, Lipitor = for home Cards advised change Lipitor to Crestor 40mg daily      Iron deficiency anemia; s/p transfusion and iron IV: continue iron PO supplements      AVM stomach/duodenum tx with APC; cold snare sigmoid colon polypectomy: continue PPI     HTN: at home was on Lopressor and now is switched to Coreg and Nifedipine     RUL ground glass density: for Pulm to see OP     Urethral diverticulum: to see Uro as OP for cystogram     Nicotine abuse: counseled for cessation     Fall 2/2 LOB 6/24/19: CT head was negative; has remaining ecchymosis right forehead/orbit     ABLA: Hgb 7 9 today  Will continue to monitor  Continue FeSO4 supplementation  Leukocytosis: Resolved  Subjective/ HPI: Patient seen and examined  Patients overnight issues or events were reviewed with nursing or staff during rounds or morning huddle session  New or overnight issues include the following: HTN: Currently well controlled  Continue present medications  CVA: No new stroke symptoms  Continue antiplatelet and statin  Anemia: Will continue to monitor  Hgb 7 9  Pt currently reports that she is doing well       ROS:     GI: denies abdominal pain, change bowel habits or reflux symptoms  Neuro: Denies any headache, new vision changes, new neuropathies,new weaknesses   Respiratory: No Cough, SOB, denies wheeze  Cardiovascular: No CP, palpitations , denies perception of rapid heartbeat  : denies any new urinary burning or frequency    Review of Scheduled Meds:    Current Facility-Administered Medications:  aspirin 81 mg Oral Daily Ed Arreola MD   atorvastatin 80 mg Oral Daily With Dinner Neville Jones MD   bisacodyl 10 mg Rectal Daily PRN Amina Whipple PA-C   calcium carbonate 500 mg Oral Daily PRN Ed Arreola MD   carvedilol 3 125 mg Oral BID With Meals Kash De La Garza MD   clopidogrel 75 mg Oral Daily Ed Arreola MD   docusate sodium 100 mg Oral BID Amina Whipple PA-C   enoxaparin 40 mg Subcutaneous Q24H [facility] Ed Arreola MD   ferrous sulfate 325 mg Oral BID With Meals Kash De La Garza MD   melatonin 6 mg Oral HS Ed Arreola MD   NIFEdipine 30 mg Oral Daily Ed Arreola MD   oxyCODONE 2 5 mg Oral Q4H PRN Ed Arreola MD   oxyCODONE 5 mg Oral Q4H PRN Ed Arreola MD   pantoprazole 40 mg Oral Early Morning Ed Arreola MD   polyethylene glycol 17 g Oral Daily PRN Amina Whipple PA-C   senna 2 tablet Oral HS Amina Whipple PA-C       Labs:     Results from last 7 days   Lab Units 07/08/19  0540 07/05/19  0617   WBC Thousand/uL 10 08 10 60*   HEMOGLOBIN g/dL 7 9* 8 5*   HEMATOCRIT % 28 3* 30 8*   PLATELETS Thousands/uL 382 381     Results from last 7 days   Lab Units 07/08/19  0540 07/05/19  0617   SODIUM mmol/L 142 140   POTASSIUM mmol/L 3 6 3 6   CHLORIDE mmol/L 110* 109*   CO2 mmol/L 26 26   BUN mg/dL 22 16   CREATININE mg/dL 0 89 0 86   CALCIUM mg/dL 8 6 8 6                        Imaging:     No orders to display       *Labs reviewed  *Radiology studies reviewed  *Medications reviewed and reconciled as needed  *Please refer to order section for additional ordered labs studies  *Case discussed with primary attending during morning huddle case rounds    Physical Examination:  Vitals:   Vitals:    07/07/19 1500 07/07/19 2041 07/08/19 0540 07/08/19 0641   BP: 155/67 142/80 118/59    BP Location: Left arm Left arm Left arm    Pulse: 65 65 75    Resp: 18 20 18    Temp: 97 5 °F (36 4 °C) 98 °F (36 7 °C) 98 3 °F (36 8 °C)    TempSrc: Oral Oral Oral    SpO2: 100% 100% 97%    Weight:    68 6 kg (151 lb 3 2 oz)   Height:           General Appearance: NAD, conversive  Eyes: No icterus; conjunctiva normal, PERRLA  HENT: oropharynx clear; mucous membranes moist; no ulcerations, normal hard and soft palette  Neck: trachea midline, range of motion full  Supple, no lymphadenopathy or thyromegaly  Lungs: CTA, normal respiratory effort, no retractions, expiratory effort normal  CV: regular rate, no rubs no gallops, PMI normal place and intensity  ABD: soft non tender, no masses , no hepatic or splenomegaly  EXT: DP pulses intact, no lymphadenopathy, no edema  Skin: normal turgor, normal texture, no rash, no ulcers  Psych: affect normal, No anxiety,   Neuro: AAOx3            Total time spent: At least 40 minutes, with more than 50% spent counseling/coordinating care  Counseling includes discussion with patient re: progress  and discussion with patient of his/her current medical state/information  Coordination of patient's care was performed in conjunction with primary service  Time invested included review of patient's labs, vitals, and management of their comorbidities with continued monitoring  In addition, this patient was discussed with medical team including physician and advanced extenders  The care of the patient was extensively discussed and appropriate treatment plan was formulated unique for this patient  ** Please Note: Dragon 360 Dictation voice to text software may have been used in the creation of this document   **

## 2019-07-08 NOTE — PROGRESS NOTES
[facility] Occupational Therapy Daily Note     07/08/19 0830   Pain Assessment   Pain Assessment No/denies pain   Pain Score No Pain   Restrictions/Precautions   Precautions Bed/chair alarms; Fall Risk;Supervision on toilet/commode   Eating Assessment   Findings pt is educated and instructed on use of R UE for meals with utensil use for increased massed practice  Pt reported "i was able to use my spoon today "   QI: Sit to Stand   Assistance Needed Incidental touching   Sit to Stand CARE Score 4   Transfer Bed/Chair/Wheelchair   Adaptive Equipment Roller Walker   Findings impaired spontaneous motor planning  Pt attempting R UE to reach back to chair instead pulling on pants  Bed, Chair, Wheelchair Transfer (FIM) 4 - Patient requires steadying assist or light touching   Neuromuscular Education   Weight Bearing Technique Yes   RUE Weight Bearing Extended arm seated   Functional Movement Patterns Pt fitted on REOGO with use of hand mast for R UE neuromuscular re-education for increased muscular facilitation and to increase proprioceptive input for improved AROM  Pt requires extensive EDU for purpose of muscular facilitation and incorporation of visual feedback  Pt completed forward thrust 40 reps in follow assist motion at 100 speed  Pt requires extensive cues for visual feedback for extension  Increased difficulty with motor planning, requires multi modal cues and Rampart assist at times  Pt is able to safely complete entirety of training session w/ no C/O pain and self perceived exertion within personal tolerance  Comments Seated UE Heavy work to increase overall proprioceptive input to increase overall funcitnoal use of R UE in daily activity  Pt using seated Nu-Step for recipricol UE priming only, 5 min x2 reps  Coordination   Fine Motor Pt engages in seated R UE neuro re-ed with visual feedback training   Pt engages in coloring activity with large coverage area initially with increased paper resistance with colored pencil  Pt uses one color  Second paper with finer detail and instruction for changing color for increased motor planning demand  Pt initially unable to motor plan for discarding pencil before picking up next, ending up stirring pencil box with pencil in hand  With repeated trials and following verbal prompts for motor action able to complete task independently after 4 trials  Cognition   Overall Cognitive Status Impaired   Arousal/Participation Alert; Cooperative   Attention Attends with cues to redirect   Orientation Level Oriented X4   Memory Decreased short term memory   Following Commands Follows one step commands with increased time or repetition   Comments Decreased insight into R UE actions  Activity Tolerance   Activity Tolerance Patient tolerated treatment well   Assessment   Treatment Assessment Pt engages in skilled OT session focusing on R UE neuro muscular re-education  See above for details  Pt continues to require skilled acute rehab OT services to increase overall functional independence and safety w/ ADLs and functional transfers, continue to follow plan of care  Prognosis Good   Plan   Treatment/Interventions ADL retraining;Functional transfer training;LE strengthening/ROM; Therapeutic exercise;Cognitive reorientation;Patient/family training; Endurance training;Equipment eval/education   Progress Progressing toward goals   Recommendation   OT Discharge Recommendation Short Term Rehab   OT Therapy Minutes   OT Time In 0830   OT Time Out 1000   OT Total Time (minutes) 90   OT Mode of treatment - Individual (minutes) 90   OT Mode of treatment - Concurrent (minutes) 0   OT Mode of treatment - Group (minutes) 0   OT Mode of treatment - Co-treat (minutes) 0   OT Mode of Teatment - Total time(minutes) 90 minutes

## 2019-07-08 NOTE — PCC PHYSICAL THERAPY
7/8/2019  Pt is a 78year old female s/p CVA who presents with dec cognition,impaired motor planning, impaired coordination, dec standing balance, dec endurance, dec R>L LE mm strength and impaired sensation impacting functional mobility indep and carry over of safety practices  Pt is demonstrating consistent progress in PT from requiring min A on eval for transfers to CGA this week  Pt moves better when using a RW for mobilities requiring min-CGA however pt did not used any AD at baseline with personal goal of using LRAD at d/c hence PT focusing on mobility training without an AD at this time utilizing neuroplasticity principles to maximize improvement in balance and gait pattern/dynamics to reduce caregiver burden at d/c and reduce risk for falls  7/15/2019    Pt advance to [facility] with S level with limited VC's at this time , pt biggest barriers ar as follow : right side vision limited attention johnathon during transfers to right side, also limited insight deficits for overall safety during ambulation and overall functional mpbility   Pt at S level at this time cont to benefit with skilled PT

## 2019-07-08 NOTE — PCC SPEECH THERAPY
Pt currently being followed for cognitive linguistic skills, in which pt completed formalized cognitive linguistic assessment, CLQT+  Overall score when compared to age matched peers between 79 - 80 yrs  Score was 1 8 out of 4 0, which indicates cognitive skills to be moderately impaired  All tasks from assessment which pt demonstrated most difficulty in completing were visual perceptual in nature  Pt inconsistently demonstrated R inattention at times vs then noting L inattention to complete some activities  Pt also noted to not be able to correct or verbalize what was not "right" especially when completing the clock drawing  Pt knew that hands needed to be drawn onto the clock, but due to how the numbers were placed inside the [place] (all numbers on the R hand side), unable to determine how the hands would be placed onto the clock  Other barriers include decreased insight to deficits, decreased executive function skills (problem solving, reasoning, sequencing, etc), decreased STM recall, more so noted given working memory, lengthier paragraph information presented, which impacts pt's overall cognitive skills at this time  At this time, pt will benefit from SLP services to maximize overall cognitive linguistic skills at this time, where pt will need supervision upon d/c back home and continued SLP services either provided given home health vs outpatient  Update from week 7/15/2019: Continues to be followed for cognitive linguistic tx sessions  Barriers are as follows: R inattention, decreased insight to deficits, decreased STM recall, decreased comprehension, decreased carryover of new learning skills, decreased executive function skills (higher level sequencing, functional problem solving- cooking tasks, medication management, reasoning, safety awareness, etc)   Pt at times is able to verbalize when a task isn't going "right" but ability to self correct or complete task again w/o making same errors was decreased, where pt does require visual cues  Pt also will "push" self despite fatigue from additional therapies, where pt requires education to take breaks and allow rest between sessions  At this time, pt would require 24/7 supervision at time of d/c due to noted safety awareness when completing cooking tasks using stove top and due to inattention to RUE, primarily R hand, increases risk of burning hand due decreased awareness  Pt will continue to benefit from SLP services at this time to maximize functional cognitive linguistic skills

## 2019-07-08 NOTE — PROGRESS NOTES
07/08/19 1000   Pain Assessment   Pain Assessment No/denies pain   Restrictions/Precautions   Precautions Bed/chair alarms;Cognitive; Fall Risk;Supervision on toilet/commode;Visual deficit   Memory Skills   Memory (FIM) 4 - Recalls 2 of 3 steps   Social Interaction (FIM) 6 - Interacts appropriately with others BUT requires extra  time   Speech/Language/Cognition Assessmetn   Treatment Assessment Session began by reviewing all events from weekend, in which pt was able to recall activities completed in therapy sessions from weekend, visitors and items consumed at last meal (breakfast) at supervision level  Engaged pt in completing moderately complex word search task given 26 words to locate in puzzle  SLP giving instructions about placement for all words to be located in puzzle  Pt only needed verbal cue x1 to locate last item to locate in puzzle  Engaged pt in written 4-step sequencing task given community activities  Pt was able to accurately sequence 56/64 items w/o increased need for cues  Upon review of errors, pt was able to increase accuracy to 64/64  Educating pt on different types of ST memory recall, primarily to demonstrate how visual memory recall is "better" vs auditory recall which challenges overall cognitive skills  Engaged pt in visual recall task given 12 pictures to group in to 3 categories (4 pictures per category), in which in completing set 1, pt's ability to recall pictures was 11/12, increasing to 12/12 given semantic cue x1  When completing same task given new set of 12 pictures to group into 3 categories, pt was 9/12 accurate, increasing to 12/12 given increased semantic cues  Pt then challenged given auditory recall task given 4 words to recall one word by attribute inclusion  It was noted that pt using strategy of self rehearsal of repeating 4 words before answering the question   Pt's ability to repeat all 4 words was 45/48 accurate, noting that pt "rushed" repeating words before SLP finished list  Ability to answer questions given per word list was 11/12 accurate, increasing to 12/12 given full repetition of word list  Lastly, pt was challenged given memory recall and mental manipulation task given 3 words to recall in reverse order  Pt noted to have increased difficulty w/ this task, noting only able to complete 1 out of 7 items w/o increased need for cues  When given phonemic cues, pt increased ability to sequence words to 4/7 accurate  When given maximal repetition of words, pt able to complete 3 word reversal  Educated pt on increased complexity w/ task, but SLP further educating on how strategies will be introduced to improve accuracy of tasks  Pt will continue to benefit from SLP services to maximize functional independence given cognitive linguistic skills  SLP Therapy Minutes   SLP Time In 1000   SLP Time Out 1100   SLP Total Time (minutes) 60   SLP Mode of treatment - Individual (minutes) 60   SLP Mode of treatment - Concurrent (minutes) 0   SLP Mode of treatment - Group (minutes) 0   SLP Mode of treatment - Co-treat (minutes) 0   SLP Mode of Teatment - Total time(minutes) 60 minutes   Therapy Time missed   Time missed?  No   Daily FIM Score   Problem solving (FIM) 4 - Solves basic problems 75-89% of time   Comprehension (FIM) 5 - Understands basic directions and conversation   Expression (FIM) 6 - Expresses complex/abstract but requires:  more time

## 2019-07-08 NOTE — PROGRESS NOTES
07/08/19 1101   Pain Assessment   Pain Assessment No/denies pain   Pain Score No Pain   Restrictions/Precautions   Precautions Fall Risk;Bed/chair alarms;Cognitive;Supervision on toilet/commode   QI: Sit to Stand   Assistance Needed Incidental touching   Assistance Provided by [place] Less than 25%   Comment CGA with RW   Sit to Stand CARE Score 3   QI: Chair/Bed-to-Chair Transfer   Assistance Needed Incidental touching   Assistance Provided by [place] Less than 25%   Comment CGA with RW   Chair/Bed-to-Chair Transfer CARE Score 3   Transfer Bed/Chair/Wheelchair   Stand Pivot Contact Guard;Assist x 1   Bed, Chair, Wheelchair Transfer (FIM) 4 - Patient requires steadying assist or light touching   QI: [address] Needed Physical assistance   Assistance Provided by [place] 25%-49%   Comment Min A secondary to incontinence of bowel requiring assistance with doffing solid brief  Toileting Hygiene CARE Score 3   Toileting   Able to [address] down yes, up yes  Able to Manage Clothing Closures Yes   Manage Hygiene Bowel   Limitations Noted In Balance;Problem Solving; Safety; Coordination   Adaptive Equipment Grab Bar   Findings Min A secondary to incontinence with assistance needed for doffing soilded brief   Toileting (FIM) 4 - Patient completes 75% of all tasks   QI: Toilet Transfer   Assistance Needed Incidental touching   Assistance Provided by [place] Less than 25%   Comment CGA with RW and grab bar   Toilet Transfer CARE Score 3   Toilet Transfer   Surface Assessed Raised Toilet   Transfer Technique Standard   Limitations Noted In Balance; Endurance; Safety   Adaptive Equipment Grab Bar   Toilet Transfer (FIM) 4 - Patient requires steadying assist or light touching   Assessment   Treatment Assessment Pt participated in 30 min OT session with focus on therapeutic activities and ADL training  Pt in therapy gym at start of therapy session (had just finished ST)   Pt agreeable to participate in OT session  Pt participated in therapeutic activities with focus on fine motor coordination and motor planning  Pt participated in table top activity with focus on in-hand manipulation/in-hand translation from palm to finger tips with pt dropping 50% of the time in order to prepare pt for fine motor tasks of medication management for completion at home for discharge  Pt demonstrated poor coordination with decreased proprioception in finger tips with pt noted to frequently drop small items requiring continued OT services to progress pt with fine motor coordination/motor planning for improved participation in tasks upon d/c  Pt participated in functional mobility from therapy gym to bedroom at [facility] with RW  Pt stated she needed to utilize the bathroom  Pt participated in toileting routine at [facility] for transfers  Upon participating in clothing management pt stated she had already started to have bowel movement with pt requiring Min A for toileting hygiene, specifically doffing brief secondary to decreased bilateral hand strength to manage incontinence  Pt upon completion of toileting participated in washing hands standing at sinkside at [facility]  Pt participated in functional mobility from bathroom to bedside recliner at [facility] with RW  Pt seated in bedside recliner at end of therapy session with bed/chair alarms on, tray table, phone, and call bell within reach  Pt's lunch tray present at end of session  Pt would benefit from continued OT services to progress pt through [address] to meet established OT goals  Problem List Decreased strength;Decreased endurance; Impaired balance;Decreased mobility; Decreased coordination;Decreased cognition;Decreased safety awareness; Impaired judgement   Plan   Treatment/Interventions ADL retraining;Functional transfer training; Therapeutic exercise; Endurance training;Patient/family training;Bed mobility; Compensatory technique education   OT Therapy Minutes   OT Time In 1100   OT Time Out 1130   OT Total Time (minutes) 30   OT Mode of treatment - Individual (minutes) 30   OT Mode of treatment - Concurrent (minutes) 0   OT Mode of treatment - Group (minutes) 0   OT Mode of treatment - Co-treat (minutes) 0   OT Mode of Teatment - Total time(minutes) 30 minutes   Therapy Time missed   Time missed?  No

## 2019-07-09 PROCEDURE — 99233 SBSQ HOSP IP/OBS HIGH 50: CPT | Performed by: PHYSICAL MEDICINE & REHABILITATION

## 2019-07-09 PROCEDURE — 97116 GAIT TRAINING THERAPY: CPT

## 2019-07-09 PROCEDURE — 97110 THERAPEUTIC EXERCISES: CPT

## 2019-07-09 PROCEDURE — 97112 NEUROMUSCULAR REEDUCATION: CPT

## 2019-07-09 PROCEDURE — G0515 COGNITIVE SKILLS DEVELOPMENT: HCPCS

## 2019-07-09 PROCEDURE — 97530 THERAPEUTIC ACTIVITIES: CPT

## 2019-07-09 RX ADMIN — CLOPIDOGREL BISULFATE 75 MG: 75 TABLET ORAL at 08:50

## 2019-07-09 RX ADMIN — PANTOPRAZOLE SODIUM 40 MG: 40 TABLET, DELAYED RELEASE ORAL at 06:35

## 2019-07-09 RX ADMIN — MELATONIN 6 MG: at 23:02

## 2019-07-09 RX ADMIN — Medication 325 MG: at 06:34

## 2019-07-09 RX ADMIN — DOCUSATE SODIUM 100 MG: 100 CAPSULE, LIQUID FILLED ORAL at 18:31

## 2019-07-09 RX ADMIN — NIFEDIPINE 30 MG: 30 TABLET, FILM COATED, EXTENDED RELEASE ORAL at 08:50

## 2019-07-09 RX ADMIN — CARVEDILOL 3.12 MG: 3.12 TABLET, FILM COATED ORAL at 06:34

## 2019-07-09 RX ADMIN — ASPIRIN 81 MG: 81 TABLET, COATED ORAL at 08:50

## 2019-07-09 RX ADMIN — DOCUSATE SODIUM 100 MG: 100 CAPSULE, LIQUID FILLED ORAL at 08:50

## 2019-07-09 RX ADMIN — ATORVASTATIN CALCIUM 80 MG: 80 TABLET, FILM COATED ORAL at 16:04

## 2019-07-09 RX ADMIN — Medication 325 MG: at 16:02

## 2019-07-09 RX ADMIN — ENOXAPARIN SODIUM 40 MG: 40 INJECTION SUBCUTANEOUS at 11:56

## 2019-07-09 RX ADMIN — SENNOSIDES 17.2 MG: 8.6 TABLET, FILM COATED ORAL at 21:38

## 2019-07-09 RX ADMIN — CARVEDILOL 3.12 MG: 3.12 TABLET, FILM COATED ORAL at 16:02

## 2019-07-09 NOTE — PCC NURSING
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

## 2019-07-09 NOTE — PLAN OF CARE
Problem: Potential for Falls  Goal: Patient will remain free of falls  Description  INTERVENTIONS:  - Assess patient frequently for physical needs  -  Identify cognitive and physical deficits and behaviors that affect risk of falls    -  [place] fall precautions as indicated by assessment   - Educate patient/family on patient safety including physical limitations  - Instruct patient to call for assistance with activity based on assessment  - Modify environment to reduce risk of injury  - Consider OT/PT consult to assist with strengthening/mobility  Outcome: Progressing     Problem: SAFETY ADULT  Goal: Maintain or return to baseline ADL function  Description  INTERVENTIONS:  -  Assess patient's ability to carry out ADLs; assess patient's baseline for ADL function and identify physical deficits which impact ability to perform ADLs (bathing, care of mouth/teeth, toileting, grooming, dressing, etc )  - Assess/evaluate cause of self-care deficits   - Assess range of motion  - Assess patient's mobility; develop plan if impaired  - Assess patient's need for assistive devices and provide as appropriate  - Encourage maximum independence but intervene and supervise when necessary  ¯ Involve family in performance of ADLs  ¯ Assess for home care needs following discharge   ¯ Request OT consult to assist with ADL evaluation and planning for discharge  ¯ Provide patient education as appropriate  Outcome: Progressing

## 2019-07-09 NOTE — PLAN OF CARE
Problem: Potential for Falls  Goal: Patient will remain free of falls  Description  INTERVENTIONS:  - Assess patient frequently for physical needs  -  Identify cognitive and physical deficits and behaviors that affect risk of falls    -  [place] fall precautions as indicated by assessment   - Educate patient/family on patient safety including physical limitations  - Instruct patient to call for assistance with activity based on assessment  - Modify environment to reduce risk of injury  - Consider OT/PT consult to assist with strengthening/mobility  Outcome: Progressing     Problem: SAFETY ADULT  Goal: Maintain or return to baseline ADL function  Description  INTERVENTIONS:  -  Assess patient's ability to carry out ADLs; assess patient's baseline for ADL function and identify physical deficits which impact ability to perform ADLs (bathing, care of mouth/teeth, toileting, grooming, dressing, etc )  - Assess/evaluate cause of self-care deficits   - Assess range of motion  - Assess patient's mobility; develop plan if impaired  - Assess patient's need for assistive devices and provide as appropriate  - Encourage maximum independence but intervene and supervise when necessary  ¯ Involve family in performance of ADLs  ¯ Assess for home care needs following discharge   ¯ Request OT consult to assist with ADL evaluation and planning for discharge  ¯ Provide patient education as appropriate  Outcome: Progressing

## 2019-07-09 NOTE — PROGRESS NOTES
[facility] Occupational Therapy Daily Note       07/09/19 1000   Pain Assessment   Pain Assessment No/denies pain   Pain Score No Pain   Lifestyle   Autonomy "Im really trying to do everything with my right hand "   Eating Assessment   Findings Pt using R UE for knife use for cutting food  pt demo good hand to mouth reach with fork with primitive grasp  Eating (FIM) 5 - Patient only requires cueing to complete tube feeds   Transfer Bed/Chair/Wheelchair   Adaptive Equipment Roller Walker   Bed, Chair, Wheelchair Transfer (FIM) 4 - Patient requires steadying assist or light touching   Meal Prep   Meal Preparation COFFEE MAKING TASK: Pt requires CGA in stance w/ unilateral UE support on counter top at all times for balance  Pt demo impaired visual perceptual skills  Pt requires cues for accurate filling of water pitcher  cues for R UE incorperation  Light Housekeeping   Light Housekeeping Level of Assistance Contact guard   Light Housekeeping Bimanual UE neuro task for folding laundry  Pt requires MIN VC's for consistant incorperation of R UE into task  pt demo poor thoroughness vs poor motor control with folding clothes  1x LOB requiring CHRISTOPHER for correction  Coordination   Fine Motor Massed practice for R UE placement of single end pegs in foam board  Pt demo increased motor planning for in hand manipulation of peg for correct orientation  cues for visual confirmation about number of pegs in hand when reaching  In Hand Manipulation Steriognosis training  5/10 for identification of object with no visual cue  After introduction of items pt is able to find 4/4 items with increased time  gross ataxia noted when searching for items in bag  Cognition   Overall Cognitive Status Impaired   Arousal/Participation Alert; Cooperative   Attention Attends with cues to redirect   Orientation Level Oriented X4   Memory Decreased recall of precautions;Decreased recall of recent events;Decreased short term memory   Following Commands Follows one step commands with increased time or repetition   Comments Pt does repeat comments to therapist more than one time  Perception   Right Attention continues to require cues for incorperation of  RUE in daily tasks, decreased proprioception  Additional Activities   Additional Activities Comments Pt provided with in room activities to increase R UE use during day along with eating all meals in R UE  Pt provided with written instruction and visual demonstration for activities to be completed 2-3 times a day    Activity Tolerance   Activity Tolerance Patient tolerated treatment well   Assessment   Treatment Assessment Skilled OT session focusing on R UE neuro muscular re education to increase overall function of R UE for increased independence and safety with I/ADLS  Prognosis Good   Plan   Treatment/Interventions ADL retraining;Functional transfer training;LE strengthening/ROM; Therapeutic exercise;Patient/family training;Cognitive reorientation; Endurance training;Bed mobility; Equipment eval/education; Compensatory technique education;Continued evaluation   Progress Progressing toward goals   Recommendation   OT Discharge Recommendation 24 hour supervision/assist   OT Therapy Minutes   OT Time In 1000   OT Time Out 1130   OT Total Time (minutes) 90   OT Mode of treatment - Individual (minutes) 90   OT Mode of treatment - Concurrent (minutes) 0   OT Mode of treatment - Group (minutes) 0   OT Mode of treatment - Co-treat (minutes) 0   OT Mode of Teatment - Total time(minutes) 90 minutes

## 2019-07-09 NOTE — SOCIAL WORK
Met with Pt to review team meeting  Pt shared that she understands the need to stay, but would like to go home  CM stated that [facility] staff will d/c Pt when they feel she can return home safely, which simply couldn't happen right now  Pt shared that her daughter, Earnesteen Night, will be visiting shortly  CM stated she would like to meet with her  CM asked therapy staff to let her know when Pts daughter is present

## 2019-07-09 NOTE — PROGRESS NOTES
Physical Medicine and Rehabilitation Progress Note  Radha King 78 y o  female MRN: [id number]  Unit/Bed#: -20 Encounter: [id number]    HPI: Radha King is a 78 y o  female who presented to the [address] with right sided weakness, right facial droop  Imaging subsequently revealed left sided infarcts to the parietal and frontal regions  In addition patient noetd to have ulcerated plaque at the left cervical carotied bifurcation; CTA revealed a right ICA occlusion  Vascular surgery was consulted, and recommendation made for a left CEA, performed on 6/28  During admission patient was noted to have ABLA, for which scope was performed revealing AVMs in the stomach and duodenum; argon plasma coagulation was performed  She was also transfused at the time  Of note, during her acute admission patient had a fall off of comode; CTOH showed right frontal hematoma, but no active bleed  Patient was found to be below functional baseline, and accepted to [facility] on 7/03/19  Chief Complaint: f/u stroke    Interval: No acute events overnight  Denies any CP or SOB  Patient without complaint  Discussed need for supervision at home, to which patient stated her family would be able to provide (will need to confirm with them)  She is disappointed we were not able to set a discharge date in teams today, but understands the need for continued rehabilitation  ROS: A 10 point ROS was performed; negative except as noted above       Assessment/Plan:    * Impaired mobility and ADLs  Assessment & Plan  · Secondary to recent stroke  · Acute comprehensive interdisciplinary inpatient rehabilitation including PT, OT, SLP, RN, CM, SW, dietary, psychology, etc         Symptomatic stenosis of left carotid artery  Assessment & Plan  · S/p CEA on 6/28  · Monitor for hematoma formation, incision care    Anemia, unspecified  Assessment & Plan  Results from last 7 days   Lab Units 07/08/19  0540 07/05/19  2406 HEMOGLOBIN g/dL 7 9* 8 5*     · Continue ferrous sulfate  · Monitor CBC intermittently  · Transfuse for Hgb <7      CVA (cerebral vascular accident) ([facility] [address] )  Assessment & Plan  · ASA, Plavix  · Statin  · Neurology f/u as outpatient      Hyperlipidemia  Assessment & Plan  · Continue statin    Essential hypertension  Assessment & Plan  Temp:  [97 7 °F (36 5 °C)-98 7 °F (37 1 °C)] 98 2 °F (36 8 °C)  HR:  [62-77] 77  Resp:  [18-20] 20  BP: (129-163)/(61-70) 136/62    · Carvedilol  · Hydralazine prn  · IM service managing anti-hypertensives    # Skin  · Encourage regular turning as patient at risk for skin breakdown  · Staff to continue patient education on Q2h turning  · Rehabilitation team to perform skin checks regularly     # Bowel  · Patient reports no constipation  · To ensure regular BMs, bowel regimen consisting of:  colace , dulcolax suppository  and miralax     # Bladder  · Patient voiding spontaneously    # Pain  · Continue oxycodone     # Rehab Psych   · There are no psychological or psychiatric problems identified    # Other  - Diet/Nutrition:        Diet Orders   (From admission, onward)            Start     Ordered    07/03/19 1804  Diet Regular; Regular House  Diet effective now     Question Answer Comment   Diet Type Regular    Regular Regular House    RD to adjust diet per protocol?  Yes        07/03/19 1803        - DVT prophy: Sequential compression device (Venodyne)  and Enoxaparin (Lovenox)  - GI ppx: Pantaprazole  - Nausea: None  - Supplements: None  - Sleep: None    Disposition: reteam (will need to discuss with family re: home support)    CODE: Level 1: Full Code Scheduled Meds:    Current Facility-Administered Medications:  aspirin 81 mg Oral Daily Ed Arreola MD   atorvastatin 80 mg Oral Daily With Keanu Pino MD   bisacodyl 10 mg Rectal Daily PRN Amina Whipple PA-C   calcium carbonate 500 mg Oral Daily PRN Ed Arreola MD   carvedilol 3 125 mg Oral BID With Meals Kameron De Leon MD   clopidogrel 75 mg Oral Daily Ed Arreola MD   docusate sodium 100 mg Oral BID Amina Whipple PA-C   enoxaparin 40 mg Subcutaneous Q24H [facility] Ed Arreola MD   ferrous sulfate 325 mg Oral BID With Meals Ed Arreola MD   melatonin 6 mg Oral HS Ed Arreola MD   NIFEdipine 30 mg Oral Daily Ed Arreola MD   oxyCODONE 2 5 mg Oral Q4H PRN Ed Arreola MD   oxyCODONE 5 mg Oral Q4H PRN Ed Arreola MD   pantoprazole 40 mg Oral Early Morning Ed Arreola MD   polyethylene glycol 17 g Oral Daily PRN Amina Whipple PA-C   senna 2 tablet Oral HS Amina Whipple PA-C        Objective:    Functional Update:  Physical Therapy Occupational Therapy Speech Therapy   Transfers: Contact Guard  Bed Mobility: Supervision  Amulation Distance (ft): 150 feet  Ambulation: Contact Guard, Minimal Assistance, Moderate Assistance(min-CG with walker, min-mod without AD )  Assistive Device for Ambulation: Roller Walker(also HHA as pt did not use AD at baseline)  Number of Stairs: 12  Assistive Device for Stairs: Bilateral [place]  Stair Assistance: Contact Guard  Discharge Recommendations: Home with:  [address] [place] [address] Angela Franz with[de-identified] Family Support, 24 Hour Supervision, Outpatient Physical Therapy, 24 Hour Assisteance   Eating: Minimal Assistance  Grooming: Minimal Assistance  Bathing: Minimal Assistance  Bathing: Minimal Assistance  Upper Body Dressing: Minimal Assistance  Lower Body Dressing: Minimal Assistance  Toileting: Minimal Assistance  Tub/Shower Transfer: Minimal Assistance  Toilet Transfer: Minimal Assistance  Cognition: Exceptions to WNL  Cognition: Decreased Safety, Decreased Executive Functions, Decreased Attention  Orientation: Person, Place, Time, Situation   Mode of Communication: Verbal  Cognition: Exceptions to WNL  Cognition: Decreased Memory, Decreased Executive Functions, Decreased Attention, Decreased Comprehension  Orientation: Person, Place, Time, Situation  Discharge Recommendations: Home with:  [address] Angela Franz with[de-identified] 24 Hour Supervision, Family Support, Outpatient Speech Therapy     Allergies per EMR    Physical Exam:  Temp:  [97 7 °F (36 5 °C)-98 7 °F (37 1 °C)] 98 2 °F (36 8 °C)  HR:  [62-77] 77  Resp:  [18-20] 20  BP: (129-163)/(61-70) 136/62  SpO2:  [96 %-100 %] 100 %    General: alert, no apparent distress, cooperative and comfortable  HEENT:  Eye: ecchymosis over right eye improving  LUNGS:  no abnormal respiratory pattern, no retractions noted, non-labored breathing   ABDOMEN:  soft, non-tender  Bowel sounds normal  No masses, no organomegaly  EXTREMITIES:  extremities normal, warm and well-perfused; no cyanosis, clubbing, or edema  NEURO:   mental status, speech normal, alert and oriented x3  PSYCH:  Alert and oriented, appropriate affect  Physical examination is otherwise unchanged from previous encounter, except as noted above  Diagnostic Studies: Reviewed, no new imaging  No orders to display       Laboratory: Reviewed  Results from last 7 days   Lab Units 07/08/19  0540 07/05/19  0617   HEMOGLOBIN g/dL 7 9* 8 5*   HEMATOCRIT % 28 3* 30 8*   WBC Thousand/uL 10 08 10 60*     Results from last 7 days   Lab Units 07/08/19  0540 07/05/19  0617   BUN mg/dL 22 16   SODIUM mmol/L 142 140   POTASSIUM mmol/L 3 6 3 6   CHLORIDE mmol/L 110* 109*   CREATININE mg/dL 0 89 0 86            Patient Active Problem List   Diagnosis    Cataracts, bilateral    Essential hypertension    Hyperlipidemia    Nicotine dependence    [facility]cere[facility] vascular accident) [facility] [address] )    Anemia, unspecified    Abnormal CT scan    Symptomatic stenosis of left carotid artery    Iron deficiency anemia    Impaired mobility and ADLs       ** Please Note: Fluency Direct voice to text software may have been used in the creation of this document  **    Total time spent: At least 35 minutes, with more than 50% spent counseling/coordinating care   Counseling includes discussion with patient re: progress in therapies, functional issues observed by therapy staff, and discussion with patient his/her current medical state/wellbeing  Coordination of patient's care was performed in conjunction with Internal Medicine service to monitor patient's labs, vitals, and management of their comorbidities  In addition, this patient was discussed by the interdisciplinary team in weekly case conference today  The care of the patient was extensively discussed with all care providers and an appropriate rehabilitation plan was formulated unique for this patient  Barriers were identified preventing progression of therapy and appropriate interventions were discussed with each discipline  Please see the team note for input from all disciplines regarding barriers, intervention, and discharge planning

## 2019-07-09 NOTE — PROGRESS NOTES
07/09/19 1230   Pain Assessment   Pain Assessment No/denies pain   Pain Score No Pain   Restrictions/Precautions   Precautions Fall Risk;Cognitive;Supervision on toilet/commode   QI: Sit to Stand   Assistance Needed Incidental touching   Sit to Stand CARE Score 4   QI: Chair/Bed-to-Chair Transfer   Assistance Needed Incidental touching   Chair/Bed-to-Chair Transfer CARE Score 4   Transfer Bed/Chair/Wheelchair   Findings Pt completed transfers with RW and CGA/CS   Bed, Chair, Wheelchair Transfer (FIM) 4 - Patient requires steadying assist or light touching   Neuromuscular Education   Functional Movement Patterns REOGO ASSISTED THERAPY: "[address]" Pt engaged in [address] assisted therapy for mass motor practice/repetition to inc fxnl coordination, targeting, proprioceptive awareness, and overall fxnl use of RUE to inc indep with ADL/IADL fxn  Pt tolerated x100 reps forward thrust in guided motion mode, x25 forward reach with 5 points to target in guided motion mode to improve coordination and overall motor planning with fxnl reach  Pt req cues and [place] assist to maintain grasp on mast demo deficits in sensation, proprioception, and awareness  Pt engaged in game of [email or web address] on mode easy to promote smooth pursuits to target and motor planning with sustained grasp  Pt demo need for [facility] assist with impaired ability to achieve target  Assessment   Treatment Assessment Pt participated in 60 mins of [place] assisted therapy for OT with focus on use of mast motor practice, biofeedback, and handling techniques of R UE to inc fxnl use of IADL/ADL tasks  Pt lacks insight in severity of deficits and reports "I did pretty good"  Continued to recommend skilled OT with plans to implement modified constraint induced therapy program to inc unilateral control  Problem List Decreased strength;Decreased range of motion;Decreased endurance; Impaired sensation; Impaired vision;Decreased safety awareness; Impaired judgement;Decreased cognition;Decreased coordination;Decreased mobility; Impaired balance   Plan   Treatment/Interventions ADL retraining;Functional transfer training; Therapeutic exercise; Endurance training;Cognitive reorientation;Patient/family training; Compensatory technique education   OT Therapy Minutes   OT Time In 1230   OT Time Out 1330   OT Total Time (minutes) 60   OT Mode of treatment - Individual (minutes) 0   OT Mode of treatment - Concurrent (minutes) 60   OT Mode of treatment - Group (minutes) 0   OT Mode of treatment - Co-treat (minutes) 0   OT Mode of Teatment - Total time(minutes) 60 minutes   Therapy Time missed   Time missed?  No

## 2019-07-09 NOTE — PCC CARE MANAGEMENT
Pt is participating well with therapy, and plans on a return home  Pt is aware of the potential for cont'd care, ie therapy and services  Pts family has been made aware of team recommendations for 24/7 supervision at d/c  Following to assist with d/c planning needs

## 2019-07-09 NOTE — PROGRESS NOTES
07/09/19 1350   Pain Assessment   Pain Assessment No/denies pain   Pain Score No Pain   Restrictions/Precautions   Precautions Fall Risk;Cognitive; Impulsive;Supervision on toilet/commode  (R side inattention)   Cognition   Overall Cognitive Status Impaired   Arousal/Participation Alert; Cooperative   Attention Attends with cues to redirect   Orientation Level Oriented X4   Memory Decreased recall of precautions;Decreased recall of recent events;Decreased short term memory   Following Commands Follows one step commands with increased time or repetition   Comments Although pt states during therapeutic interventions "I know how to do this," pt is often unable to complete task in proper manner when using R side of body   Subjective   Subjective Pt pleasant and agreeable to PT tx with no complaints  At end of tx pt stated "I am tired "   QI: Sit to Stand   Assistance Needed Incidental touching   Assistance Provided by [place] No physical assistance   Comment CGA-CS w/o AD and with SPC; 100% tactile cueing for R hand placement   Sit to Stand CARE Score 4   QI: Chair/Bed-to-Chair Transfer   Assistance Needed Incidental touching   Assistance Provided by [place] No physical assistance   Comment CS-CGA without AD and with use of SPC; pt requires verbal cueing to slow down when turning and to keep SPC in L hand during transfer to chair as when pt starts turning she tries to switch cane from L to R hand  Also instructed pt to look back to R hand in order to place on arm rest properly and then reach back with L hand while holding cane  Chair/Bed-to-Chair Transfer CARE Score 4   Transfer Bed/Chair/Wheelchair   Limitations Noted In Balance; Coordination; Endurance;Problem Solving;Sensation; Sequencing;UE Strength;LE Strength   Adaptive Equipment Cane;None   Stand Pivot Contact Guard;Supervision   Sit to Stand Supervision;Contact Guard   Stand to [employer] Guard;Supervision   Findings Pt demo's difficulty with management of SPC during transfers as well as hand placement and needs repeated cueing to complete properly  Bed, Chair, Wheelchair Transfer (FIM) 4 - Patient requires steadying assist or light touching   QI: Car Transfer   Assistance Needed Incidental touching   Assistance Provided by [place] No physical assistance   Comment CGA-CS with SPC; pt completed transfer with increased speed and did not reach back properly to car; instructed pt to turn fully before sitting on seat as well as to reach back to headrest with R UE and seat with L UE; also instructed pt not to use car door for support as it can move and is unsafe    Car Transfer CARE Score 4   QI: Walk 10 Feet   Assistance Needed Physical assistance   Assistance Provided by [place] Less than 25%   Comment [place] without AD, CGA-CS with SPC; pt demo's improvements in amb with SPC, therefore cont trialing SPC with pt during amb   Walk 10 Feet CARE Score 3   QI: Walk 50 Feet with Two [address] Needed Physical assistance   Assistance Provided by [place] Less than 25%   Comment Tara without AD; CGA-CS with SPC; no LOB noted    Walk 50 Feet with Two Turns CARE Score 3   QI: Walk 150 Feet   Assistance Needed Physical assistance   Assistance Provided by [place] Less than 25%   Comment Tara without AD; CGA-CS with use of SPC, verbal cues to decrease R foot drag   Walk 150 Feet CARE Score 3   QI: Walking 10 Feet on Uneven Surfaces   Assistance Needed Incidental touching   Assistance Provided by [place] No physical assistance   Comment CGA-CS with SPC performed on outside concrete pavers and while ascending/descending outside ramp; amb 150' on concrete pavers and ascend/descend outside ramp x 80'   Walking 10 Feet on Uneven Surfaces CARE Score 4   Ambulation   Does the patient walk? 2  Yes   Primary Discharge Mode of Locomotion Walk   Walk Assist Level Minimum Assist;Contact Guard;Close Supervision   Gait Pattern Inconsistant Dolly; Slow Dolly;R foot drag;Narrow LUIS; Decreased R stance; Improper weight shift   Assist Device [place]; Other  (no AD in beginning of tx)   Distance Walked (feet) 200 ft  (x1 w/o AD, 150x1 with SPC)   Limitations Noted In Balance; Coordination; Endurance; Heel Strike;Posture; Safety; Sensation; Sequencing;Speed;Strength;Swing   Findings Pt demos improvements with stability with use of SPC in L hand compared to no AD; pt states she feels confident using SPC as she reports "my friends at home use them, and I have watched them do that " Cont to trial amb with SPC as pt is open to using SPC upon d/c  Walking (FIM) 4 - Patient requires steadying assist or light touching AND distance 150 feet or more, no rest   Therapeutic Interventions   Neuromuscular Re-Education Tape placed on ground with cones placed overtop having pt straddle cones and tape during amb to improve pt's LUIS and awareness of R LE  Pt able to complete 2/8 trials without knocking over any cones, however, still requires verbal cueing to physically look at R LE during amb to be aware of placement  Other Repeated sit to stand and chair to chair transfers performed with [facility] to educate pt on proper hand placement during transfers as well as management of SPC  Equipment Use   NuStep Level 1 x 15 minutes   Assessment   Treatment Assessment Pt participated in skilled PT treatment focusing on functional mobilities with and without use of SPC as well as amb on uneven and even surfaces  Pt demo's improvements in balance during amb with use of SPC as well as good sequencing with SPC during amb, therefore, continued treatment utilizing SPC and educating pt on proper use of SPC  Pt demo'd difficulty with placement of SPC during transfers as she would attempt to move SPC from L hand to R hand while turning before sitting in chair so pt would be able to reach back to chair with L UE   Educated pt to reach back to chair with L UE first, and then turn and look at R UE to reach back to arm rest as pt is unable to find arm rest with R UE if she does not turn and watch hand  Pt was able to tolerate all therapeutic interventions well, however, did state she was fatigued at end of tx  Continue to trial amb with use of SPC and further increase pt's safety and proper technique with use of SPC, especially with transfers in order to decrease pt's risk for falls  Family/Caregiver Present no   Problem List Decreased strength;Decreased range of motion;Decreased endurance; Impaired sensation; Impaired vision;Decreased safety awareness; Impaired judgement;Decreased cognition;Decreased coordination;Decreased mobility; Impaired balance   Barriers to Discharge Decreased caregiver support   PT Barriers   Physical Impairment Decreased strength;Decreased endurance; Impaired balance;Decreased mobility; Decreased coordination; Impaired judgement;Decreased safety awareness   Functional Limitation Car transfers;Stair negotiation;Standing;Transfers; Walking   Plan   Treatment/Interventions Functional transfer training;LE strengthening/ROM; Therapeutic exercise; Endurance training;Equipment eval/education;Gait training   Progress Progressing toward goals   Recommendation   Recommendation Outpatient PT;24 hour supervision/assist;Home with family support   Equipment Recommended Other (Comment)  (TBD: most appropriate least restrictive device)   PT - OK to Discharge No   PT Therapy Minutes   PT Time In 1350   PT Time Out 1520   PT Total Time (minutes) 90   PT Mode of treatment - Individual (minutes) 72   PT Mode of treatment - Concurrent (minutes) 18   PT Mode of treatment - Group (minutes) 0   PT Mode of treatment - Co-treat (minutes) 0   PT Mode of Teatment - Total time(minutes) 90 minutes   Therapy Time missed   Time missed?  No

## 2019-07-09 NOTE — PROGRESS NOTES
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; s/p left CEA 6/28/19:  continue ASA/Plavix, Lipitor = for home Cards advised change Lipitor to Crestor 40mg daily      Iron deficiency anemia; s/p transfusion and iron IV: continue iron PO supplements      AVM stomach/duodenum tx with APC; cold snare sigmoid colon polypectomy: continue PPI     HTN: at home was on Lopressor and now is switched to Coreg and Nifedipine     RUL ground glass density: for Pulm to see OP     Urethral diverticulum: to see Uro as OP for cystogram     Nicotine abuse: counseled for cessation     Fall 2/2 LOB 6/24/19: CT head was negative; has remaining ecchymosis right forehead/orbit     ABLA: Hgb 7 9 today  Will continue to monitor  Continue FeSO4 supplementation  Subjective/ HPI: Patient seen and examined  Patients overnight issues or events were reviewed with nursing or staff during rounds or morning huddle session  New or overnight issues include the following: HTN: Currently well controlled  Continue present medications  CVA: No new stroke symptoms  Continue antiplatelet and statin  Anemia: Will continue to monitor  Hgb 7 9  Pt currently reports that she is doing well       ROS:     GI: denies abdominal pain, change bowel habits or reflux symptoms  Neuro: Denies any headache, new vision changes, new neuropathies,new weaknesses   Respiratory: No Cough, SOB, denies wheeze  Cardiovascular: No CP, palpitations , denies perception of rapid heartbeat  : denies any new urinary burning or frequency    Review of Scheduled Meds:    Current Facility-Administered Medications:  aspirin 81 mg Oral Daily Ed Arreola MD   atorvastatin 80 mg Oral Daily With Dinner Toro Flood MD   bisacodyl 10 mg Rectal Daily PRN Amina TERESA Whipple   calcium carbonate 500 mg Oral Daily PRN Ed Arreola MD   carvedilol 3 125 mg Oral BID With Meals Magalis Montaño MD   clopidogrel 75 mg Oral Daily Ed Arreola MD   docusate sodium 100 mg Oral BID Amina Whipple PA-C   enoxaparin 40 mg Subcutaneous Q24H [facility] Ed Arreola MD   ferrous sulfate 325 mg Oral BID With Meals Magalis Montaño MD   melatonin 6 mg Oral HS Ed Arreola MD   NIFEdipine 30 mg Oral Daily Ed Arreola MD   oxyCODONE 2 5 mg Oral Q4H PRN Ed Arreola MD   oxyCODONE 5 mg Oral Q4H PRN Ed Arreola MD   pantoprazole 40 mg Oral Early Morning Ed Arreola MD   polyethylene glycol 17 g Oral Daily PRN Amina Whipple PA-C   senna 2 tablet Oral HS Amina Whipple PA-C       Labs:     Results from last 7 days   Lab Units 07/08/19  0540 07/05/19  0617   WBC Thousand/uL 10 08 10 60*   HEMOGLOBIN g/dL 7 9* 8 5*   HEMATOCRIT % 28 3* 30 8*   PLATELETS Thousands/uL 382 381     Results from last 7 days   Lab Units 07/08/19  0540 07/05/19  0617   SODIUM mmol/L 142 140   POTASSIUM mmol/L 3 6 3 6   CHLORIDE mmol/L 110* 109*   CO2 mmol/L 26 26   BUN mg/dL 22 16   CREATININE mg/dL 0 89 0 86   CALCIUM mg/dL 8 6 8 6                        Imaging:     No orders to display       *Labs reviewed  *Radiology studies reviewed  *Medications reviewed and reconciled as needed  *Please refer to order section for additional ordered labs studies  *Case discussed with primary attending during morning huddle case rounds    Physical Examination:  Vitals:   Vitals:    07/08/19 1317 07/08/19 1614 07/08/19 2009 07/09/19 0604   BP: 163/70 144/66 129/61 136/62   BP Location:   Left arm    Pulse: 67 66 62 77   Resp: 18  18 20   Temp: 98 7 °F (37 1 °C)  97 7 °F (36 5 °C) 98 2 °F (36 8 °C)   TempSrc: Oral  Oral Oral   SpO2: 96%  97% 100%   Weight:    72 5 kg (159 lb 13 3 oz)   Height:           General Appearance: NAD, conversive  Eyes: No icterus; conjunctiva normal, PERRLA  HENT: oropharynx clear; mucous membranes moist; no ulcerations, normal hard and soft palette  Neck: trachea midline, range of motion full  Supple, no lymphadenopathy or thyromegaly  Lungs: CTA, normal respiratory effort, no retractions, expiratory effort normal  CV: regular rate, no rubs no gallops, PMI normal place and intensity  ABD: soft non tender, no masses , no hepatic or splenomegaly  EXT: DP pulses intact, no lymphadenopathy, no edema  Skin: normal turgor, normal texture, no rash, no ulcers  Psych: affect normal, No anxiety,   Neuro: AAOx3            Total time spent: At least 40 minutes, with more than 50% spent counseling/coordinating care  Counseling includes discussion with patient re: progress  and discussion with patient of his/her current medical state/information  Coordination of patient's care was performed in conjunction with primary service  Time invested included review of patient's labs, vitals, and management of their comorbidities with continued monitoring  In addition, this patient was discussed with medical team including physician and advanced extenders  The care of the patient was extensively discussed and appropriate treatment plan was formulated unique for this patient  ** Please Note: Dragon 360 Dictation voice to text software may have been used in the creation of this document   **

## 2019-07-09 NOTE — PROGRESS NOTES
07/09/19 0830   Pain Assessment   Pain Assessment No/denies pain   Restrictions/Precautions   Precautions Fall Risk;Cognitive;Supervision on toilet/commode;Visual deficit   Memory Skills   Memory (FIM) 5 - Needs cueing reminders <10%   Social Interaction (FIM) 6 - Interacts appropriately with others BUT requires extra  time   Speech/Language/Cognition Assessmetn   Treatment Assessment Session focused on a number of cognitive and memory tasks  Pt's ability to recall daily events, such as visitors from last evening, items consumed at last meal (breakfast) as well as recalling overall therapy schedule for today at mod I level  Engaged pt in 4-step written sequencing task dealing w/ health and social situations  When completing the health sequences, pt was 52/76 accurate, noting many errors related to initial steps, where pt would "call" first before "noticing" problem first  Upon reviewing these items, pt able to improve to 72/76 accurate, due to difficulty on one sequence task, needing maximal cues to complete from SLP  When completing social sequences, improvement noted w/ these items, in which pt was 40/44 accurate, but able to "fix" errors given SLP cue x1  Lastly, pt engaging in auditory categorization task given 4 words in which pt was to determine the exclusionary word and then provide reason how remaining 3 words were alike  Pt's ability to ID concrete category exclusion, pt was 17/18 accurate, increasing to 18/18 given repetition cue of word list  However, it was noted that pt's ability to determine reason in which remaining 3 words were similar, pt was 14/18 accurate, noting pt having mild word finding for the category name  When completing same task given moderately complex 4 word groups to determine the word excluded and then how the remaining items were similar, pt was 9/10 accurate given word excluded, needing written cue x1 for last item   Ability to determine the similarities of the other 3 words, pt was 10/10 accurate  At this time, pt will continue to benefit from SLP services at this time to maximize overall cognitive linguistic skills  SLP Therapy Minutes   SLP Time In 0830   SLP Time Out 0930   SLP Total Time (minutes) 60   SLP Mode of treatment - Individual (minutes) 60   SLP Mode of treatment - Concurrent (minutes) 0   SLP Mode of treatment - Group (minutes) 0   SLP Mode of treatment - Co-treat (minutes) 0   SLP Mode of Teatment - Total time(minutes) 60 minutes   Therapy Time missed   Time missed?  No   Daily FIM Score   Problem solving (FIM) 4 - Solves basic problems 75-89% of time   Comprehension (FIM) 5 - Understands basic directions and conversation   Expression (FIM) 6 - Expresses complex/abstract but requires device (trach valve, communication boards)

## 2019-07-09 NOTE — TEAM CONFERENCE
Acute RehabilitationTeam Conference Note  Date: 7/9/2019   Time: 10:52 AM       Patient Name:  Yuriy Garcia       Medical Record Number: [id number]   YOB: 1940  Sex: Female          Room/Bed:  [facility] 457/[facility] 457-01  Payor Info:  Payor: Della Ledezma / Plan: [place]  REP / Product Type: Medicare PPO /      Admitting Diagnosis: CVA (cerebral vascular accident) (RUST 75 ) [I63 9]   Admit Date/Time:  7/3/2019  5:57 PM  Admission Comments: No comment available     Primary Diagnosis:  Impaired mobility and ADLs  Principal Problem: Impaired mobility and ADLs    Patient Active Problem List    Diagnosis Date Noted    Impaired mobility and ADLs 07/04/2019    Iron deficiency anemia 06/24/2019    CVA (cerebral vascular accident) (RUST 75 ) 06/19/2019    Anemia, unspecified 06/19/2019    Abnormal CT scan 06/19/2019    Symptomatic stenosis of left carotid artery 06/19/2019    Nicotine dependence 08/01/2016    Hyperlipidemia 11/12/2015    Cataracts, bilateral 10/26/2015    Essential hypertension 10/26/2015       Physical Therapy:    Transfers: Contact Guard  Bed Mobility: Supervision  Amulation Distance (ft): 150 feet  Ambulation: Contact Guard, Minimal Assistance, Moderate Assistance(min-CG with walker, min-mod without AD )  Assistive Device for Ambulation: Roller Walker(also HHA as pt did not use AD at baseline)  Number of Stairs: 12  Assistive Device for Stairs: Bilateral [place]  Stair Assistance: Contact Guard  Discharge Recommendations: Home with:  [address] [facility] with[de-identified] Family Support, 24 Hour Supervision, Outpatient Physical Therapy, 24 Hour Assisteance    7/8/2019  Pt is a 78year old female s/p CVA who presents with dec cognition,impaired motor planning, impaired coordination, dec standing balance, dec endurance, dec R>L LE mm strength and impaired sensation impacting functional mobility indep and carry over of safety practices   Pt is demonstrating consistent progress in PT from requiring min A on eval for transfers to CGA this week  Pt moves better when using a RW for mobilities requiring min-CGA however pt did not used any AD at baseline with personal goal of using LRAD at d/c hence PT focusing on mobility training without an AD at this time utilizing neuroplasticity principles to maximize improvement in balance and gait pattern/dynamics to reduce caregiver burden at d/c and reduce risk for falls  Occupational Therapy:  Eating: Minimal Assistance  Grooming: Minimal Assistance  Bathing: Minimal Assistance  Bathing: Minimal Assistance  Upper Body Dressing: Minimal Assistance  Lower Body Dressing: Minimal Assistance  Toileting: Minimal Assistance  Tub/Shower Transfer: Minimal Assistance  Toilet Transfer: Minimal Assistance  Cognition: Exceptions to WNL  Cognition: Decreased Safety, Decreased Executive Functions, Decreased Attention  Orientation: Person, Place, Time, Situation  Discharge Recommendations: Home with:  [address] with[de-identified] Family Support       Pt is demonstrating good progress with occupational therapy and is progressing toward  Long term goals for ADL, IADL, and functional transfers/mobility  Pt continues to present with impairments in activity tolerance, standing balance/tolerance, sitting balance/tolerance, UE strength, UE ROM, FMC, GMC, safety , attention , sequencing , sensation , visual perceptual skills , depth perception , R/L discrimination , task initiation , task termination , (R) attention and proprioception   Occupational performance remains limited by fatigue, (R) UE dysmetria, (R) visual deficits , decreased caregiver support and risk for falls  Pt will continue to benefit from skilled acute rehab OT services to address above mentioned barriers and maximize functional independence in baseline areas of occupation to meet established treatment goals with overall decreased burden of care                   Speech Therapy:  Mode of Communication: Verbal  Cognition: Exceptions to WNL  Cognition: Decreased Memory, Decreased Executive Functions, Decreased Attention, Decreased Comprehension  Orientation: Person, Place, Time, Situation  Discharge Recommendations: Home with:  [address] with[de-identified] 24 Hour Supervision, Family Support, Outpatient Speech Therapy  Pt currently being followed for cognitive linguistic skills, in which pt completed formalized cognitive linguistic assessment, CLQT+  Overall score when compared to age matched peers between 79 - 80 yrs  Score was 1 8 out of 4 0, which indicates cognitive skills to be moderately impaired  All tasks from assessment which pt demonstrated most difficulty in completing were visual perceptual in nature  Pt inconsistently demonstrated R inattention at times vs then noting L inattention to complete some activities  Pt also noted to not be able to correct or verbalize what was not "right" especially when completing the clock drawing  Pt knew that hands needed to be drawn onto the clock, but due to how the numbers were placed inside the [place] (all numbers on the R hand side), unable to determine how the hands would be placed onto the clock  Other barriers include decreased insight to deficits, decreased executive function skills (problem solving, reasoning, sequencing, etc), decreased STM recall, more so noted given working memory, lengthier paragraph information presented, which impacts pt's overall cognitive skills at this time  At this time, pt will benefit from SLP services to maximize overall cognitive linguistic skills at this time, where pt will need supervision upon d/c back home and continued SLP services either provided given home health vs outpatient       Nursing Notes:  Appetite: Poor  Diet Type: Regular/House                      Diet Patient/Family Education Complete: No                            Bladder: 7 - Complete [place]     Bladder Patient/Family Education: No  Bowel: 6 - Modified [place]     Bowel Patient/Family Education: No        Pain Score: 0                          Pain Patient/Family Education: No  Medication Management/Safety  Injectable: Lovenox  Safe Administration: Yes  Medication Patient/Family Education Complete: No    78 y o  Female with a  history of HTN, HLD, former smoker, previous hyponatremia  Presented w/ R sided weakness/R facial droop - resolved in the ER  MRI showed superficial cortical infarct in the left parietal region and small deep watershed infarct in the left frontal region  Left carotid endarterectomy was performed on 06/28, Cardiac echo with EF 65%  Fall 6/24 ecchymotic R orbit & forehead  6/29 received 2 units of blood   EGD/colonoscopy  06/24:multiple AVM in the stomach and duodenum treated with argon plasma coagulation  Patient would like miralax every other day start on 7/8 due to repeated constipation and disimpactmant on 7/6  Patient has not had recent complaints of pain  We will continue monitoring daily weights, intake and output, and vital signs  We will conitnue fall prevention and maintain skin integrity  Case Management:     Discharge Planning  Goal Length of Stay: 5  Living Arrangements: Alone  Support Systems: Children  Assistance Needed: tbd  Type of Current Residence: Other (Comment)([facility])  Current Care Facility Name: [facility]  Current Home Care Services: No  Pt is participating well with therapy, and plans on a return home  Pt is aware of the potential for cont'd care, ie therapy and services  Following to assist with d/c planning needs  Is the patient actively participating in therapies?  yes  List any modifications to the treatment plan:     Barriers Interventions   Decreased executive function/memory Speech therapy exercises   Activity tolerance/balance  Energy conservation education/therapy exercises   propri perception Therapy exercises   Insight/cognition Speech therapy exercises         Is the patient making expected progress toward goals? yes  List any update or changes to goals:     Medical Goals: Patient will be medically stable for discharge to [facility] upon completion of rehab program and Patient will be able to manage medical conditions and comorbid conditions with medications and follow up upon completion of rehab program    Weekly Team Goals:   Rehab Team Goals  ADL Team Goal: Patient will require supervision with ADLs with least restrictive device upon completion of rehab program  Transfer Team Goal: Patient will require supervision with transfers with least restrictive device upon completion of rehab program  Locomotion Team Goal: Patient will require supervision with locomotion with least restrictive device upon completion of rehab program  Cognitive Team Goal: Patient will require supervision for basic and complex tasks upon completion of rehab program    Discussion: Pt presents with the above barriers  Pt is functioning min a for all ADLs, and CG to supervision for bed mobility and transfers  Pt ambulates min to CG with an assistive device (walker), and min to mod without AD  Anticipated Discharge Date:  [facility] Team Members Present: The following team members are supervising care for this patient and were present during this Weekly Team Conference      Physician: Dr  [address] [place], MD  : ANTONINO Logan/ REFUGIOW  Registered Nurse: Jayme Robison RN  Physical Therapist: Cristy Cali DPT  Occupational Therapist: Stephie Frias MS, OTR/L  Speech Therapist: Kaz Pope MA, CCC-SLP  Other:

## 2019-07-10 PROCEDURE — 99232 SBSQ HOSP IP/OBS MODERATE 35: CPT | Performed by: PHYSICAL MEDICINE & REHABILITATION

## 2019-07-10 PROCEDURE — 97535 SELF CARE MNGMENT TRAINING: CPT

## 2019-07-10 PROCEDURE — 97112 NEUROMUSCULAR REEDUCATION: CPT

## 2019-07-10 PROCEDURE — 97110 THERAPEUTIC EXERCISES: CPT

## 2019-07-10 PROCEDURE — 97530 THERAPEUTIC ACTIVITIES: CPT

## 2019-07-10 PROCEDURE — 97116 GAIT TRAINING THERAPY: CPT

## 2019-07-10 PROCEDURE — G0515 COGNITIVE SKILLS DEVELOPMENT: HCPCS

## 2019-07-10 RX ADMIN — SENNOSIDES 17.2 MG: 8.6 TABLET, FILM COATED ORAL at 22:16

## 2019-07-10 RX ADMIN — NIFEDIPINE 30 MG: 30 TABLET, FILM COATED, EXTENDED RELEASE ORAL at 08:39

## 2019-07-10 RX ADMIN — CARVEDILOL 3.12 MG: 3.12 TABLET, FILM COATED ORAL at 17:33

## 2019-07-10 RX ADMIN — ATORVASTATIN CALCIUM 80 MG: 80 TABLET, FILM COATED ORAL at 17:33

## 2019-07-10 RX ADMIN — CLOPIDOGREL BISULFATE 75 MG: 75 TABLET ORAL at 08:39

## 2019-07-10 RX ADMIN — ENOXAPARIN SODIUM 40 MG: 40 INJECTION SUBCUTANEOUS at 08:39

## 2019-07-10 RX ADMIN — Medication 325 MG: at 17:34

## 2019-07-10 RX ADMIN — Medication 325 MG: at 08:39

## 2019-07-10 RX ADMIN — MELATONIN 6 MG: at 22:16

## 2019-07-10 RX ADMIN — ASPIRIN 81 MG: 81 TABLET, COATED ORAL at 08:39

## 2019-07-10 RX ADMIN — CARVEDILOL 3.12 MG: 3.12 TABLET, FILM COATED ORAL at 08:39

## 2019-07-10 RX ADMIN — PANTOPRAZOLE SODIUM 40 MG: 40 TABLET, DELAYED RELEASE ORAL at 05:47

## 2019-07-10 NOTE — PLAN OF CARE
Problem: Potential for Falls  Goal: Patient will remain free of falls  Description  INTERVENTIONS:  - Assess patient frequently for physical needs  -  Identify cognitive and physical deficits and behaviors that affect risk of falls    -  [place] fall precautions as indicated by assessment   - Educate patient/family on patient safety including physical limitations  - Instruct patient to call for assistance with activity based on assessment  - Modify environment to reduce risk of injury  - Consider OT/PT consult to assist with strengthening/mobility  Outcome: Progressing     Problem: PAIN - ADULT  Goal: Verbalizes/displays adequate comfort level or baseline comfort level  Description  Interventions:  - Encourage patient to monitor pain and request assistance  - Assess pain using appropriate pain scale  - Administer analgesics based on type and severity of pain and evaluate response  - Implement non-pharmacological measures as appropriate and evaluate response  - Consider cultural and social influences on pain and pain management  - Notify physician/advanced practitioner if interventions unsuccessful or patient reports new pain  Outcome: Progressing     Problem: INFECTION - ADULT  Goal: Absence or prevention of progression during hospitalization  Description  INTERVENTIONS:  - Assess and monitor for signs and symptoms of infection  - Monitor lab/diagnostic results  - Monitor all insertion sites, i e  indwelling lines, tubes, and drains  - Monitor endotracheal (as able) and nasal secretions for changes in amount and color  - [place] appropriate cooling/warming therapies per order  - Administer medications as ordered  - Instruct and encourage patient and family to use good hand hygiene technique  - Identify and instruct in appropriate isolation precautions for identified infection/condition  Outcome: Progressing     Problem: SAFETY ADULT  Goal: Maintain or return to baseline ADL function  Description  INTERVENTIONS:  -  Assess patient's ability to carry out ADLs; assess patient's baseline for ADL function and identify physical deficits which impact ability to perform ADLs (bathing, care of mouth/teeth, toileting, grooming, dressing, etc )  - Assess/evaluate cause of self-care deficits   - Assess range of motion  - Assess patient's mobility; develop plan if impaired  - Assess patient's need for assistive devices and provide as appropriate  - Encourage maximum independence but intervene and supervise when necessary  ¯ Involve family in performance of ADLs  ¯ Assess for home care needs following discharge   ¯ Request OT consult to assist with ADL evaluation and planning for discharge  ¯ Provide patient education as appropriate  Outcome: Progressing  Goal: Maintain or return mobility status to optimal level  Description  INTERVENTIONS:  - Assess patient's baseline mobility status (ambulation, transfers, stairs, etc )    - Identify cognitive and physical deficits and behaviors that affect mobility  - Identify mobility aids required to assist with transfers and/or ambulation (gait belt, sit-to-stand, lift, walker, cane, etc )  - [place] fall precautions as indicated by assessment  - Record patient progress and toleration of activity level on Mobility SBAR; progress patient to next Phase/Stage  - Instruct patient to call for assistance with activity based on assessment  - Request Rehabilitation consult to assist with strengthening/weightbearing, etc   Outcome: Progressing     Problem: DISCHARGE PLANNING  Goal: Discharge to home or other facility with appropriate resources  Description  INTERVENTIONS:  - Identify barriers to discharge w/patient and caregiver  - Arrange for needed discharge resources and transportation as appropriate  - Identify discharge learning needs (meds, wound care, etc )  - Arrange for interpretive services to assist at discharge as needed  - Refer to Case Management Department for coordinating discharge planning if the patient needs post-hospital services based on physician/advanced practitioner order or complex needs related to functional status, cognitive ability, or social support system  Outcome: Progressing     Problem: Prexisting or High Potential for Compromised Skin Integrity  Goal: Skin integrity is maintained or improved  Description  INTERVENTIONS:  - Identify patients at risk for skin breakdown  - Assess and monitor skin integrity  - Assess and monitor nutrition and hydration status  - Monitor labs (i e  albumin)  - Assess for incontinence   - Turn and reposition patient  - Assist with mobility/ambulation  - Relieve pressure over bony prominences  - Avoid friction and shearing  - Provide appropriate hygiene as needed including keeping skin clean and dry  - Evaluate need for skin moisturizer/barrier cream  - Collaborate with interdisciplinary team (i e  Nutrition, Rehabilitation, etc )   - Patient/family teaching  Outcome: Progressing

## 2019-07-10 NOTE — PROGRESS NOTES
07/10/19 0815   Pain Assessment   Pain Assessment 0-10   Pain Score No Pain   Restrictions/Precautions   Precautions Cognitive; Fall Risk;Supervision on toilet/commode   Grooming   Findings Pt completed hand hygiene in stance at sink with inc time to target soap and faucet 2* RUE coordination deficits   Grooming (FIM) 4 - Patient requires steadying assist or light touching   QI: Sit to Stand   Assistance Needed Incidental touching   Sit to Stand CARE Score 4   QI: Chair/Bed-to-Chair Transfer   Assistance Needed Physical assistance   Assistance Provided by L[place] Less than 25%   Chair/Bed-to-Chair Transfer CARE Score 3   Transfer Bed/Chair/Wheelchair   Findings Min A with use of SPC; dec safety with use    Bed, Chair, Wheelchair Transfer (FIM) 4 - Patient completes 75% of all tasks   Meal Prep   Meal Prep Level Cane   Meal Prep Level of Assistance Maximum verbal cues;Contact guard;Dependent   Meal Preparation Pt engaged in OT/ST cotx to prepare pancakes to address meal prep tasks with OT focus on  RUE fxnl use, R sided attention, and overall standing tolerance/balance  See SLP note for further details on cognitive performance  Pt reaching in pantry for pancake mix with noted  Impaired proprioceptive sense of R UE req cues to monitor and adjust  strength for appropriate weighted resistance with box  Pt able to complete kitchen mob and management of drawers/cabinets with use of counter side stepping tech and use of RUE with cues to close drawers and cabinets to inc safety  Pt demo dec R UE attention, impaired motor planning, dec FMC/GMC, and dec bimanual coordination with meal prep tasks, pt demo dec attention to R body with cues and edu to utilize vision to compensate for sensory/proprioceptive deficits  Pt demo dec motor planning and  deficits for tasks, noted attempting to place large  in small box/container with cues to correct   Pt with dec attention to hot stovetop, placing hand on hot stovetop req cues to move R UE to dec risk of injury  Pt at this time would req TA for meal prep tasks at d/c, Pt demo dec insight into deficits stating "I did pretty good"  Kitchen Mobility   Kitchen-Mobility Level Cane   Kitchen Activity Retrieve items;Transport items   Money Management   Money Management Pt engaged in OT/ST cotx with OT focus on bill handling with b/l UEs and handwriting tasks  Pt req to make grocery list with severe apraxia noted with handwriting, illegible handwriting with no error recognition  Pt with would req inc practice with handwriting tasks to improve coordination and overall [facility]   Neuromuscular Education   Functional Movement Patterns Mirror Therapy: Pt engaged in UE mirror box therapy to improve overall motor control and awareness of RUE to allow for visual feedback to inc fxnl use  Pt seated at tabletop with RUE occluded behind mirror box  Pt directed to perform fxnl reaching and wrist/digit AROM of L UE while maintaining visual attention to R for improved neuroplasticity and organization  Pt demo spontaneous mvmt of R UE while visually attending to mirror with dec awareness  Pt however noted mirroring L UE mvmt appropriately  cues to redirect and maintain visual attention  Vision   Vision Comments Pt engaged in word search task with pen in  RUE to address R attention, visual scanning, and [facility] of RUE with tool use  Pt demo G attention to task and able to target letters appropriately with inc time    Assessment   Treatment Assessment pt engaged in 45 min of skilled OT tx session and 60 min of OT/ST cotx  see above and ST note for further details  At this time, it is recommended pt should NOT participate in IADL fxn of cooking or finance management 2* performance deficits as outlined by OT and ST  Pt demo dec insight into deficits and poor performance req education to address   Pt continues to benefit from skilled OT tx to address RUE coordination, strength, R attention, proprioceptive deficts, impaired sensation, dec balance/endurance, and overall safety/cognition  unsafe to d c home at this time   Prognosis Good   Problem List Decreased strength;Decreased range of motion; Impaired balance;Decreased endurance;Decreased mobility; Decreased coordination;Decreased cognition;Decreased safety awareness; Impaired judgement; Impaired vision; Impaired sensation   Plan   Treatment/Interventions ADL retraining;Functional transfer training;LE strengthening/ROM; Therapeutic exercise; Endurance training;Cognitive reorientation;Patient/family training;Equipment eval/education; Bed mobility; Compensatory technique education   Progress Progressing toward goals   OT Therapy Minutes   OT Time In 0815   OT Time Out 1000   OT Total Time (minutes) 105   OT Mode of treatment - Individual (minutes) 45   OT Mode of treatment - Concurrent (minutes) 60   OT Mode of treatment - Group (minutes) 0   OT Mode of treatment - Co-treat (minutes) 0   OT Mode of Teatment - Total time(minutes) 105 minutes   Therapy Time missed   Time missed?  No

## 2019-07-10 NOTE — PROGRESS NOTES
[facility] Occupational Therapy [facility] Initial Evaluation      07/10/19 0700   Pain Assessment   Pain Assessment No/denies pain   Pain Score No Pain   Restrictions/Precautions   Precautions Cognitive; Fall Risk;Supervision on toilet/commode   Lifestyle   Autonomy "I did all those things you gave me "   QI: Eating   Assistance Needed Incidental touching   Eating CARE Score 4   Eating Assessment   Findings hand over hand assist required for cutting [place] toast  Pt requires verbal cues for attention to R UE wich is holding multiple items at once w/ poor awareness  decreased attention and awareness of items in hand  Poor grasp awareness on R UE with knife and L UE with fork  difficulty w/ bimanual UE tool use  requires hand over hand assist     Eating (FIM) 4 - Patient completes 75%-90% of tube feed tasks   QI: Oral Hygiene   Assistance Needed Supervision   Oral Hygiene CARE Score 4   Grooming   Able To Wash/Dry Hands;Brush/Clean Teeth;Wash/Dry Face;Comb/Brush Hair   Findings   increased time and cues for thoroughness of hand hygiene following bowel movement   Grooming (FIM) 4 - Patient requires steadying assist or light touching   QI: Shower/Bathe Self   Assistance Needed Physical assistance   Assistance Provided by [place] 25%-49%   Shower/Bathe Self CARE Score 3   Bathing   Assessed Bath Style Sponge Bath   Anticipated D/C Bath Style Tub   Able to Wash/Rinse/Dry (body part) Left Arm;Right Arm;L Upper Leg;R Upper Leg;L Lower Leg/Foot;R Lower Leg/Foot;Chest;Abdomen;Perineal Area; Buttocks   Limitations Noted in Balance; Endurance   Positioning Seated   Findings  Pt with frequent washcloth dropping to floor from R UE with limited insight/awareness  fair thoroughness  Bathing (FIM) 4 - Patient requires steadying assist or light touching   QI: Upper Body Dressing   Assistance Needed Physical assistance   Assistance Provided by [place] 25%-49%   Comment multiple trials required for donning shirt   pt with poor awareness of R UE during threading and poor motor planning to thread R UE  Upper Body Dressing CARE Score 3   QI: Lower Body Dressing   Assistance Needed Physical assistance   Assistance Provided by [place] 25%-49%   Comment requires assist for L LE threading with decreased abilty to use R UE for holding pants open  Lower Body Dressing CARE Score 3   Dressing/Undressing Clothing   Remove UB Clothes Pullover Shirt   Remove LB Clothes Socks; [address] LB Clothes Pants; Undergarment; Shoes   Positioning Sit Edge Of Bed   Findings repeated trials for donnign shirt compelted with isolation of UE threading action completing 10 trials  UB Dressing (FIM) 4 - Patient completes 75% of all tasks   LB Dressing (FIM) 4 - Patient requires steadying assist or light touching   QI: Lying to Sitting on Side of Bed   Assistance Needed Supervision   Lying to Sitting on Side of Bed CARE Score 4   QI: Sit to Stand   Assistance Needed Incidental touching;Verbal cues   Comment continued poor  RUE awareness and decreased safety w/ R UE during stand -sit   Sit to Stand CARE Score 4   QI: Chair/Bed-to-Chair Transfer   Assistance Needed Incidental touching   Chair/Bed-to-Chair Transfer CARE Score 4   Transfer Bed/Chair/Wheelchair   Positioning Concerns Cognition   Limitations Noted In Problem Solving;Balance   Adaptive Equipment Roller [employer], Chair, Wheelchair Transfer (FIM) 4 - Patient completes 75% of all tasks   QI: [address] Needed Physical assistance   Assistance Provided by [place] 25%-49%   Comment poor thoroughness for bowel hygiene w/ R and L UE use  requires assist to complete  Toileting Hygiene CARE Score 3   Toileting   Able to [address] down yes, up yes     Manage Hygiene Bowel   Toileting (FIM) 4 - Patient completes 75% of all tasks   QI: Toilet Transfer   Assistance Needed Incidental touching   Toilet Transfer CARE Score 4   Toilet Transfer   Surface Assessed Standard Toilet   Toilet Transfer (FIM) 4 - Patient completes 75% of all tasks   Cognition   Overall Cognitive Status Impaired   Arousal/Participation Alert; Cooperative   Attention Attends with cues to redirect   Orientation Level Oriented X4   Memory Decreased short term memory;Decreased recall of precautions;Decreased recall of recent events   Following Commands Follows one step commands with increased time or repetition   Perception   Right Attention MOD VC's required R UE attention  pt reuqires full attention to R w/ visual confirmation of R UE proprioception  Assessment   Treatment Assessment Skilled OT session focusing on morning ADL participation, R UE awareness, Self feeding management  Pt continues to demo significant deficits in R UE proprioceptions, motor planning, apraxia, sensation and attention impacting safety with compeltiong of daily acivites  Pt conintues to require skilled OT services to increase overall safety and independence with ADLS and funciotnal transfers  Prognosis Good   Problem List Decreased strength;Decreased range of motion;Decreased endurance; Impaired balance;Decreased mobility; Decreased coordination;Decreased cognition; Impaired judgement;Decreased safety awareness; Impaired sensation   Plan   Treatment/Interventions ADL retraining;Functional transfer training;LE strengthening/ROM; Therapeutic exercise; Endurance training;Cognitive reorientation;Patient/family training;Equipment eval/education; Bed mobility; Compensatory technique education   Progress Progressing toward goals   Recommendation   OT Discharge Recommendation 24 hour supervision/assist   OT Therapy Minutes   OT Time In 0700   OT Time Out 0800   OT Total Time (minutes) 60   OT Mode of treatment - Individual (minutes) 60   OT Mode of treatment - Concurrent (minutes) 0   OT Mode of treatment - Group (minutes) 0   OT Mode of treatment - Co-treat (minutes) 0   OT Mode of Teatment - Total time(minutes) 60 minutes

## 2019-07-10 NOTE — PROGRESS NOTES
07/10/19 0900   Pain Assessment   Pain Assessment No/denies pain   Restrictions/Precautions   Precautions Cognitive; Fall Risk;Supervision on toilet/commode;Visual deficit   Memory Skills   Memory (FIM) 2 - Recalls 1 of 2 steps   Social Interaction (FIM) 5 - Interacts appropriately with others 90% of time   Speech/Language/Cognition Assessmetn   Treatment Assessment Refer to below for details  SLP Therapy Minutes   SLP Time In 0900   SLP Time Out 1000   SLP Total Time (minutes) 60   SLP Mode of treatment - Individual (minutes) 0   SLP Mode of treatment - Concurrent (minutes) 0   SLP Mode of treatment - Group (minutes) 0   SLP Mode of treatment - Co-treat (minutes) 60   SLP Mode of Teatment - Total time(minutes) 60 minutes   Therapy Time missed   Time missed? No   Daily FIM Score   Problem solving (FIM) 2 - Needs direction more than ½ time to initiate, plan or complete simple tasks   Comprehension (FIM) 3 - Understands basic info/conversation 50-74% of time   Expression (FIM) 4 - Expresses basic info/needs 75-90% of time     Session was co-tx'd w/ OT  Refer to OT note in regards to pt's overall functional mobility w/ use of SPC, use of RUE, visual attention, etc  Overall session task was to complete functional cooking task in which pt was to make pancakes  Focus for the SLP portion of the session was for attention to task, memory recall, sequencing, problem solving, safety awareness, insight to deficits when completing this task  SLP giving pt objective of activity along w/ initial directions to locate ingredients, as well as instructing pt about labels on the cabinets/drawers to locate dishes/utensils for making pancakes  Pt able to locate pantry and retrieved the pancake mix needed for the cooking activity w/o cues  Once retrieving pancake mix, pt then directed to go to counter top to complete mixing items   When determining the dishes/utensils needed to mix the pancakes, pt independently able to elicit 1 (wisk) out of at total of 6 items (mixing bowl, spatula, ladle, measuring cup, overton pan) needing maximal prompting to determine all remaining items needed  After retrieving all dishes/utensils needed to mix pancakes, therapists asking how to initiate making the pancakes  Pt was able to verbalize appropriate items (eggs, pancake mix, milk, etc), but SLP needed to cue pt to locate the ingredients listed on the box of pancakes  Pt initially reading the directions in how to make the pancakes, but unable to locate the ingredients listed on the box  SLP giving verbal cues to locate information w/o success, finally needing visual cue to locate ingredients listed- pancake mix and water  Pt also verbalizing the amount of mix and the amount of water needed to mix together to make the pancakes, but carryover/recall was decreased needing to reference amounts due to multitasking given use of R hand to fill the measuring cup w/ pancake mix  When pt's divided attention to using measuring cup and recalling amounts of mix and water was decreased, again, needing cues from both SLP to locate amounts on box vs  OT cueing w/ R hand use  Prior to use of measuring cup, pt did initially place pancake mix into bowl despite verbalizing needing to place mix into measuring cup  Decreased visual attention noted as result of this  Given verbal cues, pt's attention/awareness of error noted and was able to appropriately place mix into measuring cup  Placed water into mixing bowl appropriately and w/o cues  Pt did wisk the pancake mix and water together, but unable to recall next step  SLP cued pt to read the directions again, in which she went to the last step, which was to spoon mixture onto overton pan  SLP again needing to provide visual cues to locate that the mix needed to set for 2 minutes before making  Pt was appropriate in eliciting needing butter for the overton pan   Once at stove top, pt placed overton pan on L front burner, but had difficulty determining the knob to turn  SLP needed to show pt key to locate correct burner w/ success  Pt also able to appropriately choose a safe number to place for the butter to melt in the overton pan  SLP cued pt to watch R hand, as it was placed away from burner but on the middle area of the stove top  Once butter melted, pt able to ladle pancake mix into overton pan, waiting ~4 min to solidify, which pt was able to verbalize when pancake would be "ready to flip " However, the pancake was not setting appropriately, where pt was able to verbalize needing to turn up the burner to cook more thoroughly  Once pt was ready to flip the pancake, pt only allowing less than a minute to cook on the other side and placed item onto plate, unaware of decreased cook time  Completed task again, needing intermittent verbal cues to watch her R hand to ensure hand placement was not on the stove top  Upon completing 2nd pancake, pt again able to complete ladling in mix appropriately and wait time appropriate, but when flipping pancake to cook on opposite side, pt cooked for less than a min, in which placed pancake onto plate  Pt appropriately turning off burner when completing task and began to clean up items  Pt able to rinse off all items used and then showed green rag to wipe of countertop  Of note, pt w/ decreased awareness in turning off water when engaging in task, needing verbal prompting from SLP  Once task was all completed, pt sat down and SLP/OT reviewing how pancake making task went, in which pt verbalizing "it went ok " However, SLP showing pt pancakes, which were not fully cooked, which pt was able to notice and stated needing increased time to cook once they were flipped  SLP then asking how pt completes grocery shopping, etc, in which pt stated that she goes w/ dtrs to the store  Pt reported making a list and pays w/ cash   OT giving pt a list of ingredients to make a shopping list, where pt just started to copy the recipe, not the items needed  SLP/OT needing to cue pt to think of how items would be written down in quantity vs how many cups needed for the items  Refer to OT note in regards to pt's handwriting  OT made up a mock list of prices for a number of food items, which pt was to scan/locate and determine the cost of items which was supervision level in determining the cost of items  However, ability to complete functional math in adding and subtracting total amounts was max A  When writing down amounts to add up the total, pt was more accurate in calculation  However, when asking pt to eliminate one item from the shopping list and to determine new total, pt was nearly total A due to difficulty focusing on question at hand  Pt continued to focus on written numbers which increased pt's ability to focus to verbal math to be completed  Once SLP pulled written information away, pt's ability to complete functional math slightly improved  Also noted was mild word finding when noted to be "flustered" during cooking task as well as completing functional math task  Will continue to monitor this in upcoming sessions at this time  At this time, pt will continue to benefit from SLP services to maximize overall cognitive linguistic skills

## 2019-07-10 NOTE — PROGRESS NOTES
07/10/19 0815   Pain Assessment   Pain Assessment 0-10   Pain Score No Pain   Restrictions/Precautions   Precautions Cognitive; Fall Risk;Supervision on toilet/commode   Grooming   Findings Pt completed hand hygiene in stance at sink with inc time to target soap and faucet 2* RUE coordination deficits   Grooming (FIM) 4 - Patient requires steadying assist or light touching   QI: Sit to Stand   Assistance Needed Incidental touching   Sit to Stand CARE Score 4   QI: Chair/Bed-to-Chair Transfer   Assistance Needed Physical assistance   Assistance Provided by [place] Less than 25%   Chair/Bed-to-Chair Transfer CARE Score 3   Transfer Bed/Chair/Wheelchair   Findings Min A with use of SPC; dec safety with use    Bed, Chair, Wheelchair Transfer (FIM) 4 - Patient completes 75% of all tasks   Meal Prep   Meal Prep Level Cane   Meal Prep Level of Assistance Maximum verbal cues;Contact guard;Dependent   Meal Preparation Pt engaged in OT/ST cotx to prepare pancakes to address meal prep tasks with OT focus on  RUE fxnl use, R sided attention, and overall standing tolerance/balance  See SLP note for further details on cognitive performance  Pt reaching in pantry for pancake mix with noted  Impaired proprioceptive sense of R UE req cues to monitor and adjust  strength for appropriate weighted resistance with box  Pt able to complete kitchen mob and management of drawers/cabinets with use of counter side stepping tech and use of RUE with cues to close drawers and cabinets to inc safety  Pt demo dec R UE attention, impaired motor planning, dec FMC/GMC, and dec bimanual coordination with meal prep tasks, pt demo dec attention to R body with cues and edu to utilize vision to compensate for sensory/proprioceptive deficits  Pt demo dec motor planning and  deficits for tasks, noted attempting to place large  in small box/container with cues to correct   Pt with dec attention to hot stovetop, placing hand on hot stovetop req cues to move R UE to dec risk of injury  Pt at this time would req TA for meal prep tasks at d/c, Pt demo dec insight into deficits stating "I did pretty good"  Kitchen Mobility   Kitchen-Mobility Level Cane   Kitchen Activity Retrieve items;Transport items   Money Management   Money Management Pt engaged in OT/ST cotx with OT focus on bill handling with b/l UEs and handwriting tasks  Pt req to make grocery list with severe apraxia noted with handwriting, illegible handwriting with no error recognition  Pt with would req inc practice with handwriting tasks to improve coordination and overall [facility]   Neuromuscular Education   Functional Movement Patterns Mirror Therapy: Pt engaged in UE mirror box therapy to improve overall motor control and awareness of RUE to allow for visual feedback to inc fxnl use  Pt seated at tabletop with RUE occluded behind mirror box  Pt directed to perform fxnl reaching and wrist/digit AROM of L UE while maintaining visual attention to R for improved neuroplasticity and organization  Pt demo spontaneous mvmt of R UE while visually attending to mirror with dec awareness  Pt however noted mirroring L UE mvmt appropriately  cues to redirect and maintain visual attention  Vision   Vision Comments Pt engaged in word search task with pen in  RUE to address R attention, visual scanning, and [facility] of RUE with tool use  Pt demo G attention to task and able to target letters appropriately with inc time    Assessment   Treatment Assessment pt engaged in 45 min of skilled OT tx session and 60 min of OT/ST cotx  see above and ST note for further details  At this time, it is recommended pt should NOT participate in IADL fxn of cooking or finance management 2* performance deficits as outlined by OT and ST  Pt demo dec insight into deficits and poor performance req education to address   Pt continues to benefit from skilled OT tx to address RUE coordination, strength, R attention, proprioceptive deficts, impaired sensation, dec balance/endurance, and overall safety/cognition  unsafe to d c home at this time   Prognosis Good   Problem List Decreased strength;Decreased range of motion; Impaired balance;Decreased endurance;Decreased mobility; Decreased coordination;Decreased cognition;Decreased safety awareness; Impaired judgement; Impaired vision; Impaired sensation   Plan   Treatment/Interventions ADL retraining;Functional transfer training;LE strengthening/ROM; Therapeutic exercise; Endurance training;Cognitive reorientation;Patient/family training;Equipment eval/education; Bed mobility; Compensatory technique education   Progress Progressing toward goals   OT Therapy Minutes   OT Time In 0815   OT Time Out 1000   OT Total Time (minutes) 105   OT Mode of treatment - Individual (minutes) 30   OT Mode of treatment - Concurrent (minutes) 15   OT Mode of treatment - Group (minutes) 0   OT Mode of treatment - Co-treat (minutes) 60   OT Mode of Teatment - Total time(minutes) 105 minutes   Therapy Time missed   Time missed?  No

## 2019-07-10 NOTE — PROGRESS NOTES
Physical Medicine and Rehabilitation Progress Note  Lucille Engel 78 y o  female MRN: [id number]  Unit/Bed#: -12 Encounter: [id number]    HPI: Lucille Engel is a 78 y o  female who presented to the [facility] with right sided weakness, right facial droop  Imaging subsequently revealed left sided infarcts to the parietal and frontal regions  In addition patient noetd to have ulcerated plaque at the left cervical carotied bifurcation; CTA revealed a right ICA occlusion  Vascular surgery was consulted, and recommendation made for a left CEA, performed on 6/28  During admission patient was noted to have ABLA, for which scope was performed revealing AVMs in the stomach and duodenum; argon plasma coagulation was performed  She was also transfused at the time  Of note, during her acute admission patient had a fall off of comode; CTOH showed right frontal hematoma, but no active bleed  Patient was found to be below functional baseline, and accepted to [facility] on 7/03/19  Chief Complaint: f/u stroke    Interval: No acute events overnight  Denies any CP or SOB  Patient without complaint  ROS: A 10 point ROS was performed; negative except as noted above       Assessment/Plan:    * Impaired mobility and ADLs  Assessment & Plan  · Secondary to recent stroke  · Acute comprehensive interdisciplinary inpatient rehabilitation including PT, OT, SLP, RN, CM, SW, dietary, psychology, etc         Symptomatic stenosis of left carotid artery  Assessment & Plan  · S/p CEA on 6/28  · Monitor for hematoma formation, incision care    Anemia, unspecified  Assessment & Plan  Results from last 7 days   Lab Units 07/08/19  0540 07/05/19  0617   HEMOGLOBIN g/dL 7 9* 8 5*     · Continue ferrous sulfate  · Monitor CBC intermittently  · Transfuse for Hgb <7      CVA (cerebral vascular accident) ([facility] [address] )  Assessment & Plan  · ASA, Plavix  · Statin  · Neurology f/u as outpatient      Hyperlipidemia  Assessment & Plan  · Continue statin    Essential hypertension  Assessment & Plan  Temp:  [97 5 °F (36 4 °C)-98 3 °F (36 8 °C)] 97 5 °F (36 4 °C)  HR:  [61-72] 61  Resp:  [18-20] 18  BP: (120-169)/(59-73) 125/59    · Carvedilol  · Hydralazine prn  · IM service managing anti-hypertensives    # Skin  · Encourage regular turning as patient at risk for skin breakdown  · Staff to continue patient education on Q2h turning  · Rehabilitation team to perform skin checks regularly     # Bowel  · Patient reports no constipation  · To ensure regular BMs, bowel regimen consisting of:  colace , dulcolax suppository  and miralax     # Bladder  · Patient voiding spontaneously    # Pain  · Continue oxycodone     # Rehab Psych   · There are no psychological or psychiatric problems identified    # Other  - Diet/Nutrition:        Diet Orders   (From admission, onward)            Start     Ordered    07/03/19 1804  Diet Regular; Regular House  Diet effective now     Question Answer Comment   Diet Type Regular    Regular Regular House    RD to adjust diet per protocol?  Yes        07/03/19 1803        - DVT prophy: Sequential compression device (Venodyne)  and Enoxaparin (Lovenox)  - GI ppx: Pantaprazole  - Nausea: None  - Supplements: None  - Sleep: None    Disposition: reteam (will need to discuss with family re: home support)    CODE: Level 1: Full Code Scheduled Meds:    Current Facility-Administered Medications:  aspirin 81 mg Oral Daily Ed Arreola MD   atorvastatin 80 mg Oral Daily With Luis Sheehan MD   bisacodyl 10 mg Rectal Daily PRN Amina Whipple PA-C   calcium carbonate 500 mg Oral Daily PRN Ed Arreola MD   carvedilol 3 125 mg Oral BID With Meals Ed Arreola MD   clopidogrel 75 mg Oral Daily Ed Arreola MD   docusate sodium 100 mg Oral BID Amina Whipple PA-C   enoxaparin 40 mg Subcutaneous Q24H [address] Ed Arreola MD   ferrous sulfate 325 mg Oral BID With Meals Thang Lopes MD   melatonin 6 mg Oral HS Ed Arreola MD   NIFEdipine 30 mg Oral Daily Ed Arreola MD   oxyCODONE 2 5 mg Oral Q4H PRN Ed Arreola MD   oxyCODONE 5 mg Oral Q4H PRN Ed Arreola MD   pantoprazole 40 mg Oral Early Morning Ed Arreola MD   polyethylene glycol 17 g Oral Daily PRN Amina Whipple PA-C   senna 2 tablet Oral HS Amina Whipple PA-C        Objective:    Functional Update:  Physical Therapy Occupational Therapy Speech Therapy   Transfers: Contact Guard  Bed Mobility: Supervision  Amulation Distance (ft): 150 feet  Ambulation: Contact Guard, Minimal Assistance, Moderate Assistance(min-CG with walker, min-mod without AD )  Assistive Device for Ambulation: Roller Walker(also HHA as pt did not use AD at baseline)  Number of Stairs: 12  Assistive Device for Stairs: Bilateral [employer]  Stair Assistance: Contact Guard  Discharge Recommendations: Home with:  [address] Franz with[de-identified] Family Support, 24 Hour Supervision, Outpatient Physical Therapy, 24 Hour Assisteance   Eating: Minimal Assistance  Grooming: Minimal Assistance  Bathing: Minimal Assistance  Bathing: Minimal Assistance  Upper Body Dressing: Minimal Assistance  Lower Body Dressing: Minimal Assistance  Toileting: Minimal Assistance  Tub/Shower Transfer: Minimal Assistance  Toilet Transfer: Minimal Assistance  Cognition: Exceptions to WNL  Cognition: Decreased Safety, Decreased Executive Functions, Decreased Attention  Orientation: Person, Place, Time, Situation   Mode of Communication: Verbal  Cognition: Exceptions to WNL  Cognition: Decreased Memory, Decreased Executive Functions, Decreased Attention, Decreased Comprehension  Orientation: Person, Place, Time, Situation  Discharge Recommendations: Home with:  [address] Franz with[de-identified] 24 Hour Supervision, Family Support, Outpatient Speech Therapy     Allergies per EMR    Physical Exam:  Temp:  [97 5 °F (36 4 °C)-98 3 °F (36 8 °C)] 97 5 °F (36 4 °C)  HR:  [61-72] 61  Resp:  [18-20] 18  BP: (120-169)/(59-73) 125/59  SpO2:  [96 %-99 %] 97 %    General: alert, no apparent distress, cooperative and comfortable  HEENT:  Eye: ecchymosis over right eye improving  LUNGS:  no abnormal respiratory pattern, no retractions noted, non-labored breathing   ABDOMEN:  soft, non-tender  Bowel sounds normal  No masses, no organomegaly  EXTREMITIES:  extremities normal, warm and well-perfused; no cyanosis, clubbing, or edema  NEURO:   mental status, speech normal, alert and oriented x3  PSYCH:  Alert and oriented, appropriate affect  Physical examination is otherwise unchanged from previous encounter, except as noted above  Diagnostic Studies: Reviewed, no new imaging  No orders to display     Laboratory: Reviewed  Results from last 7 days   Lab Units 07/08/19  0540 07/05/19  0617   HEMOGLOBIN g/dL 7 9* 8 5*   HEMATOCRIT % 28 3* 30 8*   WBC Thousand/uL 10 08 10 60*     Results from last 7 days   Lab Units 07/08/19  0540 07/05/19  0617   BUN mg/dL 22 16   SODIUM mmol/L 142 140   POTASSIUM mmol/L 3 6 3 6   CHLORIDE mmol/L 110* 109*   CREATININE mg/dL 0 89 0 86            Patient Active Problem List   Diagnosis    Cataracts, bilateral    Essential hypertension    Hyperlipidemia    Nicotine dependence    [facility] accident) [facility] [address] )    Anemia, unspecified    Abnormal CT scan    Symptomatic stenosis of left carotid artery    Iron deficiency anemia    Impaired mobility and ADLs     ** Please Note: Fluency Direct voice to text software may have been used in the creation of this document   **

## 2019-07-10 NOTE — PROGRESS NOTES
07/10/19 1330   Pain Assessment   Pain Assessment 0-10   Pain Score No Pain   Restrictions/Precautions   Precautions Fall Risk;Cognitive;Supervision on toilet/commode   Subjective   Subjective pt agreeable to perform to perform skilled PT session    QI: Sit to Stand   Assistance Needed Incidental touching;Verbal cues   Sit to Stand CARE Score 4   QI: Chair/Bed-to-Chair Transfer   Assistance Needed Incidental touching;Verbal cues   Chair/Bed-to-Chair Transfer CARE Score 4   Transfer Bed/Chair/Wheelchair   Limitations Noted In Endurance;Balance;LE Strength; Sequencing   Stand Pivot Contact Guard;Supervision   Sit to Stand Supervision   Stand to Sit Contact Guard;Supervision   Findings with RW  and SPC    Bed, Chair, Wheelchair Transfer (FIM) 4 - Patient requires steadying assist or light touching   QI: Walk 10 Feet   Assistance Needed Physical assistance   Assistance Provided by Telephone Less than 25%   Walk 10 Feet CARE Score 3   QI: Walk 50 Feet with Two Turns   Assistance Needed Physical assistance   Assistance Provided by Telephone Less than 25%   Walk 50 Feet with Two Turns CARE Score 3   QI: Walk 150 Feet   Assistance Needed Physical assistance   Assistance Provided by Telephone Less than 25%   Walk 150 Feet CARE Score 3   Ambulation   Does the patient walk? 2  Yes   Primary Discharge Mode of Locomotion Walk   Walk Assist Level Contact Guard;Minimum Assist   Gait Pattern Decreased foot clearance; Slow Dolly; Inconsistant Dolly   Distance Walked (feet) 200 ft   Limitations Noted In Endurance;Balance;Strength; Sequencing   Findings pt tired inPM session short walks with SPC about 40 feet    Walking (FIM) 3 - Patient completes 50 - 74% of all tasks, needs more than steadying or light touch AND distance 150 feet or more, no rest   Therapeutic Interventions   Neuromuscular Re-Education focus on motor learning durign ambulation with RW and SPC  vc's for direction for hand placement in sitting and in standing johnathon with SPC keeping SPC in left and reach back with her right hand before sitting    Equipment Use   NuStep level 2 for 13  min    Assessment   Treatment Assessment pt perform motor learning and NPP today during ambulation with RW and SPC , pt inc endurance durign Nustep bike as above   pt will cont to need skilled PT to meet goals    Barriers to Discharge Decreased caregiver support   Plan   Treatment/Interventions Patient/family training;Gait training; Endurance training;Functional transfer training   Progress Progressing toward goals   PT Therapy Minutes   PT Time In 1330   PT Time Out 1400   PT Total Time (minutes) 30   PT Mode of treatment - Individual (minutes) 30   PT Mode of treatment - Concurrent (minutes) 0   PT Mode of treatment - Group (minutes) 0   PT Mode of treatment - Co-treat (minutes) 0   PT Mode of Teatment - Total time(minutes) 30 minutes   Therapy Time missed   Time missed?  No

## 2019-07-10 NOTE — PROGRESS NOTES
07/10/19 1100   Pain Assessment   Pain Assessment 0-10   Pain Score No Pain   Restrictions/Precautions   Precautions Fall Risk;Cognitive;Supervision on toilet/commode   Subjective   Subjective pt states feeling okay and ready for PT    QI: Chair/Bed-to-Chair Transfer   Assistance Needed Incidental touching   Chair/Bed-to-Chair Transfer CARE Score 4   Transfer Bed/Chair/Wheelchair   Limitations Noted In LE Strength;Balance; Endurance   Adaptive Equipment Roller Walker   Stand Pivot Contact Guard   Sit to [employer] [place]   Stand to [facility] [place], Chair, Wheelchair Transfer (FIM) 4 - Patient requires steadying assist or light touching   QI: Walk 10 Feet   Assistance Needed Incidental touching   Walk 10 Feet CARE Score 4   QI: Walk 50 Feet with Two Turns   Assistance Needed Incidental touching   Walk 50 Feet with Two Turns CARE Score 4   QI: Walk 150 Feet   Assistance Needed Incidental touching   Walk 150 Feet CARE Score 4   Ambulation   Does the patient walk? 2  Yes   Primary Discharge Mode of Locomotion Walk   Walk Assist Level Contact Guard;Minimum Assist   Gait Pattern Decreased foot clearance; Inconsistant Dolly; Improper weight shift;Narrow LUIS;Scissoring   Assist Device Roller [employer] [facility] Walked (feet) 200 ft   Limitations Noted In Coordination;Balance; Heel Strike;Posture; Safety; Sequencing;Speed;Strength   Findings RW vs SPC during ambulation    Walking (FIM) 4 - Patient requires steadying assist or light touching AND distance 150 feet or more, no rest   Wheelchair mobility   Wheelchair (FIM) 0 - Activity does not occur   Toilet Transfer   Toilet Transfer (FIM) 0 - Activity does not occur   Assessment   Treatment Assessment pt perform skilled PT focus on ambulation with RW vs SPC , pt also perform inc functional activities in balance and safety awareness , gait training as above to dec fall risk , pt will cont to need skilled PT to meet goals    Barriers to Discharge Decreased caregiver support   Plan   Treatment/Interventions Functional transfer training; Therapeutic exercise;Cognitive reorientation;Patient/family training;Gait training   Progress Progressing toward goals   PT Therapy Minutes   PT Time In 1100   PT Time Out 1130   PT Total Time (minutes) 30   PT Mode of treatment - Individual (minutes) 30   PT Mode of treatment - Concurrent (minutes) 0   PT Mode of treatment - Group (minutes) 0   PT Mode of treatment - Co-treat (minutes) 0   PT Mode of Teatment - Total time(minutes) 30 minutes   Therapy Time missed   Time missed?  No

## 2019-07-10 NOTE — PROGRESS NOTES
07/10/19 0900   Pain Assessment   Pain Assessment No/denies pain   Restrictions/Precautions   Precautions Cognitive; Fall Risk;Supervision on toilet/commode;Visual deficit   Memory Skills   Memory (FIM) 2 - Recalls 1 of 2 steps   Social Interaction (FIM) 5 - Interacts appropriately with others 90% of time   Speech/Language/Cognition Assessmetn   Treatment Assessment Refer to below for details  SLP Therapy Minutes   SLP Time In 0900   SLP Time Out 1000   SLP Total Time (minutes) 60   SLP Mode of treatment - Individual (minutes) 60   SLP Mode of treatment - Concurrent (minutes) 0   SLP Mode of treatment - Group (minutes) 0   SLP Mode of treatment - Co-treat (minutes) 0   SLP Mode of Teatment - Total time(minutes) 60 minutes   Therapy Time missed   Time missed? No   Daily FIM Score   Problem solving (FIM) 2 - Needs direction more than ½ time to initiate, plan or complete simple tasks   Comprehension (FIM) 3 - Understands basic info/conversation 50-74% of time   Expression (FIM) 4 - Expresses basic info/needs 75-90% of time     Session was co-tx'd w/ OT  Refer to OT note in regards to pt's overall functional mobility w/ use of SPC, use of RUE, visual attention, etc  Overall session task was to complete functional cooking task in which pt was to make pancakes  Focus for the SLP portion of the session was for attention to task, memory recall, sequencing, problem solving, safety awareness, insight to deficits when completing this task  SLP giving pt objective of activity along w/ initial directions to locate ingredients, as well as instructing pt about labels on the cabinets/drawers to locate dishes/utensils for making pancakes  Pt able to locate pantry and retrieved the pancake mix needed for the cooking activity w/o cues  Once retrieving pancake mix, pt then directed to go to counter top to complete mixing items   When determining the dishes/utensils needed to mix the pancakes, pt independently able to elicit 1 (wisk) out of at total of 6 items (mixing bowl, spatula, ladle, measuring cup, overton pan) needing maximal prompting to determine all remaining items needed  After retrieving all dishes/utensils needed to mix pancakes, therapists asking how to initiate making the pancakes  Pt was able to verbalize appropriate items (eggs, pancake mix, milk, etc), but SLP needed to cue pt to locate the ingredients listed on the box of pancakes  Pt initially reading the directions in how to make the pancakes, but unable to locate the ingredients listed on the box  SLP giving verbal cues to locate information w/o success, finally needing visual cue to locate ingredients listed- pancake mix and water  Pt also verbalizing the amount of mix and the amount of water needed to mix together to make the pancakes, but carryover/recall was decreased needing to reference amounts due to multitasking given use of R hand to fill the measuring cup w/ pancake mix  When pt's divided attention to using measuring cup and recalling amounts of mix and water was decreased, again, needing cues from both SLP to locate amounts on box vs  OT cueing w/ R hand use  Prior to use of measuring cup, pt did initially place pancake mix into bowl despite verbalizing needing to place mix into measuring cup  Decreased visual attention noted as result of this  Given verbal cues, pt's attention/awareness of error noted and was able to appropriately place mix into measuring cup  Placed water into mixing bowl appropriately and w/o cues  Pt did wisk the pancake mix and water together, but unable to recall next step  SLP cued pt to read the directions again, in which she went to the last step, which was to spoon mixture onto overton pan  SLP again needing to provide visual cues to locate that the mix needed to set for 2 minutes before making  Pt was appropriate in eliciting needing butter for the overton pan   Once at stove top, pt placed overton pan on L front burner, but had difficulty determining the knob to turn  SLP needed to show pt key to locate correct burner w/ success  Pt also able to appropriately choose a safe number to place for the butter to melt in the overton pan  SLP cued pt to watch R hand, as it was placed away from burner but on the middle area of the stove top  Once butter melted, pt able to ladle pancake mix into overton pan, waiting ~4 min to solidify, which pt was able to verbalize when pancake would be "ready to flip " However, the pancake was not setting appropriately, where pt was able to verbalize needing to turn up the burner to cook more thoroughly  Once pt was ready to flip the pancake, pt only allowing less than a minute to cook on the other side and placed item onto plate, unaware of decreased cook time  Completed task again, needing intermittent verbal cues to watch her R hand to ensure hand placement was not on the stove top  Upon completing 2nd pancake, pt again able to complete ladling in mix appropriately and wait time appropriate, but when flipping pancake to cook on opposite side, pt cooked for less than a min, in which placed pancake onto plate  Pt appropriately turning off burner when completing task and began to clean up items  Pt able to rinse off all items used and then showed green rag to wipe of countertop  Of note, pt w/ decreased awareness in turning off water when engaging in task, needing verbal prompting from SLP  Once task was all completed, pt sat down and SLP/OT reviewing how pancake making task went, in which pt verbalizing "it went ok " However, SLP showing pt pancakes, which were not fully cooked, which pt was able to notice and stated needing increased time to cook once they were flipped  SLP then asking how pt completes grocery shopping, etc, in which pt stated that she goes w/ dtrs to the store  Pt reported making a list and pays w/ cash   OT giving pt a list of ingredients to make a shopping list, where pt just started to copy the recipe, not the items needed  SLP/OT needing to cue pt to think of how items would be written down in quantity vs how many cups needed for the items  Refer to OT note in regards to pt's handwriting  OT made up a mock list of prices for a number of food items, which pt was to scan/locate and determine the cost of items which was supervision level in determining the cost of items  However, ability to complete functional math in adding and subtracting total amounts was max A  When writing down amounts to add up the total, pt was more accurate in calculation  However, when asking pt to eliminate one item from the shopping list and to determine new total, pt was nearly total A due to difficulty focusing on question at hand  Pt continued to focus on written numbers which increased pt's ability to focus to verbal math to be completed  Once SLP pulled written information away, pt's ability to complete functional math slightly improved  Also noted was mild word finding when noted to be "flustered" during cooking task as well as completing functional math task  Will continue to monitor this in upcoming sessions at this time  At this time, pt will continue to benefit from SLP services to maximize overall cognitive linguistic skills

## 2019-07-10 NOTE — PROGRESS NOTES
07/10/19 1450   Pain Assessment   Pain Assessment No/denies pain   Pain Score No Pain   Restrictions/Precautions   Precautions Fall Risk;Cognitive;Supervision on toilet/commode  (R side inattention)   Cognition   Overall Cognitive Status Impaired   Arousal/Participation Alert; Cooperative   Attention Attends with cues to redirect   Orientation Level Oriented X4   Memory Decreased short term memory;Decreased recall of recent events;Decreased recall of precautions   Following Commands Follows one step commands with increased time or repetition   Subjective   Subjective pt had no c/o and was agreeable to be seen early for PT    QI: Sit to Stand   Assistance Needed Incidental touching;Verbal cues   Comment CS sit to stand, CG stand to sit requires cues for R hand placement about 75% of the time, noted improve R hand     Sit to Stand CARE Score 4   QI: Chair/Bed-to-Chair Transfer   Assistance Needed Incidental touching;Verbal cues   Comment CS-CG without an AD   Chair/Bed-to-Chair Transfer CARE Score 4   Transfer Bed/Chair/Wheelchair   Limitations Noted In LE Strength;Balance; Endurance;Sensation;Problem Solving;Coordination; Sequencing   Adaptive Equipment None   Stand Pivot Contact Guard;Supervision   Sit to Stand Supervision   Stand to [employer], Chair, Wheelchair Transfer (FIM) 4 - Patient requires steadying assist or light touching   QI: Walk 10 Feet   Assistance Needed Physical assistance   Assistance Provided by [place] Less than 25%   Comment CG hands on hips most of the time to min A x 3 episodes due to slight LOB which happened about last 48' when pt got distracted by oncoming traffic then caught R toe on the floor 2x   Walk 10 Feet CARE Score 3   QI: Walk 50 Feet with Two Turns   Assistance Needed Physical assistance   Assistance Provided by [place] Less than 25%   Walk 50 Feet with Two Turns CARE Score 3   QI: Walk 150 Feet   Assistance Needed Physical assistance   Assistance Provided by [place] Less than 25%   Walk 150 Feet CARE Score 3   Ambulation   Does the patient walk? 2  Yes   Primary Discharge Mode of Locomotion Walk   Walk Assist Level Minimum Assist;Contact Guard   Gait Pattern Inconsistant Dolly; Slow Dolly;Decreased foot clearance;Narrow LUIS; Improper weight shift   Assist Device   (none)   Distance Walked (feet) 200 ft  (400 with short standing rest breaks)   Limitations Noted In Endurance;Balance;Speed;Strength;Swing;Safety; Heel Strike;Sensation   Findings gait training without an AD to promote normalization of gait pattern, improving weight shifting, righting reaction, gait tolerance   Walking (FIM) 3 - Patient completes 50 - 74% of all tasks, needs more than steadying or light touch AND distance 150 feet or more, no rest   Wheelchair mobility   QI: Does the patient use a wheelchair? 0  No   Therapeutic Interventions   Neuromuscular Re-Education ball toss while walking fwd/bwd to improve attention with split type activity, improve gait dynamics  ball kicking with R LE only initially then L/R LE to improve weight shifting, righting reaction to reduce risk for falls  Equipment Use   NuStep L2 x 10 mins with FÉLIX rating scale of 11-13 using bilat LE and R UE to improve strength, conditioning and increase R UE awareness and hand    Assessment   Treatment Assessment Skilled PT intervention focused on balance training and gait training without an AD  See above info for details of therapy session  Noted slight improvement in utilizing R UE during functional activities including pushing up with R UE from chair to stand up or actually holding shirt when straightening clothing upon standing with pt utilizing visual cues strategy  Although increase cueing still required to ensure proper hand placement (arm rest versus seat) when reaching back for the chair prior to sitting down   Noted with fatigue and distraction increase occurence of slight LOB, path deviation,  R foot crossover and R toe catching  Also with longer distance walked needed increase frequency of taking short standing rest breaks which at times pt takes voluntarily without prompts  Pt will benefit from continued skilled PT intervention to work on dynamic balance, facilitate recovery of baseline gait pattern, improve functional mobility indep utilizing neuroplasticity and principles of motor learning  Family/Caregiver Present no   Barriers to Discharge Decreased caregiver support   PT Barriers   Physical Impairment Decreased strength;Decreased endurance; Impaired balance;Decreased mobility; Impaired sensation;Decreased safety awareness;Decreased cognition;Decreased coordination; Impaired judgement   Functional Limitation Car transfers; Walking;Stair negotiation;Ramp negotiation;Transfers   Plan   Treatment/Interventions Therapeutic exercise; Endurance training;Functional transfer training;Gait training;Spoke to nursing;OT   Progress Progressing toward goals   Recommendation   Recommendation 24 hour supervision/assist;Outpatient PT   PT - OK to Discharge No   PT Therapy Minutes   PT Time In 1450   PT Time Out 1530   PT Total Time (minutes) 40   PT Mode of treatment - Individual (minutes) 20   PT Mode of treatment - Concurrent (minutes) 20   PT Mode of treatment - Group (minutes) 0   PT Mode of treatment - Co-treat (minutes) 0   PT Mode of Teatment - Total time(minutes) 40 minutes   Therapy Time missed   Time missed?  No

## 2019-07-10 NOTE — PROGRESS NOTES
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; s/p left CEA 6/28/19:  continue ASA/Plavix, Lipitor = for home Cards advised change Lipitor to Crestor 40mg daily      Iron deficiency anemia; s/p transfusion and iron IV: continue iron PO supplements      AVM stomach/duodenum tx with APC; cold snare sigmoid colon polypectomy: continue PPI     HTN: at home was on Lopressor and now is switched to Coreg and Nifedipine     RUL ground glass density: for Pulm to see OP     Urethral diverticulum: to see Uro as OP for cystogram     Nicotine abuse: counseled for cessation     Fall 2/2 LOB 6/24/19: CT head was negative; has remaining ecchymosis right forehead/orbit     ABLA: Hgb 8 5 improved today  Will continue to monitor  Continue FeSO4 supplementation  Subjective/ HPI: Patient seen and examined  Patients overnight issues or events were reviewed with nursing or staff during rounds or morning huddle session  New or overnight issues include the following: HTN: Currently well controlled  Continue present medications  CVA: No new stroke symptoms  Continue antiplatelet and statin  Anemia: Will continue to monitor  Hgb 8 5  Pt currently reports that she is doing well       ROS:     GI: denies abdominal pain, change bowel habits or reflux symptoms  Neuro: Denies any headache, new vision changes, new neuropathies,new weaknesses   Respiratory: No Cough, SOB, denies wheeze  Cardiovascular: No CP, palpitations , denies perception of rapid heartbeat  : denies any new urinary burning or frequency    Review of Scheduled Meds:    Current Facility-Administered Medications:  aspirin 81 mg Oral Daily Ed Arreola MD   atorvastatin 80 mg Oral Daily With Dinner Seferino Rendon MD   bisacodyl 10 mg Rectal Daily PRN Maxime Hubbard PA-C   calcium carbonate 500 mg Oral Daily PRN Ed Arreola MD   carvedilol 3 125 mg Oral BID With Meals Lowell Jackson MD   clopidogrel 75 mg Oral Daily Ed Arreola MD   docusate sodium 100 mg Oral BID Amina Whipple PA-C   enoxaparin 40 mg Subcutaneous Q24H [facility] Ed Arreola MD   ferrous sulfate 325 mg Oral BID With Meals Ed Arreola MD   melatonin 6 mg Oral HS Ed Arreola MD   NIFEdipine 30 mg Oral Daily Ed Arreola MD   oxyCODONE 2 5 mg Oral Q4H PRN Ed Arreola MD   oxyCODONE 5 mg Oral Q4H PRN Ed Arreola MD   pantoprazole 40 mg Oral Early Morning Ed Arreola MD   polyethylene glycol 17 g Oral Daily PRN Amina Whipple PA-C   senna 2 tablet Oral HS Amina Whipple PA-C       Labs:     Results from last 7 days   Lab Units 07/08/19  0540 07/05/19  0617   WBC Thousand/uL 10 08 10 60*   HEMOGLOBIN g/dL 7 9* 8 5*   HEMATOCRIT % 28 3* 30 8*   PLATELETS Thousands/uL 382 381     Results from last 7 days   Lab Units 07/08/19  0540 07/05/19  0617   SODIUM mmol/L 142 140   POTASSIUM mmol/L 3 6 3 6   CHLORIDE mmol/L 110* 109*   CO2 mmol/L 26 26   BUN mg/dL 22 16   CREATININE mg/dL 0 89 0 86   CALCIUM mg/dL 8 6 8 6                        Imaging:     No orders to display       *Labs reviewed  *Radiology studies reviewed  *Medications reviewed and reconciled as needed  *Please refer to order section for additional ordered labs studies  *Case discussed with primary attending during morning huddle case rounds    Physical Examination:  Vitals:   Vitals:    07/09/19 2113 07/10/19 0545 07/10/19 0600 07/10/19 0837   BP: 120/70 134/60  125/59   BP Location: Right arm Left arm  Left arm   Pulse: 63 72  61   Resp: 18 18     Temp: 98 3 °F (36 8 °C) 97 5 °F (36 4 °C)     TempSrc: Oral Oral     SpO2: 99% 97%     Weight:   72 5 kg (159 lb 13 3 oz)    Height:           General Appearance: NAD, conversive  Eyes: No icterus; conjunctiva normal, PERRLA  HENT: oropharynx clear; mucous membranes moist; no ulcerations, normal hard and soft palette  Neck: trachea midline, range of motion full  Supple, no lymphadenopathy or thyromegaly  Lungs: CTA, normal respiratory effort, no retractions, expiratory effort normal  CV: regular rate, no rubs no gallops, PMI normal place and intensity  ABD: soft non tender, no masses , no hepatic or splenomegaly  EXT: DP pulses intact, no lymphadenopathy, no edema  Skin: normal turgor, normal texture, no rash, no ulcers  Psych: affect normal, No anxiety,   Neuro: AAOx3            Total time spent: At least 40 minutes, with more than 50% spent counseling/coordinating care  Counseling includes discussion with patient re: progress  and discussion with patient of his/her current medical state/information  Coordination of patient's care was performed in conjunction with primary service  Time invested included review of patient's labs, vitals, and management of their comorbidities with continued monitoring  In addition, this patient was discussed with medical team including physician and advanced extenders  The care of the patient was extensively discussed and appropriate treatment plan was formulated unique for this patient  ** Please Note: Dragon 360 Dictation voice to text software may have been used in the creation of this document   **

## 2019-07-11 LAB
ABO GROUP BLD: NORMAL
ANION GAP SERPL CALCULATED.3IONS-SCNC: 5 MMOL/L (ref 4–13)
BASOPHILS # BLD AUTO: 0.08 THOUSANDS/ΜL (ref 0–0.1)
BASOPHILS NFR BLD AUTO: 1 % (ref 0–1)
BLD GP AB SCN SERPL QL: NEGATIVE
BUN SERPL-MCNC: 22 MG/DL (ref 5–25)
CALCIUM SERPL-MCNC: 8.6 MG/DL (ref 8.3–10.1)
CHLORIDE SERPL-SCNC: 112 MMOL/L (ref 100–108)
CO2 SERPL-SCNC: 25 MMOL/L (ref 21–32)
CREAT SERPL-MCNC: 0.8 MG/DL (ref 0.6–1.3)
EOSINOPHIL # BLD AUTO: 0.5 THOUSAND/ΜL (ref 0–0.61)
EOSINOPHIL NFR BLD AUTO: 7 % (ref 0–6)
GFR SERPL CREATININE-BSD FRML MDRD: 70 ML/MIN/1.73SQ M
GLUCOSE P FAST SERPL-MCNC: 81 MG/DL (ref 65–99)
GLUCOSE SERPL-MCNC: 81 MG/DL (ref 65–140)
HCT VFR BLD AUTO: 25.8 % (ref 34.8–46.1)
HGB BLD-MCNC: 7.3 G/DL (ref 11.5–15.4)
IMM GRANULOCYTES # BLD AUTO: 0.02 THOUSAND/UL (ref 0–0.2)
IMM GRANULOCYTES NFR BLD AUTO: 0 % (ref 0–2)
LYMPHOCYTES # BLD AUTO: 1.86 THOUSANDS/ΜL (ref 0.6–4.47)
LYMPHOCYTES NFR BLD AUTO: 26 % (ref 14–44)
MCH RBC QN AUTO: 23.6 PG (ref 26.8–34.3)
MCHC RBC AUTO-ENTMCNC: 28.3 G/DL (ref 31.4–37.4)
MCV RBC AUTO: 84 FL (ref 82–98)
MONOCYTES # BLD AUTO: 0.73 THOUSAND/ΜL (ref 0.17–1.22)
MONOCYTES NFR BLD AUTO: 10 % (ref 4–12)
NEUTROPHILS # BLD AUTO: 3.97 THOUSANDS/ΜL (ref 1.85–7.62)
NEUTS SEG NFR BLD AUTO: 56 % (ref 43–75)
NRBC BLD AUTO-RTO: 0 /100 WBCS
PLATELET # BLD AUTO: 364 THOUSANDS/UL (ref 149–390)
PMV BLD AUTO: 11.3 FL (ref 8.9–12.7)
POTASSIUM SERPL-SCNC: 3.5 MMOL/L (ref 3.5–5.3)
RBC # BLD AUTO: 3.09 MILLION/UL (ref 3.81–5.12)
RH BLD: POSITIVE
SODIUM SERPL-SCNC: 142 MMOL/L (ref 136–145)
SPECIMEN EXPIRATION DATE: NORMAL
WBC # BLD AUTO: 7.16 THOUSAND/UL (ref 4.31–10.16)

## 2019-07-11 PROCEDURE — 86900 BLOOD TYPING SEROLOGIC ABO: CPT | Performed by: INTERNAL MEDICINE

## 2019-07-11 PROCEDURE — G0515 COGNITIVE SKILLS DEVELOPMENT: HCPCS

## 2019-07-11 PROCEDURE — 86901 BLOOD TYPING SEROLOGIC RH(D): CPT | Performed by: INTERNAL MEDICINE

## 2019-07-11 PROCEDURE — 99232 SBSQ HOSP IP/OBS MODERATE 35: CPT | Performed by: PHYSICAL MEDICINE & REHABILITATION

## 2019-07-11 PROCEDURE — 97116 GAIT TRAINING THERAPY: CPT

## 2019-07-11 PROCEDURE — 85025 COMPLETE CBC W/AUTO DIFF WBC: CPT | Performed by: INTERNAL MEDICINE

## 2019-07-11 PROCEDURE — 97112 NEUROMUSCULAR REEDUCATION: CPT

## 2019-07-11 PROCEDURE — 97530 THERAPEUTIC ACTIVITIES: CPT

## 2019-07-11 PROCEDURE — 97110 THERAPEUTIC EXERCISES: CPT

## 2019-07-11 PROCEDURE — 86923 COMPATIBILITY TEST ELECTRIC: CPT

## 2019-07-11 PROCEDURE — P9016 RBC LEUKOCYTES REDUCED: HCPCS

## 2019-07-11 PROCEDURE — 86850 RBC ANTIBODY SCREEN: CPT | Performed by: INTERNAL MEDICINE

## 2019-07-11 PROCEDURE — 80048 BASIC METABOLIC PNL TOTAL CA: CPT | Performed by: INTERNAL MEDICINE

## 2019-07-11 PROCEDURE — 30233N1 TRANSFUSION OF NONAUTOLOGOUS RED BLOOD CELLS INTO PERIPHERAL VEIN, PERCUTANEOUS APPROACH: ICD-10-PCS | Performed by: PHYSICAL MEDICINE & REHABILITATION

## 2019-07-11 RX ORDER — ACETAMINOPHEN 325 MG/1
650 TABLET ORAL ONCE
Status: COMPLETED | OUTPATIENT
Start: 2019-07-11 | End: 2019-07-11

## 2019-07-11 RX ADMIN — NIFEDIPINE 30 MG: 30 TABLET, FILM COATED, EXTENDED RELEASE ORAL at 09:30

## 2019-07-11 RX ADMIN — CARVEDILOL 3.12 MG: 3.12 TABLET, FILM COATED ORAL at 17:23

## 2019-07-11 RX ADMIN — ATORVASTATIN CALCIUM 80 MG: 80 TABLET, FILM COATED ORAL at 17:23

## 2019-07-11 RX ADMIN — ENOXAPARIN SODIUM 40 MG: 40 INJECTION SUBCUTANEOUS at 09:30

## 2019-07-11 RX ADMIN — CARVEDILOL 3.12 MG: 3.12 TABLET, FILM COATED ORAL at 06:38

## 2019-07-11 RX ADMIN — DOCUSATE SODIUM 100 MG: 100 CAPSULE, LIQUID FILLED ORAL at 09:30

## 2019-07-11 RX ADMIN — ASPIRIN 81 MG: 81 TABLET, COATED ORAL at 09:30

## 2019-07-11 RX ADMIN — Medication 325 MG: at 17:23

## 2019-07-11 RX ADMIN — MELATONIN 6 MG: at 23:00

## 2019-07-11 RX ADMIN — CLOPIDOGREL BISULFATE 75 MG: 75 TABLET ORAL at 09:30

## 2019-07-11 RX ADMIN — Medication 325 MG: at 06:38

## 2019-07-11 RX ADMIN — SENNOSIDES 17.2 MG: 8.6 TABLET, FILM COATED ORAL at 23:00

## 2019-07-11 RX ADMIN — ACETAMINOPHEN 650 MG: 325 TABLET ORAL at 17:24

## 2019-07-11 RX ADMIN — PANTOPRAZOLE SODIUM 40 MG: 40 TABLET, DELAYED RELEASE ORAL at 05:44

## 2019-07-11 NOTE — PROGRESS NOTES
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; s/p left CEA 6/28/19:  continue ASA/Plavix, Lipitor = for home Cards advised change Lipitor to Crestor 40mg daily      Iron deficiency anemia; s/p transfusion and iron IV: continue iron PO supplements      AVM stomach/duodenum tx with APC; cold snare sigmoid colon polypectomy: continue PPI     HTN: at home was on Lopressor and now is switched to Coreg and Nifedipine     RUL ground glass density: for Pulm to see OP     Urethral diverticulum: to see Uro as OP for cystogram     Nicotine abuse: counseled for cessation     Fall 2/2 LOB 6/24/19: CT head was negative; has remaining ecchymosis right forehead/orbit     ABLA: Hgb 7 3 needs transfusion, consent signed  Continue FeSO4 supplementation  Repeat cbc in a m  Subjective/ HPI: Patient seen and examined  Patients overnight issues or events were reviewed with nursing or staff during rounds or morning huddle session  New or overnight issues include the following: HTN: Currently well controlled  Continue present medications  CVA: No new stroke symptoms  Continue antiplatelet and statin  Anemia: Will transfuse today  Hgb 7 3  Pt currently reports that she is doing well       ROS:     GI: denies abdominal pain, change bowel habits or reflux symptoms  Neuro: Denies any headache, new vision changes, new neuropathies,new weaknesses   Respiratory: No Cough, SOB, denies wheeze  Cardiovascular: No CP, palpitations , denies perception of rapid heartbeat  : denies any new urinary burning or frequency    Review of Scheduled Meds:    Current Facility-Administered Medications:  acetaminophen 650 mg Oral Once Annmarie Jones DO   aspirin 81 mg Oral Daily Ed Arreola MD   atorvastatin 80 mg Oral Daily With Colby Rosenbaum MD   bisacodyl 10 mg Rectal Daily PRN Amina Whipple PA-C   calcium carbonate 500 mg Oral Daily PRN Ed Arreola MD   carvedilol 3 125 mg Oral BID With Meals Kameron De Leon MD   clopidogrel 75 mg Oral Daily Ed Arreola MD   docusate sodium 100 mg Oral BID Amina Whipple PA-C   enoxaparin 40 mg Subcutaneous Q24H [address] Ed Arreola MD   ferrous sulfate 325 mg Oral BID With Meals Ed Arreola MD   melatonin 6 mg Oral HS Ed Arreola MD   NIFEdipine 30 mg Oral Daily Ed Arreola MD   oxyCODONE 2 5 mg Oral Q4H PRN Ed Arreola MD   oxyCODONE 5 mg Oral Q4H PRN Ed Arreola MD   pantoprazole 40 mg Oral Early Morning Ed Arreola MD   polyethylene glycol 17 g Oral Daily PRN Amina Whipple PA-C   senna 2 tablet Oral HS Amina Whipple PA-C       Labs:     Results from last 7 days   Lab Units 07/11/19  0539 07/08/19  0540   WBC Thousand/uL 7 16 10 08   HEMOGLOBIN g/dL 7 3* 7 9*   HEMATOCRIT % 25 8* 28 3*   PLATELETS Thousands/uL 364 382     Results from last 7 days   Lab Units 07/11/19  0539 07/08/19  0540   SODIUM mmol/L 142 142   POTASSIUM mmol/L 3 5 3 6   CHLORIDE mmol/L 112* 110*   CO2 mmol/L 25 26   BUN mg/dL 22 22   CREATININE mg/dL 0 80 0 89   CALCIUM mg/dL 8 6 8 6                        Imaging:     No orders to display       *Labs reviewed  *Radiology studies reviewed  *Medications reviewed and reconciled as needed  *Please refer to order section for additional ordered labs studies  *Case discussed with primary attending during morning huddle case rounds    Physical Examination:  Vitals:   Vitals:    07/10/19 1733 07/10/19 2100 07/11/19 0618 07/11/19 0929   BP: 150/66 170/77 139/60 146/70   BP Location:  Left arm  Left arm   Pulse: 71 69 70 63   Resp:  18 20    Temp:  98 7 °F (37 1 °C) 98 5 °F (36 9 °C)    TempSrc:  Oral Oral    SpO2:  98% 96%    Weight:   72 2 kg (159 lb 2 8 oz)    Height:           General Appearance: NAD, conversive  Eyes: No icterus; conjunctiva normal, PERRLA  HENT: oropharynx clear; mucous membranes moist; no ulcerations, normal hard and soft palette  Neck: trachea midline, range of motion full  Supple, no lymphadenopathy or thyromegaly  Lungs: CTA, normal respiratory effort, no retractions, expiratory effort normal  CV: regular rate, no rubs no gallops, PMI normal place and intensity  ABD: soft non tender, no masses , no hepatic or splenomegaly  EXT: DP pulses intact, no lymphadenopathy, no edema  Skin: normal turgor, normal texture, no rash, no ulcers  Psych: affect normal, No anxiety,   Neuro: AAOx3            Total time spent: At least 40 minutes, with more than 50% spent counseling/coordinating care  Counseling includes discussion with patient re: progress  and discussion with patient of his/her current medical state/information  Coordination of patient's care was performed in conjunction with primary service  Time invested included review of patient's labs, vitals, and management of their comorbidities with continued monitoring  In addition, this patient was discussed with medical team including physician and advanced extenders  The care of the patient was extensively discussed and appropriate treatment plan was formulated unique for this patient  ** Please Note: Dragon 360 Dictation voice to text software may have been used in the creation of this document   **

## 2019-07-11 NOTE — PROGRESS NOTES
Physical Medicine and Rehabilitation Progress Note  Isabel Hernández 78 y o  female MRN: [id number]  Unit/Bed#: -13 Encounter: [id number]    HPI: Isabel Hernández is a 78 y o  female who presented to the [facility] [facility] with right sided weakness, right facial droop  Imaging subsequently revealed left sided infarcts to the parietal and frontal regions  In addition patient noetd to have ulcerated plaque at the left cervical carotied bifurcation; CTA revealed a right ICA occlusion  Vascular surgery was consulted, and recommendation made for a left CEA, performed on 6/28  During admission patient was noted to have ABLA, for which scope was performed revealing AVMs in the stomach and duodenum; argon plasma coagulation was performed  She was also transfused at the time  Of note, during her acute admission patient had a fall off of comode; CTOH showed right frontal hematoma, but no active bleed  Patient was found to be below functional baseline, and accepted to [facility] on 7/03/19  Chief Complaint: f/u stroke    Interval: No acute events overnight  Denies any CP or SOB  Patient without complaint  H/h at 7 3 today, IM recommending transfusion; patient has signed consent  ROS: A 10 point ROS was performed; negative except as noted above  Assessment/Plan:    * Impaired mobility and ADLs  Assessment & Plan  · Secondary to recent stroke  · Acute comprehensive interdisciplinary inpatient rehabilitation including PT, OT, SLP, RN, CM, SW, dietary, psychology, etc   · Patient will require supervision/assistance at home due to deficits from recent stroke  Will need to discuss with family          Symptomatic stenosis of left carotid artery  Assessment & Plan  · S/p CEA on 6/28  · Monitor for hematoma formation, incision care    Anemia, unspecified  Assessment & Plan  Results from last 7 days   Lab Units 07/11/19  0539 07/08/19  0540 07/05/19  0617   HEMOGLOBIN g/dL 7 3* 7 9* 8 5*     · Continue ferrous sulfate  · Monitor CBC intermittently  · Transfuse for Hgb <7  IM recommending transfusion today, consent obtained  CVA (cerebral vascular accident) [facility])  Assessment & Plan  · ASA, Plavix  · Statin  · Neurology f/u as outpatient      Hyperlipidemia  Assessment & Plan  · Continue statin    Essential hypertension  Assessment & Plan  Temp:  [98 3 °F (36 8 °C)-98 7 °F (37 1 °C)] 98 5 °F (36 9 °C)  HR:  [63-71] 63  Resp:  [18-20] 20  BP: (139-174)/(60-77) 146/70    · Carvedilol  · Hydralazine prn  · IM service managing anti-hypertensives    # Skin  · Encourage regular turning as patient at risk for skin breakdown  · Staff to continue patient education on Q2h turning  · Rehabilitation team to perform skin checks regularly     # Bowel  · Patient reports no constipation  · To ensure regular BMs, bowel regimen consisting of:  colace , dulcolax suppository  and miralax     # Bladder  · Patient voiding spontaneously    # Pain  · Continue oxycodone     # Rehab Psych   · There are no psychological or psychiatric problems identified    # Other  - Diet/Nutrition:        Diet Orders   (From admission, onward)            Start     Ordered    07/03/19 1804  Diet Regular; Regular House  Diet effective now     Question Answer Comment   Diet Type Regular    Regular Regular House    RD to adjust diet per protocol?  Yes        07/03/19 1803        - DVT prophy: Sequential compression device (Venodyne)  and Enoxaparin (Lovenox)  - GI ppx: Pantaprazole  - Nausea: None  - Supplements: None  - Sleep: None    Disposition: reteam (will need to discuss with family re: home support)    CODE: Level 1: Full Code Scheduled Meds:    Current Facility-Administered Medications:  acetaminophen 650 mg Oral Once Aniyah Soni DO   aspirin 81 mg Oral Daily Ed Arreola MD   atorvastatin 80 mg Oral Daily With Otilia Ferrera MD   bisacodyl 10 mg Rectal Daily PRN Amina Whipple PA-C   calcium carbonate 500 mg Oral Daily PRN Ed CONTE MD [place]   carvedilol 3 125 mg Oral BID With Meals Ed Arreola MD   clopidogrel 75 mg Oral Daily Ed Arreola MD   docusate sodium 100 mg Oral BID Amina Whipple PA-C   enoxaparin 40 mg Subcutaneous Q24H [address] Ed Arreola MD   ferrous sulfate 325 mg Oral BID With Meals Nelia Spears MD   melatonin 6 mg Oral HS Ed Arreola MD   NIFEdipine 30 mg Oral Daily Ed Arreola MD   oxyCODONE 2 5 mg Oral Q4H PRN Ed Arreola MD   oxyCODONE 5 mg Oral Q4H PRN Ed Arreola MD   pantoprazole 40 mg Oral Early Morning Ed Arreola MD   polyethylene glycol 17 g Oral Daily PRN Amina Whipple PA-C   senna 2 tablet Oral HS Amina Whipple PA-C        Objective:    Functional Update:  Physical Therapy Occupational Therapy Speech Therapy   Transfers: Contact Guard  Bed Mobility: Supervision  Amulation Distance (ft): 150 feet  Ambulation: Contact Guard, Minimal Assistance, Moderate Assistance(min-CG with walker, min-mod without AD )  Assistive Device for Ambulation: Roller Walker(also HHA as pt did not use AD at baseline)  Number of Stairs: 12  Assistive Device for Stairs: Bilateral [employer] [place]  Stair Assistance: Contact Guard  Discharge Recommendations: Home with:  [address] with[de-identified] Family Support, 24 Hour Supervision, Outpatient Physical Therapy, 24 Hour Assisteance   Eating: Minimal Assistance  Grooming: Minimal Assistance  Bathing: Minimal Assistance  Bathing: Minimal Assistance  Upper Body Dressing: Minimal Assistance  Lower Body Dressing: Minimal Assistance  Toileting: Minimal Assistance  Tub/Shower Transfer: Minimal Assistance  Toilet Transfer: Minimal Assistance  Cognition: Exceptions to WNL  Cognition: Decreased Safety, Decreased Executive Functions, Decreased Attention  Orientation: Person, Place, Time, Situation   Mode of Communication: Verbal  Cognition: Exceptions to WNL  Cognition: Decreased Memory, Decreased Executive Functions, Decreased Attention, Decreased Comprehension  Orientation: Person, Place, Time, Situation  Discharge Recommendations: Home with:  [address] with[de-identified] 24 Hour Supervision, Family Support, Outpatient Speech Therapy     Allergies per EMR    Physical Exam:  Temp:  [98 3 °F (36 8 °C)-98 7 °F (37 1 °C)] 98 5 °F (36 9 °C)  HR:  [63-71] 63  Resp:  [18-20] 20  BP: (139-174)/(60-77) 146/70  SpO2:  [96 %-100 %] 96 %    General: alert, no apparent distress, cooperative and comfortable  HEENT:  Eye: ecchymosis over right eye improving  LUNGS:  no abnormal respiratory pattern, no retractions noted, non-labored breathing   ABDOMEN:  soft, non-tender  Bowel sounds normal  No masses, no organomegaly  EXTREMITIES:  extremities normal, warm and well-perfused; no cyanosis, clubbing, or edema  NEURO:   mental status, speech normal, alert and oriented x3  PSYCH:  Alert and oriented, appropriate affect  Physical examination is otherwise unchanged from previous encounter, except as noted above  Diagnostic Studies: Reviewed, no new imaging  No orders to display     Laboratory: Reviewed  Results from last 7 days   Lab Units 07/11/19  0539 07/08/19  0540 07/05/19  0617   HEMOGLOBIN g/dL 7 3* 7 9* 8 5*   HEMATOCRIT % 25 8* 28 3* 30 8*   WBC Thousand/uL 7 16 10 08 10 60*     Results from last 7 days   Lab Units 07/11/19  0539 07/08/19  0540 07/05/19  0617   BUN mg/dL 22 22 16   SODIUM mmol/L 142 142 140   POTASSIUM mmol/L 3 5 3 6 3 6   CHLORIDE mmol/L 112* 110* 109*   CREATININE mg/dL 0 80 0 89 0 86            Patient Active Problem List   Diagnosis    Cataracts, bilateral    Essential hypertension    Hyperlipidemia    Nicotine dependence    [facility] [address] [facility]    Anemia, unspecified    Abnormal CT scan    Symptomatic stenosis of left carotid artery    Iron deficiency anemia    Impaired mobility and ADLs     ** Please Note: Fluency Direct voice to text software may have been used in the creation of this document   **

## 2019-07-11 NOTE — PROGRESS NOTES
07/11/19 1000   Pain Assessment   Pain Assessment No/denies pain   Pain Score No Pain   Restrictions/Precautions   Precautions Fall Risk;Cognitive;Supervision on toilet/commode   Transfer Bed/Chair/Wheelchair   Positioning Concerns Cognition   Findings use of RW   Bed, Chair, Wheelchair Transfer (FIM) 4 - Patient completes 75% of all tasks   Neuromuscular Education   Functional Movement Patterns Mirror Therapy: Pt engaged in UE mirror box therapy to improve overall motor control and awareness of RUE to allow for visual feedback to inc fxnl use  Pt seated at tabletop with RUE occluded behind mirror box  Pt completed x 200 repetitions of forward flex and protraction/retraction with towel in L hand to wipe table to perform fxnl task  Pt counting to 200 on her own to inc pt's attention and persistance through task  Cues to maintain visual attention on mirror to inc visual feedback  Cognition   Comments Clock Drawing: pt engaged in clock drawing and temporal awareness task to address spatial dysfunction, inattention, visuo-constructive skills, while assessing her verbal understanding, memory, spatial awareness, and constructive skills  Pt verbally instructed to draw a clock with numbers 12-11 all drawn in R upper quadrant  Pt when provided with clock outline, able to verbalize need for OT to place numbers in appropriate locations/sequence  Pt demo dec comprehension of minute/hour hand placement with impaired visuo-constructive skills noted  Pt demo dec STM and poor awareness following verbal instruction  Visual cues plotted on clock to designate minutes as represented by respective numbers on clock  Pt continued to be Dependent for time identification fading to max VCs with repetition   Perception   Right Attention Directional/Body Awareness: Pt engaged in directional awareness task to follow 3 x 4 grid as given verbal directions to move left/right/up/down   Pt req to maintain R index finger on grid to follow verbal directions with max VCs to move R finger appropriately along grid respective to verbal directions given  Pt demo dec R/L awareness req [place] A to promote R UE mvmt  Pt participated in body awareness task to follow 1 to 2 step motor commands with varying R/L body integration  Pt with 75% accuracy  Assessment   Treatment Assessment Pt engaged in skilled OT tx session with focus on fxnl cognition, spatial/body awareness, visuo-constructive skills, time orientation, fxnl xfers, R UE NMR and pt education  see above for details  Pt completed mirror therapy as prepatory task for therapy session with improved awareness of purpose however continued cues for maintaining visual attention to task  Follow clock orientation task, pt then placed in front of standard clock and able to verbalize time appropriately with context, improved from structured task  OT to continue to address  OT Therapy Minutes   OT Time In 1000   OT Time Out 1130   OT Total Time (minutes) 90   OT Mode of treatment - Individual (minutes) 90   OT Mode of treatment - Concurrent (minutes) 0   OT Mode of treatment - Group (minutes) 0   OT Mode of treatment - Co-treat (minutes) 0   OT Mode of Teatment - Total time(minutes) 90 minutes   Therapy Time missed   Time missed?  No

## 2019-07-11 NOTE — PROGRESS NOTES
07/11/19 1310   Pain Assessment   Pain Assessment No/denies pain   Pain Score No Pain   Restrictions/Precautions   Precautions Fall Risk;Cognitive;Supervision on toilet/commode;Visual deficit  (R side inattention)   Cognition   Overall Cognitive Status Impaired   Arousal/Participation Alert; Cooperative   Attention Attends with cues to redirect   Orientation Level Oriented X4   Memory Decreased short term memory;Decreased recall of recent events;Decreased recall of precautions   Following Commands Follows one step commands with increased time or repetition   Subjective   Subjective Pt agreeable to PT tx and had no complaints  QI: Sit to Stand   Assistance Needed Incidental touching   Assistance Provided by Lynndyl No physical assistance   Comment CGA with no AD; improvements noted in R hand placement today, however, still requires physical guidance at times for R hand to reach back to chair   Sit to Stand CARE Score 4   QI: Chair/Bed-to-Chair Transfer   Assistance Needed Incidental touching   Assistance Provided by Lynndyl No physical assistance   Comment CGA with no AD; pt instructed to perform turns slowly as pt will quickly turn and forget hand placement, especially with R hand   Chair/Bed-to-Chair Transfer CARE Score 4   Transfer Bed/Chair/Wheelchair   Limitations Noted In Balance; Coordination; Endurance;Problem Solving; Sequencing;LE Strength;UE Strength;Vision   Adaptive Equipment None   Stand Pivot Contact Guard   Sit to [employer]   Stand to [employer] Guard   Findings CG without AD   Bed, Chair, Wheelchair Transfer (FIM) 4 - Patient requires steadying assist or light touching   QI: Car Transfer   Assistance Needed Incidental touching   Assistance Provided by Lynndyl No physical assistance   Comment CGA without AD; PT demo'd proper technique for pt prior to car transfer, however, pt unable to correctly recall 100% of steps and required verbal cueing to turn fully before sitting in car and to reach back with R UE to head rest; pt able to recall not to use door for support due to instability of door   Car Transfer CARE Score 4   QI: Walk 10 Feet   Assistance Needed Incidental touching   Assistance Provided by Thurmond No physical assistance   Comment CGA without AD   Walk 10 Feet CARE Score 4   QI: Walk 50 Feet with Two Turns   Assistance Needed Physical assistance   Assistance Provided by Thurmond Less than 25%   Comment Tara-CGA without AD   Walk 50 Feet with Two Turns CARE Score 3   QI: Walk 150 Feet   Assistance Needed Physical assistance   Assistance Provided by Thurmond Less than 25%   Comment Tara-CGA without AD; pt has occ LOB and requires Tara from therapist to maintain balance   Walk 150 Feet CARE Score 3   Ambulation   Does the patient walk? 2  Yes   Primary Discharge Mode of Locomotion Walk   Walk Assist Level Minimum Assist;Contact Guard   Gait Pattern Inconsistant Dolly; Slow Dolly;Decreased foot clearance;Narrow LUIS; Improper weight shift   Assist Device Other  (no AD)   Distance Walked (feet) 150 ft  (x1, 175'x2)   Limitations Noted In Balance; Endurance; Coordination; Heel Strike;Posture; Safety; Sensation; Sequencing;Speed;Strength;Swing   Findings Gait trng cont w/o AD; pt cont to demo R sided Trendelenberg, R out toeing, decrease R foot clearance, and narrow LUIS   Walking (FIM) 4 - Patient requires steadying assist or light touching AND distance 150 feet or more, no rest   Wheelchair mobility   QI: Does the patient use a wheelchair? 0  No   Therapeutic Interventions   Strengthening Supine bridging performed with 3 sec hold, R single leg bridging, and L sidelying R clamshells performed all performed for 3 sets to fatigue with verbal and tactile cues to engage core  Trialed L sidelying R hip abduction and even with verbal, and tactile cues, pt had difficulty performing therefore only performed 1 set of 10 reps  All performed to improve pt's pelvic stabilizers      Flexibility Seated passive B hamstring and gastroc stretching performed x3 minutes on each Le  Equipment Use   NuStep L3 x 10 minutes with SPM >60; pt reports Tucker Scale of 13/20 during intervention; performed to improve pt's endurance   Assessment   Treatment Assessment Pt engaged in skilled PT session focusing on gait trng without AD, pelvic stabilizer strengthening, and functional mobilities  Pt cont to demo gait deficits including R Trendelenburg, R out toeing, decreased R foot clearance, and increased R hip flexion during swing phase of amb without AD  Pt had minor LOB when turning as she stated "I was not focusing on what I was doing and lost my balance " During supine mat exercises, pt has difficulty motor planning tasks, therefore requires increased time to complete as well as physical assist from therapist to complete with proper technique  Consider use of mirror for visual feedback for pt during exercises to improve pt's technique and ability to complete with proper technique  Although pt's hemoglobin levels were low and pt was fatigued and appeared to be SOB during amb, all vital signs WFL  Cont to work on increasing pts awareness of R side, especially during transfers, as well as work on pt's balance, endurance, and pelvic stabilizer strength in order to improve pt's ability to safely complete functional mobilities and decrease caregiver burden as well as risk for falls upon d/c  Family/Caregiver Present no   Problem List Decreased strength;Decreased range of motion; Impaired balance;Decreased endurance;Decreased mobility; Decreased coordination;Decreased cognition;Decreased safety awareness; Impaired judgement; Impaired vision; Impaired sensation   Barriers to Discharge Decreased caregiver support   PT Barriers   Functional Limitation Car transfers; Walking;Stair negotiation;Ramp negotiation;Transfers   Plan   Treatment/Interventions Functional transfer training;LE strengthening/ROM; Therapeutic exercise; Endurance training;Gait training   Progress Progressing toward goals   Recommendation   Recommendation 24 hour supervision/assist;Outpatient PT; Home with family support   Equipment Recommended Other (Comment)  (LRAD)   PT - OK to Discharge No   PT Therapy Minutes   PT Time In 1310   PT Time Out 1448   PT Total Time (minutes) 98   PT Mode of treatment - Individual (minutes) 98   PT Mode of treatment - Concurrent (minutes) 0   PT Mode of treatment - Group (minutes) 0   PT Mode of treatment - Co-treat (minutes) 0   PT Mode of Teatment - Total time(minutes) 98 minutes   Therapy Time missed   Time missed?  No

## 2019-07-11 NOTE — PROGRESS NOTES
07/11/19 0830   Pain Assessment   Pain Assessment No/denies pain   Pain Score No Pain   Restrictions/Precautions   Precautions Cognitive; Fall Risk;Supervision on toilet/commode   Memory Skills   Memory (FIM) 3 - Recognizes, recalls/performs 50-74%   Social Interaction (FIM) 5 - Requires redirection but less than 10% of the time  Speech/Language/Cognition Assessmetn   Treatment Assessment See below for session note  SLP Therapy Minutes   SLP Time In 0830   SLP Time Out 0930   SLP Total Time (minutes) 60   SLP Mode of treatment - Individual (minutes) 60   SLP Mode of treatment - Concurrent (minutes) 0   SLP Mode of treatment - Group (minutes) 0   SLP Mode of treatment - Co-treat (minutes) 0   SLP Mode of Teatment - Total time(minutes) 60 minutes   Therapy Time missed   Time missed? No   Daily FIM Score   Problem solving (FIM) 3 - Solves basic problmes 50-74% of time   Comprehension (FIM) 4 - Understands basic info/conversation 75-90% of time   Expression (FIM) 5 - Needs help/cues only RARELY (< 10% of the time)     Session note: Pt participated in skilled ST session targeting cognitive linguistic skills  Began session by engaging in LTM recall of biographical info to which pt demonstrated ability to accurately provide info related to her home environment, family members/relationships and independent completion of IADLs  Targeting problem solving/safety, pt presented w/ written scenarios and Fo4 possible solutions w/ instructions to chose the "first" action/solution  Overall, pt completed task w/ 16/20 accuracy, noting the need for min-moderate verbal and contextual cues to improve reasoning skills  Of note, pt initially w/ decreased carryover of instructions which pt required consistent verbal cues and repetition of instructions until at least midway through task as pt was attempting to rank/order the solutions from first to last, when she was instructed to only radha the "first" solution   Some solutions which pt was ranking were not safe/appropriate, noting limited insight into safety/reasoning at this time  Presented w/ mock menus w/ focus on comprehension, attention and reasoning, pt accurately answered 8/12 comprehension questions  Most errors appeared due to decreased attention to details, rather than comprehension of questions and info  Given verbal cues and repetition of questions, along w/ cues for pacing/to take her time, pt improved accuracy w/ task  Pt also engaged in a category matrix task by eliciting an item which belongs to a category, beginning w/ a specific letter  Pt independently elicited responses in 18/20 trials, benefiting from semantic cues to achieve ability to give responses, noting overall prompt responses to trials  Lastly, targeting working memory, pt engaged in a word list retention task when auditorily presented w/ a Fo4 words  Pt recalled info based on attribute inclusion w/ 7/12 accuracy, noting need for slowed speech throughout task  Repetition and verbal cues were provided to improve ability to recall info as errors frequently occurred due to decreased recall  Prior to task, pt educated on strategies to assist in retention of info, which she inconsistently independently used during task, requiring occasional verbal cues to increase carryover  Pt overall noted to complete tasks and elicit responses promptly, at times appearing impulsive w/ thought and required cues to gain her attention before SLP provided instruction to improve comprehension  At this time, pt will continue to benefit from skilled ST services to maximize functional cognitive linguistic skills for increased safety and level of independence

## 2019-07-11 NOTE — PROGRESS NOTES
07/11/19 1000   Pain Assessment   Pain Assessment No/denies pain   Pain Score No Pain   Restrictions/Precautions   Precautions Fall Risk;Cognitive;Supervision on toilet/commode   Transfer Bed/Chair/Wheelchair   Positioning Concerns Cognition   Findings use of RW   Bed, Chair, Wheelchair Transfer (FIM) 4 - Patient completes 75% of all tasks   Neuromuscular Education   Functional Movement Patterns Mirror Therapy: Pt engaged in UE mirror box therapy to improve overall motor control and awareness of RUE to allow for visual feedback to inc fxnl use  Pt seated at tabletop with RUE occluded behind mirror box  Pt completed x 200 repetitions of forward flex and protraction/retraction with towel in L hand to wipe table to perform fxnl task  Pt counting to 200 on her own to inc pt's attention and persistance through task  Cues to maintain visual attention on mirror to inc visual feedback  Pt then completed line drawing on white board with L UE while participating in mirror therapy  pt demo inc difficulty with maintaining attention to task with tool use   Cognition   Comments Clock Drawing: pt engaged in clock drawing and temporal awareness task to address spatial dysfunction, inattention, visuo-constructive skills, while assessing her verbal understanding, memory, spatial awareness, and constructive skills  Pt verbally instructed to draw a clock with numbers 12-11 all drawn in R upper quadrant  Pt when provided with clock outline, able to verbalize need for OT to place numbers in appropriate locations/sequence  Pt demo dec comprehension of minute/hour hand placement with impaired visuo-constructive skills noted  Pt demo dec STM and poor awareness following verbal instruction  Visual cues plotted on clock to designate minutes as represented by respective numbers on clock   Pt continued to be Dependent for time identification fading to max VCs with repetition   Perception   Right Attention Directional/Body Awareness: Pt engaged in directional awareness task to follow 3 x 4 grid as given verbal directions to move left/right/up/down  Pt req to maintain R index finger on grid to follow verbal directions with max VCs to move R finger appropriately along grid respective to verbal directions given  Pt demo dec R/L awareness req [place] A to promote R UE mvmt  Pt participated in body awareness task to follow 1 to 2 step motor commands with varying R/L body integration  Pt with 75% accuracy  Assessment   Treatment Assessment Pt engaged in skilled OT tx session with focus on fxnl cognition, spatial/body awareness, visuo-constructive skills, time orientation, fxnl xfers, R UE NMR and pt education  see above for details  Pt completed mirror therapy as prepatory task for therapy session with improved awareness of purpose however continued cues for maintaining visual attention to task  Follow clock orientation task, pt then placed in front of standard clock and able to verbalize time appropriately with context, improved from structured task  OT to continue to address  OT Therapy Minutes   OT Time In 1000   OT Time Out 1130   OT Total Time (minutes) 90   OT Mode of treatment - Individual (minutes) 90   OT Mode of treatment - Concurrent (minutes) 0   OT Mode of treatment - Group (minutes) 0   OT Mode of treatment - Co-treat (minutes) 0   OT Mode of Teatment - Total time(minutes) 90 minutes   Therapy Time missed   Time missed?  No

## 2019-07-12 LAB
ABO GROUP BLD BPU: NORMAL
BASOPHILS # BLD AUTO: 0.08 THOUSANDS/ΜL (ref 0–0.1)
BASOPHILS NFR BLD AUTO: 1 % (ref 0–1)
BPU ID: NORMAL
CROSSMATCH: NORMAL
EOSINOPHIL # BLD AUTO: 0.35 THOUSAND/ΜL (ref 0–0.61)
EOSINOPHIL NFR BLD AUTO: 4 % (ref 0–6)
ERYTHROCYTE [DISTWIDTH] IN BLOOD BY AUTOMATED COUNT: 35.4 % (ref 11.6–15.1)
HCT VFR BLD AUTO: 33 % (ref 34.8–46.1)
HGB BLD-MCNC: 9.5 G/DL (ref 11.5–15.4)
IMM GRANULOCYTES # BLD AUTO: 0.05 THOUSAND/UL (ref 0–0.2)
IMM GRANULOCYTES NFR BLD AUTO: 1 % (ref 0–2)
LYMPHOCYTES # BLD AUTO: 1.49 THOUSANDS/ΜL (ref 0.6–4.47)
LYMPHOCYTES NFR BLD AUTO: 17 % (ref 14–44)
MCH RBC QN AUTO: 24.7 PG (ref 26.8–34.3)
MCHC RBC AUTO-ENTMCNC: 28.8 G/DL (ref 31.4–37.4)
MCV RBC AUTO: 86 FL (ref 82–98)
MONOCYTES # BLD AUTO: 0.72 THOUSAND/ΜL (ref 0.17–1.22)
MONOCYTES NFR BLD AUTO: 8 % (ref 4–12)
NEUTROPHILS # BLD AUTO: 6.17 THOUSANDS/ΜL (ref 1.85–7.62)
NEUTS SEG NFR BLD AUTO: 69 % (ref 43–75)
NRBC BLD AUTO-RTO: 0 /100 WBCS
PLATELET # BLD AUTO: 394 THOUSANDS/UL (ref 149–390)
PMV BLD AUTO: 10.7 FL (ref 8.9–12.7)
RBC # BLD AUTO: 3.85 MILLION/UL (ref 3.81–5.12)
UNIT DISPENSE STATUS: NORMAL
UNIT PRODUCT CODE: NORMAL
UNIT RH: NORMAL
WBC # BLD AUTO: 8.86 THOUSAND/UL (ref 4.31–10.16)

## 2019-07-12 PROCEDURE — 99232 SBSQ HOSP IP/OBS MODERATE 35: CPT | Performed by: PHYSICAL MEDICINE & REHABILITATION

## 2019-07-12 PROCEDURE — 97112 NEUROMUSCULAR REEDUCATION: CPT

## 2019-07-12 PROCEDURE — 85025 COMPLETE CBC W/AUTO DIFF WBC: CPT | Performed by: PHYSICAL MEDICINE & REHABILITATION

## 2019-07-12 PROCEDURE — 97535 SELF CARE MNGMENT TRAINING: CPT

## 2019-07-12 PROCEDURE — 97110 THERAPEUTIC EXERCISES: CPT

## 2019-07-12 PROCEDURE — G0515 COGNITIVE SKILLS DEVELOPMENT: HCPCS

## 2019-07-12 PROCEDURE — 97116 GAIT TRAINING THERAPY: CPT

## 2019-07-12 PROCEDURE — 97530 THERAPEUTIC ACTIVITIES: CPT

## 2019-07-12 RX ADMIN — CARVEDILOL 3.12 MG: 3.12 TABLET, FILM COATED ORAL at 08:32

## 2019-07-12 RX ADMIN — Medication 325 MG: at 17:05

## 2019-07-12 RX ADMIN — ASPIRIN 81 MG: 81 TABLET, COATED ORAL at 08:33

## 2019-07-12 RX ADMIN — ATORVASTATIN CALCIUM 80 MG: 80 TABLET, FILM COATED ORAL at 17:05

## 2019-07-12 RX ADMIN — CLOPIDOGREL BISULFATE 75 MG: 75 TABLET ORAL at 08:33

## 2019-07-12 RX ADMIN — NIFEDIPINE 30 MG: 30 TABLET, FILM COATED, EXTENDED RELEASE ORAL at 08:33

## 2019-07-12 RX ADMIN — DOCUSATE SODIUM 100 MG: 100 CAPSULE, LIQUID FILLED ORAL at 17:06

## 2019-07-12 RX ADMIN — ENOXAPARIN SODIUM 40 MG: 40 INJECTION SUBCUTANEOUS at 11:32

## 2019-07-12 RX ADMIN — MELATONIN 6 MG: at 22:15

## 2019-07-12 RX ADMIN — CARVEDILOL 3.12 MG: 3.12 TABLET, FILM COATED ORAL at 17:06

## 2019-07-12 RX ADMIN — SENNOSIDES 17.2 MG: 8.6 TABLET, FILM COATED ORAL at 22:15

## 2019-07-12 RX ADMIN — PANTOPRAZOLE SODIUM 40 MG: 40 TABLET, DELAYED RELEASE ORAL at 05:47

## 2019-07-12 RX ADMIN — Medication 325 MG: at 08:32

## 2019-07-12 NOTE — PROGRESS NOTES
07/12/19 1030   Pain Assessment   Pain Assessment No/denies pain   Pain Score No Pain   Restrictions/Precautions   Precautions Fall Risk;Cognitive;Supervision on toilet/commode  ( deficits, R inattention )   Grooming   Able To Wash/Dry Face;Wash/Dry Hands   Limitation Noted In Coordination;Problem Solving; Safety   Findings pt req cue to attend to residual soap on R hand with hand washing  pt attemping to terminate task prematurely prior to task completion demo R inattention, impaired coordination, proprioceptive, and sensory deficits with CGA in stance   Grooming (FIM) 4 - Patient requires steadying assist or light touching   QI: Chair/Bed-to-Chair Transfer   Assistance Needed Incidental touching;Verbal cues   Chair/Bed-to-Chair Transfer CARE Score 4   Transfer Bed/Chair/Wheelchair   Limitations Noted In Balance;Confidence; Coordination;Problem Solving; Sequencing;LE Strength;Sensation   Adaptive Equipment Roller Walker   Bed, Chair, Wheelchair Transfer (FIM) 4 - Patient requires steadying assist or light touching   Coordination   Gross Motor Pipe tree activity: Pt engaged in visuo-construction task to assemble pipe tree model from template to address visual perceptual skills, bimanual and unilateral UE use, FMC/FMS, GMC, sensory/proprioceptive awareness, and functional cognition  pt completed both unilateral and b/l UE exploration and coordination demo need for visualization on target to improve accuracy  Pt demo slight improvement of awareness of R UE however continued dysmetria/ataxia noted  Cognition   Overall Cognitive Status Impaired   Comments pt demo dec comprehension of verbal directions as given during pipe tree activity, pt utilizing trial and error method versus error identitification/generation of logcial solutions    step by step cues req to promote task accuracy and to mantain functional attention   Perception   Visual Motor Organization Pipe Tree activity: Pt engaged in visuo-construction task to assemble pipe tree model from template to address visual perceptual skills of visual memory, visual closure, figure ground, spatial orientation, and visual discrimination  Pt instructed to form pipe tree model from visual template with key sized and labeled respectively  Pt req task break down step by step to visual discrimnate pieces and to identify errors  pt req over 45mins to complete two assemblies    Assessment   Treatment Assessment Pt engaged in skilled OT tx session focusing on R UE NMR, /VM skills, fxnl cognition, fxnl transfers, pt edu   see above for further details on performance  Pt req Max VCs and step by step task breakdown to perform pipe tree activity  Pt with dec insight into deficits with poor problem solving and error recognition  Continue with skilled OT to maximize indep and safety with ADL/IADL performance  OT to f/u with pt's daughter to provide edu on recommendations at d/c for 24/7 Supervision and Assist with IADL fxn   Problem List Decreased strength;Decreased range of motion;Decreased endurance; Impaired balance;Decreased mobility; Decreased cognition; Impaired judgement;Decreased safety awareness; Impaired vision   Plan   Treatment/Interventions ADL retraining;Functional transfer training; Therapeutic exercise; Endurance training;Cognitive reorientation;Patient/family training;Equipment eval/education; Compensatory technique education   Progress Progressing toward goals   OT Therapy Minutes   OT Time In 1030   OT Time Out 1130   OT Total Time (minutes) 60   OT Mode of treatment - Individual (minutes) 60   OT Mode of treatment - Concurrent (minutes) 0   OT Mode of treatment - Group (minutes) 0   OT Mode of treatment - Co-treat (minutes) 0   OT Mode of Teatment - Total time(minutes) 60 minutes   Therapy Time missed   Time missed?  No

## 2019-07-12 NOTE — PLAN OF CARE
Problem: Potential for Falls  Goal: Patient will remain free of falls  Description  INTERVENTIONS:  - Assess patient frequently for physical needs  -  Identify cognitive and physical deficits and behaviors that affect risk of falls    -  [place] fall precautions as indicated by assessment   - Educate patient/family on patient safety including physical limitations  - Instruct patient to call for assistance with activity based on assessment  - Modify environment to reduce risk of injury  - Consider OT/PT consult to assist with strengthening/mobility  Outcome: Progressing     Problem: PAIN - ADULT  Goal: Verbalizes/displays adequate comfort level or baseline comfort level  Description  Interventions:  - Encourage patient to monitor pain and request assistance  - Assess pain using appropriate pain scale  - Administer analgesics based on type and severity of pain and evaluate response  - Implement non-pharmacological measures as appropriate and evaluate response  - Consider cultural and social influences on pain and pain management  - Notify physician/advanced practitioner if interventions unsuccessful or patient reports new pain  Outcome: Progressing     Problem: INFECTION - ADULT  Goal: Absence or prevention of progression during hospitalization  Description  INTERVENTIONS:  - Assess and monitor for signs and symptoms of infection  - Monitor lab/diagnostic results  - Monitor all insertion sites, i e  indwelling lines, tubes, and drains  - Monitor endotracheal (as able) and nasal secretions for changes in amount and color  - [place] appropriate cooling/warming therapies per order  - Administer medications as ordered  - Instruct and encourage patient and family to use good hand hygiene technique  - Identify and instruct in appropriate isolation precautions for identified infection/condition  Outcome: Progressing     Problem: SAFETY ADULT  Goal: Maintain or return to baseline ADL function  Description  INTERVENTIONS:  -  Assess patient's ability to carry out ADLs; assess patient's baseline for ADL function and identify physical deficits which impact ability to perform ADLs (bathing, care of mouth/teeth, toileting, grooming, dressing, etc )  - Assess/evaluate cause of self-care deficits   - Assess range of motion  - Assess patient's mobility; develop plan if impaired  - Assess patient's need for assistive devices and provide as appropriate  - Encourage maximum independence but intervene and supervise when necessary  ¯ Involve family in performance of ADLs  ¯ Assess for home care needs following discharge   ¯ Request OT consult to assist with ADL evaluation and planning for discharge  ¯ Provide patient education as appropriate  Outcome: Progressing  Goal: Maintain or return mobility status to optimal level  Description  INTERVENTIONS:  - Assess patient's baseline mobility status (ambulation, transfers, stairs, etc )    - Identify cognitive and physical deficits and behaviors that affect mobility  - Identify mobility aids required to assist with transfers and/or ambulation (gait belt, sit-to-stand, lift, walker, cane, etc )  - Nelson fall precautions as indicated by assessment  - Record patient progress and toleration of activity level on Mobility SBAR; progress patient to next Phase/Stage  - Instruct patient to call for assistance with activity based on assessment  - Request Rehabilitation consult to assist with strengthening/weightbearing, etc   Outcome: Progressing     Problem: DISCHARGE PLANNING  Goal: Discharge to home or other facility with appropriate resources  Description  INTERVENTIONS:  - Identify barriers to discharge w/patient and caregiver  - Arrange for needed discharge resources and transportation as appropriate  - Identify discharge learning needs (meds, wound care, etc )  - Arrange for interpretive services to assist at discharge as needed  - Refer to Case Management Department for coordinating discharge planning if the patient needs post-hospital services based on physician/advanced practitioner order or complex needs related to functional status, cognitive ability, or social support system  Outcome: Progressing     Problem: Prexisting or High Potential for Compromised Skin Integrity  Goal: Skin integrity is maintained or improved  Description  INTERVENTIONS:  - Identify patients at risk for skin breakdown  - Assess and monitor skin integrity  - Assess and monitor nutrition and hydration status  - Monitor labs (i e  albumin)  - Assess for incontinence   - Turn and reposition patient  - Assist with mobility/ambulation  - Relieve pressure over bony prominences  - Avoid friction and shearing  - Provide appropriate hygiene as needed including keeping skin clean and dry  - Evaluate need for skin moisturizer/barrier cream  - Collaborate with interdisciplinary team (i e  Nutrition, Rehabilitation, etc )   - Patient/family teaching  Outcome: Progressing

## 2019-07-12 NOTE — PROGRESS NOTES
07/12/19 0830   Pain Assessment   Pain Assessment 0-10   Pain Score No Pain   Pain Type Surgical pain   Pain Location Head   Restrictions/Precautions   Precautions Fall Risk;Cognitive;Supervision on toilet/commode   Subjective   Subjective pt reports feeling okay a little tired as subjective as above    QI: Sit to Stand   Assistance Needed Incidental touching   Sit to Stand CARE Score 4   QI: Chair/Bed-to-Chair Transfer   Assistance Needed Incidental touching   Comment HHA    Chair/Bed-to-Chair Transfer CARE Score 4   Transfer Bed/Chair/Wheelchair   Limitations Noted In Endurance;Balance;Vision;LE Strength;UE Strength; Sequencing;Problem Solving   Adaptive Equipment Hand Hold;Roller Walker   Stand Pivot Contact Guard   Sit to [employer]   Stand to [facility]   Findings HHA/RW transfers    Bed, Chair, Wheelchair Transfer (FIM) 4 - Patient requires steadying assist or light touching   Ambulation   Does the patient walk? 2  Yes   Primary Discharge Mode of Locomotion Walk   Walk Assist Level Minimum Assist   Gait Pattern Decreased foot clearance; Inconsistant Dolly;Scissoring;Trendelenburg; Improper weight shift;Narrow LUIS   Wheelchair mobility   QI: Does the patient use a wheelchair? 0  No   QI: Picking Up Object   Reason if not Attempted Activity not applicable   Picking Up Object CARE Score 9   QI: Toilet Transfer   Reason if not Attempted Activity not applicable   Toilet Transfer CARE Score 9   Equipment Use   NuRidleyep level 2 for 15 min    Assessment   Treatment Assessment pt perform skilled PT focus on inc functional mobility , endurance and walking w/o AD to inc balance awareness and dec fall risk    Pt inc in t-band resistive ex's around pt thighs walking in hallway on handrail fwd /bwd and side to side to inc B/L hip and LE strengthening and conditioning  Pt cont to have trendelenberg RLE , ambulates a little better with SPC RLE less cross over to LLE , pt has very weak hip muscles   Pt has a little SOB at times during gait training , pt will cont to need work on transfers, balance awareness endurance so pt can safety perform mobility safer and dec fall risk   Barriers to Discharge Decreased caregiver support   PT Barriers   Physical Impairment Decreased strength;Decreased endurance; Impaired balance;Decreased mobility; Decreased coordination;Decreased cognition; Impaired judgement;Decreased safety awareness   Plan   Treatment/Interventions Functional transfer training;Elevations; Therapeutic exercise; Endurance training;Cognitive reorientation;Patient/family training;Gait training   Progress Progressing toward goals   PT Therapy Minutes   PT Time In 0830   PT Time Out 0930   PT Total Time (minutes) 60   PT Mode of treatment - Individual (minutes) 60   PT Mode of treatment - Concurrent (minutes) 0   PT Mode of treatment - Group (minutes) 0   PT Mode of treatment - Co-treat (minutes) 0   PT Mode of Teatment - Total time(minutes) 60 minutes   Therapy Time missed   Time missed?  No

## 2019-07-12 NOTE — PROGRESS NOTES
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; s/p left CEA 6/28/19:  continue ASA/Plavix, Lipitor = for home Cards advised change Lipitor to Crestor 40mg daily      Iron deficiency anemia; s/p transfusion and iron IV: continue iron PO supplements      AVM stomach/duodenum tx with APC; cold snare sigmoid colon polypectomy: continue PPI     HTN: at home was on Lopressor and now is switched to Coreg and Nifedipine     RUL ground glass density: for Pulm to see OP     Urethral diverticulum: to see Uro as OP for cystogram     Nicotine abuse: counseled for cessation     Fall 2/2 LOB 6/24/19: CT head was negative; has remaining ecchymosis right forehead/orbit     ABLA: Hgb 7 3 yesterday and was transfused  Continue FeSO4 supplementation  Repeat cbc pending  Pt reports feeling better today  Subjective/ HPI: Patient seen and examined  Patients overnight issues or events were reviewed with nursing or staff during rounds or morning huddle session  New or overnight issues include the following: HTN: Currently well controlled  Continue present medications  CVA: No new stroke symptoms  Continue antiplatelet and statin  Anemia: CBC pending s/p transfusion  Pt currently reports that she is doing well       ROS:     GI: denies abdominal pain, change bowel habits or reflux symptoms  Neuro: Denies any headache, new vision changes, new neuropathies,new weaknesses   Respiratory: No Cough, SOB, denies wheeze  Cardiovascular: No CP, palpitations , denies perception of rapid heartbeat  : denies any new urinary burning or frequency    Review of Scheduled Meds:    Current Facility-Administered Medications:  aspirin 81 mg Oral Daily Ed Arreola MD   atorvastatin 80 mg Oral Daily With Yeni Nuñez MD bisacodyl 10 mg Rectal Daily PRN Amina Whipple PA-C   calcium carbonate 500 mg Oral Daily PRN Ed Arreola MD   carvedilol 3 125 mg Oral BID With Meals Rohith Wilson MD   clopidogrel 75 mg Oral Daily Ed Arreola MD   docusate sodium 100 mg Oral BID Amina Whipple PA-C   enoxaparin 40 mg Subcutaneous Q24H [address] Ed Arreola MD   ferrous sulfate 325 mg Oral BID With Meals Ed Arreola MD   melatonin 6 mg Oral HS Ed Arreola MD   NIFEdipine 30 mg Oral Daily Ed Arreola MD   oxyCODONE 2 5 mg Oral Q4H PRN Ed Arreola MD   oxyCODONE 5 mg Oral Q4H PRN Ed Arreola MD   pantoprazole 40 mg Oral Early Morning Ed Arreola MD   polyethylene glycol 17 g Oral Daily PRN Amina Whipple PA-C   senna 2 tablet Oral HS Amina Whipple PA-C       Labs:     Results from last 7 days   Lab Units 07/11/19  0539 07/08/19  0540   WBC Thousand/uL 7 16 10 08   HEMOGLOBIN g/dL 7 3* 7 9*   HEMATOCRIT % 25 8* 28 3*   PLATELETS Thousands/uL 364 382     Results from last 7 days   Lab Units 07/11/19  0539 07/08/19  0540   SODIUM mmol/L 142 142   POTASSIUM mmol/L 3 5 3 6   CHLORIDE mmol/L 112* 110*   CO2 mmol/L 25 26   BUN mg/dL 22 22   CREATININE mg/dL 0 80 0 89   CALCIUM mg/dL 8 6 8 6                        Imaging:     No orders to display       *Labs reviewed  *Radiology studies reviewed  *Medications reviewed and reconciled as needed  *Please refer to order section for additional ordered labs studies  *Case discussed with primary attending during morning huddle case rounds    Physical Examination:  Vitals:   Vitals:    07/11/19 2007 07/12/19 0548 07/12/19 0610 07/12/19 0832   BP:  162/72  158/68   BP Location:       Pulse:  60  70   Resp:  20     Temp: 97 7 °F (36 5 °C) 98 °F (36 7 °C)     TempSrc: Oral Oral     SpO2:  97%     Weight:   72 kg (158 lb 11 7 oz)    Height:           General Appearance: NAD, conversive  Eyes: No icterus; conjunctiva normal, PERRLA  HENT: oropharynx clear; mucous membranes moist; no ulcerations, normal hard and soft palette  Neck: trachea midline, range of motion full  Supple, no lymphadenopathy or thyromegaly  Lungs: CTA, normal respiratory effort, no retractions, expiratory effort normal  CV: regular rate, no rubs no gallops, PMI normal place and intensity  ABD: soft non tender, no masses , no hepatic or splenomegaly  EXT: DP pulses intact, no lymphadenopathy, no edema  Skin: normal turgor, normal texture, no rash, no ulcers  Psych: affect normal, No anxiety,   Neuro: AAOx3            Total time spent: At least 40 minutes, with more than 50% spent counseling/coordinating care  Counseling includes discussion with patient re: progress  and discussion with patient of his/her current medical state/information  Coordination of patient's care was performed in conjunction with primary service  Time invested included review of patient's labs, vitals, and management of their comorbidities with continued monitoring  In addition, this patient was discussed with medical team including physician and advanced extenders  The care of the patient was extensively discussed and appropriate treatment plan was formulated unique for this patient  ** Please Note: Dragon 360 Dictation voice to text software may have been used in the creation of this document   **

## 2019-07-12 NOTE — PROGRESS NOTES
[facility] Occupational Therapy Daily Note     07/12/19 0700   Pain Assessment   Pain Assessment No/denies pain   Pain Score No Pain   QI: Eating   Assistance Needed Set-up / clean-up   Eating CARE Score 5   Eating Assessment   Findings Pt able to use spoon for entire meal, no spillage, good advanced tripod grasp  pt reports that she still has    Eating (FIM) 5 - Patient requires supervision, cueing or coaxing   QI: Oral Hygiene   Assistance Needed Supervision   Oral Hygiene CARE Score 4   Grooming   Able To Wash/Dry Hands; Wash/Dry Face;Comb/Brush Hair;Brush/Clean Teeth   Grooming (FIM) 5 - Patient requires supervision/monitoring   QI: Shower/Bathe Self   Assistance Needed Verbal cues   Shower/Bathe Self CARE Score 4   Bathing   Assessed Bath Style Sponge Bath   Anticipated D/C Bath Style Tub   Able to Gather/Transport No   Able to [employer] Temperature Yes   Able to Wash/Rinse/Dry (body part) Left Arm;Right Arm;L Upper Leg;R Upper Leg;Chest;Abdomen;Perineal Area; Buttocks   Limitations Noted in Balance; Coordination;Problem Solving; Safety   Positioning Standing   Findings  Pt demo decreased R UE awareness, and in hand proprioception  Pt dropping washcloth and continues to attempt bathing without washcloth in hand  Pt appears surprised when she brings hand back out and there is no wasshcloth  CS in stance for safety   Bathing (FIM) 5 - Patient requires supervision/monitoring but completes 10/10 parts   QI: Upper Body Dressing   Assistance Needed Set-up / clean-up   Comment Pt is able to don shirt with 1st attempts with increased time  Pt dons shirt overhead first compared to threading UE in last ADL   Upper Body Dressing CARE Score 5   QI: Lower Body Dressing   Assistance Needed Incidental touching   Comment tactile cues required in stance for completion of donning pants over hips on R side  pt demo decreaed visual attention to R UE for pulling up pants not underwear   Pt is able to thread B/L LE in sitting with more ease and on first attempt today  Lower Body Dressing CARE Score 4   QI: Putting On/Taking Off Footwear   Assistance Needed Supervision   Putting On/Taking Off Footwear CARE Score 4   QI: Picking Up Object   Assistance Needed Incidental touching   Picking Up Object CARE Score 4   Dressing/Undressing Clothing   Remove UB Clothes Pullover Shirt   Remove LB Clothes Undergarment;Socks   [address] LB Clothes Pants; Undergarment; Shoes   UB Dressing (FIM) 5 - [place] sets up supplies or applies device   LB Dressing (FIM) 4 - Patient requires steadying assist or light touching   QI: Lying to Sitting on Side of Bed   Assistance Needed Supervision   Lying to Sitting on Side of Bed CARE Score 4   QI: Sit to Stand   Assistance Needed Supervision   Sit to Stand CARE Score 4   QI: Chair/Bed-to-Chair Transfer   Assistance Needed Supervision   Chair/Bed-to-Chair Transfer CARE Score 4   Transfer Bed/Chair/Wheelchair   Limitations Noted In Balance; Endurance   Adaptive Equipment Roller Walker   Bed, Chair, Wheelchair Transfer (FIM) 4 - Patient requires steadying assist or light touching   Toileting   Toileting (FIM) 4 - Patient requires steadying assist or light touching   Health Management   Health Management Level of Assistance Minimum assistance   Health Management Pt engages in simulated medication management trial with focus on set up of pill box and motor function  Pt is unable to verbalize current medication list, reports she took 3 PTA all in the morning  Pt now has around 8 scheduled medications  Simulated pill bottles set up with current medications and pt is instructed to set up 2 sided 7 day pill organizer  Pt made 2x errors with placing more pills than required, 1x error for missing placement in AM/PM slot  3x dropping pills without recovery, able to notice drop  Pt requires overall MIN A for identification of labels and carryover for placement into organizer   Pt educated on recommendation for daughters to provide assist and CLOSE supervision for medication management, pt demo understanding   Perception   Right Attention parquetry puzzle with basic version completed  Pt demo difficulties with visual matching of shape and color, able to consistently match color but demo difficulty with shape  Pt places pieces on board out of alignment with poor awareness of placement  Pt demo difficulty at times with R UE motor planning/apraxia, along with difficulties with isolating R UE use for puzzle  Pt benefits from verbal descriptors of shape to identify placement  Activity Tolerance   Activity Tolerance Patient tolerated treatment well   Assessment   Treatment Assessment Skilled OT session focusing on ADL participation, medication management, visual perceptual skills  See above for details  Pt demo improvement in basic tasks, and with automatic repetitive actions  Pt continues to require skilled acute rehab OT services to increase overall functional independence and safety w/ ADLs and functional transfers, continue to follow plan of care  Prognosis Good   Problem List Decreased strength;Decreased range of motion;Decreased endurance; Impaired balance;Decreased mobility; Decreased coordination;Decreased safety awareness   Barriers to Discharge Decreased caregiver support   Plan   Treatment/Interventions ADL retraining;Functional transfer training;LE strengthening/ROM; Endurance training; Therapeutic exercise;Cognitive reorientation;Patient/family training;Equipment eval/education; Bed mobility; Compensatory technique education   Progress Progressing toward goals   Recommendation   OT Discharge Recommendation 24 hour supervision/assist   OT Therapy Minutes   OT Time In 0700   OT Time Out 0815   OT Total Time (minutes) 75   OT Mode of treatment - Individual (minutes) 75   OT Mode of treatment - Concurrent (minutes) 0   OT Mode of treatment - Group (minutes) 0   OT Mode of treatment - Co-treat (minutes) 0   OT Mode of Teatment - Total time(minutes) 75 minutes

## 2019-07-12 NOTE — PROGRESS NOTES
07/12/19 1230   Pain Assessment   Pain Assessment No/denies pain   Restrictions/Precautions   Precautions Cognitive; Fall Risk;Supervision on toilet/commode;Visual deficit   Memory Skills   Memory (FIM) 3 - Recognizes, recalls/performs 50-74%   Social Interaction (FIM) 5 - Interacts appropriately with others 90% of time   Speech/Language/Cognition Assessmetn   Treatment Assessment Refer to below for details  SLP Therapy Minutes   SLP Time In 6499   SLP Time Out 1330   SLP Total Time (minutes) 60   SLP Mode of treatment - Individual (minutes) 40   SLP Mode of treatment - Concurrent (minutes) 0   SLP Mode of treatment - Group (minutes) 0   SLP Mode of treatment - Co-treat (minutes) 20   SLP Mode of Teatment - Total time(minutes) 60 minutes   Therapy Time missed   Time missed? No   Daily FIM Score   Problem solving (FIM) 2 - Needs direction more than ½ time to initiate, plan or complete simple tasks   Comprehension (FIM) 3 - Understands basic info/conversation 50-74% of time   Expression (FIM) 5 - Needs help/cues only RARELY (< 10% of the time)     Session engaged pt in completing functional money management task given tangible money amounts given both dollars and change  SLP providing just dollar amounts auditorily initially, in which pt was 3/6 accurate in ability to determine correct amounts  On errored items, pt was noted to have decreased attention to where initial money pile for total money was started along w/ having difficulty in ability to let go of money held in R hand  Many times, pt would given SLP the "leftover" amount of dollars from the piles of $20's, $10's, $5's and $1's mixed in w/ totals  SLP cueing pt to recount overall totals, in which pt would require increased time, as well as verbal cues to look at R hand to make sure that no extra dollars were included  When given both dollar and change amounts presented auditorily by SLP to again use tangible money to given to SLP   Pt was again 3/6 accurate, noting decreased recall of 3 amounts needing moderate repetition of amounts  Continued to demonstrate decreased visual perceptual deficits given which pile of dollars to select, along w/ decreased attention/awareness to where initial money pile was started, as well as again decreased awareness/attention to money in R hand  SLP attempting to have pt verbalize what was going wrong when counting the money amounts, w/ decreased ability to verbalize what was going wrong  When SLP stated to pt about how she started to count out right amounts, but lost track of where that pile was, along w/ decreased awareness of money being held in hand, pt was slow to agree and recognize errors  SLP d/w pt about strategy to place the money amounts requested in front of her to decrease any confusion  While this strategy helped in ability to determine correct dollar amounts, pt continued to have increased difficulty given change amounts, especially w/ 99 cents  Remainder of session was co-tx'd w/ OT in which [employer] was used for focus of RUE use and refer to OT note for details  For SLP portion, focus was on recalling colors, as well as ability to follow directional commands to locate marbles  Pt's overall ability to recall the color marble to collect was supervision level, but when including multiple steps such as focusing on RUE use to "" marble and drop into basket, STM decreased  Ability to follow directions was mod A overall due to difficulty noted discriminating L vs  R when completing manipulation given RUE and location of marbles  On 2nd round of completing task, SLP giving semantic cues to locate the color marbles to collect  Lastly  SLP attempting to have pt recall the color of marble which was not included in the 2nd round vs  1st round, where pt naming all the colors currently present on screen and was unable to recall blue which was on the 1st round and not the 2nd round   At this time, pt will continue to benefit from SLP services to maximize overall cognitive linguistic skills at this time

## 2019-07-12 NOTE — NURSING NOTE
Patient refusing to wear her vendines during the day she said they itch did explain reason to wear them

## 2019-07-12 NOTE — PROGRESS NOTES
Physical Medicine and Rehabilitation Progress Note  Erik Leary 78 y o  female MRN: [id number]  Unit/Bed#: -01 Encounter: [id number]    Chief Complaint:  "I feel great"    Etiology/Reason for Admission: Impairment of mobility, safety and Activities of Daily Living (ADLs) due to Stroke:  01 2  Right Body Involvement (Left Brain)    Interval History: No acute events overnight  Denies any CP or SOB  Patient without complaint  No new weakness  No new numbness or tingling  Slept well overnight  Otherwise denies any other complaints  She tolerated her transfusion well  She denies any lightheadeness, dizziness, CP, SOB, or palpitations, itching or rashes  She denies any N/V  No incontinence  Last BM this morning  Assessment/Plan - Continue plan of care as per primary team, unless otherwise stated below:      · Rehab - Continue PT, OT and SLP   · Will check a CBC this morning to ensure appropriate response to transfusion  Sharon Tinoco MD  Physical Medicine and Rehabilitation    Review of Systems:  A 10 point ROS was performed; negative except as noted above  Physical Exam:  Temp:  [97 5 °F (36 4 °C)-98 1 °F (36 7 °C)] 98 °F (36 7 °C)  HR:  [58-70] 70  Resp:  [18-20] 20  BP: (138-162)/(63-72) 158/68  SpO2:  [97 %-100 %] 97 %    General: alert, no apparent distress, cooperative and comfortable  CARDIAC:  regular rate and rhythm and S1, S2 normal  LUNGS:  normal air entry, lungs clear to auscultation  ABDOMEN:  soft, non-tender  Bowel sounds normal  No masses, no organomegaly  EXTREMITIES:  extremities normal, warm and well-perfused; no cyanosis, clubbing, or edema  NEURO:   mental status, speech normal, alert and oriented x3  PSYCH:  Normal  and Alert and oriented, appropriate affect  Observed ambulating with uncompensated trendelenberg with R gluteal weakness       Laboratory:  Reviewed  Results from last 7 days   Lab Units 07/11/19  0539 07/08/19  0540   HEMOGLOBIN g/dL 7 3* 7 9*   HEMATOCRIT % 25 8* 28 3*   WBC Thousand/uL 7 16 10 08     Results from last 7 days   Lab Units 07/11/19  0539 07/08/19  0540   BUN mg/dL 22 22   SODIUM mmol/L 142 142   POTASSIUM mmol/L 3 5 3 6   CHLORIDE mmol/L 112* 110*   CREATININE mg/dL 0 80 0 89            Diagnostic Studies: Reviewed, no new imaging   No orders to display         ** Please Note: Fluency Direct voice to text software may have been used in the creation of this document   **      Current Facility-Administered Medications:     aspirin (ECOTRIN LOW STRENGTH) EC tablet 81 mg, 81 mg, Oral, Daily, Ed Arreola MD, 81 mg at 07/12/19 0833    atorvastatin (LIPITOR) tablet 80 mg, 80 mg, Oral, Daily With Carly Aguillon MD, 80 mg at 07/11/19 1723    bisacodyl (DULCOLAX) rectal suppository 10 mg, 10 mg, Rectal, Daily PRN, Amina Whipple PA-C, 10 mg at 07/06/19 1352    calcium carbonate (TUMS) chewable tablet 500 mg, 500 mg, Oral, Daily PRN, Darrick Garcia MD    carvedilol (COREG) tablet 3 125 mg, 3 125 mg, Oral, BID With Meals, Darrick Garcia MD, 3 125 mg at 07/12/19 0832    clopidogrel (PLAVIX) tablet 75 mg, 75 mg, Oral, Daily, Ed Arreola MD, 75 mg at 07/12/19 0833    docusate sodium (COLACE) capsule 100 mg, 100 mg, Oral, BID, Amina Whipple PA-C, 100 mg at 07/11/19 0930    enoxaparin (LOVENOX) subcutaneous injection 40 mg, 40 mg, Subcutaneous, Q24H RAYNA, Ed Arreola MD, 40 mg at 07/11/19 0930    ferrous sulfate tablet 325 mg, 325 mg, Oral, BID With Meals, Darrick Garcia MD, 325 mg at 07/12/19 0832    melatonin tablet 6 mg, 6 mg, Oral, HS, Ed Arreola MD, 6 mg at 07/11/19 2300    NIFEdipine (PROCARDIA XL) 24 hr tablet 30 mg, 30 mg, Oral, Daily, Ed Arreola MD, 30 mg at 07/12/19 0833    oxyCODONE (ROXICODONE) IR tablet 2 5 mg, 2 5 mg, Oral, Q4H PRN, Ed Arreola MD    oxyCODONE (ROXICODONE) IR tablet 5 mg, 5 mg, Oral, Q4H PRN, Ed Arreola MD    pantoprazole (PROTONIX) EC tablet 40 mg, 40 mg, Oral, Early Morning, Ed Arreola MD, 40 mg at 07/12/19 0547    polyethylene glycol (MIRALAX) packet 17 g, 17 g, Oral, Daily PRN, Amina Whipple PA-C, 17 g at 07/06/19 0858    senna (SENOKOT) tablet 17 2 mg, 2 tablet, Oral, HS, Amina Whipple PA-C, 17 2 mg at 07/11/19 2300    Total visit time: At least 25 minutes, with more than 50% spent counseling/coordinating care  Counseling includes discussion with patient re: progress in therapies, functional issues observed by therapy staff, and discussion with patient regarding their current medical state and wellbeing  Coordination of patient's care was performed in conjunction with Internal Medicine service to monitor patient's labs, vitals, and management of their comorbidities

## 2019-07-12 NOTE — PROGRESS NOTES
07/12/19 1310   Pain Assessment   Pain Assessment No/denies pain   Pain Score No Pain   Restrictions/Precautions   Precautions Fall Risk;Supervision on toilet/commode;Cognitive  ( deficits, R inattention )   QI: Sit to Stand   Assistance Needed Incidental touching;Verbal cues   Sit to Stand CARE Score 4   QI: Chair/Bed-to-Chair Transfer   Assistance Needed Incidental touching   Chair/Bed-to-Chair Transfer CARE Score 4   Transfer Bed/Chair/Wheelchair   Findings when attempting to stand up from recliner chair , pt reaching to OTs hand with R UE with dec awareness that OT's hand was NOT apart of chair arm rest;   Bed, Chair, Wheelchair Transfer (FIM) 4 - Patient requires steadying assist or light touching   Neuromuscular Education   Functional Movement Patterns REOGO ASSISTED THERAPY: "[id number]" Pt engaged in [address] assisted therapy for mass motor practice/repetition to inc fxnl coordination, targeting, proprioceptive awareness, and overall fxnl use of RUE to inc indep with ADL/IADL fxn  pt engaged in OT/ST cotx to perform game of Marbles on Mode medium with OT focus on fxnl reach/targeting, motor planning, fxnl coordination, and  skills  Pt demo poor hand to target with dysmetria and impaired directional orientation  Pt completed 3 rounds with [place] A  OT completed 20 min of individual OT tx on reogo for game of marbles on mode hard with [place] fading to max VC   Vision   Vision Comments Pt completed visual screen 2* significant  deficits noted on REOGO  Pt demo impaired visual scanning, dec visual attention, impaired tracking, with breaking when tracking and with visual pursuits Pt with dysmetria with saccades  R eye occluded and pt reports "I cant see anything"  pt reports h/o cataracts in L eye however reports L vision has worsened since admission   Assessment   Treatment Assessment Pt engaged in 20 min OT/ST cotx and 40 min of OT individual tx  See above for further details   Pt would benefit from continued visual assessment with focus on R attention and compensatory    OT Therapy Minutes   OT Time In 1310   OT Time Out 1410   OT Total Time (minutes) 60   OT Mode of treatment - Individual (minutes) 40   OT Mode of treatment - Concurrent (minutes) 0   OT Mode of treatment - Group (minutes) 0   OT Mode of treatment - Co-treat (minutes) 20   OT Mode of Teatment - Total time(minutes) 60 minutes   Therapy Time missed   Time missed?  No

## 2019-07-13 LAB
BASOPHILS # BLD AUTO: 0.09 THOUSANDS/ΜL (ref 0–0.1)
BASOPHILS NFR BLD AUTO: 1 % (ref 0–1)
EOSINOPHIL # BLD AUTO: 0.55 THOUSAND/ΜL (ref 0–0.61)
EOSINOPHIL NFR BLD AUTO: 6 % (ref 0–6)
HCT VFR BLD AUTO: 30.7 % (ref 34.8–46.1)
HGB BLD-MCNC: 8.9 G/DL (ref 11.5–15.4)
IMM GRANULOCYTES # BLD AUTO: 0.04 THOUSAND/UL (ref 0–0.2)
IMM GRANULOCYTES NFR BLD AUTO: 1 % (ref 0–2)
LYMPHOCYTES # BLD AUTO: 2.14 THOUSANDS/ΜL (ref 0.6–4.47)
LYMPHOCYTES NFR BLD AUTO: 25 % (ref 14–44)
MCH RBC QN AUTO: 24.9 PG (ref 26.8–34.3)
MCHC RBC AUTO-ENTMCNC: 29 G/DL (ref 31.4–37.4)
MCV RBC AUTO: 86 FL (ref 82–98)
MONOCYTES # BLD AUTO: 0.91 THOUSAND/ΜL (ref 0.17–1.22)
MONOCYTES NFR BLD AUTO: 11 % (ref 4–12)
NEUTROPHILS # BLD AUTO: 4.83 THOUSANDS/ΜL (ref 1.85–7.62)
NEUTS SEG NFR BLD AUTO: 56 % (ref 43–75)
NRBC BLD AUTO-RTO: 0 /100 WBCS
PLATELET # BLD AUTO: 366 THOUSANDS/UL (ref 149–390)
PMV BLD AUTO: 11.1 FL (ref 8.9–12.7)
RBC # BLD AUTO: 3.57 MILLION/UL (ref 3.81–5.12)
WBC # BLD AUTO: 8.56 THOUSAND/UL (ref 4.31–10.16)

## 2019-07-13 PROCEDURE — 97530 THERAPEUTIC ACTIVITIES: CPT

## 2019-07-13 PROCEDURE — 85025 COMPLETE CBC W/AUTO DIFF WBC: CPT | Performed by: INTERNAL MEDICINE

## 2019-07-13 PROCEDURE — 97530 THERAPEUTIC ACTIVITIES: CPT | Performed by: OCCUPATIONAL THERAPY ASSISTANT

## 2019-07-13 RX ADMIN — CARVEDILOL 3.12 MG: 3.12 TABLET, FILM COATED ORAL at 17:00

## 2019-07-13 RX ADMIN — Medication 325 MG: at 17:00

## 2019-07-13 RX ADMIN — PANTOPRAZOLE SODIUM 40 MG: 40 TABLET, DELAYED RELEASE ORAL at 05:27

## 2019-07-13 RX ADMIN — CLOPIDOGREL BISULFATE 75 MG: 75 TABLET ORAL at 08:35

## 2019-07-13 RX ADMIN — CARVEDILOL 3.12 MG: 3.12 TABLET, FILM COATED ORAL at 08:36

## 2019-07-13 RX ADMIN — ENOXAPARIN SODIUM 40 MG: 40 INJECTION SUBCUTANEOUS at 08:35

## 2019-07-13 RX ADMIN — DOCUSATE SODIUM 100 MG: 100 CAPSULE, LIQUID FILLED ORAL at 08:35

## 2019-07-13 RX ADMIN — SENNOSIDES 17.2 MG: 8.6 TABLET, FILM COATED ORAL at 21:01

## 2019-07-13 RX ADMIN — ASPIRIN 81 MG: 81 TABLET, COATED ORAL at 08:35

## 2019-07-13 RX ADMIN — NIFEDIPINE 30 MG: 30 TABLET, FILM COATED, EXTENDED RELEASE ORAL at 08:35

## 2019-07-13 RX ADMIN — MELATONIN 6 MG: at 21:01

## 2019-07-13 RX ADMIN — ATORVASTATIN CALCIUM 80 MG: 80 TABLET, FILM COATED ORAL at 17:03

## 2019-07-13 RX ADMIN — Medication 325 MG: at 08:36

## 2019-07-13 RX ADMIN — DOCUSATE SODIUM 100 MG: 100 CAPSULE, LIQUID FILLED ORAL at 17:00

## 2019-07-13 NOTE — PROGRESS NOTES
Occupational Therapy Treatment Note:       07/13/19 8459   Pain Assessment   Pain Assessment No/denies pain   Pain Score No Pain   Restrictions/Precautions   Precautions Cognitive; Fall Risk;Supervision on toilet/commode;Visual deficit   Weight Bearing Restrictions No   ROM Restrictions No   QI: Picking Up Object   Assistance Needed Verbal cues; Physical assistance   Assistance Provided by [place] 25%-49%   Comment Pt able to retrieve cones x 2 and return shopping basket to floor level without use of an AD (gait belt donned) with approx Tara  Discussed use of reacher LHAE for optimal balance and safety  Picking Up Object CARE Score 3   QI: Sit to Stand   Assistance Needed Incidental touching   Assistance Provided by [place] Less than 25%   Comment No AD  Sit to Stand CARE Score 3   QI: Chair/Bed-to-Chair Transfer   Assistance Needed Verbal cues; Incidental touching   Assistance Provided by [place] Less than 25%   Comment No AD; gait belt donned; VCs warranted to slow pace and avoid scissoring of RLE and foot eversion  Chair/Bed-to-Chair Transfer CARE Score 3   Transfer Bed/Chair/Wheelchair   Positioning Concerns Cognition   Limitations Noted In Balance; Coordination; Endurance;Problem Solving;Sensation; Sequencing;LE Strength;Vision   Adaptive Equipment None  (Gait belt donned  )   Stand Pivot Contact Guard   Sit to Stand Minimal  (CGA)   Stand to Sit Minimal  (CGA)   Findings Pt completed functional mobility without use of an AD (gait belt donned) with CGA; VCs warranted to slow pace and avoid scissoring of RLE and foot eversion  Bed, Chair, Wheelchair Transfer (FIM) 4 - Patient requires steadying assist or light touching   Cognition   Overall Cognitive Status Impaired   Arousal/Participation Alert; Cooperative   Attention Attends with cues to redirect   Orientation Level Oriented X4   Memory Decreased short term memory;Decreased recall of precautions   Following Commands Follows one step commands with increased time or repetition   Additional Activities   Additional Activities Comments Pt engaged in various tasks to improve upon functional mobility, standing balance, R visual inattention and scanning skills, and overall problem solving/processing skills; including retrieving cones scattered amongst hallway at various heights while ambulating without use of an AD, ball tap in unsupported stance, functional mobility outside amongst uneven terrain of sidewalk, functional mobility amongst Pharmacy store to retrieve and then return 6 specific items on shopping list; simple scanning handout, and completion of wordsearch in stance  CGA using gait belt provided for all transfers/mobility and Tara for dynamic reach in stance; approx min VCs provided for locating cones; 1 cue warranted for item retrieval in Pharmacy; when provided with increased time pt able to complete scanning handout/initiate word search without any errors; pt able to tolerate static standing at table top for approx 3 mins with CGA/CS, rest warranted due to c/o fatigue  Pt noted to favor LUE throughout (pt RUE dominant at baseline) warranting encouragement to utilize RUE during completion of tasks  Activity Tolerance   Activity Tolerance Patient tolerated treatment well   Assessment   Treatment Assessment OT tx sessions focused on transfers, standing balance/tolerance, functional mobility, R visual inattention/scanning skills, overall problem solving/processing skills, and activity tolerance/endurance  Co-treated session with PT  Refer above for details on pt performance  Pt would benefit from continued skilled OT services in order to achieve highest functional abilities  Plan   Treatment/Interventions Functional transfer training;LE strengthening/ROM; Therapeutic exercise; Endurance training;Cognitive reorientation;Patient/family training;Equipment eval/education; Compensatory technique education;OT   Progress Progressing toward goals   Recommendation   OT Discharge Recommendation 24 hour supervision/assist   OT Therapy Minutes   OT Time In 0830   OT Time Out 0930   OT Total Time (minutes) 60   OT Mode of treatment - Individual (minutes) 0   OT Mode of treatment - Concurrent (minutes) 0   OT Mode of treatment - Group (minutes) 0   OT Mode of treatment - Co-treat (minutes) 60   OT Mode of Teatment - Total time(minutes) 60 minutes   Therapy Time missed   Time missed?  No   Isaac [place] Ck

## 2019-07-13 NOTE — PROGRESS NOTES
07/13/19 0831   Pain Assessment   Pain Score No Pain   Restrictions/Precautions   Precautions Cognitive; Fall Risk;Supervision on toilet/commode  (R sided inattention )   Cognition   Arousal/Participation Alert; Cooperative   Attention Attends with cues to redirect   Memory Decreased short term memory;Decreased recall of precautions   Following Commands Follows one step commands with increased time or repetition   Subjective   Subjective Pt  has no complaints is ready to participate in therapy    QI: Sit to Stand   Assistance Needed Incidental touching   Sit to Stand CARE Score 4   QI: Chair/Bed-to-Chair Transfer   Assistance Needed Incidental touching   Chair/Bed-to-Chair Transfer CARE Score 4   Transfer Bed/Chair/Wheelchair   Adaptive Equipment None  (using gait belt )   Stand Pivot Contact Guard   Sit to Stand Contact Guard   Stand to [facility], Chair, Wheelchair Transfer (FIM) 4 - Patient requires steadying assist or light touching   QI: Walk 10 Feet   Assistance Needed Incidental touching   Walk 10 Feet CARE Score 4   QI: Walk 50 Feet with Two [address] Needed Incidental touching;Physical assistance   Assistance Provided by [place] 25%-49%   Walk 50 Feet with Two Turns CARE Score 3   QI: Walk 150 Feet   Assistance Needed Incidental touching;Physical assistance   Assistance Provided by [place] 25%-49%   Walk 150 Feet CARE Score 3   Ambulation   Does the patient walk? 2  Yes   Primary Discharge Mode of Locomotion Walk   Walk Assist Level Minimum Assist;Contact Guard   Gait Pattern Inconsistant Dolly;[place] LUIS; Trendelenburg; Improper weight shift  (out toeing )   Assist Device Other  (no AD using gait belt )   Distance Walked (feet) 150 ft   Limitations Noted In Balance; Coordination;Strength   Findings min LOB with cognitive task with cone retrieval   walked approx 60 feet outside in uneven surfaces and around pharmacy    Walking (FIM) 4 - Patient requires steadying assist or light touching AND distance 150 feet or more, no rest   Wheelchair mobility   QI: Does the patient use a wheelchair? 0  No   QI: Picking Up Object   Assistance Needed Physical assistance   Assistance Provided by [place] 25%-49%   Picking Up Object CARE Score 3   Therapeutic Interventions   Balance scanning in the hallway for cones, beach ball toss, walking around pharmacy while trying to retrive items from a list and putiing them back where she goth them and static standing while doing word finding X 8 mins    Other Comments   Comments PT/ OT co tx with PT focusing on standing and ambulation balance without AD while pt  is retrieving items challenging vision, B side attention and use of R hand   Assessment   Treatment Assessment Pt  tolerated session well with tx focused on PT/OT co tx incorporating vision and cognitive task with functional mobility   Pt  intially was unsteady and needed more of min A with first task retrtieving cones in the hallway  Pt  cont to demonstrates trendelenburg gait pattern with R out toeing due hip weakness  pt  though demonstrates better balance and coordination walking around pharmacy and only needed mostly CGA wiith no LOB noted  Pt  noted to cont to have R hand inattention tending to always use L hand to  items and to tap balloon  Pt  assisted back to recliner at end of session with no complaints  Cont with POC as tolerated  Problem List Decreased strength;Decreased endurance; Impaired balance;Decreased mobility; Decreased coordination; Impaired judgement;Decreased safety awareness   PT Barriers   Physical Impairment Decreased strength;Decreased endurance; Impaired balance;Decreased mobility; Decreased coordination; Impaired judgement;Decreased safety awareness   Functional Limitation Standing;Transfers; Walking   Plan   Treatment/Interventions Functional transfer training;LE strengthening/ROM; Elevations; Therapeutic exercise; Endurance training;Patient/family training;Equipment eval/education; Bed mobility;Gait training   Recommendation   Recommendation 24 hour supervision/assist;Home PT;Outpatient PT; Home with family support   Equipment Recommended   (LRAD)   PT Therapy Minutes   PT Time In 0830   PT Time Out 0930   PT Total Time (minutes) 60   PT Mode of treatment - Individual (minutes) 0   PT Mode of treatment - Concurrent (minutes) 0   PT Mode of treatment - Group (minutes) 0   PT Mode of treatment - Co-treat (minutes) 60   PT Mode of Teatment - Total time(minutes) 60 minutes   Therapy Time missed   Time missed?  No

## 2019-07-13 NOTE — PLAN OF CARE
Problem: Potential for Falls  Goal: Patient will remain free of falls  Description  INTERVENTIONS:  - Assess patient frequently for physical needs  -  Identify cognitive and physical deficits and behaviors that affect risk of falls    -  [place] fall precautions as indicated by assessment   - Educate patient/family on patient safety including physical limitations  - Instruct patient to call for assistance with activity based on assessment  - Modify environment to reduce risk of injury  - Consider OT/PT consult to assist with strengthening/mobility  Outcome: Progressing     Problem: PAIN - ADULT  Goal: Verbalizes/displays adequate comfort level or baseline comfort level  Description  Interventions:  - Encourage patient to monitor pain and request assistance  - Assess pain using appropriate pain scale  - Administer analgesics based on type and severity of pain and evaluate response  - Implement non-pharmacological measures as appropriate and evaluate response  - Consider cultural and social influences on pain and pain management  - Notify physician/advanced practitioner if interventions unsuccessful or patient reports new pain  Outcome: Progressing     Problem: INFECTION - ADULT  Goal: Absence or prevention of progression during hospitalization  Description  INTERVENTIONS:  - Assess and monitor for signs and symptoms of infection  - Monitor lab/diagnostic results  - Monitor all insertion sites, i e  indwelling lines, tubes, and drains  - Monitor endotracheal (as able) and nasal secretions for changes in amount and color  - [place] appropriate cooling/warming therapies per order  - Administer medications as ordered  - Instruct and encourage patient and family to use good hand hygiene technique  - Identify and instruct in appropriate isolation precautions for identified infection/condition  Outcome: Progressing     Problem: SAFETY ADULT  Goal: Maintain or return to baseline ADL function  Description  INTERVENTIONS:  -  Assess patient's ability to carry out ADLs; assess patient's baseline for ADL function and identify physical deficits which impact ability to perform ADLs (bathing, care of mouth/teeth, toileting, grooming, dressing, etc )  - Assess/evaluate cause of self-care deficits   - Assess range of motion  - Assess patient's mobility; develop plan if impaired  - Assess patient's need for assistive devices and provide as appropriate  - Encourage maximum independence but intervene and supervise when necessary  ¯ Involve family in performance of ADLs  ¯ Assess for home care needs following discharge   ¯ Request OT consult to assist with ADL evaluation and planning for discharge  ¯ Provide patient education as appropriate  Outcome: Progressing  Goal: Maintain or return mobility status to optimal level  Description  INTERVENTIONS:  - Assess patient's baseline mobility status (ambulation, transfers, stairs, etc )    - Identify cognitive and physical deficits and behaviors that affect mobility  - Identify mobility aids required to assist with transfers and/or ambulation (gait belt, sit-to-stand, lift, walker, cane, etc )  - [place] fall precautions as indicated by assessment  - Record patient progress and toleration of activity level on Mobility SBAR; progress patient to next Phase/Stage  - Instruct patient to call for assistance with activity based on assessment  - Request Rehabilitation consult to assist with strengthening/weightbearing, etc   Outcome: Progressing     Problem: DISCHARGE PLANNING  Goal: Discharge to home or other facility with appropriate resources  Description  INTERVENTIONS:  - Identify barriers to discharge w/patient and caregiver  - Arrange for needed discharge resources and transportation as appropriate  - Identify discharge learning needs (meds, wound care, etc )  - Arrange for interpretive services to assist at discharge as needed  - Refer to Case Management Department for coordinating discharge planning if the patient needs post-hospital services based on physician/advanced practitioner order or complex needs related to functional status, cognitive ability, or social support system  Outcome: Progressing     Problem: Prexisting or High Potential for Compromised Skin Integrity  Goal: Skin integrity is maintained or improved  Description  INTERVENTIONS:  - Identify patients at risk for skin breakdown  - Assess and monitor skin integrity  - Assess and monitor nutrition and hydration status  - Monitor labs (i e  albumin)  - Assess for incontinence   - Turn and reposition patient  - Assist with mobility/ambulation  - Relieve pressure over bony prominences  - Avoid friction and shearing  - Provide appropriate hygiene as needed including keeping skin clean and dry  - Evaluate need for skin moisturizer/barrier cream  - Collaborate with interdisciplinary team (i e  Nutrition, Rehabilitation, etc )   - Patient/family teaching  Outcome: Progressing

## 2019-07-13 NOTE — PROGRESS NOTES
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; s/p left CEA 6/28/19:  continue ASA/Plavix, Lipitor = for home Cards advised change Lipitor to Crestor 40mg daily      Iron deficiency anemia; s/p transfusion and iron IV: continue iron PO supplements      AVM stomach/duodenum tx with APC; cold snare sigmoid colon polypectomy: continue PPI     HTN: at home was on Lopressor and now is switched to Coreg and Nifedipine     RUL ground glass density: for Pulm to see OP     Urethral diverticulum: to see Uro as OP for cystogram     Nicotine abuse: counseled for cessation     Fall 2/2 LOB 6/24/19: CT head was negative; has remaining ecchymosis right forehead/orbit     ABLA: Hgb 8 9, stable s/p transfusion  Continue FeSO4 supplementation  Subjective/ HPI: Patient seen and examined  Patients overnight issues or events were reviewed with nursing or staff during rounds or morning huddle session  New or overnight issues include the following: HTN: Currently well controlled  Continue present medications  CVA: No new stroke symptoms  Continue antiplatelet and statin  Anemia: Hgb stable s/p transfusion  Pt currently reports that she is doing well       ROS:     GI: denies abdominal pain, change bowel habits or reflux symptoms  Neuro: Denies any headache, new vision changes, new neuropathies,new weaknesses   Respiratory: No Cough, SOB, denies wheeze  Cardiovascular: No CP, palpitations , denies perception of rapid heartbeat  : denies any new urinary burning or frequency    Review of Scheduled Meds:    Current Facility-Administered Medications:  aspirin 81 mg Oral Daily Ed Arreola MD   atorvastatin 80 mg Oral Daily With Dinner Blanca Hunt MD   bisacodyl 10 mg Rectal Daily PRN Amina Whipple TERESA   calcium carbonate 500 mg Oral Daily PRN Ed Arreola MD   carvedilol 3 125 mg Oral BID With Meals Guero Doan MD   clopidogrel 75 mg Oral Daily Ed Arreola MD   docusate sodium 100 mg Oral BID Amina Whipple PA-C   enoxaparin 40 mg Subcutaneous Q24H [address] Ed Arreola MD   ferrous sulfate 325 mg Oral BID With Meals Ed Arreola MD   melatonin 6 mg Oral HS Ed Arreola MD   NIFEdipine 30 mg Oral Daily Ed Arreola MD   oxyCODONE 2 5 mg Oral Q4H PRN Ed Arreola MD   oxyCODONE 5 mg Oral Q4H PRN Ed Arreola MD   pantoprazole 40 mg Oral Early Morning Ed Arreola MD   polyethylene glycol 17 g Oral Daily PRN Amina Whipple PA-C   senna 2 tablet Oral HS Amina Whipple PA-C       Labs:     Results from last 7 days   Lab Units 07/13/19  0446 07/12/19  1125   WBC Thousand/uL 8 56 8 86   HEMOGLOBIN g/dL 8 9* 9 5*   HEMATOCRIT % 30 7* 33 0*   PLATELETS Thousands/uL 366 394*     Results from last 7 days   Lab Units 07/11/19  0539 07/08/19  0540   SODIUM mmol/L 142 142   POTASSIUM mmol/L 3 5 3 6   CHLORIDE mmol/L 112* 110*   CO2 mmol/L 25 26   BUN mg/dL 22 22   CREATININE mg/dL 0 80 0 89   CALCIUM mg/dL 8 6 8 6                        Imaging:     No orders to display       *Labs reviewed  *Radiology studies reviewed  *Medications reviewed and reconciled as needed  *Please refer to order section for additional ordered labs studies  *Case discussed with primary attending during morning huddle case rounds    Physical Examination:  Vitals:   Vitals:    07/12/19 1705 07/12/19 2117 07/13/19 0433 07/13/19 0835   BP: 156/68 163/69 135/60 145/67   BP Location:  Right arm Left arm    Pulse: 65 68 77 73   Resp:  20 20    Temp:  98 3 °F (36 8 °C) 97 8 °F (36 6 °C)    TempSrc:  Oral Oral    SpO2:  95% 97%    Weight:   73 3 kg (161 lb 8 oz)    Height:           General Appearance: NAD, conversive  Eyes: No icterus; conjunctiva normal, PERRLA  HENT: oropharynx clear; mucous membranes moist; no ulcerations, normal hard and soft palette  Neck: trachea midline, range of motion full  Supple, no lymphadenopathy or thyromegaly  Lungs: CTA, normal respiratory effort, no retractions, expiratory effort normal  CV: regular rate, no rubs no gallops, PMI normal place and intensity  ABD: soft non tender, no masses , no hepatic or splenomegaly  EXT: DP pulses intact, no lymphadenopathy, no edema  Skin: normal turgor, normal texture, no rash, no ulcers  Psych: affect normal, No anxiety,   Neuro: AAOx3            Total time spent: At least 40 minutes, with more than 50% spent counseling/coordinating care  Counseling includes discussion with patient re: progress  and discussion with patient of his/her current medical state/information  Coordination of patient's care was performed in conjunction with primary service  Time invested included review of patient's labs, vitals, and management of their comorbidities with continued monitoring  In addition, this patient was discussed with medical team including physician and advanced extenders  The care of the patient was extensively discussed and appropriate treatment plan was formulated unique for this patient  ** Please Note: Dragon 360 Dictation voice to text software may have been used in the creation of this document   **

## 2019-07-13 NOTE — PROGRESS NOTES
Occupational Therapy Treatment Note:       07/13/19 3807   Pain Assessment   Pain Assessment No/denies pain   Pain Score No Pain   Restrictions/Precautions   Precautions Cognitive; Fall Risk;Supervision on toilet/commode;Visual deficit   Weight Bearing Restrictions No   ROM Restrictions No   QI: Picking Up Object   Assistance Needed Verbal cues; Physical assistance   Assistance Provided by [place] 25%-49%   Comment Pt able to retrieve cones x 2 and return shopping basket to floor level without use of an AD (gait belt donned) with approx Tara  Discussed use of reacher LHAE for optimal balance and safety  Picking Up Object CARE Score 3   QI: Sit to Stand   Assistance Needed Incidental touching   Assistance Provided by [place] Less than 25%   Comment No AD  Sit to Stand CARE Score 3   QI: Chair/Bed-to-Chair Transfer   Assistance Needed Verbal cues; Incidental touching   Assistance Provided by [place] Less than 25%   Comment No AD; gait belt donned; VCs warranted to slow pace and avoid scissoring of RLE and foot eversion  Chair/Bed-to-Chair Transfer CARE Score 3   Transfer Bed/Chair/Wheelchair   Positioning Concerns Cognition   Limitations Noted In Balance; Coordination; Endurance;Problem Solving;Sensation; Sequencing;LE Strength;Vision   Adaptive Equipment None  (Gait belt donned  )   Stand Pivot Contact Guard   Sit to Stand Minimal  (CGA)   Stand to Sit Minimal  (CGA)   Findings Pt completed functional mobility without use of an AD (gait belt donned) with CGA; VCs warranted to slow pace and avoid scissoring of RLE and foot eversion  Bed, Chair, Wheelchair Transfer (FIM) 4 - Patient requires steadying assist or light touching   Cognition   Overall Cognitive Status Impaired   Arousal/Participation Alert; Cooperative   Attention Attends with cues to redirect   Orientation Level Oriented X4   Memory Decreased short term memory;Decreased recall of precautions   Following Commands Follows one step commands with increased time or repetition   Additional Activities   Additional Activities Comments Pt engaged in various tasks to improve upon functional mobility, standing balance, R visual inattention and scanning skills, and overall problem solving/processing skills; including retrieving cones scattered amongst hallway at various heights while ambulating without use of an AD, ball tap in unsupported stance, functional mobility outside amongst uneven terrain of sidewalk, functional mobility amongst Pharmacy store to retrieve and then return 6 specific items on shopping list; simple scanning handout, and completion of wordsearch  CGA using gait belt provided for all transfers/mobility and Tara for dynamic reach in stance; approx min VCs provided for locating cones; 1 cue warranted for item retrieval in Pharmacy; when provided with increased time pt able to complete scanning handout/initiate word search without any errors  Activity Tolerance   Activity Tolerance Patient tolerated treatment well   Assessment   Treatment Assessment OT tx sessions focused on transfers, standing balance, functional mobility, R visual inattention/scanning skills, overall problem solving/processing skills, and activity tolerance/endurance  Co-treated session with PT  Refer above for details on pt performance  Pt would benefit from continued skilled OT services in order to achieve highest functional abilities  Plan   Treatment/Interventions Functional transfer training;LE strengthening/ROM; Therapeutic exercise; Endurance training;Cognitive reorientation;Patient/family training;Equipment eval/education; Compensatory technique education;OT   Progress Progressing toward goals   Recommendation   OT Discharge Recommendation 24 hour supervision/assist   OT Therapy Minutes   OT Time In 0830   OT Time Out 0930   OT Total Time (minutes) 60   OT Mode of treatment - Individual (minutes) 0   OT Mode of treatment - Concurrent (minutes) 0   OT Mode of treatment - Group (minutes) 0   OT Mode of treatment - Co-treat (minutes) 60   OT Mode of Teatment - Total time(minutes) 60 minutes   Therapy Time missed   Time missed?  No   Vidal Daniel, [address]

## 2019-07-14 PROCEDURE — G0515 COGNITIVE SKILLS DEVELOPMENT: HCPCS

## 2019-07-14 PROCEDURE — 97110 THERAPEUTIC EXERCISES: CPT

## 2019-07-14 PROCEDURE — 97535 SELF CARE MNGMENT TRAINING: CPT | Performed by: OCCUPATIONAL THERAPY ASSISTANT

## 2019-07-14 PROCEDURE — 97530 THERAPEUTIC ACTIVITIES: CPT

## 2019-07-14 PROCEDURE — 97116 GAIT TRAINING THERAPY: CPT

## 2019-07-14 PROCEDURE — 97112 NEUROMUSCULAR REEDUCATION: CPT | Performed by: OCCUPATIONAL THERAPY ASSISTANT

## 2019-07-14 RX ADMIN — CLOPIDOGREL BISULFATE 75 MG: 75 TABLET ORAL at 08:58

## 2019-07-14 RX ADMIN — PANTOPRAZOLE SODIUM 40 MG: 40 TABLET, DELAYED RELEASE ORAL at 05:39

## 2019-07-14 RX ADMIN — Medication 325 MG: at 08:59

## 2019-07-14 RX ADMIN — CARVEDILOL 3.12 MG: 3.12 TABLET, FILM COATED ORAL at 08:58

## 2019-07-14 RX ADMIN — ENOXAPARIN SODIUM 40 MG: 40 INJECTION SUBCUTANEOUS at 08:58

## 2019-07-14 RX ADMIN — CARVEDILOL 3.12 MG: 3.12 TABLET, FILM COATED ORAL at 17:13

## 2019-07-14 RX ADMIN — ASPIRIN 81 MG: 81 TABLET, COATED ORAL at 08:58

## 2019-07-14 RX ADMIN — SENNOSIDES 17.2 MG: 8.6 TABLET, FILM COATED ORAL at 21:30

## 2019-07-14 RX ADMIN — DOCUSATE SODIUM 100 MG: 100 CAPSULE, LIQUID FILLED ORAL at 08:58

## 2019-07-14 RX ADMIN — NIFEDIPINE 30 MG: 30 TABLET, FILM COATED, EXTENDED RELEASE ORAL at 08:59

## 2019-07-14 RX ADMIN — Medication 325 MG: at 17:13

## 2019-07-14 RX ADMIN — ATORVASTATIN CALCIUM 80 MG: 80 TABLET, FILM COATED ORAL at 17:13

## 2019-07-14 RX ADMIN — MELATONIN 6 MG: at 22:45

## 2019-07-14 RX ADMIN — DOCUSATE SODIUM 100 MG: 100 CAPSULE, LIQUID FILLED ORAL at 17:13

## 2019-07-14 NOTE — PROGRESS NOTES
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; s/p left CEA 6/28/19:  continue ASA/Plavix, Lipitor = for home Cards advised change Lipitor to Crestor 40mg daily      Iron deficiency anemia; s/p transfusion and iron IV: continue iron PO supplements      AVM stomach/duodenum tx with APC; cold snare sigmoid colon polypectomy: continue PPI     HTN: at home was on Lopressor and now is switched to Coreg and Nifedipine     RUL ground glass density: for Pulm to see OP     Urethral diverticulum: to see Uro as OP for cystogram     Nicotine abuse: counseled for cessation     Fall 2/2 LOB 6/24/19: CT head was negative; has remaining ecchymosis right forehead/orbit     ABLA: Hgb 8 9, stable s/p transfusion  Continue FeSO4 supplementation  Subjective/ HPI: Patient seen and examined  Patients overnight issues or events were reviewed with nursing or staff during rounds or morning huddle session  New or overnight issues include the following: HTN: Currently well controlled  Continue present medications  CVA: No new stroke symptoms  Continue antiplatelet and statin  Anemia: Hgb stable s/p transfusion  Pt currently reports that she is doing well       ROS:     GI: denies abdominal pain, change bowel habits or reflux symptoms  Neuro: Denies any headache, new vision changes, new neuropathies,new weaknesses   Respiratory: No Cough, SOB, denies wheeze  Cardiovascular: No CP, palpitations , denies perception of rapid heartbeat  : denies any new urinary burning or frequency    Review of Scheduled Meds:    Current Facility-Administered Medications:  aspirin 81 mg Oral Daily Ed Arreola MD   atorvastatin 80 mg Oral Daily With Dinner Elisa Kiser MD   bisacodyl 10 mg Rectal Daily PRN Amina Whipple TERESA   calcium carbonate 500 mg Oral Daily PRN Ed Arreola MD   carvedilol 3 125 mg Oral BID With Meals Neville Jones MD   clopidogrel 75 mg Oral Daily Ed Arreola MD   docusate sodium 100 mg Oral BID Amina Whipple PA-C   enoxaparin 40 mg Subcutaneous Q24H [address] Ed Arreola MD   ferrous sulfate 325 mg Oral BID With Meals Neville Jones MD   melatonin 6 mg Oral HS Ed Arreola MD   NIFEdipine 30 mg Oral Daily Ed Arreola MD   oxyCODONE 2 5 mg Oral Q4H PRN Ed Arreola MD   oxyCODONE 5 mg Oral Q4H PRN Ed Arreola MD   pantoprazole 40 mg Oral Early Morning Ed Arreola MD   polyethylene glycol 17 g Oral Daily PRN Amian Whipple PA-C   senna 2 tablet Oral HS Amina Whipple PA-C       Labs:     Results from last 7 days   Lab Units 07/13/19  0446 07/12/19  1125   WBC Thousand/uL 8 56 8 86   HEMOGLOBIN g/dL 8 9* 9 5*   HEMATOCRIT % 30 7* 33 0*   PLATELETS Thousands/uL 366 394*     Results from last 7 days   Lab Units 07/11/19  0539 07/08/19  0540   SODIUM mmol/L 142 142   POTASSIUM mmol/L 3 5 3 6   CHLORIDE mmol/L 112* 110*   CO2 mmol/L 25 26   BUN mg/dL 22 22   CREATININE mg/dL 0 80 0 89   CALCIUM mg/dL 8 6 8 6                        Imaging:     No orders to display       *Labs reviewed  *Radiology studies reviewed  *Medications reviewed and reconciled as needed  *Please refer to order section for additional ordered labs studies  *Case discussed with primary attending during morning huddle case rounds    Physical Examination:  Vitals:   Vitals:    07/13/19 1342 07/13/19 1700 07/13/19 2034 07/14/19 0539   BP: 118/62 156/72 157/68 134/64   BP Location: Right arm  Right arm Right arm   Pulse: 70 59 65 74   Resp: 18  20 20   Temp: 98 2 °F (36 8 °C)  97 5 °F (36 4 °C) 97 7 °F (36 5 °C)   TempSrc: Oral  Oral Oral   SpO2: 97%  98% 95%   Weight:    73 2 kg (161 lb 6 oz)   Height:           General Appearance: NAD, conversive  Eyes: No icterus; conjunctiva normal, PERRLA  HENT: oropharynx clear; mucous membranes moist; no ulcerations, normal hard and soft palette  Neck: trachea midline, range of motion full  Supple, no lymphadenopathy or thyromegaly  Lungs: CTA, normal respiratory effort, no retractions, expiratory effort normal  CV: regular rate, no rubs no gallops, PMI normal place and intensity  ABD: soft non tender, no masses , no hepatic or splenomegaly  EXT: DP pulses intact, no lymphadenopathy, no edema  Skin: normal turgor, normal texture, no rash, no ulcers  Psych: affect normal, No anxiety,   Neuro: AAOx3            Total time spent: At least 40 minutes, with more than 50% spent counseling/coordinating care  Counseling includes discussion with patient re: progress  and discussion with patient of his/her current medical state/information  Coordination of patient's care was performed in conjunction with primary service  Time invested included review of patient's labs, vitals, and management of their comorbidities with continued monitoring  In addition, this patient was discussed with medical team including physician and advanced extenders  The care of the patient was extensively discussed and appropriate treatment plan was formulated unique for this patient  ** Please Note: Dragon 360 Dictation voice to text software may have been used in the creation of this document   **

## 2019-07-14 NOTE — PLAN OF CARE
Problem: Potential for Falls  Goal: Patient will remain free of falls  Description  INTERVENTIONS:  - Assess patient frequently for physical needs  -  Identify cognitive and physical deficits and behaviors that affect risk of falls    -  [place] fall precautions as indicated by assessment   - Educate patient/family on patient safety including physical limitations  - Instruct patient to call for assistance with activity based on assessment  - Modify environment to reduce risk of injury  - Consider OT/PT consult to assist with strengthening/mobility  Outcome: Progressing     Problem: PAIN - ADULT  Goal: Verbalizes/displays adequate comfort level or baseline comfort level  Description  Interventions:  - Encourage patient to monitor pain and request assistance  - Assess pain using appropriate pain scale  - Administer analgesics based on type and severity of pain and evaluate response  - Implement non-pharmacological measures as appropriate and evaluate response  - Consider cultural and social influences on pain and pain management  - Notify physician/advanced practitioner if interventions unsuccessful or patient reports new pain  Outcome: Progressing     Problem: INFECTION - ADULT  Goal: Absence or prevention of progression during hospitalization  Description  INTERVENTIONS:  - Assess and monitor for signs and symptoms of infection  - Monitor lab/diagnostic results  - Monitor all insertion sites, i e  indwelling lines, tubes, and drains  - Monitor endotracheal (as able) and nasal secretions for changes in amount and color  - [place] appropriate cooling/warming therapies per order  - Administer medications as ordered  - Instruct and encourage patient and family to use good hand hygiene technique  - Identify and instruct in appropriate isolation precautions for identified infection/condition  Outcome: Progressing     Problem: SAFETY ADULT  Goal: Maintain or return to baseline ADL function  Description  INTERVENTIONS:  -  Assess patient's ability to carry out ADLs; assess patient's baseline for ADL function and identify physical deficits which impact ability to perform ADLs (bathing, care of mouth/teeth, toileting, grooming, dressing, etc )  - Assess/evaluate cause of self-care deficits   - Assess range of motion  - Assess patient's mobility; develop plan if impaired  - Assess patient's need for assistive devices and provide as appropriate  - Encourage maximum independence but intervene and supervise when necessary  ¯ Involve family in performance of ADLs  ¯ Assess for home care needs following discharge   ¯ Request OT consult to assist with ADL evaluation and planning for discharge  ¯ Provide patient education as appropriate  Outcome: Progressing  Goal: Maintain or return mobility status to optimal level  Description  INTERVENTIONS:  - Assess patient's baseline mobility status (ambulation, transfers, stairs, etc )    - Identify cognitive and physical deficits and behaviors that affect mobility  - Identify mobility aids required to assist with transfers and/or ambulation (gait belt, sit-to-stand, lift, walker, cane, etc )  - [place] fall precautions as indicated by assessment  - Record patient progress and toleration of activity level on Mobility SBAR; progress patient to next Phase/Stage  - Instruct patient to call for assistance with activity based on assessment  - Request Rehabilitation consult to assist with strengthening/weightbearing, etc   Outcome: Progressing     Problem: DISCHARGE PLANNING  Goal: Discharge to home or other facility with appropriate resources  Description  INTERVENTIONS:  - Identify barriers to discharge w/patient and caregiver  - Arrange for needed discharge resources and transportation as appropriate  - Identify discharge learning needs (meds, wound care, etc )  - Arrange for interpretive services to assist at discharge as needed  - Refer to Case Management Department for coordinating discharge planning if the patient needs post-hospital services based on physician/advanced practitioner order or complex needs related to functional status, cognitive ability, or social support system  Outcome: Progressing     Problem: Prexisting or High Potential for Compromised Skin Integrity  Goal: Skin integrity is maintained or improved  Description  INTERVENTIONS:  - Identify patients at risk for skin breakdown  - Assess and monitor skin integrity  - Assess and monitor nutrition and hydration status  - Monitor labs (i e  albumin)  - Assess for incontinence   - Turn and reposition patient  - Assist with mobility/ambulation  - Relieve pressure over bony prominences  - Avoid friction and shearing  - Provide appropriate hygiene as needed including keeping skin clean and dry  - Evaluate need for skin moisturizer/barrier cream  - Collaborate with interdisciplinary team (i e  Nutrition, Rehabilitation, etc )   - Patient/family teaching  Outcome: Progressing

## 2019-07-14 NOTE — PROGRESS NOTES
Occupational Therapy Treatment Note:       07/14/19 0700   Pain Assessment   Pain Assessment No/denies pain   Pain Score No Pain   Restrictions/Precautions   Precautions Cognitive; Fall Risk;Supervision on toilet/commode;Visual deficit   Weight Bearing Restrictions No   ROM Restrictions No   QI: Eating   Assistance Needed Supervision   Assistance Provided by [place] No physical assistance   Comment Pt able to complete s/u without assistance when provided with increased time  Eating CARE Score 4   Eating Assessment   Food To Mouth Yes   Able To Cut Yes   Positioning Upright;Out of Bed   Meal Assessed Breakfast   Intake Mode PO;Self   Dentures Upper   Finishes Timely Yes   Opens Packages Yes   Eating (FIM) 6 - Patient requires assistive device or dentures   QI: Oral Hygiene   Assistance Needed Incidental touching   Assistance Provided by [place] Less than 25%   Comment In stance  Oral Hygiene CARE Score 3   Grooming   Able To Initiate Tasks; Acquire Items;Comb/Brush Hair;Wash/Dry Face;Brush/Clean Teeth;Wash/Dry Hands  (Blowdry hair  )   Limitation Noted In Coordination;Problem Solving; Safety;Strength   Findings Oral-care in stance with CGA; remainder seated (utilizing heavy blowdryer with RUE)   Grooming (FIM) 4 - Patient requires steadying assist or light touching   QI: Shower/Bathe Self   Assistance Needed Adaptive equipment;Set-up / clean-up;Supervision;Verbal cues   Assistance Provided by [place] Less than 25%   Comment CS while pt standing unilaterally supported at grab bar with no noted LOB; VCs to initiate hand support in order to maintain optimal balance and safety  Remainder completed while seated  Frequent droppage of bottles noted when manipulating with R hand      Shower/Bathe Self CARE Score 3   Bathing   Assessed Bath Style Shower   Anticipated D/C Bath Style Tub   Able to Gather/Transport No   Able to [employer] Temperature Yes   Able to Wash/Rinse/Dry (body part) Left Arm;Right Arm;L Upper Leg;R Upper Leg;L Lower Leg/Foot;R Lower Leg/Foot;Chest;Abdomen;Perineal Area; Buttocks   Limitations Noted in Balance; Coordination; Endurance;Problem Solving; Safety;Strength   Positioning Seated;Standing   Adaptive Equipment Tub Bench; Shower Bars;Hand Held Shower   Bathing (FIM) 5 - Patient requires supervision/monitoring but completes 10/10 parts   Tub/Shower Transfer   Limitations Noted In Balance; Coordination; Endurance;Problem Solving; Safety;LE Strength   Adaptive Equipment Grab Bars;Transfer Bench   Assessed Shower   Findings Functional mobility without use of AD with HHA/CGA to tub bench using grab bars; VCs for technique in order to ensure good safety; pt noted to catch RLE on foot of tub bench  Shower Transfer (FIM) 4 - Patient requires steadying assist or light touching   QI: Upper Body Dressing   Assistance Needed Set-up / clean-up;Supervision   Assistance Provided by [place] No physical assistance   Comment Seated  Upper Body Dressing CARE Score 4   QI: Lower Body Dressing   Assistance Needed Shivani Torres / Jose Juan Smith; Incidental touching   Assistance Provided by [place] Less than 25%   Comment Instruction for progression of tasks required; pt stated, "what next? are we done?" while seated in underwear without pants, socks, or shoes donned yet; upon reply pt stated, "oh yeah I forgot about that " VCs warranted to doff undergarment/pants over feet while seated rather than in stance, and to ensure pt not stepping on R pants leg prior to standing in order to decrease risk for falls  Pt completed clothing mgmt over hips while in brief unsupported stance with CGA post steadying HHA provided initially  Lower Body Dressing CARE Score 3   QI: Putting On/Taking Off Footwear   Assistance Needed Set-up / clean-up;Supervision   Assistance Provided by [place] No physical assistance   Comment Seated; doffing/donning socks and slip on style shoes using method of crossing 1 LE over opposing knee      Putting On/Taking Off Footwear CARE Score 4   Dressing/Undressing Clothing   Remove UB Clothes Pullover Shirt   Remove LB Clothes Undergarment   Don UB Clothes Pullover Shirt   Don LB Clothes Pants; Undergarment;Socks; Shoes   Limitations Noted In Balance; Coordination; Endurance;Problem Solving; Safety;Strength;Timeliness   Positioning Supported Sit;Standing   UB Dressing (FIM) 5 - Patient requires supervision/monitoring   LB Dressing (FIM) 4 - Patient requires steadying assist or light touching   QI: Sit to [address] Provided by [place] No physical assistance   Comment CS no AD  Sit to Stand CARE Score 4   QI: Chair/Bed-to-Chair Transfer   Assistance Needed Betty Ramos / Renetta Harkins; Incidental touching   Assistance Provided by [place] No physical assistance   Comment No AD  Chair/Bed-to-Chair Transfer CARE Score 4   Transfer Bed/Chair/Wheelchair   Limitations Noted In Balance; Coordination; Endurance;Problem Solving;Sensation;UE Strength;LE Strength;Vision   Adaptive Equipment None   Stand Pivot Contact Guard   Sit to Stand Minimal  (CGA)   Stand to Sit Minimal  (CGA)   Findings Pt completed functional mobility short distances without use of an AD with CGA  Bed, Chair, Wheelchair Transfer (FIM) 4 - Patient requires steadying assist or light touching   Exercise Tools   Other Exercise Tool 1 Pt engaged in activity of placing small plastic chips through overhead slot of game Connect Four using [place], followed by engaging in playing 2 rounds of the game, in order to improve upon depth perception skills as well as overall problem solving/processing skills for carryover in completion of functional tasks  Other Exercise Tool 2 Pt engaged in activity of using R hand sensation/stereognosis skills to retrieve 3D shape in bag with vision occluded and attempt to identify which puzzle cut out it matches for carryover in completion of functional tasks; pt with no success with 0/5 correct      Cognition   Overall Cognitive Status Impaired   Arousal/Participation Alert; Cooperative   Attention Attends with cues to redirect   Orientation Level Oriented X4   Memory Decreased short term memory;Decreased recall of precautions   Following Commands Follows one step commands with increased time or repetition   Activity Tolerance   Activity Tolerance Patient tolerated treatment well   Assessment   Treatment Assessment OT tx sessions focused on transfers, standing balance, functional mobility, ADL skills, RUE neuro re-ed, overall problem solving/processing skills, and activity tolerance/endurance  Refer above for details on pt performance  Pt would benefit from continued skilled OT services in order to achieve highest functional abilities  Prognosis Good   Problem List Decreased strength;Decreased endurance; Impaired balance;Decreased mobility; Decreased coordination;Decreased cognition; Impaired judgement;Decreased safety awareness; Impaired vision; Impaired sensation   Barriers to Discharge Decreased caregiver support   Plan   Treatment/Interventions ADL retraining;Functional transfer training; Therapeutic exercise; Endurance training;Cognitive reorientation;Patient/family training;Equipment eval/education; Compensatory technique education;OT   Progress Progressing toward goals   Recommendation   OT Discharge Recommendation 24 hour supervision/assist   OT Therapy Minutes   OT Time In 0700   OT Time Out 0830   OT Total Time (minutes) 90   OT Mode of treatment - Individual (minutes) 90   OT Mode of treatment - Concurrent (minutes) 0   OT Mode of treatment - Group (minutes) 0   OT Mode of treatment - Co-treat (minutes) 0   OT Mode of Teatment - Total time(minutes) 90 minutes   Therapy Time missed   Time missed?  No   Lia Queen, [address]

## 2019-07-14 NOTE — PROGRESS NOTES
07/14/19 1300   Pain Assessment   Pain Assessment No/denies pain   Pain Score No Pain   Restrictions/Precautions   Precautions Cognitive; Fall Risk;Visual deficit  (R inattention)   Memory Skills   Memory (FIM) 3 - Recognizes, recalls/performs 50-74%   Social Interaction (FIM) 5 - Interacts appropriately with others 90% of time   Speech/Language/Cognition Assessmetn   Treatment Assessment Pt participated in skilled ST session targeting cognitive linguistic skills  Initiated session by engaging in visual memory task where pt presented w/ different picture scenes  Prior to beginning task, pt educated on strategies to improve retention and recall of info  Pt recalled info from picture scenes w/ 19/22 accuracy, noting the need for semantic cues to achieve 20/22 accuracy and more moderate verbal cues to improve initially errored recall of info  Targeting reasoning, word knowledge and attention, pt engaged in a scrambled word task where she was presented w/ scrambled letters and a brief definition  Pt independently determined words in 40/48 trials, improving accuracy of task to 48/48 given semantic, verbal and occasionally gestural cues to improve word finding/knowledge  While pt demonstrated overall good attention to task in general as she verbalized that she enjoyed the task and desired to continue it, pt noted to have minimal attention to details such as ensuring to use all letters given for words, frequently omitted "s" or the last letter when spelling the targeted word  When pt demonstrating difficulty w/ specific trials, pt also noted to require verbal encouragement and cues for problem solving in order to skip trial then come back  Lastly, pt engaged in a working memory/sequencing task when auditorily presented w/ a [id number] [employer] words   Pt accurately recalled info by sequencing words by progressing degree in 8/11 trials, requiring contextual and verbal cues to improve sequencing abilities to which she then recognized errors  Of note, during task, pt w/ ability to accurately recall all stimuli, w/o the need for repetition of info while sequencing  When returning to pt's room, pt had previously been instructed to use [facility] for ambulation  Pt w/ decreased recall of instructions for need to still use call bell in her room and need for assist w/ ambulation as she reported that she did not feel she was "steady enough" to ambulate to bathroom on her own using SPC  Pt re-educated on safety and bathroom procedure regarding need to wait for assistance  At this time, pt will benefit from further skilled ST services to maximize functional cognitive linguistic skills for increased safety and independence  SLP Therapy Minutes   SLP Time In 1300   SLP Time Out 1400   SLP Total Time (minutes) 60   SLP Mode of treatment - Individual (minutes) 60   SLP Mode of treatment - Concurrent (minutes) 0   SLP Mode of treatment - Group (minutes) 0   SLP Mode of treatment - Co-treat (minutes) 0   SLP Mode of Teatment - Total time(minutes) 60 minutes   Therapy Time missed   Time missed?  No   Daily FIM Score   Problem solving (FIM) 3 - Solves basic problmes 50-74% of time   Comprehension (FIM) 4 - Understands basic info/conversation 75-90% of time   Expression (FIM) 5 - Needs help/cues only RARELY (< 10% of the time)

## 2019-07-14 NOTE — PROGRESS NOTES
07/14/19 0900   Pain Assessment   Pain Assessment No/denies pain   Pain Score No Pain   Restrictions/Precautions   Precautions Cognitive; Fall Risk;Visual deficit  (R sided inattention)   Cognition   Overall Cognitive Status Impaired   Arousal/Participation Alert; Cooperative   Attention Attends with cues to redirect   Orientation Level Oriented X4   Memory Decreased short term memory;Decreased recall of precautions   Following Commands Follows one step commands with increased time or repetition   Subjective   Subjective No complaints reported by pt upon arrival to her room      QI: Roll Left and Right   Assistance Needed Set-up / clean-up   Assistance Provided by [place] No physical assistance   Comment HOB flat w/o bed rails   Roll Left and Right CARE Score 5   QI: Sit to [address] [address] [facility] Provided by [place] No physical assistance   Comment HOB flat w/o bed rails   Sit to Lying CARE Score 5   QI: Lying to Sitting on Side of Bed   Assistance Needed Set-up / clean-up   Assistance Provided by [place] No physical assistance   Comment HOB flat w/o bed rails   Lying to Sitting on Side of Bed CARE Score 5   QI: Sit to [address] Provided by [place] No physical assistance   Comment CS with and w/o SPC; with SPC, pt requires increased cueing for sequencing and proper hand placement   Sit to Stand CARE Score 4   Bed Mobility   Able to Roll Right to Left;Left to Right;Scoot Up;Scoot Down   Adaptive Equipment Other  (no AD)   Findings DS for all bed mobilties   QI: Chair/Bed-to-Chair Transfer   Assistance Needed Incidental touching   Assistance Provided by [place] No physical assistance   Comment CS-CGA for transfers; tactile cues for hand placement, especially with SPC as pt attempts to switch SPC from L hand to R hand before sitting   Chair/Bed-to-Chair Transfer CARE Score 4   Transfer Bed/Chair/Wheelchair   Limitations Noted In Balance;Confidence; Endurance;Problem Solving; Sequencing;Sensation;UE Strength;LE Strength;Vision   Adaptive Equipment None;Cane   Stand Pivot Contact Guard;Supervision   Sit to Stand Supervision   Stand to Sit Supervision   Supine to Sit Supervision   Sit to Supine Supervision   Car Transfer Supervision   Findings performed with and without SPC; cont to practice with SPC in order to improve carryover of hand placement with use of SPC   Bed, Chair, Wheelchair Transfer (FIM) 4 - Patient requires steadying assist or light touching   QI: [address] Provided by [place] No physical assistance   Comment CS with car transfer; verbal cueing for hand placement and to look back to R UE   Car Transfer CARE Score 4   QI: [address] Provided by [place] No physical assistance   Comment CS with SPC   Walk 10 Feet CARE Score 4   QI: Walk 50 Feet with Two Turns   Assistance Needed Incidental touching   Assistance Provided by [place] No physical assistance   Comment CGA-CS without SPC; with SPC, CS; pt demo's posterior and lateral sway at times but is able to stop and maintain balance without requiring physical assist from therapist   Walk 50 Feet with Two Turns CARE Score 4   QI: Walk 150 Feet   Assistance Needed Incidental touching   Assistance Provided by [place] No physical assistance   Comment CGA-CS without SPC; with SPC, CS; pt demo's posterior and lateral sway at times but is able to stop and maintain balance without requiring physical assist from therapist; 150'x1 without AD and 150'x1 with SPC   Walk 150 Feet CARE Score 4   QI: Walking 10 Feet on Uneven Surfaces   Assistance Needed Incidental touching   Assistance Provided by [place] No physical assistance   Comment CGA-CS on outside concrete pavers x150' and ascending/descending outside ramp x30' wihile utilizing SPC; pt demo's good safety and takes time when stepping over uneven surfaces   Walking 10 Feet on Uneven Surfaces CARE Score 4   Ambulation   Does the patient walk? 2  Yes   Primary Discharge Mode of Locomotion Walk   Walk Assist Level Contact Guard;Close Supervision   Gait Pattern Inconsistant Dolly; Slow Dolly;Decreased foot clearance;R foot drag;Narrow LUIS; Trendelenburg; Improper weight shift   Assist Device Verlinda Awe; Other  (no AD)   Distance Walked (feet) 150 ft  (x2, first trial without AD, second trial with SPC)   Limitations Noted In Balance; Coordination; Endurance; Heel Strike;Posture; Safety; Sensation; Sequencing;Speed;Strength;Swing   Findings Pt demo's good gait pattern with use of SPC; still demo's R trendelenberg, decreased R foot clearance and increased R out toeing but is able to perform amb with SPC with increased stability reported by pt   Walking (FIM) 5 - Patient requires supervision/monitoring AND distance 150 feet or more, no rest   Therapeutic Interventions   Flexibility Seated passive B gastroc and hamstring stretching performed for 3 minutes on each LE   Equipment Use   NuStep Lvl 4, 10 minutes   Other Comments   Comments Cont to use SPC with pt during all tx's in order to improve carryover with proper hand placement during transfers; pt progessed to use SPC in room with nsg staff amb to/from bathroom   Assessment   Treatment Assessment Pt engaged in skilled PT session focusing on functional mobilities with use of SPC  First trial of walking performed w/o AD to challenge pt's balance with rest of session focusing on amb and transfers with use of SPC in order to improve pt's safety with functional mobilities  Pt has difficulty recalling proper hand placement during transfers, especially with use of SPC, therefore performed repeated trials targeting pt's ability to recall and learn proper and safe technique   Pt cont to demo difficulty with looking back at R hand while placing it on armrest as she will report during transfers "yes, I'm looking back at my R hand, I see it" although pt is looking straight ahead and her R arm is unable to find armrest  Educated pt on decreased sensation and proprioception in R UE and importance of using visual system to help aid pt in recovery process  Continue to use SPC during all functional mobilities in order to improve pt's safety and carryover with use of SPC by d/c  Also cont to work on strength, balance, and endurance in order to decrease pt's risk for falls upon d/c  Family/Caregiver Present no   Problem List Decreased strength;Decreased endurance; Impaired balance;Decreased mobility; Decreased coordination; Impaired judgement;Decreased safety awareness   Barriers to Discharge Decreased caregiver support   PT Barriers   Functional Limitation Standing;Transfers; Walking;Ramp negotiation   Plan   Treatment/Interventions Functional transfer training;LE strengthening/ROM; Therapeutic exercise; Endurance training;Bed mobility;Gait training   Progress Progressing toward goals   Recommendation   Recommendation 24 hour supervision/assist;Home with family support; Outpatient PT   PT - OK to Discharge No   PT Therapy Minutes   PT Time In 0900   PT Time Out 1000   PT Total Time (minutes) 60   PT Mode of treatment - Individual (minutes) 60   PT Mode of treatment - Concurrent (minutes) 0   PT Mode of treatment - Group (minutes) 0   PT Mode of treatment - Co-treat (minutes) 0   PT Mode of Teatment - Total time(minutes) 60 minutes   Therapy Time missed   Time missed?  No

## 2019-07-15 ENCOUNTER — TELEPHONE (OUTPATIENT)
Dept: VASCULAR SURGERY | Facility: CLINIC | Age: 79
End: 2019-07-15

## 2019-07-15 LAB
ANION GAP SERPL CALCULATED.3IONS-SCNC: 7 MMOL/L (ref 4–13)
BASOPHILS # BLD AUTO: 0.05 THOUSANDS/ΜL (ref 0–0.1)
BASOPHILS NFR BLD AUTO: 1 % (ref 0–1)
BUN SERPL-MCNC: 15 MG/DL (ref 5–25)
CALCIUM SERPL-MCNC: 8.4 MG/DL (ref 8.3–10.1)
CHLORIDE SERPL-SCNC: 114 MMOL/L (ref 100–108)
CO2 SERPL-SCNC: 26 MMOL/L (ref 21–32)
CREAT SERPL-MCNC: 0.86 MG/DL (ref 0.6–1.3)
EOSINOPHIL # BLD AUTO: 0.56 THOUSAND/ΜL (ref 0–0.61)
EOSINOPHIL NFR BLD AUTO: 8 % (ref 0–6)
GFR SERPL CREATININE-BSD FRML MDRD: 64 ML/MIN/1.73SQ M
GLUCOSE SERPL-MCNC: 79 MG/DL (ref 65–140)
HCT VFR BLD AUTO: 29.7 % (ref 34.8–46.1)
HGB BLD-MCNC: 8.7 G/DL (ref 11.5–15.4)
IMM GRANULOCYTES # BLD AUTO: 0.02 THOUSAND/UL (ref 0–0.2)
IMM GRANULOCYTES NFR BLD AUTO: 0 % (ref 0–2)
LYMPHOCYTES # BLD AUTO: 1.87 THOUSANDS/ΜL (ref 0.6–4.47)
LYMPHOCYTES NFR BLD AUTO: 26 % (ref 14–44)
MCH RBC QN AUTO: 26 PG (ref 26.8–34.3)
MCHC RBC AUTO-ENTMCNC: 29.3 G/DL (ref 31.4–37.4)
MCV RBC AUTO: 89 FL (ref 82–98)
MONOCYTES # BLD AUTO: 0.83 THOUSAND/ΜL (ref 0.17–1.22)
MONOCYTES NFR BLD AUTO: 11 % (ref 4–12)
NEUTROPHILS # BLD AUTO: 3.97 THOUSANDS/ΜL (ref 1.85–7.62)
NEUTS SEG NFR BLD AUTO: 54 % (ref 43–75)
NRBC BLD AUTO-RTO: 0 /100 WBCS
PLATELET # BLD AUTO: 338 THOUSANDS/UL (ref 149–390)
PMV BLD AUTO: 11.5 FL (ref 8.9–12.7)
POTASSIUM SERPL-SCNC: 3.7 MMOL/L (ref 3.5–5.3)
RBC # BLD AUTO: 3.35 MILLION/UL (ref 3.81–5.12)
SODIUM SERPL-SCNC: 147 MMOL/L (ref 136–145)
WBC # BLD AUTO: 7.3 THOUSAND/UL (ref 4.31–10.16)

## 2019-07-15 PROCEDURE — 97530 THERAPEUTIC ACTIVITIES: CPT

## 2019-07-15 PROCEDURE — G0515 COGNITIVE SKILLS DEVELOPMENT: HCPCS

## 2019-07-15 PROCEDURE — 97112 NEUROMUSCULAR REEDUCATION: CPT

## 2019-07-15 PROCEDURE — 97116 GAIT TRAINING THERAPY: CPT

## 2019-07-15 PROCEDURE — 80048 BASIC METABOLIC PNL TOTAL CA: CPT | Performed by: INTERNAL MEDICINE

## 2019-07-15 PROCEDURE — 97535 SELF CARE MNGMENT TRAINING: CPT

## 2019-07-15 PROCEDURE — 97110 THERAPEUTIC EXERCISES: CPT

## 2019-07-15 PROCEDURE — 85025 COMPLETE CBC W/AUTO DIFF WBC: CPT | Performed by: INTERNAL MEDICINE

## 2019-07-15 PROCEDURE — 99232 SBSQ HOSP IP/OBS MODERATE 35: CPT | Performed by: PHYSICAL MEDICINE & REHABILITATION

## 2019-07-15 RX ADMIN — DOCUSATE SODIUM 100 MG: 100 CAPSULE, LIQUID FILLED ORAL at 09:28

## 2019-07-15 RX ADMIN — Medication 325 MG: at 15:50

## 2019-07-15 RX ADMIN — ATORVASTATIN CALCIUM 80 MG: 80 TABLET, FILM COATED ORAL at 15:51

## 2019-07-15 RX ADMIN — MELATONIN 6 MG: at 21:00

## 2019-07-15 RX ADMIN — ENOXAPARIN SODIUM 40 MG: 40 INJECTION SUBCUTANEOUS at 11:05

## 2019-07-15 RX ADMIN — CARVEDILOL 3.12 MG: 3.12 TABLET, FILM COATED ORAL at 09:27

## 2019-07-15 RX ADMIN — CLOPIDOGREL BISULFATE 75 MG: 75 TABLET ORAL at 09:28

## 2019-07-15 RX ADMIN — CARVEDILOL 3.12 MG: 3.12 TABLET, FILM COATED ORAL at 15:51

## 2019-07-15 RX ADMIN — ASPIRIN 81 MG: 81 TABLET, COATED ORAL at 09:28

## 2019-07-15 RX ADMIN — PANTOPRAZOLE SODIUM 40 MG: 40 TABLET, DELAYED RELEASE ORAL at 05:24

## 2019-07-15 RX ADMIN — Medication 325 MG: at 09:27

## 2019-07-15 RX ADMIN — NIFEDIPINE 30 MG: 30 TABLET, FILM COATED, EXTENDED RELEASE ORAL at 09:29

## 2019-07-15 NOTE — PROGRESS NOTES
07/15/19 0930   Pain Assessment   Pain Assessment 0-10   Pain Score No Pain   Restrictions/Precautions   Precautions Cognitive; Fall Risk;Visual deficit  (right attention )   Cognition   Overall Cognitive Status Impaired   Subjective   Subjective pt had no c/o today ready for PT session    QI: Sit to Stand   Assistance Needed Supervision   Sit to Stand CARE Score 4   QI: Chair/Bed-to-Chair Transfer   Assistance Needed Incidental touching   Chair/Bed-to-Chair Transfer CARE Score 4   Transfer Bed/Chair/Wheelchair   Limitations Noted In Balance;Confidence; Endurance;Problem Solving;LE Strength; Sequencing   Adaptive Equipment Cane   Stand Pivot Contact Guard   Sit to Stand Supervision   Stand to [employer] Transfer Supervision   Findings pt cont to be limited with her vision that she is limited with her balance awareness and johnathon during SPT with her SPC    Bed, Chair, Wheelchair Transfer (FIM) 5 - Patient requires supervision/monitoring   QI: Car Transfer   Assistance Needed Supervision   Comment vc;s for hand placement and swing B/L LE into the car    Car Transfer CARE Score 4   QI: Walk 10 Feet   Assistance Needed Verbal cues   Walk 10 Feet CARE Score 4   QI: Walk 50 Feet with Two Turns   Assistance Needed Incidental touching   Walk 50 Feet with Two Turns CARE Score 4   QI: Walk 150 Feet   Assistance Needed Incidental touching   Walk 150 Feet CARE Score 4   QI: Walking 10 Feet on Uneven Surfaces   Assistance Needed Incidental touching   Walking 10 Feet on Uneven Surfaces CARE Score 4   Ambulation   Does the patient walk? 2  Yes   Primary Discharge Mode of Locomotion Walk   Walk Assist Level Close Supervision;Supervision   Gait Pattern Inconsistant Dolly; Slow Dolly;Narrow LUIS; Improper weight shift; Step to;Scissoring   Assist Device Cane   Distance Walked (feet) 150 ft  (x1 and 50 feet for home endurance training with [facility] )   Limitations Noted In [facility]; Endurance;Posture; Safety; Sequencing;Strength   Findings Needs more time to perform pivot sitting in standing d/t poor vision and dec right attention    Walking (FIM) 5 - Patient requires supervision/monitoring AND distance 150 feet or more, no rest   Therapeutic Interventions   Strengthening supine bridging BL LE and single bridging to inc hip and gluts strengthening    Balance balance in standing w/o AD to inc awareness and dec fall risk , VC ;s for scanning inhallways during ambulation with SPC pt unsteady at times   Asha Sole needs to reset during cont ambulation    Equipment Use   [place] level 3 for 15 min   Assessment   Treatment Assessment Pt cont to have poor insight with safety percaution during pivot sitting in standing, ambulation with head turns using AD SPC   Pt cont to have weak B/L LE hip and gluts at this time , pt possible at baseline for B/L LE  and hip strength , however pt cont to need skilled PT to inc BL LE strengthening and conditioning to acheive goals for D/C   Barriers to Discharge Decreased caregiver support   Plan   Treatment/Interventions Functional transfer training;LE strengthening/ROM; Therapeutic exercise; Endurance training;Patient/family training;Gait training   Progress Progressing toward goals   PT Therapy Minutes   PT Time In 0930   PT Time Out 1030   PT Total Time (minutes) 60   PT Mode of treatment - Individual (minutes) 60   PT Mode of treatment - Concurrent (minutes) 0   PT Mode of treatment - Group (minutes) 0   PT Mode of treatment - Co-treat (minutes) 0   PT Mode of Teatment - Total time(minutes) 60 minutes   Therapy Time missed   Time missed?  No

## 2019-07-15 NOTE — PROGRESS NOTES
Physical Medicine and Rehabilitation Progress Note  Mateusz Tellez 78 y o  female MRN: [id number]  Unit/Bed#: -80 Encounter: [id number]    HPI: Mateusz Tellez is a 78 y o  female who presented to the [facility] with right sided weakness, right facial droop  Imaging subsequently revealed left sided infarcts to the parietal and frontal regions  In addition patient noetd to have ulcerated plaque at the left cervical carotied bifurcation; CTA revealed a right ICA occlusion  Vascular surgery was consulted, and recommendation made for a left CEA, performed on 6/28  During admission patient was noted to have ABLA, for which scope was performed revealing AVMs in the stomach and duodenum; argon plasma coagulation was performed  She was also transfused at the time  Of note, during her acute admission patient had a fall off of comode; CTOH showed right frontal hematoma, but no active bleed  Patient was found to be below functional baseline, and accepted to [facility] [place] [place] on 7/03/19  Chief Complaint: f/u stroke    Interval: No acute events overnight  Denies any CP or SOB  Patient without complaint  hopes to be able to go home soon  ROS: A 10 point ROS was performed; negative except as noted above  Assessment/Plan:    * Impaired mobility and ADLs  Assessment & Plan  · Secondary to recent stroke  · Acute comprehensive interdisciplinary inpatient rehabilitation including PT, OT, SLP, RN, CM, SW, dietary, psychology, etc   · Patient will require supervision/assistance at home due to deficits from recent stroke  Will need to discuss with family          Symptomatic stenosis of left carotid artery  Assessment & Plan  · S/p CEA on 6/28  · Monitor for hematoma formation, incision care    Anemia, unspecified  Assessment & Plan  Results from last 7 days   Lab Units 07/15/19  0518 07/13/19  0446 07/12/19  1125   HEMOGLOBIN g/dL 8 7* 8 9* 9 5*     · Continue ferrous sulfate  · Monitor CBC intermittently  · Transfuse for Hgb <7  Transusion was performed on 7/11, with appropriate response noted  CVA (cerebral vascular accident) [facility])  Assessment & Plan  · ASA, Plavix  · Statin  · Neurology f/u as outpatient      Hyperlipidemia  Assessment & Plan  · Continue statin    Essential hypertension  Assessment & Plan  Temp:  [97 4 °F (36 3 °C)-97 9 °F (36 6 °C)] 97 9 °F (36 6 °C)  HR:  [62-73] 73  Resp:  [20] 20  BP: (132-168)/(62-76) 142/70    · Carvedilol  · Hydralazine prn  · IM service managing anti-hypertensives    # Skin  · Encourage regular turning as patient at risk for skin breakdown  · Staff to continue patient education on Q2h turning  · Rehabilitation team to perform skin checks regularly     # Bowel  · Patient reports no constipation  · To ensure regular BMs, bowel regimen consisting of:  colace , dulcolax suppository  and miralax     # Bladder  · Patient voiding spontaneously    # Pain  · Continue oxycodone     # Rehab Psych   · There are no psychological or psychiatric problems identified    # Other  - Diet/Nutrition:        Diet Orders   (From admission, onward)            Start     Ordered    07/03/19 1804  Diet Regular; Regular House  Diet effective now     Question Answer Comment   Diet Type Regular    Regular Regular House    RD to adjust diet per protocol?  Yes        07/03/19 1803        - DVT prophy: Sequential compression device (Venodyne)  and Enoxaparin (Lovenox)  - GI ppx: Pantaprazole  - Nausea: None  - Supplements: None  - Sleep: None    Disposition: reteam (will need to discuss with family re: home support)    CODE: Level 1: Full Code Scheduled Meds:    Current Facility-Administered Medications:  aspirin 81 mg Oral Daily Ed Arreola MD   atorvastatin 80 mg Oral Daily With Su Marc MD   bisacodyl 10 mg Rectal Daily PRN Amina Whipple PA-C   calcium carbonate 500 mg Oral Daily PRN Ed Arreola MD   carvedilol 3 125 mg Oral BID With Meals Ed CONTE MD [place]   clopidogrel 75 mg Oral Daily Ed Arreola MD   docusate sodium 100 mg Oral BID Amina Whipple PA-C   enoxaparin 40 mg Subcutaneous Q24H [facility] Ed Arreola MD   ferrous sulfate 325 mg Oral BID With Meals Ed Arreola MD   melatonin 6 mg Oral HS Ed Arreola MD   NIFEdipine 30 mg Oral Daily Ed Arreola MD   oxyCODONE 2 5 mg Oral Q4H PRN Ed Arreola MD   oxyCODONE 5 mg Oral Q4H PRN Ed Arreola MD   pantoprazole 40 mg Oral Early Morning Ed Arreola MD   polyethylene glycol 17 g Oral Daily PRN Amina Whipple PA-C   senna 2 tablet Oral HS Amina Whipple PA-C        Objective:    Functional Update:  Physical Therapy Occupational Therapy Speech Therapy   Transfers: Contact Guard  Bed Mobility: Supervision  Amulation Distance (ft): 150 feet  Ambulation: Contact Guard, Minimal Assistance, Moderate Assistance(min-CG with walker, min-mod without AD )  Assistive Device for Ambulation: Roller Walker(also HHA as pt did not use AD at baseline)  Number of Stairs: 12  Assistive Device for Stairs: Bilateral [place]  Stair Assistance: Contact Guard  Discharge Recommendations: Home with:  [address] with[de-identified] Family Support, 24 Hour Supervision, Outpatient Physical Therapy, 24 Hour Assisteance   Eating: Minimal Assistance  Grooming: Minimal Assistance  Bathing: Minimal Assistance  Bathing: Minimal Assistance  Upper Body Dressing: Minimal Assistance  Lower Body Dressing: Minimal Assistance  Toileting: Minimal Assistance  Tub/Shower Transfer: Minimal Assistance  Toilet Transfer: Minimal Assistance  Cognition: Exceptions to WNL  Cognition: Decreased Safety, Decreased Executive Functions, Decreased Attention  Orientation: Person, Place, Time, Situation   Mode of Communication: Verbal  Cognition: Exceptions to WNL  Cognition: Decreased Memory, Decreased Executive Functions, Decreased Attention, Decreased Comprehension  Orientation: Person, Place, Time, Situation  Discharge Recommendations: Home with:  DC Home with[de-identified] 24 Hour Supervision, Family Support, Outpatient Speech Therapy     Allergies per EMR    Physical Exam:  Temp:  [97 4 °F (36 3 °C)-97 9 °F (36 6 °C)] 97 9 °F (36 6 °C)  HR:  [62-73] 73  Resp:  [20] 20  BP: (132-168)/(62-76) 142/70  SpO2:  [95 %-98 %] 95 %    General: alert, no apparent distress, cooperative and comfortable  HEENT:  Eye: ecchymosis over right eye improving  LUNGS:  no abnormal respiratory pattern, no retractions noted, non-labored breathing   ABDOMEN:  soft, non-tender  Bowel sounds normal  No masses, no organomegaly  EXTREMITIES:  extremities normal, warm and well-perfused; no cyanosis, clubbing, or edema  NEURO:   mental status, speech normal, alert and oriented x3  PSYCH:  Alert and oriented, appropriate affect  Physical examination is otherwise unchanged from previous encounter, except as noted above  Diagnostic Studies: Reviewed, no new imaging  No orders to display     Laboratory: Reviewed  Results from last 7 days   Lab Units 07/15/19  0518 07/13/19  0446 07/12/19  1125   HEMOGLOBIN g/dL 8 7* 8 9* 9 5*   HEMATOCRIT % 29 7* 30 7* 33 0*   WBC Thousand/uL 7 30 8 56 8 86     Results from last 7 days   Lab Units 07/15/19  0518 07/11/19  0539   BUN mg/dL 15 22   SODIUM mmol/L 147* 142   POTASSIUM mmol/L 3 7 3 5   CHLORIDE mmol/L 114* 112*   CREATININE mg/dL 0 86 0 80            Patient Active Problem List   Diagnosis    Cataracts, bilateral    Essential hypertension    Hyperlipidemia    Nicotine dependence    [facility]cere[facility] [address] )    Anemia, unspecified    Abnormal CT scan    Symptomatic stenosis of left carotid artery    Iron deficiency anemia    Impaired mobility and ADLs     ** Please Note: Fluency Direct voice to text software may have been used in the creation of this document   **

## 2019-07-15 NOTE — PROGRESS NOTES
07/15/19 1030   Pain Assessment   Pain Assessment No/denies pain   Restrictions/Precautions   Precautions Cognitive; Fall Risk;Supervision on toilet/commode;Visual deficit   Memory Skills   Memory (FIM) 4 - Recalls 2 of 3 steps   Social Interaction (FIM) 6 - Interacts appropriately with others BUT requires extra  time   Speech/Language/Cognition Assessmetn   Treatment Assessment Session focused on completing functional written sequencing task given 4 and 5 steps  Ability to complete written 4 step sequences was 30/32 accurate w/ ability to increase accuracy of task to 32/32 given verbal review of errored steps  When completing written 5 step sequences, pt noted to have increased time to process information before completing  Of note, pt would orally read sentences aloud to self before placing steps in correct order  Pt at times was noted to verbalize incorrect words in sentences w/ decreased awareness of stating  This was noted to impact ability to sequence steps accordingly  SLP providing verbal cue to "re-read the sentence" in which pt would be able orally read sentence accurately  Pt was 35/40 accurate, but upon reviewing errors, pt again able to self correct to increase accuracy to 40/40  Pt verbalizing that she likes to complete these tasks because, "they get me thinking " SLP educating pt that in PM session, will complete the challenge of written 6-steps to sequence in correct order  Will continue to benefit from SLP services to maximize overall cognitive linguistic skills at this time  SLP Therapy Minutes   SLP Time In 56   SLP Time Out 1100   SLP Total Time (minutes) 30   SLP Mode of treatment - Individual (minutes) 30   SLP Mode of treatment - Concurrent (minutes) 0   SLP Mode of treatment - Group (minutes) 0   SLP Mode of treatment - Co-treat (minutes) 0   SLP Mode of Teatment - Total time(minutes) 30 minutes   Therapy Time missed   Time missed?  No   Daily FIM Score   Problem solving (FIM) 4 - Solves basic problems 75-89% of time   Comprehension (FIM) 5 - Understands basic directions and conversation   Expression (FIM) 5 - Needs help/cues only RARELY (< 10% of the time)

## 2019-07-15 NOTE — PLAN OF CARE
Problem: Potential for Falls  Goal: Patient will remain free of falls  Description  INTERVENTIONS:  - Assess patient frequently for physical needs  -  Identify cognitive and physical deficits and behaviors that affect risk of falls    -  [place] fall precautions as indicated by assessment   - Educate patient/family on patient safety including physical limitations  - Instruct patient to call for assistance with activity based on assessment  - Modify environment to reduce risk of injury  - Consider OT/PT consult to assist with strengthening/mobility  Outcome: Progressing

## 2019-07-15 NOTE — PROGRESS NOTES
07/15/19 1330   Pain Assessment   Pain Assessment No/denies pain   Pain Score No Pain   Restrictions/Precautions   Precautions Cognitive; Fall Risk;Supervision on toilet/commode;Visual deficit   QI: Sit to Stand   Assistance Needed Supervision;Verbal cues   Sit to Stand CARE Score 4   QI: Chair/Bed-to-Chair Transfer   Assistance Needed Incidental touching   Chair/Bed-to-Chair Transfer CARE Score 4   Transfer Bed/Chair/Wheelchair   Findings pt completed fxnl mob with use of SPC, guarding on R 2* inattention and impaired proprioception   Bed, Chair, Wheelchair Transfer (FIM) 4 - Patient requires steadying assist or light touching   QI: [address] Needed Incidental touching   [address] Score 4   Toileting   Able to [address] down yes, up yes  Findings cues to release grasp of R UE on pants   Toileting (FIM) 4 - Patient requires steadying assist or light touching   QI: Toilet Transfer   Assistance Needed Incidental touching   Toilet Transfer CARE Score 4   Toilet Transfer   Toilet Transfer (FIM) 4 - Patient requires steadying assist or light touching   Neuromuscular Education   Functional Movement Patterns Pt engaged in community re-entry and mobility task with OT/ST cotx with OT focus on fxnl R UE attention, [address], functional reach, and overall safety awareness  See SLP note for further details  OT placed 1# weight in pt's R hand to encourage awareness with fxnl mob  Pt demo dec safety with SPC when R hand is occupied to complete sustained grasp/reaching task  Pt with noted dec attention to items in R hand, losing  demo dec proprioceptive awareness  Pt demo characteristics of alien hand with post it note management to place to target with dysmetria and impaired [address] noted   Assessment   Treatment Assessment Pt engaged in 60 min OT /ST cotx and 10 min individual OT tx with focus on R UE fxnl reach, [address], attention, awareness, and overall safety   Pt continues to demonstrate dec proprioceptive awareness and attention to R UE limiting coordination and overall control  continue to recommend skilled OT tx session with focus on ADL/IADL fxn, R UE NMR, FT, and pt education   OT Therapy Minutes   OT Time In 1330   OT Time Out 1410   OT Total Time (minutes) 40   OT Mode of treatment - Individual (minutes) 10   OT Mode of treatment - Concurrent (minutes) 0   OT Mode of treatment - Group (minutes) 0   OT Mode of treatment - Co-treat (minutes) 30   OT Mode of Teatment - Total time(minutes) 40 minutes   Therapy Time missed   Time missed?  No

## 2019-07-15 NOTE — SOCIAL WORK
Insurance review sent to [facility], fax# [phone], requesting additional time for Pt  Pts daughter, Krystal Bran, was present, and contacted CM  Pts daughter asked if Pt was going to d/c today  CM explained that a date has not yet been set, but the insurance update was sent today, and the team meeting is tomorrow  CM added that the standing recommendation from the team is for 24/7 supervision at d/c  Krystal Bran inquired about the length of time that will be necessary, and CM explained that was unknown, at this time  CM offered to contact Dr Mee Fabian, for any additional questions Krystal Bran had  Dr Arreola met with Pt and her daughter, and further explained the above    CM provided info for [facility]HA, and planned to meet with Pt and her daughter again tomorrow, after the team meeting, roughly 12pm

## 2019-07-15 NOTE — TELEPHONE ENCOUNTER
Needs follow up for L CEA pt is currently in patient  Daughter  Ketty Peng [phone], would like a call back next week if pt is out of hospital to reschedule

## 2019-07-15 NOTE — PROGRESS NOTES
07/15/19 1230   Pain Assessment   Pain Assessment No/denies pain   Pain Score No Pain   Restrictions/Precautions   Precautions Fall Risk;Cognitive;Supervision on toilet/commode   Subjective   Subjective Pt agreeable to perform PT session    QI: Sit to Stand   Assistance Needed Supervision   Sit to Stand CARE Score 4   QI: Chair/Bed-to-Chair Transfer   Assistance Needed Supervision   Chair/Bed-to-Chair Transfer CARE Score 4   Transfer Bed/Chair/Wheelchair   Limitations Noted In Endurance;Balance; Coordination;LE Strength;Vision  (right attention )   Adaptive Equipment Cane   Stand Pivot Contact Guard   Sit to Stand Supervision   Stand to Sit Supervision   Bed, Chair, Wheelchair Transfer (FIM) 4 - Patient requires steadying assist or light touching   QI: Walk 10 Feet   Assistance Needed Supervision   Walk 10 Feet CARE Score 4   QI: Walk 50 Feet with Two Turns   Assistance Needed Supervision   Walk 50 Feet with Two Turns CARE Score 4   QI: Walk 150 Feet   Assistance Needed Supervision   Walk 150 Feet CARE Score 4   Ambulation   Does the patient walk? 2  Yes   Primary Discharge Mode of Locomotion Walk   Walk Assist Level Close Supervision;Supervision   Gait Pattern Inconsistant Dolly; Slow Dolly;Narrow LUIS;Scissoring; Improper weight shift   Assist Device [address] Walked (feet) 165 ft   Limitations Noted In Endurance;Balance; Coordination; Sequencing;Speed;Strength   Walking (FIM) 5 - Patient requires supervision/monitoring AND distance 150 feet or more, no rest   Wheelchair mobility   QI: Does the patient use a wheelchair? 0  No   Therapeutic Interventions   Balance standing and walking balance with SPC vc's for head turns johnathon right vision head turns   Equipment Use   NuStep level 3 for 15 min    Assessment   Treatment Assessment pt skilled PT session focus on inc functional activities and awareness johnathon with ambulation balnace with SPC and dec fall risk    Pt perform Nu Step bike to inc B/L LE strengthening and conditioning , pt will cont to need skilled PT to meet D/C goals    Barriers to Discharge Decreased caregiver support   Plan   Treatment/Interventions LE strengthening/ROM; Functional transfer training; Therapeutic exercise; Endurance training;Cognitive reorientation;Gait training;Patient/family training   Progress Progressing toward goals   PT Therapy Minutes   PT Time In 1230   PT Time Out 1300   PT Total Time (minutes) 30   PT Mode of treatment - Individual (minutes) 30   PT Mode of treatment - Concurrent (minutes) 0   PT Mode of treatment - Group (minutes) 0   PT Mode of treatment - Co-treat (minutes) 0   PT Mode of Teatment - Total time(minutes) 30 minutes   Therapy Time missed   Time missed?  No

## 2019-07-15 NOTE — PROGRESS NOTES
Internal Medicine Progress Note  Patient: Ursula Garduno  Age/sex: 78 y o  female  Medical Record #: [id number]      ASSESSMENT/PLAN: (Interval History)  Ursula Garduno is seen and examined and management for following issues:    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; s/p left CEA 6/28/19:  continue ASA/Plavix, Lipitor = for home Cards advised change Lipitor to Crestor 40mg daily      Iron deficiency anemia; s/p transfusion and iron IV: continue iron PO supplements      AVM stomach/duodenum tx with APC; cold snare sigmoid colon polypectomy: continue PPI     HTN: at home was on Lopressor and now is switched to Coreg and Nifedipine     RUL ground glass density: for Pulm to see OP     Urethral diverticulum: to see Uro as OP for cystogram     Nicotine abuse: counseled for cessation     Fall 2/2 LOB 6/24/19: CT head was negative; has remaining ecchymosis right forehead/orbit     ABLA: Hgb 8 9, stable s/p transfusion  Continue FeSO4 supplementation  CBC pending  Subjective/ HPI: Patient seen and examined  Patients overnight issues or events were reviewed with nursing or staff during rounds or morning huddle session  New or overnight issues include the following: HTN: Currently well controlled  Continue present medications  CVA: No new stroke symptoms  Continue antiplatelet and statin  Anemia: Hgb stable s/p transfusion  Pt currently reports that she is doing well and would like to be discharged      ROS:     GI: denies abdominal pain, change bowel habits or reflux symptoms  Neuro: Denies any headache, new vision changes, new neuropathies,new weaknesses   Respiratory: No Cough, SOB, denies wheeze  Cardiovascular: No CP, palpitations , denies perception of rapid heartbeat  : denies any new urinary burning or frequency    Review of Scheduled Meds:    Current Facility-Administered Medications:  aspirin 81 mg Oral Daily Ed Arreola MD   atorvastatin 80 mg Oral Daily With Levar Rod MD   bisacodyl 10 mg Rectal Daily PRN Amina Whipple PA-C   calcium carbonate 500 mg Oral Daily PRN Ed Arreola MD   carvedilol 3 125 mg Oral BID With Meals Thang Lopes MD   clopidogrel 75 mg Oral Daily Ed Arreola MD   docusate sodium 100 mg Oral BID Amina Whipple PA-C   enoxaparin 40 mg Subcutaneous Q24H [address] Ed Arreola MD   ferrous sulfate 325 mg Oral BID With Meals Ed Arreola MD   melatonin 6 mg Oral HS Ed Arreola MD   NIFEdipine 30 mg Oral Daily Ed Arreola MD   oxyCODONE 2 5 mg Oral Q4H PRN Ed Arreola MD   oxyCODONE 5 mg Oral Q4H PRN Ed Arreola MD   pantoprazole 40 mg Oral Early Morning Ed Arreola MD   polyethylene glycol 17 g Oral Daily PRN Amina Whipple PA-C   senna 2 tablet Oral HS Amina Whipple PA-C       Labs:     Results from last 7 days   Lab Units 07/13/19  0446 07/12/19  1125   WBC Thousand/uL 8 56 8 86   HEMOGLOBIN g/dL 8 9* 9 5*   HEMATOCRIT % 30 7* 33 0*   PLATELETS Thousands/uL 366 394*     Results from last 7 days   Lab Units 07/11/19  0539   SODIUM mmol/L 142   POTASSIUM mmol/L 3 5   CHLORIDE mmol/L 112*   CO2 mmol/L 25   BUN mg/dL 22   CREATININE mg/dL 0 80   CALCIUM mg/dL 8 6                        Imaging:     No orders to display       *Labs reviewed  *Radiology studies reviewed  *Medications reviewed and reconciled as needed  *Please refer to order section for additional ordered labs studies  *Case discussed with primary attending during morning huddle case rounds    Physical Examination:  Vitals:   Vitals:    07/14/19 0858 07/14/19 1713 07/14/19 2034 07/15/19 0512   BP: 151/65 164/74 168/76 132/62   BP Location:   Right arm Right arm   Pulse: 65 64 62 62   Resp:   20 20   Temp:   97 9 °F (36 6 °C) (!) 97 4 °F (36 3 °C)   TempSrc:   Oral Oral   SpO2:   98% 95%   Weight:    74 kg (163 lb 2 3 oz)   Height:           General Appearance: NAD, conversive  Eyes: No icterus; conjunctiva normal, PERRLA  HENT: oropharynx clear; mucous membranes moist; no ulcerations, normal hard and soft palette  Neck: trachea midline, range of motion full  Supple, no lymphadenopathy or thyromegaly  Lungs: CTA, normal respiratory effort, no retractions, expiratory effort normal  CV: regular rate, no rubs no gallops, PMI normal place and intensity  ABD: soft non tender, no masses , no hepatic or splenomegaly  EXT: DP pulses intact, no lymphadenopathy, no edema  Skin: normal turgor, normal texture, no rash, no ulcers  Psych: affect normal, No anxiety,   Neuro: AAOx3            Total time spent: At least 40 minutes, with more than 50% spent counseling/coordinating care  Counseling includes discussion with patient re: progress  and discussion with patient of his/her current medical state/information  Coordination of patient's care was performed in conjunction with primary service  Time invested included review of patient's labs, vitals, and management of their comorbidities with continued monitoring  In addition, this patient was discussed with medical team including physician and advanced extenders  The care of the patient was extensively discussed and appropriate treatment plan was formulated unique for this patient  ** Please Note: Dragon 360 Dictation voice to text software may have been used in the creation of this document   **

## 2019-07-15 NOTE — PROGRESS NOTES
07/15/19 0830   Pain Assessment   Pain Assessment No/denies pain   Pain Score No Pain   Restrictions/Precautions   Precautions Cognitive; Fall Risk   QI: Sit to Stand   Assistance Needed Supervision   Sit to Stand CARE Score 4   QI: Chair/Bed-to-Chair Transfer   Assistance Needed Incidental touching   Chair/Bed-to-Chair Transfer CARE Score 4   Transfer Bed/Chair/Wheelchair   Limitations Noted In Balance;Confidence; Coordination; Endurance;Problem Solving;Sensation;UE Strength;LE Strength;Vision; Sequencing   Adaptive Equipment Cane   Findings Pt completed transfers with use of SPC and req CGA  Pt attempted to transfer without SPC and req cueing to utilize [facility]  Bed, Chair, Wheelchair Transfer (FIM) 4 - Patient requires steadying assist or light touching   Meal Prep   Meal Prep Level Cane   Meal Prep Level of Assistance Maximum verbal cues;Contact guard   Meal Preparation Grilled cheese cooking task:  Pt completed meal prep task to make a grilled cheese sandwich to assess fxnl safety with IADL fxn while addressing R side inattention, standing balance/ endurance, fxnl cognition, and fxnl reach  OT engaged pt in task break down to categorize and organize necessary items and steps to inc pt's ability to sequence and plan IADL fxn  Pt able to verbalize 90% of ingredients and utensils/tools needed to cook grilled cheese at stove top  Pt demo dec insight to cook time for grilled cheese and req cues to keep sandwich on pan longer  Pt demo impaired proprioception and sensation of R UE to manage pan on hot stove and req verbal cueing to bring attention to right side and to turn stove off  Pt noted striking hand on hot pan multiple times with no attempt for task modification  Additionally, pt utilized R hand to spread butter to pan with knife, pt then began new task with no awareness to remove knife from grasp, demo high safety risk     Kitchen Mobility   Kitchen-Mobility Level Cane   Kitchen Activity Retrieve items; Transport items   Kitchen Mobility Comments Pt completed kitchen mobility with use of SPC and CGA at counter top  Pt able to retrieve kitchen tools and utensils from drawers and cabinets with unilateral UE support on countertop  Pt demo dec safety by reaching outside LUIS to place items on counter  Pt edu on how to carry items to stove top to inc safety and dec risk of falls  Cognition   Overall Cognitive Status Impaired   Arousal/Participation Alert; Responsive; Cooperative   Attention Attends with cues to redirect   Orientation Level Oriented X4   Perception   Visual Motor Organization Pt completed picture copying task seated at table to address visual perceptual skills, motor planning, and FMS/FMC  Pt was given simple shape picture with constructional cueing/model and pt instructed to draw copy of same picture  Pt able to complete simple shape drawings but demo inc difficulty with more abstract picture (flower) due to dec visual perceptual skills: visual form constancy, visual closure, visual memory  Pt able to visual distinguish errors/differences in pictures, however unable to self correct  Assessment   Treatment Assessment Pt engaged in 60 min skilled OT tx session focusing on fxnl transfers, visual perceptual skills, motor planning, standing balance/endurance, activity tolerance, and safety  Pt demo slight improvement with fxnl sequencing of meal prep task with use of task break down technique and improved FMC with use of writing utensil in R UE  Pt continues to demo dec attention to R side, dec safety, a dec visual perceptual skillls  OT recommends pt continues working on ADL/IADL fxn, fxnl transfers, R side attention, pt safety and edu  Problem List Decreased strength;Decreased endurance; Impaired balance;Decreased mobility; Decreased coordination;Decreased cognition; Impaired judgement;Decreased safety awareness; Impaired vision; Impaired sensation   Plan   Treatment/Interventions ADL retraining;Functional transfer training; Therapeutic exercise; Endurance training;Cognitive reorientation;Patient/family training;Equipment eval/education; Compensatory technique education   Progress Progressing toward goals   OT Therapy Minutes   OT Time In 0830   OT Time Out 0930   OT Total Time (minutes) 60   OT Mode of treatment - Individual (minutes) 60   OT Mode of treatment - Concurrent (minutes) 0   OT Mode of treatment - Group (minutes) 0   OT Mode of treatment - Co-treat (minutes) 0   OT Mode of Teatment - Total time(minutes) 60 minutes   Therapy Time missed   Time missed?  No

## 2019-07-16 PROCEDURE — 97530 THERAPEUTIC ACTIVITIES: CPT

## 2019-07-16 PROCEDURE — 97116 GAIT TRAINING THERAPY: CPT

## 2019-07-16 PROCEDURE — 99233 SBSQ HOSP IP/OBS HIGH 50: CPT | Performed by: PHYSICAL MEDICINE & REHABILITATION

## 2019-07-16 PROCEDURE — 97110 THERAPEUTIC EXERCISES: CPT

## 2019-07-16 PROCEDURE — G0515 COGNITIVE SKILLS DEVELOPMENT: HCPCS

## 2019-07-16 RX ADMIN — Medication 325 MG: at 15:53

## 2019-07-16 RX ADMIN — NIFEDIPINE 30 MG: 30 TABLET, FILM COATED, EXTENDED RELEASE ORAL at 08:19

## 2019-07-16 RX ADMIN — ATORVASTATIN CALCIUM 80 MG: 80 TABLET, FILM COATED ORAL at 15:53

## 2019-07-16 RX ADMIN — ENOXAPARIN SODIUM 40 MG: 40 INJECTION SUBCUTANEOUS at 08:20

## 2019-07-16 RX ADMIN — CLOPIDOGREL BISULFATE 75 MG: 75 TABLET ORAL at 08:19

## 2019-07-16 RX ADMIN — Medication 325 MG: at 07:48

## 2019-07-16 RX ADMIN — PANTOPRAZOLE SODIUM 40 MG: 40 TABLET, DELAYED RELEASE ORAL at 05:43

## 2019-07-16 RX ADMIN — CARVEDILOL 3.12 MG: 3.12 TABLET, FILM COATED ORAL at 07:48

## 2019-07-16 RX ADMIN — CARVEDILOL 3.12 MG: 3.12 TABLET, FILM COATED ORAL at 15:53

## 2019-07-16 RX ADMIN — MELATONIN 6 MG: at 22:33

## 2019-07-16 RX ADMIN — ASPIRIN 81 MG: 81 TABLET, COATED ORAL at 08:20

## 2019-07-16 NOTE — PROGRESS NOTES
07/16/19 0931   Pain Assessment   Pain Assessment No/denies pain   Restrictions/Precautions   Precautions Cognitive; Fall Risk;Supervision on toilet/commode;Visual deficit   Memory Skills   Memory (FIM) 4 - Recognizes/recalls/performs 75-89%   Social Interaction (FIM) 6 - Interacts appropriately with others BUT requires extra  time   Speech/Language/Cognition Assessmetn   Treatment Assessment Refer to below for details  SLP Therapy Minutes   SLP Time In    SLP Time Out 1031   SLP Total Time (minutes) 60   SLP Mode of treatment - Individual (minutes) 60   SLP Mode of treatment - Concurrent (minutes) 0   SLP Mode of treatment - Group (minutes) 0   SLP Mode of treatment - Co-treat (minutes) 0   SLP Mode of Teatment - Total time(minutes) 60 minutes   Therapy Time missed   Time missed? No   Daily FIM Score   Problem solving (FIM) 3 - Solves basic problmes 50-74% of time   Comprehension (FIM) 4 - Understands basic info/conversation 75-90% of time   Expression (FIM) 5 - Needs help/cues only RARELY (< 10% of the time)     Session began w/ pt completing a word search task given the category of items in the fridge, where pt was to locate 15 words in the directions of going horizontally, vertically, diagonally, as well as backwards  Pt was able to independently locate 12/15 words  SLP did given each word not found an initial cue as to where the word was located in the puzzle, but pt remained unable to locate  When given additional verbal cues to locate remaining 3 words, pt was able to locate items accordingly  Pt then completing written 4-step sequences given social situations  Pt was 18/24 accurate, noting that pt switched 2 steps but upon review of the steps, pt able to verbalize the correction nearly immediately  By this point during the session, pt's handwriting skills were noted to be decreased from completing word search tasks and placing number of sequences   Engaged pt in completing category matrix task, where pt was provided 4 different categories as well as 4 different initial letters to determine items in each category  Pt was able to verbally complete task given 13/16 accuracy, needing semantic cues for last 3 items, which pt was able to elicit given cues  However, if this task was dependent on pt's written expression, overall legibility given handwriting, as well as spelling of words was significantly decreased  When SLP asking pt about fatigue in using R hand, pt denied, despite "seeing" her written expression  Lastly, pt engaged in visual scanning, problem solving/sequencing task, given a map of a small town  Pt noted to have increased difficulty comprehending directional questions using specific prepositions, such as beside, behind, across, etc  Pt was 10/16 accurate in answering questions, needing either repetition of questions or rephrasing of questions to elicit appropriate responses  However, when SLP asking pt to ID locations/places on maps, pt was 10/10 accurate in locating  At end of session, SLP asking pt about ongoing difficulty which continues to require work, etc  Pt stating that her right hand and vision are the only things that need work  However, when presenting prior sessions w/ kitchen activities (making pancakes or grilled cheese), pt continued to demonstrate decreased insight to deficits, unsafe situations ( such as touching hot stovetop w/o knowing, not cooking food thoroughly, etc)  At this time, pt demonstrating the need for 24/7 supervision at time of d/c  Will plan for family training to determine ability for their availability to provide vs additional resources to maximize safety w/in home setting  Will continue to benefit from SLP services at this time to maximize functional independence of cognitive skills

## 2019-07-16 NOTE — PROGRESS NOTES
Physical Medicine and Rehabilitation Progress Note  Jasen Gayle 78 y o  female MRN: [id number]  Unit/Bed#: -86 Encounter: [id number]    HPI: Jasen Gayle is a 78 y o  female who presented to the [facility] with right sided weakness, right facial droop  Imaging subsequently revealed left sided infarcts to the parietal and frontal regions  In addition patient noetd to have ulcerated plaque at the left cervical carotied bifurcation; CTA revealed a right ICA occlusion  Vascular surgery was consulted, and recommendation made for a left CEA, performed on 6/28  During admission patient was noted to have ABLA, for which scope was performed revealing AVMs in the stomach and duodenum; argon plasma coagulation was performed  She was also transfused at the time  Of note, during her acute admission patient had a fall off of comode; CTOH showed right frontal hematoma, but no active bleed  Patient was found to be below functional baseline, and accepted to [facility] on 7/03/19  Chief Complaint: f/u stroke    Interval: No acute events overnight  Patient without complaint  ROS: A 10 point ROS was performed; negative except as noted above  Assessment/Plan:    * Impaired mobility and ADLs  Assessment & Plan  · Secondary to recent stroke  · Acute comprehensive interdisciplinary inpatient rehabilitation including PT, OT, SLP, RN, CM, SW, dietary, psychology, etc   · Patient will require supervision/assistance at home due to deficits from recent stroke  Will need to discuss with family          Symptomatic stenosis of left carotid artery  Assessment & Plan  · S/p CEA on 6/28  · Monitor for hematoma formation, incision care    Anemia, unspecified  Assessment & Plan  Results from last 7 days   Lab Units 07/15/19  0518 07/13/19  0446 07/12/19  1125   HEMOGLOBIN g/dL 8 7* 8 9* 9 5*     · Continue ferrous sulfate  · Monitor CBC intermittently  · Transfuse for Hgb <7    · Transusion performed on 7/11, with appropriate response noted  CVA (cerebral vascular accident) [facility])  Assessment & Plan  · ASA, Plavix  · Statin  · Neurology f/u as outpatient      Hyperlipidemia  Assessment & Plan  · Continue statin    Essential hypertension  Assessment & Plan  Temp:  [97 4 °F (36 3 °C)-97 6 °F (36 4 °C)] 97 4 °F (36 3 °C)  HR:  [61-72] 72  Resp:  [18-19] 19  BP: (111-144)/(57-68) 123/57    · Carvedilol  · Hydralazine prn  · IM service managing anti-hypertensives    # Skin  · Encourage regular turning as patient at risk for skin breakdown  · Staff to continue patient education on Q2h turning  · Rehabilitation team to perform skin checks regularly     # Bowel  · Patient reports no constipation  · To ensure regular BMs, bowel regimen consisting of:  colace , dulcolax suppository  and miralax     # Bladder  · Patient voiding spontaneously    # Pain  · Continue oxycodone     # Rehab Psych   · There are no psychological or psychiatric problems identified    # Other  - Diet/Nutrition:        Diet Orders   (From admission, onward)            Start     Ordered    07/03/19 1804  Diet Regular; Regular House  Diet effective now     Question Answer Comment   Diet Type Regular    Regular Regular House    RD to adjust diet per protocol?  Yes        07/03/19 1803        - DVT prophy: Sequential compression device (Venodyne)  and Enoxaparin (Lovenox)  - GI ppx: Pantaprazole  - Nausea: None  - Supplements: None  - Sleep: None    Disposition: reteam (will need to discuss with family re: home support)    CODE: Level 1: Full Code Scheduled Meds:    Current Facility-Administered Medications:  aspirin 81 mg Oral Daily Ed Arreola MD   atorvastatin 80 mg Oral Daily With Bud Joshua MD   bisacodyl 10 mg Rectal Daily PRN Amina Whipple PA-C   calcium carbonate 500 mg Oral Daily PRN Ed Arreola MD   carvedilol 3 125 mg Oral BID With Meals Lowell Jackson MD   clopidogrel 75 mg Oral Daily Lowell Jackson MD   docusate sodium 100 mg Oral BID Amina Whipple PA-C   enoxaparin 40 mg Subcutaneous Q24H [address] Ed Arreola MD   ferrous sulfate 325 mg Oral BID With Meals Ed Arreola MD   melatonin 6 mg Oral HS Ed Arreola MD   NIFEdipine 30 mg Oral Daily Ed Arreola MD   oxyCODONE 2 5 mg Oral Q4H PRN Ed Arreola MD   oxyCODONE 5 mg Oral Q4H PRN Ed Arreola MD   pantoprazole 40 mg Oral Early Morning Ed Arreola MD   polyethylene glycol 17 g Oral Daily PRN Amina Whipple PA-C   senna 2 tablet Oral HS Amina Whipple PA-C        Objective:    Functional Update:  Physical Therapy Occupational Therapy Speech Therapy   Weight Bearing Status: Full Weight Bearing  Transfers: Supervision  Bed Mobility: Supervision  Amulation Distance (ft): 160 feet  Ambulation: Supervision  Assistive Device for Ambulation: Single Point Cane  Wheelchair Mobility Distance: 0 ft  Number of Stairs: 12  Assistive Device for Stairs: Right Hand Rail  Stair Assistance: Supervision  Discharge Recommendations: Home with:  [address] [place] [address] Angela Franz with[de-identified] Family Support, 24 Hour Supervision, Home Physical Therapy   Eating: Modified Independent  Grooming: Contact Guard  Bathing: Supervision  Bathing: Supervision  Upper Body Dressing: Supervision  Lower Body Dressing: Minimal Assistance  Toileting: Contact Guard  Tub/Shower Transfer: Contact Guard  Toilet Transfer: Contact Guard  Cognition: Exceptions to WNL  Cognition: Decreased Memory, Decreased Executive Functions, Decreased Attention, Decreased Comprehension, Decreased Safety  Orientation: Person, Place, Situation   Mode of Communication: Verbal  Cognition: Exceptions to WNL  Cognition: Decreased Memory, Decreased Executive Functions, Decreased Attention, Decreased Comprehension, Decreased Safety(visuospatial deficits)  Orientation: Person, Place, Time, Situation  Discharge Recommendations: Home with:  [place] Home with[de-identified] 24 Hour Supervision, 24 Hour Assisteance, Family Support, Outpatient Speech Therapy     Allergies per EMR    Physical Exam:  Temp:  [97 4 °F (36 3 °C)-97 6 °F (36 4 °C)] 97 4 °F (36 3 °C)  HR:  [61-72] 72  Resp:  [18-19] 19  BP: (111-144)/(57-68) 123/57  SpO2:  [96 %-98 %] 96 %    General: alert, no apparent distress, cooperative and comfortable  HEENT:  Eye: ecchymosis over right eye improving  LUNGS:  no abnormal respiratory pattern, no retractions noted, non-labored breathing   ABDOMEN:  soft, non-tender  Bowel sounds normal  No masses, no organomegaly  EXTREMITIES:  extremities normal, warm and well-perfused; no cyanosis, clubbing, or edema  NEURO:   mental status, speech normal, alert and oriented x3  PSYCH:  Alert and oriented, appropriate affect  Physical examination is otherwise unchanged from previous encounter, except as noted above  Diagnostic Studies: Reviewed, no new imaging  No orders to display     Laboratory: Reviewed  Results from last 7 days   Lab Units 07/15/19  0518 07/13/19  0446 07/12/19  1125   HEMOGLOBIN g/dL 8 7* 8 9* 9 5*   HEMATOCRIT % 29 7* 30 7* 33 0*   WBC Thousand/uL 7 30 8 56 8 86     Results from last 7 days   Lab Units 07/15/19  0518 07/11/19  0539   BUN mg/dL 15 22   SODIUM mmol/L 147* 142   POTASSIUM mmol/L 3 7 3 5   CHLORIDE mmol/L 114* 112*   CREATININE mg/dL 0 86 0 80            Patient Active Problem List   Diagnosis    Cataracts, bilateral    Essential hypertension    Hyperlipidemia    Nicotine dependence    [facility]cere[facility] accident) [facility] [address] )    Anemia, unspecified    Abnormal CT scan    Symptomatic stenosis of left carotid artery    Iron deficiency anemia    Impaired mobility and ADLs     ** Please Note: Fluency Direct voice to text software may have been used in the creation of this document  **    Total time spent: At least 35 minutes, with more than 50% spent counseling/coordinating care   Counseling includes discussion with patient re: progress in therapies, functional issues observed by therapy staff, and discussion with patient his/her current medical state/wellbeing  Coordination of patient's care was performed in conjunction with Internal Medicine service to monitor patient's labs, vitals, and management of their comorbidities  In addition, this patient was discussed by the interdisciplinary team in weekly case conference today  The care of the patient was extensively discussed with all care providers and an appropriate rehabilitation plan was formulated unique for this patient  Barriers were identified preventing progression of therapy and appropriate interventions were discussed with each discipline  Please see the team note for input from all disciplines regarding barriers, intervention, and discharge planning

## 2019-07-16 NOTE — PROGRESS NOTES
Internal Medicine Progress Note  Patient: Efrain Sandoval  Age/sex: 78 y o  female  Medical Record #: [id number]      ASSESSMENT/PLAN: (Interval History)  Efrain Sandoval is seen and examined and management for following issues:    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; s/p left CEA 6/28/19:  continue ASA/Plavix, Lipitor = for home Cards advised change Lipitor to Crestor 40mg daily      Iron deficiency anemia; s/p transfusion and iron IV: continue iron PO supplements      AVM stomach/duodenum tx with APC; cold snare sigmoid colon polypectomy: continue PPI     HTN: at home was on Lopressor and now is switched to Coreg and Nifedipine     RUL ground glass density: for Pulm to see OP     Urethral diverticulum: to see Uro as OP for cystogram     Nicotine abuse: counseled for cessation     Fall 2/2 LOB 6/24/19: CT head was negative; has remaining ecchymosis right forehead/orbit     ABLA: Recent Hgb 8 7 stable s/p transfusion  Continue FeSO4 supplementation  Subjective/ HPI: Patient seen and examined  Patients overnight issues or events were reviewed with nursing or staff during rounds or morning huddle session  New or overnight issues include the following: HTN: Currently well controlled  Continue present medications  CVA: No new stroke symptoms  Continue antiplatelet and statin  Anemia: Hgb stable s/p transfusion  Pt currently reports that she is doing well and would like to be discharged      ROS:     GI: denies abdominal pain, change bowel habits or reflux symptoms  Neuro: Denies any headache, new vision changes, new neuropathies,new weaknesses   Respiratory: No Cough, SOB, denies wheeze  Cardiovascular: No CP, palpitations , denies perception of rapid heartbeat  : denies any new urinary burning or frequency    Review of Scheduled Meds:    Current Facility-Administered Medications:  aspirin 81 mg Oral Daily Ed Arreola MD   atorvastatin 80 mg Oral Daily With Dinner Nicole Ramos MD   bisacodyl 10 mg Rectal Daily PRN Amina Whipple PA-C   calcium carbonate 500 mg Oral Daily PRN Ed Arreola MD   carvedilol 3 125 mg Oral BID With Meals Susi Rebollar MD   clopidogrel 75 mg Oral Daily Ed Arreola MD   docusate sodium 100 mg Oral BID Amina Whipple PA-C   enoxaparin 40 mg Subcutaneous Q24H [address] Ed Arreola MD   ferrous sulfate 325 mg Oral BID With Meals Susi Rebollar MD   melatonin 6 mg Oral HS Ed Arreola MD   NIFEdipine 30 mg Oral Daily Ed Arreola MD   oxyCODONE 2 5 mg Oral Q4H PRN Ed Arreola MD   oxyCODONE 5 mg Oral Q4H PRN Ed Arreola MD   pantoprazole 40 mg Oral Early Morning Ed Arreola MD   polyethylene glycol 17 g Oral Daily PRN Amina Whipple PA-C   senna 2 tablet Oral HS Amina Whipple PA-C       Labs:     Results from last 7 days   Lab Units 07/15/19  0518 07/13/19  0446   WBC Thousand/uL 7 30 8 56   HEMOGLOBIN g/dL 8 7* 8 9*   HEMATOCRIT % 29 7* 30 7*   PLATELETS Thousands/uL 338 366     Results from last 7 days   Lab Units 07/15/19  0518 07/11/19  0539   SODIUM mmol/L 147* 142   POTASSIUM mmol/L 3 7 3 5   CHLORIDE mmol/L 114* 112*   CO2 mmol/L 26 25   BUN mg/dL 15 22   CREATININE mg/dL 0 86 0 80   CALCIUM mg/dL 8 4 8 6                        Imaging:     No orders to display       *Labs reviewed  *Radiology studies reviewed  *Medications reviewed and reconciled as needed  *Please refer to order section for additional ordered labs studies  *Case discussed with primary attending during morning huddle case rounds    Physical Examination:  Vitals:   Vitals:    07/15/19 1324 07/15/19 1550 07/15/19 2034 07/16/19 0541   BP: 137/67 144/68 111/58 123/57   BP Location: Right arm  Right arm Left arm   Pulse: 70 61 70 72   Resp: 18 18 19   Temp: 97 6 °F (36 4 °C)  97 6 °F (36 4 °C) (!) 97 4 °F (36 3 °C)   TempSrc: Oral  Oral Oral   SpO2: 98%  98% 96%   Weight:    72 3 kg (159 lb 6 3 oz)   Height:           General Appearance: NAD, conversive  Eyes: No icterus; conjunctiva normal, PERRLA  HENT: oropharynx clear; mucous membranes moist; no ulcerations, normal hard and soft palette  Neck: trachea midline, range of motion full  Supple, no lymphadenopathy or thyromegaly  Lungs: CTA, normal respiratory effort, no retractions, expiratory effort normal  CV: regular rate, no rubs no gallops, PMI normal place and intensity  ABD: soft non tender, no masses , no hepatic or splenomegaly  EXT: DP pulses intact, no lymphadenopathy, no edema  Skin: normal turgor, normal texture, no rash, no ulcers  Psych: affect normal, No anxiety,   Neuro: AAOx3            Total time spent: At least 40 minutes, with more than 50% spent counseling/coordinating care  Counseling includes discussion with patient re: progress  and discussion with patient of his/her current medical state/information  Coordination of patient's care was performed in conjunction with primary service  Time invested included review of patient's labs, vitals, and management of their comorbidities with continued monitoring  In addition, this patient was discussed with medical team including physician and advanced extenders  The care of the patient was extensively discussed and appropriate treatment plan was formulated unique for this patient  ** Please Note: Dragon 360 Dictation voice to text software may have been used in the creation of this document   **

## 2019-07-16 NOTE — SOCIAL WORK
Received call from nathanael at [facility] stating pt is at supervision she feels the patient should be d/c'ing this week  Cm spoke with therapy who stated family training is occurring Thursday and a dc can occur on Friday   tele aware and in agreement  Donnie phoned nathanael at [facility] [phone] and made aware, she approves coverage

## 2019-07-16 NOTE — PROGRESS NOTES
07/16/19 0830   Pain Assessment   Pain Assessment No/denies pain   Pain Score No Pain   Restrictions/Precautions   Precautions Cognitive; Fall Risk;Supervision on toilet/commode;Visual deficit  (R side inattention)   Cognition   Overall Cognitive Status Impaired   Arousal/Participation Alert; Cooperative   Attention Attends with cues to redirect   Orientation Level Oriented X4   Memory Decreased short term memory;Decreased recall of recent events;Decreased recall of precautions   Following Commands Follows one step commands with increased time or repetition   Subjective   Subjective Pt had no complaints upon arrival to her room  While pt reported earlier to rehab aide that she had a headache, she reports to PT that headache has dissipated  Pt agreeable to perform PT tx     QI: Sit to Stand   Assistance Needed Supervision;Verbal cues   Assistance Provided by [place] No physical assistance   Comment CS with SPC; improvements noted in hand placement today, however, pt still requires verbal cues for proper sequencing when reaching back to chair and to hold onto cane in L hand   Sit to Stand CARE Score 4   QI: Chair/Bed-to-Chair Transfer   Assistance Needed Supervision;Verbal cues   Assistance Provided by [place] No physical assistance   Comment CS with SPC; verbal cues to reach back with heel of L hand while holding onto cane; improvements in looking back to R hand when reaching back to chair   Chair/Bed-to-Chair Transfer CARE Score 4   Transfer Bed/Chair/Wheelchair   Limitations Noted In Balance;Confidence; Coordination; Endurance;Problem Solving; Sequencing;UE Strength;LE Strength   Adaptive Equipment Cane   Stand Pivot Supervision   Sit to Stand Supervision   Stand to Sit Supervision   Findings Continue to use [facility] with pt to increase her safety with use of SPC during transfers   Bed, Chair, Wheelchair Transfer (FIM) 5 - Patient requires supervision/monitoring   QI: Walk 10 Feet   Assistance Needed Supervision Assistance Provided by [place] No physical assistance   Comment S with SPC   Walk 10 Feet CARE Score 4   QI: Walk 50 Feet with Two [facility] Provided by [place] No physical assistance   Comment S with SPC; pt still cont to demo R Trendelenberg and increased R out toeing   Walk 50 Feet with Two Turns CARE Score 4   QI: Walk 150 Feet   Assistance Needed Supervision   Assistance Provided by [place] No physical assistance   Comment S with SPC; good sequencing noted with use of SPC and occ lateral sway, however pt had no LOB   Walk 150 Feet CARE Score 4   Ambulation   Does the patient walk? 2  Yes   Primary Discharge Mode of Locomotion Walk   Walk Assist Level Close Supervision;Supervision   Gait Pattern Inconsistant Dolly; Slow Dolly;Decreased foot clearance;Narrow LUIS; Improper weight shift  (R out toeing, R Trendelenberg)   Assist Device [employer] Walked (feet) 200 ft  (x2 wc follow, 175'x1 w/o wc follow, 60'x1 unlimited distance)   Limitations Noted In Balance; Coordination; Endurance; Heel Strike;Posture; Safety; Sequencing;Speed;Swing;Strength   Findings Cues for pt to relax R UE as it will go into flexor position with increased amb distances   Walking (FIM) 5 - Patient requires supervision/monitoring AND distance 150 feet or more, no rest   QI: 4 Steps   Assistance Needed Incidental touching   Assistance Provided by [place] No physical assistance   Comment FF of steps; CGA-CS ascending steps with B HR's; CGA descending steps for safety; pt requires cues to look at R UE to advance on HR as she often will let R UE fall behind her; pt also requires verbal cues to be sure foot is completely on step before taking next step   4 Steps CARE Score 4   QI: 12 Steps   Assistance Needed Incidental touching   Assistance Provided by [place] No physical assistance   Comment FF of steps; CGA-CS ascending steps with B HR's; CGA descending steps for safety; pt requires cues to look at R UE to advance on HR as she often will let R UE fall behind her; pt also requires verbal cues to be sure foot is completely on step before taking next step   12 Steps CARE Score 4   Stairs   Type Stairs   # of Steps 12   Weight Bearing Precautions Fall Risk   Assist Devices Bilateral Rail   Findings use of B HR's since first time performing FF of steps   Stairs (FIM) 4 - Patient requires steadying assist or light touching AND patient goes up and down full flight (12- 14 stairs)   Therapeutic Interventions   Balance Pt reached to eye level cabinet to retrieve cup and was able to retrieve water pitcher from refrigerator with CGA provided by PT; pt able to maintain balance and pour water with R UE; pt then amb approx 15' with full cup of water in R hand w/ SPC in L hand w/o spilling any water with CS from PT; performed to improve pt's awareness of R UE as well as work on balance while carrying object   Equipment Use   NuStep Lvl 4, 15 minutes which pt rated as "somewhat hard" on Tucker Scale   Assessment   Treatment Assessment Pt participated in skilled PT session focusing on functional mobilities with use of SPC  Pt continues to demo improvements in amb distances, however, still demos R Trendelenburg and increased R out toeing  No LOB noted during amb, however, occ lateral sway noted in which pt was able to maintain balance  Pt was able to perform secondary task during short distance amb today without LOB  Pt also able to perform FF of steps using B HR's, however, requires verbal cues for safety, sequencing, and for attention to R UE  Pt would benefit from cont skilled PT interventions in order to address pt's gait deficits, endurance, balance, strength, and overall safety with use of SPC during functional mobilities to decrease caregiver burden as well as pt's risk of falls upon d/c  Family/Caregiver Present no   Problem List Decreased strength;Decreased endurance; Impaired balance;Decreased mobility; Decreased coordination;Decreased cognition; Impaired judgement;Decreased safety awareness; Impaired vision; Impaired sensation   Barriers to Discharge Decreased caregiver support   PT Barriers   Functional Limitation Standing;Transfers; Walking;Ramp negotiation   Plan   Treatment/Interventions Functional transfer training;LE strengthening/ROM; Therapeutic exercise; Endurance training;Gait training   Progress Progressing toward goals   Recommendation   Recommendation 24 hour supervision/assist;Outpatient PT; Home with family support   Equipment Recommended Cane   PT Equipment ordered Pt reports she will speak with daughter about purchasing SPC   PT - OK to Discharge No   PT Therapy Minutes   PT Time In 0830   PT Time Out 0915   PT Total Time (minutes) 45   PT Mode of treatment - Individual (minutes) 30   PT Mode of treatment - Concurrent (minutes) 15   PT Mode of treatment - Group (minutes) 0   PT Mode of treatment - Co-treat (minutes) 0   PT Mode of Teatment - Total time(minutes) 45 minutes   Therapy Time missed   Time missed?  No

## 2019-07-16 NOTE — PROGRESS NOTES
[facility] Occupational Therapy Daily Note     07/16/19 0850   Pain Assessment   Pain Assessment No/denies pain   Pain Score No Pain   Restrictions/Precautions   Precautions Cognitive; Fall Risk;Visual deficit;Supervision on toilet/commode   Meal Prep   Meal Prep Level Cane   Meal Prep Level of Assistance Maximum verbal cues   Meal Preparation MICROWAVE MEAL PREP: pt trials use of microwave use and kitchen safety  Pt is able to accurately read and verbalize instructions  Pt prepares 3 separate meal items with varying directions  Pt reports that her microwave is very simple and only has basic functions  For preparation of oatmeal Pt demo poor R UE awareness with frequently almost spilling items  Pt demo decreased ability to use measuring cup  Pt filling to 2 cup marker rather than 2/3 cup radha  MOD VC's for problem solving  Pt unable to set time confidently despite 4x references to instructions for 1-2 min cook time  Ultimately sets time over recommended cook time, and demo decreased safety with reaching in microwave with very hot bowl with verbal cues beforehand  Preparing frozen meal pt is able to set time for 4 min by Identification of number "4" but completes with "is that right?" with second step of 2:30 second cook time pt unable to problem solve for pushing correct amount  Cooking frozen macaroni with instruction for 2:30 cook time  Pt instructed to set cooking time  Pt sets it for 2:13, cues for reference to number and needs to identify "2,3,0" as buttons to push  At this time it is not recommended that pt complete any microwave use without close supervision and assist  Independent meal prep is not recommended at this time 2* decreased safety, poor problem solving, decreased attention, poor L awareness  Cognition   Overall Cognitive Status Impaired   Arousal/Participation Alert; Cooperative   Attention Attends with cues to redirect   Orientation Level Oriented X4   Memory Decreased recall of precautions;Decreased recall of recent events   Following Commands Follows one step commands with increased time or repetition   Activity Tolerance   Activity Tolerance Patient tolerated treatment well   Assessment   Treatment Assessment Skilled OT session focusing on Hot microwave meal prep in kitchen  see above for details  pt appearing less animated and more flat today, reporting she feels "tired " Pt continues to require skilled acute rehab OT services to increase overall functional independence and safety w/ ADLs and functional transfers, continue to follow plan of care  Prognosis Good   Problem List Decreased strength;Decreased range of motion;Decreased endurance;Decreased mobility; Impaired balance;Decreased coordination;Decreased cognition;Decreased safety awareness; Impaired vision   Plan   Treatment/Interventions Functional transfer training;ADL retraining;LE strengthening/ROM; Therapeutic exercise; Endurance training;Cognitive reorientation;Patient/family training;Equipment eval/education; Bed mobility; Compensatory technique education   Progress Progressing toward goals   Recommendation   OT Discharge Recommendation 24 hour supervision/assist   OT Therapy Minutes   OT Time In 1030   OT Time Out 1115   OT Total Time (minutes) 45   OT Mode of treatment - Individual (minutes) 45   OT Mode of treatment - Concurrent (minutes) 0   OT Mode of treatment - Group (minutes) 0   OT Mode of treatment - Co-treat (minutes) 0   OT Mode of Teatment - Total time(minutes) 45 minutes

## 2019-07-16 NOTE — SOCIAL WORK
Met with Pt and her daughters, Krishan Flores and Shayy Cousin, and shared the team is concerned re: the family plan  CM offered more pamphlets with info re: HHA/adult   Pts daughters aren't sure they can provide 24/7 supervision, due to their work schedules  CM offered that they should rally the [facility] to develop a plan, and whenever there wasn't coverage, they could employ HHA  Pts daughter continued to speak of the expense  They wondered about SNF, LYLE, etc    CM offered info, as well as the financial form  CM stated that it would be prudent for them to attend family training with therapy, to fully understand their mothers functioning  CM offered an example discussed at the team meeting, ie Pt putting her hand on a hot pan without realizing it  CM notified ANDREA Alvarez OT, that [facility] was present and would like to hear more about Pts functioning

## 2019-07-16 NOTE — PLAN OF CARE
Problem: Potential for Falls  Goal: Patient will remain free of falls  Description  INTERVENTIONS:  - Assess patient frequently for physical needs  -  Identify cognitive and physical deficits and behaviors that affect risk of falls    -  [place] fall precautions as indicated by assessment   - Educate patient/family on patient safety including physical limitations  - Instruct patient to call for assistance with activity based on assessment  - Modify environment to reduce risk of injury  - Consider OT/PT consult to assist with strengthening/mobility  Outcome: Progressing     Problem: PAIN - ADULT  Goal: Verbalizes/displays adequate comfort level or baseline comfort level  Description  Interventions:  - Encourage patient to monitor pain and request assistance  - Assess pain using appropriate pain scale  - Administer analgesics based on type and severity of pain and evaluate response  - Implement non-pharmacological measures as appropriate and evaluate response  - Consider cultural and social influences on pain and pain management  - Notify physician/advanced practitioner if interventions unsuccessful or patient reports new pain  Outcome: Progressing     Problem: INFECTION - ADULT  Goal: Absence or prevention of progression during hospitalization  Description  INTERVENTIONS:  - Assess and monitor for signs and symptoms of infection  - Monitor lab/diagnostic results  - Monitor all insertion sites, i e  indwelling lines, tubes, and drains  - Monitor endotracheal (as able) and nasal secretions for changes in amount and color  - [place] appropriate cooling/warming therapies per order  - Administer medications as ordered  - Instruct and encourage patient and family to use good hand hygiene technique  - Identify and instruct in appropriate isolation precautions for identified infection/condition  Outcome: Progressing     Problem: SAFETY ADULT  Goal: Maintain or return to baseline ADL function  Description  INTERVENTIONS:  -  Assess patient's ability to carry out ADLs; assess patient's baseline for ADL function and identify physical deficits which impact ability to perform ADLs (bathing, care of mouth/teeth, toileting, grooming, dressing, etc )  - Assess/evaluate cause of self-care deficits   - Assess range of motion  - Assess patient's mobility; develop plan if impaired  - Assess patient's need for assistive devices and provide as appropriate  - Encourage maximum independence but intervene and supervise when necessary  ¯ Involve family in performance of ADLs  ¯ Assess for home care needs following discharge   ¯ Request OT consult to assist with ADL evaluation and planning for discharge  ¯ Provide patient education as appropriate  Outcome: Progressing  Goal: Maintain or return mobility status to optimal level  Description  INTERVENTIONS:  - Assess patient's baseline mobility status (ambulation, transfers, stairs, etc )    - Identify cognitive and physical deficits and behaviors that affect mobility  - Identify mobility aids required to assist with transfers and/or ambulation (gait belt, sit-to-stand, lift, walker, cane, etc )  - [place] fall precautions as indicated by assessment  - Record patient progress and toleration of activity level on Mobility SBAR; progress patient to next Phase/Stage  - Instruct patient to call for assistance with activity based on assessment  - Request Rehabilitation consult to assist with strengthening/weightbearing, etc   Outcome: Progressing     Problem: DISCHARGE PLANNING  Goal: Discharge to home or other facility with appropriate resources  Description  INTERVENTIONS:  - Identify barriers to discharge w/patient and caregiver  - Arrange for needed discharge resources and transportation as appropriate  - Identify discharge learning needs (meds, wound care, etc )  - Arrange for interpretive services to assist at discharge as needed  - Refer to Case Management Department for coordinating discharge planning if the patient needs post-hospital services based on physician/advanced practitioner order or complex needs related to functional status, cognitive ability, or social support system  Outcome: Progressing     Problem: Prexisting or High Potential for Compromised Skin Integrity  Goal: Skin integrity is maintained or improved  Description  INTERVENTIONS:  - Identify patients at risk for skin breakdown  - Assess and monitor skin integrity  - Assess and monitor nutrition and hydration status  - Monitor labs (i e  albumin)  - Assess for incontinence   - Turn and reposition patient  - Assist with mobility/ambulation  - Relieve pressure over bony prominences  - Avoid friction and shearing  - Provide appropriate hygiene as needed including keeping skin clean and dry  - Evaluate need for skin moisturizer/barrier cream  - Collaborate with interdisciplinary team (i e  Nutrition, Rehabilitation, etc )   - Patient/family teaching  Outcome: Progressing     Problem: Nutrition/Hydration-ADULT  Goal: Nutrient/Hydration intake appropriate for improving, restoring or maintaining nutritional needs  Description  Monitor and assess patient's nutrition/hydration status for malnutrition (ex- brittle hair, bruises, dry skin, pale skin and conjunctiva, muscle wasting, smooth red tongue, and disorientation)  Collaborate with interdisciplinary team and initiate plan and interventions as ordered  Monitor patient's weight and dietary intake as ordered or per policy  Utilize nutrition screening tool and intervene per policy  Determine patient's food preferences and provide high-protein, high-caloric foods as appropriate       INTERVENTIONS:  - Monitor oral intake, urinary output, labs, and treatment plans  - Assess nutrition and hydration status and recommend course of action  - Evaluate amount of meals eaten  - Assist patient with eating if necessary   - Allow adequate time for meals  - Recommend/ encourage appropriate diets, oral nutritional supplements, and vitamin/mineral supplements  - Order, calculate, and assess calorie counts as needed  - Recommend, monitor, and adjust tube feedings and TPN/PPN based on assessed needs  - Assess need for intravenous fluids  - Provide specific nutrition/hydration education as appropriate  - Include patient/family/caregiver in decisions related to nutrition  Outcome: Progressing

## 2019-07-16 NOTE — TEAM CONFERENCE
Acute RehabilitationTeam Conference Note  Date: 7/16/2019   Time: 10:38 AM       Patient Name:  Jordin Hoskins       Medical Record Number: [id number]   YOB: 1940  Sex: Female          Room/Bed:  [facility] 457/[facility] 457-01  Payor Info:  Payor: [place]  / Plan: Roxana Dickerson  REP / Product Type: Medicare PPO /      Admitting Diagnosis: CV[facility] (cerebral vascular accident) [facility] [I63 9]   Admit Date/Time:  7/3/2019  5:57 PM  Admission Comments: No comment available     Primary Diagnosis:  Impaired mobility and ADLs  Principal Problem: Impaired mobility and ADLs    Patient Active Problem List    Diagnosis Date Noted    Impaired mobility and ADLs 07/04/2019    Iron deficiency anemia 06/24/2019    CVA (cerebral vascular accident) ([facility] ) 06/19/2019    Anemia, unspecified 06/19/2019    Abnormal CT scan 06/19/2019    Symptomatic stenosis of left carotid artery 06/19/2019    Nicotine dependence 08/01/2016    Hyperlipidemia 11/12/2015    Cataracts, bilateral 10/26/2015    Essential hypertension 10/26/2015       Physical Therapy:    Weight Bearing Status: Full Weight Bearing  Transfers: Supervision  Bed Mobility: Supervision  Amulation Distance (ft): 160 feet  Ambulation: Supervision  Assistive Device for Ambulation: Single Point Cane  Wheelchair Mobility Distance: 0 ft  Number of Stairs: 12  Assistive Device for Stairs: Right Hand Rail  Stair Assistance: Supervision  Discharge Recommendations: Home with:  [address] [facility] Esa Gadanielleia with[de-identified] Family Support, 24 Hour Supervision, Home Physical Therapy    7/8/2019  Pt is a 78year old female s/p CVA who presents with dec cognition,impaired motor planning, impaired coordination, dec standing balance, dec endurance, dec R>L LE mm strength and impaired sensation impacting functional mobility indep and carry over of safety practices  Pt is demonstrating consistent progress in PT from requiring min A on eval for transfers to CGA this week   Pt moves better when using a RW for mobilities requiring min-CGA however pt did not used any AD at baseline with personal goal of using LRAD at d/c hence PT focusing on mobility training without an AD at this time utilizing neuroplasticity principles to maximize improvement in balance and gait pattern/dynamics to reduce caregiver burden at d/c and reduce risk for falls  7/15/2019    Pt advance to [facility] with S level with limited VC's at this time , pt biggest barriers ar as follow : right side vision limited attention johnathon during transfers to right side, also limited insight deficits for overall safety during ambulation and overall functional mpbility  Pt at S level at this time cont to benefit with skilled PT     Occupational Therapy:  Eating: Modified Independent  Grooming: Contact Guard  Bathing: Supervision  Bathing: Supervision  Upper Body Dressing: Supervision  Lower Body Dressing: Minimal Assistance  Toileting: Contact Guard  Tub/Shower Transfer: Contact Guard  Toilet Transfer: Contact Guard  Cognition: Exceptions to WNL  Cognition: Decreased Memory, Decreased Executive Functions, Decreased Attention, Decreased Comprehension, Decreased Safety  Orientation: Person, Place, Situation  Discharge Recommendations: [facility]  [address] [facility] with[de-identified] 24 Hour Supervision, 24 Hour Assistance, Outpatient Occupational Therapy       Pt is demonstrating  progress with occupational therapy and is progressing toward  Long term goals for ADL, IADL, and functional transfers/mobility  Pt continues to present with impairments in standing balance/tolerance, UE strength, FMC, GMC, memory, insight, safety , judgement , attention , sequencing , sensation , visual perceptual skills , depth perception , R/L discrimination , task initiation , task termination , (R) attention and proprioception   Occupational performance remains limited by fatigue, impulsivity, (R) UE dysmetria, (R) visual deficits , decreased caregiver support and risk for falls   Pt will continue to benefit from skilled acute rehab OT services to address above mentioned barriers and maximize functional independence in baseline areas of occupation to meet established treatment goals with overall decreased burden of care  Pt at this time has trialed 2 full sessions for meal prep tasks; pt at this time does not demo ability to cook at indep level  Pt overall min A with mod/mAx VCs for safety and R awareness  OT to f/u with dtrs regarding recommendations for 24/7 S and A at d/c                  Speech Therapy:  Mode of Communication: Verbal  Cognition: Exceptions to WNL  Cognition: Decreased Memory, Decreased Executive Functions, Decreased Attention, Decreased Comprehension, Decreased Safety(visuospatial deficits)  Orientation: Person, Place, Time, Situation  Discharge Recommendations: Home with:  [address] Franz with[de-identified] 24 Hour Supervision, 24 Hour Assisteance, Family Support, Outpatient Speech Therapy  Pt currently being followed for cognitive linguistic skills, in which pt completed formalized cognitive linguistic assessment, CLQT+  Overall score when compared to age matched peers between 79 - 80 yrs  Score was 1 8 out of 4 0, which indicates cognitive skills to be moderately impaired  All tasks from assessment which pt demonstrated most difficulty in completing were visual perceptual in nature  Pt inconsistently demonstrated R inattention at times vs then noting L inattention to complete some activities  Pt also noted to not be able to correct or verbalize what was not "right" especially when completing the clock drawing  Pt knew that hands needed to be drawn onto the clock, but due to how the numbers were placed inside the [place] (all numbers on the R hand side), unable to determine how the hands would be placed onto the clock   Other barriers include decreased insight to deficits, decreased executive function skills (problem solving, reasoning, sequencing, etc), decreased STM recall, more so noted given working memory, lengthier paragraph information presented, which impacts pt's overall cognitive skills at this time  At this time, pt will benefit from SLP services to maximize overall cognitive linguistic skills at this time, where pt will need supervision upon d/c back home and continued SLP services either provided given home health vs outpatient  Update from week 7/15/2019: Continues to be followed for cognitive linguistic tx sessions  Barriers are as follows: R inattention, decreased insight to deficits, decreased STM recall, decreased comprehension, decreased carryover of new learning skills, decreased executive function skills (higher level sequencing, functional problem solving- cooking tasks, medication management, reasoning, safety awareness, etc)  Pt at times is able to verbalize when a task isn't going "right" but ability to self correct or complete task again w/o making same errors was decreased, where pt does require visual cues  Pt also will "push" self despite fatigue from additional therapies, where pt requires education to take breaks and allow rest between sessions  At this time, pt would require 24/7 supervision at time of d/c due to noted safety awareness when completing cooking tasks using stove top and due to inattention to RUE, primarily R hand, increases risk of burning hand due decreased awareness  Pt will continue to benefit from SLP services at this time to maximize functional cognitive linguistic skills  Nursing Notes:  Appetite: Good  Diet Type: Regular/House                      Diet Patient/Family Education Complete: No    Type of Wound (LDA):  Wound                       Bladder: 7 - Complete [place]     Bladder Patient/Family Education: No  Bowel: 6 - Modified [place]     Bowel Patient/Family Education: No  Pain Location: Head  Pain Orientation: Frontal  Pain Score: 2                       Hospital Pain Intervention(s): Repositioned(Pt  refused pain meds)  Pain Patient/Family Education: No  Medication Management/Safety  Injectable: Lovenox  Safe Administration: Yes  Medication Patient/Family Education Complete: No    78 y o  Female with a  history of HTN, HLD, former smoker, previous hyponatremia  Presented w/ R sided weakness/R facial droop - resolved in the ER  MRI showed superficial cortical infarct in the left parietal region and small deep watershed infarct in the left frontal region Left carotid endarterectomy was performed on 06/28, Cardiac echo with EF 65%  Fall 6/24 ecchymotic R orbit & forehead  6/29 received 2 units of blood   EGD/Colonoscopy 06/24  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 treated with argon plasma coagulation  This week we will continue monitoring daily weights, intake and output, and vital signs  We will continue to practice safety with transfers to keep pt free from falls  Pt is continent of bowel & bladder  We will encourage bed mobility & offloading while in wheelchair to preserve skin integrity  Case Management:     Discharge Planning  Goal Length of Stay: 5  Living Arrangements: Alone  Support Systems: Children  Assistance Needed: tbd  Type of Current Residence: Other (Comment)([facility])  Current Care Facility Name: [facility]  Current Home Care Services: No  Pt is participating well with therapy, and plans on a return home  Pt is aware of the potential for cont'd care, ie therapy and services  Pts family has been made aware of team recommendations for 24/7 supervision at d/c  Following to assist with d/c planning needs  Is the patient actively participating in therapies?  yes  List any modifications to the treatment plan:     Barriers Interventions   R side limited vision/awareness Therapy exercises   Decreased carryover of safety techniques Speech therapy exercises Activity tolerance/fatigue Energy conservation exercises             Is the patient making expected progress toward goals? yes  List any update or changes to goals:     Medical Goals: Patient will be medically stable for discharge to [facility] [place] upon completion of rehab program and Patient will be able to manage medical conditions and comorbid conditions with medications and follow up upon completion of rehab program    Weekly Team Goals:   Rehab Team Goals  ADL Team Goal: Patient will require supervision with ADLs with least restrictive device upon completion of rehab program  Transfer Team Goal: Patient will require supervision with transfers with least restrictive device upon completion of rehab program  Locomotion Team Goal: Patient will require supervision with locomotion with least restrictive device upon completion of rehab program  Cognitive Team Goal: Patient will require supervision for basic and complex tasks upon completion of rehab program    Discussion:  Pt presents with the above barriers  Pt lacks insight, and has no awareness of her deficits  Pt is currently functioning at min a to contact guard for ADLs, and supervision for bed mobility/transfers  Team recommendations for d/c are 24/7 supervision  Pts family was made aware, and offered information for A  D/C date will be set once the family has created a plan  Anticipated Discharge Date:  TBD  [facility] Team Members Present: The following team members are supervising care for this patient and were present during this Weekly Team Conference      Physician: Dr Melody Duke MD  : ANTONINO Smith/ REFUGIOW  Registered Nurse: Arleen Harris RN, CRRN  Physical Therapist: SUZAN PrattT  Occupational Therapist: Annmarie Reid MS, OTR/L  Speech Therapist: Federico Wheatley MA, CCC-SLP  Other:

## 2019-07-16 NOTE — PROGRESS NOTES
07/16/19 1310   Pain Assessment   Pain Assessment No/denies pain   Pain Score No Pain   Assessment   Treatment Assessment Pt and her two daughters participated in FT and education session to promote and facilitate safe d/c planning and provide appropriate d/c recommendations  OT provided extensive education on pt's current fxnl status and deficits including dec balance, dec attention, dec safety, Impaired proprioception, dec sensation, impaired sequencing, impaired motor planning, dec coordination, and overall cognition limiting safety and indep with ADL/IADL fxn  Pt's dtrs educated on recommendation for 24/7 S and inc A and cues for IADL fxn  Pt's dtrs were concerned and reports they are unsure if they are able to provide the S necessary  They are going to have a family meeting to discuss if they are able to provide that S with scheduled FT for 7/18  Information provided on home health aides and day programs  OT Therapy Minutes   OT Time In 1310   OT Time Out 1420   OT Total Time (minutes) 70   OT Mode of treatment - Individual (minutes) 70   OT Mode of treatment - Concurrent (minutes) 0   OT Mode of treatment - Group (minutes) 0   OT Mode of treatment - Co-treat (minutes) 0   OT Mode of Teatment - Total time(minutes) 70 minutes   Therapy Time missed   Time missed?  No

## 2019-07-17 PROCEDURE — 97116 GAIT TRAINING THERAPY: CPT

## 2019-07-17 PROCEDURE — G0515 COGNITIVE SKILLS DEVELOPMENT: HCPCS

## 2019-07-17 PROCEDURE — 97112 NEUROMUSCULAR REEDUCATION: CPT

## 2019-07-17 PROCEDURE — 97110 THERAPEUTIC EXERCISES: CPT

## 2019-07-17 PROCEDURE — 97535 SELF CARE MNGMENT TRAINING: CPT

## 2019-07-17 PROCEDURE — 99232 SBSQ HOSP IP/OBS MODERATE 35: CPT | Performed by: PHYSICAL MEDICINE & REHABILITATION

## 2019-07-17 PROCEDURE — 97530 THERAPEUTIC ACTIVITIES: CPT

## 2019-07-17 RX ADMIN — ENOXAPARIN SODIUM 40 MG: 40 INJECTION SUBCUTANEOUS at 08:48

## 2019-07-17 RX ADMIN — CARVEDILOL 3.12 MG: 3.12 TABLET, FILM COATED ORAL at 16:19

## 2019-07-17 RX ADMIN — ATORVASTATIN CALCIUM 80 MG: 80 TABLET, FILM COATED ORAL at 16:19

## 2019-07-17 RX ADMIN — CLOPIDOGREL BISULFATE 75 MG: 75 TABLET ORAL at 08:48

## 2019-07-17 RX ADMIN — CARVEDILOL 3.12 MG: 3.12 TABLET, FILM COATED ORAL at 07:36

## 2019-07-17 RX ADMIN — SENNOSIDES 17.2 MG: 8.6 TABLET, FILM COATED ORAL at 21:41

## 2019-07-17 RX ADMIN — PANTOPRAZOLE SODIUM 40 MG: 40 TABLET, DELAYED RELEASE ORAL at 05:21

## 2019-07-17 RX ADMIN — NIFEDIPINE 30 MG: 30 TABLET, FILM COATED, EXTENDED RELEASE ORAL at 08:48

## 2019-07-17 RX ADMIN — Medication 325 MG: at 07:36

## 2019-07-17 RX ADMIN — ASPIRIN 81 MG: 81 TABLET, COATED ORAL at 08:48

## 2019-07-17 RX ADMIN — MELATONIN 6 MG: at 22:26

## 2019-07-17 RX ADMIN — Medication 325 MG: at 16:19

## 2019-07-17 RX ADMIN — DOCUSATE SODIUM 100 MG: 100 CAPSULE, LIQUID FILLED ORAL at 17:14

## 2019-07-17 NOTE — PROGRESS NOTES
07/17/19 1300   Pain Assessment   Pain Assessment No/denies pain   Restrictions/Precautions   Precautions Cognitive; Fall Risk;Supervision on toilet/commode;Visual deficit   Memory Skills   Memory (FIM) 5 - [place] cues patient   Social Interaction (FIM) 6 - Interacts appropriately with others BUT requires extra  time   Speech/Language/Cognition Assessmetn   Treatment Assessment Pt engaging in session, but verbalizing fatigue from earlier therapy sessions  Able to verbalize prior activities completing in both OT and PT sessions  Additionally, pt stating to SLP that her plan is to be discharged most likely on Friday to dtr's house  SLP will confirm w/ team, but also planning for family training on Thursday, 7/18/19, where SLP will provide family education on pt's current abilities as well as continued barriers which they will require assistance in completing w/ pt at time of d/c  Engaged pt in category matrix task given 4 different categories and to determine items per the category given a specific initial letter  Pt was 15/16 accurate in determining items per category, only needing single semantic cue to determine remaining item in the category  After completing that task, SLP asking pt about anything which she is able to identify in needing f/u or wanting to try again to demonstrate potential for improvement, where pt unable to verbalize any overt deficits pertaining to cognitive skills  SLP stating to pt about completing a community re-integration task, where pt would need to go to gift shop, recall 3 or 4 items to find and then completing functional math given tangible money management  SLP stating that this is multi-tasking to improve attention, focus, memory, executive functions, etc in completing a common activity  Pt in agreement to complete tomorrow  In preparation for tomorrow, focused session on completing functional money management task given 2 coin amounts to add   Pt was able to accurately answer questions given 22/30 accuracy  However, when reviewing errors, pt able to correct increasing to 30/30 accurate  Of note, many of the errors were noted to be off by ~5 cents  At end of task, SLP educated on overall fatigue, which did appear to impact ability to complete task w/  Increased accuracy, needing more time to process/complete addition of coin amounts  Lastly, pt engaging in 3 words recall and sequencing task  Ability to recall all 3 words per set list was 24/24 accurate  However, ability to sequence words in order of occurrence was 5/8 accuracy, needing written cues to aid in correct sequence  At this time, pt will continue to benefit from SLP services at this time to maximize overall cognitive linguistic skills at this time  SLP Therapy Minutes   SLP Time In 1300   SLP Time Out 1400   SLP Total Time (minutes) 60   SLP Mode of treatment - Individual (minutes) 60   SLP Mode of treatment - Concurrent (minutes) 0   SLP Mode of treatment - Group (minutes) 0   SLP Mode of treatment - Co-treat (minutes) 0   SLP Mode of Teatment - Total time(minutes) 60 minutes   Therapy Time missed   Time missed?  No   Daily FIM Score   Problem solving (FIM) 4 - Solves basic problems 75-89% of time   Comprehension (FIM) 5 - Understands basic directions and conversation   Expression (FIM) 5 - Needs help/cues only RARELY (< 10% of the time)

## 2019-07-17 NOTE — PLAN OF CARE
Problem: Potential for Falls  Goal: Patient will remain free of falls  Description  INTERVENTIONS:  - Assess patient frequently for physical needs  -  Identify cognitive and physical deficits and behaviors that affect risk of falls    -  [place] fall precautions as indicated by assessment   - Educate patient/family on patient safety including physical limitations  - Instruct patient to call for assistance with activity based on assessment  - Modify environment to reduce risk of injury  - Consider OT/PT consult to assist with strengthening/mobility  Outcome: Progressing     Problem: PAIN - ADULT  Goal: Verbalizes/displays adequate comfort level or baseline comfort level  Description  Interventions:  - Encourage patient to monitor pain and request assistance  - Assess pain using appropriate pain scale  - Administer analgesics based on type and severity of pain and evaluate response  - Implement non-pharmacological measures as appropriate and evaluate response  - Consider cultural and social influences on pain and pain management  - Notify physician/advanced practitioner if interventions unsuccessful or patient reports new pain  Outcome: Progressing     Problem: INFECTION - ADULT  Goal: Absence or prevention of progression during hospitalization  Description  INTERVENTIONS:  - Assess and monitor for signs and symptoms of infection  - Monitor lab/diagnostic results  - Monitor all insertion sites, i e  indwelling lines, tubes, and drains  - Monitor endotracheal (as able) and nasal secretions for changes in amount and color  - [place] appropriate cooling/warming therapies per order  - Administer medications as ordered  - Instruct and encourage patient and family to use good hand hygiene technique  - Identify and instruct in appropriate isolation precautions for identified infection/condition  Outcome: Progressing     Problem: SAFETY ADULT  Goal: Maintain or return to baseline ADL function  Description  INTERVENTIONS:  -  Assess patient's ability to carry out ADLs; assess patient's baseline for ADL function and identify physical deficits which impact ability to perform ADLs (bathing, care of mouth/teeth, toileting, grooming, dressing, etc )  - Assess/evaluate cause of self-care deficits   - Assess range of motion  - Assess patient's mobility; develop plan if impaired  - Assess patient's need for assistive devices and provide as appropriate  - Encourage maximum independence but intervene and supervise when necessary  ¯ Involve family in performance of ADLs  ¯ Assess for home care needs following discharge   ¯ Request OT consult to assist with ADL evaluation and planning for discharge  ¯ Provide patient education as appropriate  Outcome: Progressing  Goal: Maintain or return mobility status to optimal level  Description  INTERVENTIONS:  - Assess patient's baseline mobility status (ambulation, transfers, stairs, etc )    - Identify cognitive and physical deficits and behaviors that affect mobility  - Identify mobility aids required to assist with transfers and/or ambulation (gait belt, sit-to-stand, lift, walker, cane, etc )  - [place] fall precautions as indicated by assessment  - Record patient progress and toleration of activity level on Mobility SBAR; progress patient to next Phase/Stage  - Instruct patient to call for assistance with activity based on assessment  - Request Rehabilitation consult to assist with strengthening/weightbearing, etc   Outcome: Progressing     Problem: DISCHARGE PLANNING  Goal: Discharge to home or other facility with appropriate resources  Description  INTERVENTIONS:  - Identify barriers to discharge w/patient and caregiver  - Arrange for needed discharge resources and transportation as appropriate  - Identify discharge learning needs (meds, wound care, etc )  - Arrange for interpretive services to assist at discharge as needed  - Refer to Case Management Department for coordinating discharge planning if the patient needs post-hospital services based on physician/advanced practitioner order or complex needs related to functional status, cognitive ability, or social support system  Outcome: Progressing     Problem: Prexisting or High Potential for Compromised Skin Integrity  Goal: Skin integrity is maintained or improved  Description  INTERVENTIONS:  - Identify patients at risk for skin breakdown  - Assess and monitor skin integrity  - Assess and monitor nutrition and hydration status  - Monitor labs (i e  albumin)  - Assess for incontinence   - Turn and reposition patient  - Assist with mobility/ambulation  - Relieve pressure over bony prominences  - Avoid friction and shearing  - Provide appropriate hygiene as needed including keeping skin clean and dry  - Evaluate need for skin moisturizer/barrier cream  - Collaborate with interdisciplinary team (i e  Nutrition, Rehabilitation, etc )   - Patient/family teaching  Outcome: Progressing     Problem: Nutrition/Hydration-ADULT  Goal: Nutrient/Hydration intake appropriate for improving, restoring or maintaining nutritional needs  Description  Monitor and assess patient's nutrition/hydration status for malnutrition (ex- brittle hair, bruises, dry skin, pale skin and conjunctiva, muscle wasting, smooth red tongue, and disorientation)  Collaborate with interdisciplinary team and initiate plan and interventions as ordered  Monitor patient's weight and dietary intake as ordered or per policy  Utilize nutrition screening tool and intervene per policy  Determine patient's food preferences and provide high-protein, high-caloric foods as appropriate       INTERVENTIONS:  - Monitor oral intake, urinary output, labs, and treatment plans  - Assess nutrition and hydration status and recommend course of action  - Evaluate amount of meals eaten  - Assist patient with eating if necessary   - Allow adequate time for meals  - Recommend/ encourage appropriate diets, oral nutritional supplements, and vitamin/mineral supplements  - Order, calculate, and assess calorie counts as needed  - Recommend, monitor, and adjust tube feedings and TPN/PPN based on assessed needs  - Assess need for intravenous fluids  - Provide specific nutrition/hydration education as appropriate  - Include patient/family/caregiver in decisions related to nutrition  Outcome: Progressing

## 2019-07-17 NOTE — PROGRESS NOTES
07/17/19 0700   Pain Assessment   Pain Assessment No/denies pain   Pain Score No Pain   Restrictions/Precautions   Precautions Cognitive; Fall Risk;Supervision on toilet/commode   Weight Bearing Restrictions No   QI: Eating   Assistance Needed Independent   Eating CARE Score 6   Eating Assessment   Findings Pt able to utilize R UE for self feeding at mod I level    Eating (FIM) 6 - Patient requires increased time or safety concern   QI: Oral Hygiene   Assistance Needed Supervision   Oral Hygiene CARE Score 4   Grooming   Able To Initiate Tasks;Comb/Brush Hair;Wash/Dry Face;Brush/Clean Teeth;Wash/Dry Hands   Limitation Noted In Coordination; Safety   Findings Pt completed in stance at sink with improved hand to target noted today   Grooming (FIM) 5 - Patient requires supervision/monitoring   QI: Shower/Bathe Self   Assistance Needed Supervision;Verbal cues   Shower/Bathe Self CARE Score 4   Bathing   Assessed Bath Style Sponge Bath   Anticipated D/C Bath Style Shower  (at [employer])   Able to [place] Manuel Yes   Able to [employer] Temperature Yes   Able to Wash/Rinse/Dry (body part) Left Arm;Right Arm;L Upper Leg;R Upper Leg;L Lower Leg/Foot;R Lower Leg/Foot; Abdomen; Chest;Perineal Area; Buttocks   Limitations Noted in Balance; Coordination;Problem Solving; Safety   Positioning Seated;Standing   Bathing (FIM) 5 - Patient requires supervision/monitoring but completes 10/10 parts   QI: Upper Body Dressing   Assistance Needed Set-up / clean-up   Upper Body Dressing CARE Score 5   QI: Lower Body Dressing   Assistance Needed Supervision   Lower Body Dressing CARE Score 4   QI: Putting On/Taking Off Footwear   Assistance Needed Supervision   Putting On/Taking Off Footwear CARE Score 4   Dressing/Undressing Clothing   Able to  [facility]; Undergarment;Socks   Don [address]; Undergarment;Socks   Limitations Noted In Balance; Coordination; Safety   Findings pt able to reach to closet to retrieve clothing from closet at S level  Pt req cue to dec reaching distance to inc safety and dec risk for fall  Pt reaching with R UE with improved hand to target noted  pt completed UB and LB dressing at S level with inc time to thread b/l LEs to pants however with inc time pt able to complete without physical assistance  UB Dressing (FIM) 5 - Patient requires supervision/monitoring   LB Dressing (FIM) 5 - Patient requires supervision/monitoring   QI: Roll Left and Right   Assistance Needed Independent   Roll Left and Right CARE Score 6   QI: Sit to [address] to Lying CARE Score 6   QI: Lying to Sitting on Side of Bed   Assistance Needed Independent   Lying to Sitting on Side of Bed CARE Score 6   QI: Sit to Stand   Assistance Needed Supervision   Sit to Stand CARE Score 4   QI: Chair/Bed-to-Chair Transfer   Assistance Needed Supervision   Chair/Bed-to-Chair Transfer CARE Score 4   Transfer Bed/Chair/Wheelchair   Limitations Noted In Balance; Coordination;Sensation   Adaptive Equipment [employer] with use of SPC   Bed, Chair, Wheelchair Transfer (FIM) 5 - Patient requires supervision/monitoring   QI: [address] CARE Score 4   Toileting   Able to [address] down yes, up yes  Able to Manage Clothing Closures Yes   Manage Hygiene Bladder   Toileting (FIM) 5 - Patient requires supervision/monitoring   QI: Toilet Transfer   Assistance Needed Supervision   Toilet Transfer CARE Score 4   Toilet Transfer   Toilet Transfer (FIM) 5 - Patient requires supervision/monitoring   Coordination   Fine Motor Fine Pinch to target: Pt engaged in [facility] and targeting task to retrieve >100 coins from tabletop with R UE and target to coin slot to address R UE coordination, motor control to inc indep with ADL/IADL fxn   Pt demo G improvement with targeting, able to isolate pincer grasp appropriately while terminating uninvolved digits to inc control  Pt with accurate targeting noted  Vision   Vision Comments Pt engaged in motor free visual perceptual task with focus on identifying areas of visual perceptual deficits  Pt completed visual task to assess visual memory, visual discrimination, visual closure, figure ground, and form constancy  Pt completed with 85% accuracy with easy/medium level task  Pt demo improved overall visual attention, focus, and demo a greater sense of purposeful responses versus trial and error method  OT to continue to address   Assessment   Treatment Assessment Pt engaged in skilled OT tx session with focus on ADL fxn, fxnl xfers, R UE FMC, and visual perceptual skills  see above for further details  Pt overall S for ADL fxn with cues and inc time to complete tasks, however improved  skills and [address] noted today of R UE  Pt with inc attention to task and demo overall improved targeting  Continue with FT scheduled for tomorrow   OT Therapy Minutes   OT Time In 0700   OT Time Out 0840   OT Total Time (minutes) 100   OT Mode of treatment - Individual (minutes) 90   OT Mode of treatment - Concurrent (minutes) 10   OT Mode of treatment - Group (minutes) 0   OT Mode of treatment - Co-treat (minutes) 0   OT Mode of Teatment - Total time(minutes) 100 minutes   Therapy Time missed   Time missed?  No

## 2019-07-17 NOTE — PROGRESS NOTES
Physical Medicine and Rehabilitation Progress Note  Everett Guzman 78 y o  female MRN: [id number]  Unit/Bed#: -28 Encounter: [id number]    HPI: Everett Guzman is a 78 y o  female who presented to the [facility] with right sided weakness, right facial droop  Imaging subsequently revealed left sided infarcts to the parietal and frontal regions  In addition patient noetd to have ulcerated plaque at the left cervical carotied bifurcation; CTA revealed a right ICA occlusion  Vascular surgery was consulted, and recommendation made for a left CEA, performed on 6/28  During admission patient was noted to have ABLA, for which scope was performed revealing AVMs in the stomach and duodenum; argon plasma coagulation was performed  She was also transfused at the time  Of note, during her acute admission patient had a fall off of comode; CTOH showed right frontal hematoma, but no active bleed  Patient was found to be below functional baseline, and accepted to [facility] on 7/03/19  Chief Complaint: f/u stroke    Interval: No acute events overnight  Patient without complaint  No new weakness  No new numbness or tingling  Slept well overnight  family to come in for training tomorrow  ROS: A 10 point ROS was performed; negative except as noted above  Assessment/Plan:    * Impaired mobility and ADLs  Assessment & Plan  · Secondary to recent stroke  · Acute comprehensive interdisciplinary inpatient rehabilitation including PT, OT, SLP, RN, CM, SW, dietary, psychology, etc   · Patient will require supervision/assistance at home due to deficits from recent stroke  Will need to discuss with family          Symptomatic stenosis of left carotid artery  Assessment & Plan  · S/p CEA on 6/28  · Monitor for hematoma formation, incision care    Anemia, unspecified  Assessment & Plan  Results from last 7 days   Lab Units 07/15/19  0518 07/13/19  0446 07/12/19  1125   HEMOGLOBIN g/dL 8 7* 8 9* 9 5* · Continue ferrous sulfate  · Monitor CBC intermittently  · Transfuse for Hgb <7    · Transusion performed on 7/11, with appropriate response noted  CVA (cerebral vascular accident) [facility])  Assessment & Plan  · ASA, Plavix  · Statin  · Neurology f/u as outpatient      Hyperlipidemia  Assessment & Plan  · Continue statin    Essential hypertension  Assessment & Plan  Temp:  [97 4 °F (36 3 °C)-97 6 °F (36 4 °C)] 97 4 °F (36 3 °C)  HR:  [61-72] 72  Resp:  [18-19] 19  BP: (111-144)/(57-68) 123/57    · Carvedilol  · Hydralazine prn  · IM service managing anti-hypertensives    # Skin  · Encourage regular turning as patient at risk for skin breakdown  · Staff to continue patient education on Q2h turning  · Rehabilitation team to perform skin checks regularly     # Bowel  · Patient reports no constipation  · To ensure regular BMs, bowel regimen consisting of:  colace , dulcolax suppository  and miralax     # Bladder  · Patient voiding spontaneously    # Pain  · Continue oxycodone     # Rehab Psych   · There are no psychological or psychiatric problems identified    # Other  - Diet/Nutrition:        Diet Orders   (From admission, onward)            Start     Ordered    07/03/19 1804  Diet Regular; Regular House  Diet effective now     Question Answer Comment   Diet Type Regular    Regular Regular House    RD to adjust diet per protocol?  Yes        07/03/19 1803        - DVT prophy: Sequential compression device (Venodyne)  and Enoxaparin (Lovenox)  - GI ppx: Pantaprazole  - Nausea: None  - Supplements: None  - Sleep: None    Disposition: tentative d/c on 2/19    CODE: Level 1: Full Code Scheduled Meds:    Current Facility-Administered Medications:  aspirin 81 mg Oral Daily Ed Arreola MD   atorvastatin 80 mg Oral Daily With [employer] labs, MD   bisacodyl 10 mg Rectal Daily PRN Amina Whipple PA-C   calcium carbonate 500 mg Oral Daily PRN Ed Arreola MD   carvedilol 3 125 mg Oral BID With Meals David Bush MD   clopidogrel 75 mg Oral Daily Ed Arreola MD   docusate sodium 100 mg Oral BID Amina Whipple PA-C   enoxaparin 40 mg Subcutaneous Q24H [facility] Ed Arreola MD   ferrous sulfate 325 mg Oral BID With Meals Ed Arreola MD   melatonin 6 mg Oral HS Ed Arreola MD   NIFEdipine 30 mg Oral Daily Ed Arreola MD   oxyCODONE 2 5 mg Oral Q4H PRN Ed Arreola MD   oxyCODONE 5 mg Oral Q4H PRN Ed Arreola MD   pantoprazole 40 mg Oral Early Morning Ed Arreola MD   polyethylene glycol 17 g Oral Daily PRN Amina Whipple PA-C   senna 2 tablet Oral HS Amina Whipple PA-C        Objective:    Functional Update:  Physical Therapy Occupational Therapy Speech Therapy   Weight Bearing Status: Full Weight Bearing  Transfers: Supervision  Bed Mobility: Supervision  Amulation Distance (ft): 160 feet  Ambulation: Supervision  Assistive Device for Ambulation: Single Point Cane  Wheelchair Mobility Distance: 0 ft  Number of Stairs: 12  Assistive Device for Stairs: Right Hand Rail  Stair Assistance: Supervision  Discharge Recommendations: Home with:  [address] with[de-identified] Family Support, 24 Hour Supervision, Home Physical Therapy   Eating: Modified Independent  Grooming: Contact Guard  Bathing: Supervision  Bathing: Supervision  Upper Body Dressing: Supervision  Lower Body Dressing: Minimal Assistance  Toileting: Contact Guard  Tub/Shower Transfer: Contact Guard  Toilet Transfer: Contact Guard  Cognition: Exceptions to WNL  Cognition: Decreased Memory, Decreased Executive Functions, Decreased Attention, Decreased Comprehension, Decreased Safety  Orientation: Person, Place, Situation   Mode of Communication: Verbal  Cognition: Exceptions to WNL  Cognition: Decreased Memory, Decreased Executive Functions, Decreased Attention, Decreased Comprehension, Decreased Safety(visuospatial deficits)  Orientation: Person, Place, Time, Situation  Discharge Recommendations: Home with:  [address] with[de-identified] 24 Hour Supervision, 24 Hour Assisteance, Family Support, Outpatient Speech Therapy     Allergies per EMR    Physical Exam:  Temp:  [97 6 °F (36 4 °C)-98 9 °F (37 2 °C)] 97 6 °F (36 4 °C)  HR:  [66-74] 73  Resp:  [18] 18  BP: (123-136)/(60-95) 131/60  SpO2:  [95 %-98 %] 95 %    General: alert, no apparent distress, cooperative and comfortable   HEENT:  Eye: ecchymosis over right eye has resolved  LUNGS:  no abnormal respiratory pattern, no retractions noted, non-labored breathing   ABDOMEN:  soft, non-tender  Bowel sounds normal  No masses, no organomegaly  EXTREMITIES:  extremities normal, warm and well-perfused; no cyanosis, clubbing, or edema  NEURO:   mental status, speech normal, alert and oriented x3  PSYCH:  Alert and oriented, appropriate affect  Physical examination is otherwise unchanged from previous encounter, except as noted above  Diagnostic Studies: Reviewed, no new imaging  No orders to display     Laboratory: Reviewed  Results from last 7 days   Lab Units 07/15/19  0518 07/13/19  0446 07/12/19  1125   HEMOGLOBIN g/dL 8 7* 8 9* 9 5*   HEMATOCRIT % 29 7* 30 7* 33 0*   WBC Thousand/uL 7 30 8 56 8 86     Results from last 7 days   Lab Units 07/15/19  0518 07/11/19  0539   BUN mg/dL 15 22   SODIUM mmol/L 147* 142   POTASSIUM mmol/L 3 7 3 5   CHLORIDE mmol/L 114* 112*   CREATININE mg/dL 0 86 0 80            Patient Active Problem List   Diagnosis    Cataracts, bilateral    Essential hypertension    Hyperlipidemia    Nicotine dependence    CVA (cerebral vascular accident) ([facility] [address] )    Anemia, unspecified    Abnormal CT scan    Symptomatic stenosis of left carotid artery    Iron deficiency anemia    Impaired mobility and ADLs     ** Please Note: Fluency Direct voice to text software may have been used in the creation of this document   **

## 2019-07-17 NOTE — PROGRESS NOTES
Internal Medicine Progress Note  Patient: Florence Hernandez  Age/sex: 78 y o  female  Medical Record #: [id number]      ASSESSMENT/PLAN: (Interval History)  Florence Hernandez is seen and examined and management for following issues:    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; s/p left CEA 6/28/19:  continue ASA/Plavix, Lipitor = for home Cards advised change Lipitor to Crestor 40mg daily      Iron deficiency anemia; s/p transfusion and iron IV: continue iron PO supplements      AVM stomach/duodenum tx with APC; cold snare sigmoid colon polypectomy: continue PPI     HTN: at home was on Lopressor and now is switched to Coreg and Nifedipine     RUL ground glass density: for Pulm to see OP     Urethral diverticulum: to see Uro as OP for cystogram     Nicotine abuse: counseled for cessation     Fall 2/2 LOB 6/24/19: CT head was negative; has remaining ecchymosis right forehead/orbit     ABLA: Recent Hgb 8 7 stable s/p transfusion  Continue FeSO4 supplementation  Subjective/ HPI: Patient seen and examined  Patients overnight issues or events were reviewed with nursing or staff during rounds or morning huddle session  New or overnight issues include the following: HTN: Currently well controlled  Continue present medications  CVA: No new stroke symptoms  Continue antiplatelet and statin  Anemia: Hgb stable s/p transfusion  Pt currently reports that she is doing well and is ready to be discharged      ROS:     GI: denies abdominal pain, change bowel habits or reflux symptoms  Neuro: Denies any headache, new vision changes, new neuropathies,new weaknesses   Respiratory: No Cough, SOB, denies wheeze  Cardiovascular: No CP, palpitations , denies perception of rapid heartbeat  : denies any new urinary burning or frequency    Review of Scheduled Meds:    Current Facility-Administered Medications:  aspirin 81 mg Oral Daily Ed Arreola MD   atorvastatin 80 mg Oral Daily With Dinner Rohith Wilson MD   bisacodyl 10 mg Rectal Daily PRN Amina Whipple PA-C   calcium carbonate 500 mg Oral Daily PRN Ed Arreola MD   carvedilol 3 125 mg Oral BID With Meals Darrick Garcia MD   clopidogrel 75 mg Oral Daily Ed Arreola MD   docusate sodium 100 mg Oral BID Amina Whipple PA-C   enoxaparin 40 mg Subcutaneous Q24H [facility] Ed Arreola MD   ferrous sulfate 325 mg Oral BID With Meals Ed Arreola MD   melatonin 6 mg Oral HS Ed Arreola MD   NIFEdipine 30 mg Oral Daily Ed Arreola MD   oxyCODONE 2 5 mg Oral Q4H PRN Ed Arreola MD   oxyCODONE 5 mg Oral Q4H PRN Ed rAreola MD   pantoprazole 40 mg Oral Early Morning Ed Arreola MD   polyethylene glycol 17 g Oral Daily PRN Amina Whipple PA-C   senna 2 tablet Oral HS Amina Whipple PA-C       Labs:     Results from last 7 days   Lab Units 07/15/19  0518 07/13/19  0446   WBC Thousand/uL 7 30 8 56   HEMOGLOBIN g/dL 8 7* 8 9*   HEMATOCRIT % 29 7* 30 7*   PLATELETS Thousands/uL 338 366     Results from last 7 days   Lab Units 07/15/19  0518 07/11/19  0539   SODIUM mmol/L 147* 142   POTASSIUM mmol/L 3 7 3 5   CHLORIDE mmol/L 114* 112*   CO2 mmol/L 26 25   BUN mg/dL 15 22   CREATININE mg/dL 0 86 0 80   CALCIUM mg/dL 8 4 8 6                        Imaging:     No orders to display       *Labs reviewed  *Radiology studies reviewed  *Medications reviewed and reconciled as needed  *Please refer to order section for additional ordered labs studies  *Case discussed with primary attending during morning huddle case rounds    Physical Examination:  Vitals:   Vitals:    07/16/19 1553 07/16/19 2027 07/17/19 0521 07/17/19 0600   BP: 123/76 128/95 131/60    BP Location:  Right arm Left arm    Pulse: 66 74 73    Resp:  18 18    Temp:  98 9 °F (37 2 °C) 97 6 °F (36 4 °C)    TempSrc:  Oral Oral    SpO2:  95% 95%    Weight:    73 6 kg (162 lb 4 1 oz)   Height:           General Appearance: NAD, conversive  Eyes: No icterus; conjunctiva normal, PERRLA  HENT: oropharynx clear; mucous membranes moist; no ulcerations, normal hard and soft palette  Neck: trachea midline, range of motion full  Supple, no lymphadenopathy or thyromegaly  Lungs: CTA, normal respiratory effort, no retractions, expiratory effort normal  CV: regular rate, no rubs no gallops, PMI normal place and intensity  ABD: soft non tender, no masses , no hepatic or splenomegaly  EXT: DP pulses intact, no lymphadenopathy, no edema  Skin: normal turgor, normal texture, no rash, no ulcers  Psych: affect normal, No anxiety,   Neuro: AAOx3            Total time spent: At least 40 minutes, with more than 50% spent counseling/coordinating care  Counseling includes discussion with patient re: progress  and discussion with patient of his/her current medical state/information  Coordination of patient's care was performed in conjunction with primary service  Time invested included review of patient's labs, vitals, and management of their comorbidities with continued monitoring  In addition, this patient was discussed with medical team including physician and advanced extenders  The care of the patient was extensively discussed and appropriate treatment plan was formulated unique for this patient  ** Please Note: Dragon 360 Dictation voice to text software may have been used in the creation of this document   **

## 2019-07-17 NOTE — PROGRESS NOTES
07/17/19 0954   Pain Assessment   Pain Assessment No/denies pain   Pain Score No Pain   Restrictions/Precautions   Precautions Cognitive; Fall Risk;Supervision on toilet/commode;Visual deficit   Cognition   Overall Cognitive Status Impaired   Arousal/Participation Alert; Cooperative   Attention Attends with cues to redirect   Orientation Level Oriented X4   Memory Decreased recall of precautions;Decreased recall of recent events   Following Commands Follows one step commands with increased time or repetition   Subjective   Subjective No complaints upon arrival to pt's room  She reports she will d/c Friday to daughter April's house where she has FF of steps to access bathroom   Pt agreeable to participate with PT tx     QI: Roll Left and Right   Assistance Needed Set-up / clean-up   Assistance Provided by [place] No physical assistance   Comment HOB flat w/o bed rails   Roll Left and Right CARE Score 5   QI: Sit to [address] Provided by [place] No physical assistance   Comment HOB flat w/o bed rails   Sit to Lying CARE Score 5   QI: Lying to Sitting on Side of Bed   Assistance Needed Set-up / clean-up   Assistance Provided by [place] No physical assistance   Comment HOB flat w/o bed rails   Lying to Sitting on Side of Bed CARE Score 5   QI: Sit to Stand   Assistance Needed Supervision;Verbal cues   Assistance Provided by [place] No physical assistance   Comment S with SPC, requires verbal cueing for R hand placement during transfers   Sit to Stand CARE Score 4   Bed Mobility   Able to Roll Left to Right;Right to Left;Scoot Up;Scoot Down   Adaptive Equipment Other  (w/o bedrails)   Findings DS for all bed mobilities   QI: Chair/Bed-to-Chair Transfer   Assistance Needed Supervision;Verbal cues   Assistance Provided by [place] No physical assistance   Comment CS with SPC, verbal cues to turn fully before sitting in chair   Chair/Bed-to-Chair Transfer CARE Score 4   Transfer Bed/Chair/Wheelchair   Limitations Noted In Balance;Confidence; Coordination; Endurance;Problem Solving; Sequencing;LE Strength;Vision   Adaptive Equipment Cane   Stand Pivot Supervision   Sit to Stand Supervision   Stand to [employer] Transfer Supervision   Findings Pt cont to show difficulty reaching back with B UE's to chair, therefore focused session on pt reaching back with R UE to chair while maintaining SPC in L hand; pt demo'd improvements during session reaching back to chair with R UE for sit<>stand transfers; verbal cues for pt to eccentrically lower self to chair   Bed, Chair, Wheelchair Transfer (FIM) 5 - Patient requires supervision/monitoring   QI: Car Transfer   Assistance Needed Supervision;Verbal cues   Assistance Provided by [place] No physical assistance   Comment CS with SPC, verbal cues for hand placement; pt able to recall proper hand placement when exiting car   Car Transfer CARE Score 4   QI: [address] Provided by [place] No physical assistance   Comment S with SPC   Walk 10 Feet CARE Score 4   QI: Walk 50 Feet with [address] Provided by [place] No physical assistance   Comment S with SPC   Walk 50 Feet with Two Turns CARE Score 4   QI: Walk [address] Provided by [place] No physical assistance   Comment S with SPC; pt had 1 LOB during amb due to distraction but was able to recover without assistance; performed amb in multiple areas and surfaces to simulate pt's environments when she is d/c'd      Walk 150 Feet CARE Score 4   QI: Walking 10 Feet on Uneven Surfaces   Assistance Needed Supervision;Verbal cues   Assistance Provided by [place] No physical assistance   Comment S with SPC; pt shows good safety by taking time and being aware of uneven surfaces; performed on outside concrete pavers x100'   Walking 10 Feet on Uneven Surfaces CARE Score 4 Ambulation   Does the patient walk? 2  Yes   Primary Discharge Mode of Locomotion Walk   Walk Assist Level Supervision   Gait Pattern Slow Dolly; Inconsistant Dolly;Decreased foot clearance;Narrow LUIS; Improper weight shift   Assist Device [address] Walked (feet) 300 ft  (150'x3, 180'x1, 50'x4 (unlimited distances), 200'x1 carpet)   Limitations Noted In Balance; Coordination; Endurance; Heel Strike;Posture; Safety; Sensation; Sequencing;Speed;Strength;Swing   Findings Performed on laminate surface, carpeted surface, downstairs in main lobby with traffic, and outside on concrete pavers with SPC and S; only 1 LOB noted without assistance required to maintain balance   Walking (FIM) 5 - Patient requires supervision/monitoring AND distance 150 feet or more, no rest   QI: 4 Steps   Assistance Needed Physical assistance   Assistance Provided by [place] Less than 25%   Comment minAx1 descending steps; CGA-CS ascending steps; utilized R HR ascending as pt states she will have this at daughter's home; pt performed non-reciprocal pattern and did not use SPC as she stated she did not want to perform as she was worried about proper sequencing and felt she would be more unsafe with use of SPC   4 Steps CARE Score 3   QI: 12 Steps   Assistance Needed Physical assistance   Assistance Provided by [place] Less than 25%   Comment FF of steps; minAx1 descending steps; CGA-CS ascending steps; utilized R HR ascending as pt states she will have this at daughter's home; pt performed non-reciprocal pattern and did not use SPC as she stated she did not want to perform as she was worried about proper sequencing and felt she would be more unsafe with use of SPC   12 Steps CARE Score 3   Stairs   Type Stairs   # of Steps 12   Weight Bearing Precautions Fall Risk   Assist Devices Single Rail  (R HR ascending)   Findings Pt appears to be fearful when descending steps therefore requires increased assist with descending steps; also had pt utilize B UE's on HR descending but requires verbal cueing to advance hands on HR before descending step   Stairs (FIM) 4 - Patient requires steadying assist or light touching AND patient goes up and down full flight (12- 14 stairs)   Therapeutic Interventions   Flexibility Seated passive B hamstring and gastroc stretching performed for 3 minutes on each LE; with distraction, pt requires increased cueing to relax as pt will tense muscles   Balance Amb through [employer] approx 30' navigating through tight spaces and brining awareness to R side so pt did not bump into any objects; pt able to complete without any LOB; performed to improve awareness of R UE as well as improve pt's balance to decrease her overall risk for falls   Equipment Use   NuStep Lvl 4, 15 minutes   Assessment   Treatment Assessment Pt engaged in skilled PT session focusing on amb with SPC in different areas of hospital to increase obstacle negotiation and increase amount of distractions pt has as well as amb on different surfaces including laminate, carpet, and outside concrete pavers  Pt was able to perform all amb with only 1 LOB which occurred when pt was distracted by a person coming around the corner  Pt did not require physical assist from therapist and was able to recover independently  With fatigue, pt's ishan slows, however, pt is still able to cont with amb before requiring seated rest break  At this time, pt is able to amb distance at best approx 300' with [facility]  During amb, pt reports RPE according to Tucker scale as 13/20  Practiced multiple transfers on different chairs with focus on pt reaching back with R UE as pt demo's difficulty coordinating reaching back with B UE's while holding onto cane in L UE  Pt able to perform transfers consistently throughout tx reaching back to chair with R UE  Will reassess transfers tomorrow to see if learning has occurred   Stair trng also performed with pt as she has FF of steps at [facility] to access 2nd floor bathroom  Pt requires increased assist descending steps as she is fearful of falling  Cont to work on [employer] of steps with pt utilizing R HR ascending to prepare pt for d/c on Friday to pt's daughters house as well as complete family trng with daughter on 7/18/19 focusing on functional mobilities to decrease overall caregiver burden upon d/c and decrease pt's risk for falls upon d/c  Family/Caregiver Present no   Problem List Decreased strength;Decreased range of motion;Decreased endurance;Decreased coordination;Decreased cognition;Decreased safety awareness; Impaired vision; Impaired balance   Barriers to Discharge Decreased caregiver support   Barriers to Discharge Comments Pt to d/c home to daughter April's house on 7/19/19  [facility] reports she has FF of steps with and pt states there is L HR to ascend steps  Pt also reports daughter [facility] will be purchasing pt SPC prior to d/c     PT Barriers   Functional Limitation Stair negotiation; Walking   Plan   Treatment/Interventions Functional transfer training;LE strengthening/ROM; Therapeutic exercise; Endurance training;Gait training   Progress Progressing toward goals   Recommendation   Recommendation 24 hour supervision/assist;Home with family support; Outpatient PT   Equipment Recommended Cane   PT - OK to Discharge No   PT Therapy Minutes   PT Time In 0954   PT Time Out 1132   PT Total Time (minutes) 98   PT Mode of treatment - Individual (minutes) 92   PT Mode of treatment - Concurrent (minutes) 6   PT Mode of treatment - Group (minutes) 0   PT Mode of treatment - Co-treat (minutes) 0   PT Mode of Teatment - Total time(minutes) 98 minutes   Therapy Time missed   Time missed?  No

## 2019-07-18 LAB
ANION GAP SERPL CALCULATED.3IONS-SCNC: 5 MMOL/L (ref 4–13)
BASOPHILS # BLD AUTO: 0.05 THOUSANDS/ΜL (ref 0–0.1)
BASOPHILS NFR BLD AUTO: 1 % (ref 0–1)
BUN SERPL-MCNC: 21 MG/DL (ref 5–25)
CALCIUM SERPL-MCNC: 8.2 MG/DL (ref 8.3–10.1)
CHLORIDE SERPL-SCNC: 114 MMOL/L (ref 100–108)
CO2 SERPL-SCNC: 26 MMOL/L (ref 21–32)
CREAT SERPL-MCNC: 0.89 MG/DL (ref 0.6–1.3)
EOSINOPHIL # BLD AUTO: 0.53 THOUSAND/ΜL (ref 0–0.61)
EOSINOPHIL NFR BLD AUTO: 9 % (ref 0–6)
GFR SERPL CREATININE-BSD FRML MDRD: 62 ML/MIN/1.73SQ M
GLUCOSE P FAST SERPL-MCNC: 79 MG/DL (ref 65–99)
GLUCOSE SERPL-MCNC: 79 MG/DL (ref 65–140)
HCT VFR BLD AUTO: 27.6 % (ref 34.8–46.1)
HGB BLD-MCNC: 8.1 G/DL (ref 11.5–15.4)
IMM GRANULOCYTES # BLD AUTO: 0.02 THOUSAND/UL (ref 0–0.2)
IMM GRANULOCYTES NFR BLD AUTO: 0 % (ref 0–2)
LYMPHOCYTES # BLD AUTO: 1.59 THOUSANDS/ΜL (ref 0.6–4.47)
LYMPHOCYTES NFR BLD AUTO: 26 % (ref 14–44)
MCH RBC QN AUTO: 26.7 PG (ref 26.8–34.3)
MCHC RBC AUTO-ENTMCNC: 29.3 G/DL (ref 31.4–37.4)
MCV RBC AUTO: 91 FL (ref 82–98)
MONOCYTES # BLD AUTO: 0.69 THOUSAND/ΜL (ref 0.17–1.22)
MONOCYTES NFR BLD AUTO: 11 % (ref 4–12)
NEUTROPHILS # BLD AUTO: 3.15 THOUSANDS/ΜL (ref 1.85–7.62)
NEUTS SEG NFR BLD AUTO: 53 % (ref 43–75)
NRBC BLD AUTO-RTO: 0 /100 WBCS
PLATELET # BLD AUTO: 267 THOUSANDS/UL (ref 149–390)
PMV BLD AUTO: 11.5 FL (ref 8.9–12.7)
POTASSIUM SERPL-SCNC: 3.7 MMOL/L (ref 3.5–5.3)
RBC # BLD AUTO: 3.03 MILLION/UL (ref 3.81–5.12)
SODIUM SERPL-SCNC: 145 MMOL/L (ref 136–145)
WBC # BLD AUTO: 6.03 THOUSAND/UL (ref 4.31–10.16)

## 2019-07-18 PROCEDURE — 85025 COMPLETE CBC W/AUTO DIFF WBC: CPT | Performed by: INTERNAL MEDICINE

## 2019-07-18 PROCEDURE — 97530 THERAPEUTIC ACTIVITIES: CPT

## 2019-07-18 PROCEDURE — 97116 GAIT TRAINING THERAPY: CPT

## 2019-07-18 PROCEDURE — G0515 COGNITIVE SKILLS DEVELOPMENT: HCPCS

## 2019-07-18 PROCEDURE — 80048 BASIC METABOLIC PNL TOTAL CA: CPT | Performed by: INTERNAL MEDICINE

## 2019-07-18 PROCEDURE — 99232 SBSQ HOSP IP/OBS MODERATE 35: CPT | Performed by: PHYSICAL MEDICINE & REHABILITATION

## 2019-07-18 PROCEDURE — 97110 THERAPEUTIC EXERCISES: CPT

## 2019-07-18 PROCEDURE — 97535 SELF CARE MNGMENT TRAINING: CPT

## 2019-07-18 RX ORDER — PANTOPRAZOLE SODIUM 40 MG/1
40 TABLET, DELAYED RELEASE ORAL
Qty: 30 TABLET | Refills: 0 | Status: SHIPPED | OUTPATIENT
Start: 2019-07-19

## 2019-07-18 RX ORDER — ATORVASTATIN CALCIUM 40 MG/1
80 TABLET, FILM COATED ORAL DAILY
Qty: 90 TABLET | Refills: 0 | Status: SHIPPED | OUTPATIENT
Start: 2019-07-18 | End: 2019-09-20 | Stop reason: SDUPTHER

## 2019-07-18 RX ORDER — CARVEDILOL 3.12 MG/1
3.12 TABLET ORAL 2 TIMES DAILY WITH MEALS
Qty: 60 TABLET | Refills: 3 | Status: SHIPPED | OUTPATIENT
Start: 2019-07-18 | End: 2020-11-29 | Stop reason: HOSPADM

## 2019-07-18 RX ORDER — FERROUS SULFATE 325(65) MG
325 TABLET ORAL 2 TIMES DAILY WITH MEALS
Qty: 60 TABLET | Refills: 3 | Status: SHIPPED | OUTPATIENT
Start: 2019-07-18

## 2019-07-18 RX ORDER — CLOPIDOGREL BISULFATE 75 MG/1
75 TABLET ORAL DAILY
Qty: 30 TABLET | Refills: 3 | Status: SHIPPED | OUTPATIENT
Start: 2019-07-19 | End: 2020-02-27 | Stop reason: ALTCHOICE

## 2019-07-18 RX ORDER — NIFEDIPINE 30 MG/1
30 TABLET, FILM COATED, EXTENDED RELEASE ORAL DAILY
Qty: 30 TABLET | Refills: 3 | Status: SHIPPED | OUTPATIENT
Start: 2019-07-19 | End: 2020-11-29 | Stop reason: HOSPADM

## 2019-07-18 RX ADMIN — DOCUSATE SODIUM 100 MG: 100 CAPSULE, LIQUID FILLED ORAL at 09:29

## 2019-07-18 RX ADMIN — CARVEDILOL 3.12 MG: 3.12 TABLET, FILM COATED ORAL at 06:44

## 2019-07-18 RX ADMIN — Medication 325 MG: at 17:04

## 2019-07-18 RX ADMIN — ATORVASTATIN CALCIUM 80 MG: 80 TABLET, FILM COATED ORAL at 17:04

## 2019-07-18 RX ADMIN — CARVEDILOL 3.12 MG: 3.12 TABLET, FILM COATED ORAL at 17:04

## 2019-07-18 RX ADMIN — PANTOPRAZOLE SODIUM 40 MG: 40 TABLET, DELAYED RELEASE ORAL at 06:44

## 2019-07-18 RX ADMIN — NIFEDIPINE 30 MG: 30 TABLET, FILM COATED, EXTENDED RELEASE ORAL at 09:29

## 2019-07-18 RX ADMIN — CLOPIDOGREL BISULFATE 75 MG: 75 TABLET ORAL at 09:29

## 2019-07-18 RX ADMIN — ENOXAPARIN SODIUM 40 MG: 40 INJECTION SUBCUTANEOUS at 09:29

## 2019-07-18 RX ADMIN — ASPIRIN 81 MG: 81 TABLET, COATED ORAL at 09:29

## 2019-07-18 RX ADMIN — MELATONIN 6 MG: at 22:45

## 2019-07-18 RX ADMIN — Medication 325 MG: at 06:44

## 2019-07-18 NOTE — PROGRESS NOTES
07/18/19 0710   Pain Assessment   Pain Assessment No/denies pain   Pain Score No Pain   Restrictions/Precautions   Precautions Cognitive; Fall Risk   QI: Oral Hygiene   Assistance Needed Supervision   Oral Hygiene CARE Score 4   Grooming   Findings Pt completed in stance at sink    Grooming (FIM) 5 - Patient requires supervision/monitoring   QI: Putting On/Taking Off Footwear   Assistance Needed Supervision   Putting On/Taking Off Footwear CARE Score 4   Dressing/Undressing Clothing   Findings Pt able to don/doff socks and shoes from seated level and thread b/l LEs to underwear to pull over hips in stance  Pt req cue x 1 to bring attention to R hip as pt's dress was tucked into waistband  QI: Sit to Stand   Assistance Needed Supervision   Sit to Stand CARE Score 4   QI: Chair/Bed-to-Chair Transfer   Assistance Needed Supervision   Chair/Bed-to-Chair Transfer CARE Score 4   Transfer Bed/Chair/Wheelchair   Adaptive Equipment [place], Chair, Wheelchair Transfer (FIM) 5 - Patient requires supervision/monitoring   QI: [address] Needed Supervision;Verbal cues   Toileting Hygiene CARE Score 4   Toileting   Able to [address] down yes, up yes  Able to Manage Clothing Closures Yes   Manage Hygiene Bladder; Bowel   Findings Pt req cues to inc attention to positioning of toilet paper in RUE; pt unable to sense when readjustment of toilet paper led to dec thoroughness of bottom care  Toileting (FIM) 5 - Patient requires supervision/monitoring   QI: Toilet Transfer   Assistance Needed Supervision   Toilet Transfer CARE Score 4   Toilet Transfer   Findings CS with [facility]   Toilet Transfer (FIM) 5 - Patient requires supervision/monitoring   Assessment   Treatment Assessment Pt performed grooming and toileting task this am with cues to attend to R body 2* proprioceptive and sensory deficits   FT scheduled for this pm   OT Therapy Minutes   OT Time In 0710   OT Time Out 0720   OT Total Time (minutes) 10   OT Mode of treatment - Individual (minutes) 0   OT Mode of treatment - Concurrent (minutes) 10   OT Mode of treatment - Group (minutes) 0   OT Mode of treatment - Co-treat (minutes) 0   OT Mode of Teatment - Total time(minutes) 10 minutes   Therapy Time missed   Time missed?  No

## 2019-07-18 NOTE — PROGRESS NOTES
07/18/19 1400   Pain Assessment   Pain Assessment No/denies pain   Pain Score No Pain   Restrictions/Precautions   Precautions Cognitive; Fall Risk   Cognition   Overall Cognitive Status Impaired   Arousal/Participation Alert; Cooperative   Attention Attends with cues to redirect   Orientation Level Oriented X4   Memory Decreased recall of precautions;Decreased recall of recent events   Following Commands Follows one step commands with increased time or repetition   Subjective   Subjective Pt daughters Elza Spivey present for PT tx  Pt states she is not as fatigued as she felt in a m  session and is agreeable to PT tx     QI: Sit to Stand   Assistance Needed Supervision   Assistance Provided by [place] No physical assistance   Comment DS with SPC; cont with learned behavior or reaching back to chair with R UE before sitting; only one instance at end of session where pt did not reach back to chair with R UE before sitting  Sit to Stand CARE Score 4   QI: Chair/Bed-to-Chair Transfer   Assistance Needed Supervision   Assistance Provided by [place] No physical assistance   Comment S with SPC, no LOB with turns this session and pt did not demo behaviors of reaching for chair as she approaches it this session  Chair/Bed-to-Chair Transfer CARE Score 4   Transfer Bed/Chair/Wheelchair   Limitations Noted In Balance;Confidence; Endurance;Problem Solving; Sequencing;LE Strength;UE Strength   Adaptive Equipment Cane   Stand Pivot Supervision  (DS)   Sit to Stand Supervision   Stand to [employer] Transfer Supervision   Bed, Chair, Wheelchair Transfer (FIM) 5 - Patient requires supervision/monitoring   QI: Car Transfer   Assistance Needed Supervision   Assistance Provided by [place] No physical assistance   Comment S with SPC in car on 9th floor; trialed on mat table to simulate height of pt's daughter's car (SUV)  Pt able to perform with CS provided on mat table     Car Transfer CARE Score 4   QI: Walk 10 Feet Assistance Needed Supervision   Assistance Provided by [place] No physical assistance   Comment S with SPC   Walk 10 Feet CARE Score 4   QI: Walk 50 Feet with Two [address] Provided by [place] No physical assistance   Comment S with SPC   Walk 50 Feet with Two Turns CARE Score 4   QI: Walking 10 Feet on Uneven Surfaces   Assistance Needed Physical assistance   Assistance Provided by [place] Less than 25%   Comment Tara descending ramp with SPC and HHA due to pt reported fatigue, CS ascending ramp with SPC   Walking 10 Feet on Uneven Surfaces CARE Score 3   Ambulation   Does the patient walk? 2  Yes   Primary Discharge Mode of Locomotion Walk   Walk Assist Level Supervision   Gait Pattern Inconsistant Dolly; Slow Dolly;Decreased foot clearance;Narrow LUIS; Decreased R stance; Improper weight shift   Assist Device [address] Walked (feet) 50 ft  (x1 unlimited distances)   Limitations Noted In Balance; Coordination; Endurance; Heel Strike; Safety; Sequencing;Speed;Swing;Strength   Findings amb distances shortened this session due to family training and family requests to complete other tasks   Walking (FIM) 5 - Patient requires verbal cues AND distance 150 feet or more, no rest   QI: 1 Step (Curb)   Assistance Needed Physical assistance;Verbal cues   Assistance Provided by [place] 25%-49%   Comment minAx1 with SPC, HHA on pt's R side for pt's comfort and stability; verbal cues for proper sequencing, performed with SPC   1 Step (Curb) CARE Score 3   QI: 4 Steps   Assistance Needed Physical assistance   Assistance Provided by [place] Less than 25%   Comment CS ascending steps with B UE's on R HR; minAx1 descending steps with verbal cueing for proper sequencing    4 Steps CARE Score 3   QI: 12 Steps   Assistance Needed Physical assistance   Assistance Provided by [place] Less than 25%   Comment FF of steps; CS ascending steps with B UE's on R HR, minAx1 descending steps with B UE's on HR and verbal cues for proper sequencing   12 Steps CARE Score 3   Stairs   Type Stairs;Curb;Ramp   # of Steps 12   Weight Bearing Precautions Fall Risk   Assist Devices Single Rail  (R HR ascending)   Findings Pt still cont to be fearful and requires increased verbal cueing to complete descending steps   Stairs (FIM) 4 - Patient requires steadying assist or light touching AND patient goes up and down full flight (12- 14 stairs)   Assessment   Treatment Assessment Pt engaged in skilled PT session focusing on family training with pt's daughters  See "Family training" section for more details  Pt stated she felt less fatigued this session and was able to complete all functional mobilities without LOB and with improvements noted in R hand placement during transfers  After speaking with OT, pt will d/c to daughter April's home on 7/22/19 with daughters stating they will purchase pt "Shearer Dav" and prepare pt's room in daughters house for d/c  Cont to work on pt's safety with functional mobilities in order to decrease her overall risk for falls as well as decrease caregiver burden upon d/c  Family/Caregiver Present yes   PT Family training done with: Pt's daughters Arturo Valiente, and Nancy Reyes present for family training  Daughters observed pt complete FF of steps, car xfer, 1 curb step with SPC, and amb with SPC and with quad cane  Daughter's requested quad cane trial as they are worried pt will drop cane and reach down to pick it up  They also questioned if "Shearer Dav" would be appropriate and after trialing quad cane, recommend "Shearer Dav" as pt demos decreased stride length and increased instability and safety with use of quad cane  Daughters in agreement that quad cane is not appropriate as pt has not had adequate practice with it  Also educated daughters to place cane out of patient's reach so she is not tempted to get up without daughters assist or reach down if cane is dropped to floor   Daughters state they feel comfortable providing S during amb and transfers and providing assistance to pt on curb step and steps and state they have gait belt at home that they will toby on pt  PT informed daughters that written instructions will be provided to them by PT regarding guarding techniques for curb step and stairs  Daughters also stated that they have had experience as Bandar Swanson and Torsten Palafox were CNAs in past and feel confident with assisting pt upon d/c  Will follow up with daughter's on 7/19/19 regarding additional family training this weekend  Problem List Decreased strength;Decreased endurance; Impaired balance;Decreased coordination;Decreased cognition; Impaired judgement;Decreased safety awareness; Impaired sensation   Barriers to Discharge Other (Comment)  (pt to d/c to daughter April's home on Monday 7/22/19  )   Barriers to Discharge Comments Pt to d/c to daughter April's home on 7/22/19  PT Barriers   Physical Impairment Decreased strength;Decreased endurance; Impaired balance;Decreased mobility; Decreased coordination; Impaired judgement;Decreased safety awareness   Functional Limitation Stair negotiation; Walking   Plan   Treatment/Interventions Functional transfer training;LE strengthening/ROM; Therapeutic exercise;Patient/family training;Gait training   Progress Progressing toward goals   Recommendation   Recommendation 24 hour supervision/assist;Home with family support; Outpatient PT   Equipment Recommended Cane   PT Equipment ordered Daughters state they will purchase [employer] for pt  PT - OK to Discharge No   PT Therapy Minutes   PT Time In 1400   PT Time Out 1430   PT Total Time (minutes) 30   PT Mode of treatment - Individual (minutes) 30   PT Mode of treatment - Concurrent (minutes) 0   PT Mode of treatment - Group (minutes) 0   PT Mode of treatment - Co-treat (minutes) 0   PT Mode of Teatment - Total time(minutes) 30 minutes   Therapy Time missed   Time missed?  No

## 2019-07-18 NOTE — PROGRESS NOTES
Physical Medicine and Rehabilitation Progress Note  Efrain Sandoval 78 y o  female MRN: [id number]  Unit/Bed#: -46 Encounter: [id number]    HPI: Efrain Sandoval is a 78 y o  female who presented to the [facility] with right sided weakness, right facial droop  Imaging subsequently revealed left sided infarcts to the parietal and frontal regions  In addition patient noetd to have ulcerated plaque at the left cervical carotied bifurcation; CTA revealed a right ICA occlusion  Vascular surgery was consulted, and recommendation made for a left CEA, performed on 6/28  During admission patient was noted to have ABLA, for which scope was performed revealing AVMs in the stomach and duodenum; argon plasma coagulation was performed  She was also transfused at the time  Of note, during her acute admission patient had a fall off of comode; CTOH showed right frontal hematoma, but no active bleed  Patient was found to be below functional baseline, and accepted to [facility] on 7/03/19  Chief Complaint: f/u stroke    Interval: No acute events overnight  Patient without complaint  Family to come in for family training today  ROS: A 10 point ROS was performed; negative except as noted above  Assessment/Plan:    * Impaired mobility and ADLs  Assessment & Plan  · Secondary to recent stroke  · Acute comprehensive interdisciplinary inpatient rehabilitation including PT, OT, SLP, RN, CM, SW, dietary, psychology, etc   · Patient will require supervision/assistance at home due to deficits from recent stroke  Will need to discuss with family          Symptomatic stenosis of left carotid artery  Assessment & Plan  · S/p CEA on 6/28  · Monitor for hematoma formation, incision care    Anemia, unspecified  Assessment & Plan  Results from last 7 days   Lab Units 07/18/19  0541 07/15/19  0518 07/13/19  0446   HEMOGLOBIN g/dL 8 1* 8 7* 8 9*     · Continue ferrous sulfate  · Monitor CBC intermittently  · Transfuse for Hgb <7    · Transusion performed on 7/11, with appropriate response noted  CVA (cerebral vascular accident) [facility])  Assessment & Plan  · ASA, Plavix  · Statin  · Neurology f/u as outpatient      Hyperlipidemia  Assessment & Plan  · Continue statin    Essential hypertension  Assessment & Plan  Temp:  [97 6 °F (36 4 °C)-97 9 °F (36 6 °C)] 97 6 °F (36 4 °C)  HR:  [63-70] 63  Resp:  [16-20] 20  BP: (129-148)/(59-65) 139/63    · Carvedilol  · Hydralazine prn  · IM service managing anti-hypertensives    # Skin  · Encourage regular turning as patient at risk for skin breakdown  · Staff to continue patient education on Q2h turning  · Rehabilitation team to perform skin checks regularly     # Bowel  · Patient reports no constipation  · To ensure regular BMs, bowel regimen consisting of:  colace , dulcolax suppository  and miralax     # Bladder  · Patient voiding spontaneously    # Pain  · Continue oxycodone     # Rehab Psych   · There are no psychological or psychiatric problems identified    # Other  - Diet/Nutrition:        Diet Orders   (From admission, onward)            Start     Ordered    07/03/19 1804  Diet Regular; Regular House  Diet effective now     Question Answer Comment   Diet Type Regular    Regular Regular House    RD to adjust diet per protocol?  Yes        07/03/19 1803        - DVT prophy: Sequential compression device (Venodyne)  and Enoxaparin (Lovenox)  - GI ppx: Pantaprazole  - Nausea: None  - Supplements: None  - Sleep: None    Disposition: tentative d/c on 2/19    CODE: Level 1: Full Code Scheduled Meds:    Current Facility-Administered Medications:  aspirin 81 mg Oral Daily Ed Arreola MD   atorvastatin 80 mg Oral Daily With [employer], MD   bisacodyl 10 mg Rectal Daily PRN Amina Whipple PA-C   calcium carbonate 500 mg Oral Daily PRN Ed Arreola MD   carvedilol 3 125 mg Oral BID With Meals Neville Jones MD   clopidogrel 75 mg Oral Daily Neville Jones MD docusate sodium 100 mg Oral BID Amina Whipple PA-C   enoxaparin 40 mg Subcutaneous Q24H [address] Ed Arreola MD   ferrous sulfate 325 mg Oral BID With Meals Ed Arreola MD   melatonin 6 mg Oral HS Ed Arreola MD   NIFEdipine 30 mg Oral Daily Ed Arreola MD   oxyCODONE 2 5 mg Oral Q4H PRN Ed Arreola MD   oxyCODONE 5 mg Oral Q4H PRN Ed Arreola MD   pantoprazole 40 mg Oral Early Morning Ed Arreola MD   polyethylene glycol 17 g Oral Daily PRN Amina Whipple PA-C   senna 2 tablet Oral HS Amina Whipple PA-C        Objective:    Functional Update:  Physical Therapy Occupational Therapy Speech Therapy   Weight Bearing Status: Full Weight Bearing  Transfers: Supervision  Bed Mobility: Supervision  Amulation Distance (ft): 160 feet  Ambulation: Supervision  Assistive Device for Ambulation: Single Point Cane  Wheelchair Mobility Distance: 0 ft  Number of Stairs: 12  Assistive Device for Stairs: Right Hand Rail  Stair Assistance: Supervision  Discharge Recommendations: Home with:  [address] with[de-identified] Family Support, 24 Hour Supervision, Home Physical Therapy   Eating: Modified Independent  Grooming: Contact Guard  Bathing: Supervision  Bathing: Supervision  Upper Body Dressing: Supervision  Lower Body Dressing: Minimal Assistance  Toileting: Contact Guard  Tub/Shower Transfer: Contact Guard  Toilet Transfer: Contact Guard  Cognition: Exceptions to WNL  Cognition: Decreased Memory, Decreased Executive Functions, Decreased Attention, Decreased Comprehension, Decreased Safety  Orientation: Person, Place, Situation   Mode of Communication: Verbal  Cognition: Exceptions to WNL  Cognition: Decreased Memory, Decreased Executive Functions, Decreased Attention, Decreased Comprehension, Decreased Safety(visuospatial deficits)  Orientation: Person, Place, Time, Situation  Discharge Recommendations: Home with:  [address] with[de-identified] 24 Hour Supervision, 24 Hour Assisteance, Family Support, Outpatient Speech Therapy     Allergies per EMR    Physical Exam:  Temp:  [97 6 °F (36 4 °C)-97 9 °F (36 6 °C)] 97 6 °F (36 4 °C)  HR:  [63-70] 63  Resp:  [16-20] 20  BP: (129-148)/(59-65) 139/63  SpO2:  [96 %-97 %] 97 %    General: alert, no apparent distress, cooperative and comfortable   HEENT:  Eye: ecchymosis over right eye has resolved  LUNGS:  no abnormal respiratory pattern, no retractions noted, non-labored breathing   ABDOMEN:  soft, non-tender  Bowel sounds normal  No masses, no organomegaly  EXTREMITIES:  extremities normal, warm and well-perfused; no cyanosis, clubbing, or edema  NEURO:   mental status, speech normal, alert and oriented x3  PSYCH:  Alert and oriented, appropriate affect  Physical examination is otherwise unchanged from previous encounter, except as noted above  Diagnostic Studies: Reviewed, no new imaging  No orders to display     Laboratory: Reviewed  Results from last 7 days   Lab Units 07/18/19  0541 07/15/19  0518 07/13/19  0446   HEMOGLOBIN g/dL 8 1* 8 7* 8 9*   HEMATOCRIT % 27 6* 29 7* 30 7*   WBC Thousand/uL 6 03 7 30 8 56     Results from last 7 days   Lab Units 07/18/19  0541 07/15/19  0518   BUN mg/dL 21 15   SODIUM mmol/L 145 147*   POTASSIUM mmol/L 3 7 3 7   CHLORIDE mmol/L 114* 114*   CREATININE mg/dL 0 89 0 86            Patient Active Problem List   Diagnosis    Cataracts, bilateral    Essential hypertension    Hyperlipidemia    Nicotine dependence    [facility]A [facility]ce[facility] accident) [facility] [address] )    Anemia, unspecified    Abnormal CT scan    Symptomatic stenosis of left carotid artery    Iron deficiency anemia    Impaired mobility and ADLs     ** Please Note: Fluency Direct voice to text software may have been used in the creation of this document   **

## 2019-07-18 NOTE — SOCIAL WORK
Ana Laura Wiseman OT came to CM with info re: family training that occurred today  Family had not been made aware of the scheduled dc for tomorrow and they are not set up in the dtr's home to accommodate pt  Together with Dr Dalila Son it was agreed that dc was moved to Monday  Ankur Irving to inform family and will notify CM if additional support is needed

## 2019-07-18 NOTE — PROGRESS NOTES
07/18/19 1235   Pain Assessment   Pain Assessment No/denies pain   Pain Score No Pain   Restrictions/Precautions   Precautions Cognitive; Fall Risk   QI: Sit to Stand   Assistance Needed Supervision   Sit to Stand CARE Score 4   QI: Chair/Bed-to-Chair Transfer   Assistance Needed Supervision;Verbal cues   Chair/Bed-to-Chair Transfer CARE Score 4   Transfer Bed/Chair/Wheelchair   Findings use of SPC; pt's dtr ordered cane today from [employer], Chair, Wheelchair Transfer (FIM) 5 - Patient requires supervision/monitoring   Meal Prep   Meal Prep Level Cane   Meal Prep Level of Assistance Moderate verbal cues   Meal Preparation pt engaged in meal prep task to prepare scrambled eggs on stovetop for FT and education  Pt able to verbalize 90% of necessary items to prepare and cook eggs on stovetop omitting frying pan  Pt demo dec R UE attention/proprioception with item retrieval and transport, striking hand on frying pan, attempting to hold hot frying da silva while reaching to dial with RUE, and demo dec awareness of items located in RUE  Pt's dtr edu on modifying task to inc engagement and indep and recommendation to provide S and cues/assist for IADL fxn  Vision   Vision Comments Pt engaged in motor free visual perceptual task with focus on visual perceptual training  Pt demo 50% accuracy with inc difficulty for figure ground, visual closure, and form constancy  Assessment   OT Family training done with: Pt's 3 dtrs  Pt and family engaged in lengthy education and training session with focus on demonstration of pt's cooking skills and highlighting areas of deficit including R UE proprioceptive deficits, impaired balance, dec safety/fxnl cognition, dec attention, impaired sequencing and overall motor planning  Pt's dtrs aware of recommendation for 24/7 S and state that pt will d/c to her dtr [address]   Pts dtrs report today that the space in which Enid Issa will stay is NOT adequate for habitating at this time and they need to set up bed and requested further family training for the weekend  OT spoke at length with NATHAN morris and MD Dr Moiz Slaughter to provide d/c date for monday 7/22  Pt's family in agreement  OT edu on recommendations for assist with ALL IADL fxn and S for ADL tasks with cues provided by dtrs  Pt and family edu on recommendations for OP OT services at d/c and edu on process and overall therapy expectations   Assessment of family training '   OT Therapy Minutes   OT Time In 1235   OT Time Out 1400   OT Total Time (minutes) 85   OT Mode of treatment - Individual (minutes) 85   OT Mode of treatment - Concurrent (minutes) 0   OT Mode of treatment - Group (minutes) 0   OT Mode of treatment - Co-treat (minutes) 0   OT Mode of Teatment - Total time(minutes) 85 minutes   Therapy Time missed   Time missed?  No

## 2019-07-18 NOTE — PROGRESS NOTES
07/18/19 0830   Pain Assessment   Pain Assessment No/denies pain   Pain Score No Pain   Restrictions/Precautions   Precautions Cognitive; Fall Risk;Pain;Supervision on toilet/commode;Visual deficit  (R sided inattention)   Cognition   Overall Cognitive Status Impaired   Arousal/Participation Alert; Cooperative   Attention Attends with cues to redirect   Orientation Level Oriented X4   Memory Decreased recall of precautions;Decreased recall of recent events   Following Commands Follows one step commands with increased time or repetition   Subjective   Subjective Upon PT's arrival to pt's room, pt reports that she is fatigued this morning and did not sleep well  Pt rated fatigue at beginning of tx as 5/10 and at end of tx was rated as 7/10  Pt had no c/o pain and is agreeable to PT tx     QI: Sit to Stand   Assistance Needed Set-up / clean-up   Assistance Provided by [place] No physical assistance   Comment DS with SPC; pt gayathri's improvements in learned behavior of placing R hand on chair prior to sitting/standing   Sit to Stand CARE Score 5   QI: Chair/Bed-to-Chair Transfer   Assistance Needed Supervision   Assistance Provided by [place] No physical assistance   Comment S-DS with SPC dependent on fatigue levels; with increased fatigue, pt gayathri's increased instability, especially during turns, however, is able to self correct and does not require assistance from PT to maintain balance   Chair/Bed-to-Chair Transfer CARE Score 4   Transfer Bed/Chair/Wheelchair   Limitations Noted In Balance;Confidence; Endurance;LE Strength   Adaptive Equipment Cane   Stand Pivot Supervision  (S-DS)   Sit to Stand Supervision  (DS)   Stand to Sit Supervision  (DS)   Findings Educated pt to be safe at all times during transfers including taking time and concentrating on task at hand   Bed, Chair, Wheelchair Transfer (FIM) 5 - Patient requires supervision/monitoring   QI: Walk 10 [address] / [address] [facility] Provided by [place] No physical assistance   Comment DS with SPC   Walk 10 Feet CARE Score 5   QI: Walk 50 Feet with Two Turns   Assistance Needed Set-up / clean-up   Assistance Provided by [place] No physical assistance   Comment DS with SPC; pt had minor LOB during turn, however, was able to recover without physical assist required; pt reports that fatigue today is impacting her balance   Walk 50 Feet with Two Turns CARE Score 5   QI: Walk 150 [address] Provided by [place] No physical assistance   Comment S with SPC; 2 LOB during amb, however, pt able to recover without physical assist required from PT   Walk 150 Feet CARE Score 4   Ambulation   Does the patient walk? 2  Yes   Primary Discharge Mode of Locomotion Walk   Walk Assist Level Supervision;Distant Supervision   Gait Pattern Inconsistant Dolly; Slow Dolly;Decreased foot clearance;Narrow LUIS; Decreased R stance; Improper weight shift   Assist Device [address] Walked (feet) 150 ft  (x2, 50'x2 DS )   Limitations Noted In Balance; Coordination; Endurance; Heel Strike; Safety;Speed   Findings Increased instability noted during amb today which is suspected to be due to reports of increased fatigue levels today however pt still does not require assist from therapist to maintain balance   Walking (FIM) 5 - Patient requires supervision/monitoring AND distance 150 feet or more, no rest   QI: 4 Steps   Assistance Needed Physical assistance   Assistance Provided by [place] Less than 25%   Comment CS ascending steps, minAx1 descending steps; R HR utilized with verbal cueing for proper sequencing and hand placement   4 Steps CARE Score 3   QI: 12 Steps   Assistance Needed Physical assistance   Assistance Provided by [place] Less than 25%   Comment FF of steps; CS ascending steps, minAx1 descending steps; R HR utilized with verbal cueing for proper sequencing and hand placement   12 Steps CARE Score 3   Stairs   Type Stairs   # of Steps 12   Weight Bearing Precautions Fall Risk   Assist Devices Single Rail   Findings Pt fearful when descending steps and requires increased time to complete as well as verbal cueing during stair trng for proper sequencing and hand placement   Stairs (FIM) 4 - Patient requires steadying assist or light touching AND patient goes up and down full flight (12- 14 stairs)   Therapeutic Interventions   Strengthening Seated hip flex (1 5# B ankle weights) and hip abduction (blue TB) performed x 30 reps; Alt LAQ's performed x10 reps; performed to improve pt's LE strength to assist with ease of completing functional mobilities   Flexibility Seated passive B gastroc and hamstring stretching performed x3 minutes on each LE  Equipment Use   NuStep lvl 2 10 minutes   Assessment   Treatment Assessment Pt engaged in skilled PT tx focusing on functional mobilities with use of SPC  Pt stated that she had increased fatigue today compared to previous sessions, therefore, pt provided with increased therapeutic rest breaks during session  Pt was able to demo improvements in R hand placement during all transfers today, however, had increased LOB noted during transfers and amb, however did not require physical assist to maintain balance  LOB especially prominent with turning which is suspected to be due to fatigue  At times, pt would begin to reach for chair when approaching it before turning fully, therefore, pt educated on safety and increased risk of falling if pt approaches chair in that manner  Pt stated she had full understanding and was then able to demo transfer to chair properly  Seated exercises performed today due to increased fatigue and were tolerated by pt  Plan to perform family training in afternoon session with pt's 3 daughters who will provide assistance upon d/c focusing on car transfers, curb and stair management, bed mobilities, and providing S during amb      Family/Caregiver Present no   Problem List Decreased strength;Decreased endurance; Impaired balance;Decreased coordination;Decreased cognition; Impaired judgement;Decreased safety awareness; Impaired sensation   Barriers to Discharge Other (Comment)  (Family training to occur in afternoon session w/ daughters)   PT Barriers   Functional Limitation Stair negotiation; Walking   Plan   Treatment/Interventions Functional transfer training;LE strengthening/ROM; Therapeutic exercise; Endurance training;Gait training   Progress Progressing toward goals   Recommendation   Recommendation 24 hour supervision/assist;Home with family support; Outpatient PT   Equipment Recommended Cane   PT - OK to Discharge No   PT Therapy Minutes   PT Time In 0830   PT Time Out 0930   PT Total Time (minutes) 60   PT Mode of treatment - Individual (minutes) 60   PT Mode of treatment - Concurrent (minutes) 0   PT Mode of treatment - Group (minutes) 0   PT Mode of treatment - Co-treat (minutes) 0   PT Mode of Teatment - Total time(minutes) 60 minutes   Therapy Time missed   Time missed?  No

## 2019-07-18 NOTE — PROGRESS NOTES
Internal Medicine Progress Note  Patient: Radha King  Age/sex: 78 y o  female  Medical Record #: [id number]      ASSESSMENT/PLAN: (Interval History)  Radha King is seen and examined and management for following issues:    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; s/p left CEA 6/28/19:  continue ASA/Plavix, Lipitor = for home Cards advised change Lipitor to Crestor 40mg daily      Iron deficiency anemia; s/p transfusion and iron IV: continue iron PO supplements      AVM stomach/duodenum tx with APC; cold snare sigmoid colon polypectomy: continue PPI     HTN: at home was on Lopressor and now is switched to Coreg and Nifedipine     RUL ground glass density: for Pulm to see OP     Urethral diverticulum: to see Uro as OP for cystogram     Nicotine abuse: counseled for cessation     Fall 2/2 LOB 6/24/19: CT head was negative; has remaining ecchymosis right forehead/orbit     ABLA: Recent Hgb 8 1 stable s/p transfusion  Continue FeSO4 supplementation  Subjective/ HPI: Patient seen and examined  Patients overnight issues or events were reviewed with nursing or staff during rounds or morning huddle session  New or overnight issues include the following: HTN: Currently well controlled  Continue present medications  CVA: No new stroke symptoms  Continue antiplatelet and statin  Anemia: Hgb stable s/p transfusion  Pt currently reports that she is doing well and is ready to be discharged      ROS:     GI: denies abdominal pain, change bowel habits or reflux symptoms  Neuro: Denies any headache, new vision changes, new neuropathies,new weaknesses   Respiratory: No Cough, SOB, denies wheeze  Cardiovascular: No CP, palpitations , denies perception of rapid heartbeat  : denies any new urinary burning or frequency    Review of Scheduled Meds:    Current Facility-Administered Medications:  aspirin 81 mg Oral Daily Ed Arreola MD   atorvastatin 80 mg Oral Daily With Dinner David Bush MD   bisacodyl 10 mg Rectal Daily PRN Amina Whipple PA-C   calcium carbonate 500 mg Oral Daily PRN Ed Arreola MD   carvedilol 3 125 mg Oral BID With Meals Chaz Ayala MD   clopidogrel 75 mg Oral Daily dE Arreola MD   docusate sodium 100 mg Oral BID Amina Whipple PA-C   enoxaparin 40 mg Subcutaneous Q24H [address] Ed Arreola MD   ferrous sulfate 325 mg Oral BID With Meals Ed Arreola MD   melatonin 6 mg Oral HS Ed Arreola MD   NIFEdipine 30 mg Oral Daily Ed Arreola MD   oxyCODONE 2 5 mg Oral Q4H PRN Ed Arreola MD   oxyCODONE 5 mg Oral Q4H PRN Ed Arreola MD   pantoprazole 40 mg Oral Early Morning Ed Arreola MD   polyethylene glycol 17 g Oral Daily PRN Amina Whipple PA-C   senna 2 tablet Oral HS Amina Whipple PA-C       Labs:     Results from last 7 days   Lab Units 07/18/19  0541 07/15/19  0518   WBC Thousand/uL 6 03 7 30   HEMOGLOBIN g/dL 8 1* 8 7*   HEMATOCRIT % 27 6* 29 7*   PLATELETS Thousands/uL 267 338     Results from last 7 days   Lab Units 07/18/19  0541 07/15/19  0518   SODIUM mmol/L 145 147*   POTASSIUM mmol/L 3 7 3 7   CHLORIDE mmol/L 114* 114*   CO2 mmol/L 26 26   BUN mg/dL 21 15   CREATININE mg/dL 0 89 0 86   CALCIUM mg/dL 8 2* 8 4                        Imaging:     No orders to display       *Labs reviewed  *Radiology studies reviewed  *Medications reviewed and reconciled as needed  *Please refer to order section for additional ordered labs studies  *Case discussed with primary attending during morning huddle case rounds    Physical Examination:  Vitals:   Vitals:    07/17/19 1350 07/17/19 1619 07/18/19 0543 07/18/19 0600   BP: 129/59 148/65 143/60    BP Location: Right arm  Left arm    Pulse: 66 64 70    Resp: 16  20    Temp: 97 9 °F (36 6 °C)  97 6 °F (36 4 °C)    TempSrc: Oral  Oral    SpO2: 96%  97%    Weight:    72 9 kg (160 lb 11 5 oz)   Height:           General Appearance: NAD, conversive  Eyes: No icterus; conjunctiva normal, PERRLA  HENT: oropharynx clear; mucous membranes moist; no ulcerations, normal hard and soft palette  Neck: trachea midline, range of motion full  Supple, no lymphadenopathy or thyromegaly  Lungs: CTA, normal respiratory effort, no retractions, expiratory effort normal  CV: regular rate, no rubs no gallops, PMI normal place and intensity  ABD: soft non tender, no masses , no hepatic or splenomegaly  EXT: DP pulses intact, no lymphadenopathy, no edema  Skin: normal turgor, normal texture, no rash, no ulcers  Psych: affect normal, No anxiety,   Neuro: AAOx3            Total time spent: At least 40 minutes, with more than 50% spent counseling/coordinating care  Counseling includes discussion with patient re: progress  and discussion with patient of his/her current medical state/information  Coordination of patient's care was performed in conjunction with primary service  Time invested included review of patient's labs, vitals, and management of their comorbidities with continued monitoring  In addition, this patient was discussed with medical team including physician and advanced extenders  The care of the patient was extensively discussed and appropriate treatment plan was formulated unique for this patient  ** Please Note: Dragon 360 Dictation voice to text software may have been used in the creation of this document   **

## 2019-07-18 NOTE — PROGRESS NOTES
07/18/19 0930   Pain Assessment   Pain Assessment No/denies pain   Restrictions/Precautions   Precautions Cognitive; Fall Risk;Supervision on toilet/commode;Visual deficit   Memory Skills   Orientation Level Oriented X4   Memory (FIM) 5 - [place] cues patient   Social Interaction (FIM) 6 - Interacts appropriately with others BUT requires extra  time   Speech/Language/Cognition Assessmetn   Treatment Assessment Refer to below for details  SLP Therapy Minutes   SLP Time In 0930   SLP Time Out 1030   SLP Total Time (minutes) 60   SLP Mode of treatment - Individual (minutes) 60   SLP Mode of treatment - Concurrent (minutes) 0   SLP Mode of treatment - Group (minutes) 0   SLP Mode of treatment - Co-treat (minutes) 0   SLP Mode of Teatment - Total time(minutes) 60 minutes   Therapy Time missed   Time missed? No   Daily FIM Score   Problem solving (FIM) 4 - Solves basic problems 75-89% of time   Comprehension (FIM) 5 - Understands basic directions and conversation   Expression (FIM) 6 - Has only MILD difficulty with complex/abstract info     Pt engaging in community reintegration task, where focus of task was targeting multiple levels of cognitive skills, STM recall, problem solving, direction following (focus of L/R discrimination), visual scanning/visual attention to completing tasks, sequencing, safety awareness in community situations, functional money management, sequencing, etc  Focus was to the gift shop, locating 3 items in gift shop, then buying candy and completing money management  While still in therapy gym, SLP told pt 3 items in which she was going to locate once in gift shop  Immediate recall was 3/3 accurate  Delayed recall ~1-2 min, pt was 2/3 accurate in recalling, but when given visual cue increased to 3/3 accurate  Delayed recall of 3 items ~20 min due to other conversation presented before going to gift shop, pt was 3/3 accurate   Last cue to recall items ~30 min into task, pt continued to be consistent in recalling 3/3 items  Prior to going down to gift shop, pt's primary OT also wanted pt to purchase 5 candies for 15 cents each  OT provided pt w/ $1  When asking pt how much change OT should receive back after buying 5 pieces, unable to complete mental math to determine the change due  SLP using visual cue to add up how much 5 pieces would cost, which pt was able to then determine the total was 75 cents and then determined change due to be 25 cents  OT then asking pt about if pt wants to purchase a candy for herself, how many would she be able to buy, where pt again noted to have difficulty in completing mental manipulation to determine that all she could purchase 1 candy  Pt's ability to navigate to the elevators was mod I level, along w/ ability to determine correct floor to get to gift shop  Once on ground floor, pt was able to again locate gift shop, as well as then navigating back to elevators and then determining which floor to return to and navigate back to unit  Once in gift shop, pt did demonstrate ability to safely use cane for mobility as well as navigate through the gift shop w/o running into items, not hitting items given R hand, etc  Of note, pt did require minimal verbal prompts to locate items, due to increased visual stimuli within the gift shop to guide pt to locate 3 items  Of note, when in the middle of the shop, SLP gave pt verbal cue to "look straight" ahead to locate one of the items (toothpaste) but due to pt's inattention to R, pt locating another item (birthday card) which was on pt's L side  Of note, once at the cards, SLP again gave pt verbal cue that not all cards are birthday cards, in which increased time spent looking at group of cards, which were get well cards, w/o initiating to continue to look at other side of stand to locate appropriate cards   SLP required to verbally cue pt to go around and still pt was noted to delay response in locating birthday cards due to the variety presented  Pt was able to locate the 2nd item, gum, which was on pt's R side w/o cues  In locating toothpaste, SLP needed to provide cues to locate area where item would be found  Once there, pt needing increased verbal prompting to scan to the R and locate the toothpaste which was behind mouthwash  Once finding items, last task of purchasing 5 pieces of candy needed to be completed  Pt locating candy items on her R side, but it was a bag of candy, not individual pieces  When cued by SLP again about locating the pieces of candy, pt again picking up a bag of candy despite giving verbal cue just moments prior  Moderate verbal cues needed to locate the individual candy pieces, but once found, pt was able to recall that she needed to pick out 5 pieces and declined piece for self  Once collecting all pieces of candy, pt went to counter to purchase, in which  gave pt the total as well as handing over the $1, in which SLP asked pt again the total change due, where pt was noted to have a delayed response, but accurately stating 25 cents  Once back in therapy gym, SLP engaging pt in completing additional functional money management task given a total dollar amount and then to determine the change due  When SLP showed pt $5 bill, giving the cost of the item being $3 25, ability to complete mental math was decreased, but pt was close to the change due  However, when given visual cue to complete money management, pt able to given accurate answer in change as well as pulling correct amount in tangible money  Educated pt on how visual cues increases ability assist in processing information to allow for improved accuracy when engaging in money management, etc  Will continue to benefit from SLP services to maximize overall cognitive linguistic skills at this time

## 2019-07-19 LAB
HCT VFR BLD AUTO: 32.5 % (ref 34.8–46.1)
HGB BLD-MCNC: 9.5 G/DL (ref 11.5–15.4)

## 2019-07-19 PROCEDURE — 97530 THERAPEUTIC ACTIVITIES: CPT

## 2019-07-19 PROCEDURE — 85018 HEMOGLOBIN: CPT | Performed by: PHYSICIAN ASSISTANT

## 2019-07-19 PROCEDURE — 97110 THERAPEUTIC EXERCISES: CPT

## 2019-07-19 PROCEDURE — 97112 NEUROMUSCULAR REEDUCATION: CPT

## 2019-07-19 PROCEDURE — 99232 SBSQ HOSP IP/OBS MODERATE 35: CPT | Performed by: PHYSICAL MEDICINE & REHABILITATION

## 2019-07-19 PROCEDURE — G0515 COGNITIVE SKILLS DEVELOPMENT: HCPCS

## 2019-07-19 PROCEDURE — 97535 SELF CARE MNGMENT TRAINING: CPT

## 2019-07-19 PROCEDURE — 97116 GAIT TRAINING THERAPY: CPT

## 2019-07-19 PROCEDURE — 85014 HEMATOCRIT: CPT | Performed by: PHYSICIAN ASSISTANT

## 2019-07-19 RX ORDER — ACETAMINOPHEN 325 MG/1
650 TABLET ORAL EVERY 6 HOURS PRN
Status: DISCONTINUED | OUTPATIENT
Start: 2019-07-19 | End: 2019-07-22 | Stop reason: HOSPADM

## 2019-07-19 RX ADMIN — PANTOPRAZOLE SODIUM 40 MG: 40 TABLET, DELAYED RELEASE ORAL at 05:51

## 2019-07-19 RX ADMIN — MELATONIN 6 MG: at 21:22

## 2019-07-19 RX ADMIN — ATORVASTATIN CALCIUM 80 MG: 80 TABLET, FILM COATED ORAL at 15:42

## 2019-07-19 RX ADMIN — ENOXAPARIN SODIUM 40 MG: 40 INJECTION SUBCUTANEOUS at 09:58

## 2019-07-19 RX ADMIN — CLOPIDOGREL BISULFATE 75 MG: 75 TABLET ORAL at 09:58

## 2019-07-19 RX ADMIN — Medication 325 MG: at 15:42

## 2019-07-19 RX ADMIN — NIFEDIPINE 30 MG: 30 TABLET, FILM COATED, EXTENDED RELEASE ORAL at 09:58

## 2019-07-19 RX ADMIN — SENNOSIDES 17.2 MG: 8.6 TABLET, FILM COATED ORAL at 21:22

## 2019-07-19 RX ADMIN — CARVEDILOL 3.12 MG: 3.12 TABLET, FILM COATED ORAL at 15:42

## 2019-07-19 RX ADMIN — CARVEDILOL 3.12 MG: 3.12 TABLET, FILM COATED ORAL at 07:47

## 2019-07-19 RX ADMIN — Medication 325 MG: at 07:47

## 2019-07-19 RX ADMIN — ASPIRIN 81 MG: 81 TABLET, COATED ORAL at 09:58

## 2019-07-19 NOTE — PROGRESS NOTES
07/19/19 1330   Pain Assessment   Pain Assessment No/denies pain   Pain Score No Pain   Cognition   Overall Cognitive Status Impaired   Arousal/Participation Alert; Cooperative   Attention Attends with cues to redirect   Orientation Level Oriented X4   Memory Decreased recall of precautions;Decreased recall of recent events   Following Commands Follows one step commands with increased time or repetition   Subjective   Subjective pt had no c/o and was agreeable to have PT  although reported fatigue towards latter part of tx session but was agreeable to finish PT session  QI: Sit to Stand   Comment still requires cues for proper handling of SPC (hold it with handle facing in) and hand placement prior to sitting down especially to reach back with R hand    QI: Chair/Bed-to-Chair Transfer   Assistance Needed Supervision;Verbal cues   Comment upon approaching chair at times pt would still reach for arm rest when she still far away from it increasing risk for fall or would attempt to switch out cane from Left to right    Chair/Bed-to-Chair Transfer CARE Score 4   Transfer Bed/Chair/Wheelchair   Limitations Noted In Endurance;Balance;Problem Solving;LE Strength;UE Strength; Coordination; Other;Sensation; Sequencing  (R side inattention)   Adaptive Equipment Cane   Stand Pivot Supervision   Sit to Stand Supervision   Stand to Sit Supervision   Bed, Chair, Wheelchair Transfer (FIM) 5 - Patient requires supervision/monitoring   QI: Walk 10 Feet   Assistance Needed Supervision   Walk 10 Feet CARE Score 4   QI: Walk 50 Feet with Two Turns   Assistance Needed Supervision   Walk 50 Feet with Two Turns CARE Score 4   QI: Walk 150 Feet   Assistance Needed Supervision   Walk 150 Feet CARE Score 4   Ambulation   Does the patient walk? 2  Yes   Primary Discharge Mode of Locomotion Walk   Walk Assist Level Supervision   Gait Pattern Inconsistant Dolly; Slow Dolly; Improper weight shift;Trendelenburg;Narrow LUIS; Decreased foot clearance   Assist Device [employer] Walked (feet) 150 ft  (x2 , 50 x 2  (not limited))   Limitations Noted In Endurance;Balance;Strength;Speed; Safety; Heel Strike;Swing   Findings adapts a slow gait speed focusing on walking as verbalized limitation engaging in talking while walking due to  increase instability   Walking (FIM) 5 - Patient requires supervision/monitoring AND distance 150 feet or more, no rest   Wheelchair mobility   QI: Does the patient use a wheelchair? 0  No   Therapeutic Interventions   Other TUG test using SPC: 17 74 secs, 10 MWT 3 trials of fast velocity using SPC =  6m/s - both test indicates pt requires continued skilled intervention to reduce risk for falls and improve safety with community ambulation    Assessment   Treatment Assessment Skilled PT intervention focused on mobility training and assessment of outcome measures including TUG and 10 MWT (fast gait speed) with pt using SPC as AD  Pt demonstrated improvement with TUG test score of 24 seconds on eval to 17 74 seconds  today while scored   6m/s for 10MWT  Both scores indicate pt remain a high fall risk and would benefit from further skilled PT intervention to reduce risk for falls and maximize mobilities  PT to continue FT with dtrs if they will be in this weekend and agreeable especially with stair act as pt refused PT to call dtrs this morning to schedule a f/u hands on family training in preparation for home d/c on Monday  Pt was provided with written handout to be given to dtr to aide safe FF negotiation at home upon d/c  Family/Caregiver Present no   Problem List Decreased strength;Decreased endurance; Impaired balance;Decreased coordination;Decreased cognition; Impaired judgement;Decreased safety awareness; Impaired sensation   Barriers to Discharge Inaccessible home environment   PT Barriers   Functional Limitation Stair negotiation; Walking;Ramp negotiation;Transfers   Plan   Treatment/Interventions Therapeutic exercise; Functional transfer training;Gait training;Spoke to nursing;OT;Endurance training   Progress Progressing toward goals   Recommendation   Recommendation 24 hour supervision/assist;Outpatient PT   Equipment Recommended Cane   PT Therapy Minutes   PT Time In 1330   PT Time Out 1400   PT Total Time (minutes) 30   PT Mode of treatment - Individual (minutes) 0   PT Mode of treatment - Concurrent (minutes) 30   PT Mode of treatment - Group (minutes) 0   PT Mode of treatment - Co-treat (minutes) 0   PT Mode of Teatment - Total time(minutes) 30 minutes   Therapy Time missed   Time missed?  No

## 2019-07-19 NOTE — PROGRESS NOTES
07/19/19 1000   Pain Assessment   Pain Assessment No/denies pain   Restrictions/Precautions   Precautions Cognitive; Fall Risk;Visual deficit   Memory Skills   Memory (FIM) 5 - Loses track of time   Social Interaction (FIM) 6 - Interacts appropriately with others BUT requires extra  time   Speech/Language/Cognition Assessmetn   Treatment Assessment Pt engaging in session where SLP distracting pt during blood draws in which pt's attention and focus to task at hand was [facility]  Pt able to complete 2 different category matrix tasks, where pt was given a total of 8 different categories  Pt was to determine items in each category given different initial letters  Pt's ability to elicit items on initial category matrix task was 16/16 accurate  When given 2nd category matrix task, pt was 14/16 accurate in giving answers, noting difficulty w/ states, in which pt was providing cities  Given single semantic cues, pt was able to determine last 2 states w/o increased difficulty  Lastly, engaged pt in functional money management task given dollar and change amounts  Objective of task was to determine the total dollars and then break out the change amounts into quarters, dimes, nickels, pennies  Pt was able to elicit 4/7 items independently, noting difficulty determining correct nickel and vivek amounts on the remaining 3 items  Additional visual math cues given to pt which somewhat improved ability to determine correct coin amounts  Pt will continue to benefit from SLP services at this time to maximize overall cognitive linguistic skills at this time  SLP Therapy Minutes   SLP Time In 1000   SLP Time Out 1030   SLP Total Time (minutes) 30   SLP Mode of treatment - Individual (minutes) 30   SLP Mode of treatment - Concurrent (minutes) 0   SLP Mode of treatment - Group (minutes) 0   SLP Mode of treatment - Co-treat (minutes) 0   SLP Mode of Teatment - Total time(minutes) 30 minutes   Therapy Time missed   Time missed?  No   Daily FIM Score   Problem solving (FIM) 4 - Solves basic problems 75-89% of time   Comprehension (FIM) 5 - Understands basic directions and conversation   Expression (FIM) 6 - Expresses complex/abstract but requires:  more time

## 2019-07-19 NOTE — QUICK NOTE
Pt's family provided with number and all necessary services to schedule initial evaluations for OP OT PT AND ST

## 2019-07-19 NOTE — PROGRESS NOTES
07/19/19 1230   Pain Assessment   Pain Assessment No/denies pain   Pain Score No Pain   QI: Sit to Stand   Assistance Needed Supervision   Sit to Stand CARE Score 4   Transfer Bed/Chair/Wheelchair   Findings use of SPC   Bed, Chair, Wheelchair Transfer (FIM) 5 - Patient requires supervision/monitoring   Exercise Tools   Theraputty pt engaged in FM tool control and strengthening/coordination task to place small beads to red (med soft) tputty with use of tweezers to inc [facility] and fxnl tool use for ADL/IADL fxn  Pt demo inc difficulty with positioning tool in R hand and adjusting to target bead  pt req [place] A to inc motor planning and control to inc targeting  Vision   Vision Comments Pt participated in word search puzzle task seated at tabletop to inc visual scanning while engaging R UE and fxnl coordination and targeting with use of pencil to [place] letters of words found  Pt completed 20 word - word search in 10 minutes demo inc accuracy and visual targeting to task   Assessment   Treatment Assessment Pt engaged in 45 min individual OT tx with focus on /VM skills, FMC, FMS, fxnl xfers, and pt education  see above for further details  Pt participated in stroke education series with edu on reducing risk for secondary stroke and "BE FAST" acronym to identify s/s and risk factors of stroke to reduce risk of secondary stroke and re-hospitalization  Pt reports understanding and states "If any of this happens to me Ill call 911 for sure"  OT Therapy Minutes   OT Time In 1230   OT Time Out 1315   OT Total Time (minutes) 45   OT Mode of treatment - Individual (minutes) 10   OT Mode of treatment - Concurrent (minutes) 35   OT Mode of treatment - Group (minutes) 0   OT Mode of treatment - Co-treat (minutes) 0   OT Mode of Teatment - Total time(minutes) 45 minutes   Therapy Time missed   Time missed?  No

## 2019-07-19 NOTE — PROGRESS NOTES
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; s/p left CEA 6/28/19:  continue ASA/Plavix, Lipitor = for home Cards advised change Lipitor to Crestor 40mg daily      Iron deficiency anemia; s/p transfusion and iron IV: continue iron PO supplements      AVM stomach/duodenum tx with APC; cold snare sigmoid colon polypectomy: continue PPI     HTN: at home was on Lopressor and now is switched to Coreg and Nifedipine     RUL ground glass density: for Pulm to see OP     Urethral diverticulum: to see Uro as OP for cystogram     Nicotine abuse: counseled for cessation     Fall 2/2 LOB 6/24/19: CT head was negative; has remaining ecchymosis right forehead/orbit     ABLA: Recent Hgb 8 1 stable s/p transfusion  Continue FeSO4 supplementation  Subjective/ HPI: Patient seen and examined  Patients overnight issues or events were reviewed with nursing or staff during rounds or morning huddle session  New or overnight issues include the following: HTN: Currently well controlled  Continue present medications  CVA: No new stroke symptoms  Continue antiplatelet and statin  Anemia: Hgb stable s/p transfusion  Pt currently reports that she is doing well and is ready to be discharged      ROS:     GI: denies abdominal pain, change bowel habits or reflux symptoms  Neuro: Denies any headache, new vision changes, new neuropathies,new weaknesses   Respiratory: No Cough, SOB, denies wheeze  Cardiovascular: No CP, palpitations , denies perception of rapid heartbeat  : denies any new urinary burning or frequency    Review of Scheduled Meds:    Current Facility-Administered Medications:  aspirin 81 mg Oral Daily Ed Arreola MD   atorvastatin 80 mg Oral Daily With Dinner Magalis Montaño MD   bisacodyl 10 mg Rectal Daily PRN Amina Whipple PA-C   calcium carbonate 500 mg Oral Daily PRN dE Arreola MD   carvedilol 3 125 mg Oral BID With Meals Michelle Terrazas MD   clopidogrel 75 mg Oral Daily Ed Arreola MD   docusate sodium 100 mg Oral BID Amina Whipple PA-C   enoxaparin 40 mg Subcutaneous Q24H [address] Ed Arreola MD   ferrous sulfate 325 mg Oral BID With Meals Michelle Terrazas MD   melatonin 6 mg Oral HS Ed Arreola MD   NIFEdipine 30 mg Oral Daily Ed Arreola MD   oxyCODONE 2 5 mg Oral Q4H PRN Ed Arreola MD   oxyCODONE 5 mg Oral Q4H PRN Ed Arreola MD   pantoprazole 40 mg Oral Early Morning Ed Arreola MD   polyethylene glycol 17 g Oral Daily PRN Amina Whipple PA-C   senna 2 tablet Oral HS Amina Whipple PA-C       Labs:     Results from last 7 days   Lab Units 07/18/19  0541 07/15/19  0518   WBC Thousand/uL 6 03 7 30   HEMOGLOBIN g/dL 8 1* 8 7*   HEMATOCRIT % 27 6* 29 7*   PLATELETS Thousands/uL 267 338     Results from last 7 days   Lab Units 07/18/19  0541 07/15/19  0518   SODIUM mmol/L 145 147*   POTASSIUM mmol/L 3 7 3 7   CHLORIDE mmol/L 114* 114*   CO2 mmol/L 26 26   BUN mg/dL 21 15   CREATININE mg/dL 0 89 0 86   CALCIUM mg/dL 8 2* 8 4                        Imaging:     No orders to display       *Labs reviewed  *Radiology studies reviewed  *Medications reviewed and reconciled as needed  *Please refer to order section for additional ordered labs studies  *Case discussed with primary attending during morning huddle case rounds    Physical Examination:  Vitals:   Vitals:    07/18/19 2045 07/18/19 2120 07/19/19 0533 07/19/19 0537   BP: 165/65 135/65 131/61    BP Location: Right arm Left arm Left arm    Pulse: 59  71    Resp: 18  18    Temp: 98 7 °F (37 1 °C)  98 1 °F (36 7 °C)    TempSrc: Oral  Oral    SpO2: 96%  96%    Weight:    72 7 kg (160 lb 4 4 oz)   Height:           General Appearance: NAD, conversive  Eyes: No icterus; conjunctiva normal, PERRLA  HENT: oropharynx clear; mucous membranes moist; no ulcerations, normal hard and soft palette  Neck: trachea midline, range of motion full  Supple, no lymphadenopathy or thyromegaly  Lungs: CTA, normal respiratory effort, no retractions, expiratory effort normal  CV: regular rate, no rubs no gallops, PMI normal place and intensity  ABD: soft non tender, no masses , no hepatic or splenomegaly  EXT: DP pulses intact, no lymphadenopathy, no edema  Skin: normal turgor, normal texture, no rash, no ulcers  Psych: affect normal, No anxiety,   Neuro: AAOx3            Total time spent: At least 40 minutes, with more than 50% spent counseling/coordinating care  Counseling includes discussion with patient re: progress  and discussion with patient of his/her current medical state/information  Coordination of patient's care was performed in conjunction with primary service  Time invested included review of patient's labs, vitals, and management of their comorbidities with continued monitoring  In addition, this patient was discussed with medical team including physician and advanced extenders  The care of the patient was extensively discussed and appropriate treatment plan was formulated unique for this patient  ** Please Note: Dragon 360 Dictation voice to text software may have been used in the creation of this document   **

## 2019-07-19 NOTE — PROGRESS NOTES
07/19/19 1030   Pain Assessment   Pain Assessment No/denies pain   Pain Score No Pain   Restrictions/Precautions   Precautions Cognitive; Fall Risk;Visual deficit  (R sided inattention)   Cognition   Overall Cognitive Status Impaired   Arousal/Participation Alert; Cooperative   Attention Attends with cues to redirect   Orientation Level Oriented X4   Memory Decreased recall of precautions;Decreased recall of recent events   Following Commands Follows one step commands with increased time or repetition   Subjective   Subjective Pt states she is less fatigued today than during yesterday's session    Pt denies any pain and is agreeable to PT tx     QI: Roll Left and Right   Assistance Needed Set-up / clean-up   Assistance Provided by Corinne No physical assistance   Comment DS; HOB flat w/o bed rails   Roll Left and Right CARE Score 5   QI: Sit to [address] / [address] Provided by Corinne No physical assistance   Comment DS; HOB flat w/o bed rails   Sit to Lying CARE Score 5   QI: Lying to Sitting on Side of Bed   Assistance Needed Set-up / [address] [place] Provided by Corinne No physical assistance   Comment DS; HOB flat w/o bed rails   Lying to Sitting on Side of Bed CARE Score 5   QI: Sit to [address] Provided by Corinne No physical assistance   Comment S with SPC; on occasssion, pt requires verbal cueing for right hand placement, noted especially with fatigue   Sit to Stand CARE Score 4   Bed Mobility   Able to Roll Left to Right;Right to Left;Scoot Up;Scoot Down   Adaptive Equipment Other  (w/o AD)   Findings DS for bed mobilities   QI: Chair/Bed-to-Chair Transfer   Assistance Needed Supervision   Assistance Provided by Corinne No physical assistance   Comment S with SPC; on one occassion after amb, pt began to reach for chair with L UE as she approached it and atempted to switch SPC to R hand; instructed pt in importance of safety even with fatigue; also educated pt that [facility] stays in L hand at all times and for pt to turn fully before reaching for chair  Chair/Bed-to-Chair Transfer CARE Score 4   Transfer Bed/Chair/Wheelchair   Limitations Noted In Balance;Confidence; Coordination; Endurance;Problem Solving; Sequencing;LE Strength;Vision   Adaptive Equipment Cane   Stand Pivot Supervision   Sit to Stand Supervision   Stand to Sit Supervision   Supine to Sit Supervision  (DS)   Sit to Supine Supervision  (DS)   Car Transfer Supervision  (verbal cues for hand placement)   Findings when approaching chairs, and especially with fatigue, pt demos increased difficulty problem solving on proper hand placement and management of SPC; cont to trial chair to chair transfer with pt   Bed, Chair, Wheelchair Transfer (FIM) 5 - Patient requires supervision/monitoring   QI: [address] Provided by [place] No physical assistance   Comment S with SPC; verbal cues needed for pt to reach back to head rest of car with R UE  After pt sat she stated "I know I have to reach back" however when questioned why she did not reach back to car she stated "I reached back once I sat " Instructed pt that she always needs to reach back to head rest before sitting to be sure she makes it onto seat  Pt verbalized that she has full understanding of how to complete car transfer safely including hand placement      Car Transfer CARE Score 4   QI: [address] Provided by [place] No physical assistance   Comment S with SPC   Walk 10 Feet CARE Score 4   QI: Walk 50 Feet with Two [address] Provided by [place] No physical assistance   Comment S with SPC   Walk 50 Feet with Two Turns CARE Score 4   QI: Walk 150 Feet   Assistance Needed Supervision   Assistance Provided by [place] No physical assistance   Comment S with SPC , 1 LOB noted, pt able to recover without assistance; performed on carpeted surface x150' and on laminate surface x400'  Walk 150 Feet CARE Score 4   QI: Walking 10 Feet on Uneven Surfaces   Assistance Needed Incidental touching   Assistance Provided by [place] No physical assistance   Comment CGA ascending/descending ramp inside with pt utilizing SPC   Walking 10 Feet on Uneven Surfaces CARE Score 4   Ambulation   Does the patient walk? 2  Yes   Primary Discharge Mode of Locomotion Walk   Walk Assist Level Supervision   Gait Pattern Inconsistant Dolly; Slow Dolly;Decreased foot clearance;R foot drag;Narrow LUIS; Decreased R stance; Improper weight shift   Assist Device [address] Walked (feet) 150 ft  (x1 carpet, 400'x1 laminate, 50'x1 unlimited distances)   Limitations Noted In Balance; Coordination; Endurance; Heel Strike;Posture; Safety; Sensation; Sequencing;Speed;Strength;Swing   Findings Pt reports daughter's house is fully carpeted, therefore, cont to practice amb on different surfaces to simulate daughters house   Walking (FIM) 5 - Patient requires verbal cues AND distance 150 feet or more, no rest   QI: 1 Step (Curb)   Assistance Needed Physical assistance   Assistance Provided by [place] 50%-74%   Comment modAx1 ascending step for pt to maintain balance, minAx1 descending steps; verbal cues for proper sequencing   1 Step (Curb) CARE Score 2   QI: 4 Steps   Assistance Needed Physical assistance   Assistance Provided by [place] Less than 25%   Comment CS ascending steps, Tara descending steps; use of B UE's on R HR ascending; pt still states she is fearful of steps but after completed stated she felt more confident in completion   4 Steps CARE Score 3   QI: 12 Steps   Assistance Needed Physical assistance   Assistance Provided by [place] Less than 25%   Comment FF of steps, CS ascending steps, minAx1 descending steps; B UE's on R HR ascending; increased verbal cueing when descending steps for hand placement on hand rails   12 Steps CARE Score 3 Stairs   Type Stairs;Curb;Ramp   # of Steps 12   Weight Bearing Precautions Fall Risk   Assist Devices Single Rail   Findings Instructed pt to always have assistance when performing steps/curb/ramp for pt's safety in which she agreed that she will only perform with family member assisting her  Stairs (FIM) 3 - Patient completes 50 - 74% of all tasks, needs more than steadying or light touch AND goes up and down full flight (12- 14 stairs)   Equipment Use   NuStep lvl 4, 10 minutes, use of R UE and B GALDINO's   Assessment   Treatment Assessment Pt engaged in skilled PT session focusing on functional mobilities in preparation for d/c to pt's daughter's home on 7/22/19  Pt was able to increase amb distances today before requiring seated rest breaks with less LOB's noted today compared to yesterday  Pt also stated that she was not fatigued like she was yesterday, however, still demo's decreased safety practices at times once she is fatigued, especially during transfers when approaching a chair  Plan to cont to work on stair trng, curb step, and uneven surface training with pt to increase her confidence in performing, especially descending steps/ curb as pt is fearful of falling  Also plan to assess all functional mobilities with pt as well as maximize her independence and safety in order to decrease pt's overall risk of falls and caregiver burden upon d/c   PT asked pt if PT could contact pt's daughters regarding setting up family training over the weekend as daughters had only observed PT tx and did not perform hands on training  Further family training was offered to pt, however, pt refused for PT to call daughters as she stated "my daughers will come when they can but I can't expect her to come up all time time "   Family/Caregiver Present no   Problem List Decreased strength;Decreased endurance; Impaired balance;Decreased coordination;Decreased cognition; Impaired judgement;Decreased safety awareness; Impaired sensation   Barriers to Discharge Inaccessible home environment   PT Barriers   Functional Limitation Stair negotiation; Walking;Ramp negotiation;Transfers   Plan   Treatment/Interventions Functional transfer training;LE strengthening/ROM; Therapeutic exercise; Endurance training;Bed mobility;Gait training   Progress Progressing toward goals   Recommendation   Recommendation 24 hour supervision/assist;Home with family support; Outpatient PT   Equipment Recommended Cane   PT - OK to Discharge No   PT Therapy Minutes   PT Time In 1030   PT Time Out 1130   PT Total Time (minutes) 60   PT Mode of treatment - Individual (minutes) 60   PT Mode of treatment - Concurrent (minutes) 0   PT Mode of treatment - Group (minutes) 0   PT Mode of treatment - Co-treat (minutes) 0   PT Mode of Teatment - Total time(minutes) 60 minutes   Therapy Time missed   Time missed?  No

## 2019-07-19 NOTE — PROGRESS NOTES
07/19/19 0800   Pain Assessment   Pain Assessment No/denies pain   Pain Score No Pain   Restrictions/Precautions   Precautions Cognitive; Fall Risk   QI: Oral Hygiene   Assistance Needed Supervision   Oral Hygiene CARE Score 4   Grooming   Grooming (FIM) 5 - Patient requires supervision/monitoring   QI: Sit to Stand   Assistance Needed Supervision   Sit to Stand CARE Score 4   QI: Chair/Bed-to-Chair Transfer   Assistance Needed Supervision   Chair/Bed-to-Chair Transfer CARE Score 4   Transfer Bed/Chair/Wheelchair   Findings use of SPC   Bed, Chair, Wheelchair Transfer (FIM) 5 - Patient requires supervision/monitoring   Functional Standing Tolerance   Activity Plant Watering   Comments Pt engaged in fxnl standing, balance, and UE coordination/motor control task to pour water from watering can to plants to perform home IADL fxn of plant care as pt reprots she enjoys those tasks at home  Pt demo improved overall fxnl target, motor control, and awareness today  Pt initiating indep  asking "can you hold this watering can while i test the soil"  demonstrating increased awareness of R UE positioning and placement  Pt completed at S level with no LOB noted   Neuromuscular Education   Response to Techniques Mirror Therapy: Pt engaged in UE mirror box therapy to improve overall motor control and awareness of RUE to allow for visual feedback to inc fxnl use  Pt seated at tabletop with RUE occluded behind mirror box  Pt completed small chip manipulation in L UE to  small chips from tabletop and place to target  Pt demo G improvement in maintaining visual attention to mirror and engaging in mental practice  Coordination   Fine Motor Pt engaged in FM strengthening and proprioceptive task to engage in game of [place] for digit isolation and strengthening and to make appropriate adjustments to inc overall [facility] and FMS for fxnl tasks  Pt completed with improved ability to adjust strength and positioning      In Hand Manipulation In - hand translation: Pt engaged in palm to finger and finger to palm translation task to  smooth stones with 4-5 in palm with minimal droppage noted  Vision   Vision Comments pt engaged in visual scanning worksheets to address overall awareness, to inc visual memory, and visual attention  Pt completed with sentence tracking and symbol discrimination worksheets with 100% accuracy for field of 2 search and 80% accuracy in field of 3  Pt then engaged in sentence tracking worksheets to locate words of sentence amongst non-descript words/phrases  Pt with 100% accuracy following demonstration and step by step instruction  Pt able to complete indep with 100% accuracy  Perception   Visual Motor Organization Pt engaged in hand-eye coordination and proprioceptive/strengthening/coordination task to pour water from pitcher to cups of varying sizes with labeled fill levels varying in volumes  Pt req to pour water to cups to fill to fill level as marked  Pt completed with RUE and overall improved accuracy, coordination, and inc attention noted  Pt able to self correct and demo improved overall  skills to complete  Pt demo slight difficulty with size discrimination when asked to stack cups from large >small req 1VC and prompt   Assessment   Treatment Assessment Pt engaged in skilled OT tx with focus on fxnl xfers, fxnl mobility, FMC/FMS, GMS/GMC, /VM skills, R attention, and pt education  see above for further details  Pt demonstrates Great improvement in functional coordination scores of 9 hole peg test and functional dexterity test  Pt was more alert and attended to todays session and overall presents with [facility] and [address] [place] [address]   Continue per OT POC   OT Therapy Minutes   OT Time In 0800   OT Time Out 0940   OT Total Time (minutes) 100   OT Mode of treatment - Individual (minutes) 100   OT Mode of treatment - Concurrent (minutes) 0   OT Mode of treatment - Group (minutes) 0   OT Mode of treatment - Co-treat (minutes) 0   OT Mode of Teatment - Total time(minutes) 100 minutes   Therapy Time missed   Time missed?  No

## 2019-07-19 NOTE — PROGRESS NOTES
07/19/19 1230   Pain Assessment   Pain Assessment No/denies pain   Pain Score No Pain   QI: Sit to Stand   Assistance Needed Supervision   Sit to Stand CARE Score 4   Transfer Bed/Chair/Wheelchair   Findings use of SPC   Bed, Chair, Wheelchair Transfer (FIM) 5 - Patient requires supervision/monitoring   Exercise Tools   Theraputty pt engaged in FM tool control and strengthening/coordination task to place small beads to red (med soft) tputty with use of tweezers to inc [facility] and fxnl tool use for ADL/IADL fxn  Pt demo inc difficulty with positioning tool in R hand and adjusting to target bead  pt req [place] A to inc motor planning and control to inc targeting  Vision   Vision Comments Pt participated in word search puzzle task seated at tabletop to inc visual scanning while engaging R UE and fxnl coordination and targeting with use of pencil to [place] letters of words found  Pt completed 20 word - word search in 10 minutes demo inc accuracy and visual targeting to task   Assessment   Treatment Assessment Pt engaged in 45 min individual OT tx with focus on /VM skills, FMC, FMS, fxnl xfers, and pt education  see above for further details  Pt participated in stroke education series with edu on reducing risk for secondary stroke and "BE FAST" acronym to identify s/s and risk factors of stroke to reduce risk of secondary stroke and re-hospitalization  Pt reports understanding and states "If any of this happens to me Ill call 911 for sure"  OT Therapy Minutes   OT Time In 1230   OT Time Out 1315   OT Total Time (minutes) 45   OT Mode of treatment - Individual (minutes) 0   OT Mode of treatment - Concurrent (minutes) 45   OT Mode of treatment - Group (minutes) 0   OT Mode of treatment - Co-treat (minutes) 0   OT Mode of Teatment - Total time(minutes) 45 minutes   Therapy Time missed   Time missed?  No

## 2019-07-19 NOTE — PROGRESS NOTES
Physical Medicine and Rehabilitation Progress Note  Mirza Carr 78 y o  female MRN: [id number]  Unit/Bed#: -30 Encounter: [id number]    HPI: Mirza Carr is a 78 y o  female who presented to the [facility] with right sided weakness, right facial droop  Imaging subsequently revealed left sided infarcts to the parietal and frontal regions  In addition patient noetd to have ulcerated plaque at the left cervical carotied bifurcation; CTA revealed a right ICA occlusion  Vascular surgery was consulted, and recommendation made for a left CEA, performed on 6/28  During admission patient was noted to have ABLA, for which scope was performed revealing AVMs in the stomach and duodenum; argon plasma coagulation was performed  She was also transfused at the time  Of note, during her acute admission patient had a fall off of comode; CTOH showed right frontal hematoma, but no active bleed  Patient was found to be below functional baseline, and accepted to [facility] on 7/03/19  Chief Complaint: f/u stroke    Interval: No acute events overnight  Patient without complaint  Slept well overnight  ROS: A 10 point ROS was performed; negative except as noted above  Assessment/Plan:    * Impaired mobility and ADLs  Assessment & Plan  · Secondary to recent stroke  · Acute comprehensive interdisciplinary inpatient rehabilitation including PT, OT, SLP, RN, CM, SW, dietary, psychology, etc   · Patient will require supervision/assistance at home due to deficits from recent stroke  Will need to discuss with family  Symptomatic stenosis of left carotid artery  Assessment & Plan  · S/p CEA on 6/28  · Monitor for hematoma formation, incision care    Anemia, unspecified  Assessment & Plan  Results from last 7 days   Lab Units 07/19/19  1019 07/18/19  0541 07/15/19  0518   HEMOGLOBIN g/dL 9 5* 8 1* 8 7*     · Continue ferrous sulfate  · Monitor CBC intermittently  · Transfuse for Hgb <7  · Transusion performed on 7/11, with appropriate response noted  CVA (cerebral vascular accident) [facility])  Assessment & Plan  · ASA, Plavix  · Statin  · Neurology f/u as outpatient      Hyperlipidemia  Assessment & Plan  · Continue statin    Essential hypertension  Assessment & Plan  Temp:  [98 1 °F (36 7 °C)-98 7 °F (37 1 °C)] 98 1 °F (36 7 °C)  HR:  [59-72] 72  Resp:  [18] 18  BP: (131-165)/(61-74) 135/72    · Carvedilol  · Hydralazine prn  · IM service managing anti-hypertensives    # Skin  · Encourage regular turning as patient at risk for skin breakdown  · Staff to continue patient education on Q2h turning  · Rehabilitation team to perform skin checks regularly     # Bowel  · Patient reports no constipation  · To ensure regular BMs, bowel regimen consisting of:  colace , dulcolax suppository  and miralax     # Bladder  · Patient voiding spontaneously    # Pain  · Start tylenol, for max of 3gm daily  · Discontinue oxycodone     # Rehab Psych   · There are no psychological or psychiatric problems identified    # Other  - Diet/Nutrition:        Diet Orders   (From admission, onward)            Start     Ordered    07/03/19 1804  Diet Regular; Regular House  Diet effective now     Question Answer Comment   Diet Type Regular    Regular Regular House    RD to adjust diet per protocol?  Yes        07/03/19 1803        - DVT prophy: Sequential compression device (Venodyne)  and Enoxaparin (Lovenox)  - GI ppx: Pantaprazole  - Nausea: None  - Supplements: None  - Sleep: None    Disposition: tentative d/c on 2/22    CODE: Level 1: Full Code Scheduled Meds:    Current Facility-Administered Medications:  aspirin 81 mg Oral Daily Ed Arreola MD   atorvastatin 80 mg Oral Daily With Shopper[place], MD   bisacodyl 10 mg Rectal Daily PRN Amina Whipple PA-C   calcium carbonate 500 mg Oral Daily PRN Ed Arreola MD   carvedilol 3 125 mg Oral BID With Meals Ray Coffey MD   clopidogrel 75 mg Oral Daily Ed Arreola MD   docusate sodium 100 mg Oral BID Amina Whipple PA-C   enoxaparin 40 mg Subcutaneous Q24H [address] Ed Arreola MD   ferrous sulfate 325 mg Oral BID With Meals Ed Arreola MD   melatonin 6 mg Oral HS Ed Arreola MD   NIFEdipine 30 mg Oral Daily Ed Arreola MD   oxyCODONE 2 5 mg Oral Q4H PRN Ed Arreola MD   oxyCODONE 5 mg Oral Q4H PRN Ed Arreola MD   pantoprazole 40 mg Oral Early Morning Ed Arreola MD   polyethylene glycol 17 g Oral Daily PRN Amina Whipple PA-C   senna 2 tablet Oral HS Amina Whipple PA-C        Objective:    Functional Update:  Physical Therapy Occupational Therapy Speech Therapy   Weight Bearing Status: Full Weight Bearing  Transfers: Supervision  Bed Mobility: Supervision  Amulation Distance (ft): 160 feet  Ambulation: Supervision  Assistive Device for Ambulation: Single Point Cane  Wheelchair Mobility Distance: 0 ft  Number of Stairs: 12  Assistive Device for Stairs: Right Hand Rail  Stair Assistance: Supervision  Discharge Recommendations: Home with:  [address] Reyna Franz with[de-identified] Family Support, 24 Hour Supervision, Home Physical Therapy   Eating: Modified Independent  Grooming: Contact Guard  Bathing: Supervision  Bathing: Supervision  Upper Body Dressing: Supervision  Lower Body Dressing: Minimal Assistance  Toileting: Contact Guard  Tub/Shower Transfer: Contact Guard  Toilet Transfer: Contact Guard  Cognition: Exceptions to WNL  Cognition: Decreased Memory, Decreased Executive Functions, Decreased Attention, Decreased Comprehension, Decreased Safety  Orientation: Person, Place, Situation   Mode of Communication: Verbal  Cognition: Exceptions to WNL  Cognition: Decreased Memory, Decreased Executive Functions, Decreased Attention, Decreased Comprehension, Decreased Safety(visuospatial deficits)  Orientation: Person, Place, Time, Situation  Discharge Recommendations: Home with:  [address] Fraziervenessa with[de-identified] 24 Hour Supervision, 24 Hour Assisteance, Family Support, Outpatient Speech Therapy     Allergies per EMR    Physical Exam:  Temp:  [98 1 °F (36 7 °C)-98 7 °F (37 1 °C)] 98 1 °F (36 7 °C)  HR:  [59-72] 72  Resp:  [18] 18  BP: (131-165)/(61-74) 135/72  SpO2:  [96 %] 96 %    General: alert, no apparent distress, cooperative and comfortable   HEENT:  Eye: ecchymosis over right eye has resolved  LUNGS:  no abnormal respiratory pattern, no retractions noted, non-labored breathing   ABDOMEN:  soft, non-tender  Bowel sounds normal  No masses, no organomegaly  EXTREMITIES:  extremities normal, warm and well-perfused; no cyanosis, clubbing, or edema  NEURO:   mental status, speech normal, alert and oriented x3  PSYCH:  Alert and oriented, appropriate affect  Physical examination is otherwise unchanged from previous encounter, except as noted above  Diagnostic Studies: Reviewed, no new imaging  No orders to display     Laboratory: Reviewed  Results from last 7 days   Lab Units 07/19/19  1019 07/18/19  0541 07/15/19  0518 07/13/19  0446   HEMOGLOBIN g/dL 9 5* 8 1* 8 7* 8 9*   HEMATOCRIT % 32 5* 27 6* 29 7* 30 7*   WBC Thousand/uL  --  6 03 7 30 8 56     Results from last 7 days   Lab Units 07/18/19  0541 07/15/19  0518   BUN mg/dL 21 15   SODIUM mmol/L 145 147*   POTASSIUM mmol/L 3 7 3 7   CHLORIDE mmol/L 114* 114*   CREATININE mg/dL 0 89 0 86            Patient Active Problem List   Diagnosis    Cataracts, bilateral    Essential hypertension    Hyperlipidemia    Nicotine dependence    [facility]re[facility]) [facility] [address] )    Anemia, unspecified    Abnormal CT scan    Symptomatic stenosis of left carotid artery    Iron deficiency anemia    Impaired mobility and ADLs     ** Please Note: Fluency Direct voice to text software may have been used in the creation of this document   **

## 2019-07-19 NOTE — PLAN OF CARE
Problem: Potential for Falls  Goal: Patient will remain free of falls  Description  INTERVENTIONS:  - Assess patient frequently for physical needs  -  Identify cognitive and physical deficits and behaviors that affect risk of falls    -  [place] fall precautions as indicated by assessment   - Educate patient/family on patient safety including physical limitations  - Instruct patient to call for assistance with activity based on assessment  - Modify environment to reduce risk of injury  - Consider OT/PT consult to assist with strengthening/mobility  Outcome: Progressing     Problem: PAIN - ADULT  Goal: Verbalizes/displays adequate comfort level or baseline comfort level  Description  Interventions:  - Encourage patient to monitor pain and request assistance  - Assess pain using appropriate pain scale  - Administer analgesics based on type and severity of pain and evaluate response  - Implement non-pharmacological measures as appropriate and evaluate response  - Consider cultural and social influences on pain and pain management  - Notify physician/advanced practitioner if interventions unsuccessful or patient reports new pain  Outcome: Progressing     Problem: INFECTION - ADULT  Goal: Absence or prevention of progression during hospitalization  Description  INTERVENTIONS:  - Assess and monitor for signs and symptoms of infection  - Monitor lab/diagnostic results  - Monitor all insertion sites, i e  indwelling lines, tubes, and drains  - Monitor endotracheal (as able) and nasal secretions for changes in amount and color  - [place] appropriate cooling/warming therapies per order  - Administer medications as ordered  - Instruct and encourage patient and family to use good hand hygiene technique  - Identify and instruct in appropriate isolation precautions for identified infection/condition  Outcome: Progressing     Problem: SAFETY ADULT  Goal: Maintain or return to baseline ADL function  Description  INTERVENTIONS:  -  Assess patient's ability to carry out ADLs; assess patient's baseline for ADL function and identify physical deficits which impact ability to perform ADLs (bathing, care of mouth/teeth, toileting, grooming, dressing, etc )  - Assess/evaluate cause of self-care deficits   - Assess range of motion  - Assess patient's mobility; develop plan if impaired  - Assess patient's need for assistive devices and provide as appropriate  - Encourage maximum independence but intervene and supervise when necessary  ¯ Involve family in performance of ADLs  ¯ Assess for home care needs following discharge   ¯ Request OT consult to assist with ADL evaluation and planning for discharge  ¯ Provide patient education as appropriate  Outcome: Progressing  Goal: Maintain or return mobility status to optimal level  Description  INTERVENTIONS:  - Assess patient's baseline mobility status (ambulation, transfers, stairs, etc )    - Identify cognitive and physical deficits and behaviors that affect mobility  - Identify mobility aids required to assist with transfers and/or ambulation (gait belt, sit-to-stand, lift, walker, cane, etc )  - [place] fall precautions as indicated by assessment  - Record patient progress and toleration of activity level on Mobility SBAR; progress patient to next Phase/Stage  - Instruct patient to call for assistance with activity based on assessment  - Request Rehabilitation consult to assist with strengthening/weightbearing, etc   Outcome: Progressing     Problem: DISCHARGE PLANNING  Goal: Discharge to home or other facility with appropriate resources  Description  INTERVENTIONS:  - Identify barriers to discharge w/patient and caregiver  - Arrange for needed discharge resources and transportation as appropriate  - Identify discharge learning needs (meds, wound care, etc )  - Arrange for interpretive services to assist at discharge as needed  - Refer to Case Management Department for coordinating discharge planning if the patient needs post-hospital services based on physician/advanced practitioner order or complex needs related to functional status, cognitive ability, or social support system  Outcome: Progressing     Problem: Prexisting or High Potential for Compromised Skin Integrity  Goal: Skin integrity is maintained or improved  Description  INTERVENTIONS:  - Identify patients at risk for skin breakdown  - Assess and monitor skin integrity  - Assess and monitor nutrition and hydration status  - Monitor labs (i e  albumin)  - Assess for incontinence   - Turn and reposition patient  - Assist with mobility/ambulation  - Relieve pressure over bony prominences  - Avoid friction and shearing  - Provide appropriate hygiene as needed including keeping skin clean and dry  - Evaluate need for skin moisturizer/barrier cream  - Collaborate with interdisciplinary team (i e  Nutrition, Rehabilitation, etc )   - Patient/family teaching  Outcome: Progressing     Problem: Nutrition/Hydration-ADULT  Goal: Nutrient/Hydration intake appropriate for improving, restoring or maintaining nutritional needs  Description  Monitor and assess patient's nutrition/hydration status for malnutrition (ex- brittle hair, bruises, dry skin, pale skin and conjunctiva, muscle wasting, smooth red tongue, and disorientation)  Collaborate with interdisciplinary team and initiate plan and interventions as ordered  Monitor patient's weight and dietary intake as ordered or per policy  Utilize nutrition screening tool and intervene per policy  Determine patient's food preferences and provide high-protein, high-caloric foods as appropriate       INTERVENTIONS:  - Monitor oral intake, urinary output, labs, and treatment plans  - Assess nutrition and hydration status and recommend course of action  - Evaluate amount of meals eaten  - Assist patient with eating if necessary   - Allow adequate time for meals  - Recommend/ encourage appropriate diets, oral nutritional supplements, and vitamin/mineral supplements  - Order, calculate, and assess calorie counts as needed  - Recommend, monitor, and adjust tube feedings and TPN/PPN based on assessed needs  - Assess need for intravenous fluids  - Provide specific nutrition/hydration education as appropriate  - Include patient/family/caregiver in decisions related to nutrition  Outcome: Progressing

## 2019-07-20 PROCEDURE — 97530 THERAPEUTIC ACTIVITIES: CPT

## 2019-07-20 PROCEDURE — G0515 COGNITIVE SKILLS DEVELOPMENT: HCPCS

## 2019-07-20 PROCEDURE — 97110 THERAPEUTIC EXERCISES: CPT

## 2019-07-20 PROCEDURE — 97116 GAIT TRAINING THERAPY: CPT

## 2019-07-20 PROCEDURE — 97535 SELF CARE MNGMENT TRAINING: CPT

## 2019-07-20 RX ADMIN — CARVEDILOL 3.12 MG: 3.12 TABLET, FILM COATED ORAL at 17:30

## 2019-07-20 RX ADMIN — ASPIRIN 81 MG: 81 TABLET, COATED ORAL at 10:00

## 2019-07-20 RX ADMIN — DOCUSATE SODIUM 100 MG: 100 CAPSULE, LIQUID FILLED ORAL at 10:00

## 2019-07-20 RX ADMIN — SENNOSIDES 17.2 MG: 8.6 TABLET, FILM COATED ORAL at 22:14

## 2019-07-20 RX ADMIN — Medication 325 MG: at 10:28

## 2019-07-20 RX ADMIN — ENOXAPARIN SODIUM 40 MG: 40 INJECTION SUBCUTANEOUS at 10:00

## 2019-07-20 RX ADMIN — ATORVASTATIN CALCIUM 80 MG: 80 TABLET, FILM COATED ORAL at 17:30

## 2019-07-20 RX ADMIN — PANTOPRAZOLE SODIUM 40 MG: 40 TABLET, DELAYED RELEASE ORAL at 06:13

## 2019-07-20 RX ADMIN — CARVEDILOL 3.12 MG: 3.12 TABLET, FILM COATED ORAL at 10:00

## 2019-07-20 RX ADMIN — MELATONIN 6 MG: at 22:14

## 2019-07-20 RX ADMIN — Medication 325 MG: at 17:30

## 2019-07-20 RX ADMIN — CLOPIDOGREL BISULFATE 75 MG: 75 TABLET ORAL at 10:00

## 2019-07-20 RX ADMIN — NIFEDIPINE 30 MG: 30 TABLET, FILM COATED, EXTENDED RELEASE ORAL at 10:00

## 2019-07-20 NOTE — PROGRESS NOTES
07/20/19 0700   Pain Assessment   Pain Assessment No/denies pain   Pain Score No Pain   Restrictions/Precautions   Precautions Cognitive; Fall Risk;Visual deficit   Weight Bearing Restrictions No   ROM Restrictions No   QI: [address] Provided by [place] No physical assistance   Eating CARE Score 6   Eating Assessment   Eating (FIM) 6 - Patient requires assistive device or dentures   QI: Oral Hygiene   Assistance Needed Supervision   Assistance Provided by [place] Less than 25%   Comment while in stance at sink   Oral Hygiene CARE Score 3   Grooming   Able To Initiate Tasks   Limitation Noted In Coordination; Safety   Findings in stance at sink   Grooming (FIM) 5 - Patient requires supervision/monitoring   QI: Shower/Bathe Self   Assistance Needed Supervision;Verbal cues   Assistance Provided by [place] Less than 25%   Shower/Bathe Self CARE Score 3   Bathing   Assessed Bath Style Sponge Bath   Anticipated D/C Bath Style Shower;Sponge Bath   Able to [place] Manuel Yes   Able to [employer] Temperature Yes   Able to Wash/Rinse/Dry (body part) Left Arm;Right Arm;L Upper Leg;R Upper Leg;L Lower Leg/Foot;R Lower Leg/Foot;Chest;Abdomen;Perineal Area; Buttocks   Limitations Noted in Balance; Coordination; Endurance; Safety   Positioning Seated;Standing   Findings  Pt unagreeable to shower this AM, requesting to sponge bathe  Able to bathe all parts w/ S while in stance     Bathing (FIM) 5 - Patient requires supervision/monitoring but completes 10/10 parts   Tub/Shower Transfer   Not Assessed Sponge Bath   QI: Upper Body Dressing   Assistance Needed Supervision   Assistance Provided by [place] No physical assistance   Comment while seated   Upper Body Dressing CARE Score 4   QI: Lower Body Dressing   Assistance Needed Supervision   Assistance Provided by [place] No physical assistance   Lower Body Dressing CARE Score 4   QI: Putting On/Taking Off Footwear   Assistance Needed Supervision Assistance Provided by [place] No physical assistance   Putting On/Taking Off Footwear CARE Score 4   Dressing/Undressing Clothing   Able to  Obtain Clothing;Store removed clothing   Remove UB Clothes Pullover Shirt   Remove LB Clothes Pants; Undergarment; [place] UB Clothes Pullover Shirt;Bra   Don LB Clothes Pants; Undergarment;Socks; Shoes   Limitations Noted In Balance; Coordination;Timeliness; Safety   Positioning Supported Sit;Standing   Findings Pt req Min vc's to maximize attention to task and for task progression, able to perform at S level w/ inc time  UB Dressing (FIM) 5 - Patient requires supervision/monitoring   LB Dressing (FIM) 5 - Patient requires verbal cues   QI: Lying to Sitting on Side of Bed   Assistance Needed Supervision   Assistance Provided by [place] No physical assistance   Lying to Sitting on Side of Bed CARE Score 4   QI: Sit to [address] Provided by [place] No physical assistance   Sit to Stand CARE Score 4   QI: Chair/Bed-to-Chair Transfer   Assistance Needed Supervision   Assistance Provided by [place] No physical assistance   Chair/Bed-to-Chair Transfer CARE Score 4   Transfer Bed/Chair/Wheelchair   Limitations Noted In Balance; Coordination;Problem Solving   Adaptive Equipment Chepe Linares, Chair, Wheelchair Transfer (FIM) 5 - Patient requires supervision/monitoring   QI: [address] Provided by [place] No physical assistance   Ryan Nolen 83 Score 4   Toileting   Able to [address] down yes, up yes     Able to Manage Clothing Closures Yes   Manage Hygiene Bladder   Limitations Noted In Balance   Toileting (FIM) 5 - Patient requires supervision/monitoring   QI: Toilet Transfer   Assistance Needed Supervision   Assistance Provided by [place] No physical assistance   Toilet Transfer CARE Score 4   Toilet Transfer   Surface Assessed Standard Toilet   Transfer Technique Standard   Limitations Noted In Balance; Safety   Adaptive Equipment Grab Bar   Findings CS w/ [facility]   Toilet Transfer (FIM) 5 - Patient requires supervision/monitoring   Coordination   Fine Motor Pt engaged in small item retrieval from table w/ focus on fine<>palm manipulation to place small items in container  Pt tolerated well w/ inc time to complete  Pt reported inc confidence stating, "I'm getting strnger, see I can do it!  "   Assessment   Treatment Assessment Pt seen for AM ADL w/ details noted above  Pt cont to req vc's to inc safety awareness and for attention to task at times  Improvement noted w/ fxnl use of RUE and inc confidence  Will cont to benefit from R GMC/FMC, fxnl standing balance, and reiteration of safety strategies to maximize fxnl indep  Prognosis Good   Problem List Decreased strength;Decreased endurance; Impaired balance;Decreased coordination;Decreased cognition; Impaired judgement;Decreased safety awareness; Impaired sensation   Plan   Treatment/Interventions Functional transfer training; Therapeutic exercise; Endurance training;Patient/family training   Progress Progressing toward goals   Recommendation   OT Discharge Recommendation 24 hour supervision/assist   OT Therapy Minutes   OT Time In 0700   OT Time Out 0800   OT Total Time (minutes) 60   OT Mode of treatment - Individual (minutes) 60   OT Mode of treatment - Concurrent (minutes) 0   OT Mode of treatment - Group (minutes) 0   OT Mode of treatment - Co-treat (minutes) 0   OT Mode of Teatment - Total time(minutes) 60 minutes   Therapy Time missed   Time missed?  No

## 2019-07-20 NOTE — PROGRESS NOTES
07/20/19 1030   Pain Assessment   Pain Assessment 0-10   Pain Score No Pain   Restrictions/Precautions   Precautions Cognitive; Fall Risk;Visual deficit   Weight Bearing Restrictions No   ROM Restrictions No   Cognition   Overall Cognitive Status Impaired   Arousal/Participation Alert; Cooperative   Attention Attends with cues to redirect   Orientation Level Oriented X4   Memory Decreased recall of recent events;Decreased recall of precautions   Following Commands Follows one step commands with increased time or repetition   Subjective   Subjective No c/o pain today  QI: Roll Left and Right   Reason if not Attempted Activity not applicable   Roll Left and Right CARE Score 9   QI: Sit to Lying   Reason if not Attempted Activity not applicable   Sit to Lying CARE Score 9   QI: Lying to Sitting on Side of Bed   Reason if not Attempted Activity not applicable   Lying to Sitting on Side of Bed CARE Score 9   QI: Sit to Stand   Assistance Needed Supervision   Assistance Provided by [place] No physical assistance   Sit to Stand CARE Score 4   QI: Chair/Bed-to-Chair Transfer   Assistance Needed Supervision   Assistance Provided by [place] No physical assistance   Chair/Bed-to-Chair Transfer CARE Score 4   Transfer Bed/Chair/Wheelchair   Limitations Noted In Balance; Endurance;Problem Solving; Sequencing;UE Strength;LE Strength   Adaptive Equipment Cane   Stand Pivot Supervision   Sit to Stand Supervision   Stand to RADHA WILKES  [place], Chair, Wheelchair Transfer (FIM) 5 - Patient requires supervision/monitoring   QI: Car Transfer   Reason if not Attempted Activity not applicable   Car Transfer CARE Score 9   QI: Walk 10 [address] Provided by [place] No physical assistance   Walk 10 Feet CARE Score 4   QI: Walk 50 Feet with Two [address] Provided by [place] No physical assistance   Walk 50 Feet with Two Turns CARE Score 4   QI: Walk 150 Feet Assistance Needed Supervision   Assistance Provided by [place] No physical assistance   Walk 150 Feet CARE Score 4   QI: Walking 10 Feet on Uneven Surfaces   Reason if not Attempted Activity not applicable   Walking 10 Feet on Uneven Surfaces CARE Score 9   Ambulation   Does the patient walk? 2  Yes   Primary Discharge Mode of Locomotion Walk   Walk Assist Level Supervision   Gait Pattern Inconsistant Dolly; Slow Dolly;Decreased foot clearance;[place] LUIS; Improper weight shift   Assist Device [employer] Walked (feet) 250 ft  (250 x 2 using SPC with Supervision)   Limitations Noted In Balance;Device Management; Endurance; Safety;Speed;Strength;Swing   Walking (FIM) 5 - Patient requires supervision/monitoring AND distance 150 feet or more, no rest   QI: Wheel 50 Feet with Two Turns   Reason if not Attempted Activity not applicable   Wheel 50 Feet with Two Turns CARE Score 9   QI: Wheel 150 Feet   Reason if not Attempted Activity not applicable   Wheel 889 Feet CARE Score 9   Wheelchair mobility   QI: Does the patient use a wheelchair? 0  No   Wheelchair (FIM) 0 - Activity does not occur   QI: 1 Step (Curb)   Reason if not Attempted Activity not applicable   1 Step (Curb) CARE Score 9   QI: 4 Steps   Assistance Needed Incidental touching   Assistance Provided by [place] Less than 25%   Comment CS going up and needed CG going down using RHR going up with both hands on railing  4 Steps CARE Score 3   QI: 12 Steps   Assistance Needed Incidental touching   Assistance Provided by [place] Less than 25%   Comment CS going up using RHR with both hands on railing and needed CG going down     12 Steps CARE Score 3   Stairs   Type Stairs   # of Steps 12   Weight Bearing Precautions Fall Risk   Assist Devices Single Rail   Stairs (FIM) 4 - Patient requires steadying assist or light touching AND patient goes up and down full flight (12- 14 stairs)   QI: Picking Up Object   Reason if not Attempted Activity not applicable Picking Up Object CARE Score 9   QI: Toilet Transfer   Reason if not Attempted Activity not applicable   Toilet Transfer CARE Score 9   Toilet Transfer   Toilet Transfer (FIM) 0 - Activity does not occur   Therapeutic Interventions   Strengthening Seated ther ex on BLE's using B/L 2 5# ankle weights for LAQ, hip flexion, hip abduction/adduction with knees extended, ankle DF/PF, hip abduction using red theraband and hip adduction with ball squeezes for 2 x 10 reps each  Equipment Use   NuStep Level 4 x 15 minutes using BLE's only   Assessment   Treatment Assessment Patient tolerated therapy very well with rest breaks in between  She was very pleasant and very cooperative during therapy session  She ambulated 250 feet x 2 using SPC with Supervision  Supervision with all transfers  She ascended/descended 12 steps with RHR going up with both hands on railing with Close Supervision going up and CG going down  She will benefit from continued skilled PT services to improve BLE's strength to facilitate safety and [place] with transfers and gait, to improve endurance with gait and improve standing balance to reduce risk for falls  Family/Caregiver Present no   Problem List Decreased strength;Decreased endurance; Impaired balance;Decreased mobility; Decreased coordination;Decreased cognition; Impaired judgement;Decreased safety awareness   Barriers to Discharge Inaccessible home environment   PT Barriers   Physical Impairment Decreased strength;Decreased endurance; Impaired balance;Decreased mobility; Decreased coordination; Impaired judgement;Decreased safety awareness   Functional Limitation Stair negotiation;Standing;Transfers; Walking   Plan   Treatment/Interventions Functional transfer training;LE strengthening/ROM; Elevations; Therapeutic exercise; Endurance training;Bed mobility;Gait training   Progress Progressing toward goals   Recommendation   Recommendation 24 hour supervision/assist;Outpatient PT   Equipment Recommended Cane   PT Therapy Minutes   PT Time In 1030   PT Time Out 1130   PT Total Time (minutes) 60   PT Mode of treatment - Individual (minutes) 60   PT Mode of treatment - Concurrent (minutes) 0   PT Mode of treatment - Group (minutes) 0   PT Mode of treatment - Co-treat (minutes) 0   PT Mode of Teatment - Total time(minutes) 60 minutes   Therapy Time missed   Time missed?  No

## 2019-07-20 NOTE — PROGRESS NOTES
Speech Note     07/20/19 0930   Pain Assessment   Pain Assessment No/denies pain   Pain Score No Pain   Memory Skills   Memory (FIM) 5 - Recalls/performs request 90% of time   Social Interaction (FIM) 6 - Interacts appropriately with others BUT requires extra  time   Speech/Language/Cognition Assessmetn   Treatment Assessment Pt  seen for a skilled ST cognitive session to address cognitive linguistic deficits  Pt  began session completing a category matrix in which pt  was provided with a variety of categories and the initial letter required for each category member  Pt  scored 26/32 on this task increasing to 30/32 given semantic cues for the desired word  Pt  then completed a following directions worksheet with 10/10 accuracy  Pt  ability to read a perscription label was then targeted in which pt  answered questions based on a hypotethical pill bottle with 7/7 accuracy  Pt  working memory skills were addressed with paragraph retention worksheets in which pt  read a short paragraph and answered questions based of the information she had read  Pt  scored 16/20 with this activity  Pt  ended the session with a calculating coins activity in which the pt  was given an amount and asked to break the amount down into dollar, quarters, dimes, nickels, and pennies  Pt  was 8/15 accurate with this task increasing to 12/15 given verbal cues  Pt  continues to demonstrate cognitive linguistic deficits at this time and continues to benefit from skilled ST services to improve overall cognitive linguistic function to increase safety and independence  SLP Therapy Minutes   SLP Time In 0930   SLP Time Out 1030   SLP Total Time (minutes) 60   SLP Mode of treatment - Individual (minutes) 60   SLP Mode of treatment - Concurrent (minutes) 0   SLP Mode of treatment - Group (minutes) 0   SLP Mode of treatment - Co-treat (minutes) 0   SLP Mode of Teatment - Total time(minutes) 60 minutes   Therapy Time missed   Time missed?  No   Daily FIM Score   Problem solving (FIM) 4 - Solves basic problems 75-89% of time   Comprehension (FIM) 5 - Needs help/cues, repetition only RARELY ( < 10% of the time)   Expression (FIM) 6 - Expresses complex/abstract but requires:  more time

## 2019-07-20 NOTE — PROGRESS NOTES
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; s/p left CEA 6/28/19:  continue ASA/Plavix, Lipitor = for home Cards advised change Lipitor to Crestor 40mg daily      Iron deficiency anemia; s/p transfusion and iron IV: stable; continue iron PO supplements      AVM stomach/duodenum tx with APC; cold snare sigmoid colon polypectomy: continue PPI     HTN: at home was on Lopressor and now is switched to Coreg and Nifedipine = stable currently     RUL ground glass density: for Pulm to see OP     Urethral diverticulum: to see Uro as OP for cystogram     Nicotine abuse: counseled for cessation     Fall 2/2 LOB 6/24/19: CT head was negative; has remaining ecchymosis right forehead/orbit      Subjective/ HPI:  Patients overnight issues or events were reviewed with nursing or staff during rounds or morning huddle session  No new or overnight issues  HTN: Currently well controlled w/o changes needed  CVA: No new stroke symptoms  Anemia: Hgb stable s/p transfusion  Pt currently reports that she is doing well and offers no complaints      ROS:     GI: denies abdominal pain, change bowel habits or reflux symptoms  Neuro: Denies any headache, new vision changes, new neuropathies,new weaknesses   Respiratory: No Cough, SOB, denies wheeze  Cardiovascular: No CP, palpitations , denies perception of rapid heartbeat  : denies any new urinary burning or frequency    Review of Scheduled Meds:    Current Facility-Administered Medications:  acetaminophen 650 mg Oral Q6H PRN Kash De La Garza MD   aspirin 81 mg Oral Daily Ed Arreola MD   atorvastatin 80 mg Oral Daily With [employer], MD   bisacodyl 10 mg Rectal Daily PRN Amina Whipple PA-C   calcium carbonate 500 mg Oral Daily PRN Ed CONTE MD [place]   carvedilol 3 125 mg Oral BID With Meals Elias Hammans, MD   clopidogrel 75 mg Oral Daily Ed Arreola MD   docusate sodium 100 mg Oral BID Amina Whipple PA-C   enoxaparin 40 mg Subcutaneous Q24H [address] Ed Arreola MD   ferrous sulfate 325 mg Oral BID With Meals Ed Arreola MD   melatonin 6 mg Oral HS Ed Arreola MD   NIFEdipine 30 mg Oral Daily Ed Arreola MD   pantoprazole 40 mg Oral Early Morning Ed Arreola MD   polyethylene glycol 17 g Oral Daily PRN Amina Whipple PA-C   senna 2 tablet Oral HS Amina Whipple PA-C       Labs:     Results from last 7 days   Lab Units 07/19/19  1019 07/18/19  0541 07/15/19  0518   WBC Thousand/uL  --  6 03 7 30   HEMOGLOBIN g/dL 9 5* 8 1* 8 7*   HEMATOCRIT % 32 5* 27 6* 29 7*   PLATELETS Thousands/uL  --  267 338     Results from last 7 days   Lab Units 07/18/19  0541 07/15/19  0518   SODIUM mmol/L 145 147*   POTASSIUM mmol/L 3 7 3 7   CHLORIDE mmol/L 114* 114*   CO2 mmol/L 26 26   BUN mg/dL 21 15   CREATININE mg/dL 0 89 0 86   CALCIUM mg/dL 8 2* 8 4                        Imaging:     No orders to display       *Labs reviewed  *Radiology studies reviewed  *Medications reviewed and reconciled as needed  *Please refer to order section for additional ordered labs studies  *Case discussed with primary attending during morning huddle case rounds    Physical Examination:  Vitals:   Vitals:    07/19/19 1542 07/19/19 2030 07/20/19 0555 07/20/19 0600   BP: (!) 171/72 137/67 132/60    BP Location:  Left arm Left arm    Pulse: 61 59 60    Resp:  18 18    Temp:  97 7 °F (36 5 °C) 97 7 °F (36 5 °C)    TempSrc:  Oral Oral    SpO2:  99% 96%    Weight:    72 8 kg (160 lb 7 9 oz)   Height:           General Appearance: NAD, conversive  Eyes: No icterus; conjunctiva normal  HENT: oropharynx clear; mucous membranes moist  Neck: trachea midline, range of motion full     Lungs: CTA, normal respiratory effort, no retractions, expiratory effort normal  CV: regular rate, no rubs/murmurs/gallops  ABD: soft; NT/ND; +BS  EXT: no edema  Skin: normal turgor, normal texture, no rashes  Psych: affect normal, No anxiety/depression   Neuro: AAOx3:  KAM 5/5          Total time spent: At least 40 minutes, with more than 50% spent counseling/coordinating care  Counseling includes discussion with patient re: progress  and discussion with patient of his/her current medical state/information  Coordination of patient's care was performed in conjunction with primary service  Time invested included review of patient's labs, vitals, and management of their comorbidities with continued monitoring  In addition, this patient was discussed with medical team including physician and advanced extenders  The care of the patient was extensively discussed and appropriate treatment plan was formulated unique for this patient  ** Please Note: Dragon 360 Dictation voice to text software may have been used in the creation of this document   **

## 2019-07-21 PROCEDURE — 97112 NEUROMUSCULAR REEDUCATION: CPT

## 2019-07-21 PROCEDURE — G0515 COGNITIVE SKILLS DEVELOPMENT: HCPCS

## 2019-07-21 PROCEDURE — 97535 SELF CARE MNGMENT TRAINING: CPT

## 2019-07-21 PROCEDURE — 97110 THERAPEUTIC EXERCISES: CPT

## 2019-07-21 PROCEDURE — 97530 THERAPEUTIC ACTIVITIES: CPT

## 2019-07-21 RX ADMIN — NIFEDIPINE 30 MG: 30 TABLET, FILM COATED, EXTENDED RELEASE ORAL at 08:10

## 2019-07-21 RX ADMIN — Medication 325 MG: at 08:10

## 2019-07-21 RX ADMIN — CARVEDILOL 3.12 MG: 3.12 TABLET, FILM COATED ORAL at 17:25

## 2019-07-21 RX ADMIN — CLOPIDOGREL BISULFATE 75 MG: 75 TABLET ORAL at 08:10

## 2019-07-21 RX ADMIN — SENNOSIDES 17.2 MG: 8.6 TABLET, FILM COATED ORAL at 21:10

## 2019-07-21 RX ADMIN — MELATONIN 6 MG: at 21:10

## 2019-07-21 RX ADMIN — ENOXAPARIN SODIUM 40 MG: 40 INJECTION SUBCUTANEOUS at 08:10

## 2019-07-21 RX ADMIN — PANTOPRAZOLE SODIUM 40 MG: 40 TABLET, DELAYED RELEASE ORAL at 06:02

## 2019-07-21 RX ADMIN — Medication 325 MG: at 17:25

## 2019-07-21 RX ADMIN — ASPIRIN 81 MG: 81 TABLET, COATED ORAL at 08:10

## 2019-07-21 RX ADMIN — DOCUSATE SODIUM 100 MG: 100 CAPSULE, LIQUID FILLED ORAL at 08:10

## 2019-07-21 RX ADMIN — ATORVASTATIN CALCIUM 80 MG: 80 TABLET, FILM COATED ORAL at 17:25

## 2019-07-21 RX ADMIN — CARVEDILOL 3.12 MG: 3.12 TABLET, FILM COATED ORAL at 08:10

## 2019-07-21 NOTE — PROGRESS NOTES
07/21/19 0900   Pain Assessment   Pain Assessment No/denies pain   Pain Score No Pain   Restrictions/Precautions   Precautions Cognitive; Fall Risk;Supervision on toilet/commode;Visual deficit   Weight Bearing Restrictions No   ROM Restrictions No   Memory Skills   Memory (FIM) 5 - Needs cueing reminders <10%   Social Interaction (FIM) 7 - Interacts appropriately without assistive device, medication or helper   Speech/Language/Cognition Assessmetn   Treatment Assessment Pt participated in a skilled ST session targeting cognitive linguistic skills, specifically memory and money management  Session began with a visual memory task  Given 7 unrelated pictures, pt and SLP reviewed memory strategy of making association/categorization to improve recall  Following a 5 minute delay, pt recalled 5/7 pictures independently, increased to 7/7 given min cues  Pt recalled 6/7 pictures after 15 minute and 40 minute delay, increasing to 7/7 given min verbal cue  Pt completed a mock money activity with min difficulty  Pt was able to use bills and coins to provided specified amount in 4/6 trials independently, increasing to 6/6 given min verbal cues  Errors appeared to be secondary to decreased attention (I e  Pt collected correct amount of bills; however, held partial amount in hand vs  Placing in pile)  Pt answered functional math word problems in 3/10 trials independently, increasing to 7/10 given mod-max verbal and visual cues  Pt completed a category matrix per her request  She was able to complete 28/32 category members independently; increased to 31/32 given min semantic cues  Recommend continued ST services to target cognitive linguistic skills through outpatient facility upon D/C from acute rehab  Pt reported she will be attending ST/OT/PT services upon D/C at [address] [place] [address] Location     SLP Therapy Minutes   SLP Time In 0900   SLP Time Out 1000   SLP Total Time (minutes) 60   SLP Mode of treatment - Individual (minutes) 60 SLP Mode of treatment - Concurrent (minutes) 0   SLP Mode of treatment - Group (minutes) 0   SLP Mode of treatment - Co-treat (minutes) 0   SLP Mode of Teatment - Total time(minutes) 60 minutes   Therapy Time missed   Time missed?  No   Daily FIM Score   Problem solving (FIM) 5 - Solves basic problems 90% of time   Comprehension (FIM) 5 - Understands basic directions and conversation   Expression (FIM) 6 - Expresses complex/abstract but requires:  more time

## 2019-07-21 NOTE — PROGRESS NOTES
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; s/p left CEA 6/28/19:  continue ASA/Plavix, Lipitor = for home Cards advised change Lipitor to Crestor 40mg daily      Iron deficiency anemia; s/p transfusion and iron IV: stable; continue iron PO supplements      AVM stomach/duodenum tx with APC; cold snare sigmoid colon polypectomy: continue PPI     HTN: at home was on Lopressor and now is switched to Coreg and Nifedipine = stable currently and no dose changes for today     RUL ground glass density: for Pulm to see OP     Urethral diverticulum: to see Uro as OP for cystogram     Nicotine abuse: counseled for cessation     Fall 2/2 LOB 6/24/19: CT head was negative; has remaining ecchymosis right forehead/orbit      Subjective/ HPI:  Patients overnight issues or events were reviewed with nursing or staff during rounds or morning huddle session  No new or overnight issues  HTN: Currently well controlled w/o changes needed  CVA: No new stroke symptoms  Anemia: Hgb stable s/p transfusion  Pt currently reports that she is doing well and offers no complaints      ROS:     GI: denies abdominal pain, change bowel habits or reflux symptoms  Neuro: Denies any headache, new vision changes, new neuropathies,new weaknesses   Respiratory: No Cough, SOB, denies wheeze  Cardiovascular: No CP, palpitations , denies perception of rapid heartbeat  : denies any new urinary burning or frequency    Review of Scheduled Meds:    Current Facility-Administered Medications:  acetaminophen 650 mg Oral Q6H PRN José Antonio Schwab MD   aspirin 81 mg Oral Daily Ed Arreola MD   atorvastatin 80 mg Oral Daily With Boyd Houston MD   bisacodyl 10 mg Rectal Daily PRN Amina Whipple PA-C   calcium carbonate 500 mg Oral Daily PRN Jarett Luis MD   carvedilol 3 125 mg Oral BID With Meals Jarett Luis MD   clopidogrel 75 mg Oral Daily Ed Arreola MD   docusate sodium 100 mg Oral BID Amina Whipple PA-C   enoxaparin 40 mg Subcutaneous Q24H [address] Ed Arreola MD   ferrous sulfate 325 mg Oral BID With Meals Ed Arreola MD   melatonin 6 mg Oral HS Ed Arreola MD   NIFEdipine 30 mg Oral Daily Ed Arreola MD   pantoprazole 40 mg Oral Early Morning Ed Arreola MD   polyethylene glycol 17 g Oral Daily PRN Amina Whipple PA-C   senna 2 tablet Oral HS Amina Whipple PA-C       Labs:     Results from last 7 days   Lab Units 07/19/19  1019 07/18/19  0541 07/15/19  0518   WBC Thousand/uL  --  6 03 7 30   HEMOGLOBIN g/dL 9 5* 8 1* 8 7*   HEMATOCRIT % 32 5* 27 6* 29 7*   PLATELETS Thousands/uL  --  267 338     Results from last 7 days   Lab Units 07/18/19  0541 07/15/19  0518   SODIUM mmol/L 145 147*   POTASSIUM mmol/L 3 7 3 7   CHLORIDE mmol/L 114* 114*   CO2 mmol/L 26 26   BUN mg/dL 21 15   CREATININE mg/dL 0 89 0 86   CALCIUM mg/dL 8 2* 8 4                        Imaging:     No orders to display       *Labs reviewed  *Radiology studies reviewed  *Medications reviewed and reconciled as needed  *Please refer to order section for additional ordered labs studies  *Case discussed with primary attending during morning huddle case rounds    Physical Examination:  Vitals:   Vitals:    07/20/19 2123 07/21/19 0527 07/21/19 0600 07/21/19 0810   BP: 143/65 125/57  145/66   BP Location: Left arm Right arm     Pulse: 60 62  68   Resp: 18 18     Temp: 98 °F (36 7 °C) 97 7 °F (36 5 °C)     TempSrc: Oral Oral     SpO2: 100% 97%     Weight:   77 8 kg (171 lb 8 oz)    Height:           General Appearance: NAD, conversive  Eyes: No icterus; conjunctiva normal  HENT: oropharynx clear; mucous membranes moist  Neck: trachea midline, range of motion full     Lungs: CTA, normal respiratory effort, no retractions, expiratory effort normal  CV: regular rate, no rubs/murmurs/gallops  ABD: soft; NT/ND; +BS  EXT: no edema  Skin: normal turgor, normal texture, no rashes  Psych: affect normal, No anxiety/depression   Neuro: AAOx3:  KAM 5/5          Total time spent: At least 40 minutes, with more than 50% spent counseling/coordinating care  Counseling includes discussion with patient re: progress  and discussion with patient of his/her current medical state/information  Coordination of patient's care was performed in conjunction with primary service  Time invested included review of patient's labs, vitals, and management of their comorbidities with continued monitoring  In addition, this patient was discussed with medical team including physician and advanced extenders  The care of the patient was extensively discussed and appropriate treatment plan was formulated unique for this patient  ** Please Note: Dragon 360 Dictation voice to text software may have been used in the creation of this document   **

## 2019-07-21 NOTE — PROGRESS NOTES
07/21/19 1030   Pain Assessment   Pain Assessment No/denies pain   Pain Score No Pain   Restrictions/Precautions   Precautions Fall Risk;Cognitive;Supervision on toilet/commode   QI: [address] Provided by [place] No physical assistance   Eating CARE Score 6   Eating Assessment   Eating (FIM) 6 - Patient requires assistive device or dentures   QI: Oral Hygiene   Assistance Needed Supervision   Assistance Provided by [place] No physical assistance   Comment while in stance at sink   Oral Hygiene CARE Score 4   Grooming   Able To Initiate Tasks;Comb/Brush Hair;Wash/Dry Face;Brush/Clean Teeth;Wash/Dry Hands; Acquire Items   Limitation Noted In Coordination   Findings in stance at sink w/ vc's to maximize R hand release and natural use of hand  Grooming (FIM) 5 - Patient requires supervision/monitoring   QI: Shower/Bathe Self   Assistance Needed Verbal cues; Supervision   Assistance Provided by [place] No physical assistance   Shower/Bathe Self CARE Score 4   Bathing   Assessed Bath Style Shower   Anticipated D/C Bath Style Shower;Sponge Bath   Able to Kike Manuel Yes   Able to [employer] Temperature Yes   Able to Wash/Rinse/Dry (body part) Left Arm;Right Arm;L Upper Leg;R Upper Leg;L Lower Leg/Foot;R Lower Leg/Foot;Chest;Abdomen;Perineal Area; Buttocks   Limitations Noted in Balance;Problem Solving; Safety   Positioning Seated;Standing   Adaptive Equipment Shower Seat;Hand Held Shower; Shower Family [employer]  req vc's to thoroughly bathe buttocks while in stance and to wash all of soap from hair, bathed all parts at S  Bathing (FIM) 5 - Patient requires verbal cues but completes 10/10 parts   Tub/Shower Transfer   Limitations Noted In Balance;Problem Solving; Safety   Adaptive Equipment Grab Bars;Seat with Back   Assessed Shower   Findings CS w/ vc's to maximize safety   Tub Transfer (FIM) 0 - Activity does not occur   Shower Transfer (FIM) 5 - Patient requires supervision/monitoring   QI: Upper Body Dressing   Assistance Needed Supervision   Assistance Provided by [place] No physical assistance   Comment while seated   Upper Body Dressing CARE Score 4   QI: Lower Body Dressing   Assistance Needed Supervision;Verbal cues   Assistance Provided by [place] No physical assistance   Lower Body Dressing CARE Score 4   QI: Putting On/Taking Off Footwear   Assistance Needed Supervision;Verbal cues   Assistance Provided by [place] No physical assistance   Putting On/Taking Off Footwear CARE Score 4   Dressing/Undressing Clothing   Able to  [facility]; Undergarment; Shoes   Don UB Clothes Pullover Shirt   Don LB Clothes Pants; Undergarment; Shoes   Limitations Noted In Problem Solving; Safety;Timeliness   Positioning Supported Sit;Standing   Findings req encouragement and vc's to maximize use of RUE to thread BLE and don shoes, although great improvement noted  UB Dressing (FIM) 5 - Patient requires supervision/monitoring   LB Dressing (FIM) 5 - Patient requires verbal cues   QI: Sit to [address] Provided by [place] No physical assistance   Sit to Stand CARE Score 4   QI: Chair/Bed-to-Chair Transfer   Assistance Needed Supervision   Assistance Provided by [place] No physical assistance   Comment w/ SPC and inc time   Chair/Bed-to-Chair Transfer CARE Score 4   Transfer Bed/Chair/Wheelchair   Positioning Concerns Cognition   Limitations Noted In Coordination;Problem Solving   Adaptive Equipment [place], Chair, Wheelchair Transfer (FIM) 5 - Patient requires supervision/monitoring   QI: [address] Provided by [place] No physical assistance   Toileting Hygiene CARE Score 4   Toileting   Able to [address] down yes, up yes     Able to Manage Clothing Closures Yes   Manage Hygiene Bladder   Limitations Noted In Safety   Adaptive Equipment Grab Bar   Toileting (FIM) 5 - Patient requires supervision/monitoring   QI: Toilet Transfer   [address] Provided by [place] No physical assistance   Toilet Transfer CARE Score 4   Toilet Transfer   Surface Assessed Standard Toilet   Transfer Technique [address] [facility]   Toilet Transfer (FIM) 5 - Patient requires supervision/monitoring   Assessment   Treatment Assessment Pt seen for skilled OT session w/ focus on analysis of ADL safety w/ details noted above  Pt performed UB/LB ADLs, fxnl xfers, and fxnl mobility w/ SPC at S w/ Min vc's to maximize safety and dec fall risk  From OT standpoint, pt is okay to return home w/ family support and 24 hour supervision to maximize fxnl indep  Pt was left resting in recliner w/ all needs within reach and meal tray set-up  Prognosis Good   Problem List Decreased strength;Decreased endurance; Impaired balance;Decreased mobility; Decreased coordination;Decreased cognition; Impaired judgement;Decreased safety awareness   Plan   Progress Progressing toward goals   Recommendation   OT Discharge Recommendation 24 hour supervision/assist   OT Therapy Minutes   OT Time In 1030   OT Time Out 1130   OT Total Time (minutes) 60   OT Mode of treatment - Individual (minutes) 60   OT Mode of treatment - Concurrent (minutes) 0   OT Mode of treatment - Group (minutes) 0   OT Mode of treatment - Co-treat (minutes) 0   OT Mode of Teatment - Total time(minutes) 60 minutes   Therapy Time missed   Time missed?  No

## 2019-07-21 NOTE — PROGRESS NOTES
07/21/19 1400   Pain Assessment   Pain Assessment 0-10   Pain Score No Pain   Restrictions/Precautions   Precautions Cognitive; Fall Risk;Supervision on toilet/commode;Visual deficit   Cognition   Overall Cognitive Status Impaired   Arousal/Participation Alert; Cooperative   Attention Attends with cues to redirect   Orientation Level Oriented X4   Memory Decreased recall of recent events;Decreased recall of precautions   Following Commands Follows one step commands with increased time or repetition   Subjective   Subjective reports she feels great   QI: Roll Left and Right   Assistance Needed Supervision   Roll Left and Right CARE Score 4   QI: Sit to Lying   Assistance Needed Supervision   Sit to Lying CARE Score 4   QI: Lying to Sitting on Side of Bed   Assistance Needed Supervision   Lying to Sitting on Side of Bed CARE Score 4   QI: Sit to Stand   Assistance Needed Supervision   Sit to Stand CARE Score 4   Bed Mobility   Able to Roll Left to Right;Right to Left;Scoot Up;Scoot Down   QI: Chair/Bed-to-Chair Transfer   Assistance Needed Supervision   Chair/Bed-to-Chair Transfer CARE Score 4   Transfer Bed/Chair/Wheelchair   Limitations Noted In Balance; Endurance;UE Strength;LE Strength;Vision   Adaptive Equipment Cane   Stand Pivot Supervision   Sit to Stand Supervision   Stand to Sit Supervision   Supine to Sit Supervision   Sit to Supine Supervision   Car Transfer Supervision   Bed, Chair, Wheelchair Transfer (FIM) 5 - Patient requires supervision/monitoring   QI: Car Transfer   Assistance Needed Supervision   Car Transfer CARE Score 4   QI: Walk 10 Feet   Assistance Needed Supervision   Walk 10 Feet CARE Score 4   QI: Walk 50 Feet with Two Turns   Assistance Needed Supervision   Walk 50 Feet with Two Turns CARE Score 4   QI: Walk 150 Feet   Assistance Needed Supervision   Walk 150 Feet CARE Score 4   QI: Walking 10 Feet on Uneven Surfaces   Assistance Needed Supervision   Walking 10 Feet on Uneven Surfaces CARE Score 4   Ambulation   Does the patient walk? 2  Yes   Primary Discharge Mode of Locomotion Walk   Walk Assist Level Supervision   Gait Pattern Inconsistant Dolly; Slow Dolly;Narrow LUIS;Step to; Step through; Decreased R stance;Trendelenburg; Improper weight shift   Assist Device [employer] Walked (feet) 300 ft   Limitations Noted In Balance; Endurance; Heel Strike;Speed;Strength;Swing   Walking (FIM) 5 - Patient requires supervision/monitoring AND distance 150 feet or more, no rest   QI: Wheel 50 Feet with Two Turns   Reason if not Attempted Activity not applicable   Wheel 50 Feet with Two Turns CARE Score 9   QI: Wheel 150 Feet   Reason if not Attempted Activity not applicable   Wheel 742 Feet CARE Score 9   Wheelchair mobility   QI: Does the patient use a wheelchair? 0   No   QI: 1 Step (Curb)   Assistance Needed Physical assistance   Assistance Provided by [place] 25%-49%   Comment LOB going up   1 Step (Curb) CARE Score 3   QI: 4 Steps   Assistance Needed Incidental touching   4 Steps CARE Score 4   QI: 12 Steps   Assistance Needed Incidental touching   12 Steps CARE Score 4   Stairs   Type Stairs   # of Steps 12   Weight Bearing Precautions Fall Risk   Assist Devices Single Rail   Findings uses both hands on single rail and goes down slightly sideways with non-reciprocal pattern and extra time   Stairs (FIM) 4 - Patient requires steadying assist or light touching AND patient goes up and down full flight (12- 14 stairs)   Therapeutic Interventions   Strengthening clamshells both sides 10x3   Balance EO/EC FA/FT floor and foam 60"x1 each; retro amb 60'; lateral amb 20' each direction   Equipment Use   NuStep 15 mins BLE only level 2&3   Assessment   Treatment Assessment pt has good safety awareness for xfers and use of AD; weakness on R side allows for Trendelenburg during amb but she is able to clear the floor with both feet; static balance is good with EO/EC FT/FA and on variable surface (foam); dynamic balance is impaired for retro amb and for lateral amb partially from weakness in BLE; pt would benefit from increasing strength in BLE and improving balance for safe and functional mobility; continue POC as per PT   Problem List Decreased strength;Decreased endurance; Impaired balance;Decreased mobility; Decreased coordination;Decreased cognition; Impaired judgement;Decreased safety awareness   Barriers to Discharge Inaccessible home environment   PT Barriers   Functional Limitation Stair negotiation;Transfers; Walking   Plan   Treatment/Interventions LE strengthening/ROM; Elevations; Therapeutic exercise; Endurance training;Cognitive reorientation;Patient/family training;Gait training   Recommendation   Recommendation 24 hour supervision/assist;Home PT;Outpatient PT   Equipment Recommended Cane   PT Therapy Minutes   PT Time In 1400   PT Time Out 1530   PT Total Time (minutes) 90   PT Mode of treatment - Individual (minutes) 90   PT Mode of treatment - Concurrent (minutes) 0   PT Mode of treatment - Group (minutes) 0   PT Mode of treatment - Co-treat (minutes) 0   PT Mode of Teatment - Total time(minutes) 90 minutes   Therapy Time missed   Time missed?  No

## 2019-07-22 VITALS
BODY MASS INDEX: 33.67 KG/M2 | DIASTOLIC BLOOD PRESSURE: 56 MMHG | HEART RATE: 66 BPM | SYSTOLIC BLOOD PRESSURE: 122 MMHG | HEIGHT: 60 IN | TEMPERATURE: 97.9 F | WEIGHT: 171.5 LBS | RESPIRATION RATE: 18 BRPM | OXYGEN SATURATION: 96 %

## 2019-07-22 LAB
ANION GAP SERPL CALCULATED.3IONS-SCNC: 6 MMOL/L (ref 4–13)
BASOPHILS # BLD AUTO: 0.05 THOUSANDS/ΜL (ref 0–0.1)
BASOPHILS NFR BLD AUTO: 1 % (ref 0–1)
BUN SERPL-MCNC: 18 MG/DL (ref 5–25)
CALCIUM SERPL-MCNC: 8.6 MG/DL (ref 8.3–10.1)
CHLORIDE SERPL-SCNC: 114 MMOL/L (ref 100–108)
CO2 SERPL-SCNC: 26 MMOL/L (ref 21–32)
CREAT SERPL-MCNC: 0.9 MG/DL (ref 0.6–1.3)
EOSINOPHIL # BLD AUTO: 0.74 THOUSAND/ΜL (ref 0–0.61)
EOSINOPHIL NFR BLD AUTO: 11 % (ref 0–6)
GFR SERPL CREATININE-BSD FRML MDRD: 61 ML/MIN/1.73SQ M
GLUCOSE P FAST SERPL-MCNC: 87 MG/DL (ref 65–99)
GLUCOSE SERPL-MCNC: 87 MG/DL (ref 65–140)
HCT VFR BLD AUTO: 30.2 % (ref 34.8–46.1)
HGB BLD-MCNC: 8.8 G/DL (ref 11.5–15.4)
IMM GRANULOCYTES # BLD AUTO: 0.02 THOUSAND/UL (ref 0–0.2)
IMM GRANULOCYTES NFR BLD AUTO: 0 % (ref 0–2)
LYMPHOCYTES # BLD AUTO: 1.99 THOUSANDS/ΜL (ref 0.6–4.47)
LYMPHOCYTES NFR BLD AUTO: 29 % (ref 14–44)
MCH RBC QN AUTO: 27.3 PG (ref 26.8–34.3)
MCHC RBC AUTO-ENTMCNC: 29.1 G/DL (ref 31.4–37.4)
MCV RBC AUTO: 94 FL (ref 82–98)
MONOCYTES # BLD AUTO: 0.78 THOUSAND/ΜL (ref 0.17–1.22)
MONOCYTES NFR BLD AUTO: 11 % (ref 4–12)
NEUTROPHILS # BLD AUTO: 3.34 THOUSANDS/ΜL (ref 1.85–7.62)
NEUTS SEG NFR BLD AUTO: 48 % (ref 43–75)
NRBC BLD AUTO-RTO: 0 /100 WBCS
PLATELET # BLD AUTO: 213 THOUSANDS/UL (ref 149–390)
PMV BLD AUTO: 11.3 FL (ref 8.9–12.7)
POTASSIUM SERPL-SCNC: 3.8 MMOL/L (ref 3.5–5.3)
RBC # BLD AUTO: 3.22 MILLION/UL (ref 3.81–5.12)
SODIUM SERPL-SCNC: 146 MMOL/L (ref 136–145)
WBC # BLD AUTO: 6.92 THOUSAND/UL (ref 4.31–10.16)

## 2019-07-22 PROCEDURE — 85025 COMPLETE CBC W/AUTO DIFF WBC: CPT | Performed by: INTERNAL MEDICINE

## 2019-07-22 PROCEDURE — 99238 HOSP IP/OBS DSCHRG MGMT 30/<: CPT | Performed by: PHYSICAL MEDICINE & REHABILITATION

## 2019-07-22 PROCEDURE — 80048 BASIC METABOLIC PNL TOTAL CA: CPT | Performed by: INTERNAL MEDICINE

## 2019-07-22 RX ADMIN — DOCUSATE SODIUM 100 MG: 100 CAPSULE, LIQUID FILLED ORAL at 08:27

## 2019-07-22 RX ADMIN — PANTOPRAZOLE SODIUM 40 MG: 40 TABLET, DELAYED RELEASE ORAL at 06:03

## 2019-07-22 RX ADMIN — CARVEDILOL 3.12 MG: 3.12 TABLET, FILM COATED ORAL at 08:27

## 2019-07-22 RX ADMIN — NIFEDIPINE 30 MG: 30 TABLET, FILM COATED, EXTENDED RELEASE ORAL at 08:27

## 2019-07-22 RX ADMIN — Medication 325 MG: at 08:27

## 2019-07-22 RX ADMIN — ASPIRIN 81 MG: 81 TABLET, COATED ORAL at 08:27

## 2019-07-22 RX ADMIN — ENOXAPARIN SODIUM 40 MG: 40 INJECTION SUBCUTANEOUS at 08:27

## 2019-07-22 RX ADMIN — CLOPIDOGREL BISULFATE 75 MG: 75 TABLET ORAL at 08:27

## 2019-07-22 NOTE — PLAN OF CARE
Problem: Potential for Falls  Goal: Patient will remain free of falls  Description  INTERVENTIONS:  - Assess patient frequently for physical needs  -  Identify cognitive and physical deficits and behaviors that affect risk of falls    -  [place] fall precautions as indicated by assessment   - Educate patient/family on patient safety including physical limitations  - Instruct patient to call for assistance with activity based on assessment  - Modify environment to reduce risk of injury  - Consider OT/PT consult to assist with strengthening/mobility  Outcome: Adequate for Discharge     Problem: PAIN - ADULT  Goal: Verbalizes/displays adequate comfort level or baseline comfort level  Description  Interventions:  - Encourage patient to monitor pain and request assistance  - Assess pain using appropriate pain scale  - Administer analgesics based on type and severity of pain and evaluate response  - Implement non-pharmacological measures as appropriate and evaluate response  - Consider cultural and social influences on pain and pain management  - Notify physician/advanced practitioner if interventions unsuccessful or patient reports new pain  Outcome: Adequate for Discharge     Problem: INFECTION - ADULT  Goal: Absence or prevention of progression during hospitalization  Description  INTERVENTIONS:  - Assess and monitor for signs and symptoms of infection  - Monitor lab/diagnostic results  - Monitor all insertion sites, i e  indwelling lines, tubes, and drains  - Monitor endotracheal (as able) and nasal secretions for changes in amount and color  - [place] appropriate cooling/warming therapies per order  - Administer medications as ordered  - Instruct and encourage patient and family to use good hand hygiene technique  - Identify and instruct in appropriate isolation precautions for identified infection/condition  Outcome: Adequate for Discharge     Problem: SAFETY ADULT  Goal: Maintain or return to baseline ADL function  Description  INTERVENTIONS:  -  Assess patient's ability to carry out ADLs; assess patient's baseline for ADL function and identify physical deficits which impact ability to perform ADLs (bathing, care of mouth/teeth, toileting, grooming, dressing, etc )  - Assess/evaluate cause of self-care deficits   - Assess range of motion  - Assess patient's mobility; develop plan if impaired  - Assess patient's need for assistive devices and provide as appropriate  - Encourage maximum independence but intervene and supervise when necessary  ¯ Involve family in performance of ADLs  ¯ Assess for home care needs following discharge   ¯ Request OT consult to assist with ADL evaluation and planning for discharge  ¯ Provide patient education as appropriate  Outcome: Adequate for Discharge  Goal: Maintain or return mobility status to optimal level  Description  INTERVENTIONS:  - Assess patient's baseline mobility status (ambulation, transfers, stairs, etc )    - Identify cognitive and physical deficits and behaviors that affect mobility  - Identify mobility aids required to assist with transfers and/or ambulation (gait belt, sit-to-stand, lift, walker, cane, etc )  - [place] fall precautions as indicated by assessment  - Record patient progress and toleration of activity level on Mobility SBAR; progress patient to next Phase/Stage  - Instruct patient to call for assistance with activity based on assessment  - Request Rehabilitation consult to assist with strengthening/weightbearing, etc   Outcome: Adequate for Discharge     Problem: DISCHARGE PLANNING  Goal: Discharge to home or other facility with appropriate resources  Description  INTERVENTIONS:  - Identify barriers to discharge w/patient and caregiver  - Arrange for needed discharge resources and transportation as appropriate  - Identify discharge learning needs (meds, wound care, etc )  - Arrange for interpretive services to assist at discharge as needed  - Refer to Case Management Department for coordinating discharge planning if the patient needs post-hospital services based on physician/advanced practitioner order or complex needs related to functional status, cognitive ability, or social support system  Outcome: Adequate for Discharge     Problem: Prexisting or High Potential for Compromised Skin Integrity  Goal: Skin integrity is maintained or improved  Description  INTERVENTIONS:  - Identify patients at risk for skin breakdown  - Assess and monitor skin integrity  - Assess and monitor nutrition and hydration status  - Monitor labs (i e  albumin)  - Assess for incontinence   - Turn and reposition patient  - Assist with mobility/ambulation  - Relieve pressure over bony prominences  - Avoid friction and shearing  - Provide appropriate hygiene as needed including keeping skin clean and dry  - Evaluate need for skin moisturizer/barrier cream  - Collaborate with interdisciplinary team (i e  Nutrition, Rehabilitation, etc )   - Patient/family teaching  Outcome: Adequate for Discharge     Problem: Nutrition/Hydration-ADULT  Goal: Nutrient/Hydration intake appropriate for improving, restoring or maintaining nutritional needs  Description  Monitor and assess patient's nutrition/hydration status for malnutrition (ex- brittle hair, bruises, dry skin, pale skin and conjunctiva, muscle wasting, smooth red tongue, and disorientation)  Collaborate with interdisciplinary team and initiate plan and interventions as ordered  Monitor patient's weight and dietary intake as ordered or per policy  Utilize nutrition screening tool and intervene per policy  Determine patient's food preferences and provide high-protein, high-caloric foods as appropriate       INTERVENTIONS:  - Monitor oral intake, urinary output, labs, and treatment plans  - Assess nutrition and hydration status and recommend course of action  - Evaluate amount of meals eaten  - Assist patient with eating if necessary   - Allow adequate time for meals  - Recommend/ encourage appropriate diets, oral nutritional supplements, and vitamin/mineral supplements  - Order, calculate, and assess calorie counts as needed  - Recommend, monitor, and adjust tube feedings and TPN/PPN based on assessed needs  - Assess need for intravenous fluids  - Provide specific nutrition/hydration education as appropriate  - Include patient/family/caregiver in decisions related to nutrition  Outcome: Adequate for Discharge       Pt will be discharged to home

## 2019-07-22 NOTE — DISCHARGE SUMMARY
Discharge Summary - PMR   Curry Lowery 78 y o  female MRN: [id number]  Unit/Bed#: [facility] 457-01 Encounter: [id number]    Admission Date: 7/3/2019     Discharge Date:     Etiologic/Rehabilitation Diagnosis: Impairment of mobility, safety and Activities of Daily Living (ADLs) due to Stroke:  01 2  Right Body Involvement (Left Brain)    HPI: Curry Lowery is a 78 y o  female who presented to the [facility] with right sided weakness, right facial droop  Imaging subsequently revealed left sided infarcts to the parietal and frontal regions  In addition patient noetd to have ulcerated plaque at the left cervical carotied bifurcation; CTA revealed a right ICA occlusion  Vascular surgery was consulted, and recommendation made for a left CEA, performed on 6/28  During admission patient was noted to have ABLA, for which scope was performed revealing AVMs in the stomach and duodenum; argon plasma coagulation was performed  She was also transfused at the time  Of note, during her acute admission patient had a fall off of comode; CTOH showed right frontal hematoma, but no active bleed  Patient was found to be below functional baseline, and accepted to [facility] on 7/03/19  Procedures Performed During [facility] Admission: none    Acute Rehabilitation Center Course: Patient participated in a comprehensive interdisciplinary inpatient rehabilitation program which included involvment of MD, therapies (PT, OT, and/or SLP), RN, CM, SW, dietary, and psychology services  She was able to be advanced to an overall supervision level of assist and considered safe for discharge home with family  Please see below for patient's day to day management of medical needs        * Impaired mobility and ADLs  Assessment & Plan  · Secondary to recent stroke  · Acute comprehensive interdisciplinary inpatient rehabilitation including PT, OT, SLP, RN, CM, SW, dietary, psychology, etc   · Patient will require supervision/assistance at home due to deficits from recent stroke  Discussed extensively with family; patient is to d/c home with daughter        Symptomatic stenosis of left carotid artery  Assessment & Plan  · S/p CEA on 6/28  · Monitor for hematoma formation, incision care  · F/u vascular as OP    Anemia, unspecified  Assessment & Plan  Results from last 7 days   Lab Units 07/22/19  0458 07/19/19  1019 07/18/19  0541   HEMOGLOBIN g/dL 8 8* 9 5* 8 1*     · Continue ferrous sulfate  · Monitor CBC intermittently  · Transfuse for Hgb <7    · Transusion performed on 7/11, with appropriate response noted  CVA (cerebral vascular accident) [facility])  Assessment & Plan  · ASA, Plavix  · Statin  · Neurology f/u as outpatient      Hyperlipidemia  Assessment & Plan  · Continue statin    Essential hypertension  Assessment & Plan  Temp:  [97 5 °F (36 4 °C)-98 4 °F (36 9 °C)] 97 9 °F (36 6 °C)  HR:  [60-66] 66  Resp:  [16-18] 18  BP: (122-166)/(56-77) 122/56    · Carvedilol  · Hydralazine prn  · IM service managing anti-hypertensives      Discharge Physical Examination:  General: alert, no apparent distress, cooperative and comfortable   HEENT:  Eye: ecchymosis over right eye has resolved  LUNGS:  no abnormal respiratory pattern, no retractions noted, non-labored breathing   ABDOMEN:  soft, non-tender  Bowel sounds normal  No masses, no organomegaly  EXTREMITIES:  extremities normal, warm and well-perfused; no cyanosis, clubbing, or edema  NEURO:   mental status, speech normal, alert and oriented x3  PSYCH:  Alert and oriented, appropriate affect      Significant Findings, Care, Treatment and Services Provided: Acute comprehensive interdisciplinary inpatient rehabilitation including PT, OT, SLP, RN, CM, SW, dietary, psychology, etc     Complications: none    Functional Status Upon Admission to [facility]:  Mobility: min transfers  Transfers: min transfers  ADLs: min transfers    Functional Status Upon Discharge from [facility]:   Physical Therapy Occupational Therapy Speech Therapy   Weight Bearing Status: Full Weight Bearing  Transfers: Supervision  Bed Mobility: Supervision  Amulation Distance (ft): 160 feet  Ambulation: Supervision  Assistive Device for Ambulation: Single Point Cane  Wheelchair Mobility Distance: 0 ft  Number of Stairs: 12  Assistive Device for Stairs: Right Hand Rail  Stair Assistance: Supervision  Discharge Recommendations: Home with:  [address] with[de-identified] Family Support, 24 Hour Supervision, Home Physical Therapy   Eating: Modified Independent  Grooming: Contact Guard  Bathing: Supervision  Bathing: Supervision  Upper Body Dressing: Supervision  Lower Body Dressing: Minimal Assistance  Toileting: Contact Guard  Tub/Shower Transfer: Contact Guard  Toilet Transfer: Contact Guard  Cognition: Exceptions to WNL  Cognition: Decreased Memory, Decreased Executive Functions, Decreased Attention, Decreased Comprehension, Decreased Safety  Orientation: Person, Place, Situation   Mode of Communication: Verbal  Cognition: Exceptions to WNL  Cognition: Decreased Memory, Decreased Executive Functions, Decreased Attention, Decreased Comprehension, Decreased Safety(visuospatial deficits)  Orientation: Person, Place, Time, Situation  Discharge Recommendations: Home with:  [address] with[de-identified] 24 Hour Supervision, 24 Hour Assisteance, Family Support, Outpatient Speech Therapy     Discharge Diagnosis: Impairment of mobility, safety and Activities of Daily Living (ADLs) due to Stroke:  01 2  Right Body Involvement (Left Brain)    Discharge Medications:   See after visit summary for reconciled discharge medications provided to patient and family  Condition at Discharge: stable     Discharge instructions/Information to patient and family:   See after visit summary for information provided to patient and family  Provisions for Follow-Up Care:  See after visit summary for information related to follow-up care and any pertinent home health orders        Future Appointments   Date Time Provider [place]   7/25/2019 12:00 PM Romain Roland, PT [facility] [address]   7/26/2019  1:00 PM Radha Magallon, CCC-SLP [facility] [address]   7/30/2019  3:00 PM Gus Serna, OT BE [facility] [address]     Disposition: Home    Planned Readmission: No    Discharge Statement   I spent more than 30 minutes discharging the patient  This time was spent on the day of discharge  I had direct contact with the patient on the day of discharge  Greater than 50% of the total time was spent examining patient, answering all patient questions, arranging and discussing plan of care with patient as well as directly providing post-discharge instructions  Additional time then spent on discharge activities  Discharge Medications:  See after visit summary for reconciled discharge medications provided to patient and family

## 2019-07-22 NOTE — PROGRESS NOTES
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; s/p left CEA 6/28/19:  continue ASA/Plavix, Lipitor = for home Cards advised change Lipitor to Crestor 40mg daily  DC home today      Iron deficiency anemia; s/p transfusion and iron IV: stable; continue iron PO supplements      AVM stomach/duodenum tx with APC; cold snare sigmoid colon polypectomy: continue PPI     HTN: at home was on Lopressor and now is switched to Coreg and Nifedipine = stable currently and no dose changes for today     RUL ground glass density: for Pulm to see OP     Urethral diverticulum: to see Uro as OP for cystogram     Nicotine abuse: counseled for cessation     Fall 2/2 LOB 6/24/19: CT head was negative; has remaining ecchymosis right forehead/orbit      Subjective/ HPI:  Patients overnight issues or events were reviewed with nursing or staff during rounds or morning huddle session  No new or overnight issues  HTN: Currently well controlled w/o changes needed  CVA: No new stroke symptoms  Anemia: Hgb stable s/p transfusion  Pt currently reports that she is doing well and offers no complaints   She is looking forward to DC     ROS:     GI: denies abdominal pain, change bowel habits or reflux symptoms  Neuro: Denies any headache, new vision changes, new neuropathies,new weaknesses   Respiratory: No Cough, SOB, denies wheeze  Cardiovascular: No CP, palpitations , denies perception of rapid heartbeat  : denies any new urinary burning or frequency    Review of Scheduled Meds:    Current Facility-Administered Medications:  acetaminophen 650 mg Oral Q6H PRN Toro Flood MD   aspirin 81 mg Oral Daily Ed Arreola MD   atorvastatin 80 mg Oral Daily With Dinner Toro Flood MD   bisacodyl 10 mg Rectal Daily PRN Amina TERESA Whipple   calcium carbonate 500 mg Oral Daily PRN Ed Arreola MD   carvedilol 3 125 mg Oral BID With Meals Toro Flood MD   clopidogrel 75 mg Oral Daily Ed Arreola MD   docusate sodium 100 mg Oral BID Amina Whipple PA-C   enoxaparin 40 mg Subcutaneous Q24H [address] Ed Arreola MD   ferrous sulfate 325 mg Oral BID With Meals Ed Arreola MD   melatonin 6 mg Oral HS Ed Arreola MD   NIFEdipine 30 mg Oral Daily Ed Arreola MD   pantoprazole 40 mg Oral Early Morning Ed Arreola MD   polyethylene glycol 17 g Oral Daily PRN Amina Whipple PA-C   senna 2 tablet Oral HS Amina Whipple PA-C       Labs:     Results from last 7 days   Lab Units 07/22/19  0458 07/19/19  1019 07/18/19  0541   WBC Thousand/uL 6 92  --  6 03   HEMOGLOBIN g/dL 8 8* 9 5* 8 1*   HEMATOCRIT % 30 2* 32 5* 27 6*   PLATELETS Thousands/uL 213  --  267     Results from last 7 days   Lab Units 07/22/19  0458 07/18/19  0541   SODIUM mmol/L 146* 145   POTASSIUM mmol/L 3 8 3 7   CHLORIDE mmol/L 114* 114*   CO2 mmol/L 26 26   BUN mg/dL 18 21   CREATININE mg/dL 0 90 0 89   CALCIUM mg/dL 8 6 8 2*                        Imaging:     No orders to display       *Labs reviewed  *Radiology studies reviewed  *Medications reviewed and reconciled as needed  *Please refer to order section for additional ordered labs studies  *Case discussed with primary attending during morning huddle case rounds    Physical Examination:  Vitals:   Vitals:    07/21/19 2033 07/22/19 0529 07/22/19 0600 07/22/19 0824   BP: 166/77 135/64  122/56   BP Location: Left arm Right arm  Right arm   Pulse: 66 62  66   Resp: 18 18     Temp: 97 5 °F (36 4 °C) 97 9 °F (36 6 °C)     TempSrc: Oral Oral     SpO2: 97% 96%     Weight:   77 8 kg (171 lb 8 oz)    Height:           General Appearance: NAD, conversive  Eyes: No icterus; conjunctiva normal  HENT: oropharynx clear; mucous membranes moist  Neck: trachea midline, range of motion full  Lungs: CTA, normal respiratory effort, no retractions, expiratory effort normal  CV: regular rate, no rubs/murmurs/gallops  ABD: soft; NT/ND; +BS  EXT: no edema  Skin: normal turgor, normal texture, no rashes  Psych: affect normal, No anxiety/depression   Neuro: AAOx3:  KAM 5/5          Total time spent: At least 40 minutes, with more than 50% spent counseling/coordinating care  Counseling includes discussion with patient re: progress  and discussion with patient of his/her current medical state/information  Coordination of patient's care was performed in conjunction with primary service  Time invested included review of patient's labs, vitals, and management of their comorbidities with continued monitoring  In addition, this patient was discussed with medical team including physician and advanced extenders  The care of the patient was extensively discussed and appropriate treatment plan was formulated unique for this patient  ** Please Note: Dragon 360 Dictation voice to text software may have been used in the creation of this document   **

## 2019-07-22 NOTE — SPEECH THERAPY NOTE
SLP Discharge Summary    Pt was discharged home w/ family support/supervision on 7/22/19  At time of d/c, pt was able to achieve 4/5 LTG's  Pt completed formalized cognitive linguistic assessment, CLQT+  Overall score when compared to age matched peers between 79 - 80 yrs  Score was 1 8 out of 4 0, which indicated cognitive skills to be moderately impaired  All tasks from assessment which pt demonstrated most difficulty in completing were visual perceptual in nature  Pt inconsistently demonstrated R inattention at times vs then noting L inattention to complete some activities  Pt also noted to not be able to correct or verbalize what was not "right" especially when completing the clock drawing  Pt knew that hands needed to be drawn onto the clock, but due to how the numbers were placed inside the [place] (all numbers on the R hand side), unable to determine how the hands would be placed onto the clock  Additional barriers noted while on the rehab were R inattention, decreased insight to deficits, decreased STM recall, decreased comprehension, decreased carryover of new learning skills, decreased executive function skills (higher level sequencing, functional problem solving- cooking tasks, medication management, reasoning, safety awareness, etc)  Pt at times is able to verbalize when a task isn't going "right" but ability to self correct or complete task again w/o making same errors was decreased, where pt does require visual cues  Pt also will "push" self despite fatigue from additional therapies, where pt requires education to take breaks and allow rest between sessions  Pt continued to demonstrate need for assistance when engaging in any cooking tasks, completing money management and medication management at this time, but is motivated to continue to improve skills   At this time, pt would require 24/7 supervision at time of d/c due to noted safety awareness when completing cooking tasks using stove top and due to inattention to RUE, primarily R hand, increases risk of burning hand due decreased awareness  At time of d/c, will recommend to continue OP SLP services at this time to maximize functional independence given cognitive skills

## 2019-07-22 NOTE — DISCHARGE INSTRUCTIONS
[facility] Discharge Instructions    Please note you are restricted from driving/operating a motorized vehicle/operating heavy machinery/etc until you are cleared by your doctors or through a formal driving evaluation  This service is offered through [employer] driving evaluation program on [address] however this evaluation can be done at a site of your choosing  Please contact your family doctor for a referral when your family doctor clears you to perform this evaluation once your neurologic/physical deficits have stabilized  Please see your doctors listed in the follow up providers section of your discharge paperwork, and take the discharge paperwork with you to your appointments    Please note changes may have been made to your medications please refer to your discharge paperwork for your current medications and take this list with you to all your doctors appointments for your doctors to review    Please do not resume a home medication unless the medication reconciliation sheet indicates to do so, please do not assume that a medication that you were given a prescription for is the same as a medication you have at home based on both medications having the same name as dosages and frequency may have changed  Prior to your discharge from the hospital, your nurse has reviewed: your medications, when to take them, how to take them, what they are for, how much to take, and when your next dose is due; please follow these instructions as directed       Please check your blood pressure prior to taking your blood pressure medications and 1 hour after, please contact your family doctor or cardiologist immediately for a blood pressure below 100/60 and do not take your blood pressure medications until speaking with them, please contact your family doctor or cardiologist immediately for blood pressure greater than 160/100    Unless specifically noted in your medication list provided to you in your discharge paper work, please discuss with your family doctor prior to resuming any vitamins/minerals/supplements you may have been taking prior to your hospitalization     Please note a summary of your hospital stay with relevant information for your doctors has been sent to them, please confirm with your doctors at your follow up visits that they have received this summary and have them contact [address] [place] [address] if they have not received them along with any other medical records they may require

## 2019-07-22 NOTE — NURSING NOTE
AVS reviewed with patient and daughter  All questions answered  Pt requested prescriptions to take to home pharmacy  Pt discharged to home

## 2019-07-22 NOTE — PHYSICAL THERAPY NOTE
[facility] PT Discharge Summary  Pt demonstrated good progress in PT from requiring min A on eval using RW to achieving S goals for transfers and amb task using a SPC on d/c  Due to ongoing strength deficits, impaired standing dynamic balance, R side inattention with impaired coordination, dec cognition and fear of falling pt continues to require CGA when ascending/descending FF with 1 HR or curb with SPC  Although family education/training provided prior to d/c with good understanding of rationale of current recommendation for pt to have S/assist for mobilities and ADL's using AD  Pt to [facility] as was discussed during family training/education  Pt was d/c on 7/22/2019 to dtr's home with family support/assist and outpatient PT services to address ongoing rehab needs and maximize recovery and independence s/p CVA

## 2019-07-23 ENCOUNTER — TELEPHONE (OUTPATIENT)
Dept: GASTROENTEROLOGY | Facility: CLINIC | Age: 79
End: 2019-07-23

## 2019-07-23 NOTE — TELEPHONE ENCOUNTER
[address] [facility] [address] [place] [address] pt to schedule - lm    Previous Messages      ----- Message -----   From: Tapan Riggs RN   Sent: 7/22/2019   9:49 AM EDT   To: [facility] Call [facility]   Subject: FW: Post-Hospital Follow-up                           ----- Message -----   From: Susi Rebollar MD   Sent: 7/22/2019   9:47 AM EDT   To: [facility] Clerical, *   Subject: Post-Hospital Follow-up                           Please call patient to schedule a post-hospital follow-up appointment in Vascular Surgery clinic   Diagnosis: s/p CEA

## 2019-07-23 NOTE — CASE MANAGEMENT
Team dc summary - pt made good progress and returned home with family and contd outpt pt ot and slp services at [facility] [address]  Family scheduled appmts on their own  No dme needs identified on dc  Family present for dc instructions and aware of pts functional ability

## 2019-07-24 ENCOUNTER — TELEPHONE (OUTPATIENT)
Dept: NEUROLOGY | Facility: CLINIC | Age: 79
End: 2019-07-24

## 2019-07-24 NOTE — TELEPHONE ENCOUNTER
----- Message from Lorraine Estrada PA-C sent at 6/21/2019  3:09 PM EDT -----  Regarding: HFU    Diagnosis/Reason for follow-up: Stroke, secondary to left ICA stenosis   Subspecialty for follow-up: Stroke  Attending or AP?: Dr Chanell Chu neurologist: Has seen Dr Saurabh Inman in hospital   Tests/Labs/Imaging ordered: None

## 2019-07-24 NOTE — OCCUPATIONAL THERAPY NOTE
Occupational Therapy Discharge Summary    Pt participated well while in Acute Rehab and demo G fxnl progress and gains s/p CVA  Pt at time of d/c demonstrated need for 24/7 S and A with IADL fxn  Pt d/c to her dtrs home with 24/7 S and A provided by her dtr, son in law, and 2 other local dtrs  Pt and family participated in multiple sessions of FT and education to provide appropriate d/c needs  Pt continued to demo deficits in R attention, R UE Fxnl FM/GM coordination and strength, apraxia, dec balance, impaired fxnl cognition/safety, and dec insight into deficits  Pt overall S with use of SPC for fxnl mob and ADL tasks  OP OT recommended with DME of shower chair for family to purchase indep

## 2019-07-25 ENCOUNTER — EVALUATION (OUTPATIENT)
Dept: PHYSICAL THERAPY | Facility: CLINIC | Age: 79
End: 2019-07-25
Payer: COMMERCIAL

## 2019-07-25 DIAGNOSIS — Z78.9 IMPAIRED MOBILITY AND ADLS: Primary | ICD-10-CM

## 2019-07-25 DIAGNOSIS — Z74.09 IMPAIRED MOBILITY AND ADLS: Primary | ICD-10-CM

## 2019-07-25 PROCEDURE — 97162 PT EVAL MOD COMPLEX 30 MIN: CPT | Performed by: PHYSICAL THERAPIST

## 2019-07-25 NOTE — PROGRESS NOTES
PT Evaluation     Today's date: 2019  Patient name: Marga Aldridge  : 1940  MRN: [id number]  Referring provider: Shannon Blake MD  Dx:   Encounter Diagnosis     ICD-10-CM    1  Impaired mobility and ADLs Z74 [id number]                   Assessment  Assessment details: Pt presents to outpatient physical therapy s/p CVA  At this time patient has decreased right lower extremity strength, sensation, proprioception, and coordination causing her to have difficulty with balance and is at a hihg risk of falls per testing, has decreased gait efficiency and endurance, which causes her to have difficulty safely completing community activities and completing IADLs independently  Pt will benefit from skilled therapy intervention 2x/week for 8-12 weeks in order to improve strength, coordination, balance, endurance, gait and overall independence  Understanding of Dx/Px/POC: good   Prognosis: good    Goals  STG  Pt will improve 5x sit to stand to 13 seconds within 4 weeks  Pt will improve 6 MWT to 700 ft wihtin 4 weeks  Pt will improve R Hip abduction strength to 4/5 within 4 weeks    LTG  Pt will be able to ambulate without SPC within 8-12 weeks  Pt will report no near falls within 8-12 weeks  Pt will complete 6 MWT in 900 ft within 8-12 weeks    Plan  Patient would benefit from: skilled physical therapy, OT eval and speech eval  Planned therapy interventions: neuromuscular re-education, balance, home exercise program, therapeutic exercise and gait training  Frequency: 2x week  Duration in weeks: 12  Plan of Care beginning date: 2019  Plan of Care expiration date: 10/25/2019  Treatment plan discussed with: patient        Subjective Evaluation    History of Present Illness  Mechanism of injury: Pt was hospitalized due to having a CVA for 35 days  She was d/c home on   She has difficulty with her right UE/LE with weakness and loss of coordination  Pt is walkign with [facility] household distances   Minimal assistance for ADLs at this time, dependent with IADLs     Pain  No pain reported    Social Support  Steps to enter house: yes  3  Stairs in house: yes   12  Lives in: apartment  Lives with: adult children (currently living with daughter but would like to return to living in apartment )    Hand dominance: right  Exercise history: chair exercise     Treatments  Previous treatment: physical therapy and occupational therapy  Patient Goals  Patient goals for therapy: improved balance  Patient goal: would like to go home to her own apartment        Objective     Strength/Myotome Testing     Left Hip   Planes of Motion   Flexion: 3+  Extension: 3  Abduction: 4  External rotation: 4    Right Hip   Planes of Motion   Flexion: 3+  Extension: 3+  Abduction: 3+  External rotation: 3    Left Knee   Flexion: 4  Extension: 4    Right Knee   Flexion: 4  Extension: 4    Left Ankle/Foot   Dorsiflexion: 3+  Plantar flexion: 3+    Right Ankle/Foot   Dorsiflexion: 3+  Plantar flexion: 3    Ambulation   Weight-Bearing Status   Assistive device used: cane  Neuro Exam:     Sensation   Light touch LE: right impaired  Light touch LE: left WNL  Proprioception LE: right impaired  Proprioception LE: left WNL    Functional outcomes   6 minute walk test: 525 ft spc  5x sit to stand: 20sec no ue (seconds)  TUG: 15 28 SPC (seconds)             Precautions: fall risk    Short Term Goal Expiration Date:(8/25/2019)  Long Term Goal Expiration Date: (10/25/2019)  POC Expiration Date: (10/25/2019)          Manual                                                     Exercise Diary                                                                                                                                                                             Modalities

## 2019-07-26 ENCOUNTER — EVALUATION (OUTPATIENT)
Dept: SPEECH THERAPY | Facility: CLINIC | Age: 79
End: 2019-07-26
Payer: COMMERCIAL

## 2019-07-26 DIAGNOSIS — R48.8 OTHER SYMBOLIC DYSFUNCTIONS: Primary | ICD-10-CM

## 2019-07-26 DIAGNOSIS — R47.01 ANOMIC APHASIA: ICD-10-CM

## 2019-07-26 DIAGNOSIS — I63.9 CEREBROVASCULAR ACCIDENT (CVA), UNSPECIFIED MECHANISM (HCC): ICD-10-CM

## 2019-07-26 PROCEDURE — 96125 COGNITIVE TEST BY HC PRO: CPT

## 2019-07-26 NOTE — PROGRESS NOTES
Speech-Language Pathology Initial Evaluation    Today's date: 2019  Patients name: Vaughn Shaffer  : 1940  MRN: [id number]  Safety measures: fall risk, CVA  Referring provider: Yadiel Hayes MD     Subjective comments: "If I take my time I can remember the words"    How did the patient hear about us? Physician    Patient's goal(s): "Talk like I used to"    Reason for referral: Change in cognitive status and Decreased language skills  Prior functional status: Communication effective and appropriate in all situations  Clinically complex situations: Discharge from SNF or Hospital in the last 30 days    History: 78 y o  female who initially presented to the [facility] on  with right sided weakness and right facial droop  Imaging subsequently revealed left sided infarcts to the parietal and frontal regions  In addition patient noted to have ulcerated plaque at the left cervical carotid bifurcation; CTA revealed a right ICA occlusion  Vascular surgery was consulted, and recommendation made for a left CEA, performed on   During admission patient was noted to have ABLA, for which scope was performed revealing AVMs in the stomach and duodenum; argon plasma coagulation was performed  She was also transfused at the time  Of note, during her acute admission patient had a fall off of commode; CTOH showed right frontal hematoma, but no active bleed  Patient was found to be below functional baseline, and accepted to [facility] on 19  During her stay in the [facility] patient participated in a comprehensive interdisciplinary inpatient rehabilitation program which included involvement  of MD, therapies (PT, OT, and/or SLP), RN, CM, SW, dietary, and psychology services  She was able to be advanced to an overall supervision level of assist and considered safe for discharge home with family   Today she reports for OP ST for evaluation of cognitive-linguistic skills to continue with POC (see below)  Patient denies dysphagia or dysarthria  6/19/19: L MCA CVA 2* to L ICA occlusion    7/5/19:  (CLQT testing in [facility]): Overall score when compared to age matched peers between 79 - 80 yrs  Score was 1 8 out of 4 0, which indicates cognitive skills to be moderately impaired  The following cognitive domain scores are as follows: Attention: score of 18, indicating severe impairment; Memory: score of 120, indicating mild impairment; Executive Functions: score of 6, indicating severke impairment; Language: score of 27, indicating mild impairment; Visuospatial Skills: score of 14, indicating severe impairment; Clock Drawing: score of 4, indicating severe impairment  Of note, pt was below criterion cutoff scores on 6 out of 10 tasks completed during this assesssment  Of note,all tasks which pt demonstrated most difficulty in completing were visual perceptual in nature  Pt inconsistently demonstrated R inattention at times vs then noting L inattention to complete some activities  Pt also noted to not be able to correct or verbalize what was not "right" especially when completing the clock drawing  Pt knew that hands needed to be drawn onto the clock, but due to how the numbers were placed inside the [employer] (all numbers on the R hand side), unable to determine how the hands would be placed onto the clock  7/22/19: Patient discharged home  At time of d/c, will recommend to continue OP SLP services at this time to maximize functional independence given cognitive skills  Hearing: WFL  Vision: with glasses    Home environment/lifestyle: Patient currently lives with her daughter Eleazar Oconnell with plans to return home to her apartment    Highest level of education: dnt  Vocational status: retired    Mental status: Alert  Behavior status: Cooperative  Communication modalities: Verbal  Rehabilitation prognosis: Excellent rehab potential to reach and maintain prior level of function    Assessments    The Repeatable Battery for the Assessment of Neuropsychological Status (RBANS) is a brief, individually-administered assessment which measures attention, language, visuospatial/constructional abilities, and immediate & delayed memory  The RBANS is intended for use with adolescents to adults, ages 15 to 80 years  The following results were obtained during the administration of the assessment  Form: C    Cognitive Domain/Subtest: Index Score: Percentile Rank: Classification:   IMMEDIATE MEMORY 78 9%ile Borderline        1  List Learning (21/40)        2  Story Memory (10/24)       VISUOSPATIAL/  CONSTRUCTIONAL 50 < 1%ile Extremely Low        3  Figure Copy (9/20)        4  Line Orientation (5/20)       LANGUAGE 68 2%ile Extremely Low        5  Picture Naming (7/10)        6  Semantic Fluency (14/40)       ATTENTION 75 2%ile Borderline        7  Digit Span (10/16)        8  Coding (16/89)       DELAYED MEMORY 85 16%ile Low Average        9  List Recall (1/10)        10  List Recognition (19/20)        11  Story Recall (6/12)        12  Figure Recall (3/20)         Sum of Index Scores:  356   Total Score:  64   Percentile: 1%ile   Classification: Extremely Low       *Patient named 10 concrete category members (animals) in 60 sec (norm=15+)  -- BELOW AVERAGE    *Patient named 5 abstract category members (words beginning with letter 'm') in 60 sec (norm=10+)  -- BELOW AVERAGE       The [employer] Edition (BNT-2) is a confrontational naming assessment that asks patients to provide the best name for a given picture  It was designed to detect word-finding impairments  The BNT-2 consists of 60 pictures, ordered from easiest to most difficult  Stimulus cues, including semantic, phonemic, and written information, are provided as necessary  The following results were obtained during the administration of the assessment:     Summary of Scores: Score:   1  Number of spontaneously given correct response: 44       2   Number of stimulus cues given:  16   3  Number of correct responses following a stimulus cue:  7       *TOTAL SCORE: 51   Percentile Rank:  78%ile       Additional Cuein  Number of phonemic cues: 9   5  Number of correct responses following the phonemic cue: 7       6  Number of multiple choices given:  2   7  Number of correct choices: 2           Goals  Short-term goals:  1  Patient will complete thought organization tasks (e g , sequencing, deduction puzzles, etc ) with 80% accuracy to facilitate increased executive functioning skills, to be achieved in 4-6 weeks  2  Patient will complete auditory immediate and short term memory tasks to 80% accuracy to facilitate increased ability to retell narratives and recall information within functional living environment, to be achieved in 4-6 weeks    3  Patient will be educated on word finding strategies (i e , circumlocution) for improved generative naming and verbal expression skills, to be achieved in 4-6 weeks  4  Patient will complete concrete and abstract categorization tasks to 80% accuracy to facilitate improved generative naming skills and working memory, to be achieved in 4-6 weeks    5  To target mental manipulation and working memory, patient will participate in word finding activity (i e , anagrams) with 80% accuracy, to be achieved in 4-6 weeks  6  Patient will name an average of 15+ items in a category in 60 seconds over 5 trials using compensatory strategies and min cues to facilitate improved word retrieval skills, to be achieved in 4-6 weeks  7  Patient will name an average of 10+ words that begin with a specific letter in 60 seconds over 5 trials using compensatory strategies and min cues to facilitate improved word retrieval skills, to be achieved in 4-6 weeks  Long-term goals: 1  Patient will demonstrate cognitive-communication skills consistent with age and education given use of compensatory strategies when needed to resume baseline activities and responsibilities in home, community, and work/school settings by discharge  2  Patient will develop functional, cognitive-linguistic based skills and utilize compensatory strategies as needed to communicate effectively to different conversational partners, maintain safety, and participate socially by discharge  3  Patient will demonstrate adequate verbal expression during conversation without breakdowns or word finding deficits by discharge  Impressions/Recommendations    Impressions: Patient presents with moderate cognitive-linguistic deficits c/b reduced wording finding, generative naming, attention, and visuospatial skills as well as reduced immediate and delayed recall  It is suspected that the patients scores in visuospatial/constructional and attention may have been impacted by her vision and impaired fine motor skills therefore those areas will be assessed by OT and may be added to ST goals at a later date  Patient was however noted to be impulsive today during treatment tasks  ST to target language, thought organization/processing as well as immediate and delayed recall  Recommendations:  -Patient would benefit from outpatient skilled Speech Therapy services : Speech/ language therapy and Cognitive-Linguistic therapy    -Frequency: 2x weekly  -Duration: 4-6 weeks    -Intervention certification from: 8/22/9239  -Intervention certification to: 3/40/9686    -Intervention comments: Test Administration conducted from 1-1:50p; Scoring and Interpretation completed from 1:50-2:50p    Visit Tracking:  -Referring provider: Epic  -Billing guidelines: CMS  -Visit #1  -Angel Luis Vann     -RE due 8/23/19

## 2019-07-29 ENCOUNTER — TELEPHONE (OUTPATIENT)
Dept: NEUROLOGY | Facility: CLINIC | Age: 79
End: 2019-07-29

## 2019-07-29 NOTE — TELEPHONE ENCOUNTER
The purpose of this phone call is to assess patient's general wellbeing or for any assistance needed with follow-up care  Patient needs stroke hospital follow up appointment scheduled  Called patient with no answer  Left message on voicemail box for call back

## 2019-07-30 ENCOUNTER — EVALUATION (OUTPATIENT)
Dept: OCCUPATIONAL THERAPY | Facility: CLINIC | Age: 79
End: 2019-07-30
Payer: COMMERCIAL

## 2019-07-30 ENCOUNTER — OFFICE VISIT (OUTPATIENT)
Dept: PHYSICAL THERAPY | Facility: CLINIC | Age: 79
End: 2019-07-30
Payer: COMMERCIAL

## 2019-07-30 ENCOUNTER — OFFICE VISIT (OUTPATIENT)
Dept: SPEECH THERAPY | Facility: CLINIC | Age: 79
End: 2019-07-30
Payer: COMMERCIAL

## 2019-07-30 DIAGNOSIS — Z78.9 IMPAIRED MOBILITY AND ADLS: Primary | ICD-10-CM

## 2019-07-30 DIAGNOSIS — I63.9 CEREBROVASCULAR ACCIDENT (CVA), UNSPECIFIED MECHANISM (HCC): Primary | ICD-10-CM

## 2019-07-30 DIAGNOSIS — R48.8 OTHER SYMBOLIC DYSFUNCTIONS: Primary | ICD-10-CM

## 2019-07-30 DIAGNOSIS — Z74.09 IMPAIRED MOBILITY AND ADLS: Primary | ICD-10-CM

## 2019-07-30 DIAGNOSIS — I63.9 CEREBROVASCULAR ACCIDENT (CVA), UNSPECIFIED MECHANISM (HCC): ICD-10-CM

## 2019-07-30 DIAGNOSIS — R47.01 ANOMIC APHASIA: ICD-10-CM

## 2019-07-30 PROCEDURE — 97166 OT EVAL MOD COMPLEX 45 MIN: CPT | Performed by: OCCUPATIONAL THERAPIST

## 2019-07-30 PROCEDURE — 97112 NEUROMUSCULAR REEDUCATION: CPT | Performed by: PHYSICAL THERAPIST

## 2019-07-30 PROCEDURE — 97150 GROUP THERAPEUTIC PROCEDURES: CPT | Performed by: PHYSICAL THERAPIST

## 2019-07-30 PROCEDURE — 92507 TX SP LANG VOICE COMM INDIV: CPT

## 2019-07-30 NOTE — PROGRESS NOTES
OCCUPATIONAL THERAPY INITIAL EVALUATION:    07/30/2019  Efrain Sandoval  1940  [id number]  Randee Foster MD  No diagnosis found  Subjective    "I can't see with my L eye "    PATIENT GOAL: "To get well and to go home to my apartment "    HISTORY OF PRESENT ILLNESS:   Pt is a 78 y o  female who was referred to Occupational Therapy s/p acute left MCA stroke  As per hospital report, Pt was admitted on 6/19/2019 due to right hand weakness and right-sided facial droop  Imaging (MRI) revealed left sided infarcts to the parietal and frontal regions of the brain  In addition Pt presented with ulcerated plaque at the L cervical carotid bifurcation; CTA revealed a right ICA occlusion  Vascular surgery was consulted, and recommendation made for a left CEA, performed on 8/40 without complication  During admission Pt was noted to have ABLA, for which scope was performed revealing AVMs in the stomach and duodenum; argon plasma coagulation was performed  Pt was also transfused at the time  During her acute admission, Pt had a fall off of comode; CTOH showed right frontal hematoma, but no active bleed  Pt received OT, PT, and SLP while at [facility]  Pt was discharge from hospital on 07/22/2019 with supervision/assistance at home  Pt presents with RUE weakness proximal to distal, decreased FMC/prehension, decreased in-hand manipulation, R inattention, R apraxia, decreased direction following abilities, and difficulties with shoe tying and handwriting  Pt lives on the first floor of a two story home with her daughter, son-in-law and grandchildren  Pt wants to return to [facility], where she resides on the fifth floor---elevator accessible for use  Pt currently performing ADLs with supervision  Pt currently does not perform IADLs  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  Pt loss role of -  Pt relies on her daughter for transportation at this time        PMH:   Past Medical History:   Diagnosis Date  CVA (cerebral vascular accident) ([facility] [address] )     Hypertension          Pain Levels:     Restin    With Activity:  0    Objective    Impairment Observations:  1  RUE weakness proximal to distal  2  Decreased R FMC/prehension  3  Decreased R in-hand manipulation  4  R inattention  5  R apraxia  6  Decreased B/L coordination  7  Decreased direction following abilities    Referred to SLP      Dynamometer Position #2:   R: 50  L: 45    R hand dominant      Action:  3 point pinch: R: 12 and L: 9  2 point pinch: R: 10 and L: 10  Lateral pinch: R: 11 and L:10    9-hole Peg Test: RUE: 1 minute and 8 seconds  seconds, LUE: 26 46 seconds    Functional Dexterity Test: RUE: 1 minute and 46 seconds, LUE: 35 10 seconds x1 droppage    Myofilaments: unable to test 2* cognitive deficits    B/L AROM:  Shoulder elevation: B/L full  Shoulder FF: B/L full  Shoulder ABD: B/L full  ER/IR: B/L full  Elbow ext/flex: B/L full  Sup: B/L near full  Pron: B/L  full   Wrist flex: B/L full  Wrist Ext: B/L neutral  Composite: B/L full  Hook: B/L full  Opposition: R: apraxia with increased time required and with poor coordination L: intact  Finger to nose: B/L impaired   Dysdiadochokinesia: R impaired evident of apraxia, L intact    MMT: RUE forward flexion 5/5, shoulder abduction 5/5, elbow flexion/extension 5/5, wrist extension/flexion 5/5, remainder unable to assess 2* apraxia,  LUE: 5/5      Vision Screen: glasses donned  Visual acuity, near: R: 20/50 L unable to assess  Binocularity, far: orthotropia/orthophoria  Binocularity, near: orthotropia/orthphoria  Red/Green fusion: diplopia with no fusion  Convergence: decreased teaming in B/L eyes L>R  Quenten Manners string: misalignment  Pursuits: slightly jerky in all planes  Saccades: inaccurate in all planes with dysmetria (undershooting)  full Range of Motion  Visual perceptual midline: shifted L of midline and above horizon      Assessment/Plan    Skilled Analysis:  Pt is a 78 y o  female referred to Occupational Therapy s/p acute left MCA stroke  Pt presents with RUE weakness proximal to distal, dcecreased R FMC/prehension, decreased R in-hand manipulation, R inattention, decreased B/L coordination, and R apraxia  Pt will benefit from skilled Occupational Therapy services 2x/week for 8-10 weeks with focus on neuro re-ed, self-care management, improved motor planning, NMES/FES, and HEP to improve on the above deficits, improve functional use of RUE in meaningful occupations, resume R hand dominance with daily life tasks, and enhance overall QOL       Short Term Goals:   Pt will increase R prehension patterns for improved tripod with utensil management with <20% droppage 4 weeks   Pt will demo with G carryover of Home Exercise Program to improve functional progression towards goals in Plan of care and for improved functional use of RUE 4 weeks   Pt will increase automaticity of RUE with improved motor planning abilities of RUE x 25% for improved grasp release of tabletop items for improved functional performance with salient tasks 4 weeks   Pt will increase rate of manipulation for all R FM tests for improved functional performance with salient tasks 4 weeks   Pt will increase RUE to refined functional assist with <20% cuing for tabletop tasks for improved functional performance of life roles and salient tasks 4 weeks    - Pt will increase oculomotor control for improved saccades, con/divergent tasks for improved reading, board to table tasks with minimal increase in symptoms 4 weeks   -Pt will demo with improved attention to RUE x 50% for improved safety awareness and improved functional use 4 weeks           -  Pt will tolerate R NMES/FES for improved motor and sensory performance for overall improved hand to target with 50% accuracy 4 weeks    Long Term Goals:   -Pt will increase automaticity of RUE with improved motor planning abilities of LUE x 50% for improved  grasp release of tabletop items for improved functional performance with salient tasks 4 weeks   - Pt will increase rate of prehension for R FM tasks for improved use of utensils, writing, ADL fasteners   Pt will resume R hand dominance to Mod I with all activities of daily living, IADLs, and meaningful occupations   Pt will increase LUE strength to 5/5 and  strength R=L, through the use of strengthening exercises for improved functional performance with meaningful occupations and life roles   -Pt will demo with improved attention to RUE x 75% for improved safety awareness and improved functional  use of RUE  - Pt will increase oculomotor control for improved dynamic activities with head turns, board/screen to table tasks symptom free, improved VMI and return to baseline handwriting   - Pt will increase oculomotor control for WNL saccades, con/divergent tasks symptom free for overall enhanced QOL         -  Pt will tolerate R NMES/FES for improved motor and sensory performance for overall improved hand to target with 75% accuracy       OTHER PLANNED THERAPY INTERVENTIONS:   Supine, seated, and in stance neuro re-ed  NMES  FMC/prehension  Timed Trials  Manual tx  Motor planning  Hand to target  B/L integration  Seated functional reach: crossing midline  Supine place and hold  WBearing strategies          INTERVENTION COMMENTS:  Diagnosis: CVA  Precautions: HTN, fall risk  FOTO: N/A  Insurance: Payor: Roopa Gibbs [employer] REP / Plan: Rakan Sullivan PFFS [address] REP / Product Type: Medicare PPO /   1 of ____ visits, PN due 08/30/2019    IE by SIENNA Sanford under supervision of Rosa Okeefe MS, OTR/L

## 2019-07-30 NOTE — TELEPHONE ENCOUNTER
Daughter Patricia Mckinley returned my call  Since discharge, she has not experienced any new or worsening stroke symptoms  Patricia Mckinley does not live with patient, states her sister lives with patient and assists with care  Patricia Mckinley makes appointments and provides transportation  States patient requires assistance with washing, making meals, and managing her medications  She is able to dress and feed herself  She is in the process of acquiring a shower chair for the patient  Patient ambulates with a cane at this time and is seen by outpatient PT, OT, and ST twice a week  Patricia Mckinley has not made an appointment to follow up with PCP at this time  I reminded her to do so  I scheduled stroke hospital follow up appointment 9/20 with Dr Kailyn Michel 9/20 at 12:00 pm in [place]  She requesting appointment card  Mailed to home address on file  States there have not been any changes in in medications since discharge  she had no difficulties obtaining medications  Reports taking all as prescribed with no missed doses or medication side effects  I reviewed stroke symptoms and risk factor management with her  she verbalizes understanding of information  States sister monitors patient's BP at home, she is unsure of patient's average  she is a non smoker  No specific diet  Daughter Patricia Mckinley does not have any questions or concerns at this time

## 2019-07-30 NOTE — PROGRESS NOTES
Daily Speech Treatment Note    Today's date: 2019  Patients name: Hurshel Fabry  : 1940  MRN: [id number]  Safety measures: fall risk, CVA  Referring provider: Kamlesh Fernandez MD    Primary Diagnosis/Billing code: T84 2  Secondary Diagnosis/ Billing code: I63 9, R47 01    Visit Tracking:  -Referring provider: Epic  -Billing guidelines: CMS  -Visit #2  -Michelle [place]  -RE due 19    Subjective/Behavioral:  -"I'm good"    Objective/Assessment:  -Reviewed testing results and goals in plan care with patient  Patient is in agreement at this time  Discussed POC with evaluating OT today; who reports they will not be able to tackle any of the cognitive (ex  Functioning, attention) and will be focusing on motor  ALL cog goals with be targeted in speech  Short-term goals:  1  Patient will complete thought organization tasks (e g , sequencing, deduction puzzles, etc ) with 80% accuracy to facilitate increased executive functioning skills, to be achieved in 4-6 weeks  2  Patient will complete auditory immediate and short term memory tasks to 80% accuracy to facilitate increased ability to retell narratives and recall information within functional living environment, to be achieved in 4-6 weeks    3  Patient will be educated on word finding strategies (i e , circumlocution) for improved generative naming and verbal expression skills, to be achieved in 4-6 weeks  To target semantic association and lexicon building, patient asked to name an opposite/antonym and synonym of an abstract word (i e , rigid)  Opposite naming completed  opp, increased to  with verbal cues  Synonym naming completed  opp, increased to  with verbal cues      4  Patient will complete concrete and abstract categorization tasks to 80% accuracy to facilitate improved generative naming skills and working memory, to be achieved in 4-6 weeks    Fill in blanks with recall: Patient was asked to fill in missing letters within a 10 word list of words within a category (i e , fruits; o_an_e (orange))  Patient completed ID of words in 17/20 opp  Patient was then educated on "chunking" memory strategy, and asked to use this strategy to study or memorize the two lists of words  Patient was provided with min-mod cues to create groups/chunking of words  Immediate recall completed in two separate trials with 9/10 opp with first set, and 6/10 with other set  Delayed recall overall in 10/20 opp  To target generative naming with initial letter and category provided, patient completed category members acitvity (i e , SPORT, begins with /g/)  Patient appropriately named 29/32 words; increasing to 32/32 with verbal cues  5  To target mental manipulation and working memory, patient will participate in word finding activity (i e , anagrams) with 80% accuracy, to be achieved in 4-6 weeks  6  Patient will name an average of 15+ items in a category in 60 seconds over 5 trials using compensatory strategies and min cues to facilitate improved word retrieval skills, to be achieved in 4-6 weeks  7  Patient will name an average of 10+ words that begin with a specific letter in 60 seconds over 5 trials using compensatory strategies and min cues to facilitate improved word retrieval skills, to be achieved in 4-6 weeks  Plan:  -Continue with current plan of care

## 2019-07-31 NOTE — PROGRESS NOTES
Daily Note     Today's date: 2019  Patient name: Lucille Engel  : 1940  MRN: [id number]  Referring provider: Sudhakar Monge MD  Dx:   Encounter Diagnosis     ICD-10-CM    1  Impaired mobility and ADLs Z74 09                   Subjective:Feeling well today      Objective: See treatment diary below      Assessment: Patient had significant difficulty following directions for exercises due to apraxia  Pt was issued HEP this session and reviewed  Plan to review all exercises once again at next session as well  Plan: Continue per plan of care        Precautions: fall risk    Short Term Goal Expiration Date:(2019)  Long Term Goal Expiration Date: (10/25/2019)  POC Expiration Date: (10/25/2019)          Manual                                                     Exercise Diary         sts 3x10       Side stepping 3 laps no UE       Hip flex 30x       Hip ext 30x ea       Heel raise 30x       Toe raise 30x       Step ups Fwd/lat 8" 2x10                                                                                                                   Modalities

## 2019-08-02 ENCOUNTER — OFFICE VISIT (OUTPATIENT)
Dept: PHYSICAL THERAPY | Facility: CLINIC | Age: 79
End: 2019-08-02
Payer: COMMERCIAL

## 2019-08-02 ENCOUNTER — OFFICE VISIT (OUTPATIENT)
Dept: OCCUPATIONAL THERAPY | Facility: CLINIC | Age: 79
End: 2019-08-02
Payer: COMMERCIAL

## 2019-08-02 DIAGNOSIS — I63.9 CEREBROVASCULAR ACCIDENT (CVA), UNSPECIFIED MECHANISM (HCC): Primary | ICD-10-CM

## 2019-08-02 DIAGNOSIS — Z78.9 IMPAIRED MOBILITY AND ADLS: Primary | ICD-10-CM

## 2019-08-02 DIAGNOSIS — Z74.09 IMPAIRED MOBILITY AND ADLS: Primary | ICD-10-CM

## 2019-08-02 PROCEDURE — 97112 NEUROMUSCULAR REEDUCATION: CPT | Performed by: PHYSICAL THERAPIST

## 2019-08-02 PROCEDURE — 97530 THERAPEUTIC ACTIVITIES: CPT

## 2019-08-02 PROCEDURE — 97535 SELF CARE MNGMENT TRAINING: CPT

## 2019-08-02 PROCEDURE — 97110 THERAPEUTIC EXERCISES: CPT | Performed by: PHYSICAL THERAPIST

## 2019-08-02 PROCEDURE — 97150 GROUP THERAPEUTIC PROCEDURES: CPT | Performed by: PHYSICAL THERAPIST

## 2019-08-02 NOTE — PROGRESS NOTES
Occupational Therapy Daily Note:    Today's date: 2019  Patient name: Radha King  : 1940  MRN: [id number]  Referring provider: Nancy Aguila MD  Dx:   Encounter Diagnosis   Name Primary?  Cerebrovascular accident (CVA), unspecified mechanism ([facility] [address] ) Yes         Subjective: "I'm doing wonderful today"    Objective: Pt seen for OT treatment session  Pt engaged in multi matrix, only using RUE for visual clutter, visual closure, saccadic movements and convergence/divergence w/ 35% droppage noted  OTS placed all cubes and cards on R side of body Pt required max verbal cues for correct placement, usage of RUE and reminder of what to look for  Pt engaged in ADL board for coordination, strength, FMC/FMS and in hand manipulation  Pt required max verbal cues to continue  Assessment: Tolerated treatment well  Patient would benefit from continued skilled OT  Plan: Continued skilled OT per POC  INTERVENTION COMMENTS:  Diagnosis: CVA  Precautions: HTN, fall risk  FOTO: N/A  Insurance: Payor: Naomy Villatoro  REP / Plan: Nidhi Phillips PF  W Agusto Darling REP / Product Type: Medicare PPO /   2 of [address] visits, PN due 2019    Thank you for the consult!   Please call if you have any questions: [phone]  DONTRELL Chavez MBA, OTR/L, C-GCM, CSRS, CBIS  Director of Outpatient Neuro Occupational Therapy

## 2019-08-02 NOTE — PROGRESS NOTES
Daily Note     Today's date: 2019  Patient name: Shabana Stewart  : 1940  MRN: [id number]  Referring provider: Alisia Kline MD  Dx:   Encounter Diagnosis     ICD-10-CM    1  Impaired mobility and ADLs Z74 09                   Subjective:Feeling well today      Objective: See treatment diary below      Assessment: Patient improved this session with awareness of LE and ability to motor plan  Pt requested to trial treadmill next session and will attempt  Plan to assist patient to attempt hurdles without UE at next session  Plan: Continue per plan of care        Precautions: fall risk    Short Term Goal Expiration Date:(2019)  Long Term Goal Expiration Date: (10/25/2019)  POC Expiration Date: (10/25/2019)          Manual                                                     Exercise Diary        sts 3x10 3x10      Side stepping 3 laps no UE 3 laps no ue      Hip flex 30x       Hip ext 30x ea       Heel raise 30x 30x      Toe raise 30x 30x      Step ups Fwd/lat 8" 2x10 Fwd/lat 8" 2x10      hurdles  Fwd/lat 3 laps ea      NUstep  10 min L5      treadmill  NV                                                                                          Modalities

## 2019-08-06 ENCOUNTER — OFFICE VISIT (OUTPATIENT)
Dept: PHYSICAL THERAPY | Facility: CLINIC | Age: 79
End: 2019-08-06
Payer: COMMERCIAL

## 2019-08-06 ENCOUNTER — OFFICE VISIT (OUTPATIENT)
Dept: SPEECH THERAPY | Facility: CLINIC | Age: 79
End: 2019-08-06
Payer: COMMERCIAL

## 2019-08-06 ENCOUNTER — OFFICE VISIT (OUTPATIENT)
Dept: OCCUPATIONAL THERAPY | Facility: CLINIC | Age: 79
End: 2019-08-06
Payer: COMMERCIAL

## 2019-08-06 DIAGNOSIS — I63.9 CEREBROVASCULAR ACCIDENT (CVA), UNSPECIFIED MECHANISM (HCC): ICD-10-CM

## 2019-08-06 DIAGNOSIS — R48.8 OTHER SYMBOLIC DYSFUNCTIONS: Primary | ICD-10-CM

## 2019-08-06 DIAGNOSIS — Z78.9 IMPAIRED MOBILITY AND ADLS: Primary | ICD-10-CM

## 2019-08-06 DIAGNOSIS — I63.9 CEREBROVASCULAR ACCIDENT (CVA), UNSPECIFIED MECHANISM (HCC): Primary | ICD-10-CM

## 2019-08-06 DIAGNOSIS — Z74.09 IMPAIRED MOBILITY AND ADLS: Primary | ICD-10-CM

## 2019-08-06 DIAGNOSIS — R47.01 ANOMIC APHASIA: ICD-10-CM

## 2019-08-06 PROCEDURE — 97112 NEUROMUSCULAR REEDUCATION: CPT | Performed by: PHYSICAL THERAPIST

## 2019-08-06 PROCEDURE — 92507 TX SP LANG VOICE COMM INDIV: CPT

## 2019-08-06 PROCEDURE — 97110 THERAPEUTIC EXERCISES: CPT | Performed by: PHYSICAL THERAPIST

## 2019-08-06 PROCEDURE — 97530 THERAPEUTIC ACTIVITIES: CPT

## 2019-08-06 NOTE — PROGRESS NOTES
Daily Speech Treatment Note    Today's date: 2019  Patients name: Hurshel Fabry  : 1940  MRN: [id number]  Safety measures: fall risk, CVA  Referring provider: Kamlesh Fernandez MD    Primary Diagnosis/Billing code: H05 2  Secondary Diagnosis/ Billing code: I63 9, R47 01    Visit Tracking:  -Referring provider: Epic  -Billing guidelines: CMS  -Visit #3  -Michelle Robert  -RE due 19    Subjective/Behavioral:  "Am I with you? I had you last time, right?"    Objective/Assessment:  Treatment door remained open today for added noise and distraction - patient did an excellent job with her ability to stay concentrated on the task at hand  Short-term goals:  1  Patient will complete thought organization tasks (e g , sequencing, deduction puzzles, etc ) with 80% accuracy to facilitate increased executive functioning skills, to be achieved in 4-6 weeks  Anagrams: To target auditory attention and mental manipulation during a word finding activity, patient was asked to listen to a word, and rearrange the letters within the word to create a new word (i e , late = tale)  Task completed over 7 trials  Completed 1/ independently, increased to 5/7 with use of written support  2  Patient will complete auditory immediate and short term memory tasks to 80% accuracy to facilitate increased ability to retell narratives and recall information within functional living environment, to be achieved in 4-6 weeks    3  Patient will be educated on word finding strategies (i e , circumlocution) for improved generative naming and verbal expression skills, to be achieved in 4-6 weeks  To target education and practice of the circumlocution strategy, patient was asked to provide verbal/semantic descriptions of different people/places/things using "Hedbandz" cards  Task completed in  opp with min-mod cues needed to expand and provide more semantic appropriate details   When asked to name card described by clinician, patient did a great job and notably was able to name many "people's names" without difficulty  4  Patient will complete concrete and abstract categorization tasks to 80% accuracy to facilitate improved generative naming skills and working memory, to be achieved in 4-6 weeks    5  To target mental manipulation and working memory, patient will participate in word finding activity (i e , anagrams) with 80% accuracy, to be achieved in 4-6 weeks  6  Patient will name an average of 15+ items in a category in 60 seconds over 5 trials using compensatory strategies and min cues to facilitate improved word retrieval skills, to be achieved in 4-6 weeks  7  Patient will name an average of 10+ words that begin with a specific letter in 60 seconds over 5 trials using compensatory strategies and min cues to facilitate improved word retrieval skills, to be achieved in 4-6 weeks  Plan:  -Continue with current plan of care

## 2019-08-06 NOTE — PROGRESS NOTES
Daily Note     Today's date: 2019  Patient name: Meaghan Lopez  : 1940  MRN: [id number]  Referring provider: Shai Joe MD  Dx:   Encounter Diagnosis     ICD-10-CM    1  Impaired mobility and ADLs Z74 09                   Subjective:Feeling well today, has been doing HEP      Objective: See treatment diary below      Assessment: With encouragement pt was able to attempt some balance challenges without UE  She has significant hip weakness in affected limb which cuases increased unsteadiness during sls moment  Plan to continue LE strength and balance activities  Plan: Continue per plan of care        Precautions: fall risk    Short Term Goal Expiration Date:(2019)  Long Term Goal Expiration Date: (10/25/2019)  POC Expiration Date: (10/25/2019)          Manual                                                     Exercise Diary       sts 3x10 3x10 3x10     Side stepping 3 laps no UE 3 laps no ue 3 laps no UE ytb     Hip flex 30x       Hip ext 30x ea       Heel raise 30x 30x      Toe raise 30x 30x      Step ups Fwd/lat 8" 2x10 Fwd/lat 8" 2x10 Fwd/lat 2x10     hurdles  Fwd/lat 3 laps ea      NUstep  10 min L5 10 min L5     treadmill  NV NV     HK marches   3 laps no UE     Step taps   3 laps                                                                         Modalities

## 2019-08-06 NOTE — PROGRESS NOTES
Daily Note     Today's date: 2019  Patient name: Karel Cifuentes  : 1940  MRN: [id number]  Referring provider: Oracio Cabrales MD  Dx:   Encounter Diagnosis   Name Primary?  C[facility] Yes                  Subjective: "I can't do this "      Objective: See treatment below  Seated pegboard task with all pegs set up on pt's R side for improved R-side awareness, and using R gross grasp on black resistive clamp (L15) to grasp barrel pegs and place into large pegboard ahead  Upgraded to stacking with tongs>mushroom pegs, but pt unable to sustain 3-jaw-yolande on tongs or coordinate B/L hands to stabilize mushroom peg with L hand and grasp top of peg with tongs using R hand, despite frequent vc's and [place] A for positioning  Downgraded to no tongs, using only 3-jaw-yolande pinch on mushroom pegs with small ball grasped under D4 + D5 to isolate first 3 digits, with significant improvement and pt able to complete stacking pegs this way  Pt attempted stacking pom-poms on top of mushroom pegs with tweezers, but unable to maintain  or release pom-poms  Downgraded to using 3-jaw-yolande with ball still under D4/D5 for digit isolation  Focus was on distal strengthening, [facility], prehension patterns, R inattention, B/L coordination, and saccades  Attempted rubberbands for intrinsic hand strengthening and coordination but unable to complete due to max difficulty coordinating digits  Assessment: Tolerated treatment fair  Max droppage and max difficulty with hand positioning and pacing during task, P carryover of vc's for slower pacing and for hand positioning  See above for details  Plan: Continue skilled OT per POC        INTERVENTION COMMENTS:  Diagnosis: Cerebrovascular accident (CVA), unspecified mechanism ([facility] [address] ) [I63 9]  Precautions: HTN, fall risk  FOTO: N/A  Insurance: Payor: [employer]  RE[facility] / Plan[facility] / Product Type: Medicare PPO /   3 of [facility] visits, PN due 08/30/2019

## 2019-08-08 NOTE — PROGRESS NOTES
Daily Speech Treatment Note    Today's date: 2019   Patients name: Stephanie Enciso  : 1940  MRN: [id number]  Safety measures: fall risk, CVA  Referring provider: Raymond Farias MD    Primary Diagnosis/Billing code: W50 5  Secondary Diagnosis/ Billing code: I63 9, R47 01    Visit Tracking:  -Referring provider: Epic  -Billing guidelines: CMS  -Visit #4   Brain Pam  -RE due 19    Subjective/Behavioral:  -"It's nice to meet you "    Objective/Assessment:  -Patient indicated that she completed HEP worksheets; however, she forgot to bring along to therapy today  Short-term goals:  1  Patient will complete thought organization tasks (e g , sequencing, deduction puzzles, etc ) with 80% accuracy to facilitate increased executive functioning skills, to be achieved in 4-6 weeks  2  Patient will complete auditory immediate and short term memory tasks to 80% accuracy to facilitate increased ability to retell narratives and recall information within functional living environment, to be achieved in 4-6 weeks  -Mental manipulation/thought organization: Clinician presented patient with 4 words aloud and asked patient to rearrange words to form into a logical sentence (e g , sav-er-hdlc-the = HE GOT THE MAIL    Task completed in  opp (45%) independently, increasing to 16 opp (80% acc) with mod verbal repetition cues  3  Patient will be educated on word finding strategies (i e , circumlocution) for improved generative naming and verbal expression skills, to be achieved in 4-6 weeks  4  Patient will complete concrete and abstract categorization tasks to 80% accuracy to facilitate improved generative naming skills and working memory, to be achieved in 4-6 weeks  -Word finding/thought organization: Patient was presented with four category labels and asked to name category members provided an initial letter (4x4 grid)   Task completed in  opp (84%) independently, increasing to  opp (97% acc) with min semantic cues  -Word finding: Patient played Anomia card game to target speed of naming/processing based upon category labels (e g , flower, pop band, candy bar)  Level 3 cards utilized  Patient named an average of 15 0 cards/min (norm 10+)  Clinician educated patient on strategies to assist with thought organization and word retrieval speed, as well as circumlocution  Reviewed 3 skipped cards at end of trials  5  To target mental manipulation and working memory, patient will participate in word finding activity (i e , anagrams) with 80% accuracy, to be achieved in 4-6 weeks  6  Patient will name an average of 15+ items in a category in 60 seconds over 5 trials using compensatory strategies and min cues to facilitate improved word retrieval skills, to be achieved in 4-6 weeks  7  Patient will name an average of 10+ words that begin with a specific letter in 60 seconds over 5 trials using compensatory strategies and min cues to facilitate improved word retrieval skills, to be achieved in 4-6 weeks  Plan:  -Patient was provided with home exercises/activities to target goals in plan of care at the end of today's session   -Continue with current plan of care

## 2019-08-09 ENCOUNTER — OFFICE VISIT (OUTPATIENT)
Dept: OCCUPATIONAL THERAPY | Facility: CLINIC | Age: 79
End: 2019-08-09
Payer: COMMERCIAL

## 2019-08-09 ENCOUNTER — OFFICE VISIT (OUTPATIENT)
Dept: SPEECH THERAPY | Facility: CLINIC | Age: 79
End: 2019-08-09
Payer: COMMERCIAL

## 2019-08-09 ENCOUNTER — OFFICE VISIT (OUTPATIENT)
Dept: PHYSICAL THERAPY | Facility: CLINIC | Age: 79
End: 2019-08-09
Payer: COMMERCIAL

## 2019-08-09 DIAGNOSIS — Z78.9 IMPAIRED MOBILITY AND ADLS: Primary | ICD-10-CM

## 2019-08-09 DIAGNOSIS — I63.9 CEREBROVASCULAR ACCIDENT (CVA), UNSPECIFIED MECHANISM (HCC): Primary | ICD-10-CM

## 2019-08-09 DIAGNOSIS — I63.9 CEREBROVASCULAR ACCIDENT (CVA), UNSPECIFIED MECHANISM (HCC): ICD-10-CM

## 2019-08-09 DIAGNOSIS — R48.8 OTHER SYMBOLIC DYSFUNCTIONS: Primary | ICD-10-CM

## 2019-08-09 DIAGNOSIS — Z74.09 IMPAIRED MOBILITY AND ADLS: Primary | ICD-10-CM

## 2019-08-09 DIAGNOSIS — R47.01 ANOMIC APHASIA: ICD-10-CM

## 2019-08-09 PROCEDURE — 97530 THERAPEUTIC ACTIVITIES: CPT

## 2019-08-09 PROCEDURE — 97112 NEUROMUSCULAR REEDUCATION: CPT

## 2019-08-09 PROCEDURE — 97110 THERAPEUTIC EXERCISES: CPT | Performed by: PHYSICAL THERAPIST

## 2019-08-09 PROCEDURE — 92507 TX SP LANG VOICE COMM INDIV: CPT | Performed by: SPEECH-LANGUAGE PATHOLOGIST

## 2019-08-09 PROCEDURE — 97112 NEUROMUSCULAR REEDUCATION: CPT | Performed by: PHYSICAL THERAPIST

## 2019-08-09 NOTE — PROGRESS NOTES
Daily Note     Today's date: 2019  Patient name: Shabana Stewart  : 1940  MRN: [id number]  Referring provider: Alisia Kline MD  Dx:   Encounter Diagnosis     ICD-10-CM    1  Impaired mobility and ADLs Z74 09                 Subjective:Pt reports doing exercises at home  Objective: See treatment diary below      Assessment: Attempted lunges this session but patient was unable to figure out motor pattern  Demonstrates fair balance without SPC  Plan: Continue per plan of care        Precautions: fall risk    Short Term Goal Expiration Date:(2019)  Long Term Goal Expiration Date: (10/25/2019)  POC Expiration Date: (10/25/2019)          Manual                                                     Exercise Diary      sts 3x10 3x10 3x10 3x10    Side stepping 3 laps no UE 3 laps no ue 3 laps no UE ytb 3 laps no ue    Hip flex 30x       Hip ext 30x ea       Heel raise 30x 30x      Toe raise 30x 30x      Step ups Fwd/lat 8" 2x10 Fwd/lat 8" 2x10 Fwd/lat 2x10 Fwd/lat 2x10 ea     hurdles  Fwd/lat 3 laps ea      NUstep  10 min L5 10 min L5 10 min L5    treadmill  NV NV     HK marches   3 laps no UE 40'x 2 no ue    Step taps   3 laps     backwards    40'x 2    lunges    attempted                                                        Modalities

## 2019-08-09 NOTE — PROGRESS NOTES
Daily Note     Today's date: 2019  Patient name: Sasha Staples  : 1940  MRN: [id number]  Referring provider: Charlotte Galdamez MD  Dx:   Encounter Diagnosis   Name Primary?  Cerebrovascular accident (CVA), unspecified mechanism ([facility] ) Yes       Start Time: 1300  Stop Time: 1400  Total time in clinic (min): 60 minutes    Subjective: "I'll do anything you ask to help this right arm"  Objective: See treatment diary below  UEB for 5 min prograde bilaterally/3min retrograde unilaterally w/o resistance focusing on endurance and proximal strengthening  Ball push back utilizing 1# therabar held horizontally to push ball back to MORENO focusing on proximal strengthening, proprioceptive input and BL integration  Pixie cubes followed by card sort facilitating prehension patterns, BL integration and attention to LUE/    Assessment: Tolerated treatment well  Patient would benefit from continued OT  Pt required cueing and Augustine assist to motor plan following directions involving LUE  Significant improvement noted w/mass practice       Plan: Continued skilled OT per POC    INTERVENTION COMMENTS:  Diagnosis: Cerebrovascular accident (CVA), unspecified mechanism ([facility] ) [I63 9]  Precautions: HTN, fall risk  FOTO: N/A  Insurance: Payor: [employer]  REP / Plan: Melissa Lockett [facility] REP / Product Type: Medicare PPO /   4 of [facility] visits, PN due 2019

## 2019-08-12 NOTE — PROGRESS NOTES
Daily Speech Treatment Note    Today's date: 2019   Patients name: Beny Olivas  : 1940  MRN: [id number]  Safety measures: fall risk, CVA  Referring provider: Micha Chiang MD    Primary Diagnosis/Billing code: C96 1  Secondary Diagnosis/ Billing code: I63 9, R47 01    Visit Tracking:  -Referring provider: Epic  -Billing guidelines: CMS  -Visit #5  -Susan Dies  -RE due 19    Subjective/Behavioral:  "I am okay"    Objective/Assessment:  -Patient indicated that she completed HEP worksheets; however, she forgot to bring along to therapy today  Short-term goals:  1  Patient will complete thought organization tasks (e g , sequencing, deduction puzzles, etc ) with 80% accuracy to facilitate increased executive functioning skills, to be achieved in 4-6 weeks  Deduction by Exclusion: Mod-max cues to complete to 100% acc  2  Patient will complete auditory immediate and short term memory tasks to 80% accuracy to facilitate increased ability to retell narratives and recall information within functional living environment, to be achieved in 4-6 weeks    3  Patient will be educated on word finding strategies (i e , circumlocution) for improved generative naming and verbal expression skills, to be achieved in 4-6 weeks  To target generative naming skills; patient participated in "BlueCava" game where they were asked to provide a word when given a specific letter and category (i e , boys name __/L/ = Janece Elder)  Task completed over 4 trials in 36/48 opp  4  Patient will complete concrete and abstract categorization tasks to 80% accuracy to facilitate improved generative naming skills and working memory, to be achieved in 4-6 weeks    5  To target mental manipulation and working memory, patient will participate in word finding activity (i e , anagrams) with 80% accuracy, to be achieved in 4-6 weeks      6  Patient will name an average of 15+ items in a category in 60 seconds over 5 trials using compensatory strategies and min cues to facilitate improved word retrieval skills, to be achieved in 4-6 weeks  7  Patient will name an average of 10+ words that begin with a specific letter in 60 seconds over 5 trials using compensatory strategies and min cues to facilitate improved word retrieval skills, to be achieved in 4-6 weeks  Plan:  -Patient was provided with home exercises/activities to target goals in plan of care at the end of today's session   -Continue with current plan of care

## 2019-08-13 ENCOUNTER — OFFICE VISIT (OUTPATIENT)
Dept: PHYSICAL THERAPY | Facility: CLINIC | Age: 79
End: 2019-08-13
Payer: COMMERCIAL

## 2019-08-13 ENCOUNTER — OFFICE VISIT (OUTPATIENT)
Dept: SPEECH THERAPY | Facility: CLINIC | Age: 79
End: 2019-08-13
Payer: COMMERCIAL

## 2019-08-13 ENCOUNTER — OFFICE VISIT (OUTPATIENT)
Dept: OCCUPATIONAL THERAPY | Facility: CLINIC | Age: 79
End: 2019-08-13
Payer: COMMERCIAL

## 2019-08-13 DIAGNOSIS — I63.9 CEREBROVASCULAR ACCIDENT (CVA), UNSPECIFIED MECHANISM (HCC): ICD-10-CM

## 2019-08-13 DIAGNOSIS — R48.8 OTHER SYMBOLIC DYSFUNCTIONS: Primary | ICD-10-CM

## 2019-08-13 DIAGNOSIS — I63.9 CEREBROVASCULAR ACCIDENT (CVA), UNSPECIFIED MECHANISM (HCC): Primary | ICD-10-CM

## 2019-08-13 DIAGNOSIS — R47.01 ANOMIC APHASIA: ICD-10-CM

## 2019-08-13 DIAGNOSIS — Z74.09 IMPAIRED MOBILITY AND ADLS: Primary | ICD-10-CM

## 2019-08-13 DIAGNOSIS — Z78.9 IMPAIRED MOBILITY AND ADLS: Primary | ICD-10-CM

## 2019-08-13 PROCEDURE — 97112 NEUROMUSCULAR REEDUCATION: CPT

## 2019-08-13 PROCEDURE — 92507 TX SP LANG VOICE COMM INDIV: CPT

## 2019-08-13 PROCEDURE — 97110 THERAPEUTIC EXERCISES: CPT

## 2019-08-13 NOTE — PROGRESS NOTES
Daily Note     Today's date: 2019  Patient name: Mirza Carr  : 1940  MRN: [id number]  Referring provider: Odilia Guevara MD  Dx:   Encounter Diagnosis   Name Primary?  Cerebrovascular accident (CVA), unspecified mechanism ([facility] ) Yes                  Subjective: "This was such a fun hour!"      Objective: See treatment below  5 minutes prograde and 5 minutes retrograde on UEB with no added resistance  Engaged patient in get-a- clip task for pincer grasp, and visual perceptual skills  Completed letter match, rotating sequence, and visual memory activities on BITs for scanning and hand to target accuracy  Assessment: Tolerated treatment well  Noted with moderate difficulty with depth perception during get-a- task  Patient ranged from 40-80% hand to target accuracy with BITS scanning tasks  100% accuracy with visual memory task  Plan: Continued skilled OT per POC      INTERVENTION COMMENTS:  Diagnosis: Cerebrovascular accident (CVA), unspecified mechanism ([facility] ) [I63 9]  Precautions: HTN, fall risk  5 of 10 visits, PN due

## 2019-08-13 NOTE — PROGRESS NOTES
Daily Speech Treatment Note    Today's date: 2019  Patients name: Daryl Rivas  : 1940  MRN: [id number]  Safety measures: fall risk, CVA  Referring provider: Yeyo Sanchez MD    Primary Diagnosis/Billing code: A86 3  Secondary Diagnosis/ Billing code: I63 9, R47 01    Visit Tracking:  -Referring provider: Epic  -Billing guidelines: CMS  -Visit #6   Chandan Weber  -RE due 19    Subjective/Behavioral:  -"I did most of my homework "    Objective/Assessment:  -Reviewed completed HEP worksheets (anagrams in sentences completed in  opp) and (category grids completed in  opp)  Patient increased to 100% acc on worksheets with min-mod semantic cues from clinician  Short-term goals:  1  Patient will complete thought organization tasks (e g , sequencing, deduction puzzles, etc ) with 80% accuracy to facilitate increased executive functioning skills, to be achieved in 4-6 weeks  2  Patient will complete auditory immediate and short term memory tasks to 80% accuracy to facilitate increased ability to retell narratives and recall information within functional living environment, to be achieved in 4-6 weeks  -Mental manipulation: Clinician read 3 words aloud and patient asked to recall words in a sequential order (e g , Qflyafaz-Agtgw-Nalhcm = HHDBL-GPCETN-RTZXSPGG)  Task completed in 10/18 opp (55%) independently, increasing to  opp (89% acc) with min verbal repetition cues  It should be noted that patient benefited from additional processing time during activity  3  Patient will be educated on word finding strategies (i e , circumlocution) for improved generative naming and verbal expression skills, to be achieved in 4-6 weeks   -Word finding: Patient presented with a list of words and asked to generate antonyms & synonyms  Antonyms task completed in  opp  Synonyms task completed in  opp  Patient achieved 100% acc with mod semantic cues   Patient required frequent reminders of whether to name a synonym/antonym despite doing each column separately (I e , did synonyms first and continued to name when working on antonyms)  4  Patient will complete concrete and abstract categorization tasks to 80% accuracy to facilitate improved generative naming skills and working memory, to be achieved in 4-6 weeks    5  To target mental manipulation and working memory, patient will participate in word finding activity (i e , anagrams) with 80% accuracy, to be achieved in 4-6 weeks  6  Patient will name an average of 15+ items in a category in 60 seconds over 5 trials using compensatory strategies and min cues to facilitate improved word retrieval skills, to be achieved in 4-6 weeks  7  Patient will name an average of 10+ words that begin with a specific letter in 60 seconds over 5 trials using compensatory strategies and min cues to facilitate improved word retrieval skills, to be achieved in 4-6 weeks  Plan:  -Patient was provided with home exercises/activities to target goals in plan of care at the end of today's session   -Continue with current plan of care

## 2019-08-13 NOTE — PROGRESS NOTES
Daily Note     Today's date: 2019  Patient name: Isabel Hernández  : 1940  MRN: [id number]  Referring provider: Evan Kearney MD  Dx:   Encounter Diagnosis     ICD-10-CM    1  Impaired mobility and ADLs Z74 09         Subjective:Pt reports doing exercises at home  Objective: See treatment diary below      Assessment: Attempted lunges again this session but patient was unable to figure out motor pattern  Patient challenged with hurdles on foam, especially sideways with noted foot catching  Patient needing UE support for some TE  TM in use at beginning of session  Patient reported being too tired to perform at end of session  Nustep performed instead  Plan: Continue per plan of care        Precautions: fall risk    Short Term Goal Expiration Date:(2019)  Long Term Goal Expiration Date: (10/25/2019)  POC Expiration Date: (10/25/2019)          Manual                                                     Exercise Diary     sts 3x10 3x10 3x10 3x10    Side stepping 3 laps no UE 3 laps no ue 3 laps no UE ytb 3 laps no ue 3 laps no UE   Hip flex 30x       Hip ext 30x ea       Heel raise 30x 30x      Toe raise 30x 30x      Step ups Fwd/lat 8" 2x10 Fwd/lat 8" 2x10 Fwd/lat 2x10 Fwd/lat 2x10 ea  Fwd/lat 2x10 ea    hurdles  Fwd/lat 3 laps ea   Foam beams, fwd/lateral, 3 laps ea   NUstep  10 min L5 10 min L5 10 min L5 10 min   treadmill  NV NV     HK marches   3 laps no UE 40'x 2 no ue 3 laps no UE   Step taps   3 laps     backwards    40'x 2 3 laps no UE   lunges    attempted Attempted, unable   Tandem walking     3 laps // bars                                               Modalities

## 2019-08-15 ENCOUNTER — OFFICE VISIT (OUTPATIENT)
Dept: VASCULAR SURGERY | Facility: CLINIC | Age: 79
End: 2019-08-15

## 2019-08-15 VITALS
HEIGHT: 60 IN | BODY MASS INDEX: 30.63 KG/M2 | TEMPERATURE: 98 F | WEIGHT: 156 LBS | HEART RATE: 66 BPM | DIASTOLIC BLOOD PRESSURE: 80 MMHG | SYSTOLIC BLOOD PRESSURE: 152 MMHG

## 2019-08-15 DIAGNOSIS — I65.22 SYMPTOMATIC STENOSIS OF LEFT CAROTID ARTERY: Primary | ICD-10-CM

## 2019-08-15 DIAGNOSIS — Z98.890 HISTORY OF LEFT-SIDED CAROTID ENDARTERECTOMY: ICD-10-CM

## 2019-08-15 DIAGNOSIS — I63.132 CEREBROVASCULAR ACCIDENT (CVA) DUE TO EMBOLISM OF LEFT CAROTID ARTERY (HCC): ICD-10-CM

## 2019-08-15 PROCEDURE — 99024 POSTOP FOLLOW-UP VISIT: CPT | Performed by: SURGERY

## 2019-08-15 NOTE — PATIENT INSTRUCTIONS
1) Left side stroke with left carotid endarterectomy  -please continue taking your aspirin and statin for life  -please continue your plavix for 2 months after surgery (end 8/28/19)  -we will get an ultrasound of the surgery site in 3 months for a new baseline  -please continue your physical therapy until you have completed it  -please make an appointment to see you PCP to discuss anemia and need for pantoprazole  -please keep your appointment to see you neurologist    Carotid Endarterectomy   WHAT YOU NEED TO KNOW:   Carotid endarterectomy (CEA) is a surgery done to remove plaque (fatty deposits) from inside your carotid artery  The carotid artery is a blood vessel found in both sides of your neck  Plaque may build up inside your carotid artery and decrease blood flow to your brain  A piece of plaque may also break free and cause a stroke  DISCHARGE INSTRUCTIONS:   Medicines:   · Aspirin:  This helps thin the blood to keep blood clots from forming  If you are told to take aspirin, do not take acetaminophen or ibuprofen instead  Do not take more or less aspirin than directed  This medicine makes it more likely for you to bleed or bruise  · Blood thinners: This helps keep blood clots from forming  You may be more likely to bleed or bruise  Take as directed  · Blood pressure medicine: This medicine may be given to lower your blood pressure  Keeping your blood pressure under control will protect your surgery site from breaking open  · Cholesterol medicine: This is given to help lower the amount of cholesterol in your blood  It may help decrease new plaque from forming in your carotid artery  · Take your medicine as directed  Contact your healthcare provider if you think your medicine is not helping or if you have side effects  Tell him or her if you are allergic to any medicine  Keep a list of the medicines, vitamins, and herbs you take  Include the amounts, and when and why you take them  Bring the list or the pill bottles to follow-up visits  Carry your medicine list with you in case of an emergency  Follow up with your healthcare provider or vascular surgeon as directed:  Write down your questions so you remember to ask them during your visits  Wound care:  Carefully wash the wound with soap and water  Dry the area and put on new, clean bandages as directed  Change your bandages if they get wet or dirty  Self-care:   · Do not smoke: If you smoke, it is never too late to quit  Smoking increases your risk of heart disease and stroke  Ask your healthcare provider for information if you need help quitting  · Maintain a healthy weight:  Ask your healthcare provider how much you should weigh  Ask him to help you create a weight loss plan if you are overweight  Contact your healthcare provider or vascular surgeon if:   · You have nausea or vomiting  · You have a fever or chills  · You have questions or concerns about your condition or care  Seek care immediately or call 911 if:   · You have chest pain that spreads to your arms, jaw, or back  You may also have sweating or feel sick to your stomach  · You have sudden trouble speaking or moving parts of your body  You may also have blurred vision, dizziness, or trouble thinking clearly  · Your incision is swollen, red, or has pus coming from it  · Blood soaks through your bandage  · Your stitches come apart  · You suddenly feel lightheaded and short of breath  · You have chest pain when you take a deep breath or cough  You may cough up blood  · Your arm or leg feels warm, tender, and painful  It may look swollen and red  © 2017 [employer]  Information is for End User's use only and may not be sold, redistributed or otherwise used for commercial purposes  All illustrations and images included in CareNotes® are the copyrighted property of [employer]  or [employer]    The above information is an  only  It is not intended as medical advice for individual conditions or treatments  Talk to your doctor, nurse or pharmacist before following any medical regimen to see if it is safe and effective for you

## 2019-08-15 NOTE — PROGRESS NOTES
Assessment/Plan:    Pt is a 79 yo F w/ HTN, cataracts, HLD, iron deficiency anemia, presented with L CVA, found to have R ICA occlusion, L ICA stenosis, now s/p L CEA 6/28/19  Spent 3 wks in rehab, presents now for first POC    Symptomatic stenosis of left carotid artery  Cerebrovascular accident (CVA) due to embolism of left carotid artery ([address] )  History of left-sided carotid endarterectomy  -     VAS carotid complete study; Future  -reviewed MRI which showed L parietal and watershed CVA  -reviewed CTA and car DU which showed R ICA occlusion and L ICA stenosis, gurpreet 744/208 ratio 11 29  -now s/p L CEA 6/28/19 without issue  -persistent mild R hand defect, R facial droop, dysarthria but rehabbing well  -will get first f/u DU in 3 months after surgery  -cont ASA/statin for life  -stop plavix 2 months after surgery (end date 8/28/19)  -f/u with neurology (appt made)  -f/u with PCP (call for appt) to discuss need for PPI and anemia    Subjective:      Patient ID: Mirza Carr is a 78 y o  female  Patient is s/p L CEA done 6/28 and presents today for post-op visit  Patient had CVA on 6/19 and is currently seeing PT and OT due to impaired mobility and ADL's  Patient continues to have some R sided weakness secondary to stroke, though it is getting better  She denies sore throat, trouble swallowing, hoarseness and no fever/chills  Patient quit smoking when she had stroke  Patient is taking Aspirin, Lipitor and Plavix  HPI:    Patient returns for first POC after L CEA  Patient was admitted after L CVA with RUE weakness, decreased hand dexterity, facial droop, dysarthria and found to have R ICA occlusion, L ICA stenosis and underwent L CEA  She was also found to have anemia and concern for GI bleed with ablation of AVMs? She was then admitted to [facility] for rehab and just discharged 7/22/19  She continues with outpatient PT/OT    She is staying with family but hopes to return to independent living at [facility] where she was prior to stroke  She notes mild residual R hand dexterity loss but this is improved from in house  She thinks her speech is back to normal although sounds slightly slurred during appt  She denies any other new neurologic symptoms  She continues ASA/plavix/statin  She is taking Fe for anemia and is constipated  She has colace but isn't taking it  The following portions of the patient's history were reviewed and updated as appropriate: allergies, current medications, past family history, past medical history, past social history, past surgical history and problem list     Review of Systems   Constitutional: Positive for activity change  HENT: Positive for hearing loss  Eyes: Negative  Negative for visual disturbance  Respiratory: Positive for shortness of breath  Negative for chest tightness  Cardiovascular: Negative  Gastrointestinal: Positive for constipation  Endocrine: Negative  Genitourinary: Negative  Musculoskeletal: Positive for gait problem  Skin: Negative  Negative for wound  Allergic/Immunologic: Negative  Neurological: Positive for facial asymmetry and speech difficulty  Negative for weakness and numbness  Mild R hand dexterity decrease   Hematological: Negative  Psychiatric/Behavioral: Negative  Objective:      /80 (BP Location: Right arm, Patient Position: Sitting)   Pulse 66   Temp 98 °F (36 7 °C) (Tympanic)   Ht 5' (1 524 m)   Wt 70 8 kg (156 lb)   BMI 30 47 kg/m²          Physical Exam   Constitutional: She is oriented to person, place, and time  She appears well-developed and well-nourished  HENT:   Head: Normocephalic and atraumatic  Eyes: Conjunctivae are normal    Neck: Normal range of motion  Neck supple  Cardiovascular: Normal rate, regular rhythm and normal heart sounds  No murmur heard  Pulses:       Radial pulses are 2+ on the right side, and 2+ on the left side          Dorsalis pedis pulses are 0 on the right side, and 2+ on the left side  Posterior tibial pulses are 2+ on the right side, and 2+ on the left side  No carotid bruits B   Pulmonary/Chest: Effort normal and breath sounds normal  No respiratory distress  She has no wheezes  Abdominal: Soft  She exhibits no distension  There is no tenderness  There is no rebound  Musculoskeletal: Normal range of motion  She exhibits no edema  Neurological: She is alert and oriented to person, place, and time  Strength equal B, manual dexterity of R hand is decreased, mild R lip droop, mild persistent dysarthria   Skin: Skin is warm and dry  L neck surgery site C/D/I, well healed   Psychiatric: She has a normal mood and affect  Her behavior is normal    Nursing note and vitals reviewed  I have reviewed and made appropriate changes to the review of systems input by the medical assistant  Vitals:    08/15/19 1437 08/15/19 1438   BP: 148/82 152/80   BP Location: Left arm Right arm   Patient Position: Sitting Sitting   Pulse: 66    Temp: 98 °F (36 7 °C)    TempSrc: Tympanic    Weight: 70 8 kg (156 lb)    Height: 5' (1 524 m)        Patient Active Problem List   Diagnosis    Cataracts, bilateral    Essential hypertension    Hyperlipidemia    [facility]cerebral vascular accident) [facility]    Anemia, unspecified    Abnormal CT scan    Symptomatic stenosis of left carotid artery    Iron deficiency anemia    Impaired mobility and ADLs    History of left-sided carotid endarterectomy       Past Surgical History:   Procedure Laterality Date    CAROTID ENDARTARECTOMY Left 6/28/2019    Procedure: ENDARTERECTOMY ARTERY CAROTID WITH PATCH ANGIOPLASTY;  Surgeon: Nirmal Solis MD;  Location: [facility];  Service: Vascular       History reviewed  No pertinent family history      Social History     Socioeconomic History    Marital status: Single     Spouse name: Not on file    Number of children: Not on file    Years of education: Not on file    Highest education level: Not on file   Occupational History    Not on file   Social Needs    Financial resource strain: Not on file    Food insecurity:     Worry: Not on file     Inability: Not on file    Transportation needs:     Medical: Not on file     Non-medical: Not on file   Tobacco Use    Smoking status: Former Smoker     Packs/day: 0 25     Years: 50 00     Pack years: 12 50     Types: Cigarettes    Smokeless tobacco: Never Used   Substance and Sexual Activity    Alcohol use: Not Currently     Frequency: Monthly or less     Drinks per session: 1 or 2    Drug use: Never    Sexual activity: Not Currently   Lifestyle    Physical activity:     Days per week: Not on file     Minutes per session: Not on file    Stress: Not on file   Relationships    Social connections:     Talks on phone: Not on file     Gets together: Not on file     Attends Gnosticism service: Not on file     Active member of club or organization: Not on file     Attends meetings of clubs or organizations: Not on file     Relationship status: Not on file    Intimate partner violence:     Fear of current or ex partner: Not on file     Emotionally abused: Not on file     Physically abused: Not on file     Forced sexual activity: Not on file   Other Topics Concern    Not on file   Social History Narrative    Not on file       No Known Allergies      Current Outpatient Medications:     aspirin 81 mg chewable tablet, Chew 81 mg daily, Disp: , Rfl:     atorvastatin (LIPITOR) 40 mg tablet, Take 2 tablets (80 mg total) by mouth daily, Disp: 90 tablet, Rfl: 0    carvedilol (COREG) 3 125 mg tablet, Take 1 tablet (3 125 mg total) by mouth 2 (two) times a day with meals, Disp: 60 tablet, Rfl: 3    clopidogrel (PLAVIX) 75 mg tablet, Take 1 tablet (75 mg total) by mouth daily, Disp: 30 tablet, Rfl: 3    ferrous sulfate 325 (65 Fe) mg tablet, Take 1 tablet (325 mg total) by mouth 2 (two) times a day with meals, Disp: 60 tablet, Rfl: 3    Garlic 515 MG TABS, Take 100 mg by mouth daily, Disp: , Rfl:     NIFEdipine (ADALAT CC) 30 MG 24 hr tablet, Take 1 tablet (30 mg total) by mouth daily, Disp: 30 tablet, Rfl: 3    pantoprazole (PROTONIX) 40 mg tablet, Take 1 tablet (40 mg total) by mouth daily in the early morning, Disp: 30 tablet, Rfl: 0

## 2019-08-16 ENCOUNTER — OFFICE VISIT (OUTPATIENT)
Dept: OCCUPATIONAL THERAPY | Facility: CLINIC | Age: 79
End: 2019-08-16
Payer: COMMERCIAL

## 2019-08-16 ENCOUNTER — OFFICE VISIT (OUTPATIENT)
Dept: PHYSICAL THERAPY | Facility: CLINIC | Age: 79
End: 2019-08-16
Payer: COMMERCIAL

## 2019-08-16 ENCOUNTER — OFFICE VISIT (OUTPATIENT)
Dept: SPEECH THERAPY | Facility: CLINIC | Age: 79
End: 2019-08-16
Payer: COMMERCIAL

## 2019-08-16 DIAGNOSIS — Z74.09 IMPAIRED MOBILITY AND ADLS: Primary | ICD-10-CM

## 2019-08-16 DIAGNOSIS — Z78.9 IMPAIRED MOBILITY AND ADLS: Primary | ICD-10-CM

## 2019-08-16 DIAGNOSIS — R48.8 OTHER SYMBOLIC DYSFUNCTIONS: Primary | ICD-10-CM

## 2019-08-16 DIAGNOSIS — I63.9 CEREBROVASCULAR ACCIDENT (CVA), UNSPECIFIED MECHANISM (HCC): Primary | ICD-10-CM

## 2019-08-16 DIAGNOSIS — I63.9 CEREBROVASCULAR ACCIDENT (CVA), UNSPECIFIED MECHANISM (HCC): ICD-10-CM

## 2019-08-16 DIAGNOSIS — R47.01 ANOMIC APHASIA: ICD-10-CM

## 2019-08-16 PROCEDURE — 97112 NEUROMUSCULAR REEDUCATION: CPT

## 2019-08-16 PROCEDURE — 97110 THERAPEUTIC EXERCISES: CPT

## 2019-08-16 PROCEDURE — 92507 TX SP LANG VOICE COMM INDIV: CPT | Performed by: SPEECH-LANGUAGE PATHOLOGIST

## 2019-08-16 NOTE — PROGRESS NOTES
Daily Note     Today's date: 2019  Patient name: Luly Rincon  : 1940  MRN: [id number]  Referring provider: José Miguel Goodwin MD  Dx:   Encounter Diagnosis     ICD-10-CM    1  Impaired mobility and ADLs Z74 09                   Subjective: Patient noted tiredness pre treatment  Objective: See treatment diary below      Assessment: Tolerated treatment fair  Patient needed VC to correct form for marches to bring knees up higher  Trailed TM today 5 minutes x 2 patient was able to walk at 1 0 mph but needed VC to take bigger steps while ambulating  Therapist stood on TM behind patient due to patient tendency to take smaller steps and getting closer to end of treadmill  VC to take bigger steps and to walk more in center of treadmill  Patient Patient demonstrated fatigue post treatment and would benefit from continued PT      Plan: Continue per plan of care  Precautions: fall risk    Short Term Goal Expiration Date:(2019)  Long Term Goal Expiration Date: (10/25/2019)  POC Expiration Date: (10/25/2019)          Manual                                                     Exercise Diary     sts  3x10 3x10 3x10    Side stepping 3 laps no UE 3 laps no ue 3 laps no UE ytb 3 laps no ue 3 laps no UE   Hip flex        Hip ext        Heel raise  30x      Toe raise  30x      Step ups Fwd/lat 2x10 ea  Fwd/lat 8" 2x10 Fwd/lat 2x10 Fwd/lat 2x10 ea  Fwd/lat 2x10 ea    hurdles 3 laps fwd/lat Fwd/lat 3 laps ea   Foam beams, fwd/lateral, 3 laps ea   NUstep np 10 min L5 10 min L5 10 min L5 10 min   treadmill 2x5 minutes 1 0 mph standing behind patient on treadmill   NV NV     HK marches 3 laps no UE  3 laps no UE 40'x 2 no ue 3 laps no UE   Step taps   3 laps     backwards 3 laps    40'x 2 3 laps no UE   lunges    attempted Attempted, unable   Tandem walking 3 laps // bars     3 laps // bars                                               Modalities

## 2019-08-16 NOTE — PROGRESS NOTES
Daily Note     Today's date: 2019  Patient name: Stephanie Enciso  : 1940  MRN: [id number]  Referring provider: Raymond Farias MD  Dx:   Encounter Diagnosis   Name Primary?  Cerebrovascular accident (CVA), unspecified mechanism ([facility] ) Yes                  Subjective: "I'm getting better each day "      Objective: See treatment below  5 minutes prograde and 5 minutes retrograde on UEB at 2 0 resistance  Engaged patient in [address] [place] [address] for hand to target accuracy, ocular motor scanning, and screen tolerance  Assessment: Tolerated treatment well  Patient ranged this day from 45-85% accuracy with BITs tasks  During geoboard activity, observed with supination of forearm and required hand-over-hand assist for sustaining tripod prehension grasp  Plan: Continued skilled OT per POC      INTERVENTION COMMENTS:  Diagnosis: Cerebrovascular accident (CVA), unspecified mechanism ([facility] ) [I63 9]  Precautions: HTN, fall risk  6 of 10 visits, PN due

## 2019-08-19 ENCOUNTER — APPOINTMENT (OUTPATIENT)
Dept: LAB | Facility: HOSPITAL | Age: 79
End: 2019-08-19
Payer: COMMERCIAL

## 2019-08-19 ENCOUNTER — OFFICE VISIT (OUTPATIENT)
Dept: GASTROENTEROLOGY | Facility: CLINIC | Age: 79
End: 2019-08-19
Payer: COMMERCIAL

## 2019-08-19 ENCOUNTER — TRANSCRIBE ORDERS (OUTPATIENT)
Dept: LAB | Facility: HOSPITAL | Age: 79
End: 2019-08-19

## 2019-08-19 VITALS
HEIGHT: 62 IN | SYSTOLIC BLOOD PRESSURE: 141 MMHG | TEMPERATURE: 98.6 F | BODY MASS INDEX: 29.37 KG/M2 | HEART RATE: 67 BPM | DIASTOLIC BLOOD PRESSURE: 79 MMHG | WEIGHT: 159.6 LBS

## 2019-08-19 DIAGNOSIS — D12.6 TUBULAR ADENOMA OF COLON: ICD-10-CM

## 2019-08-19 DIAGNOSIS — K31.819 AVM (ARTERIOVENOUS MALFORMATION) OF STOMACH, ACQUIRED: Primary | ICD-10-CM

## 2019-08-19 DIAGNOSIS — K55.20 AVM (ARTERIOVENOUS MALFORMATION) OF SMALL BOWEL, ACQUIRED: ICD-10-CM

## 2019-08-19 DIAGNOSIS — D50.0 IRON DEFICIENCY ANEMIA DUE TO CHRONIC BLOOD LOSS: ICD-10-CM

## 2019-08-19 DIAGNOSIS — K59.00 CONSTIPATION, UNSPECIFIED CONSTIPATION TYPE: ICD-10-CM

## 2019-08-19 DIAGNOSIS — K31.819 AVM (ARTERIOVENOUS MALFORMATION) OF STOMACH, ACQUIRED: ICD-10-CM

## 2019-08-19 LAB
BASOPHILS # BLD AUTO: 0.06 THOUSANDS/ΜL (ref 0–0.1)
BASOPHILS NFR BLD AUTO: 1 % (ref 0–1)
EOSINOPHIL # BLD AUTO: 0.33 THOUSAND/ΜL (ref 0–0.61)
EOSINOPHIL NFR BLD AUTO: 5 % (ref 0–6)
ERYTHROCYTE [DISTWIDTH] IN BLOOD BY AUTOMATED COUNT: 17.2 % (ref 11.6–15.1)
HCT VFR BLD AUTO: 36.2 % (ref 34.8–46.1)
HGB BLD-MCNC: 10.9 G/DL (ref 11.5–15.4)
IMM GRANULOCYTES # BLD AUTO: 0.01 THOUSAND/UL (ref 0–0.2)
IMM GRANULOCYTES NFR BLD AUTO: 0 % (ref 0–2)
LYMPHOCYTES # BLD AUTO: 1.91 THOUSANDS/ΜL (ref 0.6–4.47)
LYMPHOCYTES NFR BLD AUTO: 28 % (ref 14–44)
MCH RBC QN AUTO: 30.2 PG (ref 26.8–34.3)
MCHC RBC AUTO-ENTMCNC: 30.1 G/DL (ref 31.4–37.4)
MCV RBC AUTO: 100 FL (ref 82–98)
MONOCYTES # BLD AUTO: 0.62 THOUSAND/ΜL (ref 0.17–1.22)
MONOCYTES NFR BLD AUTO: 9 % (ref 4–12)
NEUTROPHILS # BLD AUTO: 3.83 THOUSANDS/ΜL (ref 1.85–7.62)
NEUTS SEG NFR BLD AUTO: 57 % (ref 43–75)
NRBC BLD AUTO-RTO: 0 /100 WBCS
PLATELET # BLD AUTO: 276 THOUSANDS/UL (ref 149–390)
PMV BLD AUTO: 12.1 FL (ref 8.9–12.7)
RBC # BLD AUTO: 3.61 MILLION/UL (ref 3.81–5.12)
WBC # BLD AUTO: 6.76 THOUSAND/UL (ref 4.31–10.16)

## 2019-08-19 PROCEDURE — 85025 COMPLETE CBC W/AUTO DIFF WBC: CPT

## 2019-08-19 PROCEDURE — 99214 OFFICE O/P EST MOD 30 MIN: CPT | Performed by: PHYSICIAN ASSISTANT

## 2019-08-19 PROCEDURE — 36415 COLL VENOUS BLD VENIPUNCTURE: CPT

## 2019-08-19 NOTE — PROGRESS NOTES
[facility] - Outpatient Follow-up Note  Hurshel Fabry 78 y o  female MRN: [id number]  Encounter: [id number]          ASSESSMENT AND PLAN:      1  AVM (arteriovenous malformation) of stomach, acquired  2  AVM (arteriovenous malformation) of small bowel, acquired  3  Iron deficiency anemia due to chronic blood loss  She required PRBC transfusion and IV iron infusion during recent hospital admission in June for severe iron deficiency anemia  Endoscopic work-up significant for gastric and duodenal AVMs status post APC  Hemoglobin was stable in 8-9 range last month  We will repeat CBC to confirm stability of hemoglobin  If hemoglobin is down trending, would recommend repeat EGD with push enteroscopy to evaluate for more AVMs  However if hemoglobin is stable, would hold off on further endoscopic evaluation  She may stop Protonix  She should continue oral iron supplementation  She may continue aspirin and Plavix  - CBC and differential; Future    4  Constipation, unspecified type  Likely secondary to oral iron supplementation  Recommend Colace twice daily to help soften stool  She can take MiraLAX as needed  5  Tubular adenoma of colon  She was found to have 1 small tubular adenoma in the sigmoid colon on recent colonoscopy from June 2019  Repeat colonoscopy not recommended due to age and comorbidities  Follow-up as needed  ______________________________________________________________________    SUBJECTIVE:  35-year-old female with history of stroke and carotid artery stenosis status post CEA on asiprin and Plavix presenting for hospital follow-up  She was seen by GI in June 2019 due to severe iron deficiency anemia  She required PRBC transfusion and IV iron infusion  EGD significant for gastric AVM and multiple duodenal AVMs status post APC  Colonoscopy revealed 1 small tubular adenoma in the sigmoid colon, diverticula, and hemorrhoids   Hemoglobin has been generally stable between 8-9  She denies overt GI bleeding  She denies dizziness, SOB, palpitations  She denies nausea, vomiting, abdominal pain, diarrhea  She reports hard stools on oral iron  She has [place] type 2 stools daily  REVIEW OF SYSTEMS IS OTHERWISE NEGATIVE  Historical Information   Past Medical History:   Diagnosis Date    CVA [facility]cerebral vascular accident) ([facility] [address] )     Hypertension      Past Surgical History:   Procedure Laterality Date    CAROTID ENDARTARECTOMY Left 6/28/2019    Procedure: ENDARTERECTOMY ARTERY CAROTID WITH PATCH ANGIOPLASTY;  Surgeon: Hortencia Solis MD;  [facility];  Service: Vascular     Social History   Social History     Substance and Sexual Activity   Alcohol Use Not Currently    Frequency: Monthly or less    Drinks per session: 1 or 2     Social History     Substance and Sexual Activity   Drug Use Never     Social History     Tobacco Use   Smoking Status Former Smoker    Packs/day: 0 25    Years: 50 00    Pack years: 12 50    Types: Cigarettes   Smokeless Tobacco Never Used     History reviewed  No pertinent family history  Meds/Allergies       Current Outpatient Medications:     aspirin 81 mg chewable tablet    atorvastatin (LIPITOR) 40 mg tablet    carvedilol (COREG) 3 125 mg tablet    clopidogrel (PLAVIX) 75 mg tablet    ferrous sulfate 325 (65 Fe) mg tablet    Garlic 287 MG TABS    NIFEdipine (ADALAT CC) 30 MG 24 hr tablet    pantoprazole (PROTONIX) 40 mg tablet    No Known Allergies        Objective     Blood pressure 141/79, pulse 67, temperature 98 6 °F (37 °C), temperature source Tympanic, height 5' 2" (1 575 m), weight 72 4 kg (159 lb 9 6 oz)  Body mass index is 29 19 kg/m²        PHYSICAL EXAM:      General Appearance:   Alert, cooperative, no distress   HEENT:   Normocephalic, atraumatic, anicteric      Neck:  Supple, symmetrical, trachea midline   Lungs:   Clear to auscultation bilaterally; no rales, rhonchi or wheezing; respirations unlabored    Heart[de-identified]   Regular rate and rhythm; no murmur, rub, or gallop  Abdomen:   Soft, non-tender, non-distended; normal bowel sounds; no masses, no organomegaly    Genitalia:   Deferred    Rectal:   Deferred    Extremities:  No cyanosis, clubbing or edema    Pulses:  2+ and symmetric    Skin:  No jaundice, rashes, or lesions    Lymph nodes:  No palpable cervical lymphadenopathy        Lab Results:   Appointment on 08/19/2019   Component Date Value    WBC 08/19/2019 6 76     RBC 08/19/2019 3 61*    Hemoglobin 08/19/2019 10 9*    Hematocrit 08/19/2019 36 2     MCV 08/19/2019 100*    MCH 08/19/2019 30 2     MCHC 08/19/2019 30 1*    RDW 08/19/2019 17 2*    MPV 08/19/2019 12 1     Platelets 17/73/9602 276     nRBC 08/19/2019 0     Neutrophils Relative 08/19/2019 57     Immat GRANS % 08/19/2019 0     Lymphocytes Relative 08/19/2019 28     Monocytes Relative 08/19/2019 9     Eosinophils Relative 08/19/2019 5     Basophils Relative 08/19/2019 1     Neutrophils Absolute 08/19/2019 3 83     Immature Grans Absolute 08/19/2019 0 01     Lymphocytes Absolute 08/19/2019 1 91     Monocytes Absolute 08/19/2019 0 62     Eosinophils Absolute 08/19/2019 0 33     Basophils Absolute 08/19/2019 0 06          Radiology Results:   No results found

## 2019-08-19 NOTE — LETTER
August 19, 2019     Griselda Martinez, [address] [place] [address]    Patient: Shabana Stewart   YOB: 1940   Date of Visit: 8/19/2019       Dear Dr Malina Weinstein: Thank you for referring Shabana Stewart to me for evaluation  Below are my notes for this consultation  If you have questions, please do not hesitate to call me  I look forward to following your patient along with you  Sincerely,        Candice Mckinley PA-C        CC: No Recipients  Candice Mckinley PA-C  8/19/2019  8:26 PM  Sign at close encounter  [facility] - Outpatient Follow-up Note  Shabana Stewart 78 y o  female MRN: [id number]  Encounter: [id number]          ASSESSMENT AND PLAN:      1  AVM (arteriovenous malformation) of stomach, acquired  2  AVM (arteriovenous malformation) of small bowel, acquired  3  Iron deficiency anemia due to chronic blood loss  She required PRBC transfusion and IV iron infusion during recent hospital admission in June for severe iron deficiency anemia  Endoscopic work-up significant for gastric and duodenal AVMs status post APC  Hemoglobin was stable in 8-9 range last month  We will repeat CBC to confirm stability of hemoglobin  If hemoglobin is down trending, would recommend repeat EGD with push enteroscopy to evaluate for more AVMs  However if hemoglobin is stable, would hold off on further endoscopic evaluation  She may stop Protonix  She should continue oral iron supplementation  She may continue aspirin and Plavix  - CBC and differential; Future    4  Constipation, unspecified type  Likely secondary to oral iron supplementation  Recommend Colace twice daily to help soften stool  She can take MiraLAX as needed  5  Tubular adenoma of colon  She was found to have 1 small tubular adenoma in the sigmoid colon on recent colonoscopy from June 2019  Repeat colonoscopy not recommended due to age and comorbidities      Follow-up as needed  ______________________________________________________________________    SUBJECTIVE:  49-year-old female with history of stroke and carotid artery stenosis status post CEA on asiprin and Plavix presenting for hospital follow-up  She was seen by GI in June 2019 due to severe iron deficiency anemia  She required PRBC transfusion and IV iron infusion  EGD significant for gastric AVM and multiple duodenal AVMs status post APC  Colonoscopy revealed 1 small tubular adenoma in the sigmoid colon, diverticula, and hemorrhoids  Hemoglobin has been generally stable between 8-9  She denies overt GI bleeding  She denies dizziness, SOB, palpitations  She denies nausea, vomiting, abdominal pain, diarrhea  She reports hard stools on oral iron  She has [place] type 2 stools daily  REVIEW OF SYSTEMS IS OTHERWISE NEGATIVE  Historical Information   Past Medical History:   Diagnosis Date    CVA [facility]cerebral vascular accident) ([facility] [address] )     Hypertension      Past Surgical History:   Procedure Laterality Date    CAROTID ENDARTARECTOMY Left 6/28/2019    Procedure: ENDARTERECTOMY ARTERY CAROTID WITH PATCH ANGIOPLASTY;  Surgeon: Jonathan Solis MD;  [facility];  Service: Vascular     Social History   Social History     Substance and Sexual Activity   Alcohol Use Not Currently    Frequency: Monthly or less    Drinks per session: 1 or 2     Social History     Substance and Sexual Activity   Drug Use Never     Social History     Tobacco Use   Smoking Status Former Smoker    Packs/day: 0 25    Years: 50 00    Pack years: 12 50    Types: Cigarettes   Smokeless Tobacco Never Used     History reviewed  No pertinent family history      Meds/Allergies       Current Outpatient Medications:     aspirin 81 mg chewable tablet    atorvastatin (LIPITOR) 40 mg tablet    carvedilol (COREG) 3 125 mg tablet    clopidogrel (PLAVIX) 75 mg tablet    ferrous sulfate 325 (65 Fe) mg tablet    Garlic 305 MG TABS    NIFEdipine (ADALAT CC) 30 MG 24 hr tablet    pantoprazole (PROTONIX) 40 mg tablet    No Known Allergies        Objective     Blood pressure 141/79, pulse 67, temperature 98 6 °F (37 °C), temperature source Tympanic, height 5' 2" (1 575 m), weight 72 4 kg (159 lb 9 6 oz)  Body mass index is 29 19 kg/m²  PHYSICAL EXAM:      General Appearance:   Alert, cooperative, no distress   HEENT:   Normocephalic, atraumatic, anicteric      Neck:  Supple, symmetrical, trachea midline   Lungs:   Clear to auscultation bilaterally; no rales, rhonchi or wheezing; respirations unlabored    Heart[de-identified]   Regular rate and rhythm; no murmur, rub, or gallop  Abdomen:   Soft, non-tender, non-distended; normal bowel sounds; no masses, no organomegaly    Genitalia:   Deferred    Rectal:   Deferred    Extremities:  No cyanosis, clubbing or edema    Pulses:  2+ and symmetric    Skin:  No jaundice, rashes, or lesions    Lymph nodes:  No palpable cervical lymphadenopathy        Lab Results:   Appointment on 08/19/2019   Component Date Value    WBC 08/19/2019 6 76     RBC 08/19/2019 3 61*    Hemoglobin 08/19/2019 10 9*    Hematocrit 08/19/2019 36 2     MCV 08/19/2019 100*    MCH 08/19/2019 30 2     MCHC 08/19/2019 30 1*    RDW 08/19/2019 17 2*    MPV 08/19/2019 12 1     Platelets 88/52/6001 276     nRBC 08/19/2019 0     Neutrophils Relative 08/19/2019 57     Immat GRANS % 08/19/2019 0     Lymphocytes Relative 08/19/2019 28     Monocytes Relative 08/19/2019 9     Eosinophils Relative 08/19/2019 5     Basophils Relative 08/19/2019 1     Neutrophils Absolute 08/19/2019 3 83     Immature Grans Absolute 08/19/2019 0 01     Lymphocytes Absolute 08/19/2019 1 91     Monocytes Absolute 08/19/2019 0 62     Eosinophils Absolute 08/19/2019 0 33     Basophils Absolute 08/19/2019 0 06          Radiology Results:   No results found

## 2019-08-19 NOTE — PATIENT INSTRUCTIONS
Try Colace over the counter (stool softener)   You can take MiraLAX over the counter for mild constipation (if you haven't moved your bowels in 1-2 days)  You can take Dulcolax tablets for severe constipation (if you haven't moved your bowels in 3 days)    You can stop Protonix

## 2019-08-20 ENCOUNTER — OFFICE VISIT (OUTPATIENT)
Dept: SPEECH THERAPY | Facility: CLINIC | Age: 79
End: 2019-08-20
Payer: COMMERCIAL

## 2019-08-20 ENCOUNTER — OFFICE VISIT (OUTPATIENT)
Dept: PHYSICAL THERAPY | Facility: CLINIC | Age: 79
End: 2019-08-20
Payer: COMMERCIAL

## 2019-08-20 ENCOUNTER — OFFICE VISIT (OUTPATIENT)
Dept: OCCUPATIONAL THERAPY | Facility: CLINIC | Age: 79
End: 2019-08-20
Payer: COMMERCIAL

## 2019-08-20 DIAGNOSIS — R47.01 ANOMIC APHASIA: ICD-10-CM

## 2019-08-20 DIAGNOSIS — R48.8 OTHER SYMBOLIC DYSFUNCTIONS: Primary | ICD-10-CM

## 2019-08-20 DIAGNOSIS — I63.9 CEREBROVASCULAR ACCIDENT (CVA), UNSPECIFIED MECHANISM (HCC): Primary | ICD-10-CM

## 2019-08-20 DIAGNOSIS — I63.9 CEREBROVASCULAR ACCIDENT (CVA), UNSPECIFIED MECHANISM (HCC): ICD-10-CM

## 2019-08-20 DIAGNOSIS — Z74.09 IMPAIRED MOBILITY AND ADLS: Primary | ICD-10-CM

## 2019-08-20 DIAGNOSIS — Z78.9 IMPAIRED MOBILITY AND ADLS: Primary | ICD-10-CM

## 2019-08-20 PROCEDURE — 97530 THERAPEUTIC ACTIVITIES: CPT

## 2019-08-20 PROCEDURE — 97112 NEUROMUSCULAR REEDUCATION: CPT

## 2019-08-20 PROCEDURE — 97112 NEUROMUSCULAR REEDUCATION: CPT | Performed by: PHYSICAL THERAPIST

## 2019-08-20 PROCEDURE — 92507 TX SP LANG VOICE COMM INDIV: CPT

## 2019-08-20 PROCEDURE — 97110 THERAPEUTIC EXERCISES: CPT | Performed by: PHYSICAL THERAPIST

## 2019-08-20 NOTE — PROGRESS NOTES
Daily Note     Today's date: 2019  Patient name: Erik Leary  : 1940  MRN: [id number]  Referring provider: Kirit Sanon MD  Dx:   Encounter Diagnosis   Name Primary?  Cerebrovascular accident (CVA), unspecified mechanism ([facility] ) Yes                    Subjective: " I'm practicing strengthening my hands everyday "       Objective: Patient completed 5 minutes of prograde and 5 minutes of retrograde at 1 5 resistance on UEB  Patient engaged in [facility] [employer] working on her occular motor and handwriting skills  Patient worked on her hand coordination by pushing a small object down a  using only her right hand to increase coordination  Patient placed three coins in her palm and completed palm to tip translation by then placing coins in a small slot into a container  Assessment: Tolerated treatment well  She required about 25 percent verbal cueing for carry over  to keep her hand in an upright position  Patient demonstrated difficulty with crossing the midline of her body while doing activities on the [employer]  Plan: Continued skilled OT per POC      INTERVENTION COMMENTS:  Diagnosis: Cerebrovascular accident (CVA), unspecified mechanism ([facility] ) [I63 9]  Precautions: HTN, fall risk  7 of 10 visits, PN due

## 2019-08-20 NOTE — PROGRESS NOTES
Daily Speech Treatment Note    Today's date: 2019  Patients name: Beny Olivas  : 1940  MRN: [id number]  Safety measures: fall risk, CVA  Referring provider: Micha Chiang MD    Primary Diagnosis/Billing code: A37 3  Secondary Diagnosis/ Billing code: I63 9, R47 01    Visit Tracking:  -Referring provider: Epic  -Billing guidelines: CMS  -Visit #7  -Susan Dies  -RE due 19    Subjective/Behavioral:  "Oh, I'm tired"    Objective/Assessment:  -Patient left HEP at home  Short-term goals:  1  Patient will complete thought organization tasks (e g , sequencing, deduction puzzles, etc ) with 80% accuracy to facilitate increased executive functioning skills, to be achieved in 4-6 weeks  2  Patient will complete auditory immediate and short term memory tasks to 80% accuracy to facilitate increased ability to retell narratives and recall information within functional living environment, to be achieved in 4-6 weeks  Picture Retention Activity: To target working memory, the patient was shown a detailed picture (i e , kitchen scene) and was given 60 seconds to review it  Then they were asked [facility]- questions about picture  (i e , what two items were on the counter)  Task completed over 5 trials with 44/57 questions answered accurately  Distractor Task: Naming words by letter    Delayed Recall: 56/57      3  Patient will be educated on word finding strategies (i e , circumlocution) for improved generative naming and verbal expression skills, to be achieved in 4-6 weeks  4  Patient will complete concrete and abstract categorization tasks to 80% accuracy to facilitate improved generative naming skills and working memory, to be achieved in 4-6 weeks    5  To target mental manipulation and working memory, patient will participate in word finding activity (i e , anagrams) with 80% accuracy, to be achieved in 4-6 weeks      6  Patient will name an average of 15+ items in a category in 60 seconds over 5 trials using compensatory strategies and min cues to facilitate improved word retrieval skills, to be achieved in 4-6 weeks  7  Patient will name an average of 10+ words that begin with a specific letter in 60 seconds over 5 trials using compensatory strategies and min cues to facilitate improved word retrieval skills, to be achieved in 4-6 weeks  To target naming skills, patient was asked to provide a word that best fit the definition, with the initial letter provided (i e , a liquid measure equaling four quarts__/g/___gallon)  Patient completed naming in 54/60 opp, increasing to 60/60 with verbal cues provided  To target semantic association and lexicon building, patient asked to name an opposite/antonym of an abstract word (i e , miserly)  Task completed in 1/20 opp independently (mistakenly wrote synonyms), increased to 20/20 with mod-max verbal cues  Plan:  -Patient was provided with home exercises/activities to target goals in plan of care at the end of today's session   -Continue with current plan of care

## 2019-08-20 NOTE — PROGRESS NOTES
Daily Note     Today's date: 2019  Patient name: Janine Reyes  : 1940  MRN: [id number]  Referring provider: Alina Cheema MD  Dx:   Encounter Diagnosis     ICD-10-CM    1  Impaired mobility and ADLs Z74 09                   Subjective: Patient reports no new changes since last session  Would like to discontinue PT as soon as possible  Objective: See treatment diary below      Assessment: Plan to complete progress update at next visit  Pt is demonstrating improved balance and recall of exercises  Plan: Continue per plan of care  Precautions: fall risk    Short Term Goal Expiration Date:(2019)  Long Term Goal Expiration Date: (10/25/2019)  POC Expiration Date: (10/25/2019)          Manual                                                     Exercise Diary        sts  30x      Side stepping 3 laps no UE 3 laps UE with otb       Hip flex        Hip ext        Heel raise        Toe raise        Step ups Fwd/lat 2x10 ea  8" fwd/lat 15x ea      hurdles 3 laps fwd/lat       NUstep np 10 min L4      treadmill 2x5 minutes 1 0 mph standing behind patient on treadmill   10 min 1 3 mph       HK marches 3 laps no UE 3 laps no UE      Step taps        backwards 3 laps  40" x 2      lunges        Tandem walking 3 laps // bars                                                    Modalities

## 2019-08-23 ENCOUNTER — OFFICE VISIT (OUTPATIENT)
Dept: OCCUPATIONAL THERAPY | Facility: CLINIC | Age: 79
End: 2019-08-23
Payer: COMMERCIAL

## 2019-08-23 ENCOUNTER — EVALUATION (OUTPATIENT)
Dept: SPEECH THERAPY | Facility: CLINIC | Age: 79
End: 2019-08-23
Payer: COMMERCIAL

## 2019-08-23 ENCOUNTER — OFFICE VISIT (OUTPATIENT)
Dept: PHYSICAL THERAPY | Facility: CLINIC | Age: 79
End: 2019-08-23
Payer: COMMERCIAL

## 2019-08-23 DIAGNOSIS — I63.9 CEREBROVASCULAR ACCIDENT (CVA), UNSPECIFIED MECHANISM (HCC): Primary | ICD-10-CM

## 2019-08-23 DIAGNOSIS — Z78.9 IMPAIRED MOBILITY AND ADLS: Primary | ICD-10-CM

## 2019-08-23 DIAGNOSIS — R47.01 ANOMIC APHASIA: ICD-10-CM

## 2019-08-23 DIAGNOSIS — I63.9 CEREBROVASCULAR ACCIDENT (CVA), UNSPECIFIED MECHANISM (HCC): ICD-10-CM

## 2019-08-23 DIAGNOSIS — Z74.09 IMPAIRED MOBILITY AND ADLS: Primary | ICD-10-CM

## 2019-08-23 DIAGNOSIS — R48.8 OTHER SYMBOLIC DYSFUNCTIONS: Primary | ICD-10-CM

## 2019-08-23 PROCEDURE — 97110 THERAPEUTIC EXERCISES: CPT

## 2019-08-23 PROCEDURE — 96125 COGNITIVE TEST BY HC PRO: CPT

## 2019-08-23 PROCEDURE — 97530 THERAPEUTIC ACTIVITIES: CPT

## 2019-08-23 PROCEDURE — 97112 NEUROMUSCULAR REEDUCATION: CPT

## 2019-08-23 NOTE — PROGRESS NOTES
Progress Note     Today's date: 2019  Patient name: Lola Faith  : 1940  MRN: [id number]  Referring provider: Rut Lora MD  Dx:   No diagnosis found  Subjective: Patient reports no new changes since last session  Would like to discontinue PT as soon as possible  Objective: See treatment diary below    Functional outcomes   6 minute walk test: 525 ft spc  5x sit to stand: 20sec no ue (seconds)  TUG: 15 28 SPC (seconds)     5x sts- 9 87 sec no ue  TUG - 10 12 sec SPC   6 mwt - 700 ft with SPC    Assessment: Since initial evaluation patient has improved with balance per TUG and 5x sit to stand, and endurance and gait efficiency per 6 minute walk test, and overall improved LE strength and is independent with HEP  Has made good progress toward short erm goals  She will continue to improve to be closer to prior level of function with improved strength, balance and endurance with 2x/week for 4 more weeks of PT  Goals  STG  Pt will improve 5x sit to stand to 13 seconds within 4 weeks - MET  Pt will improve 6 MWT to 700 ft wihtin 4 weeks - MET  Pt will improve R Hip abduction strength to 4/5 within 4 weeks- NOT MET    LTG  Pt will be able to ambulate without SPC within 8-12 weeks  Pt will report no near falls within 8-12 weeks  Pt will complete 6 MWT in 900 ft within 8-12 weeks    Plan: Continue per plan of care        Precautions: fall risk    Short Term Goal Expiration Date:(2019)  Long Term Goal Expiration Date: (10/25/2019)  POC Expiration Date: (10/25/2019)

## 2019-08-23 NOTE — PROGRESS NOTES
Daily Note     Today's date: 2019  Patient name: Sahsa Staples  : 1940  MRN: [id number]  Referring provider: Charlotte Galdamez MD  Dx:   Encounter Diagnosis     ICD-10-CM    1  Impaired mobility and ADLs Z74 09        Subjective: No new complaints  Feels she is improving since starting PT  Objective: See treatment diary below      Assessment: Cueing to perform exercises slowly and with more control  Impression of patient getting confused at times, needing cueing for re-direction  Plan: Progress treatment as tolerated  Precautions: fall risk    Short Term Goal Expiration Date:(2019)  Long Term Goal Expiration Date: (10/25/2019)  POC Expiration Date: (10/25/2019)          Manual                                                     Exercise Diary       sts  30x 2x10     Side stepping 3 laps no UE 3 laps UE with otb  GTB, 3 laps no UE     Hip flex        Hip ext        Heel raise        Toe raise        Step ups Fwd/lat 2x10 ea  8" fwd/lat 15x ea 8" fwd/lat 15x ea     hurdles 3 laps fwd/lat       NUstep np 10 min L4 10 min     treadmill 2x5 minutes 1 0 mph standing behind patient on treadmill   10 min 1 3 mph       HK marches 3 laps no UE 3 laps no UE 3 laps no UE     Step taps        backwards 3 laps  40" x 2 3 laps     lunges        Tandem walking 3 laps // bars   3 laps     Alt step taps   From foam, 8'', 2x10                                         Modalities

## 2019-08-23 NOTE — PROGRESS NOTES
Daily Note     Today's date: 2019  Patient name: Beny Olivas  : 1940  MRN: [id number]  Referring provider: Micha Chiang MD  Dx:   Encounter Diagnosis   Name Primary?  Cerebrovascular accident (CVA), unspecified mechanism ([facility] ) Yes                  Subjective: "I love any activity using machines "      Objective: See treatment diary below  Pt completed 10 min arm bike, 5 min prograde/ 5 min retrograde for UE endurance  Pt utilized BITS focusing on  proprioceptive input w/hand to target demands for accuracy to engage in ADL tasks, mixed prehension patterns and functional reach crossing midline  Pt assembled puzzle focusing on inhand manipulation and coordination of three puzzle pieces at a time and applying to puzzle utilizing L-hand  Pt engaged in tracing activity of horizontal lines focusing on tripod grasp and isolation of D4/D5  Assessment: Tolerated treatment well  Patient would benefit from continued OT  Pt required cueing to use D2 during BITS frequently reverting to using D3, demo-ability to isolate D3,4&5 w/cues during task  PT was unable to accurately trace between lines on BITs and on paper activity but was able to trace on lines  Pt demo-difficulty with in hand manipulation of foam pieces to place on target w/min droppage noted  Plan: Continued skilled OT per POC with focus on in hand manipulation of foam pieces       INTERVENTION COMMENTS:  Diagnosis: Cerebrovascular accident (CVA), unspecified mechanism ([facility] ) [I63 9]  Precautions: HTN, fall risk  8 of 10 visits, PN due

## 2019-08-26 NOTE — PROGRESS NOTES
Occupational Therapy Stroke Progress Note/Status Update: Today's Date: 2019  Patient Name: Sasha Staples  : 1940  MRN: [id number]  Referring Provider: Charlotte Galdamez MD  Dx: Cerebrovascular accident (CVA), unspecified mechanism [facility]) [I63 9]    Active Problem List:   Patient Active Problem List   Diagnosis    Cataracts, bilateral    Essential hypertension    Hyperlipidemia    CVA (cerebral vascular accident) ([facility] [address]    Anemia, unspecified    Abnormal CT scan    Symptomatic stenosis of left carotid artery    Iron deficiency anemia    Impaired mobility and ADLs    History of left-sided carotid endarterectomy     Past Medical Hx:   Past Medical History:   Diagnosis Date    CVA (cerebral vascular accident) ([facility] [address] )     Hypertension      Past Surgical Hx:   Past Surgical History:   Procedure Laterality Date    CAROTID ENDARTARECTOMY Left 2019    Procedure: ENDARTERECTOMY ARTERY CAROTID WITH PATCH ANGIOPLASTY;  Surgeon: Keiry Solis MD;  Location: [facility];  Service: Vascular      Pain Levels:  Restin    With Activity:  0    Subjective/Patient Goal: "To get my hand more coordinated yet"    History of Present Illness:  Pt is W 5 y o  female who was referred to Occupational Therapy s/p acute left MCA stroke  As per hospital report, Pt was admitted on 2019 due to right hand weakness and right-sided facial droop  Imaging (MRI) revealed left sided infarcts to the parietal and frontal regions of the brain  In addition Pt presented with ulcerated plaque at the L cervical carotid bifurcation; CTA revealed a right ICA occlusion   Vascular surgery was consulted, and recommendation made for a left CEA, performed on  without complication  During admission Pt was noted to have ABLA, for which scope was performed revealing AVMs in the stomach and duodenum; argon plasma coagulation was performed  Pt was also transfused at the time   During her acute admission, Pt had a fall off of comode; CTOH showed right frontal hematoma, but no active bleed  Pt received OT, PT, and SLP while at [facility]  Pt was discharge from hospital on 07/22/2019 with supervision/assistance at home       Pt presents with RUE weakness proximal to distal, decreased FMC/prehension, decreased in-hand manipulation, R inattention, R apraxia, decreased direction following abilities, and difficulties with shoe tying and handwriting       Pt lives on the first floor of a two story home with her daughter, son-in-law and grandchildren  Pt wants to return to [facility], where she resides on the fifth floor---elevator accessible for use  Pt currently performing ADLs with supervision  Pt currently does not perform IADLs  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  Pt loss role of -  Pt relies on her daughter for transportation at this time      Objective  Impairments Section:   UE Strength:   GRACE: RUE: 50/200 LUE: 40/200   PINCER: 3 point pinch: RUE 10, LUE: 10   2 point pinch: RUE: 10, LUE: 12   Lateral pincher: RUE: 11, LUE: 12    Coordination:   9 HOLE PEG TEST:     RUE: 51 5 seconds, LUE: 26 4 Seconds      Functional Dexterity Test:   RUE: 1 minute 0 0 seconds, LUE: 29 0 Seconds     RUE distal ataxia apraxia dysmetria dysdiadochokinesia, R inattention    Range of Motion:  AROM: intact/full in all planes    PROM: intact/full in all planes    MMT: RUE 4/5 LUE 4/5    Pt is R hand dominant    Sensation:  MYOFILAMENTS:   RUE: 4 31   LUE: 3 61    Vision Screening Recording Form:   vision screen: + glasses @ all times present for vision screen, h/o L cataract pending removal  near acuity: R 20/40, L 20/40  binocularity far: orthophoria/orthotropia  binocularity near: orthophoria/orthotropia  red green fusion: PLRG @ 6"  near point of convergence: diplopia @ 8" w/ decreased B/L teaming w/ L exophoria  sean string: misalignment  Pursuits: slightly jerky in all planes  Saccades: inaccurate in all planes w/ apraxia/dysmetria  ocular ROM: intact/fulll  visual perceptual midline shift: shifted to the L of midline and above horizon    Assessment/Plan  Occupational Therapy Skilled Analysis Assessment and Plan of Care:  Pt is a 78 y o  female referred to Occupational Therapy s/p acute left MCA stroke  Pt presents with RUE weakness proximal to distal, dcecreased R FMC/prehension, decreased R in-hand manipulation, R inattention, decreased B/L coordination, and R apraxia  Pt will benefit from skilled Occupational Therapy services 2x/week for 8-10 weeks with focus on neuro re-ed, self-care management, improved motor planning, NMES/FES, and HEP to improve on the above deficits, improve functional use of RUE in meaningful occupations, resume R hand dominance with daily life tasks, and enhance overall QOL  Pt seen for OT re-eval/progress note/status update  Pt continues to demonstrate /VM impairments as described, R inattention, RUE distal ataxia apraxia dysmetria dysadiadochokinesia, ideamotor apraxia, impaired FMC/FMS, dyscoordination   Continue to recommend ongoing treatment to address the following goals 2x/wk for 4 more weeks to address FMC/FMS, automaticity, in hand manipulation, coordination, apraxia, depth perception, /VM re-training, convergence and ocular motility      Short Term Goals:  · Pt will increase R prehension patterns for improved tripod with utensil management with <20% droppage 4 weeks-PARTIALLY MET  · Pt will demo with G carryover of Home Exercise Program to improve functional progression towards goals in Plan of care and for improved functional use of RUE 4 weeks-PARTIALLY MET  · Pt will increase automaticity of RUE with improved motor planning abilities of RUE x 25% for improved grasp release of tabletop items for improved functional performance with salient tasks 4 weeks-PARTIALLY MET  · Pt will increase rate of manipulation for all R FM tests for improved functional performance with salient tasks 4 weeks-PARTIALLY MET  · Pt will increase RUE to refined functional assist with <20% cuing for tabletop tasks for improved functional performance of life roles and salient tasks 4 weeks -PARTIALLY MET              - Pt will increase oculomotor control for improved saccades, con/divergent tasks for improved reading, board to table tasks with minimal increase in symptoms 4 weeks-PARTIALLY MET              -Pt will demo with improved attention to RUE x 50% for improved safety awareness and improved functional use 4 weeks-PARTIALLY MET           -  Pt will tolerate R NMES/FES for improved motor and sensory performance for overall improved hand to target with 50% accuracy 4 weeks-PARTIALLY MET     Long Term Goals:              -Pt will increase automaticity of RUE with improved motor planning abilities of LUE x 50% for improved        grasp release of tabletop items for improved functional performance with salient tasks 4 weeks-PARTIALLY MET              - Pt will increase rate of prehension for R FM tasks for improved use of utensils, writing, ADL fasteners-PARTIALLY MET  · Pt will resume R hand dominance to Mod I with all activities of daily living, IADLs, and meaningful occupations  · Pt will increase LUE strength to 5/5 and  strength R=L, through the use of strengthening exercises for improved functional performance with meaningful occupations and life roles-PARTIALLY MET              -Pt will demo with improved attention to RUE x 75% for improved safety awareness and improved functional  use of RUE  · Pt will increase oculomotor control for improved dynamic activities with head turns, board/screen to table tasks symptom free, improved VMI and return to baseline handwriting -PARTIALLY MET  · Pt will increase oculomotor control for WNL saccades, con/divergent tasks symptom free for overall enhanced QOL         -  Pt will tolerate R NMES/FES for improved motor and sensory performance for overall improved hand to target with 75% accuracy-PARTIALLY MET    INTERVENTION COMMENTS:  Diagnosis: CVA  Precautions: HTN, fall risk  FOTO: N/A  Insurance: Payor: Latia Quispe  REP / Plan: Kathy Millan PFFS  REP / Product Type: Medicare PPO /   9 of 10 visits, PN due 9/27/2019    Thank you for the consult!   Please call if you have any questions: [phone]  DONTRELL Brasher, MINI, OTR/L, C-GCM, CSRS, CBIS  Director of Outpatient Neuro Occupational Therapy

## 2019-08-27 ENCOUNTER — EVALUATION (OUTPATIENT)
Dept: OCCUPATIONAL THERAPY | Facility: CLINIC | Age: 79
End: 2019-08-27
Payer: COMMERCIAL

## 2019-08-27 DIAGNOSIS — I63.9 CEREBROVASCULAR ACCIDENT (CVA), UNSPECIFIED MECHANISM (HCC): Primary | ICD-10-CM

## 2019-08-27 PROCEDURE — 97112 NEUROMUSCULAR REEDUCATION: CPT

## 2019-09-03 ENCOUNTER — OFFICE VISIT (OUTPATIENT)
Dept: PHYSICAL THERAPY | Facility: CLINIC | Age: 79
End: 2019-09-03
Payer: COMMERCIAL

## 2019-09-03 ENCOUNTER — OFFICE VISIT (OUTPATIENT)
Dept: SPEECH THERAPY | Facility: CLINIC | Age: 79
End: 2019-09-03
Payer: COMMERCIAL

## 2019-09-03 ENCOUNTER — OFFICE VISIT (OUTPATIENT)
Dept: OCCUPATIONAL THERAPY | Facility: CLINIC | Age: 79
End: 2019-09-03
Payer: COMMERCIAL

## 2019-09-03 DIAGNOSIS — Z78.9 IMPAIRED MOBILITY AND ADLS: Primary | ICD-10-CM

## 2019-09-03 DIAGNOSIS — I63.9 CEREBROVASCULAR ACCIDENT (CVA), UNSPECIFIED MECHANISM (HCC): Primary | ICD-10-CM

## 2019-09-03 DIAGNOSIS — I63.9 CEREBROVASCULAR ACCIDENT (CVA), UNSPECIFIED MECHANISM (HCC): ICD-10-CM

## 2019-09-03 DIAGNOSIS — R47.01 ANOMIC APHASIA: ICD-10-CM

## 2019-09-03 DIAGNOSIS — R48.8 OTHER SYMBOLIC DYSFUNCTIONS: Primary | ICD-10-CM

## 2019-09-03 DIAGNOSIS — Z74.09 IMPAIRED MOBILITY AND ADLS: Primary | ICD-10-CM

## 2019-09-03 PROCEDURE — 97110 THERAPEUTIC EXERCISES: CPT

## 2019-09-03 PROCEDURE — 97112 NEUROMUSCULAR REEDUCATION: CPT | Performed by: PHYSICAL THERAPIST

## 2019-09-03 PROCEDURE — 97112 NEUROMUSCULAR REEDUCATION: CPT

## 2019-09-03 PROCEDURE — 92507 TX SP LANG VOICE COMM INDIV: CPT

## 2019-09-03 PROCEDURE — 97110 THERAPEUTIC EXERCISES: CPT | Performed by: PHYSICAL THERAPIST

## 2019-09-03 NOTE — PROGRESS NOTES
Daily Speech Treatment Note    Today's date: 9/3/2019  Patients name: Mana Lanza  : 1940  MRN: [id number]  Safety measures: fall risk, CVA  Referring provider: New Bryan MD    Primary Diagnosis/Billing code: Z63 8  Secondary Diagnosis/ Billing code: I63 9, R47 01    Visit Tracking:  -Referring provider: Epic  -Billing guidelines: CMS  -Visit #9  -Yessi Ojeda  -RE due 19    Subjective/Behavioral:  "I'm good"    Objective/Assessment:    Short-term goals:  1  Patient will complete thought organization tasks (e g , sequencing, deduction puzzles, etc ) with 80% accuracy to facilitate increased executive functioning skills, to be achieved in 4-6 weeks  Following Auditory Directions (Scrambled Shapes): To target problem solving and following complex directions; patient was asked to find the letter on the page, using clues (I e , write the letter that is in the triangle and [place])  Task completed in  opp  Increasing to  with repetition of clue  Deduction (classroom seating chart): Completed with mod-max cues to 100% acc  Trail Making:  Completed with mod-max cues to 100% acc  Patient continues with significant/poor direction following; requiring constant cueing  2  Patient will complete auditory immediate and short term memory tasks to 80% accuracy to facilitate increased ability to retell narratives and recall information within functional living environment, to be achieved in 4-6 weeks  Plan:  -Patient was provided with home exercises/activities to target goals in plan of care at the end of today's session   -Continue with current plan of care

## 2019-09-03 NOTE — PROGRESS NOTES
Progress Note     Today's date: 9/3/2019  Patient name: Everett Guzman  : 1940  MRN: [id number]  Referring provider: Synetta Spatz, MD  Dx:   Encounter Diagnosis     ICD-10-CM    1  Impaired mobility and ADLs Z74 09                   Subjective: Patient reports no new changes since last session  Would like to discontinue PT as soon as possible  Objective: See treatment diary below    Assessment: Patient continues to have most difficulty with balance due to [place] during gait  Plan: Continue per plan of care  Precautions: fall risk    Short Term Goal Expiration Date:(2019)  Long Term Goal Expiration Date: (10/25/2019)  POC Expiration Date: (10/25/2019)            Manual                                                                                          Exercise Diary  8/16 8/20 8/23  9/3     sts   30x 2x10       Side stepping 3 laps no UE 3 laps UE with otb  GTB, 3 laps no UE  otb 3 laps no ue     Hip flex             Hip ext             Heel raise             Toe raise             Step ups Fwd/lat 2x10 ea  8" fwd/lat 15x ea 8" fwd/lat 15x ea  8" 15x ea fwd/lat     hurdles 3 laps fwd/lat      30x step over and back     NUstep np 10 min L4 10 min  10 min L4     treadmill 2x5 minutes 1 0 mph standing behind patient on treadmill   10 min 1 3 mph          HK marches 3 laps no UE 3 laps no UE 3 laps no UE  3 laps no UE     Step taps             backwards 3 laps  40" x 2 3 laps       lunges             Tandem walking 3 laps // bars    3 laps       Alt step taps     From foam, 8'', 2x10

## 2019-09-03 NOTE — PROGRESS NOTES
Daily Note     Today's date: 9/3/2019  Patient name: Jasen Gayle  : 1940  MRN: [id number]  Referring provider: Jason Syed MD  Dx:   Encounter Diagnosis   Name Primary?  Cerebrovascular accident (CVA), unspecified mechanism ([facility] ) Yes                  Subjective: "I can't do this "      Objective: See treatment below  5 minutes prograde and 5 minutes retrograde on UEB for strengthening and endurance  Educated and engaged patient in mixed motor patterns for motor planning and coordination  Handwriting worksheets with tracing for increasing fluidity of movement patterns for legibility of writing  Macey Harry letter cross outs for oculo motor movement patterns  Assessment: Tolerated treatment fair  Poor carryover of mixed motor patterns even with massed practice and hand over hand assist  Agraphia and poor visual perception noted with handwriting worksheets  perseveration of movement patterns when writing  MORENO to complete visual checklist with patient next session to further screen visual deficits  Plan: Continued skilled OT per POC      INTERVENTION COMMENTS:  Diagnosis: Cerebrovascular accident (CVA), unspecified mechanism ([facility] ) [I63 9]  Precautions: HTN, fall risk  1 of 10 visits, PN due

## 2019-09-06 ENCOUNTER — APPOINTMENT (OUTPATIENT)
Dept: SPEECH THERAPY | Facility: CLINIC | Age: 79
End: 2019-09-06
Payer: COMMERCIAL

## 2019-09-06 ENCOUNTER — APPOINTMENT (OUTPATIENT)
Dept: OCCUPATIONAL THERAPY | Facility: CLINIC | Age: 79
End: 2019-09-06
Payer: COMMERCIAL

## 2019-09-06 ENCOUNTER — APPOINTMENT (OUTPATIENT)
Dept: PHYSICAL THERAPY | Facility: CLINIC | Age: 79
End: 2019-09-06
Payer: COMMERCIAL

## 2019-09-10 ENCOUNTER — OFFICE VISIT (OUTPATIENT)
Dept: SPEECH THERAPY | Facility: CLINIC | Age: 79
End: 2019-09-10
Payer: COMMERCIAL

## 2019-09-10 ENCOUNTER — OFFICE VISIT (OUTPATIENT)
Dept: PHYSICAL THERAPY | Facility: CLINIC | Age: 79
End: 2019-09-10
Payer: COMMERCIAL

## 2019-09-10 ENCOUNTER — OFFICE VISIT (OUTPATIENT)
Dept: OCCUPATIONAL THERAPY | Facility: CLINIC | Age: 79
End: 2019-09-10
Payer: COMMERCIAL

## 2019-09-10 DIAGNOSIS — Z74.09 IMPAIRED MOBILITY AND ADLS: Primary | ICD-10-CM

## 2019-09-10 DIAGNOSIS — R48.8 OTHER SYMBOLIC DYSFUNCTIONS: Primary | ICD-10-CM

## 2019-09-10 DIAGNOSIS — I63.9 CEREBROVASCULAR ACCIDENT (CVA), UNSPECIFIED MECHANISM (HCC): ICD-10-CM

## 2019-09-10 DIAGNOSIS — I63.9 CEREBROVASCULAR ACCIDENT (CVA), UNSPECIFIED MECHANISM (HCC): Primary | ICD-10-CM

## 2019-09-10 DIAGNOSIS — Z78.9 IMPAIRED MOBILITY AND ADLS: Primary | ICD-10-CM

## 2019-09-10 PROCEDURE — 97112 NEUROMUSCULAR REEDUCATION: CPT | Performed by: PHYSICAL THERAPIST

## 2019-09-10 PROCEDURE — 97535 SELF CARE MNGMENT TRAINING: CPT

## 2019-09-10 PROCEDURE — 97110 THERAPEUTIC EXERCISES: CPT | Performed by: PHYSICAL THERAPIST

## 2019-09-10 PROCEDURE — 97110 THERAPEUTIC EXERCISES: CPT

## 2019-09-10 PROCEDURE — 97112 NEUROMUSCULAR REEDUCATION: CPT

## 2019-09-10 PROCEDURE — 92507 TX SP LANG VOICE COMM INDIV: CPT | Performed by: SPEECH-LANGUAGE PATHOLOGIST

## 2019-09-10 NOTE — PROGRESS NOTES
Progress Note     Today's date: 9/10/2019  Patient name: Kelsea Bruner  : 1940  MRN: [id number]  Referring provider: Sulma Forbes MD  Dx:   Encounter Diagnosis     ICD-10-CM    1  Impaired mobility and ADLs Z74 09                   Subjective: Patient reports no new changes since last session  Would like to discontinue PT as soon as possible  Objective: See treatment diary below    Assessment: Attempted hurdles outside of //bars today which pt was able to do with min to CG assist to maintain upright balance  Pt did not well to do with recumenbt bike had to do the NUstep today  Plan: Continue per plan of care  Precautions: fall risk    Short Term Goal Expiration Date:(2019)  Long Term Goal Expiration Date: (10/25/2019)  POC Expiration Date: (10/25/2019)            Manual                                                                                          Exercise Diary  8/16 8/20 8/23  9/3  9/10   sts   30x 2x10    2x15   Side stepping 3 laps no UE 3 laps UE with otb  GTB, 3 laps no UE  otb 3 laps no ue  3 laps no ue   Hip flex             Hip ext             Heel raise             Toe raise             Step ups Fwd/lat 2x10 ea  8" fwd/lat 15x ea 8" fwd/lat 15x ea  8" 15x ea fwd/lat  8" 2x10 fwd/lat    hurdles 3 laps fwd/lat      30x step over and back     NUstep np 10 min L4 10 min  10 min L4  10 min L4   treadmill 2x5 minutes 1 0 mph standing behind patient on treadmill   10 min 1 3 mph          HK marches 3 laps no UE 3 laps no UE 3 laps no UE  3 laps no UE  3 laps no UE   Step taps             backwards 3 laps  40" x 2 3 laps    40'x2   lunges             Tandem walking 3 laps // bars    3 laps       Alt step taps     From foam, 8'', 2x10    30x from flr

## 2019-09-10 NOTE — PROGRESS NOTES
Occupational Therapy Daily Note:    Today's date: 9/10/2019  Patient name: Andreina Ocampo  : 1940  MRN: [id number]  Referring provider: Cathy Apley, MD  Dx:   Encounter Diagnosis   Name Primary?  Cerebrovascular accident (CVA), unspecified mechanism ([facility] [address] ) Yes                  Subjective: "Im still not cooking at home but I moved back into my apartment Monday"    Objective: Pt seen for OT treatment session focusing on R UE NMR, FMS/GMS, GMC/FMC, constructional tasks, bimanual tasks, and simulated ADL fxn  Pt tolerated 20 min of R UE WB in quadruped position on mat while performing constructional pegboard task from template to inc proprioceptive input to R UE prior to dec apraxia with fxnl tasks  Pt tolerated well with min VCs to complete constructional task  Pt then performed simulated self feeding and utensil use with resistive (red med soft) putty to improve coordination and fxnl targeting of R UE for unilateral and bimanual tasks  Pt demo apraxia with initiation of grasp on utensil req visual cue to readjust  Pt completed line tangles worksheet to address L>R visual pursuits and R UE fxnl handwriting/tracing  Pt req max cue with step by step sequencing fading to min VCs with improved coordination and inc accuracy with tracing noted  Assessment: Tolerated treatment well  Pt demo improvement in fxnl tool use including handwriting/tracing and improved constructional tasks  Provided with line tangles worksheet for HEP  Patient would benefit from continued skilled OT  Plan: Continued skilled OT per POC      INTERVENTION COMMENTS:  Diagnosis: Cerebrovascular accident (CVA), unspecified mechanism ([facility] [address] ) [I63 9]  Precautions: HTN, fall risk  2 of 10 visits, PN due

## 2019-09-10 NOTE — PROGRESS NOTES
Daily Speech Treatment Note    Today's date: 9/10/2019  Patients name: Malachi Whitaker  : 1940  MRN: [id number]  Safety measures: fall risk, CVA  Referring provider: Amy Bran MD    Primary Diagnosis/Billing code: T47 1  Secondary Diagnosis/ Billing code: I63 9, R47 01    Visit Tracking:  -Referring provider: Epic  -Billing guidelines: CMS  -Visit #10  -Barbara Smoke  -RE due 19    Subjective/Behavioral:  "I'm fine "    Objective/Assessment:  -Patient did not return HEP worksheets  Short-term goals:  1  Patient will complete thought organization tasks (e g , sequencing, deduction puzzles, etc ) with 80% accuracy to facilitate increased executive functioning skills, to be achieved in 4-6 weeks   -Auditory direction following/organization: Mashery Book page 1 and 2) Patient answered general comprehension questions regarding picture scenes  Task completed in  opp (42% acc) independently, increasing to  opp (50% acc) with max verbal repetition cues  Patient continued with significant/poor direction following and required constant cueing      -Written direction following/organization: [employer]y Book page 4 and 5) Patient answered general comprehension questions regarding picture scenes  Task completed in  opp (33% acc) independently, increasing to  opp (58% acc) with max verbal/visual cues  Patient continued with significant/poor direction following and required constant cueing       -Reading comprehension/organization: Patient presented with 2-steps, 4 component written directions and asked to solve provided shapes, numbers, letters, and words in a box  Clinician encouraged patient read directions aloud to facilitate increased comprehension  Task completed in 8/10 opp (80%) independently, increasing to 10/10 opp (100% acc) with min verbal cues to check work  Patient with greater attention to detail on this simpler task      -Reading comprehension/organization: Patient presented with 7 written directions and asked to draw shapes, numbers, letters, and words in a box  Clinician encouraged patient read directions aloud to facilitate increased comprehension  Task completed in 12/14 opp (86%) independently, increasing to 14/14 opp (100% acc) with min verbal cues to check work  Patient with greater attention to detail on this simpler task  2  Patient will complete auditory immediate and short term memory tasks to 80% accuracy to facilitate increased ability to retell narratives and recall information within functional living environment, to be achieved in 4-6 weeks  Plan:  -Patient was provided with home exercises/activities to target goals in plan of care at the end of today's session   -Continue with current plan of care

## 2019-09-13 ENCOUNTER — OFFICE VISIT (OUTPATIENT)
Dept: SPEECH THERAPY | Facility: CLINIC | Age: 79
End: 2019-09-13
Payer: COMMERCIAL

## 2019-09-13 ENCOUNTER — OFFICE VISIT (OUTPATIENT)
Dept: PHYSICAL THERAPY | Facility: CLINIC | Age: 79
End: 2019-09-13
Payer: COMMERCIAL

## 2019-09-13 ENCOUNTER — OFFICE VISIT (OUTPATIENT)
Dept: OCCUPATIONAL THERAPY | Facility: CLINIC | Age: 79
End: 2019-09-13
Payer: COMMERCIAL

## 2019-09-13 DIAGNOSIS — R48.8 OTHER SYMBOLIC DYSFUNCTIONS: Primary | ICD-10-CM

## 2019-09-13 DIAGNOSIS — Z74.09 IMPAIRED MOBILITY AND ADLS: Primary | ICD-10-CM

## 2019-09-13 DIAGNOSIS — I63.9 CEREBROVASCULAR ACCIDENT (CVA), UNSPECIFIED MECHANISM (HCC): ICD-10-CM

## 2019-09-13 DIAGNOSIS — I63.40 CEREBROVASCULAR ACCIDENT (CVA) DUE TO EMBOLISM OF CEREBRAL ARTERY (HCC): Primary | ICD-10-CM

## 2019-09-13 DIAGNOSIS — Z78.9 IMPAIRED MOBILITY AND ADLS: Primary | ICD-10-CM

## 2019-09-13 PROCEDURE — 92507 TX SP LANG VOICE COMM INDIV: CPT | Performed by: SPEECH-LANGUAGE PATHOLOGIST

## 2019-09-13 PROCEDURE — 97112 NEUROMUSCULAR REEDUCATION: CPT | Performed by: PHYSICAL THERAPIST

## 2019-09-13 PROCEDURE — 97530 THERAPEUTIC ACTIVITIES: CPT

## 2019-09-13 PROCEDURE — 97116 GAIT TRAINING THERAPY: CPT | Performed by: PHYSICAL THERAPIST

## 2019-09-13 PROCEDURE — 97110 THERAPEUTIC EXERCISES: CPT | Performed by: PHYSICAL THERAPIST

## 2019-09-13 NOTE — PROGRESS NOTES
Daily Note     Today's date: 2019  Patient name: Radha King  : 1940  MRN: [id number]  Referring provider: Nancy Aguila MD  Dx:   Encounter Diagnosis     ICD-10-CM    1  Impaired mobility and ADLs Z74 09                   Subjective: No new complaints  Objective: See treatment diary below      Assessment: Tolerated treatment well but required VC for upright posture throughout the session  Continues to demo significant trendelenburg gait which can cause her to have LOB  Pt was very fatigued at the end off session and demo more instability  Patient would benefit from continued PT      Plan: Progress treatment as tolerated         Short Term Goal Expiration Date:(2019)  Long Term Goal Expiration Date: (10/25/2019)  POC Expiration Date: (10/25/2019)              Manual                                                                                          Exercise Diary  9/13    9/3  9/10   sts 3 x 15      2x15   Side stepping otb 3 laps with UE    No UE 2 laps    otb 3 laps no ue  3 laps no ue   Hip flex          Hip ext          Heel raise          Toe raise          Step ups  8" 2x10 fwd/lat     8" 15x ea fwd/lat  8" 2x10 fwd/lat    hurdles Attempted at end of session but deferred due to fatigue    30x step over and back     NUstep L5, 10 min     10 min L4  10 min L4   treadmill          HK marches 3 laps     3 laps no UE  3 laps no UE   Step taps          backwards Fw/bw 2 x 40'      40'x2   lunges          Tandem walking          Alt step taps 8" 30x from floor       30x from flr

## 2019-09-13 NOTE — PROGRESS NOTES
Daily Speech Treatment Note    Today's date: 2019  Patients name: Luly Rincon  : 1940  MRN: [id number]  Safety measures: fall risk, CVA  Referring provider: José Miguel Goodwin MD    Primary Diagnosis/Billing code: T34 2  Secondary Diagnosis/ Billing code: I63 9, R47 01    Visit Tracking:  -Referring provider: Epic  -Billing guidelines: CMS  -Visit #11  -Rohit Rust  -RE due 19    Subjective/Behavioral:  "I'm good "    Objective/Assessment:  -Reviewed patient's HEP worksheets  Following written directions: task complete in  opp independently, increasing to  given minimal verbal cues  True/Flase Statements (Bunny Book pg  6): task complete in  opp independently  Short-term goals:  1  Patient will complete thought organization tasks (e g , sequencing, deduction puzzles, etc ) with 80% accuracy to facilitate increased executive functioning skills, to be achieved in 4-6 weeks   -Sequencing the Steps: Patient was asked to sequence 4-5 steps in the appropriate order  Task complete in  opp independently, increasing to  given moderate-maximal semantic and visual cues  2  Patient will complete auditory immediate and short term memory tasks to 80% accuracy to facilitate increased ability to retell narratives and recall information within functional living environment, to be achieved in 4-6 weeks   -Wh- Questions: Patient was read a short story and asked a variety of wh- questions following the story  Task complete in  opp independently, increasing to 30/ given moderate to maximal semantic cues  Plan:  -Patient was provided with home exercises/activities to target goals in plan of care at the end of today's session   -Continue with current plan of care  Patient was treated by Crissy Stanley, graduate SLP student, and was under the direct supervision of ANDREA Kelley , CCC-SLP

## 2019-09-13 NOTE — PROGRESS NOTES
Daily Note     Today's date: 2019  Patient name: Kelsea Bruner  : 1940  MRN: [id number]  Referring provider: Sulma Forbes MD  Dx:   Encounter Diagnosis     ICD-10-CM    1  Cerebrovascular accident (CVA) due to embolism of cerebral artery (HCC) I63 40                   Subjective: " I work my hand everyday "      Objective: See treatment diary below  Pt in quadriped for 20 min for WB through RUE focusing on strengthening and as preparatory activity for motor apraxia engaging in pixie cube task  Pt engaged in constructional assembly task to form pipe tree designs from 2-D template to improve visual perceptual skills and fxnl apraxia  Pt req to verbally describe pipe tree components for inc constructional seq, pt req Max VCs 2* deficits in visual motor/perceptual skills of form constancy, visual closure, and spatial orientation  Pt completed task bimanually with impaired hand to target with apraxia and impaired spatial awareness  Pt on bits w/ copying shapes focusing on form constancy, visual closure, and spatial orientation  Pt demo decreased ability w/ spatial orientation  Assessment: Tolerated treatment well  Patient would benefit from continued OT    Pt req' verbal cues about 50% of time to keep RUE on mat table during quadriped activity  Pipe tree activity pt req' max verbal and tactile cues to complete  Plan: Continue per plan of care  focusing on visual discrimination task  INTERVENTION COMMENTS:  Diagnosis: Cerebrovascular accident (CVA), unspecified mechanism ([facility] [address] ) [I63 9]  Precautions: HTN, fall risk  3 of 10 visits, PN due       Pt was treated by OVIDIO Siu, under direct supervision of AUGUSTO Marroquin

## 2019-09-17 ENCOUNTER — OFFICE VISIT (OUTPATIENT)
Dept: SPEECH THERAPY | Facility: CLINIC | Age: 79
End: 2019-09-17
Payer: COMMERCIAL

## 2019-09-17 ENCOUNTER — OFFICE VISIT (OUTPATIENT)
Dept: PHYSICAL THERAPY | Facility: CLINIC | Age: 79
End: 2019-09-17
Payer: COMMERCIAL

## 2019-09-17 ENCOUNTER — OFFICE VISIT (OUTPATIENT)
Dept: OCCUPATIONAL THERAPY | Facility: CLINIC | Age: 79
End: 2019-09-17
Payer: COMMERCIAL

## 2019-09-17 DIAGNOSIS — I63.40 CEREBROVASCULAR ACCIDENT (CVA) DUE TO EMBOLISM OF CEREBRAL ARTERY (HCC): Primary | ICD-10-CM

## 2019-09-17 DIAGNOSIS — R48.8 OTHER SYMBOLIC DYSFUNCTIONS: Primary | ICD-10-CM

## 2019-09-17 DIAGNOSIS — Z78.9 IMPAIRED MOBILITY AND ADLS: Primary | ICD-10-CM

## 2019-09-17 DIAGNOSIS — Z74.09 IMPAIRED MOBILITY AND ADLS: Primary | ICD-10-CM

## 2019-09-17 DIAGNOSIS — R47.01 ANOMIC APHASIA: ICD-10-CM

## 2019-09-17 DIAGNOSIS — I63.9 CEREBROVASCULAR ACCIDENT (CVA), UNSPECIFIED MECHANISM (HCC): ICD-10-CM

## 2019-09-17 PROCEDURE — 97112 NEUROMUSCULAR REEDUCATION: CPT

## 2019-09-17 PROCEDURE — 97530 THERAPEUTIC ACTIVITIES: CPT

## 2019-09-17 PROCEDURE — 97110 THERAPEUTIC EXERCISES: CPT

## 2019-09-17 PROCEDURE — 92507 TX SP LANG VOICE COMM INDIV: CPT

## 2019-09-17 NOTE — PROGRESS NOTES
Daily Note     Today's date: 2019  Patient name: Florence Hernandez  : 1940  MRN: [id number]  Referring provider: Carmenza Arauz MD  Dx:   Encounter Diagnosis     ICD-10-CM    1  Impaired mobility and ADLs Z74 09        Subjective: No new complaints  Objective: See treatment diary below      Assessment: Requested to perform 15 min on nustep today  Patient needed cueing to perform exercises slowly and with more control  Patient had tendency to rush  Impression patient more confused today than usual, needing more cueing and redirection  Unable to perform step taps today due to confusion  Plan: Progress treatment as tolerated         Short Term Goal Expiration Date:(2019)  Long Term Goal Expiration Date: (10/25/2019)  POC Expiration Date: (10/25/2019)              Manual                                                                                          Exercise Diary        sts 3 x 15       Side stepping otb 3 laps with UE    No UE 2 laps No UE 3 laps      Hip flex        Hip ext        Heel raise        Toe raise        Step ups  8" 2x10 fwd/lat  8" 2x10 fwd/lat      hurdles Attempted at end of session but deferred due to fatigue Fwd/lateral, 3 laps      NUstep L5, 10 min  L5, 15 min      treadmill        HK marches 3 laps  3 laps      Step taps        backwards Fw/bw 2 x 40'       lunges        Tandem walking  3 laps      Alt step taps 8" 30x from floor  Unable

## 2019-09-17 NOTE — PROGRESS NOTES
Occupational Therapy Daily Note:    Today's date: 2019  Patient name: Jordin Hoskins  : 1940  MRN: [id number]  Referring provider: Garrett Medina MD  Dx:   Encounter Diagnosis   Name Primary? 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 w/ visuospatial impairments, impulsive and distractible w/ poor termination/inhibition  Pt engaged in ADL dressing/FMC/FMS board w/ max difficulty w/ max cues for sequencing, motor planning, and pacing 2* poor quality motor output w/ impulsivity  Pt engaged in Get a  on Patterns complex patterns w/ mod cues for motor planning  Pt engaged in Makenna /VM/VS skill sets assessment  Pt noted with the following intact: near/distal acuity @ R 20/40, L 20/40, orthophoric ocular alignment, no evidence of visual field cuts, somatognosia, R/L discrimination, finger agnosia, topographic orientation  Pt noted w/ the following impaired: visual fixation w/ dysmetria, saccadic eye movements, smooth pursuits w/ jerkiness in all planes, convergence w diplopia @8" w/ decreased B/L teaming w/ L exophoria, L visuospatial inattention, impaired construction apraxia and dressing apraxia, form discrimination, depth perception, figure ground, spatial relations, and sterognosis  Assessment: Tolerated treatment well  Patient would benefit from continued skilled OT  Plan: Continued skilled OT per POC  INTERVENTION COMMENTS:  Diagnosis: CVA  Precautions: HTN, fall risk  FOTO: N/A  Insurance: Payor: [facility] DARRIN REP / Plan: [employer] Gonzalez [facility] REP / Product Type: Medicare PPO /   4 of 10 visits, PN due 2019     Thank you for the consult!   Please call if you have any questions: [phone]  DONTRELL Babcock, MINI, OTR/L, C-GCM, CSRS, CBIS  Director of Outpatient Neuro Occupational Therapy

## 2019-09-17 NOTE — PROGRESS NOTES
Daily Speech Treatment Note    Today's date: 2019  Patients name: Stephanie Enciso  : 1940  MRN: [id number]  Safety measures: fall risk, CVA  Referring provider: Raymond Farias MD    Primary Diagnosis/Billing code: X53 1  Secondary Diagnosis/ Billing code: I63 9, R47 01    Visit Tracking:  -Referring provider: Epic  -Billing guidelines: CMS  -Visit #12  -Juan Carlosana Pam  -RE due 19    Subjective/Behavioral:  "Nice to met you Faustino Llamas "    Objective/Assessment:  -Reviewed patient's HEP worksheets  Sequencing: Number the steps in the correct order, task completed with 100% acc  Short-term goals:  1  Patient will complete thought organization tasks (e g , sequencing, deduction puzzles, etc ) with 80% accuracy to facilitate increased executive functioning skills, to be achieved in 4-6 weeks     -To target sequential thought, patient was asked to fill in the missing letters to complete the words given  Task completed in 30 opp (75%acc) increasing to 40/40 (100%acc) with min-mod semantic and phonemic cues  -Patient completed deduction puzzles (2x5) task completed with 40% acc independently, increasing to 100% with max verbal cues from clinician  (Patient was provided with explanation of task 2x)    2  Patient will complete auditory immediate and short term memory tasks to 80% accuracy to facilitate increased ability to retell narratives and recall information within functional living environment, to be achieved in 4-6 weeks  Memory and Mental manipulation: Clinician read 3 words aloud and patient was asked to repeat them in reverse order  She was encouraged to use internal memory strategy repeat/rehearse during activity  Task completed in 4/10 opp (40%acc) increasing to 10/10 (100%acc) with max verbal cues from clinician and repetition of words     Plan:  -Patient was provided with home exercises/activities to target goals in plan of care at the end of today's session   -Continue with current plan of care

## 2019-09-19 NOTE — PROGRESS NOTES
Daily Speech Treatment Note    Today's date: 2019   Patients name: Erik Leary  : 1940  MRN: [id number]  Safety measures: fall risk, CVA  Referring provider: Kirit Sanon MD    Primary Diagnosis/Billing code: N58 0  Secondary Diagnosis/ Billing code: I63 9, R47 01    Visit Tracking:  -Referring provider: Epic  -Billing guidelines: CMS  -Visit #13  -Alysha Kitchen  -RE due 19    Subjective/Behavioral:  "I have good news! I don't need to go back to my neurologist until next year "    Objective/Assessment:  -Patient forgot to return with her HEP  Will bring to next session  Short-term goals:  1  Patient will complete thought organization tasks (e g , sequencing, deduction puzzles, etc ) with 80% accuracy to facilitate increased executive functioning skills, to be achieved in 4-6 weeks  -Scrambles sentences: Patient was read 4-5 words and asked to create a sentence using only those words  Task complete in  independently, increasing to  given minimal verbal repetition and  given maximal visual cues  -Deducing Compound Words ([facility] bk8, pg  81 & 82): Patient was asked to determine the compound word based on the definitions provided (e g  An automobile + a house animal = a rug   carpet)  Task complete in  independently, increasing to 30/30 given minimal semantic cues  2  Patient will complete auditory immediate and short term memory tasks to 80% accuracy to facilitate increased ability to retell narratives and recall information within functional living environment, to be achieved in 4-6 weeks   -Word List Retention (word placement): Patient was read a list of 4-5 words and asked a question about word placement (e g  What was the third word?)  Task complete in 7 opp independently, increasing to 12/14 given minimal verbal repetition and 14/14 given maximal visual cues       Plan:  -Patient was provided with home exercises/activities to target goals in plan of care at the end of today's session   -Continue with current plan of care  Patient was treated by Prisca Madrigal, graduate SLP student, and was under the direct supervision of ANDREA Ruiz , CCC-SLP

## 2019-09-20 ENCOUNTER — OFFICE VISIT (OUTPATIENT)
Dept: SPEECH THERAPY | Facility: CLINIC | Age: 79
End: 2019-09-20
Payer: COMMERCIAL

## 2019-09-20 ENCOUNTER — OFFICE VISIT (OUTPATIENT)
Dept: OCCUPATIONAL THERAPY | Facility: CLINIC | Age: 79
End: 2019-09-20
Payer: COMMERCIAL

## 2019-09-20 ENCOUNTER — OFFICE VISIT (OUTPATIENT)
Dept: PHYSICAL THERAPY | Facility: CLINIC | Age: 79
End: 2019-09-20
Payer: COMMERCIAL

## 2019-09-20 ENCOUNTER — OFFICE VISIT (OUTPATIENT)
Dept: NEUROLOGY | Facility: CLINIC | Age: 79
End: 2019-09-20
Payer: COMMERCIAL

## 2019-09-20 VITALS
WEIGHT: 161 LBS | DIASTOLIC BLOOD PRESSURE: 81 MMHG | BODY MASS INDEX: 30.4 KG/M2 | HEIGHT: 61 IN | SYSTOLIC BLOOD PRESSURE: 178 MMHG | HEART RATE: 67 BPM

## 2019-09-20 DIAGNOSIS — Z74.09 IMPAIRED MOBILITY AND ADLS: Primary | ICD-10-CM

## 2019-09-20 DIAGNOSIS — R48.8 OTHER SYMBOLIC DYSFUNCTIONS: Primary | ICD-10-CM

## 2019-09-20 DIAGNOSIS — I63.9 CEREBROVASCULAR ACCIDENT (CVA), UNSPECIFIED MECHANISM (HCC): ICD-10-CM

## 2019-09-20 DIAGNOSIS — Z78.9 IMPAIRED MOBILITY AND ADLS: Primary | ICD-10-CM

## 2019-09-20 DIAGNOSIS — R47.01 ANOMIC APHASIA: ICD-10-CM

## 2019-09-20 DIAGNOSIS — Z98.890 HISTORY OF LEFT-SIDED CAROTID ENDARTERECTOMY: ICD-10-CM

## 2019-09-20 DIAGNOSIS — E78.2 MIXED HYPERLIPIDEMIA: ICD-10-CM

## 2019-09-20 DIAGNOSIS — I63.9 CEREBROVASCULAR ACCIDENT (CVA), UNSPECIFIED MECHANISM (HCC): Primary | ICD-10-CM

## 2019-09-20 DIAGNOSIS — I65.22 SYMPTOMATIC STENOSIS OF LEFT CAROTID ARTERY: Primary | ICD-10-CM

## 2019-09-20 PROCEDURE — 97116 GAIT TRAINING THERAPY: CPT | Performed by: PHYSICAL THERAPIST

## 2019-09-20 PROCEDURE — 92507 TX SP LANG VOICE COMM INDIV: CPT | Performed by: SPEECH-LANGUAGE PATHOLOGIST

## 2019-09-20 PROCEDURE — 97530 THERAPEUTIC ACTIVITIES: CPT

## 2019-09-20 PROCEDURE — 97110 THERAPEUTIC EXERCISES: CPT | Performed by: PHYSICAL THERAPIST

## 2019-09-20 PROCEDURE — 99215 OFFICE O/P EST HI 40 MIN: CPT | Performed by: PSYCHIATRY & NEUROLOGY

## 2019-09-20 PROCEDURE — 97112 NEUROMUSCULAR REEDUCATION: CPT | Performed by: PHYSICAL THERAPIST

## 2019-09-20 RX ORDER — ATORVASTATIN CALCIUM 40 MG/1
80 TABLET, FILM COATED ORAL DAILY
Qty: 90 TABLET | Refills: 2 | Status: SHIPPED | OUTPATIENT
Start: 2019-09-20 | End: 2019-09-24 | Stop reason: SDUPTHER

## 2019-09-20 NOTE — PROGRESS NOTES
Patient ID: Everett Guzman is a 78 y o  female  Assessment/Plan:    77 y/o Female who is here as a hospital follow up for history of L MCA stroke, patient has symptomatic L carotid stenosis  Patient underwent L CEA June 2018  She had rehab for 3 weeks and has been doing well since then  No new TIA/CVA like symptoms  PLAN:      Problem List Items Addressed This Visit        Cardiovascular and Mediastinum    CVA (cerebral vascular accident) ([facility] [address] )     -for secondary stroke prevention, c/w with combination of aspirin, plavix and atorvastatin, from neurology standpoint patient likely only needs monotherapy, but will defer to vascular surgery  -BP goal < 130/80, BP elevated today bc she has not taken her pills for 2 days  I did speak to her today regarding compliance and she is aware and is in agreement and wants to be compliant, she is not symptomatic at this time    -Defer to PCP regarding DM and BP management  -does not smoke  -denies any history of snoring    -Continue with PT/OT and Speech therapy twice a week  -I advised patient to avoid using NSAIDs for headaches or other pain and instead to just stick to tylenol    -I educated regarding mediterranean style diet and regular exercise regimen   -I educated patient/family regarding medication compliance           Symptomatic stenosis of left carotid artery - Primary     S/p R CEA June 2019            Other    Hyperlipidemia    Relevant Medications    atorvastatin (LIPITOR) 40 mg tablet    History of left-sided carotid endarterectomy     -patient following vascular surgery  -maintained on aspirin, plavix and atorvastatin              I would like to follow up in 1 year  I would be happy to see the patient sooner if any new questions/concerns arise  Patient/Guardian was advised to the call the office if they have any questions and concerns in the meantime       Patient/Guardian does understand that if they have any new stroke like symptoms such as facial droop on one side, weakness/paralysis on either side, speech trouble, numbness on one side, balance issues, any vision changes, extreme dizziness or any new headache, to call 9-1-1 immediately or to proceed to the nearest ER immediately  Subjective:    HPI    70-year-old female with hypertension, hyperlipidemia, smoking history who is here as a hospital follow-up of left MCA stroke  I reviewed her hospital records which state that she presented on 06/20 with stroke-like symptoms  She felt extremely fatigued and had balance issues  She had difficulty walking more than usual   She mainly had issues with her right hand noticed clumsiness and also had difficulty with coordinating of her movements on the right side  She was a stroke alert but was subsequently canceled due to her symptoms beginning 2 weeks prior to the arrival   Blood pressure in the ED was 207/100  She had a CT head which I personally viewed the images of which was not showed a subacute to chronic left MCA stroke  CT head and neck showed a completely occluded right ICA  She was also found to have approximately 50% stenosis of the left carotid bifurcation and proximal cervical ICA  Vascular surgery was consulted  Patient was admitted for stroke workup  Vascular surgery recommended dual antiplatelets and cardiac clearance for possible left ICA  She also had underlying anemia gastroenterology did perform endoscopy colonoscopy the next day  Etiology of the stroke was thought to be left ICA which is symptomatic patient agreed to was planned for either carotid artery stent versus Cerebrovascular accident patient was restarted on aspirin after GI cleared the patient  Patient then underwent CEA of the left carotid on 06/23/2019  She did have right-sided weakness associated of getting rehab for 3 weeks  She was compliant with her medications  She then followed up with vascular surgery as well for postop state appointment    She is maintained on aspirin, Plavix and Lipitor current lip  She is doing well  She denies any TIA/Cerebrovascular accident like symptoms  She does have elevated blood pressure today and does take did take her blood pressure medication this morning  And she does not have any symptoms with her elevated blood pressure  She denies any new TIA/CVA like symptoms  She still has mild right hand weakness, and she has started eating with her R hand, she has a problem with cutting  She is not cooking  She is not driving  She uses a cane for balance issues  She has mild R leg weakness as well  The following portions of the patient's history were reviewed and updated as appropriate:   She  has a past medical history of CVA (cerebral vascular accident) ([facility] [address] ) and Hypertension  She   Patient Active Problem List    Diagnosis Date Noted    History of left-sided carotid endarterectomy 08/15/2019    Impaired mobility and ADLs 07/04/2019    Iron deficiency anemia 06/24/2019    CVA (cerebral vascular accident) ([facility] [address] ) 06/19/2019    Anemia, unspecified 06/19/2019    Abnormal CT scan 06/19/2019    Symptomatic stenosis of left carotid artery 06/19/2019    Hyperlipidemia 11/12/2015    Cataracts, bilateral 10/26/2015    Essential hypertension 10/26/2015     She  has a past surgical history that includes CAROTID ENDARTARECTOMY (Left, 6/28/2019)  Her family history is not on file  She  reports that she has quit smoking  Her smoking use included cigarettes  She has a 12 50 pack-year smoking history  She has never used smokeless tobacco  She reports that she drank alcohol  She reports that she does not use drugs    Current Outpatient Medications   Medication Sig Dispense Refill    aspirin 81 mg chewable tablet Chew 81 mg daily      atorvastatin (LIPITOR) 40 mg tablet Take 2 tablets (80 mg total) by mouth daily 90 tablet 2    carvedilol (COREG) 3 125 mg tablet Take 1 tablet (3 125 mg total) by mouth 2 (two) times a day with meals 60 tablet 3    clopidogrel (PLAVIX) 75 mg tablet Take 1 tablet (75 mg total) by mouth daily 30 tablet 3    ferrous sulfate 325 (65 Fe) mg tablet Take 1 tablet (325 mg total) by mouth 2 (two) times a day with meals 60 tablet 3    Garlic 279 MG TABS Take 100 mg by mouth daily      NIFEdipine (ADALAT CC) 30 MG 24 hr tablet Take 1 tablet (30 mg total) by mouth daily 30 tablet 3    pantoprazole (PROTONIX) 40 mg tablet Take 1 tablet (40 mg total) by mouth daily in the early morning 30 tablet 0     No current facility-administered medications for this visit  Current Outpatient Medications on File Prior to Visit   Medication Sig    aspirin 81 mg chewable tablet Chew 81 mg daily    carvedilol (COREG) 3 125 mg tablet Take 1 tablet (3 125 mg total) by mouth 2 (two) times a day with meals    clopidogrel (PLAVIX) 75 mg tablet Take 1 tablet (75 mg total) by mouth daily    ferrous sulfate 325 (65 Fe) mg tablet Take 1 tablet (325 mg total) by mouth 2 (two) times a day with meals    Garlic 820 MG TABS Take 100 mg by mouth daily    NIFEdipine (ADALAT CC) 30 MG 24 hr tablet Take 1 tablet (30 mg total) by mouth daily    pantoprazole (PROTONIX) 40 mg tablet Take 1 tablet (40 mg total) by mouth daily in the early morning    [DISCONTINUED] atorvastatin (LIPITOR) 40 mg tablet Take 2 tablets (80 mg total) by mouth daily     No current facility-administered medications on file prior to visit  She has No Known Allergies            Objective:    Blood pressure (!) 178/81, pulse 67, height 5' 1" (1 549 m), weight 73 kg (161 lb)  Physical Exam  General - Patient is alert, awake, follows commands  Speech - no dysarthria noted, no aphasia noted  Neuro:   Cranial nerves: PERRL, EOMI, no facial droop noted, facial sensation intact, tongue midline  Motor: mild R hand weakness noted but 5/5 rest of the examination     Sensory - intact to soft touch throughout  Reflexes - 2+ throughout  Coordination - no ataxia/dysmetria noted  Gait - mild hemiparetic gait    ROS:  I personally reviewed ROS  Review of Systems   Constitutional: Negative  HENT: Negative  Eyes: Negative  Respiratory: Negative  Cardiovascular: Negative  Gastrointestinal: Negative  Endocrine: Negative  Genitourinary: Negative  Musculoskeletal: Positive for gait problem  Skin: Negative  Allergic/Immunologic: Negative  Hematological: Negative  Psychiatric/Behavioral: Negative

## 2019-09-20 NOTE — PROGRESS NOTES
Daily Note     Today's date: 2019  Patient name: Meaghan Lopez  : 1940  MRN: [id number]  Referring provider: Shai Joe MD  Dx:   Encounter Diagnosis     ICD-10-CM    1  Impaired mobility and ADLs Z74 09        Subjective: No new complaints  Objective: See treatment diary below      Assessment: Pt was significantly fatigued by end of outdoor walk and required several standing rest breaks after going up incline  Plan to complete next session to improve overall endurance lvels  Pt had increase in R foot catching groudn once she was fatigued  Plan: Progress treatment as tolerated         Short Term Goal Expiration Date:(2019)  Long Term Goal Expiration Date: (10/25/2019)  POC Expiration Date: (10/25/2019)              Manual                                                                                          Exercise Diary       sts 3 x 15  3x10     Side stepping otb 3 laps with UE    No UE 2 laps No UE 3 laps 3 laps no UE     Hip flex        Hip ext        Heel raise        Toe raise        Step ups  8" 2x10 fwd/lat  8" 2x10 fwd/lat 8" 15x ea fwd/lat     hurdles Attempted at end of session but deferred due to fatigue Fwd/lateral, 3 laps      NUstep L5, 10 min  L5, 15 min 15 min L5     treadmill        HK marches 3 laps  3 laps      Step taps        backwards Fw/bw 2 x 40'       lunges        Tandem walking  3 laps      Alt step taps 8" 30x from floor  Unable       15 min walk outside with SPC incline   15 min

## 2019-09-20 NOTE — ASSESSMENT & PLAN NOTE
-for secondary stroke prevention, c/w with combination of aspirin, plavix and atorvastatin, from neurology standpoint patient likely only needs monotherapy, but will defer to vascular surgery  -BP goal < 130/80, BP elevated today bc she has not taken her pills for 2 days  I did speak to her today regarding compliance and she is aware and is in agreement and wants to be compliant, she is not symptomatic at this time    -Defer to PCP regarding DM and BP management  -does not smoke  -denies any history of snoring    -Continue with PT/OT and Speech therapy twice a week     -I advised patient to avoid using NSAIDs for headaches or other pain and instead to just stick to tylenol    -I educated regarding mediterranean style diet and regular exercise regimen   -I educated patient/family regarding medication compliance

## 2019-09-20 NOTE — PROGRESS NOTES
Daily Note     Today's date: 2019  Patient name: Gwendolyn Peña  : 1940  MRN: [id number]  Referring provider: Manuel Melgoza MD  Dx:   Encounter Diagnosis     ICD-10-CM    1  Cerebrovascular accident (CVA), unspecified mechanism ([facility] [address] ) I63 9                   Subjective: 'My dr says I am doing great "       Objective: See treatment diary below  Pt colored template based off of an example marked w/ the first letter of the color followed by re creating the image using parquetry pieces  Activity upgraded to have pt followed blank template to re create image w/ parquetry pieces focusing on directionality, constructional apraxia, and orientation of pieces, w/ verbal direction follow  Pt constructed tower using jenga pieces utilizing RUE to place blocks 3 across focusing on FM skills, in hand manipulation, proprioceptive input w/ hand to target demands, motor planning  Pt completed Pipetree tower fig 3 to note carryover from last weeks session  Assessment: Tolerated treatment well  Patient would benefit from continued OT  Pt replicated color coded template utilizing parquetry blocks w/out assistance  Pt demo increase difficulty following blank template for construction of same activity  Pt unable to place jenga pieces w/ only RUE often req' LUE for stabilization of placing block on tower  Pt took 2 attempts to built tower due to knocking it over  Improvement noted from last session, however, pt req' max V/C to assemble pipetree with 1 step directions, no carryover noted  Plan: Continue per plan of care  INTERVENTION COMMENTS:  Diagnosis: CVA  Precautions: HTN, fall risk  FOTO: N/A  Insurance: Payor: Zane Ellis REP / Plan[facility] Rakan Sullivan [facility] / Product Type: Medicare PPO /   5 of 10 visits, PN due 2019    Pt was treated by OVIDIO Jones, under direct supervision of TIFFANIE Castro/KATHARINA

## 2019-09-22 NOTE — PROGRESS NOTES
Speech-Language Pathology Re-Evaluation    Today's date: 2019   Patients name: Erik Leary  : 1940  MRN: [id number]  Safety measures: fall risk, CVA  Referring provider: Kirit Sanon MD    Subjective comments: "How are you dear "    Patient's goal(s): "I want to keep my mind a little sharper"    Assessments      The Repeatable Battery for the Assessment of Neuropsychological Status (RBANS) is a brief, individually-administered assessment which measures attention, language, visuospatial/constructional abilities, and immediate & delayed memory  The RBANS is intended for use with adolescents to adults, ages 15 to 80 years  The following results were obtained during the administration of the assessment  Form: B    Cognitive Domain/Subtest: Index Score: Percentile Rank: Classification: RE: Status:   IMMEDIATE MEMORY 90 25%ile Average 90 NO CHANGE        1  List Learning ()          2  Story Memory ()           VISUOSPATIAL/  CONSTRUCTIONAL 50 <0 1%ile Extremely Low 50 NO CHANGE        3  Figure Copy (10/20)          4  Line Orientation (3/20)           LANGUAGE 96 39%ile Average 105 DECLINE        5  Picture Naming (10/10)          6  Semantic Fluency ()           ATTENTION 64 1%ile Extremely Low 60 IMPROVEMENT        7  Digit Span ()          8  Coding ()           DELAYED MEMORY 68 2%ile Extremely Low 60 IMPROVEMENT        9  List Recall (3/10)          10  List Recognition ()          11  Story Recall ()          12  Figure Recall ()           Sum of Index Scores:  368  365 IMPROVEMENT   Total Score:  67      Percentile: 1%ile      Classification: Extremely Low          RE indicates the scores from the re-evaluation (2019)  Form: A   Goals    Short-term goals:  1   Patient will complete thought organization tasks (e g , sequencing, deduction puzzles, etc ) with 80% accuracy to facilitate increased executive functioning skills, to be achieved in 4-6 weeks  PARTIALLY MET    2  Patient will complete auditory immediate and short term memory tasks to 80% accuracy to facilitate increased ability to retell narratives and recall information within functional living environment, to be achieved in 4-6 weeks PARTIALLY MET    3  Patient will complete complex auditory attention processing tasks (e g , sentence unscramble, ranking numbers/words, etc ) to improve working memory with 80% accuracy, to be achieved in 4-6 weeks  NEW GOAL    4  Patient will facilitate planning by completing thought organization tasks (e g , sequencing, deduction puzzles, etc ) with 80% accuracy to facilitate increased executive functioning, working memory, problem solving, and processing skills, to be achieved in 4-6 weeks  NEW GOAL    5  Patient will demonstrate alternating attention by being able to shift focus of attention between two tasks with min cues in a distracting environment with 80% accuracy, to be achieved in 4-6 weeks  NEW GOAL    Long-term goals: 1  Patient will demonstrate cognitive-communication skills consistent with age and education given use of compensatory strategies when needed to resume baseline activities and responsibilities in home, community, and work/school settings by discharge  PARTIALLY MET    2  Patient will develop functional, cognitive-linguistic based skills and utilize compensatory strategies as needed to communicate effectively to different conversational partners, maintain safety, and participate socially by discharge  PARTIALLY MET    3  Patient will demonstrate adequate verbal expression during conversation without breakdowns or word finding deficits by discharge  PARTIALLY MET    Impressions/Recommendations    Impressions: Patient continues to present with moderate cognitive-linguistic deficits c/b reduced attention, thought organization, problem solving/planning, and visuospatial skills as well as reduced delayed recall   Patient reports she has noticed improvement in her overall memory stating "i'm remembering more"  The RBANS was administered on this DOS and patient showed improvement in the following cognitive domains: Attention and delayed memory as well as overall index score  She is motivated during the session and completes HEP  New goals have been added to work on attention and executive function  She continues to be a good candidate for therapy  Recommendations:  -Patient would benefit from outpatient skilled Speech Therapy services : Cognitive-Linguistic therapy    -Frequency: 1-2x weekly  -Duration: 4-6 weeks    -Intervention certification from: 2/22/5331  -Intervention certification to: 37/30/7821    -Intervention comments: Test Administration  Scoring 60 minutes and Interpretation completed from 60 minutes    Visit Tracking:  -Referring provider: Epic  -Billing guidelines: CMS  -Visit #14  -Edilia POWELL due 11/05/2019

## 2019-09-23 DIAGNOSIS — E78.2 MIXED HYPERLIPIDEMIA: ICD-10-CM

## 2019-09-23 NOTE — TELEPHONE ENCOUNTER
Received a call from the patient's daughter stating she needs her prescription to be written different  Patient's Barbara Watson states she has a script for Atorvastatin 40 mg tablet take 2 tablets daily  The insurance will not pay for 2 tablets daily  They will pay for Atorvastatin 80 mg 1 tablet daily       Please send to [facility] on [address]    Thank you

## 2019-09-24 ENCOUNTER — EVALUATION (OUTPATIENT)
Dept: OCCUPATIONAL THERAPY | Facility: CLINIC | Age: 79
End: 2019-09-24
Payer: COMMERCIAL

## 2019-09-24 ENCOUNTER — EVALUATION (OUTPATIENT)
Dept: PHYSICAL THERAPY | Facility: CLINIC | Age: 79
End: 2019-09-24
Payer: COMMERCIAL

## 2019-09-24 ENCOUNTER — EVALUATION (OUTPATIENT)
Dept: SPEECH THERAPY | Facility: CLINIC | Age: 79
End: 2019-09-24
Payer: COMMERCIAL

## 2019-09-24 DIAGNOSIS — Z78.9 IMPAIRED MOBILITY AND ADLS: Primary | ICD-10-CM

## 2019-09-24 DIAGNOSIS — R47.01 ANOMIC APHASIA: ICD-10-CM

## 2019-09-24 DIAGNOSIS — Z74.09 IMPAIRED MOBILITY AND ADLS: Primary | ICD-10-CM

## 2019-09-24 DIAGNOSIS — R48.8 OTHER SYMBOLIC DYSFUNCTIONS: Primary | ICD-10-CM

## 2019-09-24 DIAGNOSIS — I63.9 CEREBROVASCULAR ACCIDENT (CVA), UNSPECIFIED MECHANISM (HCC): Primary | ICD-10-CM

## 2019-09-24 DIAGNOSIS — I63.9 CEREBROVASCULAR ACCIDENT (CVA), UNSPECIFIED MECHANISM (HCC): ICD-10-CM

## 2019-09-24 PROCEDURE — 97112 NEUROMUSCULAR REEDUCATION: CPT | Performed by: PHYSICAL THERAPIST

## 2019-09-24 PROCEDURE — 96125 COGNITIVE TEST BY HC PRO: CPT

## 2019-09-24 PROCEDURE — 97110 THERAPEUTIC EXERCISES: CPT | Performed by: PHYSICAL THERAPIST

## 2019-09-24 PROCEDURE — 97530 THERAPEUTIC ACTIVITIES: CPT

## 2019-09-24 NOTE — PROGRESS NOTES
Assessment/Plan:  1  Closed fracture of distal end of right radius with routine healing, unspecified fracture morphology, subsequent encounter         Scribe Attestation    I,:   Bibiana Maurer MA am acting as a scribe while in the presence of the attending physician :        I,:   Pat Hoffman DO personally performed the services described in this documentation    as scribed in my presence :              [address] is doing well  X-rays are unchanged from previous films  Patient states his long arm cast was rubbing against his skin  This cast was removed and a new one was placed  He will follow up in 2 weeks for repeat evaluation and repeat x-ray out of the cast     Subjective:   Paul Bustillo is a 5 y o  male who presents to the office today for follow up evaluation 1 week s/p closed treatment for a right buckle plus fracture sustained on 9/14/19  Patient states he has been compliant with cast use  He denies any pain  He denies any numbness or tingling  Review of Systems   Constitutional: Negative for chills and fever  HENT: Negative for sneezing and sore throat  Eyes: Negative for pain and redness  Respiratory: Negative for shortness of breath and wheezing  Cardiovascular: Negative for chest pain and palpitations  Gastrointestinal: Negative for nausea and vomiting  Musculoskeletal: Negative for arthralgias, joint swelling and myalgias  Neurological: Negative for dizziness, numbness and headaches  Psychiatric/Behavioral: Negative for decreased concentration  The patient is not nervous/anxious  Past Medical History:   Diagnosis Date    Allergic rhinitis        No past surgical history on file      Family History   Problem Relation Age of Onset    Asthma Father     Asthma Brother         exercise enduced        Social History     Occupational History    Not on file   Tobacco Use    Smoking status: Never Smoker    Smokeless tobacco: Never Used   Substance and Sexual Activity    Speech-Language Pathology Re-Evaluation    Today's date: 2019  Patients name: Efrain Sandoval  : 1940  MRN: [id number]  Safety measures: fall risk, CVA  Referring provider: Gayla Otoole MD    Subjective comments: "I love you girls" "I think my brain is tired today" "my brain is working better now though"    Patient's goal(s): "I want to keep my mind a little sharper"    Assessments    The Repeatable Battery for the Assessment of Neuropsychological Status (RBANS) is a brief, individually-administered assessment which measures attention, language, visuospatial/constructional abilities, and immediate & delayed memory  The RBANS is intended for use with adolescents to adults, ages 15 to 80 years  The following results were obtained during the administration of the assessment  Form: A    Cognitive Domain/Subtest: Index Score: Percentile Rank: Classification: IE: Status:   IMMEDIATE MEMORY 90 25%ile Average 78 IMPROVEMENT        1  List Learning ()          2  Story Memory ()           VISUOSPATIAL/  CONSTRUCTIONAL 50 < 1%ile Extremely Low 50 NO CHANGE        3  Figure Copy ()          4  Line Orientation ()           LANGUAGE 105 63%ile Average 68 IMPROVEMENT        5  Picture Naming (10/10)          6  Semantic Fluency ()           ATTENTION 60  4%ile Extremely Low 75 DECLINE        7  Digit Span ()          8  Coding ()           DELAYED MEMORY 60  4%ile Extremely Low 85 DECLINE        9  List Recall (0/10)          10  List Recognition (15/20)          11  Story Recall ()          12  Figure Recall (3/20)           Sum of Index Scores:  365  356 IMPROVEMENT   Total Score:  66      Percentile: 1%ile      Classification: Extremely Low          IE indicates the scores from the initial evaluation (19)  Form: C    *Patient named 21 concrete category members (animals) in 60 sec (norm=15+)   -- AVERAGE    *Patient named 10 abstract category members (words Alcohol use: Not on file    Drug use: Not on file    Sexual activity: Not on file         Current Outpatient Medications:     Acetaminophen (TYLENOL CHILDRENS PO), Take by mouth, Disp: , Rfl:     Ibuprofen (CHILDRENS MOTRIN PO), Take by mouth, Disp: , Rfl:     No Known Allergies    Objective: There were no vitals filed for this visit  Ortho Exam     Right wrist    No wounds  Long arm cast intact  Compartments soft  Brisk capillary refill  S/m intact median, radial, and ulnar nerve     Physical Exam   Constitutional: He appears well-developed and well-nourished  HENT:   Head: Atraumatic  Eyes: Conjunctivae are normal  Right eye exhibits no discharge  Left eye exhibits no discharge  Neck: Normal range of motion  Neck supple  Cardiovascular: Regular rhythm  Pulmonary/Chest: Effort normal  No respiratory distress  Musculoskeletal:   As noted in HPI   Neurological: He is alert  Skin: Skin is warm and dry  I have personally reviewed pertinent films in PACS and my interpretation is as follows:X-ray right wrist performed in the office today are unchanged from previous films    This demonstrates acceptable alignment for a distal radius fracture 5year-old patient beginning with letter 'm') in 60 sec (norm=10+)  -- AVERAGE    Goals    Short-term goals:  1  Patient will complete thought organization tasks (e g , sequencing, deduction puzzles, etc ) with 80% accuracy to facilitate increased executive functioning skills, to be achieved in 4-6 weeks  NOT MET    2  Patient will complete auditory immediate and short term memory tasks to 80% accuracy to facilitate increased ability to retell narratives and recall information within functional living environment, to be achieved in 4-6 weeks PARTIALLY MET    3  Patient will be educated on word finding strategies (i e , circumlocution) for improved generative naming and verbal expression skills, to be achieved in 4-6 weeks  MET    4  Patient will complete concrete and abstract categorization tasks to 80% accuracy to facilitate improved generative naming skills and working memory, to be achieved in 4-6 weeks MET    5  To target mental manipulation and working memory, patient will participate in word finding activity (i e , anagrams) with 80% accuracy, to be achieved in 4-6 weeks  MET    6  Patient will name an average of 15+ items in a category in 60 seconds over 5 trials using compensatory strategies and min cues to facilitate improved word retrieval skills, to be achieved in 4-6 weeks  MET    7  Patient will name an average of 10+ words that begin with a specific letter in 60 seconds over 5 trials using compensatory strategies and min cues to facilitate improved word retrieval skills, to be achieved in 4-6 weeks  MET    Long-term goals: 1  Patient will demonstrate cognitive-communication skills consistent with age and education given use of compensatory strategies when needed to resume baseline activities and responsibilities in home, community, and work/school settings by discharge  PARTIALLY MET    2   Patient will develop functional, cognitive-linguistic based skills and utilize compensatory strategies as needed to communicate effectively to different conversational partners, maintain safety, and participate socially by discharge  PARTIALLY MET    3  Patient will demonstrate adequate verbal expression during conversation without breakdowns or word finding deficits by discharge  PARTIALLY MET    Impressions/Recommendations    Impressions: Patient continues to present with moderate cognitive-linguistic deficits c/b reduced attention, thought organization, problem solving/planning, and visuospatial skills as well as reduced delayed recall  Patients vision is being targeted by OT and the areas of attention and executive functions have not yet been targeted by either discipline  New goals have been added to work on this as she has goals related to language  Will also continue to work on delayed memory as she has also met goals for immediate memory  Patient is eager to return to living independently which she expressed today however would continue from ongoing therapy at this time  Recommendations and test results as well as progress ( including initial scores compared to todays scores) were reviewed  Reciprocal comprehension was verbally expressed  Recommendations:  -Patient would benefit from outpatient skilled Speech Therapy services : Cognitive-Linguistic therapy    -Frequency: 1-2x weekly  -Duration: 4-6 weeks    -Intervention certification from: 3/95/0359  -Intervention certification to: 11/4/4644    -Intervention comments: Test Administration from 1:00-1:45  Scoring and Interpretation completed from 1:45-2:00p    Visit Tracking:  -Referring provider: Epic  -Billing guidelines: CMS  -Visit #8  -Ashok Ranch     -MORENITA due 9/23/19    ANDREA Knowles , [id number] [facility]  Speech Language Pathologist   [phone]

## 2019-09-24 NOTE — PROGRESS NOTES
Discharge Note     Today's date: 2019  Patient name: Everett Guzman  : 1940  MRN: [id number]  Referring provider: Synetta Spatz, MD  Dx:   Encounter Diagnosis     ICD-10-CM    1  Impaired mobility and ADLs Z74 [id number]        Subjective:Is living at home alone and feels she is prepared for discharge today  Completes exercises at home regularly without difficulty and walks frequently  Objective: See treatment diary below       Functional outcomes   6 minute walk test: 525 ft spc  5x sit to stand: 20sec no ue (seconds)  TUG: 15 28 SPC (seconds)     5x sts- 9 87 sec no ue  TUG - 10 12 sec SPC   6 mwt - 700 ft with SPC    5x sts- 9 12 sec  Tug - 11 46 sec spc   6mwt - 625 ft spc     Assessment: Since initial evaluation patient has made significan timprovements in  per 5x sit to stand and tug testing, improved endurance and gait efficiency per 6 minute wlak test, and is independent with HEP  However patient continues to benefit form using SPC  Progress has slowed over the last 4 weeks  She will be discharged home to [facility] at this time with goals mostly met       Goals  STG  Pt will improve 5x sit to stand to 13 seconds within 4 weeks - MET  Pt will improve 6 MWT to 700 ft wihtin 4 weeks - MET  Pt will improve R Hip abduction strength to 4/5 within 4 weeks- NOT MET    LTG  Pt will be able to ambulate without SPC within 8-12 weeks  Pt will report no near falls within 8-12 weeks - not met   Pt will complete 6 MWT in 900 ft within 8-12 weeks - not met      Plan: Progress treatment as tolerated         Short Term Goal Expiration Date:(2019)  Long Term Goal Expiration Date: (10/25/2019)  POC Expiration Date: (10/25/2019)              Manual                                                                                          Exercise Diary      sts 3 x 15  3x10     Side stepping otb 3 laps with UE    No UE 2 laps No UE 3 laps 3 laps no UE     Hip flex        Hip ext        Heel raise        Toe raise        Step ups  8" 2x10 fwd/lat  8" 2x10 fwd/lat 8" 15x ea fwd/lat     hurdles Attempted at end of session but deferred due to fatigue Fwd/lateral, 3 laps      NUstep L5, 10 min  L5, 15 min 15 min L5 15 min L5    treadmill        HK marches 3 laps  3 laps      Step taps        backwards Fw/bw 2 x 40'       lunges        Tandem walking  3 laps      Alt step taps 8" 30x from floor  Unable       15 min walk outside with SPC incline   15 min  15 min SPC

## 2019-09-24 NOTE — PROGRESS NOTES
Occupational Therapy Stroke Progress Note/Status Update:     Today's Date: 2019  Patient Name: Hurshel Fabry  : 1940  MRN: [id number]  Referring Provider: Kamlesh Fernandez MD  Dx: Cerebrovascular accident (CVA), unspecified mechanism ([facility] ) [I63 9]     Active Problem List:       Patient Active Problem List   Diagnosis    Cataracts, bilateral    Essential hypertension    Hyperlipidemia    CVA [facility] vascular [facility]) ([facility] )    Anemia, unspecified    Abnormal CT scan    Symptomatic stenosis of left carotid artery    Iron deficiency anemia    Impaired mobility and ADLs    History of left-sided carotid endarterectomy      Past Medical Hx:   Medical History        Past Medical History:   Diagnosis Date    CVA (cerebral vascular accident) ([facility] )      Hypertension           Past Surgical Hx:   Surgical History         Past Surgical History:   Procedure Laterality Date    CAROTID ENDARTARECTOMY Left 2019     Procedure: ENDARTERECTOMY ARTERY CAROTID WITH PATCH ANGIOPLASTY;  Surgeon: Diego Solis MD;  Location: [facility];  Service: Vascular         Pain Levels:  Restin     With Activity:  0     Subjective/Patient Goal: "To get my hand more coordinated yet"     History of Present Illness:  Pt is S 35 y o  female who was referred to Occupational Therapy s/p acute left MCA stroke  As per hospital report, Pt was admitted on 2019 due to right hand weakness and right-sided facial droop  Imaging (MRI) revealed left sided infarcts to the parietal and frontal regions of the brain  In addition Pt presented with ulcerated plaque at the L cervical carotid bifurcation; CTA revealed a right ICA occlusion   Vascular surgery was consulted, and recommendation made for a left CEA, performed on 7/10 without complication  During admission Pt was noted to have ABLA, for which scope was performed revealing AVMs in the stomach and duodenum; argon plasma coagulation was performed   Pt was also transfused at the time  During her acute admission, Pt had a fall off of comode; CTOH showed right frontal hematoma, but no active bleed  Pt received OT, PT, and SLP while at [facility]  Pt was discharge from hospital on 07/22/2019 with supervision/assistance at home       Pt presents with RUE weakness proximal to distal, decreased FMC/prehension, decreased in-hand manipulation, R inattention, R apraxia, decreased direction following abilities, and difficulties with shoe tying and handwriting       Pt lives on the first floor of a two story home with her daughter, son-in-law and grandchildren  Pt wants to return to [facility], where she resides on the fifth floor---elevator accessible for use  Pt currently performing ADLs with supervision  Pt currently does not perform IADLs  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  Pt loss role of -  Pt relies on her daughter for transportation at this time      As of this month September of 2019, pt is living back at there apartment at [facility] and completing ADLs and IADLs indep on a daily basis with assist from dtrs for finance management and higher level meal prep tasks         Objective  Impairments Section:  UE Strength:              GRACE: RUE: 50/200 LUE: 50/200              PINCER: 3 point pinch: RUE 10, LUE: 10              2 point pinch: RUE: 13, LUE: 13              Lateral pincher: RUE: 14, LUE: 13     Coordination:              9 HOLE PEG TEST:                RUE: 37 18 seconds, LUE: 30 15 Seconds                            Functional Dexterity Test:              RUE: 48 42 seconds, LUE: 29 0 Seconds                Range of Motion:  AROM: intact/full in all planes     PROM: intact/full in all planes     MMT: RUE 4+/5 LUE 4/5     Pt is R hand dominant     Sensation:  MYOFILAMENTS:              RUE: 4 31 (inconsistent reports)               LUE: 3 61        Vision Screening Recording Form:   vision screen: + glasses @ all times present for vision screen, h/o L cataract pending removal  near acuity:           R 20/50            L 20/800  binocularity far: orthophoria/orthotropia  binocularity near: orthophoria/orthotropia  red green fusion: PLRG @ 3"  near point of convergence: diplopia @ 4" w/ decreased B/L teaming of L eye, dec fixation   sean string: misalignment  Pursuits: dec comprehension of direction following, dec visual fixation with L head turn noted when attempting to fixate  Saccades: inaccurate in all planes  Apraxic visual coordination  ocular ROM: intact/fulll  visual perceptual midline shift: shifted to the L of midline and above horizon     Assessment/Plan  Occupational Therapy Skilled Analysis Assessment and Plan of Care:  Pt is S 33 y o  female referred to Occupational Therapy s/p acute left MCA stroke  Pt presents with RUE weakness proximal to distal, dcecreased R FMC/prehension, decreased R in-hand manipulation, R inattention, decreased B/L coordination, and R apraxia   Pt will benefit from skilled Occupational Therapy services 2x/week for 8-10 weeks with focus on neuro re-ed, self-care management, improved motor planning, NMES/FES, and HEP to improve on the above deficits, improve functional use of RUE in meaningful occupations, resume R hand dominance with daily life tasks, and enhance overall QOL     Pt seen for OT re-eval/progress note/status update  Pt continues to demonstrate /VM impairments as described, R inattention, RUE distal ataxia apraxia dysmetria dysadiadochokinesia, ideamotor apraxia, impaired FMC/FMS, dyscoordination  Continue to recommend ongoing treatment to address the following goals 2x/wk for 4 more weeks to address FMC/FMS, automaticity, in hand manipulation, coordination, apraxia, depth perception, /VM re-training, convergence and ocular motility  Pt seen 9/24/19 for re-evaluation and progress update   Pt continues to present with /VM impairments such as impaired visual fixation, apraxia, dysmetria, and dec convergence of L eye  Poor acuity from prior to CVA  Pt additionally presents with poor proprioceptive awareness, dec sensation, apraxia/ataxia, impaired FMC/GMC/GMS/FMS   Continue to recommend 4 wks of OT with pt preference to 1x/wk with re-evaluation due in 1 month to assess continued need for OT services     Short Term Goals:  · Pt will increase R prehension patterns for improved tripod with utensil management with <20% droppage 4 weeks-PARTIALLY MET  · Pt will demo with G carryover of Home Exercise Program to improve functional progression towards goals in Plan of care and for improved functional use of RUE 4 weeks-PARTIALLY MET  · Pt will increase automaticity of RUE with improved motor planning abilities of RUE x 25% for improved grasp release of tabletop items for improved functional performance with salient tasks 4 weeks-PARTIALLY MET  · Pt will increase rate of manipulation for all R FM tests for improved functional performance with salient tasks 4 weeks-PARTIALLY MET  · Pt will increase RUE to refined functional assist with <20% cuing for tabletop tasks for improved functional performance of life roles and salient tasks 4 weeks -PARTIALLY MET              - Pt will increase oculomotor control for improved saccades, con/divergent tasks for improved reading, board to table tasks with minimal increase in symptoms 4 weeks-PARTIALLY MET              -Pt will demo with improved attention to RUE x 50% for improved safety awareness and improved functional use 4 weeks-PARTIALLY MET           -  Pt will tolerate R NMES/FES for improved motor and sensory performance for overall improved hand to target with 50% accuracy 4 weeks-PARTIALLY MET     Long Term Goals:              -Pt will increase automaticity of RUE with improved motor planning abilities of LUE x 50% for improved        grasp release of tabletop items for improved functional performance with salient tasks 4 weeks-PARTIALLY MET              - Pt will increase rate of prehension for R FM tasks for improved use of utensils, writing, ADL fasteners-PARTIALLY MET  · Pt will resume R hand dominance to Mod I with all activities of daily living, IADLs, and meaningful occupations  · Pt will increase LUE strength to 5/5 and  strength R=L, through the use of strengthening exercises for improved functional performance with meaningful occupations and life roles-PARTIALLY MET              -Pt will demo with improved attention to RUE x 75% for improved safety awareness and improved functional  use of RUE  · Pt will increase oculomotor control for improved dynamic activities with head turns, board/screen to table tasks symptom free, improved VMI and return to baseline handwriting -PARTIALLY MET  · Pt will increase oculomotor control for WNL saccades, con/divergent tasks symptom free for overall enhanced QOL         -  Pt will tolerate R NMES/FES for improved motor and sensory performance for overall improved hand to target with 75% accuracy-PARTIALLY MET     INTERVENTION COMMENTS:  Diagnosis: CVA  Precautions: HTN, fall risk  FOTO: N/A  Ins[facility]: [facility] / Plan: Dre Monday [facility] REP / Product Type: Medicare PPO /   6 of 10 visits; PN due 10/24

## 2019-09-24 NOTE — TELEPHONE ENCOUNTER
Patient's daughter stopped into office regarding below  Please review and sign if appropriate  Thank you!

## 2019-09-25 RX ORDER — ATORVASTATIN CALCIUM 80 MG/1
80 TABLET, FILM COATED ORAL DAILY
Qty: 90 TABLET | Refills: 3 | Status: SHIPPED | OUTPATIENT
Start: 2019-09-25 | End: 2020-09-11

## 2019-09-27 ENCOUNTER — APPOINTMENT (OUTPATIENT)
Dept: SPEECH THERAPY | Facility: CLINIC | Age: 79
End: 2019-09-27
Payer: COMMERCIAL

## 2019-09-27 ENCOUNTER — APPOINTMENT (OUTPATIENT)
Dept: OCCUPATIONAL THERAPY | Facility: CLINIC | Age: 79
End: 2019-09-27
Payer: COMMERCIAL

## 2019-09-27 ENCOUNTER — APPOINTMENT (OUTPATIENT)
Dept: PHYSICAL THERAPY | Facility: CLINIC | Age: 79
End: 2019-09-27
Payer: COMMERCIAL

## 2019-10-03 NOTE — PROGRESS NOTES
Daily Speech Treatment Note    Today's date: 10/8/2019   Patients name: Brissa Pickard  : 1940  MRN: [id number]  Safety measures: fall risk, CVA  Referring provider: Jayson Altman MD    Primary Diagnosis/Billing code: U06 2  Secondary Diagnosis/ Billing code: I63 9, R47 01    Visit Tracking:  -Referring provider: Epic  -Billing guidelines: CMS  -Visit #15  -Nate [employer]  -RE due 2019    Subjective/Behavioral:  "I'm tired today "    Objective/Assessment:  Patient arrived with HEP completed-Sequencing steps completed with 80% acc independently  Short-term goals:  1  Patient will facilitate planning by completing thought organization tasks (e g , sequencing, deduction puzzles, etc ) with 80% accuracy to facilitate increased executive functioning, working memory, problem solving, and processing skills, to be achieved in 4-6 weeks  NEW GOAL    -To target sequential thought, patient was asked to fill in the missing letters to complete the words given  Task completed in  opp (77%acc) increasing to  (100%acc) with min semantic and phonemic cues  -Word Building-To target problem solving and processing skills, patient was asked to build a word based on clues given (ie , this letter is in film but not in filing) task completed in 10/18 opp (55%acc) increasing to  (100%acc) with moderate verbal cues from clinician  2  Patient will complete auditory immediate and short term memory tasks to 80% accuracy to facilitate increased ability to retell narratives and recall information within functional living environment, to be achieved in 4-6 weeks   -Paragraph memory task-Clinician read 2-3 sentence story aloud and patient was asked questions re story  She answered correctly in 4 5 opp (56%acc) independently, increasing to  given mod verbal cues and repetition of words       3  Patient will complete complex auditory attention processing tasks (e g , sentence unscramble, ranking numbers/words, etc ) to improve working memory with 80% accuracy, to be achieved in 4-6 weeks  NEW GOAL    5  Patient will demonstrate alternating attention by being able to shift focus of attention between two tasks with min cues in a distracting environment with 80% accuracy, to be achieved in 4-6 weeks  NEW GOAL    Patient complete divided attention task in which she was asked to alternate between numbers and shapes (ie , begin with number 1 in the square and connect to number 1 in [place] then connect to number 2 in [place] to number 2 in [place] etc) task completed with <30% acc, required max verbal cues from clinician to increase success, task was very difficult for patient she required explanation of task x3 to comprehend  Plan:  -Patient was provided with home exercises/activities to target goals in plan of care at the end of today's session   -Continue with current plan of care

## 2019-10-08 ENCOUNTER — OFFICE VISIT (OUTPATIENT)
Dept: SPEECH THERAPY | Facility: CLINIC | Age: 79
End: 2019-10-08
Payer: COMMERCIAL

## 2019-10-08 ENCOUNTER — OFFICE VISIT (OUTPATIENT)
Dept: OCCUPATIONAL THERAPY | Facility: CLINIC | Age: 79
End: 2019-10-08
Payer: COMMERCIAL

## 2019-10-08 DIAGNOSIS — R47.01 ANOMIC APHASIA: ICD-10-CM

## 2019-10-08 DIAGNOSIS — R48.8 OTHER SYMBOLIC DYSFUNCTIONS: Primary | ICD-10-CM

## 2019-10-08 DIAGNOSIS — I63.9 CEREBROVASCULAR ACCIDENT (CVA), UNSPECIFIED MECHANISM (HCC): Primary | ICD-10-CM

## 2019-10-08 DIAGNOSIS — I63.9 CEREBROVASCULAR ACCIDENT (CVA), UNSPECIFIED MECHANISM (HCC): ICD-10-CM

## 2019-10-08 PROCEDURE — 97530 THERAPEUTIC ACTIVITIES: CPT

## 2019-10-08 PROCEDURE — 92507 TX SP LANG VOICE COMM INDIV: CPT

## 2019-10-08 NOTE — PROGRESS NOTES
Daily Note     Today's date: 10/8/2019  Patient name: Norma Mercado  : 1940  MRN: [id number]  Referring provider: Gina Jerez MD  Dx:   Encounter Diagnosis   Name Primary?  Cerebrovascular accident (CVA), unspecified mechanism ([facility] ) Yes       Start Time: 1605  Stop Time: 1700  Total time in clinic (min): 55 minutes    Subjective: " I cant do that, I cant draw a triangle with the point down "      Objective: See treatment below  Patient completed following verbal multiple step direction worksheets to facilitate working memory and verbal direction following and comprehension  Patient engaged in iTrax critical thinking game by matching the pieces with the pattern to promote visual perception and hand to target and FM coordination/ manipulation  Assessment: Tolerated treatment well  Patient needed multiple step directions broken down to one step directions to be able to carry out verbal direction following task  Patient was able to complete iTrax task with good matching, patient had minimum dropping of pieces during task  Plan: Continued skilled OT per POC      INTERVENTION COMMENTS:  Diagnosis: Cerebrovascular accident (CVA), unspecified mechanism ([facility] ) [I63 9]  Precautions: HTN, fall risk  1 of 10 visits, PN due 10/24

## 2019-10-15 ENCOUNTER — OFFICE VISIT (OUTPATIENT)
Dept: OCCUPATIONAL THERAPY | Facility: CLINIC | Age: 79
End: 2019-10-15
Payer: COMMERCIAL

## 2019-10-15 ENCOUNTER — OFFICE VISIT (OUTPATIENT)
Dept: SPEECH THERAPY | Facility: CLINIC | Age: 79
End: 2019-10-15
Payer: COMMERCIAL

## 2019-10-15 DIAGNOSIS — I63.9 CEREBROVASCULAR ACCIDENT (CVA), UNSPECIFIED MECHANISM (HCC): Primary | ICD-10-CM

## 2019-10-15 DIAGNOSIS — R48.8 OTHER SYMBOLIC DYSFUNCTIONS: Primary | ICD-10-CM

## 2019-10-15 DIAGNOSIS — R47.01 ANOMIC APHASIA: ICD-10-CM

## 2019-10-15 DIAGNOSIS — I63.9 CEREBROVASCULAR ACCIDENT (CVA), UNSPECIFIED MECHANISM (HCC): ICD-10-CM

## 2019-10-15 PROCEDURE — 97530 THERAPEUTIC ACTIVITIES: CPT

## 2019-10-15 PROCEDURE — 92507 TX SP LANG VOICE COMM INDIV: CPT

## 2019-10-15 NOTE — PROGRESS NOTES
Daily Note     Today's date: 10/15/2019  Patient name: Eladia Briscoe  : 1940  MRN: [id number]  Referring provider: Feli Barney MD  Dx:   Encounter Diagnosis   Name Primary?  Cerebrovascular accident (CVA), unspecified mechanism ([facility] Yes                  Subjective: "Isn't that the same symbol?"      Objective: See treatment below  Engaged pt in symbol row copy with symbol sheet located on slantboard at L side, and using R 3-jaw-yolande and in-hand manipulation to retrieve/turn foam symbol cubes from far R side for R inattention, and constructing rows of symbol cubes at table ahead for verbal direction following,  skills, saccades, hand to target accuracy, [address], and in-hand manipulation skills  Required full hour to replicate 3 rows  Assessment: Tolerated treatment well  Pt arrived to session distractible to environment, requiring mod vc's to redirect and initiate task  Pt demo G sustained attention once task began  Pt initially required max vc's to use R not L hand to retrieve/turn pieces, but G carryover with mass practice  No droppage  Mod difficulty with  of symbols throughout task, with ~50% errors choosing correct symbols and requiring vc's to recognize and correct errors  Pt frequently referred to "$" as "S", star symbol as "cornstalk", "!" as letter "I", etc, frequently confusing "&" for "S"  Plan: Continue skilled OT per POC        INTERVENTION COMMENTS:  Diagnosis: Cerebrovascular accident (CVA), unspecified mechanism ([facility] ) [I63 9]  Precautions: HTN, fall risk  2 of 10 visits, PN due 10/24

## 2019-10-16 ENCOUNTER — APPOINTMENT (OUTPATIENT)
Dept: SPEECH THERAPY | Facility: CLINIC | Age: 79
End: 2019-10-16
Payer: COMMERCIAL

## 2019-10-16 ENCOUNTER — APPOINTMENT (OUTPATIENT)
Dept: OCCUPATIONAL THERAPY | Facility: CLINIC | Age: 79
End: 2019-10-16
Payer: COMMERCIAL

## 2019-10-22 ENCOUNTER — OFFICE VISIT (OUTPATIENT)
Dept: SPEECH THERAPY | Facility: CLINIC | Age: 79
End: 2019-10-22
Payer: COMMERCIAL

## 2019-10-22 ENCOUNTER — EVALUATION (OUTPATIENT)
Dept: OCCUPATIONAL THERAPY | Facility: CLINIC | Age: 79
End: 2019-10-22
Payer: COMMERCIAL

## 2019-10-22 DIAGNOSIS — I63.9 CEREBROVASCULAR ACCIDENT (CVA), UNSPECIFIED MECHANISM (HCC): Primary | ICD-10-CM

## 2019-10-22 DIAGNOSIS — R48.8 OTHER SYMBOLIC DYSFUNCTIONS: Primary | ICD-10-CM

## 2019-10-22 DIAGNOSIS — I63.9 CEREBROVASCULAR ACCIDENT (CVA), UNSPECIFIED MECHANISM (HCC): ICD-10-CM

## 2019-10-22 DIAGNOSIS — R47.01 ANOMIC APHASIA: ICD-10-CM

## 2019-10-22 PROCEDURE — 92507 TX SP LANG VOICE COMM INDIV: CPT

## 2019-10-22 PROCEDURE — 97530 THERAPEUTIC ACTIVITIES: CPT

## 2019-10-22 NOTE — LETTER
November 25, 2019    Kramermarie Phan, DO  [address]  [address]    Patient: Jasmyne Gonzales   YOB: 1940   Date of Visit: 10/22/2019     Encounter Diagnosis     ICD-10-CM    1  Cerebrovascular accident (CVA), unspecified mechanism ([facility] [address] ) I63 9 CANCELED: OT Plan of Care Cert/Re-cert       Dear Dr Zuniga Agent: Thank you for your recent referral of Jasmyne Gonzales  Please review the attached evaluation summary from Marlene's recent visit  Please verify that you agree with the plan of care by signing the attached order  If you have any questions or concerns, please do not hesitate to call  I sincerely appreciate the opportunity to share in the care of one of your patients and hope to have another opportunity to work with you in the near future  Sincerely,    Judd Watt OT      Referring Provider:     I certify that I have read the below Plan of Care and certify the need for these services furnished under this plan of treatment while under my care                      Williams Phan, [address] [place] [id number]  VIA Facsimile: [phone]        Occupational Therapy Stroke Progress Note/Status Update:     Today's Date: 10/22/19  Patient Lindsey Slaughter  TGT: [id number]  Referring New Gale MD  Dx: Cerebrovascular accident (CVA), unspecified mechanism ([facility] [address] ) [I63 9]     Active Problem List:         Patient Active Problem List   Diagnosis    Cataracts, bilateral    Essential hypertension    Hyperlipidemia    CVA (cerebral vascular accident) ([facility] [address] )    Anemia, unspecified    Abnormal CT scan    Symptomatic stenosis of left carotid artery    Iron deficiency anemia    Impaired mobility and ADLs    History of left-sided carotid endarterectomy      Past Medical Hx:   Medical History           Past Medical History:   Diagnosis Date    CVA (cerebral vascular accident) ([facility] [address] )      Hypertension           Past Surgical Hx:   Surgical History             Past Surgical History:   Procedure Laterality Date    CAROTID ENDARTARECTOMY Left 2019     Procedure: ENDARTERECTOMY ARTERY CAROTID WITH PATCH ANGIOPLASTY;  Surgeon: Kenia Solis MD;  Location: [facility];  Service: Vascular         Pain Levels:  Restin     With Activity:  0     Subjective/Patient Goal: "To get my hand more coordinated yet"     History of Present Illness:  Pt is R 86 y o  female who was referred to Occupational Therapy s/p acute left MCA stroke  As per hospital report, Pt was admitted on 2019 due to right hand weakness and right-sided facial droop  Imaging (MRI) revealed left sided infarcts to the parietal and frontal regions of the brain  In addition Pt presented with ulcerated plaque at the L cervical carotid bifurcation; CTA revealed a right ICA occlusion   Vascular surgery was consulted, and recommendation made for a left CEA, performed on  without complication  During admission Pt was noted to have ABLA, for which scope was performed revealing AVMs in the stomach and duodenum; argon plasma coagulation was performed  Pt was also transfused at the time  During her acute admission, Pt had a fall off of comode; CTOH showed right frontal hematoma, but no active bleed  Pt received OT, PT, and SLP while at [facility]  Pt was discharge from hospital on 2019 with supervision/assistance at home       Pt presents with RUE weakness proximal to distal, decreased FMC/prehension, decreased in-hand manipulation, R inattention, R apraxia, decreased direction following abilities, and difficulties with shoe tying and handwriting       Pt lives on the first floor of a two story home with her daughter, son-in-law and grandchildren  Pt wants to return to [facility], where she resides on the fifth floor---elevator accessible for use  Pt currently performing ADLs with supervision  Pt currently does not perform IADLs   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  Pt loss role of -  Pt relies on her daughter for transportation at this time      As of this month September of 2019, pt is living back at there apartment at [facility] and completing ADLs and IADLs indep on a daily basis with assist from dtrs for finance management and higher level meal prep tasks          Objective  Impairments Section:  UE Strength:              GRACE: RUE: 50/200 LUE: 50/200      RUE: 50/200   LUE: 50/200              PINCER: 3 point pinch: RUE 10, LUE: 10         RUE: 11   LUE: 11              2 point pinch: RUE: 13, LUE: 13     RUE: 13   LUE: 13              Lateral pincher: RUE: 14, LUE: 13      RUE: 14 LUE 13     Coordination:              9 HOLE PEG TEST:                RUE: 37 18 seconds, LUE: 30 15 Seconds   RUE: 36 44  Seconds    LUE: 25 54 seconds                 Functional Dexterity Test:              RUE: 48 42 seconds, LUE: 29 0 Seconds      RUE: 47 77  Seconds    LUE: 31 46 seconds             Range of Motion:  AROM: intact/full in all planes     PROM: intact/full in all planes     MMT: RUE 5/5 LUE 5/5     Pt is R hand dominant     Sensation:  MYOFILAMENTS:              RUE: 3 61 (inconsistent reports)               LUE: 3 61          Vision Screening Recording Form:   vision screen: + glasses @ all times present for vision screen, h/o L cataract pending removal  near acuity:           R 20/50            L 20/400  binocularity far: orthophoria/orthotropia  binocularity near: orthophoria/orthotropia  red green fusion: PLRG @ 3"  near point of convergence: dec b/l teaming, L eye not converging  sean string: unable to converge at red bead, able to viusally see X at yellow bead  Pursuits: dec comprehension of direction following, dec visual fixation with L head turn noted when attempting to fixate  Saccades: inaccurate in all planes   Apraxic visual coordination  ocular ROM: intact/fulll (remains)  visual perceptual midline shift: shifted to the L of midline and above horizon     Assessment/Plan  Occupational Therapy Skilled Analysis Assessment and Plan of Care:  Pt is C 80 y o  female referred to Occupational Therapy s/p acute left MCA stroke  Pt presents with RUE weakness proximal to distal, dcecreased R FMC/prehension, decreased R in-hand manipulation, R inattention, decreased B/L coordination, and R apraxia   Pt will benefit from skilled Occupational Therapy services 2x/week for 8-10 weeks with focus on neuro re-ed, self-care management, improved motor planning, NMES/FES, and HEP to improve on the above deficits, improve functional use of RUE in meaningful occupations, resume R hand dominance with daily life tasks, and enhance overall QOL        Pt seen for OT re-eval/progress note/status update  Pt continues to demonstrate /VM impairments as described, R inattention, RUE distal ataxia apraxia dysmetria dysadiadochokinesia, ideamotor apraxia, impaired FMC/FMS, dyscoordination  Continue to recommend ongoing treatment to address the following goals 2x/wk for 4 more weeks to address FMC/FMS, automaticity, in hand manipulation, coordination, apraxia, depth perception, /VM re-training, convergence and ocular motility            Pt seen 9/24/19 for re-evaluation and progress update  Pt continues to present with /VM impairments such as impaired visual fixation, apraxia, dysmetria, and dec convergence of L eye  Poor acuity from prior to CVA  Pt additionally presents with poor proprioceptive awareness, dec sensation, apraxia/ataxia, impaired FMC/GMC/GMS/FMS  Continue to recommend 4 wks of OT with pt preference to 1x/wk with re-evaluation due in 1 month to assess continued need for OT services      Pt engaged in skilled OT re-evaluation and progress update today with focus on re-testing of fxnl coordination, strength, and vision   Pt at this time has demonstrated a fxnl plateau in which minimal progress or change has been made in pt's FMC/FMS, GMS/GMC, sensation, proprioception and /VM skills  Pt in agreement for d/c from OT services with HEP in place   No futher OT needs         Short Term Goals:  · Pt will increase R prehension patterns for improved tripod with utensil management with <20% droppage 4 weeks-PARTIALLY MET  · Pt will demo with G carryover of Home Exercise Program to improve functional progression towards goals in Plan of care and for improved functional use of RUE 4 weeks- MET  · Pt will increase automaticity of RUE with improved motor planning abilities of RUE x 25% for improved grasp release of tabletop items for improved functional performance with salient tasks 4 weeks-PARTIALLY MET  · Pt will increase rate of manipulation for all R FM tests for improved functional performance with salient tasks 4 weeks-PARTIALLY MET  · Pt will increase RUE to refined functional assist with <20% cuing for tabletop tasks for improved functional performance of life roles and salient tasks 4 weeks -PARTIALLY MET              - Pt will increase oculomotor control for improved saccades, con/divergent tasks for improved reading, board to table tasks with minimal increase in symptoms 4 weeks-PARTIALLY MET              -Pt will demo with improved attention to RUE x 50% for improved safety awareness and improved functional use 4 weeks-PARTIALLY MET           -  Pt will tolerate R NMES/FES for improved motor and sensory performance for overall improved hand to target with 50% accuracy 4 weeks-PARTIALLY MET     Long Term Goals:              -Pt will increase automaticity of RUE with improved motor planning abilities of LUE x 50% for improved        grasp release of tabletop items for improved functional performance with salient tasks 4 weeks-PARTIALLY MET              - Pt will increase rate of prehension for R FM tasks for improved use of utensils, writing, ADL fasteners-PARTIALLY MET  · Pt will resume R hand dominance to Mod I with all activities of daily living, IADLs, and meaningful occupations  · Pt will increase LUE strength to 5/5 and  strength R=L, through the use of strengthening exercises for improved functional performance with meaningful occupations and life roles-PARTIALLY MET              -Pt will demo with improved attention to RUE x 75% for improved safety awareness and improved functional  use of RUE: PARTIALLY MET  · Pt will increase oculomotor control for improved dynamic activities with head turns, board/screen to table tasks symptom free, improved VMI and return to baseline handwriting -PARTIALLY MET  · Pt will increase oculomotor control for WNL saccades, con/divergent tasks symptom free for overall enhanced QOL: NOT MET         -  Pt will tolerate R NMES/FES for improved motor and sensory performance for overall improved hand to target with 75% accuracy-PARTIALLY MET         tx today:    Pt completed multimatrix for sustained attention, fxnl coordination, hand to target, and VM/ skills  Pt req inc time for shape recognition and identification from 2D template   Max VCs fading to min with repetition and inc in self-corrective behaviors       INTERVENTION COMMENTS:  Diagnosis: CVA  Precautions: HTN, fall risk  FOTO: N/A  Insurance: Payor: [employer]  REP / Plan: [facility]ae[facility] Goss[facility] REP / Product Type: Medicare PPO /   7 of 10 visits; PN due 10/24

## 2019-10-22 NOTE — PROGRESS NOTES
Daily Speech Treatment Note    Today's date: 10/22/2019   Patients name: Shaheed Schneider  : 1940  MRN: [id number]  Safety measures: fall risk, CVA  Referring provider: Sky Oneil MD    Primary Diagnosis/Billing code: C00 5  Secondary Diagnosis/ Billing code: I63 9, R47 01    Visit Tracking:  -Referring provider: Epic  -Billing guidelines: CMS  -Visit #17   -Ashley Sawyer  -RE due 2019    Subjective/Behavioral:  "I'm so tired today "    Objective/Assessment:  Patient completed HEP with 100% acc  Short-term goals:  1  Patient will facilitate planning by completing thought organization tasks (e g , sequencing, deduction puzzles, etc ) with 80% accuracy to facilitate increased executive functioning, working memory, problem solving, and processing skills, to be achieved in 4-6 weeks  NEW GOAL    -To target sequential thought, patient was asked to unscramble words  Task completed in  opp (72%acc) increasing to 40/40 (100%acc) with mod semantic and phonemic cues  2  Patient will complete auditory immediate and short term memory tasks to 80% accuracy to facilitate increased ability to retell narratives and recall information within functional living environment, to be achieved in 4-6 weeks  3  Patient will complete complex auditory attention processing tasks (e g , sentence unscramble, ranking numbers/words, etc ) to improve working memory with 80% accuracy, to be achieved in 4-6 weeks  NEW GOAL      5  Patient will demonstrate alternating attention by being able to shift focus of attention between two tasks with min cues in a distracting environment with 80% accuracy, to be achieved in 4-6 weeks  NEW GOAL    To target alternating attention, patient was asked to alternate between alphabet letters and umbers (ie , 1A, 2B, 3C, 4D) task completed with 88% acc independently, increasing to 100% with min verbal cues      Plan:  -Patient was provided with home exercises/activities to target goals in plan of care at the end of today's session   -Continue with current plan of care

## 2019-10-22 NOTE — LETTER
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        Dear Dr Chip Echavarria:    Thank you for your recent referral of Jannet Colbert  Please review the attached evaluation summary from Marlene's recent visit  Please verify that you agree with the plan of care by signing the attached order  If you have any questions or concerns, please do not hesitate to call  I sincerely appreciate the opportunity to share in the care of one of your patients and hope to have another opportunity to work with you in the near future  Sincerely,    Heidi Chowdary, [address] [place] [address]      Referring Provider:     Based upon review of the patient's progress and continued therapy plan, it is my medical opinion that Jannet Colbert should continue speech therapy treatment at the Physical Therapy at [address] [place] [address]:                    Gino Francisco MD  [facility] [id number]  VIA In Basket        Daily Speech Treatment Note    Today's date: 10/22/2019   Patients name: Jannet Colbert  : 1940  MRN: [id number]  Safety measures: fall risk, CVA  Referring provider: Esvin Stephenson MD    Primary Diagnosis/Billing code: F07 0  Secondary Diagnosis/ Billing code: I63 9, R47 01    Visit Tracking:  -Referring provider: Epic  -Billing guidelines: CMS  -Visit #17   -Stephanie Left  -RE due 2019    Subjective/Behavioral:  "I'm so tired today "    Objective/Assessment:  Patient completed HEP with 100% acc  Short-term goals:  1   Patient will facilitate planning by completing thought organization tasks (e g , sequencing, deduction puzzles, etc ) with 80% accuracy to facilitate increased executive functioning, working memory, problem solving, and processing skills, to be achieved in 4-6 weeks  NEW GOAL    -To target sequential thought, patient was asked to unscramble words  Task completed in 29/40 opp (72%acc) increasing to 40/40 (100%acc) with mod semantic and phonemic cues  2  Patient will complete auditory immediate and short term memory tasks to 80% accuracy to facilitate increased ability to retell narratives and recall information within functional living environment, to be achieved in 4-6 weeks  3  Patient will complete complex auditory attention processing tasks (e g , sentence unscramble, ranking numbers/words, etc ) to improve working memory with 80% accuracy, to be achieved in 4-6 weeks  NEW GOAL      5  Patient will demonstrate alternating attention by being able to shift focus of attention between two tasks with min cues in a distracting environment with 80% accuracy, to be achieved in 4-6 weeks  NEW GOAL    To target alternating attention, patient was asked to alternate between alphabet letters and umbers (ie , 1A, 2B, 3C, 4D) task completed with 88% acc independently, increasing to 100% with min verbal cues  Plan:  -Patient was provided with home exercises/activities to target goals in plan of care at the end of today's session   -Continue with current plan of care  Patient's daughter called and spoke with Braulio Banerjee Monday 10/28/19   to cancel her remaining speech appointments  Patient no longer wants to continue with therapy

## 2019-10-22 NOTE — LETTER
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        Dear Dr Lore Krishna:    Thank you for your recent referral of Miranda Driver  Please review the attached evaluation summary from Marlene's recent visit  Please verify that you agree with the plan of care by signing the attached order  If you have any questions or concerns, please do not hesitate to call  I sincerely appreciate the opportunity to share in the care of one of your patients and hope to have another opportunity to work with you in the near future  Sincerely,    Ashley Shipley, [address] [place] [address]      Referring Provider:     Based upon review of the patient's progress and continued therapy plan, it is my medical opinion that Miranda Driver should continue speech therapy treatment at the Physical Therapy at [address] [place] [address]:                    Ilsa Russell MD  [facility] [id number]  VIA In Basket        Daily Speech Treatment Note    Today's date: 10/22/2019   Patients name: Miranda Driver  : 1940  MRN: [id number]  Safety measures: fall risk, CVA  Referring provider: Christian Almazan MD    Primary Diagnosis/Billing code: P10 6  Secondary Diagnosis/ Billing code: I63 9, R47 01    Visit Tracking:  -Referring provider: Epic  -Billing guidelines: CMS  -Visit #17   -Mart Human  -RE due 2019    Subjective/Behavioral:  "I'm so tired today "    Objective/Assessment:  Patient completed HEP with 100% acc  Short-term goals:  1   Patient will facilitate planning by completing thought organization tasks (e g , sequencing, deduction puzzles, etc ) with 80% accuracy to facilitate increased executive functioning, working memory, problem solving, and processing skills, to be achieved in 4-6 weeks  NEW GOAL    -To target sequential thought, patient was asked to unscramble words  Task completed in 29/40 opp (72%acc) increasing to 40/40 (100%acc) with mod semantic and phonemic cues  2  Patient will complete auditory immediate and short term memory tasks to 80% accuracy to facilitate increased ability to retell narratives and recall information within functional living environment, to be achieved in 4-6 weeks  3  Patient will complete complex auditory attention processing tasks (e g , sentence unscramble, ranking numbers/words, etc ) to improve working memory with 80% accuracy, to be achieved in 4-6 weeks  NEW GOAL      5  Patient will demonstrate alternating attention by being able to shift focus of attention between two tasks with min cues in a distracting environment with 80% accuracy, to be achieved in 4-6 weeks  NEW GOAL    To target alternating attention, patient was asked to alternate between alphabet letters and umbers (ie , 1A, 2B, 3C, 4D) task completed with 88% acc independently, increasing to 100% with min verbal cues  Plan:  -Patient was provided with home exercises/activities to target goals in plan of care at the end of today's session   -Continue with current plan of care  Patient's daughter called and spoke with Braulio Banerjee Monday 10/28/19   to cancel her remaining speech appointments  Patient no longer wants to continue with therapy

## 2019-10-22 NOTE — PROGRESS NOTES
Occupational Therapy Stroke Progress Note/Status Update:     Today's Date: 10/22/19  Patient Jamie John  PEP: [id number]  Referring Tod Raymond MD  Dx: C[facility]brovascular [facility] ([facility] ) [I63 9]     Active Problem List:         Patient Active Problem List   Diagnosis    Cataracts, bilateral    Essential hypertension    Hyperlipidemia    CVA [facility]cerebral vascular accident) ([facility]    Anemia, unspecified    Abnormal CT scan    Symptomatic stenosis of left carotid artery    Iron deficiency anemia    Impaired mobility and ADLs    History of left-sided carotid endarterectomy      Past Medical Hx:   Medical History           Past Medical History:   Diagnosis Date    [facility]      Hypertension           Past Surgical Hx:   Surgical History             Past Surgical History:   Procedure Laterality Date    CAROTID ENDARTARECTOMY Left 2019     Procedure: ENDARTERECTOMY ARTERY CAROTID WITH PATCH ANGIOPLASTY;  Surgeon: Ester Solis MD;  Location: [facility];  Service: Vascular         Pain Levels:  Restin     With Activity:  0     Subjective/Patient Goal: "To get my hand more coordinated yet"     History of Present Illness:  Pt is H 60 y o  female who was referred to Occupational Therapy s/p acute left MCA stroke  As per hospital report, Pt was admitted on 2019 due to right hand weakness and right-sided facial droop  Imaging (MRI) revealed left sided infarcts to the parietal and frontal regions of the brain  In addition Pt presented with ulcerated plaque at the L cervical carotid bifurcation; CTA revealed a right ICA occlusion   Vascular surgery was consulted, and recommendation made for a left CEA, performed on  without complication  During admission Pt was noted to have ABLA, for which scope was performed revealing AVMs in the stomach and duodenum; argon plasma coagulation was performed   Pt was also transfused at the time  During her acute admission, Pt had a fall off of comode; CTOH showed right frontal hematoma, but no active bleed  Pt received OT, PT, and SLP while at [facility]  Pt was discharge from hospital on 07/22/2019 with supervision/assistance at home       Pt presents with RUE weakness proximal to distal, decreased FMC/prehension, decreased in-hand manipulation, R inattention, R apraxia, decreased direction following abilities, and difficulties with shoe tying and handwriting       Pt lives on the first floor of a two story home with her daughter, son-in-law and grandchildren  Pt wants to return to [facility], where she resides on the fifth floor---elevator accessible for use  Pt currently performing ADLs with supervision  Pt currently does not perform IADLs   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  Pt loss role of -  Pt relies on her daughter for transportation at this time      As of this month September of 2019, pt is living back at there apartment at [facility] and completing ADLs and IADLs indep on a daily basis with assist from dtrs for finance management and higher level meal prep tasks          Objective  Impairments Section:  UE Strength:              GRACE: RUE: 50/200 LUE: 50/200      RUE: 50/200   LUE: 50/200              PINCER: 3 point pinch: RUE 10, LUE: 10         RUE: 11   LUE: 11              2 point pinch: RUE: 13, LUE: 13     RUE: 13   LUE: 13              Lateral pincher: RUE: 14, LUE: 13      RUE: 14 LUE 13     Coordination:              9 HOLE PEG TEST:                RUE: 37 18 seconds, LUE: 30 15 Seconds   RUE: 36 44  Seconds    LUE: 25 54 seconds                 Functional Dexterity Test:              RUE: 48 42 seconds, LUE: 29 0 Seconds      RUE: 47 77  Seconds    LUE: 31 46 seconds             Range of Motion:  AROM: intact/full in all planes     PROM: intact/full in all planes     MMT: RUE 5/5 LUE 5/5     Pt is R hand dominant     Sensation:  MYOFILAMENTS:              RUE: 3 61 (inconsistent reports)               LUE: 3 61          Vision Screening Recording Form:   vision screen: + glasses @ all times present for vision screen, h/o L cataract pending removal  near acuity:           R 20/50            L 20/400  binocularity far: orthophoria/orthotropia  binocularity near: orthophoria/orthotropia  red green fusion: PLRG @ 3"  near point of convergence: dec b/l teaming, L eye not converging  sean string: unable to converge at red bead, able to viusally see X at yellow bead  Pursuits: dec comprehension of direction following, dec visual fixation with L head turn noted when attempting to fixate  Saccades: inaccurate in all planes  Apraxic visual coordination  ocular ROM: intact/fulll (remains)  visual perceptual midline shift: shifted to the L of midline and above horizon     Assessment/Plan  Occupational Therapy Skilled Analysis Assessment and Plan of Care:  Pt is R 20 y o  female referred to Occupational Therapy s/p acute left MCA stroke  Pt presents with RUE weakness proximal to distal, dcecreased R FMC/prehension, decreased R in-hand manipulation, R inattention, decreased B/L coordination, and R apraxia   Pt will benefit from skilled Occupational Therapy services 2x/week for 8-10 weeks with focus on neuro re-ed, self-care management, improved motor planning, NMES/FES, and HEP to improve on the above deficits, improve functional use of RUE in meaningful occupations, resume R hand dominance with daily life tasks, and enhance overall QOL        Pt seen for OT re-eval/progress note/status update  Pt continues to demonstrate /VM impairments as described, R inattention, RUE distal ataxia apraxia dysmetria dysadiadochokinesia, ideamotor apraxia, impaired FMC/FMS, dyscoordination   Continue to recommend ongoing treatment to address the following goals 2x/wk for 4 more weeks to address FMC/FMS, automaticity, in hand manipulation, coordination, apraxia, depth perception, /VM re-training, convergence and ocular motility            Pt seen 9/24/19 for re-evaluation and progress update  Pt continues to present with /VM impairments such as impaired visual fixation, apraxia, dysmetria, and dec convergence of L eye  Poor acuity from prior to CVA  Pt additionally presents with poor proprioceptive awareness, dec sensation, apraxia/ataxia, impaired FMC/GMC/GMS/FMS  Continue to recommend 4 wks of OT with pt preference to 1x/wk with re-evaluation due in 1 month to assess continued need for OT services      Pt engaged in skilled OT re-evaluation and progress update today with focus on re-testing of fxnl coordination, strength, and vision  Pt at this time has demonstrated a fxnl plateau in which minimal progress or change has been made in pt's FMC/FMS, GMS/GMC, sensation, proprioception and /VM skills  Pt in agreement for d/c from OT services with HEP in place   No futher OT needs         Short Term Goals:  · Pt will increase R prehension patterns for improved tripod with utensil management with <20% droppage 4 weeks-PARTIALLY MET  · Pt will demo with G carryover of Home Exercise Program to improve functional progression towards goals in Plan of care and for improved functional use of RUE 4 weeks- MET  · Pt will increase automaticity of RUE with improved motor planning abilities of RUE x 25% for improved grasp release of tabletop items for improved functional performance with salient tasks 4 weeks-PARTIALLY MET  · Pt will increase rate of manipulation for all R FM tests for improved functional performance with salient tasks 4 weeks-PARTIALLY MET  · Pt will increase RUE to refined functional assist with <20% cuing for tabletop tasks for improved functional performance of life roles and salient tasks 4 weeks -PARTIALLY MET              - Pt will increase oculomotor control for improved saccades, con/divergent tasks for improved reading, board to table tasks with minimal increase in symptoms 4 weeks-PARTIALLY MET              -Pt will demo with improved attention to RUE x 50% for improved safety awareness and improved functional use 4 weeks-PARTIALLY MET           -  Pt will tolerate R NMES/FES for improved motor and sensory performance for overall improved hand to target with 50% accuracy 4 weeks-PARTIALLY MET     Long Term Goals:              -Pt will increase automaticity of RUE with improved motor planning abilities of LUE x 50% for improved        grasp release of tabletop items for improved functional performance with salient tasks 4 weeks-PARTIALLY MET              - Pt will increase rate of prehension for R FM tasks for improved use of utensils, writing, ADL fasteners-PARTIALLY MET  · Pt will resume R hand dominance to Mod I with all activities of daily living, IADLs, and meaningful occupations  · Pt will increase LUE strength to 5/5 and  strength R=L, through the use of strengthening exercises for improved functional performance with meaningful occupations and life roles-PARTIALLY MET              -Pt will demo with improved attention to RUE x 75% for improved safety awareness and improved functional  use of RUE: PARTIALLY MET  · Pt will increase oculomotor control for improved dynamic activities with head turns, board/screen to table tasks symptom free, improved VMI and return to baseline handwriting -PARTIALLY MET  · Pt will increase oculomotor control for WNL saccades, con/divergent tasks symptom free for overall enhanced QOL: NOT MET         -  Pt will tolerate R NMES/FES for improved motor and sensory performance for overall improved hand to target with 75% accuracy-PARTIALLY MET         tx today:    Pt completed multimatrix for sustained attention, fxnl coordination, hand to target, and VM/ skills  Pt req inc time for shape recognition and identification from 2D template   Max VCs fading to min with repetition and inc in self-corrective behaviors       INTERVENTION COMMENTS:  Diagnosis: CVA  Precautions: HTN, fall risk  FOTO: N/A  Insurance: Payor: RHONA CLIFFORD REP / Plan: Riley Gambino [facility] REP / Product Type: Medicare PPO /   7 of 10 visits; PN due 10/24

## 2019-10-28 NOTE — PROGRESS NOTES
Daily Speech Treatment Note    Today's date: 10/27/2019   Patients name: Jeff Jeter  : 1940  MRN: [id number]  Safety measures: fall risk, CVA  Referring provider: Rhonda Kumar MD    Primary Diagnosis/Billing code: D70 3  Secondary Diagnosis/ Billing code: I63 9, R47 01    Visit Tracking:  -Referring provider: Epic  -Billing guidelines: CMS  -Visit #17 ***  -Pete Queen  -RE due 2019    Subjective/Behavioral:  "I'm so tired today "    Objective/Assessment:  Patient completed HEP with 100% acc  Short-term goals:  1  Patient will facilitate planning by completing thought organization tasks (e g , sequencing, deduction puzzles, etc ) with 80% accuracy to facilitate increased executive functioning, working memory, problem solving, and processing skills, to be achieved in 4-6 weeks  NEW GOAL    -To target sequential thought, patient was asked to unscramble words  Task completed in  opp (72%acc) increasing to 40/40 (100%acc) with mod semantic and phonemic cues  2  Patient will complete auditory immediate and short term memory tasks to 80% accuracy to facilitate increased ability to retell narratives and recall information within functional living environment, to be achieved in 4-6 weeks  3  Patient will complete complex auditory attention processing tasks (e g , sentence unscramble, ranking numbers/words, etc ) to improve working memory with 80% accuracy, to be achieved in 4-6 weeks  NEW GOAL      5  Patient will demonstrate alternating attention by being able to shift focus of attention between two tasks with min cues in a distracting environment with 80% accuracy, to be achieved in 4-6 weeks  NEW GOAL    To target alternating attention, patient was asked to alternate between alphabet letters and umbers (ie , 1A, 2B, 3C, 4D) task completed with 88% acc independently, increasing to 100% with min verbal cues      Plan:  -Patient was provided with home exercises/activities to target goals in plan of care at the end of today's session   -Continue with current plan of care

## 2019-10-29 ENCOUNTER — APPOINTMENT (OUTPATIENT)
Dept: SPEECH THERAPY | Facility: CLINIC | Age: 79
End: 2019-10-29
Payer: COMMERCIAL

## 2019-10-29 ENCOUNTER — APPOINTMENT (OUTPATIENT)
Dept: OCCUPATIONAL THERAPY | Facility: CLINIC | Age: 79
End: 2019-10-29
Payer: COMMERCIAL

## 2019-10-29 NOTE — PROGRESS NOTES
Patient's daughter called and spoke with [address] [place] [address] Monday 10/28/19   to cancel her remaining speech appointments  Patient no longer wants to continue with therapy

## 2019-10-31 ENCOUNTER — HOSPITAL ENCOUNTER (EMERGENCY)
Facility: HOSPITAL | Age: 79
Discharge: HOME | End: 2019-10-31
Attending: EMERGENCY MEDICINE
Payer: COMMERCIAL

## 2019-10-31 VITALS
BODY MASS INDEX: 30.39 KG/M2 | WEIGHT: 160.94 LBS | HEIGHT: 61 IN | OXYGEN SATURATION: 96 % | SYSTOLIC BLOOD PRESSURE: 143 MMHG | DIASTOLIC BLOOD PRESSURE: 70 MMHG | RESPIRATION RATE: 15 BRPM | HEART RATE: 58 BPM | TEMPERATURE: 98.3 F

## 2019-10-31 DIAGNOSIS — I10 ESSENTIAL HYPERTENSION: Primary | ICD-10-CM

## 2019-10-31 PROCEDURE — 99282 EMERGENCY DEPT VISIT SF MDM: CPT | Performed by: EMERGENCY MEDICINE

## 2019-10-31 PROCEDURE — 99283 EMERGENCY DEPT VISIT LOW MDM: CPT

## 2019-10-31 NOTE — ED ATTENDING ATTESTATION
10/31/2019  Sondra Rachel DO, saw and evaluated the patient  I have discussed the patient with the resident/non-physician practitioner and agree with the resident's/non-physician practitioner's findings, Plan of Care, and MDM as documented in the resident's/non-physician practitioner's note, except where noted  All available labs and Radiology studies were reviewed  I was present for key portions of any procedure(s) performed by the resident/non-physician practitioner and I was immediately available to provide assistance  At this point I agree with the current assessment done in the Emergency Department  I have conducted an independent evaluation of this patient a history and physical is as follows:      79 yo female w/recent CVA residual RUE drift and ataxic gait  Presents today for asymptomatic HTN  BP when she arrived >200  No intervention done, /70 now  The patient arrives without symptoms of hypertension, specifically denying chest pain, shortness of breath, persistent headaches  The patient has a PCP and appropriate follow-up  As patient has asymptomatic hypertension, we will discharge this patient home for close follow-up with their PCP  This is supported by [facility]: Elzbieta Justin MD, Vanessa DURÁN,   Clinical policy: critical issues in the evaluation and management of adult patients in the emergency department with asymptomatic elevated blood pressure  Annals of emergency medicine  2013; 62(1):59-68  [pubmed]    As well as ACC/AHA Guidelines for HTN in adults 2017:    Jesus PK, Lida Gutiérrez, Marco WS, et al  2017 ACC/AHA/AAPA/ABC/ACPM/AGS/APhA/BETH/ASPC/NMA/PCNA Guideline for the Prevention, Detection, Evaluation, and Management of High Blood Pressure in Adults: A Report of the [employer] of Cardiology/American Heart Association Task Force on Clinical Practice Guidelines  Circulation  2018; 138(17):n910-f716   [pubmed]    Figure from section 11 2 ACC/AHA guidelines below:        ED Course         Critical Care Time  Procedures

## 2019-10-31 NOTE — ED PROVIDER NOTES
History  Chief Complaint   Patient presents with    High Blood Pressure     Patient's daughter states that she was checking patient's BP and systolic was over 976, concerned because patient had stroke June 2019  Patient reports headache starting this morning  This is a 49-year-old female presents with hypertension  She has past medical history of hypertension on Coreg 3 25 mg and she has a history of CVA in June as well as carotid endarterectomy (left sided)  She called her primary care doctor with her high blood pressure and was told to come in  She said and her daughter confirms that her blood pressures were in the 340 systolic range and continued to increase after she continue to check it  She said she had a mild headache that has since resolved  She denies any dizziness, chest pain, nausea, vomiting, diarrhea  She denies any recent infectious symptoms  She said she does not have any thing to complain about this time  She does not smoke, drink, and denies drug use  History provided by:  Patient   used: No    Hypertension   Severity:  Mild  Onset quality:  Gradual  Timing:  Intermittent  Progression:  Improving  Chronicity:  Recurrent  Notable PTA blood pressures:  432 systolic  Context: not noncompliance    Relieved by:  Beta blockers  Worsened by:  Nothing  Ineffective treatments:  Beta blockers  Associated symptoms: headaches    Associated symptoms: no abdominal pain, no chest pain, no fever, no nausea, no shortness of breath and not vomiting    Headaches:     Severity:  Mild    Onset quality:  Gradual    Timing:  Rare    Progression:  Resolved    Chronicity:  Recurrent  Risk factors: family hx of HTN and prior stroke    Risk factors: no cocaine use and no tobacco use        Prior to Admission Medications   Prescriptions Last Dose Informant Patient Reported? Taking?    Garlic 902 MG TABS 24/67/9647 at Unknown time Child Yes Yes   Sig: Take 100 mg by mouth daily NIFEdipine (ADALAT CC) 30 MG 24 hr tablet 10/31/2019 at Unknown time Child No Yes   Sig: Take 1 tablet (30 mg total) by mouth daily   aspirin 81 mg chewable tablet 10/31/2019 at Unknown time Child Yes Yes   Sig: Chew 81 mg daily   atorvastatin (LIPITOR) 80 mg tablet 10/31/2019 at Unknown time  No Yes   Sig: Take 1 tablet (80 mg total) by mouth daily   carvedilol (COREG) 3 125 mg tablet 10/31/2019 at Unknown time Child No Yes   Sig: Take 1 tablet (3 125 mg total) by mouth 2 (two) times a day with meals   clopidogrel (PLAVIX) 75 mg tablet 10/31/2019 at Unknown time Child No Yes   Sig: Take 1 tablet (75 mg total) by mouth daily   ferrous sulfate 325 (65 Fe) mg tablet 10/31/2019 at Unknown time Child No Yes   Sig: Take 1 tablet (325 mg total) by mouth 2 (two) times a day with meals   pantoprazole (PROTONIX) 40 mg tablet Not Taking at Unknown time Child No No   Sig: Take 1 tablet (40 mg total) by mouth daily in the early morning   Patient not taking: Reported on 10/31/2019      Facility-Administered Medications: None       Past Medical History:   Diagnosis Date    CVA [facility]cerebral vascular accident) ([facility] [address] )     Hypertension        Past Surgical History:   Procedure Laterality Date    CAROTID ENDARTARECTOMY Left 6/28/2019    Procedure: ENDARTERECTOMY ARTERY CAROTID WITH PATCH ANGIOPLASTY;  Surgeon: Charles Solis MD;  Location: [facility] OR;  Service: Vascular       History reviewed  No pertinent family history  I have reviewed and agree with the history as documented  Social History     Tobacco Use    Smoking status: Former Smoker     Packs/day: 0 25     Years: 50 00     Pack years: 12 50     Types: Cigarettes    Smokeless tobacco: Never Used   Substance Use Topics    Alcohol use: Not Currently     Frequency: Monthly or less     Drinks per session: 1 or 2    Drug use: Never        Review of Systems   Constitutional: Negative for chills, diaphoresis and fever  HENT: Negative  Eyes: Negative    Negative for visual disturbance  Respiratory: Negative  Negative for shortness of breath  Cardiovascular: Negative  Negative for chest pain  Gastrointestinal: Negative  Negative for abdominal pain, nausea and vomiting  Endocrine: Negative  Genitourinary: Negative  Musculoskeletal: Negative  Negative for myalgias  Skin: Negative  Negative for rash  Allergic/Immunologic: Negative  Neurological: Positive for headaches  Negative for light-headedness and numbness  Resolved HA   Hematological: Negative  Psychiatric/Behavioral: Negative  All other systems reviewed and are negative  Physical Exam  ED Triage Vitals [10/31/19 1409]   Temperature Pulse Respirations Blood Pressure SpO2   98 3 °F (36 8 °C) 70 20 (!) 221/111 98 %      Temp Source Heart Rate Source Patient Position - Orthostatic VS BP Location FiO2 (%)   Tympanic Monitor Sitting Left arm --      Pain Score       3             Orthostatic Vital Signs  Vitals:    10/31/19 1430 10/31/19 1445 10/31/19 1500 10/31/19 1530   BP: (!) 206/95 (!) 173/84 144/84 143/70   Pulse: 68 60 58 58   Patient Position - Orthostatic VS: Sitting          Physical Exam   Constitutional: She is oriented to person, place, and time  She appears well-developed and well-nourished  HENT:   Head: Normocephalic and atraumatic  Mouth/Throat: Oropharynx is clear and moist    Eyes: Pupils are equal, round, and reactive to light  Conjunctivae and EOM are normal    Neck: Normal range of motion  Neck supple  Cardiovascular: Normal rate and regular rhythm  Pulmonary/Chest: Effort normal and breath sounds normal    Abdominal: Soft  Bowel sounds are normal  She exhibits no distension  There is no tenderness  Musculoskeletal: Normal range of motion  Neurological: She is alert and oriented to person, place, and time  She has normal strength  No cranial nerve deficit or sensory deficit  She displays a negative Romberg sign   Coordination and gait normal  GCS eye subscore is 4  GCS verbal subscore is 5  GCS motor subscore is 6  R sided pronator drift (resideual deficit from stroke)  Ataxia   Skin: Skin is warm and dry  Psychiatric: She has a normal mood and affect  Nursing note and vitals reviewed  ED Medications  Medications - No data to display    Diagnostic Studies  Results Reviewed     None                 No orders to display         Procedures  Procedures        ED Course           Identification of Seniors at Risk      Most Recent Value   (ISAR) Identification of Seniors at Risk   Before the illness or injury that brought you to the Emergency, did you need someone to help you on a regular basis? 1 Filed at: 10/31/2019 1411   In the last 24 hours, have you needed more help than usual?  0 Filed at: 10/31/2019 1411   Have you been hospitalized for one or more nights during the past 6 months? 1 Filed at: 10/31/2019 1411   In general, do you see well?  0 Filed at: 10/31/2019 1411   In general, do you have serious problems with your memory? 0 Filed at: 10/31/2019 1411   Do you take more than three different medications every day? 1 Filed at: 10/31/2019 1411   ISAR Score  3 Filed at: 10/31/2019 1411                          MDM  Number of Diagnoses or Management Options  Essential hypertension: established and improving  Diagnosis management comments: This is a 35-year-old female past medical history of CVA, hypertension and left carotid enterectomy presenting with increased blood pressure  The patient arrives without symptoms of hypertension, specifically denying chest pain, shortness of breath, persistent headaches  The patient has a PCP and appropriate follow-up  As patient has asymptomatic hypertension, we will discharge this patient home for close follow-up with their PCP  Patient and her daughter are agreeable to the plan  We answered all the questions  We gave them appropriate return precautions        Rationale below             Amount and/or Complexity of Data Reviewed  Decide to obtain previous medical records or to obtain history from someone other than the patient: yes  Review and summarize past medical records: yes  Discuss the patient with other providers: yes    Risk of Complications, Morbidity, and/or Mortality  Presenting problems: low  Diagnostic procedures: low  Management options: low    Patient Progress  Patient progress: improved      This is supported by [facility] 2010: Sho Gutierres MD, Vanessa DURÁN,   Clinical policy: critical issues in the evaluation and management of adult patients in the emergency department with asymptomatic elevated blood pressure  Annals of emergency medicine  2013; 62(1):59-68  [pubmed]    As well as ACC/AHA Guidelines for HTN in adults 2017:    Jesus PK, Maik Fleming, Marco WS, et al  2017 ACC/AHA/AAPA/ABC/ACPM/AGS/APhA/BETH/ASPC/NMA/PCNA Guideline for the Prevention, Detection, Evaluation, and Management of High Blood Pressure in Adults: A Report of the [employer] of Cardiology/American Heart Association Task Force on Clinical Practice Guidelines  Circulation  2018; 138(17):w234-x001  [pubmed]    Figure from section 11 2 ACC/AHA guidelines below:          Disposition  Final diagnoses:   Essential hypertension     Time reflects when diagnosis was documented in both MDM as applicable and the Disposition within this note     Time User Action Codes Description Comment    10/31/2019  4:19 PM Noeltabitha Agnus Add [I10] Essential hypertension       ED Disposition     ED Disposition Condition Date/Time Comment    Discharge Stable Thu Oct 31, 2019  4:19 PM Rachael Fresh discharge to home/self care              Follow-up Information     Follow up With Specialties Details Why Contact Crystal Arias DO Family Medicine Call  For BP management [address] [place] [address]   [phone]            Discharge Medication List as of 10/31/2019  4:22 PM      CONTINUE these medications which have NOT CHANGED    Details   aspirin 81 mg chewable tablet Chew 81 mg daily, Historical Med      atorvastatin (LIPITOR) 80 mg tablet Take 1 tablet (80 mg total) by mouth daily, Starting Wed 9/25/2019, Normal      carvedilol (COREG) 3 125 mg tablet Take 1 tablet (3 125 mg total) by mouth 2 (two) times a day with meals, Starting Thu 7/18/2019, Print      clopidogrel (PLAVIX) 75 mg tablet Take 1 tablet (75 mg total) by mouth daily, Starting Fri 7/19/2019, Print      ferrous sulfate 325 (65 Fe) mg tablet Take 1 tablet (325 mg total) by mouth 2 (two) times a day with meals, Starting Thu 1/26/1976, Print      Garlic 211 MG TABS Take 100 mg by mouth daily, Historical Med      NIFEdipine (ADALAT CC) 30 MG 24 hr tablet Take 1 tablet (30 mg total) by mouth daily, Starting Fri 7/19/2019, Print      pantoprazole (PROTONIX) 40 mg tablet Take 1 tablet (40 mg total) by mouth daily in the early morning, Starting Fri 7/19/2019, Print           No discharge procedures on file  ED Provider  Attending physically available and evaluated Tapan Jacobson  LOUIS managed the patient along with the ED Attending      Electronically Signed by         Vicki Novak MD  10/31/19 6783

## 2019-10-31 NOTE — SOCIAL WORK
Cm consulted for ISAR  Pt presented as alert during conversation with daughter Sam Shipley [phone] at bed side  Pt lives alone in an apartment on 5th floor  Pt reported that she uses elevator and no step leading to the apartment  Pt's daughter Steven Dawson [phone] is the POA  Pt denies any inpatient PT/OT, MH, substance abuse or [place] [address] service  However, Pt reported Outpatient PT/OTPt PCP affiliated with [facility] and preferred pharmacy is [facility] [address]  Pt reported retired and do not drives  Pt has DME cane  CM reviewed d/c planning process including the following: identifying help at home, patient preference for d/c planning needs, Discharge Lounge, [facility] Meds to Bed program, availability of treatment team to discuss questions or concerns patient and/or family may have regarding understanding medications and recognizing signs and symptoms once discharged  CM also encouraged patient to follow up with all recommended appointments after discharge  Patient advised of importance for patient and family to participate in managing patients medical well being

## 2019-10-31 NOTE — DISCHARGE INSTRUCTIONS
Today you were seen in the emergency department for your blood pressure  You should follow-up with your primary care doctor regarding further management of your blood pressure  Please return to emergency department if he develops any new or otherwise concerning symptoms  Thanks for coming in and have a happy Halloween!

## 2019-11-24 DIAGNOSIS — I10 ESSENTIAL HYPERTENSION: ICD-10-CM

## 2019-11-24 DIAGNOSIS — D64.9 ANEMIA, UNSPECIFIED: ICD-10-CM

## 2019-11-24 DIAGNOSIS — I63.9 CVA (CEREBRAL VASCULAR ACCIDENT) (HCC): ICD-10-CM

## 2019-11-25 RX ORDER — CARVEDILOL 3.12 MG/1
TABLET ORAL
Qty: 180 TABLET | Refills: 1 | OUTPATIENT
Start: 2019-11-25

## 2019-11-25 RX ORDER — CLOPIDOGREL BISULFATE 75 MG/1
TABLET ORAL
Qty: 90 TABLET | Refills: 1 | OUTPATIENT
Start: 2019-11-25

## 2019-11-25 RX ORDER — FERROUS SULFATE 325(65) MG
TABLET ORAL
Qty: 180 TABLET | Refills: 1 | OUTPATIENT
Start: 2019-11-25

## 2019-11-30 DIAGNOSIS — D64.9 ANEMIA, UNSPECIFIED: ICD-10-CM

## 2019-12-01 RX ORDER — FERROUS SULFATE 325(65) MG
TABLET ORAL
Qty: 180 TABLET | Refills: 1 | OUTPATIENT
Start: 2019-12-01

## 2020-01-03 RX ORDER — NIFEDIPINE 30 MG/1
TABLET, EXTENDED RELEASE ORAL
Qty: 90 TABLET | Refills: 0 | OUTPATIENT
Start: 2020-01-03

## 2020-02-05 ENCOUNTER — TELEPHONE (OUTPATIENT)
Dept: VASCULAR SURGERY | Facility: CLINIC | Age: 80
End: 2020-02-05

## 2020-02-05 DIAGNOSIS — I65.23 CAROTID STENOSIS, BILATERAL: Primary | ICD-10-CM

## 2020-02-25 ENCOUNTER — HOSPITAL ENCOUNTER (OUTPATIENT)
Dept: NON INVASIVE DIAGNOSTICS | Facility: HOSPITAL | Age: 80
Discharge: HOME | End: 2020-02-25
Attending: SURGERY
Payer: COMMERCIAL

## 2020-02-25 DIAGNOSIS — I65.23 CAROTID STENOSIS, BILATERAL: ICD-10-CM

## 2020-02-25 PROCEDURE — 93880 EXTRACRANIAL BILAT STUDY: CPT

## 2020-02-26 PROCEDURE — 93880 EXTRACRANIAL BILAT STUDY: CPT | Performed by: SURGERY

## 2020-02-27 ENCOUNTER — OFFICE VISIT (OUTPATIENT)
Dept: VASCULAR SURGERY | Facility: CLINIC | Age: 80
End: 2020-02-27
Payer: COMMERCIAL

## 2020-02-27 VITALS
SYSTOLIC BLOOD PRESSURE: 128 MMHG | TEMPERATURE: 97.5 F | DIASTOLIC BLOOD PRESSURE: 80 MMHG | WEIGHT: 158 LBS | RESPIRATION RATE: 14 BRPM | HEIGHT: 61 IN | BODY MASS INDEX: 29.83 KG/M2 | HEART RATE: 66 BPM

## 2020-02-27 DIAGNOSIS — Z98.890 HISTORY OF LEFT-SIDED CAROTID ENDARTERECTOMY: ICD-10-CM

## 2020-02-27 DIAGNOSIS — I10 ESSENTIAL HYPERTENSION: ICD-10-CM

## 2020-02-27 DIAGNOSIS — E78.5 HYPERLIPIDEMIA, UNSPECIFIED HYPERLIPIDEMIA TYPE: ICD-10-CM

## 2020-02-27 DIAGNOSIS — I65.22 SYMPTOMATIC STENOSIS OF LEFT CAROTID ARTERY: Primary | ICD-10-CM

## 2020-02-27 DIAGNOSIS — I63.412 CEREBROVASCULAR ACCIDENT (CVA) DUE TO EMBOLISM OF LEFT MIDDLE CEREBRAL ARTERY (HCC): ICD-10-CM

## 2020-02-27 DIAGNOSIS — I65.21 RIGHT INTERNAL CAROTID OCCLUSION: ICD-10-CM

## 2020-02-27 PROCEDURE — 99214 OFFICE O/P EST MOD 30 MIN: CPT | Performed by: PHYSICIAN ASSISTANT

## 2020-02-27 RX ORDER — HYDROCHLOROTHIAZIDE 12.5 MG/1
12.5 TABLET ORAL DAILY
COMMUNITY
Start: 2020-02-10 | End: 2020-11-29 | Stop reason: HOSPADM

## 2020-02-27 RX ORDER — OLMESARTAN MEDOXOMIL 40 MG/1
40 TABLET ORAL DAILY
COMMUNITY
Start: 2020-02-10 | End: 2020-11-29 | Stop reason: HOSPADM

## 2020-02-27 NOTE — PROGRESS NOTES
Assessment/Plan:    Symptomatic stenosis of left carotid artery  28-year-old female former smoker with HTN, HLD, L MCA CVA June 2019 w/residual R hand weakness, chronic right carotid occlusion and symptomatic high-grade left carotid stenosis, s/p L CEA by Dr Charles Haider Doctor on 6/28/2019  Noninvasive imaging demonstrates widely patent left endarterectomy site  Patient asymptomatic without new neurologic deficits  -doing well  -continue surveillance with carotid duplex in 6 months  -continue ASA and statin therapy  Patient may discontinue Plavix  Anti-platelet monotherapy recommended per Neurology   -return to office in 6 months with carotid duplex for VQI LTFU  -instructed to contact the office with new concerns or symptoms  Educated in signs/symptoms of TIA/CVA and instructed to call 911 immediately    Right internal carotid occlusion  Chronic right carotid occlusion  -no vascular intervention indicated or recommended  -continue surveillance contralateral endarterectomy site and disease    Hyperlipidemia  -stable  -continue statin therapy  -management per PCP    Essential hypertension  -BP well controlled  -continue current medical regimen  -management per PCP    CVA (cerebral vascular accident) ([facility] [address] )  Hx L MCA CVA June '19  -residual right hand weakness since CVA  -s/p L CEA 6/28/19       Diagnoses and all orders for this visit:    Symptomatic stenosis of left carotid artery  -     VAS carotid complete study; Future    History of left-sided carotid endarterectomy  -     VAS carotid complete study; Future    Right internal carotid occlusion    Hyperlipidemia, unspecified hyperlipidemia type    Essential hypertension    Cerebrovascular accident (CVA) due to embolism of left middle cerebral artery ([facility] [address] )    Other orders  -     olmesartan (BENICAR) 40 mg tablet; Take 40 mg by mouth daily  -     hydrochlorothiazide (HYDRODIURIL) 12 5 mg tablet;  Take 12 5 mg by mouth daily          Subjective:      Patient ID: Booaniket Woo Miranda Sutherland is a 78 y o  female  PT is here to review her CV of 2/25/2020  Pt had a CVA in June of 2019 which left her with some right had and arm weakness  PT denies nor exhibits any other symptoms  Pt has a Hx of CVA, HTN, HLD and left Carotid artery Stenosis  Pt is currently taking ASA, Plavix and Lipitor  80-year-old female former smoker with HTN, HLD, L MCA CVA June 2019 w/residual R hand weakness, chronic right carotid occlusion and symptomatic high-grade left carotid stenosis, s/p L CEA by Dr Jorge Salinas Doctor on 6/28/2019 who returns the office for routine postoperative follow-up and review of noninvasive imaging  Patient asymptomatic without new neurologic deficits  She denies aphasia, dysarthria, ataxia, amaurosis fugax, facial droop or new/worsening lateralizing extremity weakness     Carotid duplex 2/25/2020 has been reviewed and demonstrates known chronic right carotid occlusion and widely patent left endarterectomy site, L ICA velocity 112/59, ratio 1  11  Vertebrals antegrade  Patient remains on aspirin, Plavix and statin therapy  She was started on Plavix preoperatively and plan was for 2 months of postoperative dual anti-platelet therapy  Patient remains on Plavix  Neurology notes reviewed with recommendation for monotherapy at our discretion  The following portions of the patient's history were reviewed and updated as appropriate: allergies, current medications, past family history, past medical history, past social history, past surgical history and problem list     Review of Systems   Constitutional: Negative  HENT: Negative  Eyes: Negative  Respiratory: Negative  Cardiovascular: Negative  Gastrointestinal: Negative  Endocrine: Negative  Genitourinary: Negative  Musculoskeletal: Positive for gait problem  Skin: Negative  Allergic/Immunologic: Negative  Neurological: Positive for weakness (Right sided)  Hematological: Bruises/bleeds easily  Psychiatric/Behavioral: Negative  I have reviewed and made appropriate changes to the review of systems input by the medical assistant  Vitals:    02/27/20 1311 02/27/20 1313   BP: 124/78 128/80   BP Location: Left arm Right arm   Patient Position: Sitting Sitting   Cuff Size: Adult Adult   Pulse: 66    Resp: 14    Temp: 97 5 °F (36 4 °C)    TempSrc: Tympanic    Weight: 71 7 kg (158 lb)    Height: 5' 1" (1 549 m)        Patient Active Problem List   Diagnosis    Cataracts, bilateral    Essential hypertension    Hyperlipidemia    [facility]) [facility] [address]    Anemia, unspecified    Abnormal CT scan    Symptomatic stenosis of left carotid artery    Iron deficiency anemia    Impaired mobility and ADLs    History of left-sided carotid endarterectomy    Right internal carotid occlusion       Past Surgical History:   Procedure Laterality Date    CAROTID ENDARTARECTOMY Left 6/28/2019    Procedure: ENDARTERECTOMY ARTERY CAROTID WITH PATCH ANGIOPLASTY;  Surgeon: Maria Isabel Solis MD;  [facility];  Service: Vascular       History reviewed  No pertinent family history      Social History     Socioeconomic History    Marital status: Single     Spouse name: Not on file    Number of children: Not on file    Years of education: Not on file    Highest education level: Not on file   Occupational History    Not on file   Social Needs    Financial resource strain: Not on file    Food insecurity:     Worry: Not on file     Inability: Not on file    Transportation needs:     Medical: Not on file     Non-medical: Not on file   Tobacco Use    Smoking status: Former Smoker     Packs/day: 0 25     Years: 50 00     Pack years: 12 50     Types: Cigarettes    Smokeless tobacco: Never Used   Substance and Sexual Activity    Alcohol use: Not Currently     Frequency: Monthly or less     Drinks per session: 1 or 2    Drug use: Never    Sexual activity: Not Currently   Lifestyle    Physical activity:     Days per week: Not on file     Minutes per session: Not on file    Stress: Not on file   Relationships    Social connections:     Talks on phone: Not on file     Gets together: Not on file     Attends Zoroastrian service: Not on file     Active member of club or organization: Not on file     Attends meetings of clubs or organizations: Not on file     Relationship status: Not on file    Intimate partner violence:     Fear of current or ex partner: Not on file     Emotionally abused: Not on file     Physically abused: Not on file     Forced sexual activity: Not on file   Other Topics Concern    Not on file   Social History Narrative    Not on file       No Known Allergies      Current Outpatient Medications:     aspirin 81 mg chewable tablet, Chew 81 mg daily, Disp: , Rfl:     atorvastatin (LIPITOR) 80 mg tablet, Take 1 tablet (80 mg total) by mouth daily, Disp: 90 tablet, Rfl: 3    ferrous sulfate 325 (65 Fe) mg tablet, Take 1 tablet (325 mg total) by mouth 2 (two) times a day with meals, Disp: 60 tablet, Rfl: 3    Garlic 777 MG TABS, Take 100 mg by mouth daily, Disp: , Rfl:     hydrochlorothiazide (HYDRODIURIL) 12 5 mg tablet, Take 12 5 mg by mouth daily, Disp: , Rfl:     NIFEdipine (ADALAT CC) 30 MG 24 hr tablet, Take 1 tablet (30 mg total) by mouth daily, Disp: 30 tablet, Rfl: 3    olmesartan (BENICAR) 40 mg tablet, Take 40 mg by mouth daily, Disp: , Rfl:     carvedilol (COREG) 3 125 mg tablet, Take 1 tablet (3 125 mg total) by mouth 2 (two) times a day with meals (Patient not taking: Reported on 2/27/2020), Disp: 60 tablet, Rfl: 3    pantoprazole (PROTONIX) 40 mg tablet, Take 1 tablet (40 mg total) by mouth daily in the early morning (Patient not taking: Reported on 10/31/2019), Disp: 30 tablet, Rfl: 0    Objective:      /80 (BP Location: Right arm, Patient Position: Sitting, Cuff Size: Adult)   Pulse 66   Temp 97 5 °F (36 4 °C) (Tympanic)   Resp 14   Ht 5' 1" (1 549 m)   Wt 71 7 kg (158 lb)   BMI 29 85 kg/m²          Physical Exam   Constitutional: She is oriented to person, place, and time  She appears well-developed and well-nourished  No distress  HENT:   Head: Normocephalic and atraumatic  Eyes: Pupils are equal, round, and reactive to light  Conjunctivae and EOM are normal  No scleral icterus  Neck: Normal range of motion  Neck supple  No JVD present  Carotid bruit is not present  No tracheal deviation present  No thyromegaly present  Well-healed left neck surgical scar   Cardiovascular: Normal rate, regular rhythm, S1 normal and S2 normal  Exam reveals no gallop, no S3 and no friction rub  No murmur heard  Pulses:       Carotid pulses are 2+ on the right side, and 2+ on the left side  Radial pulses are 2+ on the right side, and 2+ on the left side  Pulmonary/Chest: Breath sounds normal  No stridor  No respiratory distress  She has no wheezes  She has no rhonchi  She has no rales  Abdominal: Soft  Bowel sounds are normal  She exhibits no distension, no abdominal bruit, no pulsatile midline mass and no mass  There is no hepatosplenomegaly  There is no tenderness  There is no rebound  Musculoskeletal: Normal range of motion  She exhibits no edema or deformity  Neurological: She is alert and oriented to person, place, and time  She has normal strength  No sensory deficit  Smile symmetrical   Tongue midline   strength, deltoid strength and bilateral lower extremity muscle strength 5/5 and equal bilaterally  Sensation intact light touch bilaterally  Skin: Skin is warm, dry and intact  No lesion noted  No cyanosis or erythema  No pallor  Psychiatric: She has a normal mood and affect  Nursing note and vitals reviewed

## 2020-02-27 NOTE — ASSESSMENT & PLAN NOTE
Chronic right carotid occlusion  -no vascular intervention indicated or recommended  -continue surveillance contralateral endarterectomy site and disease

## 2020-02-27 NOTE — PATIENT INSTRUCTIONS
Carotid Artery Disease   AMBULATORY CARE:   Carotid artery disease  is a condition that causes narrow or blocked carotid arteries  Your carotid arteries are the blood vessels that supply your brain with most of the blood it needs to work  You have 2 carotid arteries, one on each side of your neck  Call 911 for any of the following:   · You have any of the following signs of a stroke:      ¨ Numbness or drooping on one side of your face     ¨ Weakness in an arm or leg    ¨ Confusion or difficulty speaking    ¨ Dizziness, a severe headache, or vision loss    · You have any of the following signs of a heart attack:      ¨ Squeezing, pressure, or pain in your chest that lasts longer than 5 minutes or returns    ¨ Discomfort or pain in your back, neck, jaw, stomach, or arm     ¨ Trouble breathing    ¨ Nausea or vomiting    ¨ Lightheadedness or a sudden cold sweat, especially with chest pain or trouble breathing  Contact your healthcare provider if:   · You have questions or concerns about your condition or care  Signs and symptoms of carotid artery disease: You may have no signs or symptoms  Most commonly, carotid artery disease causes transient ischemic attacks (TIAs), or mini-strokes  You may have numbness, weakness, lack of movement, or vision or speech problems  A TIA goes away quickly and does not cause permanent damage  A TIA may be a warning sign that you are about to have a stroke  If you have any symptoms of a TIA or stroke, seek care immediately  Warning signs of a stroke: The word F A S T  can help you remember and recognize warning signs of a stroke  · F = Face:  One side of the face droops  · A = Arms:  One arm starts to drop when both arms are raised  · S = Speech:  Speech is slurred or sounds different than usual     · T = Time:  A person who is having a stroke needs to be seen immediately  A stroke is a medical emergency that needs immediate treatment   Some medicines and treatments work best if given within a few hours of a stroke  Treatment  for carotid artery disease depends on how narrow your arteries have become, your symptoms, and your general health  The goal of treatment is to lower your risk for a stroke  You may need any of the following:  · Take aspirin if directed  Your healthcare provider may suggest that you take an aspirin a day to prevent blood clots from forming in the carotid arteries  If your healthcare provider wants you to take aspirin daily, do not take acetaminophen or ibuprofen instead  · Control risk factors  High blood pressure, high cholesterol, heart disease, diabetes, and being overweight increase your risk for atherosclerosis  · Procedures can help open blocked arteries  A carotid endarterectomy is used to cut plaque out of the artery  An angioplasty is used to push the plaque against the artery wall with a balloon device  Sometimes a stent is placed during an angioplasty  A stent is a metal mesh tube that is placed in the artery to keep it open  Manage carotid artery disease:   · Eat a variety of healthy foods  Healthy foods include fruit, vegetables, whole-grain breads, low-fat dairy products, lean meat, and fish  Choose fish that are high in omega-3 fatty acids, such as salmon and fresh tuna  Ask your healthcare provider for more information on a heart healthy diet and the DASH eating plan  · Limit sodium (salt)  Sodium may increase your blood pressure  Add less table salt to your foods  Read food labels and choose foods that are low in sodium  Your healthcare provider may suggest you follow a low sodium diet  · Reach or maintain a healthy weight  Extra weight makes your heart work harder  Ask your healthcare provider how much you should weight  He can help you create a safe weight loss plan  Even a weight loss of 10% of your body weight can help your heart function better  · Exercise as directed    Exercise helps improve heart function and can help you manage your weight  Exercise can also help lower your cholesterol and blood sugar levels  Try to get at least 30 minutes of exercise at least 5 times each week  Try to be active every day  Your healthcare provider can help you create an exercise plan that works best for you  · Limit alcohol  Alcohol can increase your blood pressure and triglyceride levels  Men should limit alcohol to 2 drinks per day  Women should limit alcohol to 1 drink per day  A drink of alcohol is 12 ounces of beer, 5 ounces of wine, or 1½ ounces of liquor  · Do not smoke  Nicotine and other chemicals in cigarettes and cigars can cause heart and lung damage  Ask your healthcare provider for information if you currently smoke and need help to quit  E-cigarettes or smokeless tobacco still contain nicotine  Talk to your healthcare provider before you use these products  Follow up with your healthcare provider as directed:  Write down your questions so you remember to ask them during your visits  © 2017 [facility] Information is for End User's use only and may not be sold, redistributed or otherwise used for commercial purposes  All illustrations and images included in CareNotes® are the copyrighted property of [employer]  or [facility]  The above information is an  only  It is not intended as medical advice for individual conditions or treatments  Talk to your doctor, nurse or pharmacist before following any medical regimen to see if it is safe and effective for you       -recent carotid ultrasound demonstrates known right carotid occlusion and widely open left carotid operative site    -we will continue surveillance with carotid ultrasound in 6 months    -return to office in six-months with carotid ultrasound for routine postoperative follow-up   -continue aspirin and atorvastatin therapy  -you can stop taking Plavix (clopidogrel)  -please contact the office in the interim with any questions, concerns or new symptoms  -call 911 immediately for signs or symptoms of TIA/CVA

## 2020-08-17 ENCOUNTER — TELEPHONE (OUTPATIENT)
Dept: NEUROLOGY | Facility: CLINIC | Age: 80
End: 2020-08-17

## 2020-08-17 NOTE — TELEPHONE ENCOUNTER
1st attempt  Left message for patient to call back and reschedule appointment due to provider being out

## 2020-08-17 NOTE — PROGRESS NOTES
07/15/19 1300   Pain Assessment   Pain Assessment No/denies pain   Restrictions/Precautions   Precautions Cognitive; Fall Risk;Supervision on toilet/commode;Visual deficit   Memory Skills   Memory (FIM) 4 - Recalls 2 of 3 steps   Social Interaction (FIM) 6 - Interacts appropriately with others BUT requires extra  time   Speech/Language/Cognition Assessmetn   Treatment Assessment Initial portion of session was to complete activity which was started earlier today in prior SLP session, which pt was able to recall w/o cues  Engaged pt in completing written 6-step sequences, in which pt noted to have difficulty in determining accuracy in sequences  Pt's processing was noted to be slower, but also given increased time, where pt was able to verbalize which sequences were more difficult to complete  Pt was 29/48 accurate in completing during task  When SLP cued pt to review sequences which were errored more than 2 steps, SLP had pt start over and re-sequence items  Pt able to improve accuracy of those sequences to 44/48 accurate, needing visual prompts to complete  The last items which were errored were reversal given 2 steps, which upon verbal review completed in which pt was able to improve accuracy to 48/48  Remainder of session was co-tx'd w/ OT where pt was to complete a visual scanning/problem solving task to locate 5 places on the 9th floor  Refer to OT note in regards to RUE use and mobility notes  Focus for SLP portion of the session pertained to visual scanning, sequencing task as well as problem solving how to locate 5 places on the 9th floor  She was able to navigate to elevators w/ minimal verbal prompts, but ability to locate correct button once in elevator to get to 9th floor was appropriate  Written cue given to pt to locate the following places, [employer] office, laundry room, bathroom and then then rooms [phone] and [phone]   Pt able to read and verbalize all items on sticky notes, but when beginning to walk and attempt to locate places, pt looked at bathroom, but pt unable to recall or verbalize to OT/SLP about needing to place sticky note onto that location  SLP/OT providing 2 places to locate after that, in which pt's recall was improved for locating [employer] office and Laundry  Ability to recall room numbers was decreased, but overall problem solving was accurate, noting ability to verbalize that the room number were increasing as we walked along the hallways  Locating the rooms was [facility], noting that pt was scanning to the R side where room numbers were located  Question pt's insight to fatigue levels due to OT or SLP asking if rest breaks needed  At this time, pt will continue to benefit from SLP services to maximize overall cognitive linguistic skills at this time  SLP Therapy Minutes   SLP Time In 1300   SLP Time Out 1400   SLP Total Time (minutes) 60   SLP Mode of treatment - Individual (minutes) 30   SLP Mode of treatment - Concurrent (minutes) 0   SLP Mode of treatment - Group (minutes) 0   SLP Mode of treatment - Co-treat (minutes) 30   SLP Mode of Teatment - Total time(minutes) 60 minutes   Therapy Time missed   Time missed?  No   Daily FIM Score   Problem solving (FIM) 4 - Solves basic problems 75-89% of time   Comprehension (FIM) 5 - Understands basic directions and conversation   Expression (FIM) 5 - Needs help/cues only RARELY (< 10% of the time) Female Pregnancy Counseling Text: Female patients should also be on two forms of birth control while taking this medication and for one month after their last dose.

## 2020-08-27 ENCOUNTER — HOSPITAL ENCOUNTER (OUTPATIENT)
Dept: NON INVASIVE DIAGNOSTICS | Facility: CLINIC | Age: 80
Discharge: HOME | End: 2020-08-27
Payer: COMMERCIAL

## 2020-08-27 DIAGNOSIS — I65.22 SYMPTOMATIC STENOSIS OF LEFT CAROTID ARTERY: ICD-10-CM

## 2020-08-27 DIAGNOSIS — Z98.890 HISTORY OF LEFT-SIDED CAROTID ENDARTERECTOMY: ICD-10-CM

## 2020-08-27 PROCEDURE — 93880 EXTRACRANIAL BILAT STUDY: CPT

## 2020-08-27 PROCEDURE — 93880 EXTRACRANIAL BILAT STUDY: CPT | Performed by: SURGERY

## 2020-09-01 NOTE — TELEPHONE ENCOUNTER
Called patient LVM for patient to call back to reschedule appointment due to provider is out of office

## 2020-09-04 ENCOUNTER — TELEPHONE (OUTPATIENT)
Dept: NEUROLOGY | Facility: CLINIC | Age: 80
End: 2020-09-04

## 2020-09-11 DIAGNOSIS — E78.2 MIXED HYPERLIPIDEMIA: ICD-10-CM

## 2020-09-11 RX ORDER — ATORVASTATIN CALCIUM 80 MG/1
TABLET, FILM COATED ORAL
Qty: 90 TABLET | Refills: 3 | Status: SHIPPED | OUTPATIENT
Start: 2020-09-11 | End: 2021-07-12

## 2020-09-14 PROBLEM — I69.30 HISTORY OF CVA WITH RESIDUAL DEFICIT: Status: ACTIVE | Noted: 2019-06-19

## 2020-09-14 NOTE — ASSESSMENT & PLAN NOTE
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, chronic right carotid occlusion and symptomatic high-grade left carotid stenosis, s/p L CEA by Dr Cornejo Lashell Doctor on 6/28/2019  Noninvasive imaging demonstrates widely patent left endarterectomy site  Patient asymptomatic without new neurologic deficits and resolution of prior right hand weakness   -doing well  -continue surveillance with carotid duplex in 6 months (per consensus letter) and 1 year  -continue ASA and statin therapy    -return to office in 1 year with carotid duplex for RFM  -instructed to contact the office with new concerns or symptoms    Educated in signs/symptoms of TIA/CVA and instructed to call 911 immediately  -VQI LTFU completed

## 2020-09-14 NOTE — PATIENT INSTRUCTIONS
Carotid Artery Disease   AMBULATORY CARE:   Carotid artery disease  is a condition that causes narrow or blocked carotid arteries  Your carotid arteries are the blood vessels that supply your brain with most of the blood it needs to work  You have 2 carotid arteries, one on each side of your neck  Call 911 for any of the following:   · You have any of the following signs of a stroke:      ¨ Numbness or drooping on one side of your face     ¨ Weakness in an arm or leg    ¨ Confusion or difficulty speaking    ¨ Dizziness, a severe headache, or vision loss    · You have any of the following signs of a heart attack:      ¨ Squeezing, pressure, or pain in your chest that lasts longer than 5 minutes or returns    ¨ Discomfort or pain in your back, neck, jaw, stomach, or arm     ¨ Trouble breathing    ¨ Nausea or vomiting    ¨ Lightheadedness or a sudden cold sweat, especially with chest pain or trouble breathing  Contact your healthcare provider if:   · You have questions or concerns about your condition or care  Signs and symptoms of carotid artery disease: You may have no signs or symptoms  Most commonly, carotid artery disease causes transient ischemic attacks (TIAs), or mini-strokes  You may have numbness, weakness, lack of movement, or vision or speech problems  A TIA goes away quickly and does not cause permanent damage  A TIA may be a warning sign that you are about to have a stroke  If you have any symptoms of a TIA or stroke, seek care immediately  Warning signs of a stroke: The word F A S T  can help you remember and recognize warning signs of a stroke  · F = Face:  One side of the face droops  · A = Arms:  One arm starts to drop when both arms are raised  · S = Speech:  Speech is slurred or sounds different than usual     · T = Time:  A person who is having a stroke needs to be seen immediately  A stroke is a medical emergency that needs immediate treatment   Some medicines and treatments work best if given within a few hours of a stroke  Treatment  for carotid artery disease depends on how narrow your arteries have become, your symptoms, and your general health  The goal of treatment is to lower your risk for a stroke  You may need any of the following:  · Take aspirin if directed  Your healthcare provider may suggest that you take an aspirin a day to prevent blood clots from forming in the carotid arteries  If your healthcare provider wants you to take aspirin daily, do not take acetaminophen or ibuprofen instead  · Control risk factors  High blood pressure, high cholesterol, heart disease, diabetes, and being overweight increase your risk for atherosclerosis  · Procedures can help open blocked arteries  A carotid endarterectomy is used to cut plaque out of the artery  An angioplasty is used to push the plaque against the artery wall with a balloon device  Sometimes a stent is placed during an angioplasty  A stent is a metal mesh tube that is placed in the artery to keep it open  Manage carotid artery disease:   · Eat a variety of healthy foods  Healthy foods include fruit, vegetables, whole-grain breads, low-fat dairy products, lean meat, and fish  Choose fish that are high in omega-3 fatty acids, such as salmon and fresh tuna  Ask your healthcare provider for more information on a heart healthy diet and the DASH eating plan  · Limit sodium (salt)  Sodium may increase your blood pressure  Add less table salt to your foods  Read food labels and choose foods that are low in sodium  Your healthcare provider may suggest you follow a low sodium diet  · Reach or maintain a healthy weight  Extra weight makes your heart work harder  Ask your healthcare provider how much you should weight  He can help you create a safe weight loss plan  Even a weight loss of 10% of your body weight can help your heart function better  · Exercise as directed    Exercise helps improve heart function and can help you manage your weight  Exercise can also help lower your cholesterol and blood sugar levels  Try to get at least 30 minutes of exercise at least 5 times each week  Try to be active every day  Your healthcare provider can help you create an exercise plan that works best for you  · Limit alcohol  Alcohol can increase your blood pressure and triglyceride levels  Men should limit alcohol to 2 drinks per day  Women should limit alcohol to 1 drink per day  A drink of alcohol is 12 ounces of beer, 5 ounces of wine, or 1½ ounces of liquor  · Do not smoke  Nicotine and other chemicals in cigarettes and cigars can cause heart and lung damage  Ask your healthcare provider for information if you currently smoke and need help to quit  E-cigarettes or smokeless tobacco still contain nicotine  Talk to your healthcare provider before you use these products  Follow up with your healthcare provider as directed:  Write down your questions so you remember to ask them during your visits  © 2017 [address]  Information is for End User's use only and may not be sold, redistributed or otherwise used for commercial purposes  All illustrations and images included in CareNotes® are the copyrighted property of [employer]  or [employer]  The above information is an  only  It is not intended as medical advice for individual conditions or treatments  Talk to your doctor, nurse or pharmacist before following any medical regimen to see if it is safe and effective for you     -recent carotid ultrasound demonstrates widely patent L carotid artery and operative site    Right carotid artery has known occlusion   -no vascular intervention for right carotid occlusion  -we will continue surveillance with carotid ultrasound in 6 months and 1 year    -return to office in 1 year with carotid ultrasound for routine follow-up   -continue aspirin and atorvastatin therapy  -please contact the office in the interim with any questions, concerns or new symptoms  -call 911 immediately for signs or symptoms of TIA/CVA

## 2020-09-15 ENCOUNTER — OFFICE VISIT (OUTPATIENT)
Dept: VASCULAR SURGERY | Facility: CLINIC | Age: 80
End: 2020-09-15
Payer: COMMERCIAL

## 2020-09-15 VITALS
DIASTOLIC BLOOD PRESSURE: 80 MMHG | HEART RATE: 68 BPM | TEMPERATURE: 97.6 F | HEIGHT: 61 IN | WEIGHT: 148 LBS | BODY MASS INDEX: 27.94 KG/M2 | SYSTOLIC BLOOD PRESSURE: 144 MMHG

## 2020-09-15 DIAGNOSIS — E78.5 HYPERLIPIDEMIA, UNSPECIFIED HYPERLIPIDEMIA TYPE: ICD-10-CM

## 2020-09-15 DIAGNOSIS — I10 ESSENTIAL HYPERTENSION: ICD-10-CM

## 2020-09-15 DIAGNOSIS — I65.22 SYMPTOMATIC STENOSIS OF LEFT CAROTID ARTERY: Primary | ICD-10-CM

## 2020-09-15 DIAGNOSIS — Z98.890 HISTORY OF LEFT-SIDED CAROTID ENDARTERECTOMY: ICD-10-CM

## 2020-09-15 DIAGNOSIS — I69.30 HISTORY OF CVA WITH RESIDUAL DEFICIT: ICD-10-CM

## 2020-09-15 DIAGNOSIS — I65.21 RIGHT INTERNAL CAROTID OCCLUSION: ICD-10-CM

## 2020-09-15 PROCEDURE — 99214 OFFICE O/P EST MOD 30 MIN: CPT | Performed by: PHYSICIAN ASSISTANT

## 2020-09-15 NOTE — PROGRESS NOTES
Assessment/Plan:    Symptomatic stenosis of left carotid artery  41-year-old female former smoker with HTN, HLD, L MCA CVA June 2019, chronic right carotid occlusion and symptomatic high-grade left carotid stenosis, s/p L CEA by Dr Restrepo [facility] on 6/28/2019  Noninvasive imaging demonstrates widely patent left endarterectomy site  Patient asymptomatic without new neurologic deficits and resolution of prior right hand weakness   -doing well  -continue surveillance with carotid duplex in 6 months (per consensus letter) and 1 year  -continue ASA and statin therapy    -return to office in 1 year with carotid duplex for [facility]  -instructed to contact the office with new concerns or symptoms  Educated in signs/symptoms of TIA/CVA and instructed to call 911 immediately  -VQI LTFU completed    History of left-sided carotid endarterectomy  -patent on recent noninvasive imaging  -continue surveillance  -see plan as outlined    Right internal carotid occlusion  Chronic right carotid occlusion  -no vascular intervention indicated or recommended  -continue surveillance contralateral endarterectomy site and disease    Essential hypertension  -BP well controlled  -continue current medical regimen  -management per PCP    Hyperlipidemia  -stable  -continue statin therapy  -management per PCP    History of CVA with residual deficit  Hx L MCA CVA June '19  -residual right hand weakness since CVA  -s/p L CEA 6/28/19       Diagnoses and all orders for this visit:    Symptomatic stenosis of left carotid artery  -     VAS carotid complete study; Future  -     VAS carotid complete study; Future    History of left-sided carotid endarterectomy  -     VAS carotid complete study; Future  -     VAS carotid complete study; Future    Right internal carotid occlusion  -     VAS carotid complete study; Future  -     VAS carotid complete study;  Future    Essential hypertension    Hyperlipidemia, unspecified hyperlipidemia type    History of CVA with residual deficit          Subjective:      Patient ID: Georges Vega is a [de-identified] y o  female  Patient is here for 6 month FU VQI LTFU  Patient had CV done on 8/27/20  Patient denies any TIA or strokes symptoms  Patient currently takes ASA 81mg, Atorvastatin  77-year-old female former smoker with HTN, HLD, L MCA CVA June 2019, chronic right carotid occlusion and symptomatic high-grade left carotid stenosis, s/p L CEA by Dr Restrepo [facility] on 6/28/2019 who returns the office for routine postoperative follow-up and VQI LTFU  Carotid duplex 8/27/2020 has been reviewed and demonstrates widely patent left endarterectomy site and known right carotid occlusion  Vertebrals antegrade  Patient asymptomatic without new neurologic deficits and resolution of prior right hand weakness  Specifically, patient denies aphasia, dysarthria, ataxia, lateralizing extremity weakness, amaurosis fugax or facial droop  Patient remains on aspirin and statin therapy as well as Coreg  The following portions of the patient's history were reviewed and updated as appropriate: allergies, current medications, past family history, past medical history, past social history, past surgical history and problem list     Review of Systems   Constitutional: Negative  Negative for chills and fever  HENT: Negative  Eyes: Negative  Respiratory: Negative  Negative for chest tightness and shortness of breath  Cardiovascular: Negative  Negative for chest pain  Gastrointestinal: Negative  Endocrine: Negative  Genitourinary: Negative  Musculoskeletal: Negative  Skin: Negative  Allergic/Immunologic: Negative  Neurological: Negative  Negative for dizziness, facial asymmetry, speech difficulty, weakness, light-headedness, numbness and headaches  Hematological: Negative  Psychiatric/Behavioral: Negative          I have reviewed and made appropriate changes to the review of systems input by the medical assistant  Vitals:    09/15/20 1354   BP: 144/80   BP Location: Left arm   Patient Position: Sitting   Cuff Size: Standard   Pulse: 68   Temp: 97 6 °F (36 4 °C)   TempSrc: Tympanic   Weight: 67 1 kg (148 lb)   Height: 5' 1" (1 549 m)       Patient Active Problem List   Diagnosis    Cataracts, bilateral    Essential hypertension    Hyperlipidemia    History of CVA with residual deficit    Anemia, unspecified    Abnormal CT scan    Symptomatic stenosis of left carotid artery    Iron deficiency anemia    Impaired mobility and ADLs    History of left-sided carotid endarterectomy    Right internal carotid occlusion       Past Surgical History:   Procedure Laterality Date    CAROTID ENDARTARECTOMY Left 6/28/2019    Procedure: ENDARTERECTOMY ARTERY CAROTID WITH PATCH ANGIOPLASTY;  Surgeon: Tamir Solis MD;  Location: [facility];  Service: Vascular       History reviewed  No pertinent family history      Social History     Socioeconomic History    Marital status: Single     Spouse name: Not on file    Number of children: Not on file    Years of education: Not on file    Highest education level: Not on file   Occupational History    Not on file   Social Needs    Financial resource strain: Not on file    Food insecurity     Worry: Not on file     Inability: Not on file    Transportation needs     Medical: Not on file     Non-medical: Not on file   Tobacco Use    Smoking status: Former Smoker     Packs/day: 0 25     Years: 50 00     Pack years: 12 50     Types: Cigarettes    Smokeless tobacco: Never Used   Substance and Sexual Activity    Alcohol use: Not Currently     Frequency: Monthly or less     Drinks per session: 1 or 2    Drug use: Never    Sexual activity: Not Currently   Lifestyle    Physical activity     Days per week: Not on file     Minutes per session: Not on file    Stress: Not on file   Relationships    Social connections     Talks on phone: Not on file     Gets together: Not on file     Attends Christianity service: Not on file     Active member of club or organization: Not on file     Attends meetings of clubs or organizations: Not on file     Relationship status: Not on file    Intimate partner violence     Fear of current or ex partner: Not on file     Emotionally abused: Not on file     Physically abused: Not on file     Forced sexual activity: Not on file   Other Topics Concern    Not on file   Social History Narrative    Not on file       No Known Allergies      Current Outpatient Medications:     aspirin 81 mg chewable tablet, Chew 81 mg daily, Disp: , Rfl:     atorvastatin (LIPITOR) 80 mg tablet, TAKE 1 TABLET BY MOUTH EVERY DAY, Disp: 90 tablet, Rfl: 3    ferrous sulfate 325 (65 Fe) mg tablet, Take 1 tablet (325 mg total) by mouth 2 (two) times a day with meals, Disp: 60 tablet, Rfl: 3    Garlic 011 MG TABS, Take 100 mg by mouth daily, Disp: , Rfl:     hydrochlorothiazide (HYDRODIURIL) 12 5 mg tablet, Take 12 5 mg by mouth daily, Disp: , Rfl:     NIFEdipine (ADALAT CC) 30 MG 24 hr tablet, Take 1 tablet (30 mg total) by mouth daily, Disp: 30 tablet, Rfl: 3    olmesartan (BENICAR) 40 mg tablet, Take 40 mg by mouth daily, Disp: , Rfl:     carvedilol (COREG) 3 125 mg tablet, Take 1 tablet (3 125 mg total) by mouth 2 (two) times a day with meals (Patient not taking: Reported on 2/27/2020), Disp: 60 tablet, Rfl: 3    pantoprazole (PROTONIX) 40 mg tablet, Take 1 tablet (40 mg total) by mouth daily in the early morning (Patient not taking: Reported on 10/31/2019), Disp: 30 tablet, Rfl: 0     Objective:  Imaging study:  Carotid duplex 8/27/2020:  Imaging study reviewed and as described above  See full report below:  Right        Impression  PSV  EDV (cm/s)  Direction of Flow    Dist  ICA    Occluded                                          Mid  ICA     Occluded                                          Prox   ICA    Occluded                                          Dist CCA 76          16                       Mid CCA                   52          12                       Prox CCA                  65          13                       Ext Carotid              126          29                       Prox Vert                 37          17  Antegrade            Subclavian               134           0                          Left         Impression     PSV  EDV (cm/s)  Direction of Flow    Dist  ICA                    72          22                       Mid  ICA                     90          34                       Prox  ICA    Widely Patent  100          24                       Dist CCA                     85          27                       Mid CCA                      90          25                       Prox CCA                     92          26                       Ext Carotid                 142          33                       Prox Vert                    56          18  Antegrade            Subclavian                  103           0                                CONCLUSION:     Impression  RIGHT:  There is occlusion of the internal carotid artery  Plaque is heterogenous and  irregular  Vertebral artery flow is antegrade  There is no significant subclavian artery  disease  LEFT:  Widely patent internal carotid artery and endarterectomy site  Vertebral artery flow is antegrade  There is no significant subclavian artery  disease    /80 (BP Location: Left arm, Patient Position: Sitting, Cuff Size: Standard)   Pulse 68   Temp 97 6 °F (36 4 °C) (Tympanic)   Ht 5' 1" (1 549 m)   Wt 67 1 kg (148 lb)   BMI 27 96 kg/m²          Physical Exam  Vitals signs and nursing note reviewed  Exam conducted with a chaperone present  Constitutional:       General: She is not in acute distress  Appearance: She is well-developed  HENT:      Head: Normocephalic and atraumatic  Eyes:      General: No scleral icterus       Conjunctiva/sclera: Conjunctivae normal       Pupils: Pupils are equal, round, and reactive to light  Neck:      Musculoskeletal: Normal range of motion and neck supple  Thyroid: No thyromegaly  Vascular: No carotid bruit or JVD  Trachea: No tracheal deviation  Comments: Well-healed left neck surgical scar  Cardiovascular:      Rate and Rhythm: Normal rate and regular rhythm  Pulses:           Carotid pulses are 2+ on the right side and 2+ on the left side  Radial pulses are 2+ on the right side and 2+ on the left side  Heart sounds: S1 normal and S2 normal  No murmur  No friction rub  No gallop  No S3 sounds  Pulmonary:      Effort: No respiratory distress  Breath sounds: Normal breath sounds  No stridor  No wheezing, rhonchi or rales  Abdominal:      General: Bowel sounds are normal  There is no distension or abdominal bruit  Palpations: Abdomen is soft  There is no mass or pulsatile mass  Tenderness: There is no abdominal tenderness  There is no rebound  Musculoskeletal: Normal range of motion  General: No deformity  Skin:     General: Skin is warm and dry  Coloration: Skin is not pale  Findings: No erythema or lesion  Neurological:      General: No focal deficit present  Mental Status: She is alert and oriented to person, place, and time  Sensory: No sensory deficit  Motor: No weakness  Comments: Tongue midline  Smile symmetrical   Psychiatric:         Mood and Affect: Mood normal          Thought Content:  Thought content normal          VQI Carotid Endarterectomy Follow-Up  * No surgery found * Procedure: Carotid Endarterectomy     General Information  Date of contact: 9/15/2020    Contact by: face to face   Current smoking: Social History     Tobacco Use   Smoking Status Former Smoker    Packs/day: 0 25    Years: 50 00    Pack years: 12 50    Types: Cigarettes   Smokeless Tobacco Never Used      Current living status: home   Dialysis: no Current Medications  Is patient compliant with medications? yes     Neurologic Event since DC: None       Modified Mirian Score: 0       Myocardial Infarction since DC:  No       Cranial Nerve Injury: None       Duplex CEA Site: Yes, If Duplex CEA Site is Yes:  Results for orders placed during the hospital encounter of 08/27/20   VAS CAROTID COMPLETE STUDY (Final) 8/27/2020  4:29 PM     Signed by: Noah Holcomb MD      PSV: 100 cm/sec   EDV: 24   ICA/CCA Ratio: Not done   Duplex Stenosis: <=50%   CEA Site Re-operation: No   CEA Site PCI: No

## 2020-10-17 ENCOUNTER — HOSPITAL ENCOUNTER (EMERGENCY)
Facility: HOSPITAL | Age: 80
Discharge: HOME | End: 2020-10-17
Attending: EMERGENCY MEDICINE | Admitting: EMERGENCY MEDICINE
Payer: COMMERCIAL

## 2020-10-17 VITALS
DIASTOLIC BLOOD PRESSURE: 95 MMHG | HEIGHT: 61 IN | TEMPERATURE: 97.9 F | HEART RATE: 65 BPM | WEIGHT: 145 LBS | RESPIRATION RATE: 18 BRPM | SYSTOLIC BLOOD PRESSURE: 195 MMHG | OXYGEN SATURATION: 99 % | BODY MASS INDEX: 27.38 KG/M2

## 2020-10-17 DIAGNOSIS — J01.00 ACUTE NON-RECURRENT MAXILLARY SINUSITIS: ICD-10-CM

## 2020-10-17 DIAGNOSIS — R51.9 ACUTE NONINTRACTABLE HEADACHE, UNSPECIFIED HEADACHE TYPE: Primary | ICD-10-CM

## 2020-10-17 PROCEDURE — U0003 INFECTIOUS AGENT DETECTION BY NUCLEIC ACID (DNA OR RNA); SEVERE ACUTE RESPIRATORY SYNDROME CORONAVIRUS 2 (SARS-COV-2) (CORONAVIRUS DISEASE [COVID-19]), AMPLIFIED PROBE TECHNIQUE, MAKING USE OF HIGH THROUGHPUT TECHNOLOGIES AS DESCRIBED BY CMS-2020-01-R: HCPCS | Performed by: EMERGENCY MEDICINE

## 2020-10-17 PROCEDURE — 99283 EMERGENCY DEPT VISIT LOW MDM: CPT

## 2020-10-17 PROCEDURE — 99284 EMERGENCY DEPT VISIT MOD MDM: CPT | Performed by: EMERGENCY MEDICINE

## 2020-10-17 RX ORDER — ACETAMINOPHEN 325 MG/1
975 TABLET ORAL ONCE
Status: COMPLETED | OUTPATIENT
Start: 2020-10-17 | End: 2020-10-17

## 2020-10-17 RX ORDER — ACETAMINOPHEN 325 MG/1
975 TABLET ORAL ONCE
Status: DISCONTINUED | OUTPATIENT
Start: 2020-10-17 | End: 2020-10-17

## 2020-10-17 RX ORDER — FLUTICASONE PROPIONATE 50 MCG
1 SPRAY, SUSPENSION (ML) NASAL DAILY
Qty: 16 G | Refills: 0 | Status: SHIPPED | OUTPATIENT
Start: 2020-10-17

## 2020-10-17 RX ADMIN — ACETAMINOPHEN 975 MG: 325 TABLET, FILM COATED ORAL at 15:17

## 2020-10-19 LAB — SARS-COV-2 RNA SPEC QL NAA+PROBE: NOT DETECTED

## 2020-11-21 ENCOUNTER — HOSPITAL ENCOUNTER (EMERGENCY)
Facility: HOSPITAL | Age: 80
Discharge: HOME | DRG: 504 | End: 2020-11-21
Attending: EMERGENCY MEDICINE
Payer: COMMERCIAL

## 2020-11-21 ENCOUNTER — APPOINTMENT (EMERGENCY)
Dept: RADIOLOGY | Facility: HOSPITAL | Age: 80
DRG: 504 | End: 2020-11-21
Payer: COMMERCIAL

## 2020-11-21 VITALS
HEIGHT: 58 IN | OXYGEN SATURATION: 98 % | TEMPERATURE: 97.5 F | DIASTOLIC BLOOD PRESSURE: 67 MMHG | RESPIRATION RATE: 18 BRPM | WEIGHT: 140 LBS | BODY MASS INDEX: 29.39 KG/M2 | HEART RATE: 76 BPM | SYSTOLIC BLOOD PRESSURE: 147 MMHG

## 2020-11-21 DIAGNOSIS — I69.30 HISTORY OF CVA WITH RESIDUAL DEFICIT: ICD-10-CM

## 2020-11-21 DIAGNOSIS — L03.90 CELLULITIS: Primary | ICD-10-CM

## 2020-11-21 DIAGNOSIS — I10 ESSENTIAL HYPERTENSION: ICD-10-CM

## 2020-11-21 DIAGNOSIS — Z98.890 HISTORY OF LEFT-SIDED CAROTID ENDARTERECTOMY: ICD-10-CM

## 2020-11-21 PROCEDURE — 73660 X-RAY EXAM OF TOE(S): CPT

## 2020-11-21 PROCEDURE — 99285 EMERGENCY DEPT VISIT HI MDM: CPT | Performed by: EMERGENCY MEDICINE

## 2020-11-21 PROCEDURE — 99283 EMERGENCY DEPT VISIT LOW MDM: CPT

## 2020-11-21 RX ORDER — CEPHALEXIN 250 MG/1
500 CAPSULE ORAL EVERY 6 HOURS SCHEDULED
Qty: 56 CAPSULE | Refills: 0 | Status: SHIPPED | OUTPATIENT
Start: 2020-11-21 | End: 2020-11-29 | Stop reason: HOSPADM

## 2020-11-21 RX ORDER — CEPHALEXIN 250 MG/1
500 CAPSULE ORAL ONCE
Status: DISCONTINUED | OUTPATIENT
Start: 2020-11-21 | End: 2020-11-21

## 2020-11-21 RX ORDER — CEPHALEXIN 250 MG/1
500 CAPSULE ORAL ONCE
Status: COMPLETED | OUTPATIENT
Start: 2020-11-21 | End: 2020-11-21

## 2020-11-21 RX ADMIN — CEPHALEXIN 500 MG: 250 CAPSULE ORAL at 13:09

## 2020-11-22 ENCOUNTER — APPOINTMENT (INPATIENT)
Dept: NON INVASIVE DIAGNOSTICS | Facility: HOSPITAL | Age: 80
DRG: 504 | End: 2020-11-22
Payer: COMMERCIAL

## 2020-11-22 ENCOUNTER — HOSPITAL ENCOUNTER (INPATIENT)
Facility: HOSPITAL | Age: 80
LOS: 7 days | Discharge: HOME HEALTH | DRG: 504 | End: 2020-11-29
Attending: EMERGENCY MEDICINE | Admitting: INTERNAL MEDICINE
Payer: COMMERCIAL

## 2020-11-22 DIAGNOSIS — I10 ESSENTIAL HYPERTENSION: ICD-10-CM

## 2020-11-22 DIAGNOSIS — M86.9 OSTEOMYELITIS OF FIFTH TOE OF LEFT FOOT (HCC): Primary | ICD-10-CM

## 2020-11-22 PROBLEM — Z86.73 HISTORY OF CVA (CEREBROVASCULAR ACCIDENT): Status: ACTIVE | Noted: 2019-06-19

## 2020-11-22 LAB
ATRIAL RATE: 70 BPM
BASOPHILS # BLD AUTO: 0.06 THOUSANDS/ΜL (ref 0–0.1)
BASOPHILS NFR BLD AUTO: 1 % (ref 0–1)
CRP SERPL QL: 34.5 MG/L
EOSINOPHIL # BLD AUTO: 0.12 THOUSAND/ΜL (ref 0–0.61)
EOSINOPHIL NFR BLD AUTO: 1 % (ref 0–6)
ERYTHROCYTE [DISTWIDTH] IN BLOOD BY AUTOMATED COUNT: 13.7 % (ref 11.6–15.1)
ERYTHROCYTE [SEDIMENTATION RATE] IN BLOOD: 67 MM/HOUR (ref 0–29)
FLUAV RNA RESP QL NAA+PROBE: NEGATIVE
FLUBV RNA RESP QL NAA+PROBE: NEGATIVE
HCT VFR BLD AUTO: 38.5 % (ref 34.8–46.1)
HGB BLD-MCNC: 12 G/DL (ref 11.5–15.4)
IMM GRANULOCYTES # BLD AUTO: 0.04 THOUSAND/UL (ref 0–0.2)
IMM GRANULOCYTES NFR BLD AUTO: 0 % (ref 0–2)
LYMPHOCYTES # BLD AUTO: 1.24 THOUSANDS/ΜL (ref 0.6–4.47)
LYMPHOCYTES NFR BLD AUTO: 12 % (ref 14–44)
MCH RBC QN AUTO: 29.7 PG (ref 26.8–34.3)
MCHC RBC AUTO-ENTMCNC: 31.2 G/DL (ref 31.4–37.4)
MCV RBC AUTO: 95 FL (ref 82–98)
MONOCYTES # BLD AUTO: 1.08 THOUSAND/ΜL (ref 0.17–1.22)
MONOCYTES NFR BLD AUTO: 11 % (ref 4–12)
NEUTROPHILS # BLD AUTO: 7.75 THOUSANDS/ΜL (ref 1.85–7.62)
NEUTS SEG NFR BLD AUTO: 75 % (ref 43–75)
NRBC BLD AUTO-RTO: 0 /100 WBCS
P AXIS: 98 DEGREES
PLATELET # BLD AUTO: 305 THOUSANDS/UL (ref 149–390)
PMV BLD AUTO: 10.6 FL (ref 8.9–12.7)
PR INTERVAL: 160 MS
QRS AXIS: 192 DEGREES
QRSD INTERVAL: 82 MS
QT INTERVAL: 384 MS
QTC INTERVAL: 414 MS
RBC # BLD AUTO: 4.04 MILLION/UL (ref 3.81–5.12)
RSV RNA RESP QL NAA+PROBE: NEGATIVE
SARS-COV-2 RNA RESP QL NAA+PROBE: NEGATIVE
T WAVE AXIS: 119 DEGREES
VENTRICULAR RATE: 70 BPM
WBC # BLD AUTO: 10.29 THOUSAND/UL (ref 4.31–10.16)

## 2020-11-22 PROCEDURE — 86140 C-REACTIVE PROTEIN: CPT | Performed by: EMERGENCY MEDICINE

## 2020-11-22 PROCEDURE — 87040 BLOOD CULTURE FOR BACTERIA: CPT | Performed by: EMERGENCY MEDICINE

## 2020-11-22 PROCEDURE — 93925 LOWER EXTREMITY STUDY: CPT

## 2020-11-22 PROCEDURE — 36415 COLL VENOUS BLD VENIPUNCTURE: CPT | Performed by: EMERGENCY MEDICINE

## 2020-11-22 PROCEDURE — 93923 UPR/LXTR ART STDY 3+ LVLS: CPT

## 2020-11-22 PROCEDURE — 99222 1ST HOSP IP/OBS MODERATE 55: CPT | Performed by: INTERNAL MEDICINE

## 2020-11-22 PROCEDURE — 0241U HB NFCT DS VIR RESP RNA 4 TRGT: CPT | Performed by: EMERGENCY MEDICINE

## 2020-11-22 PROCEDURE — 99285 EMERGENCY DEPT VISIT HI MDM: CPT | Performed by: EMERGENCY MEDICINE

## 2020-11-22 PROCEDURE — 90471 IMMUNIZATION ADMIN: CPT

## 2020-11-22 PROCEDURE — 93925 LOWER EXTREMITY STUDY: CPT | Performed by: SURGERY

## 2020-11-22 PROCEDURE — 87205 SMEAR GRAM STAIN: CPT | Performed by: STUDENT IN AN ORGANIZED HEALTH CARE EDUCATION/TRAINING PROGRAM

## 2020-11-22 PROCEDURE — 85025 COMPLETE CBC W/AUTO DIFF WBC: CPT | Performed by: EMERGENCY MEDICINE

## 2020-11-22 PROCEDURE — 96365 THER/PROPH/DIAG IV INF INIT: CPT

## 2020-11-22 PROCEDURE — 93010 ELECTROCARDIOGRAM REPORT: CPT | Performed by: INTERNAL MEDICINE

## 2020-11-22 PROCEDURE — 87077 CULTURE AEROBIC IDENTIFY: CPT | Performed by: STUDENT IN AN ORGANIZED HEALTH CARE EDUCATION/TRAINING PROGRAM

## 2020-11-22 PROCEDURE — 93922 UPR/L XTREMITY ART 2 LEVELS: CPT | Performed by: SURGERY

## 2020-11-22 PROCEDURE — 90715 TDAP VACCINE 7 YRS/> IM: CPT | Performed by: EMERGENCY MEDICINE

## 2020-11-22 PROCEDURE — 99284 EMERGENCY DEPT VISIT MOD MDM: CPT

## 2020-11-22 PROCEDURE — 96367 TX/PROPH/DG ADDL SEQ IV INF: CPT

## 2020-11-22 PROCEDURE — 87070 CULTURE OTHR SPECIMN AEROBIC: CPT | Performed by: STUDENT IN AN ORGANIZED HEALTH CARE EDUCATION/TRAINING PROGRAM

## 2020-11-22 PROCEDURE — 85652 RBC SED RATE AUTOMATED: CPT | Performed by: EMERGENCY MEDICINE

## 2020-11-22 PROCEDURE — 93005 ELECTROCARDIOGRAM TRACING: CPT

## 2020-11-22 RX ORDER — ASPIRIN 81 MG/1
81 TABLET, CHEWABLE ORAL DAILY
Status: DISCONTINUED | OUTPATIENT
Start: 2020-11-22 | End: 2020-11-29 | Stop reason: HOSPADM

## 2020-11-22 RX ORDER — NIFEDIPINE 30 MG/1
30 TABLET, EXTENDED RELEASE ORAL DAILY
Status: DISCONTINUED | OUTPATIENT
Start: 2020-11-22 | End: 2020-11-24

## 2020-11-22 RX ORDER — VANCOMYCIN HYDROCHLORIDE 1 G/200ML
15 INJECTION, SOLUTION INTRAVENOUS ONCE
Status: COMPLETED | OUTPATIENT
Start: 2020-11-22 | End: 2020-11-22

## 2020-11-22 RX ORDER — FLUTICASONE PROPIONATE 50 MCG
1 SPRAY, SUSPENSION (ML) NASAL DAILY
Status: DISCONTINUED | OUTPATIENT
Start: 2020-11-22 | End: 2020-11-29 | Stop reason: HOSPADM

## 2020-11-22 RX ORDER — HYDROCHLOROTHIAZIDE 12.5 MG/1
12.5 TABLET ORAL DAILY
Status: DISCONTINUED | OUTPATIENT
Start: 2020-11-22 | End: 2020-11-23

## 2020-11-22 RX ORDER — CHOLECALCIFEROL (VITAMIN D3) 125 MCG
1 CAPSULE ORAL
COMMUNITY

## 2020-11-22 RX ORDER — ATORVASTATIN CALCIUM 80 MG/1
80 TABLET, FILM COATED ORAL DAILY
Status: DISCONTINUED | OUTPATIENT
Start: 2020-11-22 | End: 2020-11-29 | Stop reason: HOSPADM

## 2020-11-22 RX ORDER — METRONIDAZOLE 500 MG/1
500 TABLET ORAL EVERY 8 HOURS SCHEDULED
Status: DISCONTINUED | OUTPATIENT
Start: 2020-11-22 | End: 2020-11-27

## 2020-11-22 RX ORDER — HEPARIN SODIUM 5000 [USP'U]/ML
5000 INJECTION, SOLUTION INTRAVENOUS; SUBCUTANEOUS EVERY 8 HOURS SCHEDULED
Status: DISCONTINUED | OUTPATIENT
Start: 2020-11-22 | End: 2020-11-29 | Stop reason: HOSPADM

## 2020-11-22 RX ORDER — CEFAZOLIN SODIUM 2 G/50ML
2000 SOLUTION INTRAVENOUS EVERY 8 HOURS
Status: DISCONTINUED | OUTPATIENT
Start: 2020-11-22 | End: 2020-11-27

## 2020-11-22 RX ORDER — LANOLIN ALCOHOL/MO/W.PET/CERES
3 CREAM (GRAM) TOPICAL
Status: DISCONTINUED | OUTPATIENT
Start: 2020-11-22 | End: 2020-11-29 | Stop reason: HOSPADM

## 2020-11-22 RX ORDER — LOSARTAN POTASSIUM 50 MG/1
100 TABLET ORAL DAILY
Status: DISCONTINUED | OUTPATIENT
Start: 2020-11-22 | End: 2020-11-23

## 2020-11-22 RX ORDER — CARVEDILOL 3.12 MG/1
3.12 TABLET ORAL DAILY
Status: DISCONTINUED | OUTPATIENT
Start: 2020-11-22 | End: 2020-11-29 | Stop reason: HOSPADM

## 2020-11-22 RX ORDER — FERROUS SULFATE 325(65) MG
325 TABLET ORAL 2 TIMES DAILY WITH MEALS
Status: DISCONTINUED | OUTPATIENT
Start: 2020-11-22 | End: 2020-11-29 | Stop reason: HOSPADM

## 2020-11-22 RX ADMIN — VANCOMYCIN HYDROCHLORIDE 1000 MG: 1 INJECTION, SOLUTION INTRAVENOUS at 12:20

## 2020-11-22 RX ADMIN — HEPARIN SODIUM 5000 UNITS: 5000 INJECTION INTRAVENOUS; SUBCUTANEOUS at 22:47

## 2020-11-22 RX ADMIN — HEPARIN SODIUM 5000 UNITS: 5000 INJECTION INTRAVENOUS; SUBCUTANEOUS at 16:31

## 2020-11-22 RX ADMIN — METRONIDAZOLE 500 MG: 500 TABLET ORAL at 16:31

## 2020-11-22 RX ADMIN — METRONIDAZOLE 500 MG: 500 TABLET ORAL at 22:47

## 2020-11-22 RX ADMIN — CEFAZOLIN SODIUM 2000 MG: 2 SOLUTION INTRAVENOUS at 22:47

## 2020-11-22 RX ADMIN — TETANUS TOXOID, REDUCED DIPHTHERIA TOXOID AND ACELLULAR PERTUSSIS VACCINE, ADSORBED 0.5 ML: 5; 2.5; 8; 8; 2.5 SUSPENSION INTRAMUSCULAR at 12:07

## 2020-11-22 RX ADMIN — CEFAZOLIN SODIUM 2000 MG: 2 SOLUTION INTRAVENOUS at 16:31

## 2020-11-22 RX ADMIN — MELATONIN 3 MG: at 22:47

## 2020-11-22 RX ADMIN — CEFEPIME HYDROCHLORIDE 2000 MG: 2 INJECTION, POWDER, FOR SOLUTION INTRAVENOUS at 11:25

## 2020-11-23 LAB
ALBUMIN SERPL BCP-MCNC: 2.7 G/DL (ref 3.5–5)
ALBUMIN SERPL BCP-MCNC: 3.3 G/DL (ref 3.5–5)
ALP SERPL-CCNC: 142 U/L (ref 46–116)
ALP SERPL-CCNC: 155 U/L (ref 46–116)
ALT SERPL W P-5'-P-CCNC: 36 U/L (ref 12–78)
ALT SERPL W P-5'-P-CCNC: 39 U/L (ref 12–78)
ANION GAP SERPL CALCULATED.3IONS-SCNC: 5 MMOL/L (ref 4–13)
ANION GAP SERPL CALCULATED.3IONS-SCNC: 5 MMOL/L (ref 4–13)
AST SERPL W P-5'-P-CCNC: 59 U/L (ref 5–45)
AST SERPL W P-5'-P-CCNC: 67 U/L (ref 5–45)
BASOPHILS # BLD AUTO: 0.07 THOUSANDS/ΜL (ref 0–0.1)
BASOPHILS NFR BLD AUTO: 1 % (ref 0–1)
BILIRUB SERPL-MCNC: 0.4 MG/DL (ref 0.2–1)
BILIRUB SERPL-MCNC: 0.5 MG/DL (ref 0.2–1)
BUN SERPL-MCNC: 27 MG/DL (ref 5–25)
BUN SERPL-MCNC: 31 MG/DL (ref 5–25)
CALCIUM ALBUM COR SERPL-MCNC: 10.4 MG/DL (ref 8.3–10.1)
CALCIUM ALBUM COR SERPL-MCNC: 10.4 MG/DL (ref 8.3–10.1)
CALCIUM SERPL-MCNC: 9.4 MG/DL (ref 8.3–10.1)
CALCIUM SERPL-MCNC: 9.8 MG/DL (ref 8.3–10.1)
CHLORIDE SERPL-SCNC: 100 MMOL/L (ref 100–108)
CHLORIDE SERPL-SCNC: 104 MMOL/L (ref 100–108)
CO2 SERPL-SCNC: 25 MMOL/L (ref 21–32)
CO2 SERPL-SCNC: 27 MMOL/L (ref 21–32)
CREAT SERPL-MCNC: 1.31 MG/DL (ref 0.6–1.3)
CREAT SERPL-MCNC: 1.38 MG/DL (ref 0.6–1.3)
EOSINOPHIL # BLD AUTO: 0.48 THOUSAND/ΜL (ref 0–0.61)
EOSINOPHIL NFR BLD AUTO: 5 % (ref 0–6)
ERYTHROCYTE [DISTWIDTH] IN BLOOD BY AUTOMATED COUNT: 13.7 % (ref 11.6–15.1)
GFR SERPL CREATININE-BSD FRML MDRD: 36 ML/MIN/1.73SQ M
GFR SERPL CREATININE-BSD FRML MDRD: 38 ML/MIN/1.73SQ M
GLUCOSE SERPL-MCNC: 93 MG/DL (ref 65–140)
GLUCOSE SERPL-MCNC: 94 MG/DL (ref 65–140)
HCT VFR BLD AUTO: 33.6 % (ref 34.8–46.1)
HGB BLD-MCNC: 10.9 G/DL (ref 11.5–15.4)
IMM GRANULOCYTES # BLD AUTO: 0.04 THOUSAND/UL (ref 0–0.2)
IMM GRANULOCYTES NFR BLD AUTO: 0 % (ref 0–2)
LYMPHOCYTES # BLD AUTO: 2.25 THOUSANDS/ΜL (ref 0.6–4.47)
LYMPHOCYTES NFR BLD AUTO: 23 % (ref 14–44)
MCH RBC QN AUTO: 29.9 PG (ref 26.8–34.3)
MCHC RBC AUTO-ENTMCNC: 32.4 G/DL (ref 31.4–37.4)
MCV RBC AUTO: 92 FL (ref 82–98)
MONOCYTES # BLD AUTO: 1.13 THOUSAND/ΜL (ref 0.17–1.22)
MONOCYTES NFR BLD AUTO: 11 % (ref 4–12)
NEUTROPHILS # BLD AUTO: 5.91 THOUSANDS/ΜL (ref 1.85–7.62)
NEUTS SEG NFR BLD AUTO: 60 % (ref 43–75)
NRBC BLD AUTO-RTO: 0 /100 WBCS
PLATELET # BLD AUTO: 276 THOUSANDS/UL (ref 149–390)
PMV BLD AUTO: 10.4 FL (ref 8.9–12.7)
POTASSIUM SERPL-SCNC: 3.3 MMOL/L (ref 3.5–5.3)
POTASSIUM SERPL-SCNC: 3.8 MMOL/L (ref 3.5–5.3)
PROT SERPL-MCNC: 6.8 G/DL (ref 6.4–8.2)
PROT SERPL-MCNC: 7.8 G/DL (ref 6.4–8.2)
RBC # BLD AUTO: 3.64 MILLION/UL (ref 3.81–5.12)
SODIUM SERPL-SCNC: 132 MMOL/L (ref 136–145)
SODIUM SERPL-SCNC: 134 MMOL/L (ref 136–145)
WBC # BLD AUTO: 9.88 THOUSAND/UL (ref 4.31–10.16)

## 2020-11-23 PROCEDURE — 99222 1ST HOSP IP/OBS MODERATE 55: CPT | Performed by: SURGERY

## 2020-11-23 PROCEDURE — 80053 COMPREHEN METABOLIC PANEL: CPT | Performed by: NURSE PRACTITIONER

## 2020-11-23 PROCEDURE — 85025 COMPLETE CBC W/AUTO DIFF WBC: CPT | Performed by: INTERNAL MEDICINE

## 2020-11-23 PROCEDURE — 99232 SBSQ HOSP IP/OBS MODERATE 35: CPT | Performed by: NURSE PRACTITIONER

## 2020-11-23 PROCEDURE — 80053 COMPREHEN METABOLIC PANEL: CPT | Performed by: INTERNAL MEDICINE

## 2020-11-23 RX ORDER — SODIUM CHLORIDE 9 MG/ML
50 INJECTION, SOLUTION INTRAVENOUS CONTINUOUS
Status: DISPENSED | OUTPATIENT
Start: 2020-11-23 | End: 2020-11-23

## 2020-11-23 RX ORDER — LOSARTAN POTASSIUM 50 MG/1
100 TABLET ORAL DAILY
Status: DISCONTINUED | OUTPATIENT
Start: 2020-11-24 | End: 2020-11-23

## 2020-11-23 RX ORDER — POTASSIUM CHLORIDE 20 MEQ/1
40 TABLET, EXTENDED RELEASE ORAL ONCE
Status: COMPLETED | OUTPATIENT
Start: 2020-11-23 | End: 2020-11-23

## 2020-11-23 RX ADMIN — POTASSIUM CHLORIDE 40 MEQ: 1500 TABLET, EXTENDED RELEASE ORAL at 08:21

## 2020-11-23 RX ADMIN — ASPIRIN 81 MG CHEWABLE TABLET 81 MG: 81 TABLET CHEWABLE at 08:20

## 2020-11-23 RX ADMIN — CEFAZOLIN SODIUM 2000 MG: 2 SOLUTION INTRAVENOUS at 15:46

## 2020-11-23 RX ADMIN — ATORVASTATIN CALCIUM 80 MG: 80 TABLET, FILM COATED ORAL at 08:21

## 2020-11-23 RX ADMIN — METRONIDAZOLE 500 MG: 500 TABLET ORAL at 13:08

## 2020-11-23 RX ADMIN — FLUTICASONE PROPIONATE 1 SPRAY: 50 SPRAY, METERED NASAL at 08:30

## 2020-11-23 RX ADMIN — HEPARIN SODIUM 5000 UNITS: 5000 INJECTION INTRAVENOUS; SUBCUTANEOUS at 21:34

## 2020-11-23 RX ADMIN — METRONIDAZOLE 500 MG: 500 TABLET ORAL at 05:34

## 2020-11-23 RX ADMIN — SODIUM CHLORIDE 50 ML/HR: 0.9 INJECTION, SOLUTION INTRAVENOUS at 08:21

## 2020-11-23 RX ADMIN — METRONIDAZOLE 500 MG: 500 TABLET ORAL at 21:34

## 2020-11-23 RX ADMIN — CEFAZOLIN SODIUM 2000 MG: 2 SOLUTION INTRAVENOUS at 08:27

## 2020-11-23 RX ADMIN — FERROUS SULFATE TAB 325 MG (65 MG ELEMENTAL FE) 325 MG: 325 (65 FE) TAB at 08:21

## 2020-11-23 RX ADMIN — HEPARIN SODIUM 5000 UNITS: 5000 INJECTION INTRAVENOUS; SUBCUTANEOUS at 13:08

## 2020-11-23 RX ADMIN — MELATONIN 3 MG: at 21:56

## 2020-11-23 RX ADMIN — HEPARIN SODIUM 5000 UNITS: 5000 INJECTION INTRAVENOUS; SUBCUTANEOUS at 05:34

## 2020-11-23 RX ADMIN — CEFAZOLIN SODIUM 2000 MG: 2 SOLUTION INTRAVENOUS at 23:37

## 2020-11-24 ENCOUNTER — APPOINTMENT (INPATIENT)
Dept: RADIOLOGY | Facility: HOSPITAL | Age: 80
DRG: 504 | End: 2020-11-24
Payer: COMMERCIAL

## 2020-11-24 PROBLEM — N17.9 AKI (ACUTE KIDNEY INJURY) (HCC): Status: ACTIVE | Noted: 2020-11-24

## 2020-11-24 LAB
ALBUMIN SERPL BCP-MCNC: 3.1 G/DL (ref 3.5–5)
ALP SERPL-CCNC: 154 U/L (ref 46–116)
ALT SERPL W P-5'-P-CCNC: 24 U/L (ref 12–78)
ANION GAP SERPL CALCULATED.3IONS-SCNC: 8 MMOL/L (ref 4–13)
AST SERPL W P-5'-P-CCNC: 67 U/L (ref 5–45)
BACTERIA UR QL AUTO: ABNORMAL /HPF
BILIRUB SERPL-MCNC: 0.46 MG/DL (ref 0.2–1)
BILIRUB UR QL STRIP: NEGATIVE
BUN SERPL-MCNC: 22 MG/DL (ref 5–25)
CALCIUM ALBUM COR SERPL-MCNC: 10 MG/DL (ref 8.3–10.1)
CALCIUM SERPL-MCNC: 9.3 MG/DL (ref 8.3–10.1)
CHLORIDE SERPL-SCNC: 105 MMOL/L (ref 100–108)
CLARITY UR: ABNORMAL
CO2 SERPL-SCNC: 23 MMOL/L (ref 21–32)
COLOR UR: YELLOW
CREAT SERPL-MCNC: 1.16 MG/DL (ref 0.6–1.3)
GFR SERPL CREATININE-BSD FRML MDRD: 45 ML/MIN/1.73SQ M
GLUCOSE SERPL-MCNC: 91 MG/DL (ref 65–140)
GLUCOSE UR STRIP-MCNC: NEGATIVE MG/DL
HGB UR QL STRIP.AUTO: ABNORMAL
KETONES UR STRIP-MCNC: ABNORMAL MG/DL
LEUKOCYTE ESTERASE UR QL STRIP: ABNORMAL
NITRITE UR QL STRIP: NEGATIVE
NON-SQ EPI CELLS URNS QL MICRO: ABNORMAL /HPF
PH UR STRIP.AUTO: 5.5 [PH]
PLATELET # BLD AUTO: 327 THOUSANDS/UL (ref 149–390)
PMV BLD AUTO: 10.7 FL (ref 8.9–12.7)
POTASSIUM SERPL-SCNC: 3.8 MMOL/L (ref 3.5–5.3)
PROT SERPL-MCNC: 7.6 G/DL (ref 6.4–8.2)
PROT UR STRIP-MCNC: NEGATIVE MG/DL
RBC #/AREA URNS AUTO: ABNORMAL /HPF
SODIUM SERPL-SCNC: 136 MMOL/L (ref 136–145)
SP GR UR STRIP.AUTO: 1.02 (ref 1–1.03)
UROBILINOGEN UR QL STRIP.AUTO: 0.2 E.U./DL
WBC #/AREA URNS AUTO: ABNORMAL /HPF

## 2020-11-24 PROCEDURE — 81001 URINALYSIS AUTO W/SCOPE: CPT | Performed by: NURSE PRACTITIONER

## 2020-11-24 PROCEDURE — G1004 CDSM NDSC: HCPCS

## 2020-11-24 PROCEDURE — 99232 SBSQ HOSP IP/OBS MODERATE 35: CPT | Performed by: NURSE PRACTITIONER

## 2020-11-24 PROCEDURE — NC001 PR NO CHARGE: Performed by: PHYSICIAN ASSISTANT

## 2020-11-24 PROCEDURE — 80053 COMPREHEN METABOLIC PANEL: CPT | Performed by: NURSE PRACTITIONER

## 2020-11-24 PROCEDURE — 73718 MRI LOWER EXTREMITY W/O DYE: CPT

## 2020-11-24 PROCEDURE — 99223 1ST HOSP IP/OBS HIGH 75: CPT | Performed by: INTERNAL MEDICINE

## 2020-11-24 PROCEDURE — 85049 AUTOMATED PLATELET COUNT: CPT | Performed by: INTERNAL MEDICINE

## 2020-11-24 PROCEDURE — 99232 SBSQ HOSP IP/OBS MODERATE 35: CPT | Performed by: PHYSICIAN ASSISTANT

## 2020-11-24 RX ORDER — NIFEDIPINE 30 MG/1
30 TABLET, EXTENDED RELEASE ORAL DAILY
Status: DISCONTINUED | OUTPATIENT
Start: 2020-11-25 | End: 2020-11-27

## 2020-11-24 RX ORDER — SACCHAROMYCES BOULARDII 250 MG
250 CAPSULE ORAL 2 TIMES DAILY
Status: DISCONTINUED | OUTPATIENT
Start: 2020-11-24 | End: 2020-11-29 | Stop reason: HOSPADM

## 2020-11-24 RX ORDER — LOPERAMIDE HYDROCHLORIDE 2 MG/1
2 CAPSULE ORAL ONCE
Status: COMPLETED | OUTPATIENT
Start: 2020-11-24 | End: 2020-11-24

## 2020-11-24 RX ORDER — SODIUM CHLORIDE 9 MG/ML
75 INJECTION, SOLUTION INTRAVENOUS CONTINUOUS
Status: DISCONTINUED | OUTPATIENT
Start: 2020-11-24 | End: 2020-11-25

## 2020-11-24 RX ADMIN — FERROUS SULFATE TAB 325 MG (65 MG ELEMENTAL FE) 325 MG: 325 (65 FE) TAB at 08:43

## 2020-11-24 RX ADMIN — CARVEDILOL 3.12 MG: 3.12 TABLET, FILM COATED ORAL at 08:43

## 2020-11-24 RX ADMIN — CEFAZOLIN SODIUM 2000 MG: 2 SOLUTION INTRAVENOUS at 08:43

## 2020-11-24 RX ADMIN — SODIUM CHLORIDE 75 ML/HR: 0.9 INJECTION, SOLUTION INTRAVENOUS at 10:24

## 2020-11-24 RX ADMIN — METRONIDAZOLE 500 MG: 500 TABLET ORAL at 13:04

## 2020-11-24 RX ADMIN — HEPARIN SODIUM 5000 UNITS: 5000 INJECTION INTRAVENOUS; SUBCUTANEOUS at 13:04

## 2020-11-24 RX ADMIN — HEPARIN SODIUM 5000 UNITS: 5000 INJECTION INTRAVENOUS; SUBCUTANEOUS at 22:22

## 2020-11-24 RX ADMIN — HEPARIN SODIUM 5000 UNITS: 5000 INJECTION INTRAVENOUS; SUBCUTANEOUS at 05:50

## 2020-11-24 RX ADMIN — ATORVASTATIN CALCIUM 80 MG: 80 TABLET, FILM COATED ORAL at 08:43

## 2020-11-24 RX ADMIN — METRONIDAZOLE 500 MG: 500 TABLET ORAL at 05:50

## 2020-11-24 RX ADMIN — CEFAZOLIN SODIUM 2000 MG: 2 SOLUTION INTRAVENOUS at 17:14

## 2020-11-24 RX ADMIN — METRONIDAZOLE 500 MG: 500 TABLET ORAL at 22:19

## 2020-11-24 RX ADMIN — ASPIRIN 81 MG CHEWABLE TABLET 81 MG: 81 TABLET CHEWABLE at 08:43

## 2020-11-24 RX ADMIN — Medication 250 MG: at 12:49

## 2020-11-24 RX ADMIN — Medication 250 MG: at 17:12

## 2020-11-24 RX ADMIN — LOPERAMIDE HYDROCHLORIDE 2 MG: 2 CAPSULE ORAL at 12:45

## 2020-11-24 RX ADMIN — MELATONIN 3 MG: at 22:19

## 2020-11-24 RX ADMIN — NIFEDIPINE 30 MG: 30 TABLET, FILM COATED, EXTENDED RELEASE ORAL at 08:43

## 2020-11-25 ENCOUNTER — APPOINTMENT (INPATIENT)
Dept: RADIOLOGY | Facility: HOSPITAL | Age: 80
DRG: 504 | End: 2020-11-25
Payer: COMMERCIAL

## 2020-11-25 LAB
ANION GAP SERPL CALCULATED.3IONS-SCNC: 6 MMOL/L (ref 4–13)
BASOPHILS # BLD AUTO: 0.04 THOUSANDS/ΜL (ref 0–0.1)
BASOPHILS NFR BLD AUTO: 1 % (ref 0–1)
BUN SERPL-MCNC: 14 MG/DL (ref 5–25)
CALCIUM SERPL-MCNC: 8.5 MG/DL (ref 8.3–10.1)
CHLORIDE SERPL-SCNC: 105 MMOL/L (ref 100–108)
CO2 SERPL-SCNC: 24 MMOL/L (ref 21–32)
CREAT SERPL-MCNC: 0.91 MG/DL (ref 0.6–1.3)
EOSINOPHIL # BLD AUTO: 0.21 THOUSAND/ΜL (ref 0–0.61)
EOSINOPHIL NFR BLD AUTO: 2 % (ref 0–6)
ERYTHROCYTE [DISTWIDTH] IN BLOOD BY AUTOMATED COUNT: 13.4 % (ref 11.6–15.1)
GFR SERPL CREATININE-BSD FRML MDRD: 60 ML/MIN/1.73SQ M
GLUCOSE SERPL-MCNC: 88 MG/DL (ref 65–140)
HCT VFR BLD AUTO: 33.1 % (ref 34.8–46.1)
HGB BLD-MCNC: 10.6 G/DL (ref 11.5–15.4)
IMM GRANULOCYTES # BLD AUTO: 0.03 THOUSAND/UL (ref 0–0.2)
IMM GRANULOCYTES NFR BLD AUTO: 0 % (ref 0–2)
LYMPHOCYTES # BLD AUTO: 1.73 THOUSANDS/ΜL (ref 0.6–4.47)
LYMPHOCYTES NFR BLD AUTO: 20 % (ref 14–44)
MCH RBC QN AUTO: 30.1 PG (ref 26.8–34.3)
MCHC RBC AUTO-ENTMCNC: 32 G/DL (ref 31.4–37.4)
MCV RBC AUTO: 94 FL (ref 82–98)
MONOCYTES # BLD AUTO: 0.95 THOUSAND/ΜL (ref 0.17–1.22)
MONOCYTES NFR BLD AUTO: 11 % (ref 4–12)
NEUTROPHILS # BLD AUTO: 5.89 THOUSANDS/ΜL (ref 1.85–7.62)
NEUTS SEG NFR BLD AUTO: 66 % (ref 43–75)
NRBC BLD AUTO-RTO: 0 /100 WBCS
PLATELET # BLD AUTO: 266 THOUSANDS/UL (ref 149–390)
PMV BLD AUTO: 11.3 FL (ref 8.9–12.7)
POTASSIUM SERPL-SCNC: 3.4 MMOL/L (ref 3.5–5.3)
RBC # BLD AUTO: 3.52 MILLION/UL (ref 3.81–5.12)
SODIUM SERPL-SCNC: 135 MMOL/L (ref 136–145)
WBC # BLD AUTO: 8.85 THOUSAND/UL (ref 4.31–10.16)

## 2020-11-25 PROCEDURE — 80048 BASIC METABOLIC PNL TOTAL CA: CPT | Performed by: NURSE PRACTITIONER

## 2020-11-25 PROCEDURE — C1769 GUIDE WIRE: HCPCS

## 2020-11-25 PROCEDURE — 99232 SBSQ HOSP IP/OBS MODERATE 35: CPT | Performed by: PHYSICIAN ASSISTANT

## 2020-11-25 PROCEDURE — 76937 US GUIDE VASCULAR ACCESS: CPT | Performed by: RADIOLOGY

## 2020-11-25 PROCEDURE — C1760 CLOSURE DEV, VASC: HCPCS

## 2020-11-25 PROCEDURE — B41DYZZ FLUOROSCOPY OF AORTA AND BILATERAL LOWER EXTREMITY ARTERIES USING OTHER CONTRAST: ICD-10-PCS | Performed by: INTERNAL MEDICINE

## 2020-11-25 PROCEDURE — 85025 COMPLETE CBC W/AUTO DIFF WBC: CPT | Performed by: NURSE PRACTITIONER

## 2020-11-25 PROCEDURE — 36246 INS CATH ABD/L-EXT ART 2ND: CPT | Performed by: RADIOLOGY

## 2020-11-25 PROCEDURE — 99152 MOD SED SAME PHYS/QHP 5/>YRS: CPT | Performed by: RADIOLOGY

## 2020-11-25 PROCEDURE — 75710 ARTERY X-RAYS ARM/LEG: CPT | Performed by: RADIOLOGY

## 2020-11-25 PROCEDURE — C1894 INTRO/SHEATH, NON-LASER: HCPCS

## 2020-11-25 PROCEDURE — 99232 SBSQ HOSP IP/OBS MODERATE 35: CPT | Performed by: NURSE PRACTITIONER

## 2020-11-25 PROCEDURE — 99153 MOD SED SAME PHYS/QHP EA: CPT

## 2020-11-25 PROCEDURE — 99152 MOD SED SAME PHYS/QHP 5/>YRS: CPT

## 2020-11-25 PROCEDURE — 99232 SBSQ HOSP IP/OBS MODERATE 35: CPT | Performed by: INTERNAL MEDICINE

## 2020-11-25 PROCEDURE — 75625 CONTRAST EXAM ABDOMINL AORTA: CPT | Performed by: RADIOLOGY

## 2020-11-25 PROCEDURE — 75710 ARTERY X-RAYS ARM/LEG: CPT

## 2020-11-25 RX ORDER — LIDOCAINE WITH 8.4% SOD BICARB 0.9%(10ML)
SYRINGE (ML) INJECTION CODE/TRAUMA/SEDATION MEDICATION
Status: COMPLETED | OUTPATIENT
Start: 2020-11-25 | End: 2020-11-25

## 2020-11-25 RX ORDER — FENTANYL CITRATE 50 UG/ML
INJECTION, SOLUTION INTRAMUSCULAR; INTRAVENOUS CODE/TRAUMA/SEDATION MEDICATION
Status: COMPLETED | OUTPATIENT
Start: 2020-11-25 | End: 2020-11-25

## 2020-11-25 RX ORDER — ONDANSETRON 2 MG/ML
4 INJECTION INTRAMUSCULAR; INTRAVENOUS EVERY 6 HOURS PRN
Status: DISCONTINUED | OUTPATIENT
Start: 2020-11-25 | End: 2020-11-29 | Stop reason: HOSPADM

## 2020-11-25 RX ORDER — SODIUM CHLORIDE 9 MG/ML
75 INJECTION, SOLUTION INTRAVENOUS CONTINUOUS
Status: DISPENSED | OUTPATIENT
Start: 2020-11-25 | End: 2020-11-25

## 2020-11-25 RX ORDER — MIDAZOLAM HYDROCHLORIDE 2 MG/2ML
INJECTION, SOLUTION INTRAMUSCULAR; INTRAVENOUS CODE/TRAUMA/SEDATION MEDICATION
Status: COMPLETED | OUTPATIENT
Start: 2020-11-25 | End: 2020-11-25

## 2020-11-25 RX ADMIN — MIDAZOLAM 0.5 MG: 1 INJECTION INTRAMUSCULAR; INTRAVENOUS at 09:10

## 2020-11-25 RX ADMIN — MELATONIN 3 MG: at 22:41

## 2020-11-25 RX ADMIN — MIDAZOLAM 0.5 MG: 1 INJECTION INTRAMUSCULAR; INTRAVENOUS at 09:21

## 2020-11-25 RX ADMIN — FENTANYL CITRATE 25 MCG: 50 INJECTION INTRAMUSCULAR; INTRAVENOUS at 09:21

## 2020-11-25 RX ADMIN — CEFAZOLIN SODIUM 2000 MG: 2 SOLUTION INTRAVENOUS at 22:50

## 2020-11-25 RX ADMIN — CEFAZOLIN SODIUM 2000 MG: 2 SOLUTION INTRAVENOUS at 00:40

## 2020-11-25 RX ADMIN — Medication 10 ML: at 09:24

## 2020-11-25 RX ADMIN — ONDANSETRON 4 MG: 2 INJECTION INTRAMUSCULAR; INTRAVENOUS at 11:01

## 2020-11-25 RX ADMIN — HEPARIN SODIUM 5000 UNITS: 5000 INJECTION INTRAVENOUS; SUBCUTANEOUS at 13:02

## 2020-11-25 RX ADMIN — MIDAZOLAM 0.5 MG: 1 INJECTION INTRAMUSCULAR; INTRAVENOUS at 09:51

## 2020-11-25 RX ADMIN — CEFAZOLIN SODIUM 2000 MG: 2 SOLUTION INTRAVENOUS at 08:36

## 2020-11-25 RX ADMIN — SODIUM CHLORIDE 75 ML/HR: 0.9 INJECTION, SOLUTION INTRAVENOUS at 13:58

## 2020-11-25 RX ADMIN — Medication 250 MG: at 16:23

## 2020-11-25 RX ADMIN — FENTANYL CITRATE 25 MCG: 50 INJECTION INTRAMUSCULAR; INTRAVENOUS at 10:04

## 2020-11-25 RX ADMIN — SODIUM CHLORIDE 75 ML/HR: 0.9 INJECTION, SOLUTION INTRAVENOUS at 00:43

## 2020-11-25 RX ADMIN — METRONIDAZOLE 500 MG: 500 TABLET ORAL at 22:41

## 2020-11-25 RX ADMIN — METRONIDAZOLE 500 MG: 500 TABLET ORAL at 13:02

## 2020-11-25 RX ADMIN — IODIXANOL 70 ML: 320 INJECTION, SOLUTION INTRAVASCULAR at 10:37

## 2020-11-25 RX ADMIN — FENTANYL CITRATE 25 MCG: 50 INJECTION INTRAMUSCULAR; INTRAVENOUS at 09:10

## 2020-11-25 RX ADMIN — FERROUS SULFATE TAB 325 MG (65 MG ELEMENTAL FE) 325 MG: 325 (65 FE) TAB at 16:23

## 2020-11-25 RX ADMIN — METRONIDAZOLE 500 MG: 500 TABLET ORAL at 05:55

## 2020-11-25 RX ADMIN — HEPARIN SODIUM 5000 UNITS: 5000 INJECTION INTRAVENOUS; SUBCUTANEOUS at 22:41

## 2020-11-25 RX ADMIN — CEFAZOLIN SODIUM 2000 MG: 2 SOLUTION INTRAVENOUS at 16:23

## 2020-11-26 ENCOUNTER — ANESTHESIA EVENT (INPATIENT)
Dept: PERIOP | Facility: HOSPITAL | Age: 80
DRG: 504 | End: 2020-11-26
Payer: COMMERCIAL

## 2020-11-26 LAB
ANION GAP SERPL CALCULATED.3IONS-SCNC: 5 MMOL/L (ref 4–13)
BACTERIA WND AEROBE CULT: ABNORMAL
BASOPHILS # BLD AUTO: 0.07 THOUSANDS/ΜL (ref 0–0.1)
BASOPHILS NFR BLD AUTO: 1 % (ref 0–1)
BUN SERPL-MCNC: 9 MG/DL (ref 5–25)
CALCIUM SERPL-MCNC: 8.7 MG/DL (ref 8.3–10.1)
CHLORIDE SERPL-SCNC: 109 MMOL/L (ref 100–108)
CO2 SERPL-SCNC: 23 MMOL/L (ref 21–32)
CREAT SERPL-MCNC: 0.9 MG/DL (ref 0.6–1.3)
EOSINOPHIL # BLD AUTO: 0.32 THOUSAND/ΜL (ref 0–0.61)
EOSINOPHIL NFR BLD AUTO: 4 % (ref 0–6)
ERYTHROCYTE [DISTWIDTH] IN BLOOD BY AUTOMATED COUNT: 13.4 % (ref 11.6–15.1)
GFR SERPL CREATININE-BSD FRML MDRD: 61 ML/MIN/1.73SQ M
GLUCOSE SERPL-MCNC: 90 MG/DL (ref 65–140)
GRAM STN SPEC: ABNORMAL
GRAM STN SPEC: ABNORMAL
HCT VFR BLD AUTO: 32.5 % (ref 34.8–46.1)
HGB BLD-MCNC: 10.1 G/DL (ref 11.5–15.4)
IMM GRANULOCYTES # BLD AUTO: 0.02 THOUSAND/UL (ref 0–0.2)
IMM GRANULOCYTES NFR BLD AUTO: 0 % (ref 0–2)
LYMPHOCYTES # BLD AUTO: 1.9 THOUSANDS/ΜL (ref 0.6–4.47)
LYMPHOCYTES NFR BLD AUTO: 25 % (ref 14–44)
MCH RBC QN AUTO: 30.5 PG (ref 26.8–34.3)
MCHC RBC AUTO-ENTMCNC: 31.1 G/DL (ref 31.4–37.4)
MCV RBC AUTO: 98 FL (ref 82–98)
MONOCYTES # BLD AUTO: 0.91 THOUSAND/ΜL (ref 0.17–1.22)
MONOCYTES NFR BLD AUTO: 12 % (ref 4–12)
NEUTROPHILS # BLD AUTO: 4.46 THOUSANDS/ΜL (ref 1.85–7.62)
NEUTS SEG NFR BLD AUTO: 58 % (ref 43–75)
NRBC BLD AUTO-RTO: 0 /100 WBCS
PLATELET # BLD AUTO: 197 THOUSANDS/UL (ref 149–390)
PMV BLD AUTO: 11.1 FL (ref 8.9–12.7)
POTASSIUM SERPL-SCNC: 3.4 MMOL/L (ref 3.5–5.3)
RBC # BLD AUTO: 3.31 MILLION/UL (ref 3.81–5.12)
SODIUM SERPL-SCNC: 137 MMOL/L (ref 136–145)
WBC # BLD AUTO: 7.68 THOUSAND/UL (ref 4.31–10.16)

## 2020-11-26 PROCEDURE — 99233 SBSQ HOSP IP/OBS HIGH 50: CPT | Performed by: INTERNAL MEDICINE

## 2020-11-26 PROCEDURE — 99232 SBSQ HOSP IP/OBS MODERATE 35: CPT | Performed by: NURSE PRACTITIONER

## 2020-11-26 PROCEDURE — 85025 COMPLETE CBC W/AUTO DIFF WBC: CPT | Performed by: NURSE PRACTITIONER

## 2020-11-26 PROCEDURE — 80048 BASIC METABOLIC PNL TOTAL CA: CPT | Performed by: NURSE PRACTITIONER

## 2020-11-26 RX ORDER — POTASSIUM CHLORIDE 20 MEQ/1
40 TABLET, EXTENDED RELEASE ORAL ONCE
Status: COMPLETED | OUTPATIENT
Start: 2020-11-26 | End: 2020-11-26

## 2020-11-26 RX ORDER — SODIUM CHLORIDE 9 MG/ML
50 INJECTION, SOLUTION INTRAVENOUS CONTINUOUS
Status: DISPENSED | OUTPATIENT
Start: 2020-11-27 | End: 2020-11-28

## 2020-11-26 RX ADMIN — CEFAZOLIN SODIUM 2000 MG: 2 SOLUTION INTRAVENOUS at 23:46

## 2020-11-26 RX ADMIN — HEPARIN SODIUM 5000 UNITS: 5000 INJECTION INTRAVENOUS; SUBCUTANEOUS at 06:11

## 2020-11-26 RX ADMIN — FERROUS SULFATE TAB 325 MG (65 MG ELEMENTAL FE) 325 MG: 325 (65 FE) TAB at 08:12

## 2020-11-26 RX ADMIN — FERROUS SULFATE TAB 325 MG (65 MG ELEMENTAL FE) 325 MG: 325 (65 FE) TAB at 17:09

## 2020-11-26 RX ADMIN — CARVEDILOL 3.12 MG: 3.12 TABLET, FILM COATED ORAL at 08:12

## 2020-11-26 RX ADMIN — Medication 250 MG: at 17:09

## 2020-11-26 RX ADMIN — METRONIDAZOLE 500 MG: 500 TABLET ORAL at 13:25

## 2020-11-26 RX ADMIN — ATORVASTATIN CALCIUM 80 MG: 80 TABLET, FILM COATED ORAL at 08:12

## 2020-11-26 RX ADMIN — ASPIRIN 81 MG CHEWABLE TABLET 81 MG: 81 TABLET CHEWABLE at 08:12

## 2020-11-26 RX ADMIN — MELATONIN 3 MG: at 22:13

## 2020-11-26 RX ADMIN — CEFAZOLIN SODIUM 2000 MG: 2 SOLUTION INTRAVENOUS at 08:08

## 2020-11-26 RX ADMIN — Medication 250 MG: at 08:12

## 2020-11-26 RX ADMIN — METRONIDAZOLE 500 MG: 500 TABLET ORAL at 06:11

## 2020-11-26 RX ADMIN — METRONIDAZOLE 500 MG: 500 TABLET ORAL at 22:13

## 2020-11-26 RX ADMIN — HEPARIN SODIUM 5000 UNITS: 5000 INJECTION INTRAVENOUS; SUBCUTANEOUS at 22:14

## 2020-11-26 RX ADMIN — POTASSIUM CHLORIDE 40 MEQ: 1500 TABLET, EXTENDED RELEASE ORAL at 08:12

## 2020-11-26 RX ADMIN — CEFAZOLIN SODIUM 2000 MG: 2 SOLUTION INTRAVENOUS at 15:05

## 2020-11-26 RX ADMIN — HEPARIN SODIUM 5000 UNITS: 5000 INJECTION INTRAVENOUS; SUBCUTANEOUS at 13:25

## 2020-11-27 ENCOUNTER — ANESTHESIA (INPATIENT)
Dept: PERIOP | Facility: HOSPITAL | Age: 80
DRG: 504 | End: 2020-11-27
Payer: COMMERCIAL

## 2020-11-27 ENCOUNTER — APPOINTMENT (INPATIENT)
Dept: RADIOLOGY | Facility: HOSPITAL | Age: 80
DRG: 504 | End: 2020-11-27
Payer: COMMERCIAL

## 2020-11-27 VITALS — HEART RATE: 54 BPM

## 2020-11-27 LAB
ANION GAP SERPL CALCULATED.3IONS-SCNC: 3 MMOL/L (ref 4–13)
BACTERIA BLD CULT: NORMAL
BACTERIA BLD CULT: NORMAL
BASOPHILS # BLD AUTO: 0.06 THOUSANDS/ΜL (ref 0–0.1)
BASOPHILS NFR BLD AUTO: 1 % (ref 0–1)
BUN SERPL-MCNC: 7 MG/DL (ref 5–25)
CALCIUM SERPL-MCNC: 8.7 MG/DL (ref 8.3–10.1)
CHLORIDE SERPL-SCNC: 110 MMOL/L (ref 100–108)
CO2 SERPL-SCNC: 24 MMOL/L (ref 21–32)
CREAT SERPL-MCNC: 0.87 MG/DL (ref 0.6–1.3)
EOSINOPHIL # BLD AUTO: 0.37 THOUSAND/ΜL (ref 0–0.61)
EOSINOPHIL NFR BLD AUTO: 5 % (ref 0–6)
ERYTHROCYTE [DISTWIDTH] IN BLOOD BY AUTOMATED COUNT: 13.7 % (ref 11.6–15.1)
GFR SERPL CREATININE-BSD FRML MDRD: 63 ML/MIN/1.73SQ M
GLUCOSE SERPL-MCNC: 85 MG/DL (ref 65–140)
HCT VFR BLD AUTO: 29.5 % (ref 34.8–46.1)
HGB BLD-MCNC: 9.5 G/DL (ref 11.5–15.4)
IMM GRANULOCYTES # BLD AUTO: 0.03 THOUSAND/UL (ref 0–0.2)
IMM GRANULOCYTES NFR BLD AUTO: 0 % (ref 0–2)
LYMPHOCYTES # BLD AUTO: 1.82 THOUSANDS/ΜL (ref 0.6–4.47)
LYMPHOCYTES NFR BLD AUTO: 23 % (ref 14–44)
MCH RBC QN AUTO: 30.4 PG (ref 26.8–34.3)
MCHC RBC AUTO-ENTMCNC: 32.2 G/DL (ref 31.4–37.4)
MCV RBC AUTO: 94 FL (ref 82–98)
MONOCYTES # BLD AUTO: 1.09 THOUSAND/ΜL (ref 0.17–1.22)
MONOCYTES NFR BLD AUTO: 14 % (ref 4–12)
NEUTROPHILS # BLD AUTO: 4.43 THOUSANDS/ΜL (ref 1.85–7.62)
NEUTS SEG NFR BLD AUTO: 57 % (ref 43–75)
NRBC BLD AUTO-RTO: 0 /100 WBCS
PLATELET # BLD AUTO: 248 THOUSANDS/UL (ref 149–390)
PMV BLD AUTO: 11.1 FL (ref 8.9–12.7)
POTASSIUM SERPL-SCNC: 3.7 MMOL/L (ref 3.5–5.3)
RBC # BLD AUTO: 3.13 MILLION/UL (ref 3.81–5.12)
SODIUM SERPL-SCNC: 137 MMOL/L (ref 136–145)
WBC # BLD AUTO: 7.8 THOUSAND/UL (ref 4.31–10.16)

## 2020-11-27 PROCEDURE — 73630 X-RAY EXAM OF FOOT: CPT

## 2020-11-27 PROCEDURE — 80048 BASIC METABOLIC PNL TOTAL CA: CPT | Performed by: NURSE PRACTITIONER

## 2020-11-27 PROCEDURE — 85025 COMPLETE CBC W/AUTO DIFF WBC: CPT | Performed by: NURSE PRACTITIONER

## 2020-11-27 PROCEDURE — 88305 TISSUE EXAM BY PATHOLOGIST: CPT | Performed by: PATHOLOGY

## 2020-11-27 PROCEDURE — 88311 DECALCIFY TISSUE: CPT | Performed by: PATHOLOGY

## 2020-11-27 PROCEDURE — 99232 SBSQ HOSP IP/OBS MODERATE 35: CPT | Performed by: NURSE PRACTITIONER

## 2020-11-27 PROCEDURE — 0Y6Y0Z0 DETACHMENT AT LEFT 5TH TOE, COMPLETE, OPEN APPROACH: ICD-10-PCS | Performed by: INTERNAL MEDICINE

## 2020-11-27 PROCEDURE — 99233 SBSQ HOSP IP/OBS HIGH 50: CPT | Performed by: INTERNAL MEDICINE

## 2020-11-27 RX ORDER — SODIUM CHLORIDE, SODIUM LACTATE, POTASSIUM CHLORIDE, CALCIUM CHLORIDE 600; 310; 30; 20 MG/100ML; MG/100ML; MG/100ML; MG/100ML
INJECTION, SOLUTION INTRAVENOUS CONTINUOUS PRN
Status: DISCONTINUED | OUTPATIENT
Start: 2020-11-27 | End: 2020-11-27

## 2020-11-27 RX ORDER — EPHEDRINE SULFATE 50 MG/ML
INJECTION INTRAVENOUS AS NEEDED
Status: DISCONTINUED | OUTPATIENT
Start: 2020-11-27 | End: 2020-11-27

## 2020-11-27 RX ORDER — FENTANYL CITRATE/PF 50 MCG/ML
12.5 SYRINGE (ML) INJECTION
Status: DISCONTINUED | OUTPATIENT
Start: 2020-11-27 | End: 2020-11-27 | Stop reason: HOSPADM

## 2020-11-27 RX ORDER — DEXAMETHASONE SODIUM PHOSPHATE 4 MG/ML
8 INJECTION, SOLUTION INTRA-ARTICULAR; INTRALESIONAL; INTRAMUSCULAR; INTRAVENOUS; SOFT TISSUE ONCE AS NEEDED
Status: DISCONTINUED | OUTPATIENT
Start: 2020-11-27 | End: 2020-11-27 | Stop reason: HOSPADM

## 2020-11-27 RX ORDER — HYDROMORPHONE HCL/PF 1 MG/ML
0.2 SYRINGE (ML) INJECTION
Status: DISCONTINUED | OUTPATIENT
Start: 2020-11-27 | End: 2020-11-27 | Stop reason: HOSPADM

## 2020-11-27 RX ORDER — FENTANYL CITRATE 50 UG/ML
INJECTION, SOLUTION INTRAMUSCULAR; INTRAVENOUS AS NEEDED
Status: DISCONTINUED | OUTPATIENT
Start: 2020-11-27 | End: 2020-11-27

## 2020-11-27 RX ORDER — ONDANSETRON 2 MG/ML
4 INJECTION INTRAMUSCULAR; INTRAVENOUS ONCE AS NEEDED
Status: DISCONTINUED | OUTPATIENT
Start: 2020-11-27 | End: 2020-11-27 | Stop reason: HOSPADM

## 2020-11-27 RX ORDER — LIDOCAINE HYDROCHLORIDE 10 MG/ML
INJECTION, SOLUTION EPIDURAL; INFILTRATION; INTRACAUDAL; PERINEURAL AS NEEDED
Status: DISCONTINUED | OUTPATIENT
Start: 2020-11-27 | End: 2020-11-27 | Stop reason: HOSPADM

## 2020-11-27 RX ORDER — BUPIVACAINE HYDROCHLORIDE 5 MG/ML
INJECTION, SOLUTION PERINEURAL AS NEEDED
Status: DISCONTINUED | OUTPATIENT
Start: 2020-11-27 | End: 2020-11-27 | Stop reason: HOSPADM

## 2020-11-27 RX ORDER — CEFAZOLIN SODIUM 1 G/50ML
SOLUTION INTRAVENOUS AS NEEDED
Status: DISCONTINUED | OUTPATIENT
Start: 2020-11-27 | End: 2020-11-27

## 2020-11-27 RX ORDER — PROPOFOL 10 MG/ML
INJECTION, EMULSION INTRAVENOUS CONTINUOUS PRN
Status: DISCONTINUED | OUTPATIENT
Start: 2020-11-27 | End: 2020-11-27

## 2020-11-27 RX ORDER — PROPOFOL 10 MG/ML
INJECTION, EMULSION INTRAVENOUS AS NEEDED
Status: DISCONTINUED | OUTPATIENT
Start: 2020-11-27 | End: 2020-11-27

## 2020-11-27 RX ADMIN — FLUTICASONE PROPIONATE 1 SPRAY: 50 SPRAY, METERED NASAL at 08:38

## 2020-11-27 RX ADMIN — HEPARIN SODIUM 5000 UNITS: 5000 INJECTION INTRAVENOUS; SUBCUTANEOUS at 14:16

## 2020-11-27 RX ADMIN — EPHEDRINE SULFATE 15 MG: 50 INJECTION, SOLUTION INTRAVENOUS at 10:39

## 2020-11-27 RX ADMIN — FENTANYL CITRATE 25 MCG: 50 INJECTION INTRAMUSCULAR; INTRAVENOUS at 10:42

## 2020-11-27 RX ADMIN — METRONIDAZOLE 500 MG: 500 TABLET ORAL at 06:28

## 2020-11-27 RX ADMIN — FENTANYL CITRATE 50 MCG: 50 INJECTION INTRAMUSCULAR; INTRAVENOUS at 10:33

## 2020-11-27 RX ADMIN — PHENYLEPHRINE HYDROCHLORIDE 100 MCG/MIN: 10 INJECTION INTRAVENOUS at 10:42

## 2020-11-27 RX ADMIN — CARVEDILOL 3.12 MG: 3.12 TABLET, FILM COATED ORAL at 08:34

## 2020-11-27 RX ADMIN — PHENYLEPHRINE HYDROCHLORIDE 200 MCG: 10 INJECTION INTRAVENOUS at 10:45

## 2020-11-27 RX ADMIN — SODIUM CHLORIDE, SODIUM LACTATE, POTASSIUM CHLORIDE, AND CALCIUM CHLORIDE: .6; .31; .03; .02 INJECTION, SOLUTION INTRAVENOUS at 10:15

## 2020-11-27 RX ADMIN — PROPOFOL 80 MCG/KG/MIN: 10 INJECTION, EMULSION INTRAVENOUS at 10:34

## 2020-11-27 RX ADMIN — LIDOCAINE HYDROCHLORIDE 50 MG: 20 INJECTION, SOLUTION INTRAVENOUS at 10:33

## 2020-11-27 RX ADMIN — FENTANYL CITRATE 25 MCG: 50 INJECTION INTRAMUSCULAR; INTRAVENOUS at 10:52

## 2020-11-27 RX ADMIN — CEFAZOLIN SODIUM 1000 MG: 1 SOLUTION INTRAVENOUS at 10:22

## 2020-11-27 RX ADMIN — MELATONIN 3 MG: at 22:13

## 2020-11-27 RX ADMIN — SODIUM CHLORIDE 3 G: 9 INJECTION, SOLUTION INTRAVENOUS at 16:13

## 2020-11-27 RX ADMIN — PROPOFOL 60 MG: 10 INJECTION, EMULSION INTRAVENOUS at 10:34

## 2020-11-27 RX ADMIN — METRONIDAZOLE 500 MG: 500 TABLET ORAL at 14:16

## 2020-11-27 RX ADMIN — Medication 250 MG: at 17:24

## 2020-11-27 RX ADMIN — CEFAZOLIN SODIUM 2000 MG: 2 SOLUTION INTRAVENOUS at 08:35

## 2020-11-27 RX ADMIN — SODIUM CHLORIDE 3 G: 9 INJECTION, SOLUTION INTRAVENOUS at 22:13

## 2020-11-27 RX ADMIN — SODIUM CHLORIDE 50 ML/HR: 0.9 INJECTION, SOLUTION INTRAVENOUS at 11:40

## 2020-11-27 RX ADMIN — FERROUS SULFATE TAB 325 MG (65 MG ELEMENTAL FE) 325 MG: 325 (65 FE) TAB at 17:24

## 2020-11-27 RX ADMIN — HEPARIN SODIUM 5000 UNITS: 5000 INJECTION INTRAVENOUS; SUBCUTANEOUS at 22:13

## 2020-11-27 RX ADMIN — SODIUM CHLORIDE 50 ML/HR: 0.9 INJECTION, SOLUTION INTRAVENOUS at 01:57

## 2020-11-27 RX ADMIN — PHENYLEPHRINE HYDROCHLORIDE 200 MCG: 10 INJECTION INTRAVENOUS at 10:42

## 2020-11-28 LAB
ALBUMIN SERPL BCP-MCNC: 2.5 G/DL (ref 3.5–5)
ALP SERPL-CCNC: 104 U/L (ref 46–116)
ALT SERPL W P-5'-P-CCNC: 15 U/L (ref 12–78)
ANION GAP SERPL CALCULATED.3IONS-SCNC: 6 MMOL/L (ref 4–13)
AST SERPL W P-5'-P-CCNC: 65 U/L (ref 5–45)
BASOPHILS # BLD AUTO: 0.03 THOUSANDS/ΜL (ref 0–0.1)
BASOPHILS NFR BLD AUTO: 0 % (ref 0–1)
BILIRUB SERPL-MCNC: 0.32 MG/DL (ref 0.2–1)
BUN SERPL-MCNC: 7 MG/DL (ref 5–25)
CALCIUM ALBUM COR SERPL-MCNC: 9.6 MG/DL (ref 8.3–10.1)
CALCIUM SERPL-MCNC: 8.4 MG/DL (ref 8.3–10.1)
CHLORIDE SERPL-SCNC: 111 MMOL/L (ref 100–108)
CO2 SERPL-SCNC: 24 MMOL/L (ref 21–32)
CREAT SERPL-MCNC: 0.82 MG/DL (ref 0.6–1.3)
EOSINOPHIL # BLD AUTO: 0.41 THOUSAND/ΜL (ref 0–0.61)
EOSINOPHIL NFR BLD AUTO: 6 % (ref 0–6)
ERYTHROCYTE [DISTWIDTH] IN BLOOD BY AUTOMATED COUNT: 13.9 % (ref 11.6–15.1)
GFR SERPL CREATININE-BSD FRML MDRD: 68 ML/MIN/1.73SQ M
GLUCOSE SERPL-MCNC: 79 MG/DL (ref 65–140)
HCT VFR BLD AUTO: 29 % (ref 34.8–46.1)
HGB BLD-MCNC: 9.2 G/DL (ref 11.5–15.4)
IMM GRANULOCYTES # BLD AUTO: 0.02 THOUSAND/UL (ref 0–0.2)
IMM GRANULOCYTES NFR BLD AUTO: 0 % (ref 0–2)
LYMPHOCYTES # BLD AUTO: 1.8 THOUSANDS/ΜL (ref 0.6–4.47)
LYMPHOCYTES NFR BLD AUTO: 24 % (ref 14–44)
MCH RBC QN AUTO: 30.3 PG (ref 26.8–34.3)
MCHC RBC AUTO-ENTMCNC: 31.7 G/DL (ref 31.4–37.4)
MCV RBC AUTO: 95 FL (ref 82–98)
MONOCYTES # BLD AUTO: 0.84 THOUSAND/ΜL (ref 0.17–1.22)
MONOCYTES NFR BLD AUTO: 11 % (ref 4–12)
NEUTROPHILS # BLD AUTO: 4.35 THOUSANDS/ΜL (ref 1.85–7.62)
NEUTS SEG NFR BLD AUTO: 59 % (ref 43–75)
NRBC BLD AUTO-RTO: 0 /100 WBCS
PLATELET # BLD AUTO: 231 THOUSANDS/UL (ref 149–390)
PMV BLD AUTO: 11.5 FL (ref 8.9–12.7)
POTASSIUM SERPL-SCNC: 3.8 MMOL/L (ref 3.5–5.3)
PROT SERPL-MCNC: 5.8 G/DL (ref 6.4–8.2)
RBC # BLD AUTO: 3.04 MILLION/UL (ref 3.81–5.12)
SODIUM SERPL-SCNC: 141 MMOL/L (ref 136–145)
WBC # BLD AUTO: 7.45 THOUSAND/UL (ref 4.31–10.16)

## 2020-11-28 PROCEDURE — 99233 SBSQ HOSP IP/OBS HIGH 50: CPT | Performed by: INTERNAL MEDICINE

## 2020-11-28 PROCEDURE — 85025 COMPLETE CBC W/AUTO DIFF WBC: CPT | Performed by: NURSE PRACTITIONER

## 2020-11-28 PROCEDURE — 80053 COMPREHEN METABOLIC PANEL: CPT | Performed by: NURSE PRACTITIONER

## 2020-11-28 PROCEDURE — 97166 OT EVAL MOD COMPLEX 45 MIN: CPT

## 2020-11-28 PROCEDURE — 99232 SBSQ HOSP IP/OBS MODERATE 35: CPT | Performed by: NURSE PRACTITIONER

## 2020-11-28 RX ADMIN — FERROUS SULFATE TAB 325 MG (65 MG ELEMENTAL FE) 325 MG: 325 (65 FE) TAB at 17:48

## 2020-11-28 RX ADMIN — Medication 250 MG: at 17:48

## 2020-11-28 RX ADMIN — SODIUM CHLORIDE 3 G: 9 INJECTION, SOLUTION INTRAVENOUS at 05:29

## 2020-11-28 RX ADMIN — Medication 250 MG: at 08:17

## 2020-11-28 RX ADMIN — HEPARIN SODIUM 5000 UNITS: 5000 INJECTION INTRAVENOUS; SUBCUTANEOUS at 05:29

## 2020-11-28 RX ADMIN — FLUTICASONE PROPIONATE 1 SPRAY: 50 SPRAY, METERED NASAL at 08:20

## 2020-11-28 RX ADMIN — ASPIRIN 81 MG CHEWABLE TABLET 81 MG: 81 TABLET CHEWABLE at 08:17

## 2020-11-28 RX ADMIN — SODIUM CHLORIDE 3 G: 9 INJECTION, SOLUTION INTRAVENOUS at 16:32

## 2020-11-28 RX ADMIN — ATORVASTATIN CALCIUM 80 MG: 80 TABLET, FILM COATED ORAL at 08:17

## 2020-11-28 RX ADMIN — SODIUM CHLORIDE 3 G: 9 INJECTION, SOLUTION INTRAVENOUS at 22:13

## 2020-11-28 RX ADMIN — SODIUM CHLORIDE 3 G: 9 INJECTION, SOLUTION INTRAVENOUS at 10:21

## 2020-11-28 RX ADMIN — HEPARIN SODIUM 5000 UNITS: 5000 INJECTION INTRAVENOUS; SUBCUTANEOUS at 22:13

## 2020-11-28 RX ADMIN — MELATONIN 3 MG: at 22:13

## 2020-11-28 RX ADMIN — CARVEDILOL 3.12 MG: 3.12 TABLET, FILM COATED ORAL at 08:17

## 2020-11-28 RX ADMIN — HEPARIN SODIUM 5000 UNITS: 5000 INJECTION INTRAVENOUS; SUBCUTANEOUS at 13:08

## 2020-11-29 VITALS
HEART RATE: 104 BPM | SYSTOLIC BLOOD PRESSURE: 146 MMHG | DIASTOLIC BLOOD PRESSURE: 73 MMHG | WEIGHT: 140 LBS | RESPIRATION RATE: 18 BRPM | TEMPERATURE: 99.4 F | HEIGHT: 58 IN | OXYGEN SATURATION: 97 % | BODY MASS INDEX: 29.39 KG/M2

## 2020-11-29 LAB
ALBUMIN SERPL BCP-MCNC: 2.4 G/DL (ref 3.5–5)
ALP SERPL-CCNC: 103 U/L (ref 46–116)
ALT SERPL W P-5'-P-CCNC: 14 U/L (ref 12–78)
ANION GAP SERPL CALCULATED.3IONS-SCNC: 4 MMOL/L (ref 4–13)
AST SERPL W P-5'-P-CCNC: 52 U/L (ref 5–45)
BASOPHILS # BLD AUTO: 0.04 THOUSANDS/ΜL (ref 0–0.1)
BASOPHILS NFR BLD AUTO: 1 % (ref 0–1)
BILIRUB SERPL-MCNC: 0.25 MG/DL (ref 0.2–1)
BUN SERPL-MCNC: 9 MG/DL (ref 5–25)
CALCIUM ALBUM COR SERPL-MCNC: 9.8 MG/DL (ref 8.3–10.1)
CALCIUM SERPL-MCNC: 8.5 MG/DL (ref 8.3–10.1)
CHLORIDE SERPL-SCNC: 110 MMOL/L (ref 100–108)
CO2 SERPL-SCNC: 26 MMOL/L (ref 21–32)
CREAT SERPL-MCNC: 0.82 MG/DL (ref 0.6–1.3)
EOSINOPHIL # BLD AUTO: 0.56 THOUSAND/ΜL (ref 0–0.61)
EOSINOPHIL NFR BLD AUTO: 8 % (ref 0–6)
ERYTHROCYTE [DISTWIDTH] IN BLOOD BY AUTOMATED COUNT: 14.2 % (ref 11.6–15.1)
GFR SERPL CREATININE-BSD FRML MDRD: 68 ML/MIN/1.73SQ M
GLUCOSE SERPL-MCNC: 78 MG/DL (ref 65–140)
HCT VFR BLD AUTO: 28.9 % (ref 34.8–46.1)
HGB BLD-MCNC: 8.9 G/DL (ref 11.5–15.4)
IMM GRANULOCYTES # BLD AUTO: 0.03 THOUSAND/UL (ref 0–0.2)
IMM GRANULOCYTES NFR BLD AUTO: 0 % (ref 0–2)
LYMPHOCYTES # BLD AUTO: 1.97 THOUSANDS/ΜL (ref 0.6–4.47)
LYMPHOCYTES NFR BLD AUTO: 27 % (ref 14–44)
MCH RBC QN AUTO: 29.6 PG (ref 26.8–34.3)
MCHC RBC AUTO-ENTMCNC: 30.8 G/DL (ref 31.4–37.4)
MCV RBC AUTO: 96 FL (ref 82–98)
MONOCYTES # BLD AUTO: 1.01 THOUSAND/ΜL (ref 0.17–1.22)
MONOCYTES NFR BLD AUTO: 14 % (ref 4–12)
NEUTROPHILS # BLD AUTO: 3.66 THOUSANDS/ΜL (ref 1.85–7.62)
NEUTS SEG NFR BLD AUTO: 50 % (ref 43–75)
NRBC BLD AUTO-RTO: 0 /100 WBCS
PLATELET # BLD AUTO: 221 THOUSANDS/UL (ref 149–390)
PMV BLD AUTO: 11.5 FL (ref 8.9–12.7)
POTASSIUM SERPL-SCNC: 3.6 MMOL/L (ref 3.5–5.3)
PROT SERPL-MCNC: 5.7 G/DL (ref 6.4–8.2)
RBC # BLD AUTO: 3.01 MILLION/UL (ref 3.81–5.12)
SODIUM SERPL-SCNC: 140 MMOL/L (ref 136–145)
WBC # BLD AUTO: 7.27 THOUSAND/UL (ref 4.31–10.16)

## 2020-11-29 PROCEDURE — 99239 HOSP IP/OBS DSCHRG MGMT >30: CPT | Performed by: NURSE PRACTITIONER

## 2020-11-29 PROCEDURE — 85025 COMPLETE CBC W/AUTO DIFF WBC: CPT | Performed by: NURSE PRACTITIONER

## 2020-11-29 PROCEDURE — 80053 COMPREHEN METABOLIC PANEL: CPT | Performed by: NURSE PRACTITIONER

## 2020-11-29 PROCEDURE — 99232 SBSQ HOSP IP/OBS MODERATE 35: CPT | Performed by: INTERNAL MEDICINE

## 2020-11-29 PROCEDURE — 97163 PT EVAL HIGH COMPLEX 45 MIN: CPT

## 2020-11-29 RX ORDER — CARVEDILOL 3.12 MG/1
3.12 TABLET ORAL DAILY
Refills: 0
Start: 2020-11-30

## 2020-11-29 RX ADMIN — FERROUS SULFATE TAB 325 MG (65 MG ELEMENTAL FE) 325 MG: 325 (65 FE) TAB at 08:25

## 2020-11-29 RX ADMIN — Medication 250 MG: at 08:25

## 2020-11-29 RX ADMIN — ASPIRIN 81 MG CHEWABLE TABLET 81 MG: 81 TABLET CHEWABLE at 08:25

## 2020-11-29 RX ADMIN — ATORVASTATIN CALCIUM 80 MG: 80 TABLET, FILM COATED ORAL at 08:25

## 2020-11-29 RX ADMIN — CARVEDILOL 3.12 MG: 3.12 TABLET, FILM COATED ORAL at 08:25

## 2020-11-29 RX ADMIN — SODIUM CHLORIDE 3 G: 9 INJECTION, SOLUTION INTRAVENOUS at 05:00

## 2020-11-29 RX ADMIN — FLUTICASONE PROPIONATE 1 SPRAY: 50 SPRAY, METERED NASAL at 08:26

## 2020-11-29 RX ADMIN — HEPARIN SODIUM 5000 UNITS: 5000 INJECTION INTRAVENOUS; SUBCUTANEOUS at 05:00

## 2020-12-01 ENCOUNTER — TELEPHONE (OUTPATIENT)
Dept: NEPHROLOGY | Facility: CLINIC | Age: 80
End: 2020-12-01

## 2020-12-08 ENCOUNTER — LAB REQUISITION (OUTPATIENT)
Dept: LAB | Facility: HOSPITAL | Age: 80
End: 2020-12-08
Payer: COMMERCIAL

## 2020-12-08 DIAGNOSIS — N17.9 ACUTE KIDNEY FAILURE, UNSPECIFIED (HCC): ICD-10-CM

## 2020-12-08 LAB
ANION GAP SERPL CALCULATED.3IONS-SCNC: 11 MMOL/L (ref 4–13)
BUN SERPL-MCNC: 10 MG/DL (ref 5–25)
CALCIUM SERPL-MCNC: 9.4 MG/DL (ref 8.3–10.1)
CHLORIDE SERPL-SCNC: 110 MMOL/L (ref 100–108)
CO2 SERPL-SCNC: 18 MMOL/L (ref 21–32)
CREAT SERPL-MCNC: 1.1 MG/DL (ref 0.6–1.3)
GFR SERPL CREATININE-BSD FRML MDRD: 48 ML/MIN/1.73SQ M
GLUCOSE SERPL-MCNC: 94 MG/DL (ref 65–140)
POTASSIUM SERPL-SCNC: 5.5 MMOL/L (ref 3.5–5.3)
SODIUM SERPL-SCNC: 139 MMOL/L (ref 136–145)

## 2020-12-08 PROCEDURE — 80048 BASIC METABOLIC PNL TOTAL CA: CPT | Performed by: FAMILY MEDICINE

## 2020-12-15 PROBLEM — N18.31 STAGE 3A CHRONIC KIDNEY DISEASE (HCC): Status: ACTIVE | Noted: 2020-12-15

## 2020-12-16 ENCOUNTER — TELEPHONE (OUTPATIENT)
Dept: NEPHROLOGY | Facility: CLINIC | Age: 80
End: 2020-12-16

## 2020-12-22 ENCOUNTER — LAB REQUISITION (OUTPATIENT)
Dept: LAB | Facility: HOSPITAL | Age: 80
End: 2020-12-22
Payer: COMMERCIAL

## 2020-12-22 DIAGNOSIS — N17.9 ACUTE KIDNEY FAILURE, UNSPECIFIED (HCC): ICD-10-CM

## 2020-12-22 LAB
ANION GAP SERPL CALCULATED.3IONS-SCNC: 7 MMOL/L (ref 4–13)
BUN SERPL-MCNC: 12 MG/DL (ref 5–25)
CALCIUM SERPL-MCNC: 10 MG/DL (ref 8.3–10.1)
CHLORIDE SERPL-SCNC: 97 MMOL/L (ref 100–108)
CO2 SERPL-SCNC: 31 MMOL/L (ref 21–32)
CREAT SERPL-MCNC: 1.01 MG/DL (ref 0.6–1.3)
GFR SERPL CREATININE-BSD FRML MDRD: 53 ML/MIN/1.73SQ M
GLUCOSE SERPL-MCNC: 106 MG/DL (ref 65–140)
POTASSIUM SERPL-SCNC: 3 MMOL/L (ref 3.5–5.3)
SODIUM SERPL-SCNC: 135 MMOL/L (ref 136–145)

## 2020-12-22 PROCEDURE — 80048 BASIC METABOLIC PNL TOTAL CA: CPT | Performed by: FAMILY MEDICINE

## 2020-12-24 ENCOUNTER — TELEMEDICINE (OUTPATIENT)
Dept: NEUROLOGY | Facility: CLINIC | Age: 80
End: 2020-12-24
Payer: COMMERCIAL

## 2020-12-24 DIAGNOSIS — Z86.73 HISTORY OF STROKE: Primary | ICD-10-CM

## 2020-12-24 PROCEDURE — 99214 OFFICE O/P EST MOD 30 MIN: CPT | Performed by: PHYSICIAN ASSISTANT

## 2020-12-24 RX ORDER — HYDROCHLOROTHIAZIDE 25 MG/1
25 TABLET ORAL DAILY
COMMUNITY
Start: 2020-10-19

## 2021-03-04 ENCOUNTER — TELEPHONE (OUTPATIENT)
Dept: NEPHROLOGY | Facility: CLINIC | Age: 81
End: 2021-03-04

## 2021-03-04 NOTE — TELEPHONE ENCOUNTER
I called and left a detail message asking patient to contact the [place] office to schedule her may follow up with Stefanie Quinton  Called patient from the recall list  Please schedule when patient returns the call back

## 2021-03-09 ENCOUNTER — TELEPHONE (OUTPATIENT)
Dept: NEPHROLOGY | Facility: CLINIC | Age: 81
End: 2021-03-09

## 2021-03-09 NOTE — TELEPHONE ENCOUNTER
I called and left a second detail message asking patient to contact the [place] office to schedule her may follow up with Jared Naik  Called patient from the recall list  Please schedule when patient returns the call back  I will mail patient a reminder letter asking to contact the office to schedule the follow up appointment

## 2021-03-11 ENCOUNTER — HOSPITAL ENCOUNTER (OUTPATIENT)
Dept: NON INVASIVE DIAGNOSTICS | Facility: CLINIC | Age: 81
Discharge: HOME | End: 2021-03-11
Payer: COMMERCIAL

## 2021-03-11 DIAGNOSIS — I65.22 SYMPTOMATIC STENOSIS OF LEFT CAROTID ARTERY: ICD-10-CM

## 2021-03-11 DIAGNOSIS — Z98.890 HISTORY OF LEFT-SIDED CAROTID ENDARTERECTOMY: ICD-10-CM

## 2021-03-11 DIAGNOSIS — I65.21 RIGHT INTERNAL CAROTID OCCLUSION: ICD-10-CM

## 2021-03-11 PROCEDURE — 93880 EXTRACRANIAL BILAT STUDY: CPT

## 2021-03-11 PROCEDURE — 93880 EXTRACRANIAL BILAT STUDY: CPT | Performed by: SURGERY

## 2021-04-20 NOTE — PROGRESS NOTES
[facility] Progress Note  Patient: Diego Beatty 78 y o  female   MRN: [id number]  PCP: Jozef Lo DO  Unit/Bed#: [facility] 506-01 Encounter: [id number]  Date Of Visit: 07/01/19    Assessment/Plan:  * CVA (cerebral vascular accident) [facility])  Assessment & Plan  · Presented with right-sided weakness and right facial droop  · CT head positive for left parietal lobe subacute to chronic stroke  · Unable to receive tPA, outside window   · MRI confirmed superficial cortical infarct in the left parietal region and small deep watershed infarct in the left frontal region representing acute infarct in the distribution left MCA  · Echo showed EF of 65%, no regional wall motion abnormalities   · Abnormal CT of the neck with 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, significant stenosis of left carotid  · Vascular surgery consulted   · S/p L carotid endarterectomy   · Continue aspirin, Plavix, statin   · Postoperative care per vascular surgery  · Discharge planning to acute rehab, consult physical medicine    Symptomatic stenosis of left carotid artery  Assessment & Plan  · Continue ASA, plavix, statin  · Appreciate vascular recommendations   · Will f/u outpatient with vascular center     Anemia, unspecified  Assessment & Plan  · Iron-deficiency anemia, likely secondary to gastric AVMs coagulated with argon plasma   · Colonoscopy significant for polypectomy with clipping to prevent excess bleeding   · EGD showed multiple AVM's in the stomach and duodenum, induced coagulation with argon plasma   · Continue iron supplementation  · S/p transfusion, 1U pRBC  · Continue to monitor hemoglobin closely    Essential hypertension  Assessment & Plan  · Blood pressures elevated postoperatively  · Started on nifedipine 30 mg daily  · Also metoprolol changed to carvedilol 6 25, will decrease to carvedilol 3 125 to avoid hypotension   · Continue nifedipine and carvedilol   · Continue to monitor BP closely and What Type Of Note Output Would You Prefer (Optional)?: Standard Output How Severe Is Your Skin Lesion?: mild titrate      VTE Pharmacologic Prophylaxis:   Pharmacologic: Enoxaparin (Lovenox)  Mechanical VTE Prophylaxis in Place: Yes    Patient Centered Rounds: I have performed bedside rounds with nursing staff today  Discussions with Specialists or Other Care Team Provider: vascular     Education and Discussions with Family / Patient: patient     Time Spent for Care: 15 minutes  More than 50% of total time spent on counseling and coordination of care as described above  Current Length of Stay: 12 day(s)    Current Patient Status: Inpatient   Certification Statement: The patient will continue to require additional inpatient hospital stay due to PMR consult     Discharge Plan / Estimated Discharge Date: tomorrow     Code Status: Level 1 - Full Code      Subjective:   Mrs Jaimie David is our 79 yo female who presented with stroke-like symptoms, with CVA confirmed on CT and MRI  Her anemia and deficits from the stroke have continued to resolve  Today, she is in good spirits and denies headache, chest pain, shortness of breath, abdominal pain, nausea, vomiting, diarrhea or constipation  She continues to have mild difficulty with gripping and strength in her right hand, but it has improved  Objective:     Vitals:   Temp (24hrs), Av 3 °F (36 8 °C), Min:97 9 °F (36 6 °C), Max:98 7 °F (37 1 °C)    Temp:  [97 9 °F (36 6 °C)-98 7 °F (37 1 °C)] 98 7 °F (37 1 °C)  HR:  [67-86] 83  Resp:  [18] 18  BP: ()/(56-82) 129/75  SpO2:  [93 %-97 %] 96 %  Body mass index is 29 27 kg/m²  Input and Output Summary (last 24 hours): Intake/Output Summary (Last 24 hours) at 2019 1338  Last data filed at 2019 1100  Gross per 24 hour   Intake 982 ml   Output 2075 ml   Net -1093 ml       Physical Exam:     Physical Exam   Constitutional: She is oriented to person, place, and time  She appears well-developed and well-nourished  Cardiovascular: Normal rate, regular rhythm and intact distal pulses   Exam reveals no Has Your Skin Lesion Been Treated?: not been treated gallop and no friction rub  No murmur heard  Pulmonary/Chest: Effort normal and breath sounds normal  No respiratory distress  She has no wheezes  She has no rales  Abdominal: Soft  Bowel sounds are normal  She exhibits no distension  There is no tenderness  Neurological: She is alert and oriented to person, place, and time  Psychiatric: She has a normal mood and affect  Her behavior is normal  Judgment and thought content normal        Additional Data:     Labs:    Results from last 7 days   Lab Units 07/01/19  0530   WBC Thousand/uL 12 41*   HEMOGLOBIN g/dL 9 3*   HEMATOCRIT % 32 7*   PLATELETS Thousands/uL 294   NEUTROS PCT % 64   LYMPHS PCT % 21   MONOS PCT % 11   EOS PCT % 2     Results from last 7 days   Lab Units 07/01/19  0530   POTASSIUM mmol/L 3 7   CHLORIDE mmol/L 109*   CO2 mmol/L 26   BUN mg/dL 12   CREATININE mg/dL 0 86   CALCIUM mg/dL 9 4     Results from last 7 days   Lab Units 06/29/19  0430   INR  1 24*       * I Have Reviewed All Lab Data Listed Above  * Additional Pertinent Lab Tests Reviewed:  [facility] Admission Reviewed    Imaging:    Imaging Reports Reviewed Today Include: CT, MRI  Imaging Personally Reviewed by Myself Includes:  CT, MRI    Recent Cultures (last 7 days): none           Last 24 Hours Medication List:     Current Facility-Administered Medications:  aspirin 81 mg Oral Daily Marita Franklin MD    atorvastatin 80 mg Oral Daily With Dave Hathaway MD    calcium carbonate 500 mg Oral Daily PRN Cande Dalton MD    carvedilol 3 125 mg Oral BID With Meals Cande Dalton MD    clopidogrel 75 mg Oral Daily Marita Franklin MD    enoxaparin 40 mg Subcutaneous Q24H [facility] Marita Franklin MD    hydrALAZINE 10 mg Intravenous Q6H PRN Cande Dalton MD    iron sucrose 300 mg Intravenous Once per day on Mon Wed Fri Marita Franklin MD Last Rate: Stopped (07/01/19 1011)   Labetalol HCl 10 mg Intravenous Q4H PRN Cande Dalton MD    melatonin 6 mg Oral HS Is This A New Presentation, Or A Follow-Up?: Growth Navneet Leary MD    NIFEdipine 30 mg Oral Daily Ranulfo Graves MD    ondansetron 4 mg Intravenous Q6H PRN Navneet Leary MD    oxyCODONE 2 5 mg Oral Q4H PRN Navneet Leary MD    oxyCODONE 5 mg Oral Q4H PRN Navneet Leary MD    pantoprazole 40 mg Oral Early Morning Navneet Leary MD    phenol 1 spray Mouth/Throat Q2H PRN Ranulfo Graves MD    potassium chloride 20 mEq Oral BID Ranulfo Graves MD         Today, Patient Was Seen By: Shan Ojeda    ** Please Note: This note has been constructed using a voice recognition system   ** <<-----Click here for Discharge Medication Review

## 2021-07-11 DIAGNOSIS — E78.2 MIXED HYPERLIPIDEMIA: ICD-10-CM

## 2021-07-12 RX ORDER — ATORVASTATIN CALCIUM 80 MG/1
TABLET, FILM COATED ORAL
Qty: 90 TABLET | Refills: 3 | Status: SHIPPED | OUTPATIENT
Start: 2021-07-12

## 2021-12-14 ENCOUNTER — TELEPHONE (OUTPATIENT)
Dept: NEUROLOGY | Facility: CLINIC | Age: 81
End: 2021-12-14

## 2021-12-21 ENCOUNTER — TELEMEDICINE (OUTPATIENT)
Dept: NEUROLOGY | Facility: CLINIC | Age: 81
End: 2021-12-21
Payer: COMMERCIAL

## 2021-12-21 DIAGNOSIS — Z86.73 HISTORY OF STROKE: Primary | ICD-10-CM

## 2021-12-21 PROCEDURE — 99441 PR PHYS/QHP TELEPHONE EVALUATION 5-10 MIN: CPT | Performed by: PHYSICIAN ASSISTANT

## 2022-01-01 NOTE — ASSESSMENT & PLAN NOTE
Iron-deficiency anemia, likely secondary to gastric AVMs which have been cauterized  Continue iron supplementation  Status post transfusion, 1 unit packed red cells, continue to monitor hemoglobin closely  Emil/Nurse

## 2022-01-19 ENCOUNTER — APPOINTMENT (EMERGENCY)
Dept: RADIOLOGY | Facility: HOSPITAL | Age: 82
DRG: 308 | End: 2022-01-19
Payer: COMMERCIAL

## 2022-01-19 ENCOUNTER — HOSPITAL ENCOUNTER (INPATIENT)
Facility: HOSPITAL | Age: 82
LOS: 1 days | Discharge: HOME | DRG: 308 | End: 2022-01-20
Attending: SURGERY | Admitting: FAMILY MEDICINE
Payer: COMMERCIAL

## 2022-01-19 ENCOUNTER — APPOINTMENT (INPATIENT)
Dept: NON INVASIVE DIAGNOSTICS | Facility: HOSPITAL | Age: 82
DRG: 308 | End: 2022-01-19
Payer: COMMERCIAL

## 2022-01-19 DIAGNOSIS — R00.1 SYMPTOMATIC BRADYCARDIA: Primary | ICD-10-CM

## 2022-01-19 PROBLEM — Z86.73 HISTORY OF CVA (CEREBROVASCULAR ACCIDENT): Status: ACTIVE | Noted: 2022-01-19

## 2022-01-19 PROBLEM — I10 ESSENTIAL HYPERTENSION: Status: ACTIVE | Noted: 2022-01-19

## 2022-01-19 PROBLEM — N17.9 AKI (ACUTE KIDNEY INJURY) (HCC): Status: ACTIVE | Noted: 2022-01-19

## 2022-01-19 PROBLEM — R55 SYNCOPE: Status: ACTIVE | Noted: 2022-01-19

## 2022-01-19 PROBLEM — E78.2 MIXED HYPERLIPIDEMIA: Status: ACTIVE | Noted: 2022-01-19

## 2022-01-19 LAB
2HR DELTA HS TROPONIN: 2 NG/L
ALBUMIN SERPL BCP-MCNC: 2.9 G/DL (ref 3.5–5)
ALP SERPL-CCNC: 109 U/L (ref 46–116)
ALT SERPL W P-5'-P-CCNC: 33 U/L (ref 12–78)
ANION GAP SERPL CALCULATED.3IONS-SCNC: 7 MMOL/L (ref 4–13)
APICAL FOUR CHAMBER EJECTION FRACTION: 72 %
APICAL TWO CHAMBER EJECTION FRACTION: 65 %
AST SERPL W P-5'-P-CCNC: 53 U/L (ref 5–45)
ATRIAL RATE: 44 BPM
BASE EXCESS BLDA CALC-SCNC: -4 MMOL/L (ref -2–3)
BASOPHILS # BLD AUTO: 0.03 THOUSANDS/ΜL (ref 0–0.1)
BASOPHILS NFR BLD AUTO: 0 % (ref 0–1)
BILIRUB DIRECT SERPL-MCNC: 0.15 MG/DL (ref 0–0.2)
BILIRUB SERPL-MCNC: 0.53 MG/DL (ref 0.2–1)
BUN SERPL-MCNC: 28 MG/DL (ref 5–25)
CALCIUM SERPL-MCNC: 8.7 MG/DL (ref 8.3–10.1)
CARDIAC TROPONIN I PNL SERPL HS: 7 NG/L
CARDIAC TROPONIN I PNL SERPL HS: 9 NG/L
CHLORIDE SERPL-SCNC: 111 MMOL/L (ref 100–108)
CO2 SERPL-SCNC: 21 MMOL/L (ref 21–32)
CREAT SERPL-MCNC: 1.53 MG/DL (ref 0.6–1.3)
E WAVE DECELERATION TIME: 173 MS
EOSINOPHIL # BLD AUTO: 0.35 THOUSAND/ΜL (ref 0–0.61)
EOSINOPHIL NFR BLD AUTO: 5 % (ref 0–6)
ERYTHROCYTE [DISTWIDTH] IN BLOOD BY AUTOMATED COUNT: 18.4 % (ref 11.6–15.1)
FLUAV RNA RESP QL NAA+PROBE: NEGATIVE
FLUBV RNA RESP QL NAA+PROBE: NEGATIVE
FRACTIONAL SHORTENING: 39 % (ref 28–44)
GFR SERPL CREATININE-BSD FRML MDRD: 31 ML/MIN/1.73SQ M
GLOBAL LONGITUIDAL STRAIN: -23 %
GLUCOSE SERPL-MCNC: 138 MG/DL (ref 65–140)
GLUCOSE SERPL-MCNC: 146 MG/DL (ref 65–140)
HCO3 BLDA-SCNC: 21.2 MMOL/L (ref 24–30)
HCT VFR BLD AUTO: 34.2 % (ref 34.8–46.1)
HCT VFR BLD CALC: 31 % (ref 34.8–46.1)
HGB BLD-MCNC: 10.7 G/DL (ref 11.5–15.4)
HGB BLDA-MCNC: 10.5 G/DL (ref 11.5–15.4)
HOLD SPECIMEN: NORMAL
HOLD SPECIMEN: YES
IMM GRANULOCYTES # BLD AUTO: 0.03 THOUSAND/UL (ref 0–0.2)
IMM GRANULOCYTES NFR BLD AUTO: 0 % (ref 0–2)
INR PPP: 1.06 (ref 0.84–1.19)
INTERVENTRICULAR SEPTUM IN DIASTOLE (PARASTERNAL SHORT AXIS VIEW): 0.8 CM
LEFT INTERNAL DIMENSION IN SYSTOLE: 2.8 CM (ref 2.1–4)
LEFT VENTRICULAR INTERNAL DIMENSION IN DIASTOLE: 4.6 CM (ref 4.21–6.27)
LEFT VENTRICULAR POSTERIOR WALL IN END DIASTOLE: 0.7 CM
LEFT VENTRICULAR STROKE VOLUME: 66 ML
LYMPHOCYTES # BLD AUTO: 1.77 THOUSANDS/ΜL (ref 0.6–4.47)
LYMPHOCYTES NFR BLD AUTO: 26 % (ref 14–44)
MCH RBC QN AUTO: 28.2 PG (ref 26.8–34.3)
MCHC RBC AUTO-ENTMCNC: 31.3 G/DL (ref 31.4–37.4)
MCV RBC AUTO: 90 FL (ref 82–98)
MONOCYTES # BLD AUTO: 0.8 THOUSAND/ΜL (ref 0.17–1.22)
MONOCYTES NFR BLD AUTO: 12 % (ref 4–12)
MV E'TISSUE VEL-SEP: 7 CM/S
MV PEAK A VEL: 0.88 M/S
MV PEAK E VEL: 83 CM/S
MV STENOSIS PRESSURE HALF TIME: 0 MS
NEUTROPHILS # BLD AUTO: 3.97 THOUSANDS/ΜL (ref 1.85–7.62)
NEUTS SEG NFR BLD AUTO: 57 % (ref 43–75)
NRBC BLD AUTO-RTO: 0 /100 WBCS
P AXIS: 58 DEGREES
PA SYSTOLIC PRESSURE: 25 MMHG
PCO2 BLD: 22 MMOL/L (ref 21–32)
PCO2 BLD: 39.5 MM HG (ref 42–50)
PH BLD: 7.34 [PH] (ref 7.3–7.4)
PLATELET # BLD AUTO: 226 THOUSANDS/UL (ref 149–390)
PMV BLD AUTO: 11.5 FL (ref 8.9–12.7)
PO2 BLD: 36 MM HG (ref 35–45)
POTASSIUM BLD-SCNC: 3.8 MMOL/L (ref 3.5–5.3)
POTASSIUM SERPL-SCNC: 3.8 MMOL/L (ref 3.5–5.3)
PR INTERVAL: 158 MS
PROT SERPL-MCNC: 6.4 G/DL (ref 6.4–8.2)
PROTHROMBIN TIME: 13.4 SECONDS (ref 11.6–14.5)
QRS AXIS: 9 DEGREES
QRSD INTERVAL: 80 MS
QT INTERVAL: 498 MS
QTC INTERVAL: 425 MS
RA PRESSURE ESTIMATED: 5 MMHG
RBC # BLD AUTO: 3.8 MILLION/UL (ref 3.81–5.12)
RSV RNA RESP QL NAA+PROBE: NEGATIVE
RV PSP: 25 MMHG
SAO2 % BLD FROM PO2: 66 % (ref 60–85)
SARS-COV-2 RNA RESP QL NAA+PROBE: POSITIVE
SL CV LV EF: 70
SL CV PED ECHO LEFT VENTRICLE DIASTOLIC VOLUME (MOD BIPLANE) 2D: 97 ML
SL CV PED ECHO LEFT VENTRICLE SYSTOLIC VOLUME (MOD BIPLANE) 2D: 30 ML
SODIUM BLD-SCNC: 139 MMOL/L (ref 136–145)
SODIUM SERPL-SCNC: 139 MMOL/L (ref 136–145)
SPECIMEN SOURCE: ABNORMAL
SPECKLE BIPLANE: 70 %
T WAVE AXIS: 47 DEGREES
TR MAX PG: 20 MMHG
TRICUSPID VALVE PEAK REGURGITATION VELOCITY: 2.24 M/S
VENTRICULAR RATE: 44 BPM
WBC # BLD AUTO: 6.95 THOUSAND/UL (ref 4.31–10.16)
Z-SCORE OF LEFT VENTRICULAR DIMENSION IN END SYSTOLE: -1.11

## 2022-01-19 PROCEDURE — 0241U HB NFCT DS VIR RESP RNA 4 TRGT: CPT

## 2022-01-19 PROCEDURE — 99223 1ST HOSP IP/OBS HIGH 75: CPT | Performed by: FAMILY MEDICINE

## 2022-01-19 PROCEDURE — 93356 MYOCRD STRAIN IMG SPCKL TRCK: CPT

## 2022-01-19 PROCEDURE — 85610 PROTHROMBIN TIME: CPT | Performed by: SURGERY

## 2022-01-19 PROCEDURE — 96366 THER/PROPH/DIAG IV INF ADDON: CPT

## 2022-01-19 PROCEDURE — 99284 EMERGENCY DEPT VISIT MOD MDM: CPT | Performed by: SURGERY

## 2022-01-19 PROCEDURE — 70450 CT HEAD/BRAIN W/O DYE: CPT

## 2022-01-19 PROCEDURE — 93308 TTE F-UP OR LMTD: CPT | Performed by: SURGERY

## 2022-01-19 PROCEDURE — 84484 ASSAY OF TROPONIN QUANT: CPT | Performed by: SURGERY

## 2022-01-19 PROCEDURE — 99285 EMERGENCY DEPT VISIT HI MDM: CPT

## 2022-01-19 PROCEDURE — 93308 TTE F-UP OR LMTD: CPT | Performed by: INTERNAL MEDICINE

## 2022-01-19 PROCEDURE — 99223 1ST HOSP IP/OBS HIGH 75: CPT | Performed by: INTERNAL MEDICINE

## 2022-01-19 PROCEDURE — 96375 TX/PRO/DX INJ NEW DRUG ADDON: CPT

## 2022-01-19 PROCEDURE — 96365 THER/PROPH/DIAG IV INF INIT: CPT

## 2022-01-19 PROCEDURE — 72125 CT NECK SPINE W/O DYE: CPT

## 2022-01-19 PROCEDURE — 80076 HEPATIC FUNCTION PANEL: CPT

## 2022-01-19 PROCEDURE — 93010 ELECTROCARDIOGRAM REPORT: CPT | Performed by: INTERNAL MEDICINE

## 2022-01-19 PROCEDURE — 80048 BASIC METABOLIC PNL TOTAL CA: CPT | Performed by: SURGERY

## 2022-01-19 PROCEDURE — 84295 ASSAY OF SERUM SODIUM: CPT

## 2022-01-19 PROCEDURE — 93325 DOPPLER ECHO COLOR FLOW MAPG: CPT | Performed by: INTERNAL MEDICINE

## 2022-01-19 PROCEDURE — 82947 ASSAY GLUCOSE BLOOD QUANT: CPT

## 2022-01-19 PROCEDURE — 93356 MYOCRD STRAIN IMG SPCKL TRCK: CPT | Performed by: INTERNAL MEDICINE

## 2022-01-19 PROCEDURE — 93308 TTE F-UP OR LMTD: CPT

## 2022-01-19 PROCEDURE — 93005 ELECTROCARDIOGRAM TRACING: CPT

## 2022-01-19 PROCEDURE — 76705 ECHO EXAM OF ABDOMEN: CPT | Performed by: SURGERY

## 2022-01-19 PROCEDURE — 84132 ASSAY OF SERUM POTASSIUM: CPT

## 2022-01-19 PROCEDURE — 93321 DOPPLER ECHO F-UP/LMTD STD: CPT | Performed by: INTERNAL MEDICINE

## 2022-01-19 PROCEDURE — 36415 COLL VENOUS BLD VENIPUNCTURE: CPT | Performed by: SURGERY

## 2022-01-19 PROCEDURE — 85014 HEMATOCRIT: CPT

## 2022-01-19 PROCEDURE — 85025 COMPLETE CBC W/AUTO DIFF WBC: CPT | Performed by: SURGERY

## 2022-01-19 PROCEDURE — 82803 BLOOD GASES ANY COMBINATION: CPT

## 2022-01-19 RX ORDER — CARVEDILOL 3.12 MG/1
3.12 TABLET ORAL DAILY
COMMUNITY

## 2022-01-19 RX ORDER — ONDANSETRON 2 MG/ML
INJECTION INTRAMUSCULAR; INTRAVENOUS
Status: DISPENSED
Start: 2022-01-19 | End: 2022-01-19

## 2022-01-19 RX ORDER — DOCUSATE SODIUM 100 MG/1
100 CAPSULE, LIQUID FILLED ORAL 2 TIMES DAILY
Status: DISCONTINUED | OUTPATIENT
Start: 2022-01-19 | End: 2022-01-20 | Stop reason: HOSPADM

## 2022-01-19 RX ORDER — PANTOPRAZOLE SODIUM 40 MG/1
40 TABLET, DELAYED RELEASE ORAL
Status: DISCONTINUED | OUTPATIENT
Start: 2022-01-20 | End: 2022-01-20 | Stop reason: HOSPADM

## 2022-01-19 RX ORDER — ASPIRIN 81 MG/1
81 TABLET, CHEWABLE ORAL DAILY
COMMUNITY

## 2022-01-19 RX ORDER — CHOLECALCIFEROL (VITAMIN D3) 125 MCG
CAPSULE ORAL
COMMUNITY

## 2022-01-19 RX ORDER — ATORVASTATIN CALCIUM 20 MG/1
80 TABLET, FILM COATED ORAL DAILY
Status: DISCONTINUED | OUTPATIENT
Start: 2022-01-20 | End: 2022-01-20 | Stop reason: HOSPADM

## 2022-01-19 RX ORDER — ATORVASTATIN CALCIUM 80 MG/1
80 TABLET, FILM COATED ORAL DAILY
COMMUNITY

## 2022-01-19 RX ORDER — HEPARIN SODIUM 5000 [USP'U]/ML
5000 INJECTION, SOLUTION INTRAVENOUS; SUBCUTANEOUS EVERY 8 HOURS SCHEDULED
Status: DISCONTINUED | OUTPATIENT
Start: 2022-01-19 | End: 2022-01-20 | Stop reason: HOSPADM

## 2022-01-19 RX ORDER — ONDANSETRON 2 MG/ML
INJECTION INTRAMUSCULAR; INTRAVENOUS CODE/TRAUMA/SEDATION MEDICATION
Status: COMPLETED | OUTPATIENT
Start: 2022-01-19 | End: 2022-01-19

## 2022-01-19 RX ORDER — LISINOPRIL 20 MG/1
20 TABLET ORAL DAILY
COMMUNITY
End: 2022-01-20

## 2022-01-19 RX ORDER — FLUTICASONE PROPIONATE 50 MCG
1 SPRAY, SUSPENSION (ML) NASAL DAILY
Status: DISCONTINUED | OUTPATIENT
Start: 2022-01-20 | End: 2022-01-20 | Stop reason: HOSPADM

## 2022-01-19 RX ORDER — SODIUM CHLORIDE, SODIUM GLUCONATE, SODIUM ACETATE, POTASSIUM CHLORIDE, MAGNESIUM CHLORIDE, SODIUM PHOSPHATE, DIBASIC, AND POTASSIUM PHOSPHATE .53; .5; .37; .037; .03; .012; .00082 G/100ML; G/100ML; G/100ML; G/100ML; G/100ML; G/100ML; G/100ML
75 INJECTION, SOLUTION INTRAVENOUS CONTINUOUS
Status: DISCONTINUED | OUTPATIENT
Start: 2022-01-19 | End: 2022-01-20 | Stop reason: HOSPADM

## 2022-01-19 RX ORDER — ACETAMINOPHEN 325 MG/1
650 TABLET ORAL EVERY 6 HOURS PRN
Status: DISCONTINUED | OUTPATIENT
Start: 2022-01-19 | End: 2022-01-20 | Stop reason: HOSPADM

## 2022-01-19 RX ORDER — ACETAMINOPHEN 325 MG/1
650 TABLET ORAL ONCE
Status: DISCONTINUED | OUTPATIENT
Start: 2022-01-19 | End: 2022-01-19

## 2022-01-19 RX ORDER — LANOLIN ALCOHOL/MO/W.PET/CERES
6 CREAM (GRAM) TOPICAL
Status: DISCONTINUED | OUTPATIENT
Start: 2022-01-19 | End: 2022-01-20 | Stop reason: HOSPADM

## 2022-01-19 RX ORDER — ONDANSETRON 2 MG/ML
4 INJECTION INTRAMUSCULAR; INTRAVENOUS EVERY 6 HOURS PRN
Status: DISCONTINUED | OUTPATIENT
Start: 2022-01-19 | End: 2022-01-20 | Stop reason: HOSPADM

## 2022-01-19 RX ORDER — ONDANSETRON 2 MG/ML
1 INJECTION INTRAMUSCULAR; INTRAVENOUS ONCE
Status: COMPLETED | OUTPATIENT
Start: 2022-01-19 | End: 2022-01-19

## 2022-01-19 RX ORDER — FERROUS SULFATE 325(65) MG
325 TABLET ORAL 2 TIMES DAILY WITH MEALS
COMMUNITY

## 2022-01-19 RX ORDER — ASPIRIN 81 MG/1
81 TABLET, CHEWABLE ORAL DAILY
Status: DISCONTINUED | OUTPATIENT
Start: 2022-01-20 | End: 2022-01-20 | Stop reason: HOSPADM

## 2022-01-19 RX ADMIN — HEPARIN SODIUM 5000 UNITS: 5000 INJECTION INTRAVENOUS; SUBCUTANEOUS at 21:17

## 2022-01-19 RX ADMIN — SODIUM CHLORIDE 1000 ML: 0.9 INJECTION, SOLUTION INTRAVENOUS at 11:36

## 2022-01-19 RX ADMIN — SODIUM CHLORIDE, SODIUM GLUCONATE, SODIUM ACETATE, POTASSIUM CHLORIDE, MAGNESIUM CHLORIDE, SODIUM PHOSPHATE, DIBASIC, AND POTASSIUM PHOSPHATE 75 ML/HR: .53; .5; .37; .037; .03; .012; .00082 INJECTION, SOLUTION INTRAVENOUS at 21:17

## 2022-01-19 RX ADMIN — Medication 6 MG: at 21:17

## 2022-01-19 RX ADMIN — DOCUSATE SODIUM 100 MG: 100 CAPSULE ORAL at 21:17

## 2022-01-19 RX ADMIN — ONDANSETRON 4 MG: 2 INJECTION INTRAMUSCULAR; INTRAVENOUS at 11:28

## 2022-01-19 NOTE — CASE MANAGEMENT
Case Management Progress Note    Patient name Georges Vega  Location ED 28/ED 28 MRN [id number]  : 1940 Date 2022       LOS (days): 0  Geometric Mean LOS (GMLOS) (days):   Days to GMLOS:        OBJECTIVE:        Current admission status: Inpatient  Preferred Pharmacy:   [facility]  No address on file      Primary Care Provider: No primary care provider on file  Primary Insurance: [employer]  Secondary Insurance:     PROGRESS NOTE:      Pt was brought to the ED via [facility] s/p fall [place] [employer]  Pt c/o dizziness, vomiting, and low BP  Pt's dtr was en route, as she was at the grocery store with the pt at the time of the fall

## 2022-01-19 NOTE — ASSESSMENT & PLAN NOTE
Baseline creatinine 0 8-1  Presented with creatinine 1 53  Received 2L NS in ED  Hold lisinopril  Continue gentle hydration  Avoid hypotension

## 2022-01-19 NOTE — ASSESSMENT & PLAN NOTE
Patient had syncopal episode this morning at grocery store     presented as trauma alert  All trauma imaging negative  Syncope likely from bradycardia  PT/OT

## 2022-01-19 NOTE — CONSULTS
Consultation - General Cardiology Team 2  Werner Lambert 80 y o  female MRN: [id number]  Unit/Bed#: ED 29 Encounter: [id number]      Inpatient consult to Cardiology  Consult performed by: Caleb Her MD  Consult ordered by: Crystal Baugh PA-C        PCP: No primary care provider on file  History of Present Illness   Physician Requesting Consult: Dutch Combs MD  Reason for Consult / Principal Problem: Syncope / Bradycardia    HPI: Werner Lambert is a 80y o  year old female with a history of HTN, HLD, carotid artery stenosis s/p right-sided endareterectomy, TIA, active tobacco use presents to the ED after a syncopal episode at [facility]  History obtained from patient and family at bedside  Patients daughter states that they were at the supermarket when Ms Gina Carmichael suddenly felt lightheaded and dizzy and witnessed her falling backwards where she fell backwards  She gain consciousness immediately after the fall  Daughter states that Ms Gina Carmichael became cool and clammy as well  Denies any other prodromal symptoms  Upon arrival to the ED patient was noted to have a HR in the 40s with blood pressure in the 62'J systolic  Upon further history taking, patient and daughter state that she has been having poor oral intake due to poor appetite and has not been hydrating as she should be  Denies any CP, palpitations, SOB, PND, orthopnea or any other associated complaints  Patient is independent at baseline and active  Found to be COVID19 positive    Review of Systems  Review of system was conducted and was negative except for as stated in the HPI  Historical Information   No past medical history on file  No past surgical history on file    Social History     Substance and Sexual Activity   Alcohol Use Not on file     Social History     Substance and Sexual Activity   Drug Use Not on file     Social History     Tobacco Use   Smoking Status Not on file   Smokeless Tobacco Not on file Family History: No family history on file  Meds/Allergies   Hospital Medications:   No current facility-administered medications for this encounter  Home Medications: (Not in a hospital admission)      Not on File    Objective   Vitals: Blood pressure 97/57, pulse (!) 52, temperature (!) 97 2 °F (36 2 °C), temperature source Oral, resp  rate 18, weight 78 8 kg (173 lb 11 6 oz), SpO2 97 %    Orthostatic Blood Pressures      Most Recent Value   Blood Pressure 97/57 filed at 01/19/2022 1330   Patient Position - Orthostatic VS Sitting filed at 01/19/2022 1135            Invasive Devices  Report    Peripheral Intravenous Line            Peripheral IV 01/19/22 Left Forearm <1 day                Physical Exam  GEN: Brenda Lancaster appears well, alert and oriented x 3, pleasant and cooperative   HEENT:  Normocephalic, atraumatic, anicteric, moist mucous membranes  NECK: No JVD or carotid bruits   HEART: reg rhythm, chintan rate, normal S1 and S2, no murmurs, clicks, gallops or rubs   LUNGS: Clear to auscultation bilaterally; no wheezes, rales, or rhonchi; respiration nonlabored   ABDOMEN:  Normoactive bowel sounds, soft, no tenderness, no distention  EXTREMITIES: peripheral pulses palpable; no edema  NEURO: no gross focal findings; cranial nerves grossly intact   SKIN:  Dry, intact, warm to touch    Lab Results:   CBC with diff:   Results from last 7 days   Lab Units 01/19/22  1134 01/19/22  1132   WBC Thousand/uL  --  6 95   RBC Million/uL  --  3 80*   HEMOGLOBIN g/dL  --  10 7*   I STAT HEMOGLOBIN g/dl 10 5*  --    HEMATOCRIT %  --  34 2*   HEMATOCRIT, ISTAT % 31*  --    MCV fL  --  90   MCH pg  --  28 2   MCHC g/dL  --  31 3*   RDW %  --  18 4*   MPV fL  --  11 5   PLATELETS Thousands/uL  --  226     CMP:   Results from last 7 days   Lab Units 01/19/22  1134 01/19/22  1132   SODIUM mmol/L  --  139   POTASSIUM mmol/L  --  3 8   CHLORIDE mmol/L  --  111*   CO2 mmol/L  --  21   CO2, I-STAT mmol/L 22  --    BUN mg/dL --  28*   CREATININE mg/dL  --  1 53*   GLUCOSE, ISTAT mg/dl 146*  --    CALCIUM mg/dL  --  8 7   AST U/L  --  53*   ALT U/L  --  33   ALK PHOS U/L  --  109   EGFR ml/min/1 73sq m  --  31     Troponin: No results found for: TROPONINI  BNP:   Results from last 7 days   Lab Units 01/19/22  1134 01/19/22  1132   POTASSIUM mmol/L  --  3 8   CHLORIDE mmol/L  --  111*   CO2 mmol/L  --  21   CO2, I-STAT mmol/L 22  --    BUN mg/dL  --  28*   CREATININE mg/dL  --  1 53*   GLUCOSE, ISTAT mg/dl 146*  --    CALCIUM mg/dL  --  8 7   EGFR ml/min/1 73sq m  --  31     Coags:   Results from last 7 days   Lab Units 01/19/22  1132   INR  1 06     TSH:     Magnesium:     Lipid Profile:         Results from last 7 days   Lab Units 01/19/22  1134 01/19/22  1132   POTASSIUM mmol/L  --  3 8   CO2 mmol/L  --  21   CO2, I-STAT mmol/L 22  --    CHLORIDE mmol/L  --  111*   BUN mg/dL  --  28*   CREATININE mg/dL  --  1 53*     Results from last 7 days   Lab Units 01/19/22  1134 01/19/22  1132   HEMOGLOBIN g/dL  --  10 7*   I STAT HEMOGLOBIN g/dl 10 5*  --    HEMATOCRIT %  --  34 2*   HEMATOCRIT, ISTAT % 31*  --    PLATELETS Thousands/uL  --  226                 Imaging: I have personally reviewed pertinent reports  EKG:   Date: 1/19/22  Interpretation: Sinus bradycardia    VTE Prophylaxis: Heparin       Assessment/Plan     Assessment:    1  Vasovagal Syncope // Positive for COIVD19  2  Sinus Bradycardia, ?component of age-related conduction disease in setting of beta blocker use as well  --> In patients other chart echo (2019) revealed a EF of 65% and NRWMA  NM (2019) also had no signs of perfusion defects  3  HTN // HLD  4  History of TIA  5  Carotid Artery Stenosis s/p Right Endarterectomy   6   Acute Kidney Injury in setting of poor PO intake and hypotension    Plan:  - Avoid jia blocking agents for now  - IV Hydration  - f/u TTE  - c/w Telemetry  - Resume home BP meds as tolerated  - c/w Lipitor 80mg PO QHS  - c/w Aspirin 81mg PO Daily      Case discussed and reviewed with Dr Angela Joya who agrees with my assessment and plan  Thank you for involving us in the care of your patient  Leonides De La Paz MD  Cardiology Fellow PGY-5    ==========================================================================================    Counseling / Coordination of Care  Total floor / unit time spent today 45 minutes minutes  Greater than 50% of total time was spent with the patient and / or family counseling and / or coordination of care  A description of the counseling / coordination of care:         Epic/ Allscripts/Care Everywhere records reviewed:     ** Please Note: Fluency DirectDictation voice to text software may have been used in the creation of this document   **

## 2022-01-19 NOTE — ASSESSMENT & PLAN NOTE
Patient with h/o CVA, HTN, HLD presented to ED after syncope and fall at grocery store    Upon arrival, HR was in 40s, EKG reveals sinus bradycardia  BP 84/50 improved with 2L NS bolus  Place on telemetry monitor  Hold home medication Coreg and lisinopril  cardiology consult  Pending echocardiogram

## 2022-01-19 NOTE — H&P
H&P Exam - Trauma   Carrington Hua [place] [de-identified] 80 y o  female MRN: [id number]  Unit/Bed#: TR 02 Encounter: [id number]    Assessment/Plan   Trauma Alert: Level B  Model of Arrival: Ambulance  Trauma Team: Attending Carrillo Gilman and Residents [facility]  Consultants: None    Trauma Active Problems: S/P syncopal episode in Grocery store  Nausea    Trauma Plan: ***    Chief Complaint: Nausea    History of Present Illness   HPI:  Carrington Hua [place] [de-identified] is a 80 y o  female who presents with ***  Mechanism:Fall    Review of Systems    12-point, complete review of systems was reviewed and negative except as stated above  Historical Information   History is unobtainable from the patient due to ***  Efforts to obtain history included the following sources: {Reason history is unobtainable:22201}    No past medical history on file  No past surgical history on file  Social History   Social History     Substance and Sexual Activity   Alcohol Use Not on file     Social History     Substance and Sexual Activity   Drug Use Not on file     Social History     Tobacco Use   Smoking Status Not on file   Smokeless Tobacco Not on file     No existing history information found  No existing history information found  There is no immunization history on file for this patient  Last Tetanus: ***  Family History: Non-contributory      Meds/Allergies   { IP JAUT:[id number]}    Not on File      PHYSICAL EXAM    PE limited by: nothing    Objective   Vitals:   First set:      Primary Survey:   (A) Airway: patent  (B) Breathing: non-labored  (C) Circulation: Pulses:   normal  (D) Disabliity:  GCS Total:  15, Eye Opening:   Spontaneous = 4, Motor Response: Obeys commands = 6 and Verbal Response:  Oriented = 5  (E) Expose:  Completed    Secondary Survey: (Click on Physical Exam tab above)  Physical Exam  Constitutional:       Appearance: Normal appearance  HENT:      Head: Normocephalic        Nose: Nose normal       Mouth/Throat:      Pharynx: Oropharynx is clear  Eyes:      Extraocular Movements: Extraocular movements intact  Conjunctiva/sclera: Conjunctivae normal       Pupils: Pupils are equal, round, and reactive to light  Cardiovascular:      Rate and Rhythm: Normal rate and regular rhythm  Pulses: Normal pulses  Heart sounds: Normal heart sounds  Pulmonary:      Effort: Pulmonary effort is normal       Breath sounds: Normal breath sounds  Chest:      Chest wall: No tenderness  Abdominal:      General: Bowel sounds are normal       Palpations: Abdomen is soft  Tenderness: There is no abdominal tenderness  Musculoskeletal:      Cervical back: Normal range of motion and neck supple  No tenderness  Skin:     General: Skin is warm  Capillary Refill: Capillary refill takes less than 2 seconds  Comments: hematoma on left thigh   Neurological:      Mental Status: She is alert           Invasive Devices  Report    None                 Lab Results: {Laboratory NICA:76851}  Imaging/EKG Studies: {Radiographic XBOS:03644}  Other Studies: ***    Code Status: No Order  Advance Directive and Living Will:      Power of :    POLST:

## 2022-01-19 NOTE — PROCEDURES
POC FAST US    Date/Time: 1/19/2022 1:03 PM  Performed by: Luanna Leventhal, MD  Authorized by: Luanna Leventhal, MD     Patient location:  Trauma  Procedure details:     Exam Type:  Diagnostic    Indications comment:  Blunt trauma    Assess for:  Intra-abdominal fluid and pericardial effusion    Technique: FAST      Views obtained:  Heart - Pericardial sac, RUQ - Eason's Pouch, LUQ - Splenorenal space and Suprapubic - Pouch of Zafar    Image quality: diagnostic      Image availability:  Images available in PACS  FAST Findings:     RUQ (Hepatorenal) free fluid: absent      LUQ (Splenorenal) free fluid: absent      Suprapubic free fluid: absent      Cardiac wall motion: identified      Pericardial effusion: absent    Interpretation:     Impressions: negative

## 2022-01-19 NOTE — H&P
[address] [facility]  H&P- Juan Reagan 1940, 80 y o  female MRN: [id number]  Unit/Bed#: ED 29 Encounter: [id number]  Primary Care Provider: No primary care provider on file  Date and time admitted to hospital: 1/19/2022 11:20 AM    * Symptomatic bradycardia  Assessment & Plan  Patient with h/o CVA, HTN, HLD presented to ED after syncope and fall at grocery store  Upon arrival, HR was in 40s, EKG reveals sinus bradycardia  BP 84/50 improved with 2L NS bolus  Place on telemetry monitor  Hold home medication Coreg and lisinopril  cardiology consult  Pending echocardiogram    Syncope  Assessment & Plan  Patient had syncopal episode this morning at grocery store  presented as trauma alert  All trauma imaging negative  Syncope likely from bradycardia  PT/OT    ADIS (acute kidney injury) ([facility] [address] )  Assessment & Plan  Baseline creatinine 0 8-1  Presented with creatinine 1 53  Received 2L NS in ED  Hold lisinopril  Continue gentle hydration  Avoid hypotension    Mixed hyperlipidemia  Assessment & Plan  Resume statin    Essential hypertension  Assessment & Plan  At home patient is on Carvedilol and lisinopril  Will hold both meds      History of CVA (cerebrovascular accident)  Assessment & Plan  Continue ASA/statin    VTE Prophylaxis: Heparin  / sequential compression device   Code Status: full code    Discussion with family:  Daughter at bedside    Anticipated Length of Stay:  Patient will be admitted on an Inpatient basis with an anticipated length of stay of  > 2 midnights  Justification for Hospital Stay: syncope bradycardia    Total Time for Visit, including Counseling / Coordination of Care: 60 minutes  Greater than 50% of this total time spent on direct patient counseling and coordination of care      Chief Complaint:   syncope    History of Present Illness:    Juan Reagan is a 80 y o  female with history of CVA, HTN, HLD, presented to ER after she had a syncopal episode while she was grocery with her daughter this morning  Daughter witnessed her falling backwards and hitting her head  However she regained consciousness immediately after she fell  Upon arrival to ER, patient's heart rate was in 40s, blood pressure systolic was in 95X  Patient was admitted under trauma initially  CT head, CT C-spine, chest x-ray and other trauma imaging was all negative  Further workup revealed Sae I  Patient was given 2 L normal saline bolus with improvement of blood pressure  Patient also complained of some nausea, however no vomiting in the ER  Patient will be admitted for symptomatic bradycardia, SAE  On my exam patient is alert and oriented x4, some nausea, denies abdominal pain or chest pain  Patient states she had been having poor oral intake lately due to poor appetite  She is independent at baseline and lives by herself  Review of Systems:    Review of Systems   Constitutional: Negative for chills and fever  HENT: Negative for ear pain and sore throat  Eyes: Negative for pain and visual disturbance  Respiratory: Negative for cough and shortness of breath  Cardiovascular: Negative for chest pain and palpitations  Gastrointestinal: Positive for nausea  Negative for abdominal pain and vomiting  Genitourinary: Negative for dysuria and hematuria  Musculoskeletal: Negative for arthralgias and back pain  Skin: Negative for color change and rash  Neurological: Positive for syncope  Negative for seizures  All other systems reviewed and are negative  Past Medical and Surgical History:     No past medical history on file  No past surgical history on file  Meds/Allergies:    Prior to Admission medications    Medication Sig Start Date End Date Taking?  Authorizing Provider   aspirin 81 mg chewable tablet Chew 81 mg daily   Yes Historical Provider, MD   atorvastatin (LIPITOR) 80 mg tablet Take 80 mg by mouth daily   Yes Historical Provider, MD   carvedilol (COREG) 3 125 mg tablet Take 3 125 mg by mouth daily   Yes Historical Provider, MD   ferrous sulfate 325 (65 Fe) mg tablet Take 325 mg by mouth 2 (two) times a day with meals   Yes Historical Provider, MD   fluticasone (VERAMYST) 27 5 MCG/SPRAY nasal spray 2 sprays into each nostril daily   Yes Historical Provider, MD   Garlic 579 MG TABS Take by mouth   Yes Historical Provider, MD   Melatonin 5 MG TABS Take by mouth   Yes Historical Provider, MD   Pantoprazole Sodium (PROTONIX PO) Take by mouth   Yes Historical Provider, MD     I have reviewed home medications using allscripts  Allergies: Not on File    Social History:     Marital Status: Unknown     Substance Use History:   Social History     Substance and Sexual Activity   Alcohol Use Not on file     Social History     Tobacco Use   Smoking Status Not on file   Smokeless Tobacco Not on file     Social History     Substance and Sexual Activity   Drug Use Not on file       Family History:    No family history on file  Physical Exam:     Vitals:   Blood Pressure: 96/59 (01/19/22 1258)  Pulse: (!) 44 (01/19/22 1200)  Temperature: (!) 97 2 °F (36 2 °C) (01/19/22 1124)  Temp Source: Oral (01/19/22 1124)  Respirations: 18 (01/19/22 1200)  Weight - Scale: 78 8 kg (173 lb 11 6 oz) (01/19/22 1124)  SpO2: 93 % (01/19/22 1200)    Physical Exam  Vitals and nursing note reviewed  Constitutional:       General: She is not in acute distress  Appearance: She is well-developed  HENT:      Head: Normocephalic and atraumatic  Eyes:      Conjunctiva/sclera: Conjunctivae normal    Cardiovascular:      Rate and Rhythm: Regular rhythm  Bradycardia present  Heart sounds: No murmur heard  Pulmonary:      Effort: Pulmonary effort is normal  No respiratory distress  Breath sounds: Normal breath sounds  Abdominal:      Palpations: Abdomen is soft  Tenderness: There is no abdominal tenderness  Musculoskeletal:      Cervical back: Neck supple     Skin: General: Skin is warm and dry  Neurological:      Mental Status: She is alert  Additional Data:     Lab Results: I have personally reviewed pertinent reports  Results from last 7 days   Lab Units 01/19/22  1134 01/19/22  1132   WBC Thousand/uL  --  6 95   HEMOGLOBIN g/dL  --  10 7*   I STAT HEMOGLOBIN g/dl 10 5*  --    HEMATOCRIT %  --  34 2*   HEMATOCRIT, ISTAT % 31*  --    PLATELETS Thousands/uL  --  226   NEUTROS PCT %  --  57   LYMPHS PCT %  --  26   MONOS PCT %  --  12   EOS PCT %  --  5     Results from last 7 days   Lab Units 01/19/22  1134 01/19/22  1132   SODIUM mmol/L  --  139   POTASSIUM mmol/L  --  3 8   CHLORIDE mmol/L  --  111*   CO2 mmol/L  --  21   CO2, I-STAT mmol/L 22  --    BUN mg/dL  --  28*   CREATININE mg/dL  --  1 53*   ANION GAP mmol/L  --  7   CALCIUM mg/dL  --  8 7   ALBUMIN g/dL  --  2 9*   TOTAL BILIRUBIN mg/dL  --  0 53   ALK PHOS U/L  --  109   ALT U/L  --  33   AST U/L  --  53*   GLUCOSE RANDOM mg/dL  --  138     Results from last 7 days   Lab Units 01/19/22  1132   INR  1 06                   Imaging: I have personally reviewed pertinent reports  TRAUMA - CT head wo contrast   Final Result by Radha Alfaro MD (01/19 1201)      No acute intracranial abnormality  Please see same day CT cervical spine without contrast for further evaluation  I personally discussed this study with Josue Garcia on 1/19/2022 at 12:02 PM                            Workstation performed: [id number]         TRAUMA - CT spine cervical wo contrast   Final Result by Radha Alfaro MD (01/19 1207)      No cervical spine fracture or traumatic malalignment  Workstation performed: [id number]         XR Trauma multiple ([facility] trauma bay ONLY)   Final Result by Radha Alfaro MD (01/19 1210)      No acute cardiopulmonary disease within limitations of supine imaging                 Workstation performed: [id number]         XR chest 1 view (Results Pending)       EKG, Pathology, and Other Studies Reviewed on Admission:   · EKG:  Sinus Pacoon Tere    Allscripts / Epic Records Reviewed: Yes     ** Please Note: This note has been constructed using a voice recognition system   **

## 2022-01-19 NOTE — H&P
H&P Exam - Trauma   Audrey Mckenzie 80 y o  female MRN: [id number]  Unit/Bed#: ED 28 Encounter: [id number]    Assessment/Plan   Trauma Alert: Level B  Model of Arrival: Ambulance   Trauma Team: Attending Zaria Pineda and Residents JOAQUIN, [address] [facility] [facility] Fellow [address] [facility]  Consultants: SLIM    Trauma Active Problems: S/P syncopal episode in Grocery store  Nausea    Trauma Plan: Cleared C Spine, Negative Imaging, Consult Medicine for Symptomatic Bradycardia     Chief Complaint: Nausea    History of Present Illness   HPI:  Audrey Mckenzie is a 80 y o  female who presents to the emergency department after syncopal fall on blood thinners in the grocery store  Family reports that she fell down, was then sat up in a chair and briefly had LOC  Per EMS patient vomited 1 time  Patient received about 200 cc of fluid, 4 mg of Zofran pre-hospital   Upon arrival to the [facility]  Patient reported she felt like she was going to be sick, 4 mg of Zofran administered at that time  Patient denying any pain, no neurological symptoms, GCS 15  Family reports the patient had pneumonia approximately 2 weeks ago that was treated outpatient, they reports he had a stroke years ago that is why she is on anticoagulation, therefore carotid artery surgery  They also reports he has had decreased appetite, but reports she stays hydrated  Patient has history of hypertension, hyperlipidemia  They report the patient looks pale and a little yellow  They report patient appears more tired than usual      Patient denies any blood in her stool recently  Mechanism:Fall    Review of Systems   Gastrointestinal: Positive for nausea  Pt reports nausea, denying any pain     12-point, complete review of systems was reviewed and negative except as stated above  Historical Information   See above    CVA   HTN    Procedure Laterality Date Age Comment Src   Chart   CAROTID ENDARTARECTOMY Left 06/28/2019 79y Procedure: ENDARTERECTOMY ARTERY CAROTID WITH PATCH ANGIOPLASTY; Surgeon: Nasir Solis MD; Location: [facility]; Service: Vascular           IR LOWER EXTREMITY ANGIOGRAM  11/25/2020 80y            TOE AMPUTATION Left 11/27/2020 80y Procedure: AMPUTATION 5th TOE left foot; Surgeon: Tiffanie Peñaloza DPM; Location: [facility]; Service: Podiatry                                  Social History   Social History     Substance and Sexual Activity   Alcohol Use Not on file     Social History     Substance and Sexual Activity   Drug Use Not on file     Social History     Tobacco Use   Smoking Status Not on file   Smokeless Tobacco Not on file     No existing history information found  No existing history information found  There is no immunization history on file for this patient  Family History: Non-contributory      Meds/Allergies        Not on File      PHYSICAL EXAM    PE limited by: nothing    Objective   Vitals:   First set: Temperature: (!) 97 2 °F (36 2 °C) (01/19/22 1124)  Pulse: (!) 45 (01/19/22 1124)  Respirations: 16 (01/19/22 1124)  Blood Pressure: 123/57 (01/19/22 1124)    Primary Survey:   (A) Airway: patent  (B) Breathing: non-labored  (C) Circulation: Pulses:   normal  (D) Disabliity:  GCS Total:  15, Eye Opening:   Spontaneous = 4, Motor Response: Obeys commands = 6 and Verbal Response:  Oriented = 5  (E) Expose:  Completed    Secondary Survey: (Click on Physical Exam tab above)  Physical Exam  Vitals reviewed  Constitutional:       General: She is not in acute distress  Appearance: Normal appearance  She is ill-appearing  She is not toxic-appearing or diaphoretic  HENT:      Head: Normocephalic  No raccoon eyes, Rice's sign, abrasion, contusion, masses, right periorbital erythema, left periorbital erythema or laceration  Right Ear: There is impacted cerumen  Left Ear: There is hemotympanum        Ears:      Comments: R ear cerumen impaction could not visualize TM    L ear + hemotympanum      Nose: Nose normal       Mouth/Throat: Pharynx: Oropharynx is clear  Eyes:      General: No visual field deficit  Extraocular Movements: Extraocular movements intact  Conjunctiva/sclera: Conjunctivae normal       Pupils: Pupils are equal, round, and reactive to light  Cardiovascular:      Rate and Rhythm: Regular rhythm  Bradycardia present  Pulses: Normal pulses  Heart sounds: Normal heart sounds, S1 normal and S2 normal    Pulmonary:      Effort: Pulmonary effort is normal  No tachypnea or bradypnea  Breath sounds: Normal breath sounds  Chest:      Chest wall: No tenderness  Abdominal:      General: Abdomen is flat  Bowel sounds are normal       Palpations: Abdomen is soft  Tenderness: There is no abdominal tenderness  Musculoskeletal:      Cervical back: Full passive range of motion without pain, normal range of motion and neck supple  No edema, erythema, signs of trauma, rigidity, tenderness or crepitus  No spinous process tenderness or muscular tenderness  Normal range of motion  Right lower leg: No edema  Left lower leg: No edema  Comments: Entire spine palpated, no step-offs, no bony tenderness   Skin:     General: Skin is warm  Capillary Refill: Capillary refill takes less than 2 seconds  Coloration: Skin is pale  Comments: hematoma on left thigh   Neurological:      General: No focal deficit present  Mental Status: She is alert  GCS: GCS eye subscore is 4  GCS verbal subscore is 5  GCS motor subscore is 6  Cranial Nerves: No dysarthria or facial asymmetry  Sensory: Sensation is intact  Motor: No weakness           Invasive Devices  Report    Peripheral Intravenous Line            Peripheral IV 01/19/22 Left Forearm <1 day                Lab Results:   BMP/CMP:   Lab Results   Component Value Date    SODIUM 139 01/19/2022    K 3 8 01/19/2022     (H) 01/19/2022    CO2 22 01/19/2022    CO2 21 01/19/2022    BUN 28 (H) 01/19/2022    CREATININE 1 53 (H) 01/19/2022    GLUCOSE 146 (H) 01/19/2022    CALCIUM 8 7 01/19/2022    AST 53 (H) 01/19/2022    ALT 33 01/19/2022    ALKPHOS 109 01/19/2022    EGFR 31 01/19/2022   , CBC:   Lab Results   Component Value Date    WBC 6 95 01/19/2022    HGB 10 5 (L) 01/19/2022    HCT 31 (L) 01/19/2022    MCV 90 01/19/2022     01/19/2022    MCH 28 2 01/19/2022    MCHC 31 3 (L) 01/19/2022    RDW 18 4 (H) 01/19/2022    MPV 11 5 01/19/2022    NRBC 0 01/19/2022   , Coagulation:   Lab Results   Component Value Date    INR 1 06 01/19/2022   Imaging/EKG Studies: Ct head No acute intracranial abnormality  CT neck No cervical spine fracture or traumatic malalignment        Code Status: Level I Code

## 2022-01-20 VITALS
DIASTOLIC BLOOD PRESSURE: 74 MMHG | BODY MASS INDEX: 34.88 KG/M2 | HEIGHT: 59 IN | SYSTOLIC BLOOD PRESSURE: 180 MMHG | WEIGHT: 173 LBS | TEMPERATURE: 97.2 F | HEART RATE: 58 BPM | OXYGEN SATURATION: 97 % | RESPIRATION RATE: 18 BRPM

## 2022-01-20 LAB
4HR DELTA HS TROPONIN: 3 NG/L
ANION GAP SERPL CALCULATED.3IONS-SCNC: 7 MMOL/L (ref 4–13)
BUN SERPL-MCNC: 22 MG/DL (ref 5–25)
CALCIUM SERPL-MCNC: 8.7 MG/DL (ref 8.3–10.1)
CARDIAC TROPONIN I PNL SERPL HS: 10 NG/L
CHLORIDE SERPL-SCNC: 111 MMOL/L (ref 100–108)
CO2 SERPL-SCNC: 21 MMOL/L (ref 21–32)
CREAT SERPL-MCNC: 1.25 MG/DL (ref 0.6–1.3)
ERYTHROCYTE [DISTWIDTH] IN BLOOD BY AUTOMATED COUNT: 18.3 % (ref 11.6–15.1)
GFR SERPL CREATININE-BSD FRML MDRD: 40 ML/MIN/1.73SQ M
GLUCOSE SERPL-MCNC: 70 MG/DL (ref 65–140)
HCT VFR BLD AUTO: 34.1 % (ref 34.8–46.1)
HGB BLD-MCNC: 10.5 G/DL (ref 11.5–15.4)
MCH RBC QN AUTO: 28.1 PG (ref 26.8–34.3)
MCHC RBC AUTO-ENTMCNC: 30.8 G/DL (ref 31.4–37.4)
MCV RBC AUTO: 91 FL (ref 82–98)
PLATELET # BLD AUTO: 187 THOUSANDS/UL (ref 149–390)
PMV BLD AUTO: 11.6 FL (ref 8.9–12.7)
POTASSIUM SERPL-SCNC: 3.6 MMOL/L (ref 3.5–5.3)
RBC # BLD AUTO: 3.74 MILLION/UL (ref 3.81–5.12)
SODIUM SERPL-SCNC: 139 MMOL/L (ref 136–145)
WBC # BLD AUTO: 6.96 THOUSAND/UL (ref 4.31–10.16)

## 2022-01-20 PROCEDURE — 99232 SBSQ HOSP IP/OBS MODERATE 35: CPT | Performed by: SURGERY

## 2022-01-20 PROCEDURE — 80048 BASIC METABOLIC PNL TOTAL CA: CPT | Performed by: FAMILY MEDICINE

## 2022-01-20 PROCEDURE — 97163 PT EVAL HIGH COMPLEX 45 MIN: CPT

## 2022-01-20 PROCEDURE — 85027 COMPLETE CBC AUTOMATED: CPT | Performed by: FAMILY MEDICINE

## 2022-01-20 PROCEDURE — 36415 COLL VENOUS BLD VENIPUNCTURE: CPT | Performed by: FAMILY MEDICINE

## 2022-01-20 PROCEDURE — 97166 OT EVAL MOD COMPLEX 45 MIN: CPT

## 2022-01-20 PROCEDURE — 99239 HOSP IP/OBS DSCHRG MGMT >30: CPT | Performed by: PHYSICIAN ASSISTANT

## 2022-01-20 RX ADMIN — FLUTICASONE PROPIONATE 1 SPRAY: 50 SPRAY, METERED NASAL at 08:57

## 2022-01-20 RX ADMIN — ASPIRIN 81 MG CHEWABLE TABLET 81 MG: 81 TABLET CHEWABLE at 08:57

## 2022-01-20 RX ADMIN — HEPARIN SODIUM 5000 UNITS: 5000 INJECTION INTRAVENOUS; SUBCUTANEOUS at 06:14

## 2022-01-20 RX ADMIN — DOCUSATE SODIUM 100 MG: 100 CAPSULE ORAL at 08:57

## 2022-01-20 RX ADMIN — ATORVASTATIN CALCIUM 80 MG: 20 TABLET, FILM COATED ORAL at 08:57

## 2022-01-20 RX ADMIN — PANTOPRAZOLE SODIUM 40 MG: 40 TABLET, DELAYED RELEASE ORAL at 06:14

## 2022-01-20 NOTE — DISCHARGE SUMMARY
Discharge Summary - [facility]    Patient Information: New Packer 80 y o  female MRN: [id number]  Unit/Bed#: ED 29 Encounter: [id number]    Discharging Physician / Practitioner: Angel Plata PA-C  PCP: No primary care provider on file  Admission Date: 1/19/2022  Discharge Date: 01/20/22    Reason for Admission: syncope, bradycardia, ADIS    Discharge Diagnoses:     Principal Problem:    Symptomatic bradycardia  Active Problems:    Syncope    History of CVA (cerebrovascular accident)    Essential hypertension    Mixed hyperlipidemia    ADIS (acute kidney injury) ([facility] [address] )  Resolved Problems:    * No resolved hospital problems  *      Consultations During Hospital Stay:  · Trauma  · Cardiology  · PT  · OT    Procedures Performed:   · Trauma XRs  · CT head  · CT C-spine  · ECG  · Echo  · COVID-19, influenza A/B, RSV  · CMP/BMP  · Troponins  · CBC    Significant Findings:   · Trauma XRs: no acute cardiopulmonary disease within limitations of supine imaging  · CT head: no acute intracranial abnormality   · CT C-spine: no cervical spine fracture or traumatic malalignment   · ECG: sinus bradycardia  · Echo: LVEF 70%  Mild TVR  · COVID-19 positive  · Creatinine: 1 53, 1 25  · Troponins  · Hs Tnl 0hr: 7  · Hs Tnl 2hr: 9  · Hs Tnl 4hr: 10    Incidental Findings:   · None     Test Results Pending at Discharge (will require follow up): · None     Outpatient Tests Requested:  · Follow up with PCP    Complications:  None    Hospital Course:     New Packer is a 80 y o  female with history of CVA with residual right-sided weakness, HTN and HLD who originally presented to the hospital on 1/19/2022 due to syncopal episode while at the grocery store with head strike  She was seen by Trauma  XR with no acute cardiopulmonary disease, CT head with no acute intracranial abnormality, and CT C-spine with no cervical spine fracture or traumatic malalignments per the results reports   She was noted to be bradycardic in the 40s, ECG confirmed sinus bradycardia  She was also noted to have an ADIS POA as evidenced by creatinine of 1 53, up from baseline of around 0 8-1 1  She received IVF and her Coreg and lisinopril were held  Cardiology was consulted  Echo with LVEF 70%  She was also found to be COVID-19 positive but did not require supplemental oxygen  I discussed with Dr Shane Franks of Cardiology on day of discharge - Cardiology suspected vasovagal episode and recommended to restart Coreg at discharge but stop lisinopril and follow up with PCP  HR, BP, and renal function improved  PT/OT recommended discharge to home without home/OP therapy  Patient currently denies COVID-19 symptoms and is satting well on room air  Of note, this patient was admitted under inpatient status with initial length of stay expected to be >2 midnights however she was able to be discharged home sooner than was was originally expected as above  Condition at Discharge: stable     Discharge Day Visit / Exam:     Subjective:    Ms Rosemarie Hilario reports chronic RUE weakness  She denies CP, SOB, fever/chills, abdominal pain  She denies having COVID-19 symptoms  She says she wants to go home today  Vitals: Blood Pressure: 134/61 (01/20/22 0425)  Pulse: 61 (01/20/22 0425)  Temperature: (!) 97 2 °F (36 2 °C) (01/19/22 1124)  Temp Source: Oral (01/19/22 1124)  Respirations: 18 (01/20/22 0425)  Height: 4' 11" (149 9 cm) (01/19/22 1610)  Weight - Scale: 78 5 kg (173 lb) (01/19/22 1610)  SpO2: 96 % (01/20/22 0425)  Exam:   Physical Exam  Vitals and nursing note reviewed  Constitutional:       Comments: Patient seen sitting in ED bed comfortably resting, NAD   Cardiovascular:      Rate and Rhythm: Normal rate and regular rhythm  Pulmonary:      Effort: Pulmonary effort is normal  No respiratory distress  Breath sounds: Normal breath sounds  Abdominal:      General: Bowel sounds are normal       Palpations: Abdomen is soft  Tenderness:  There is no abdominal tenderness  Musculoskeletal:      Right lower leg: No edema  Left lower leg: No edema  Skin:     General: Skin is warm  Neurological:      Mental Status: She is alert and oriented to person, place, and time  Psychiatric:         Mood and Affect: Mood normal          Behavior: Behavior normal          Discharge instructions/Information to patient and family:   See after visit summary for information provided to patient and family  Provisions for Follow-Up Care:  See after visit summary for information related to follow-up care and any pertinent home health orders  Disposition:     Home    For Discharges to [facility]:   · Not Applicable to this Patient - Not Applicable to this Patient    Planned Readmission: None     Discharge Statement:  I spent 35 minutes discharging the patient  This time was spent on the day of discharge  I had direct contact with the patient on the day of discharge  Greater than 50% of the total time was spent examining patient, answering all patient questions, arranging and discussing plan of care with patient as well as directly providing post-discharge instructions  Additional time then spent on discharge activities  Discharge Medications:  See after visit summary for reconciled discharge medications provided to patient and family  ** Please Note: Dragon 360 Dictation voice to text software may have been used in the creation of this document   **

## 2022-01-20 NOTE — UTILIZATION REVIEW
Inpatient Admission Authorization Request   NOTIFICATION OF INPATIENT ADMISSION/INPATIENT AUTHORIZATION REQUEST   SERVICING FACILITY:   [facility]  Address: [address] [place] [address], [address] [place] [address]  Tax ID: [id number]  NPI: [id number]  Place of Service: Inpatient [address] [facility] Code: 24     ATTENDING PROVIDER:  Attending Name and NPI#: Peter Duval Md [[id number]]  Address: [address] [place] [address], [address] [place] [address]  Phone: [phone]     UTILIZATION REVIEW CONTACT:  [facility] Utilization   Network Utilization Review Department  Phone: [phone]  Fax: [phone]  Email: [email or web address]     PHYSICIAN ADVISORY SERVICES:  FOR AFSQ-MS-FYSK REVIEW - MEDICAL NECESSITY DENIAL  Phone: [phone]  Fax: [phone]  Email: [email or web address]     TYPE OF REQUEST:  Inpatient Status     ADMISSION INFORMATION:  ADMISSION DATE/TIME: 1/19/22  1:15 PM  PATIENT DIAGNOSIS CODE/DESCRIPTION:  Syncope [R55]  DISCHARGE DATE/TIME: No discharge date for patient encounter  DISCHARGE DISPOSITION (IF DISCHARGED): Final discharge disposition not confirmed     IMPORTANT INFORMATION:  Please contact the [facility] directly with any questions or concerns regarding this request  Department voicemails are confidential     Send requests for admission clinical reviews, concurrent reviews, approvals, and administrative denials due to lack of clinical to fax [phone]
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
## 2022-01-20 NOTE — OCCUPATIONAL THERAPY NOTE
Occupational Therapy Evaluation     Patient Name: Nayla Farrell  JUJAQ'T Date: 1/20/2022  Problem List  Principal Problem:    Symptomatic bradycardia  Active Problems:    Syncope    History of CVA (cerebrovascular accident)    Essential hypertension    Mixed hyperlipidemia    ADIS (acute kidney injury) [facility])    Past Medical History  No past medical history on file  Past Surgical History  No past surgical history on file  01/20/22 0854   OT Last Visit   OT Visit Date 01/20/22   Note Type   Note type Evaluation   Restrictions/Precautions   Weight Bearing Precautions Per Order No   Other Precautions Contact/isolation; Airborne/isolation; Fall Risk;Multiple lines;Pain  (COVID (+) )   Pain Assessment   Pain Assessment Tool 0-10   Pain Score 3   Pain Location/Orientation Location: Generalized   Hospital Pain Intervention(s) Repositioned; Ambulation/increased activity   Home Living   Type of Home Apartment   Home Layout One level   Bathroom Shower/Tub Tub/shower unit   Bathroom Toilet Raised   Bathroom Equipment Grab bars in shower; Tub transfer bench;Grab bars around toilet   Home Equipment Walker;Cane  (hurry-cane, primarily used cane PTA )   Additional Comments Pt reports residing in [facility] senior living    Prior Function   Level of [place] Independent with ADLs and functional mobility   Lives With [facility] in the last 6 months 1 to 4  (1 leading to current admission)   Vocational Retired   Lifestyle   Autonomy PTA, pt reports being I with ADLs, IADLs, cane for fnxl mobility, (-)    Reciprocal Relationships Pt reports 3 local dtrs who check in on her and can assist as needed   Service to Others Retired   [address] at [address] apartment    Psychosocial   Psychosocial (WDL) WDL   Subjective   Subjective "I'm fine"   ADL   Where Assessed Edge of bed   Eating Assistance 7  Independent   Grooming Assistance 7  Independent   UB Bathing Assistance 5  Supervision/Setup   LB Bathing Assistance 5  Supervision/Setup   UB Dressing Assistance 5  Supervision/Setup   LB Dressing Assistance 5  Supervision/Setup   LB Dressing Deficit Don/doff R sock; Don/doff L sock   Toileting Assistance  5  Supervision/Setup   Toileting Deficit Perineal hygiene   Bed Mobility   Supine to Sit 5  Supervision   Additional items Assist x 1;HOB elevated   Sit to Supine 5  Supervision   Additional items Assist x 1;HOB elevated   Transfers   Sit to Stand 5  Supervision   Additional items Assist x 1; Increased time required   Stand to Sit 5  Supervision   Additional items Assist x 1; Increased time required   Additional Comments transfers with [facility]    Functional Mobility   Functional Mobility 5  Supervision   Additional Comments Ax1   Additional items Hand hold assistance   Balance   Static Sitting Fair +   Dynamic Sitting Fair +   Static Standing Fair   Dynamic Standing Fair   Ambulatory Fair -   Activity Tolerance   Activity Tolerance Patient tolerated treatment well   Medical Staff Made Aware PT Leonel    RUE Assessment   RUE Assessment WFL   LUE Assessment   LUE Assessment WFL   Hand Function   Gross Motor Coordination Functional   Fine Motor Coordination Functional   Vision - Complex Assessment   Ocular Range of Motion WFL   Head Position WDL   Tracking Able to track stimulus in all quads without difficulty   Cognition   Overall Cognitive Status WFL   Arousal/Participation Responsive; Cooperative   Attention Attends with cues to redirect   Orientation Level Oriented X4   Memory Within functional limits   Following Commands Follows one step commands without difficulty   Comments Pt pleasant and cooperative t/o session    Assessment   Assessment Pt is a 80 y o  female admitted to [facility] on 1/19/2022 w/ Symptomatic bradycardia, s/p fall  PMH includes CVA, HTN, ADIS  Pt with active OT orders and ambulate  orders   Pt seen as a co-evaluation with PT due to the patient's co-morbidities, clinically unstable presentation/clinical complexity, and present impairments  As per pt report, pta, resides alone in a sr high rise apt, 0STE, elevator  Pt was I w/  ADLS and IADLS, (-) drove  Upon evaluation, pt currently requires S with HHA for transfers and mobility  Pt currently requires I eating, I grooming, S UB ADLs, S LB ADLs, and S toileting  Pt reports very supportive, local dtrs who check in on her daily and can assist as needed  From OT standpoint, recommendation would be home with increased social support  No further acute OT needs  D/C OT  Please re-consult if needed  Thank you  Pt was left after session with all current needs met  The patient's raw score on the AM-PAC Daily Activity inpatient short form is 20, standardized score is 42 03, greater than 39 4  Patients at this level are likely to benefit from discharge to home  Please refer to the recommendation of the Occupational Therapist for safe discharge planning     Goals   Patient Goals to go home    Plan   OT Frequency Eval only   Recommendation   OT Discharge Recommendation No rehabilitation needs  (home with increased social support )   OT - OK to Discharge Yes  (when medically stable )   AM-PAC Daily Activity Inpatient   Lower Body Dressing 3   Bathing 3   Toileting 3   Upper Body Dressing 3   Grooming 4   Eating 4   Daily Activity Raw Score 20   Daily Activity Standardized Score (Calc for Raw Score >=11) 42 03   AM-PAC Applied Cognition Inpatient   Following a Speech/Presentation 4   Understanding Ordinary Conversation 4   Taking Medications 4   Remembering Where Things Are Placed or Put Away 4   Remembering List of 4-5 Errands 3   Taking Care of Complicated Tasks 3   Applied Cognition Raw Score 22   Applied Cognition Standardized Score 47 83       Tera Gerardo MS, OTR/L

## 2022-01-20 NOTE — DISCHARGE INSTRUCTIONS
FOLLOW UP WITH YOUR PRIMARY CARE PROVIDER AS SOON AS POSSIBLE AFTER DISCHARGE       COVID-19 Home Care Guidelines    Your healthcare provider and/or public health staff have evaluated you and have determined that you do not need to remain in the hospital at this time  At this time you can be isolated at home where you will be monitored by staff from your local or state health department  You should carefully follow the prevention and isolation steps below until a healthcare provider or local or state health department says that you can return to your normal activities  Stay home except to get medical care    People who are mildly ill with COVID-19 are able to isolate at home during their illness  You should restrict activities outside your home, except for getting medical care  Do not go to work, school, or public areas  Avoid using public transportation, ride-sharing, or taxis  Separate yourself from other people and animals in your home    People: As much as possible, you should stay in a specific room and away from other people in your home  Also, you should use a separate bathroom, if available  Animals: You should restrict contact with pets and other animals while you are sick with COVID-19, just like you would around other people  Although there have not been reports of pets or other animals becoming sick with COVID-19, it is still recommended that people sick with COVID-19 limit contact with animals until more information is known about the virus  When possible, have another member of your household care for your animals while you are sick  If you are sick with COVID-19, avoid contact with your pet, including petting, snuggling, being kissed or licked, and sharing food  If you must care for your pet or be around animals while you are sick, wash your hands before and after you interact with pets and wear a facemask  See COVID-19 and Animals for more information      Call ahead before visiting your doctor    If you have a medical appointment, call the healthcare provider and tell them that you have or may have COVID-19  This will help the healthcare providers office take steps to keep other people from getting infected or exposed  Wear a facemask    You should wear a facemask when you are around other people (e g , sharing a room or vehicle) or pets and before you enter a healthcare providers office  If you are not able to wear a facemask (for example, because it causes trouble breathing), then people who live with you should not stay in the same room with you, or they should wear a facemask if they enter your room  Cover your coughs and sneezes    Cover your mouth and nose with a tissue when you cough or sneeze  Throw used tissues in a lined trash can  Immediately wash your hands with soap and water for at least 20 seconds or, if soap and water are not available, clean your hands with an alcohol-based hand  that contains at least 60% alcohol  Clean your hands often    Wash your hands often with soap and water for at least 20 seconds, especially after blowing your nose, coughing, or sneezing; going to the bathroom; and before eating or preparing food  If soap and water are not readily available, use an alcohol-based hand  with at least 60% alcohol, covering all surfaces of your hands and rubbing them together until they feel dry  Soap and water are the best option if hands are visibly dirty  Avoid touching your eyes, nose, and mouth with unwashed hands  Avoid sharing personal household items    You should not share dishes, drinking glasses, cups, eating utensils, towels, or bedding with other people or pets in your home  After using these items, they should be washed thoroughly with soap and water  Clean all high-touch surfaces everyday    High touch surfaces include counters, tabletops, doorknobs, bathroom fixtures, toilets, phones, keyboards, tablets, and bedside tables  Also, clean any surfaces that may have blood, stool, or body fluids on them  Use a household cleaning spray or wipe, according to the label instructions  Labels contain instructions for safe and effective use of the cleaning product including precautions you should take when applying the product, such as wearing gloves and making sure you have good ventilation during use of the product  Monitor your symptoms    Seek prompt medical attention if your illness is worsening (e g , difficulty breathing)  Before seeking care, call your healthcare provider and tell them that you have, or are being evaluated for, COVID-19  Put on a facemask before you enter the facility  These steps will help the healthcare providers office to keep other people in the office or waiting room from getting infected or exposed  Ask your healthcare provider to call the local or [facility] health department  Persons who are placed under active monitoring or facilitated self-monitoring should follow instructions provided by their local health department or occupational health professionals, as appropriate  If you have a medical emergency and need to call 911, notify the dispatch personnel that you have, or are being evaluated for COVID-19  If possible, put on a facemask before emergency medical services arrive      Discontinuing home isolation    Patients with confirmed COVID-19 should remain under home isolation precautions until the following conditions are met:   - They have had no fever for at least 24 hours (that is one full day of no fever without the use medicine that reduces fevers)  AND  - other symptoms have improved (for example, when their cough or shortness of breath have improved)  AND  - If had mild or moderate illness, at least 10 days have passed since their symptoms first appeared or if severe illness (needed oxygen) or immunosuppressed, at least 20 days have passed since symptoms first appeared  Patients with confirmed COVID-19 should also notify close contacts (including their workplace) and ask that they self-quarantine  Currently, close contact is defined as being within 6 feet for 15 minutes or more from the period 24 hours starting 48 hours before symptom onset to the time at which the patient went into isolation  Close contacts of patients diagnosed with COVID-19 should be instructed by the patient to self-quarantine for 14 days from the last time of their last contact with the patient  Source: [employer]        Acute Kidney Injury   WHAT YOU NEED TO KNOW:   Acute kidney injury (ADIS) is also called acute kidney failure, or acute renal failure  ADIS happens when your kidneys suddenly stop working correctly  Normally, the kidneys remove fluid, chemicals, and waste from your blood  These wastes are turned into urine by your kidneys  ADIS usually happens over hours or days  When you have ADIS, your kidneys do not remove the waste, chemicals, or extra fluid from your body  A normal amount of urine is not produced  ADIS is usually temporary, it can take days to months to recover  ADIS can also become a chronic kidney condition  DISCHARGE INSTRUCTIONS:   Call 911 if:   · You have sudden chest pain or trouble breathing  Seek care immediately if:   · Your symptoms get worse  Contact your healthcare provider if:   · Your symptoms return  · Your blood sugar or blood pressure level is not within the range your healthcare provider recommends  · You have questions or concerns about your condition or care  Nutrition:  Your healthcare provider may tell you to eat food low in sodium (salt), potassium, phosphorus, or protein  A dietitian can help you plan your meals  Drink liquids as directed: Your healthcare provider may recommend that you drink a certain amount of liquids  This will help your kidneys work better and decrease your risk for dehydration   Ask how much liquid to drink each day and which liquids are best for you  Prevent acute kidney injury:   · Manage other health conditions  such as diabetes, high blood pressure, or heart disease  These conditions increase your risk for acute kidney injury  Take your medicines for these conditions as directed  Also, monitor your blood sugar and blood pressure levels as directed  Contact your healthcare provider if your levels are not in the range he or she says it should be  · Talk to your healthcare provider before you take over-the-counter-medicine  NSAIDs, stomach medicine, or laxatives may harm your kidneys and increase your risk for acute kidney injury  If it is okay to take the medicine, follow the directions on the package  Do not take more than directed  · Tell healthcare providers you have had acute kidney injury  before you get contrast liquid for an x-ray or CT scan  Your healthcare provider may give you medicine to prevent kidney problems caused by the liquid  Follow up with your healthcare provider as directed: You will need to return for more tests to make sure your kidneys are working properly  You may also be referred to a kidney specialist  Write down your questions so you remember to ask them during your visits  © Copyright [employer] 2021 Information is for End User's use only and may not be sold, redistributed or otherwise used for commercial purposes  All illustrations and images included in CareNotes® are the copyrighted property of A D A M , Inc  or [facility] Keerthi Monet   The above information is an  only  It is not intended as medical advice for individual conditions or treatments  Talk to your doctor, nurse or pharmacist before following any medical regimen to see if it is safe and effective for you  Syncope   WHAT YOU NEED TO KNOW:   Syncope is also called fainting or passing out   Syncope is a sudden, temporary loss of consciousness, followed by a fall from a standing or sitting position  Syncope ranges from not serious to a sign of a more serious condition that needs to be treated  You can control some health conditions that cause syncope  Your healthcare providers can help you create a plan to manage syncope and prevent episodes  DISCHARGE INSTRUCTIONS:   Seek care immediately if:   · You are bleeding because you hit your head when you fainted  · You suddenly have double vision, difficulty speaking, numbness, and cannot move your arms or legs  · You have chest pain and trouble breathing  · You vomit blood or material that looks like coffee grounds  · You see blood in your bowel movement  Contact your healthcare provider if:   · You have new or worsening symptoms  · You have another syncope episode  · You have a headache, fast heartbeat, or feel too dizzy to stand up  · You have questions or concerns about your condition or care  Medicines:   · Medicines  may be needed to help your heart pump strongly and regularly  Your healthcare provider may also make changes to any medicines that are causing syncope  · Take your medicine as directed  Contact your healthcare provider if you think your medicine is not helping or if you have side effects  Tell him or her if you are allergic to any medicine  Keep a list of the medicines, vitamins, and herbs you take  Include the amounts, and when and why you take them  Bring the list or the pill bottles to follow-up visits  Carry your medicine list with you in case of an emergency  Follow up with your doctor as directed:  Write down your questions so you remember to ask them during your visits  Manage syncope:   · Keep a record of your syncope episodes  Include your symptoms and your activity before and after the episode  The record can help your healthcare provider find the cause of your syncope and help you manage episodes  · Sit or lie down when needed    This includes when you feel dizzy, your throat is getting tight, and your vision changes  Raise your legs above the level of your heart  · Take slow, deep breaths if you start to breathe faster with anxiety or fear  This can help decrease dizziness and the feeling that you might faint  · Check your blood pressure often  This is important if you take medicine to lower your blood pressure  Check your blood pressure when you are lying down and when you are standing  Ask how often to check during the day  Keep a record of your blood pressure numbers  Your healthcare provider may use the record to help plan your treatment  Prevent a syncope episode:   · Move slowly and let yourself get used to one position before you move to another position  This is very important when you change from a lying or sitting position to a standing position  Take some deep breaths before you stand up from a lying position  Stand up slowly  Sudden movements may cause a fainting spell  Sit on the side of the bed or couch for a few minutes before you stand up  If you are on bedrest, try to be upright for about 2 hours each day, or as directed  Do not lock your legs if you are standing for a long period of time  Move your legs and bend your knees to keep blood flowing  · Follow your healthcare provider's recommendations  Your provider may  recommend that you drink more liquids to prevent dehydration  You may also need to have more salt to keep your blood pressure from dropping too low and causing syncope  Your provider will tell you how much liquid and sodium to have each day  He or she will also tell you how much physical activity is safe for you  This will depend on what is causing your syncope  · Watch for signs of low blood sugar  These include hunger, nervousness, sweating, and fast or fluttery heartbeats  Talk with your healthcare provider about ways to keep your blood sugar level steady  · Do not strain if you are constipated    You may faint if you strain to have a bowel movement  Walking is the best way to get your bowels moving  Eat foods high in fiber to make it easier to have a bowel movement  Good examples are high-fiber cereals, beans, vegetables, and whole-grain breads  Prune juice may help make bowel movements softer  · Be careful in hot weather  Heat can cause a syncope episode  Limit activity done outside on hot days  Physical activity in hot weather can lead to dehydration  This can cause an episode  © Copyright [employer] 2021 Information is for End User's use only and may not be sold, redistributed or otherwise used for commercial purposes  All illustrations and images included in CareNotes® are the copyrighted property of A D A M , Inc  or [facility] Keerthi Monet   The above information is an  only  It is not intended as medical advice for individual conditions or treatments  Talk to your doctor, nurse or pharmacist before following any medical regimen to see if it is safe and effective for you

## 2022-01-20 NOTE — UTILIZATION REVIEW
Initial Clinical Review    Admission: Date/Time/Statement:   Admission Orders (From admission, onward)     Ordered        01/19/22 1315  Inpatient Admission  Once                      Orders Placed This Encounter   Procedures    Inpatient Admission     Standing Status:   Standing     Number of Occurrences:   1     Order Specific Question:   Level of Care     Answer:   Med Surg [16]     Order Specific Question:   Estimated length of stay     Answer:   More than 2 Midnights     Order Specific Question:   Certification     Answer:   I certify that inpatient services are medically necessary for this patient for a duration of greater than two midnights  See H&P and MD Progress Notes for additional information about the patient's course of treatment  ED Arrival Information     Expected Arrival Acuity    - 1/19/2022 11:20 Emergent         Means of arrival Escorted by Service Admission type    [facility] complaint    syncope        Chief Complaint   Patient presents with   Carloz Carson       Initial Presentation: 80 y o  female from the grocery store presented to the ED via EMS s/p syncope and fall  PMH of CVA, HTN, HLD  Pt had a syncopal episode while grocery shopping with her daughter this morning  Her daughter witnessed her falling backwards and hitting her head  Reports nausea, no vomiting  Reports poor oral intake lately d/t poor appetite  In ED, HR- 63'L, systolic BP- 73'G  Lab revealed ADIS- Cr- 1 53  Baseline Cr- 0 8-1  EKG reveals sinus bradycardia  Received 2L IVF bolus with improvement of BP  Plan: Inpatient admission for evaluation and treatment of symptomatic bradycardia, ADIS, syncope: monitor on telemetry, cardiology consulted, hold home Coreg and Lisinopril, ECHO pending  cont gentle hydration, PT/OT    01/19 Cardiology Consult: Vasovagal syncope, sinus bradycardia  Appears volume depleted  Echo demonstrates structurally normal heart  Tested COVID positive now  Avoid jia blocking agents for now  - IV Hydration, - f/u TTE, - c/w Telemetry, - Resume home BP meds as tolerated, - c/w Lipitor 80mg PO QHS, - c/w Aspirin 81mg PO Daily    Date: 01/20   Day 2:   Internal Medicine Notes: Cardiology suspected vasovagal episode and recommended to restart Coreg at discharge but stop lisinopril and follow up with PCP  HR, BP, and renal function improved  PT/OT recommended discharge to home without home/OP therapy  Patient currently denies COVID-19 symptoms and is satting well on room air       ED Triage Vitals   Temperature Pulse Respirations Blood Pressure SpO2   01/19/22 1124 01/19/22 1124 01/19/22 1124 01/19/22 1124 01/19/22 1124   (!) 97 2 °F (36 2 °C) (!) 45 16 123/57 97 %      Temp Source Heart Rate Source Patient Position - Orthostatic VS BP Location FiO2 (%)   01/19/22 1124 01/19/22 1124 01/19/22 1135 01/19/22 1610 --   Oral Monitor Sitting Right arm       Pain Score       01/20/22 0015       5          Wt Readings from Last 1 Encounters:   01/19/22 78 5 kg (173 lb)     Additional Vital Signs:   Date/Time Temp Pulse Resp BP MAP (mmHg) SpO2 O2 Device Patient Position - Orthostatic VS   01/20/22 0857 -- 58 18 180/74 Abnormal  -- 97 % None (Room air) --   01/20/22 0425 -- 61 18 134/61 -- 96 % None (Room air) Lying   01/20/22 0015 -- 65 18 129/62 -- 97 % None (Room air) --   01/19/22 1915 -- 54 Abnormal  16 124/68 90 96 % -- --   01/19/22 1724 -- 50 Abnormal  18 116/54 -- 97 % None (Room air) Lying   01/19/22 1610 -- 54 Abnormal  -- 107/49 Abnormal  -- 98 % Nasal cannula Sitting   01/19/22 1430 -- 52 Abnormal  18 117/54 -- 97 % -- --   01/19/22 1330 -- 50 Abnormal  18 97/57 -- 98 % -- --   01/19/22 1300 -- 58 16 92/58 -- 99 % Nasal cannula --   01/19/22 12:58:59 -- -- -- 96/59 -- -- -- --   01/19/22 1230 -- 54 Abnormal  18 97/48 Abnormal  -- 97 % -- --   01/19/22 1215 -- 46 Abnormal  16 82/46 Abnormal  -- 99 % -- --   01/19/22 1200 -- 44 Abnormal  18 91/50 -- 93 % None (Room air) --   01/19/22 1145 -- 44 Abnormal  16 84/50 Abnormal  -- 97 % None (Room air) --   01/19/22 11:35:13 -- 41 Abnormal  16 85/45 Abnormal  -- 97 % None (Room air) Sitting   01/19/22 11:30:42 -- 43 Abnormal  16 102/51 -- 97 % None (Room air) --       Pertinent Labs/Diagnostic Test Results:   01/19  XR trauma multiple:No acute cardiopulmonary disease within limitations of supine imaging    CT head: No acute intracranial abnormality  CT spine cervical Trauma: No cervical spine fracture or traumatic malalignment    EKG result: Marked sinus bradycardia  ECHO:   Left Ventricle: Left ventricular cavity size is normal  The left ventricular ejection fraction is 70% by biplane measurement  Systolic function is normal  Global longitudinal strain is normal at -23%  Wall motion is normal  Diastolic function is normal for age  There is no concentric hypertrophy    Right Ventricle: Right ventricular cavity size is normal  Systolic function is normal     Tricuspid Valve: There is mild regurgitation  The tricuspid valve regurgitation jet is central    IMPRESSION:  Preserved biventricular function  No significant valve disease   Estimated pulmonary artery systolic pressure of 25 mmHg         Results from last 7 days   Lab Units 01/19/22  1326   SARS-COV-2  Positive*     Results from last 7 days   Lab Units 01/20/22  0426 01/19/22  1134 01/19/22  1132   WBC Thousand/uL 6 96  --  6 95   HEMOGLOBIN g/dL 10 5*  --  10 7*   I STAT HEMOGLOBIN g/dl  --  10 5*  --    HEMATOCRIT % 34 1*  --  34 2*   HEMATOCRIT, ISTAT %  --  31*  --    PLATELETS Thousands/uL 187  --  226   NEUTROS ABS Thousands/µL  --   --  3 97         Results from last 7 days   Lab Units 01/20/22  0426 01/19/22  1134 01/19/22  1132   SODIUM mmol/L 139  --  139   POTASSIUM mmol/L 3 6  --  3 8   CHLORIDE mmol/L 111*  --  111*   CO2 mmol/L 21  --  21   CO2, I-STAT mmol/L  --  22  --    ANION GAP mmol/L 7  --  7   BUN mg/dL 22  --  28*   CREATININE mg/dL 1 25  --  1 53*   EGFR ml/min/1 73sq m 40  --  31   CALCIUM mg/dL 8 7  --  8 7     Results from last 7 days   Lab Units 01/19/22  1132   AST U/L 53*   ALT U/L 33   ALK PHOS U/L 109   TOTAL PROTEIN g/dL 6 4   ALBUMIN g/dL 2 9*   TOTAL BILIRUBIN mg/dL 0 53   BILIRUBIN DIRECT mg/dL 0 15         Results from last 7 days   Lab Units 01/20/22  0426 01/19/22  1132   GLUCOSE RANDOM mg/dL 70 138     Results from last 7 days   Lab Units 01/19/22  1134   PH, SUSANNE I-STAT  7 338   PCO2, SUSANNE ISTAT mm HG 39 5*   PO2, SUSANNE ISTAT mm HG 36 0   HCO3, SUSANNE ISTAT mmol/L 21 2*   I STAT BASE EXC mmol/L -4*   I STAT O2 SAT % 66         Results from last 7 days   Lab Units 01/19/22  2331 01/19/22  1332 01/19/22  1132   HS TNI 0HR ng/L  --   --  7   HS TNI 2HR ng/L  --  9  --    HSTNI D2 ng/L  --  2  --    HS TNI 4HR ng/L 10  --   --    HSTNI D4 ng/L 3  --   --          Results from last 7 days   Lab Units 01/19/22  1132   PROTIME seconds 13 4   INR  1 06       Results from last 7 days   Lab Units 01/19/22  1326   INFLUENZA A PCR  Negative   INFLUENZA B PCR  Negative   RSV PCR  Negative     ED Treatment:   Medication Administration from 01/19/2022 1115 to 01/20/2022 1006       Date/Time Order Dose Route Action     01/19/2022 1128 ondansetron (FOR EMS ONLY) (ZOFRAN) 4 mg/2 mL injection 4 mg 0 mg Does not apply Given to EMS     01/19/2022 1145 ondansetron (ZOFRAN) injection   Intravenous Canceled Entry     01/19/2022 1128 ondansetron (ZOFRAN) injection 4 mg Intravenous Given     01/19/2022 1136 sodium chloride 0 9 % bolus 1,000 mL Intravenous New Bag     01/20/2022 0857 aspirin chewable tablet 81 mg 81 mg Oral Given     01/20/2022 0857 atorvastatin (LIPITOR) tablet 80 mg 80 mg Oral Given     01/19/2022 2117 melatonin tablet 6 mg 6 mg Oral Given     01/20/2022 0857 fluticasone (FLONASE) 50 mcg/act nasal spray 1 spray 1 spray Each Nare Given     01/20/2022 0614 pantoprazole (PROTONIX) EC tablet 40 mg 40 mg Oral Given     01/20/2022 0857 docusate sodium (COLACE) capsule 100 mg 100 mg Oral Given     01/19/2022 2117 docusate sodium (COLACE) capsule 100 mg 100 mg Oral Given     01/20/2022 4523 heparin (porcine) subcutaneous injection 5,000 Units 5,000 Units Subcutaneous Given     01/19/2022 2117 heparin (porcine) subcutaneous injection 5,000 Units 5,000 Units Subcutaneous Given     01/19/2022 2117 multi-electrolyte (PLASMALYTE-A/ISOLYTE-S PH 7 4) IV solution 75 mL/hr Intravenous New Bag        No past medical history on file  Present on Admission:  **None**      Admitting Diagnosis: Syncope [R55]  Age/Sex: 80 y o  female  Admission Orders:  48 hr telemetry monitoring  SCD  Daily weights  I/O  Continuous pulse oximetry  Bladder scan    Scheduled Medications:  aspirin, 81 mg, Oral, Daily  atorvastatin, 80 mg, Oral, Daily  docusate sodium, 100 mg, Oral, BID  fluticasone, 1 spray, Each Nare, Daily  heparin (porcine), 5,000 Units, Subcutaneous, Q8H [address]  melatonin, 6 mg, Oral, HS  pantoprazole, 40 mg, Oral, Early Morning      Continuous IV Infusions:  multi-electrolyte, 75 mL/hr, Intravenous, Continuous      PRN Meds:  acetaminophen, 650 mg, Oral, Q6H PRN  ondansetron, 4 mg, Intravenous, Q6H PRN        IP CONSULT TO CARDIOLOGY    Network Utilization Review Department  ATTENTION: Please call with any questions or concerns to [phone] and carefully listen to the prompts so that you are directed to the right person  All voicemails are confidential   Cinthia Pair all requests for admission clinical reviews, approved or denied determinations and any other requests to dedicated fax number below belonging to the campus where the patient is receiving treatment   List of dedicated fax numbers for the Facilities:  [address] [place] [address] DENIALS (Administrative/Medical Necessity) [phone]   [address] [place] [address] (Maternity/NICU/Pediatrics) [phone]   [address] [facility] [address] [facility] [address] [address] [place] [address] [address] [place] [address] [phone]

## 2022-01-20 NOTE — PROGRESS NOTES
[address] [facility]  Progress Note Vincent Blades 1940, 80 y o  female MRN: [id number]  Unit/Bed#: ED 29 Encounter: [id number]  Primary Care Provider: No primary care provider on file  Date and time admitted to hospital: 1/19/2022 11:20 AM    TERTIARY TRAUMA SURVEY NOTE    Prophylaxis: Heparin    Disposition:Med surg tele    Code status:  Level 1 - Full Code    Consultants: Cardiology      SUBJECTIVE:       Mechanism of Injury:Fall    Chief Complaint: Nausea    HPI/Last 24 hour events: Patient presented as a Level B trauma after a syncopal fall at [facility] grocery store  Patient takes asa at home  She had a brief LOC  Upon presentation patient was hypotensive with bradycardia  Incidentally was found to be COVID+ She responded well to IVF  Currently she states she is doing well, only complaining of some mild neck tenderness       Active medications:           Current Facility-Administered Medications:     acetaminophen (TYLENOL) tablet 650 mg, 650 mg, Oral, Q6H PRN    aspirin chewable tablet 81 mg, 81 mg, Oral, Daily    atorvastatin (LIPITOR) tablet 80 mg, 80 mg, Oral, Daily    docusate sodium (COLACE) capsule 100 mg, 100 mg, Oral, BID, 100 mg at 01/19/22 2117    fluticasone (FLONASE) 50 mcg/act nasal spray 1 spray, 1 spray, Each Nare, Daily    heparin (porcine) subcutaneous injection 5,000 Units, 5,000 Units, Subcutaneous, Q8H [address], 5,000 Units at 01/20/22 0614 **AND** [CANCELED] Platelet count, , , Once    melatonin tablet 6 mg, 6 mg, Oral, HS, 6 mg at 01/19/22 2117    multi-electrolyte (PLASMALYTE-A/ISOLYTE-S PH 7 4) IV solution, 75 mL/hr, Intravenous, Continuous, 75 mL/hr at 01/19/22 2117    ondansetron (ZOFRAN) injection 4 mg, 4 mg, Intravenous, Q6H PRN    pantoprazole (PROTONIX) EC tablet 40 mg, 40 mg, Oral, Early Morning, 40 mg at 01/20/22 4652    Current Outpatient Medications:     aspirin 81 mg chewable tablet    atorvastatin (LIPITOR) 80 mg tablet    carvedilol (COREG) 3 125 mg tablet    ferrous sulfate 325 (65 Fe) mg tablet    fluticasone (VERAMYST) 27 5 MCG/SPRAY nasal spray    Garlic 583 MG TABS    lisinopril (ZESTRIL) 20 mg tablet    Melatonin 5 MG TABS    Pantoprazole Sodium (PROTONIX PO)      OBJECTIVE:     Vitals:   Vitals:    01/20/22 0425   BP: 134/61   Pulse: 61   Resp: 18   Temp:    SpO2: 96%       Physical Exam:   General: NAD  HENT: NCAT MMM  Neck: supple, no JVD  +TTP in the paraspinal musculature, no midline bony tenderness  CV: nl rate  Lungs: nl wob  No resp distress  ABD: Soft, nontender, nondistended  Extrem: No CCE  Neuro: AAOx3  GCS 15        1  Before the illness or injury that brought you to the Emergency, did you need someone to help you on a regular basis? 1=Yes   2  Since the illness or injury that brought you to the Emergency, have you needed more help than usual to take care of yourself? 0=No   3  Have you been hospitalized for one or more nights during the past 6 months (excluding a stay in the Emergency Department)? 1=Yes   4  In general, do you see well? 0=Yes   5  In general, do you have serious problems with your memory? 0=No   6  Do you take more than three different medications everyday? 1=Yes   TOTAL   3     Did you order a geriatric consult if the score was 2 or greater?: no   Was admitted to medicine             I/O:   I/O     None          Invasive Devices: Invasive Devices  Report    Peripheral Intravenous Line            Peripheral IV 01/19/22 Left Forearm 1 day                  Imaging:   TRAUMA - CT head wo contrast    Result Date: 1/19/2022  Impression: No acute intracranial abnormality  Please see same day CT cervical spine without contrast for further evaluation  I personally discussed this study with Nara Jimenez on 1/19/2022 at 12:02 PM  Workstation performed: [id number]     TRAUMA - CT spine cervical wo contrast    Result Date: 1/19/2022  Impression: No cervical spine fracture or traumatic malalignment    Workstation performed: [id number]     XR Trauma multiple (SLB/SLRA trauma bay ONLY)    Result Date: 1/19/2022  Impression: No acute cardiopulmonary disease within limitations of supine imaging  Workstation performed: [id number]     Echo follow up/limited w/ contrast if indicated    Result Date: 1/19/2022  Impression: Preserved biventricular function  No significant valve disease  Estimated pulmonary artery systolic pressure of 25 mmHg         Labs:   CBC:   Lab Results   Component Value Date    WBC 6 96 01/20/2022    HGB 10 5 (L) 01/20/2022    HCT 34 1 (L) 01/20/2022    MCV 91 01/20/2022     01/20/2022    MCH 28 1 01/20/2022    MCHC 30 8 (L) 01/20/2022    RDW 18 3 (H) 01/20/2022    MPV 11 6 01/20/2022    NRBC 0 01/19/2022     CMP:   Lab Results   Component Value Date     (H) 01/20/2022    CO2 21 01/20/2022    CO2 22 01/19/2022    BUN 22 01/20/2022    CREATININE 1 25 01/20/2022    GLUCOSE 146 (H) 01/19/2022    CALCIUM 8 7 01/20/2022    AST 53 (H) 01/19/2022    ALT 33 01/19/2022    ALKPHOS 109 01/19/2022    EGFR 40 01/20/2022

## 2022-01-21 NOTE — UTILIZATION REVIEW
Notification of Discharge   This is a Notification of Discharge from our facility [address]  Please be advised that this patient has been discharge from our facility  Below you will find the admission and discharge date and time including the patients disposition  UTILIZATION REVIEW CONTACT:  Tk Al  Utilization   Network Utilization Review Department  Phone: [phone] x carefully listen to the prompts  All voicemails are confidential   Email: [email or web address]     PHYSICIAN ADVISORY SERVICES:  FOR [id number] REVIEW - MEDICAL NECESSITY DENIAL  Phone: [phone]  Fax: [phone]  Email: [email or web address]     PRESENTATION DATE: 1/19/2022 11:20 AM  OBERVATION ADMISSION DATE:   INPATIENT ADMISSION DATE: 1/19/22  1:15 PM   DISCHARGE DATE: 1/20/2022 10:26 AM  DISPOSITION: Home/Self Care Home/Self Care      IMPORTANT INFORMATION:  Send all requests for admission clinical reviews, approved or denied determinations and any other requests to dedicated fax number below belonging to the campus where the patient is receiving treatment   List of dedicated fax numbers:  [address] DENIALS (Administrative/Medical Necessity) [phone]   [address] (Maternity/NICU/Pediatrics) [phone]   [facility] [phone]   130 [facility] [phone]   [address] [place] [address] [phone]   2000 [facility] [address] [place] [address],[address] [phone]   [facility]  [phone]   [address] [facility] [address]  2401 [facility] 1000 [facility] [phone]

## 2022-11-07 ENCOUNTER — APPOINTMENT (EMERGENCY)
Dept: RADIOLOGY | Facility: HOSPITAL | Age: 82
End: 2022-11-07

## 2022-11-07 ENCOUNTER — HOSPITAL ENCOUNTER (INPATIENT)
Facility: HOSPITAL | Age: 82
LOS: 3 days | Discharge: HOME HEALTH | End: 2022-11-10
Attending: EMERGENCY MEDICINE | Admitting: INTERNAL MEDICINE

## 2022-11-07 DIAGNOSIS — A41.9 SEVERE SEPSIS (HCC): ICD-10-CM

## 2022-11-07 DIAGNOSIS — I10 ESSENTIAL HYPERTENSION: ICD-10-CM

## 2022-11-07 DIAGNOSIS — D64.9 ANEMIA: ICD-10-CM

## 2022-11-07 DIAGNOSIS — A41.9 SEPTIC SHOCK (HCC): Primary | ICD-10-CM

## 2022-11-07 DIAGNOSIS — I63.9 CVA (CEREBRAL VASCULAR ACCIDENT) (HCC): ICD-10-CM

## 2022-11-07 DIAGNOSIS — I48.91 ATRIAL FIBRILLATION (HCC): ICD-10-CM

## 2022-11-07 DIAGNOSIS — K92.2 GI BLEED: ICD-10-CM

## 2022-11-07 DIAGNOSIS — R65.20 SEVERE SEPSIS (HCC): ICD-10-CM

## 2022-11-07 DIAGNOSIS — R65.21 SEPTIC SHOCK (HCC): Primary | ICD-10-CM

## 2022-11-07 DIAGNOSIS — E78.2 MIXED HYPERLIPIDEMIA: ICD-10-CM

## 2022-11-07 DIAGNOSIS — J18.9 PNEUMONIA: ICD-10-CM

## 2022-11-07 PROBLEM — R26.2 AMBULATORY DYSFUNCTION: Status: ACTIVE | Noted: 2022-11-07

## 2022-11-07 PROBLEM — I95.9 HYPOTENSION: Status: ACTIVE | Noted: 2022-11-07

## 2022-11-07 PROBLEM — Z72.0 TOBACCO ABUSE: Status: ACTIVE | Noted: 2022-11-07

## 2022-11-07 LAB
2HR DELTA HS TROPONIN: 4 NG/L
4HR DELTA HS TROPONIN: 11 NG/L
ALBUMIN SERPL BCP-MCNC: 2.8 G/DL (ref 3.5–5)
ALP SERPL-CCNC: 168 U/L (ref 46–116)
ALT SERPL W P-5'-P-CCNC: 39 U/L (ref 12–78)
ANION GAP SERPL CALCULATED.3IONS-SCNC: 14 MMOL/L (ref 4–13)
APTT PPP: 21 SECONDS (ref 23–37)
AST SERPL W P-5'-P-CCNC: 62 U/L (ref 5–45)
ATRIAL RATE: 202 BPM
BASOPHILS # BLD AUTO: 0.04 THOUSANDS/ÂΜL (ref 0–0.1)
BASOPHILS NFR BLD AUTO: 0 % (ref 0–1)
BILIRUB SERPL-MCNC: 0.89 MG/DL (ref 0.2–1)
BUN SERPL-MCNC: 16 MG/DL (ref 5–25)
CALCIUM ALBUM COR SERPL-MCNC: 9.8 MG/DL (ref 8.3–10.1)
CALCIUM SERPL-MCNC: 8.8 MG/DL (ref 8.3–10.1)
CARDIAC TROPONIN I PNL SERPL HS: 11 NG/L
CARDIAC TROPONIN I PNL SERPL HS: 15 NG/L
CARDIAC TROPONIN I PNL SERPL HS: 22 NG/L
CHLORIDE SERPL-SCNC: 103 MMOL/L (ref 96–108)
CK SERPL-CCNC: 318 U/L (ref 26–192)
CO2 SERPL-SCNC: 14 MMOL/L (ref 21–32)
CREAT SERPL-MCNC: 1.48 MG/DL (ref 0.6–1.3)
EOSINOPHIL # BLD AUTO: 0.01 THOUSAND/ÂΜL (ref 0–0.61)
EOSINOPHIL NFR BLD AUTO: 0 % (ref 0–6)
ERYTHROCYTE [DISTWIDTH] IN BLOOD BY AUTOMATED COUNT: 16.4 % (ref 11.6–15.1)
FLUAV RNA RESP QL NAA+PROBE: NEGATIVE
FLUBV RNA RESP QL NAA+PROBE: NEGATIVE
GFR SERPL CREATININE-BSD FRML MDRD: 32 ML/MIN/1.73SQ M
GLUCOSE SERPL-MCNC: 137 MG/DL (ref 65–140)
HCT VFR BLD AUTO: 25.8 % (ref 34.8–46.1)
HGB BLD-MCNC: 7.7 G/DL (ref 11.5–15.4)
IMM GRANULOCYTES # BLD AUTO: 0.16 THOUSAND/UL (ref 0–0.2)
IMM GRANULOCYTES NFR BLD AUTO: 1 % (ref 0–2)
INR PPP: 1.12 (ref 0.84–1.19)
LACTATE SERPL-SCNC: 1.6 MMOL/L (ref 0.5–2)
LACTATE SERPL-SCNC: 5.6 MMOL/L (ref 0.5–2)
LYMPHOCYTES # BLD AUTO: 0.49 THOUSANDS/ÂΜL (ref 0.6–4.47)
LYMPHOCYTES NFR BLD AUTO: 3 % (ref 14–44)
MCH RBC QN AUTO: 23.3 PG (ref 26.8–34.3)
MCHC RBC AUTO-ENTMCNC: 29.8 G/DL (ref 31.4–37.4)
MCV RBC AUTO: 78 FL (ref 82–98)
MONOCYTES # BLD AUTO: 1.61 THOUSAND/ÂΜL (ref 0.17–1.22)
MONOCYTES NFR BLD AUTO: 9 % (ref 4–12)
NEUTROPHILS # BLD AUTO: 16.18 THOUSANDS/ÂΜL (ref 1.85–7.62)
NEUTS SEG NFR BLD AUTO: 87 % (ref 43–75)
NRBC BLD AUTO-RTO: 0 /100 WBCS
PLATELET # BLD AUTO: 252 THOUSANDS/UL (ref 149–390)
PLATELET # BLD AUTO: 302 THOUSANDS/UL (ref 149–390)
PMV BLD AUTO: 11.3 FL (ref 8.9–12.7)
PMV BLD AUTO: 11.3 FL (ref 8.9–12.7)
POTASSIUM SERPL-SCNC: 3.8 MMOL/L (ref 3.5–5.3)
PROCALCITONIN SERPL-MCNC: 0.28 NG/ML
PROCALCITONIN SERPL-MCNC: 1.04 NG/ML
PROT SERPL-MCNC: 7.3 G/DL (ref 6.4–8.4)
PROTHROMBIN TIME: 14.6 SECONDS (ref 11.6–14.5)
QRS AXIS: -20 DEGREES
QRSD INTERVAL: 78 MS
QT INTERVAL: 360 MS
QTC INTERVAL: 423 MS
RBC # BLD AUTO: 3.31 MILLION/UL (ref 3.81–5.12)
RSV RNA RESP QL NAA+PROBE: NEGATIVE
SARS-COV-2 RNA RESP QL NAA+PROBE: NEGATIVE
SODIUM SERPL-SCNC: 131 MMOL/L (ref 135–147)
T WAVE AXIS: 67 DEGREES
VENTRICULAR RATE: 83 BPM
WBC # BLD AUTO: 18.49 THOUSAND/UL (ref 4.31–10.16)

## 2022-11-07 RX ORDER — ACETAMINOPHEN 325 MG/1
650 TABLET ORAL EVERY 6 HOURS PRN
Status: DISCONTINUED | OUTPATIENT
Start: 2022-11-07 | End: 2022-11-10 | Stop reason: HOSPADM

## 2022-11-07 RX ORDER — ASPIRIN 81 MG/1
81 TABLET, CHEWABLE ORAL DAILY
Status: DISCONTINUED | OUTPATIENT
Start: 2022-11-08 | End: 2022-11-10 | Stop reason: HOSPADM

## 2022-11-07 RX ORDER — BENZONATATE 100 MG/1
100 CAPSULE ORAL 3 TIMES DAILY PRN
Status: DISCONTINUED | OUTPATIENT
Start: 2022-11-07 | End: 2022-11-10 | Stop reason: HOSPADM

## 2022-11-07 RX ORDER — AZITHROMYCIN 500 MG/1
500 TABLET, FILM COATED ORAL EVERY 24 HOURS
Status: DISCONTINUED | OUTPATIENT
Start: 2022-11-07 | End: 2022-11-08

## 2022-11-07 RX ORDER — SODIUM CHLORIDE, SODIUM GLUCONATE, SODIUM ACETATE, POTASSIUM CHLORIDE, MAGNESIUM CHLORIDE, SODIUM PHOSPHATE, DIBASIC, AND POTASSIUM PHOSPHATE .53; .5; .37; .037; .03; .012; .00082 G/100ML; G/100ML; G/100ML; G/100ML; G/100ML; G/100ML; G/100ML
500 INJECTION, SOLUTION INTRAVENOUS ONCE
Status: COMPLETED | OUTPATIENT
Start: 2022-11-07 | End: 2022-11-07

## 2022-11-07 RX ORDER — NICOTINE 21 MG/24HR
14 PATCH, TRANSDERMAL 24 HOURS TRANSDERMAL DAILY
Status: DISCONTINUED | OUTPATIENT
Start: 2022-11-07 | End: 2022-11-10 | Stop reason: HOSPADM

## 2022-11-07 RX ORDER — SODIUM CHLORIDE 9 MG/ML
75 INJECTION, SOLUTION INTRAVENOUS CONTINUOUS
Status: DISCONTINUED | OUTPATIENT
Start: 2022-11-07 | End: 2022-11-08

## 2022-11-07 RX ORDER — ATORVASTATIN CALCIUM 80 MG/1
80 TABLET, FILM COATED ORAL DAILY
Status: DISCONTINUED | OUTPATIENT
Start: 2022-11-08 | End: 2022-11-10 | Stop reason: HOSPADM

## 2022-11-07 RX ORDER — SODIUM CHLORIDE, SODIUM GLUCONATE, SODIUM ACETATE, POTASSIUM CHLORIDE, MAGNESIUM CHLORIDE, SODIUM PHOSPHATE, DIBASIC, AND POTASSIUM PHOSPHATE .53; .5; .37; .037; .03; .012; .00082 G/100ML; G/100ML; G/100ML; G/100ML; G/100ML; G/100ML; G/100ML
1000 INJECTION, SOLUTION INTRAVENOUS ONCE
Status: COMPLETED | OUTPATIENT
Start: 2022-11-07 | End: 2022-11-07

## 2022-11-07 RX ORDER — ACETAMINOPHEN 325 MG/1
975 TABLET ORAL ONCE
Status: COMPLETED | OUTPATIENT
Start: 2022-11-07 | End: 2022-11-07

## 2022-11-07 RX ORDER — ONDANSETRON 2 MG/ML
4 INJECTION INTRAMUSCULAR; INTRAVENOUS EVERY 6 HOURS PRN
Status: DISCONTINUED | OUTPATIENT
Start: 2022-11-07 | End: 2022-11-10 | Stop reason: HOSPADM

## 2022-11-07 RX ORDER — PANTOPRAZOLE SODIUM 40 MG/1
40 TABLET, DELAYED RELEASE ORAL
Status: DISCONTINUED | OUTPATIENT
Start: 2022-11-08 | End: 2022-11-10 | Stop reason: HOSPADM

## 2022-11-07 RX ORDER — HEPARIN SODIUM 5000 [USP'U]/ML
5000 INJECTION, SOLUTION INTRAVENOUS; SUBCUTANEOUS EVERY 8 HOURS SCHEDULED
Status: DISCONTINUED | OUTPATIENT
Start: 2022-11-07 | End: 2022-11-10 | Stop reason: HOSPADM

## 2022-11-07 RX ORDER — FERROUS SULFATE 325(65) MG
325 TABLET ORAL
Status: DISCONTINUED | OUTPATIENT
Start: 2022-11-08 | End: 2022-11-10 | Stop reason: HOSPADM

## 2022-11-07 RX ORDER — LANOLIN ALCOHOL/MO/W.PET/CERES
3 CREAM (GRAM) TOPICAL
Status: DISCONTINUED | OUTPATIENT
Start: 2022-11-07 | End: 2022-11-10 | Stop reason: HOSPADM

## 2022-11-07 RX ORDER — GINSENG 100 MG
1 CAPSULE ORAL 2 TIMES DAILY
Status: DISCONTINUED | OUTPATIENT
Start: 2022-11-07 | End: 2022-11-10 | Stop reason: HOSPADM

## 2022-11-07 RX ADMIN — Medication 3 MG: at 21:17

## 2022-11-07 RX ADMIN — AZITHROMYCIN 500 MG: 500 TABLET, FILM COATED ORAL at 21:17

## 2022-11-07 RX ADMIN — SODIUM CHLORIDE 100 ML/HR: 0.9 INJECTION, SOLUTION INTRAVENOUS at 19:46

## 2022-11-07 RX ADMIN — BACITRACIN 1 SMALL APPLICATION: 500 OINTMENT TOPICAL at 19:36

## 2022-11-07 RX ADMIN — SODIUM CHLORIDE, SODIUM GLUCONATE, SODIUM ACETATE, POTASSIUM CHLORIDE, MAGNESIUM CHLORIDE, SODIUM PHOSPHATE, DIBASIC, AND POTASSIUM PHOSPHATE 500 ML: .53; .5; .37; .037; .03; .012; .00082 INJECTION, SOLUTION INTRAVENOUS at 17:46

## 2022-11-07 RX ADMIN — ACETAMINOPHEN 975 MG: 325 TABLET ORAL at 15:43

## 2022-11-07 RX ADMIN — SODIUM CHLORIDE, SODIUM GLUCONATE, SODIUM ACETATE, POTASSIUM CHLORIDE, MAGNESIUM CHLORIDE, SODIUM PHOSPHATE, DIBASIC, AND POTASSIUM PHOSPHATE 1000 ML: .53; .5; .37; .037; .03; .012; .00082 INJECTION, SOLUTION INTRAVENOUS at 15:43

## 2022-11-07 RX ADMIN — CEFTRIAXONE SODIUM 2000 MG: 10 INJECTION, POWDER, FOR SOLUTION INTRAVENOUS at 16:09

## 2022-11-07 RX ADMIN — SODIUM CHLORIDE, SODIUM GLUCONATE, SODIUM ACETATE, POTASSIUM CHLORIDE, MAGNESIUM CHLORIDE, SODIUM PHOSPHATE, DIBASIC, AND POTASSIUM PHOSPHATE 500 ML: .53; .5; .37; .037; .03; .012; .00082 INJECTION, SOLUTION INTRAVENOUS at 16:54

## 2022-11-07 NOTE — ED ATTENDING ATTESTATION
11/7/2022  I, Dewayne Griffin DO, saw and evaluated the patient  I have discussed the patient with the resident/non-physician practitioner and agree with the resident's/non-physician practitioner's findings, Plan of Care, and MDM as documented in the resident's/non-physician practitioner's note, except where noted  All available labs and Radiology studies were reviewed  I was present for key portions of any procedure(s) performed by the resident/non-physician practitioner and I was immediately available to provide assistance  At this point I agree with the current assessment done in the Emergency Department  I have conducted an independent evaluation of this patient a history and physical is as follows:    81 yo female BIBA for evaluation of hypotension, fever, gen weakness  Arnaud Reyes in her kitchen today  C/o intermittent cough, nausea  No vomiting, HA, focal weakness/numbness/tingling, abd pain, urinary sxs  Imp: fever, hypotension  Likely infx plan: sepsis eval, IVF for hypotension, reassess  Will likely require admission for further eval and tx  ED Course     EKG reviewed 1523 - afib, new onset  Reviewed records in epic, no prior dx of afib      Critical Care Time  CriticalCare Time  Performed by: Dewayne Griffin DO  Authorized by: Dewayne Griffin DO     Critical care provider statement:     Critical care time (minutes):  45    Critical care time was exclusive of:  Separately billable procedures and treating other patients and teaching time    Critical care was necessary to treat or prevent imminent or life-threatening deterioration of the following conditions:  Sepsis    Critical care was time spent personally by me on the following activities:  Blood draw for specimens, obtaining history from patient or surrogate, development of treatment plan with patient or surrogate, evaluation of patient's response to treatment, examination of patient, review of old charts, re-evaluation of patient's condition, ordering and review of radiographic studies, ordering and review of laboratory studies and ordering and performing treatments and interventions

## 2022-11-07 NOTE — ASSESSMENT & PLAN NOTE
Patient presented with presyncope and fall at her facility  Found to be febrile with leukocytosis and lactic acidosis  Hypotensive responding to IV fluid  Patient with to 3 days of URI symptoms and cough  No dysuria or abdominal pain, no diarrhea  Chest x-ray personally reviewed with possible left lower lobe pneumonia  Negative COVID/flu/RSV PCR  Continue with IV ceftriaxone  Lactic acid has normalized with IV fluid  Follow on chest x-ray report and blood culture

## 2022-11-07 NOTE — ED PROVIDER NOTES
Emergency Department Note- Shayy Taylor 80 y o  female MRN: [id number]    Unit/Bed#: ED 20 Encounter: [id number]    Chief Complaint   Patient presents with   • Hypotension     Pt states that she fell in her kitchen and was down for about 1 hour prior to someone helping her; pt fell onto knees and c/o dizziness; hypotensive for EMS        History of Present Illness   HPI:  Shayy Taylor is a 80 y o  female with PMH HTN who presents via EMS with lightheadedness, found to be hypertensive  Patient states she was in her kitchen in [facility], started to feel lightheaded and noted herself to her knees onto the ground  Denies LOC, head strike, use of blood thinners however is on aspirin  Patient states for the past few days she has been dealing with viral type symptoms, congestion, cough  Denies fevers, nausea, vomiting, chest pain at home  Otherwise states she feels usual state of health  Review of Systems   Constitutional: Negative for chills and fever  HENT: Positive for congestion  Negative for ear pain and sore throat  Eyes: Negative for pain and visual disturbance  Respiratory: Positive for cough  Negative for shortness of breath  Cardiovascular: Negative for chest pain and palpitations  Gastrointestinal: Negative for abdominal pain and vomiting  Genitourinary: Negative for dysuria and hematuria  Musculoskeletal: Negative for arthralgias and back pain  Skin: Negative for color change and rash  Neurological: Positive for light-headedness  Negative for seizures and syncope  All other systems reviewed and are negative  All systems reviewed and negative except as noted above or in HPI       No current facility-administered medications for this encounter      Current Outpatient Medications:   •  aspirin 81 mg chewable tablet, Chew 81 mg daily, Disp: , Rfl:   •  aspirin 81 mg chewable tablet, Chew 81 mg daily, Disp: , Rfl:   •  atorvastatin (LIPITOR) 80 mg tablet, TAKE 1 TABLET BY MOUTH EVERY DAY, Disp: 90 tablet, Rfl: 3  •  atorvastatin (LIPITOR) 80 mg tablet, Take 80 mg by mouth daily, Disp: , Rfl:   •  carvedilol (COREG) 3 125 mg tablet, Take 1 tablet (3 125 mg total) by mouth daily, Disp: , Rfl: 0  •  carvedilol (COREG) 3 125 mg tablet, Take 3 125 mg by mouth daily, Disp: , Rfl:   •  ferrous sulfate 325 (65 Fe) mg tablet, Take 1 tablet (325 mg total) by mouth 2 (two) times a day with meals (Patient taking differently: Take 325 mg by mouth 3x a week monday ,wensday and friday ), Disp: 60 tablet, Rfl: 3  •  ferrous sulfate 325 (65 Fe) mg tablet, Take 325 mg by mouth 2 (two) times a day with meals, Disp: , Rfl:   •  fluticasone (FLONASE) 50 mcg/act nasal spray, 1 spray into each nostril daily (Patient not taking: Reported on 12/21/2021 ), Disp: 16 g, Rfl: 0  •  fluticasone (VERAMYST) 27 5 MCG/SPRAY nasal spray, 2 sprays into each nostril daily, Disp: , Rfl:   •  Garlic 876 MG TABS, Take 100 mg by mouth daily, Disp: , Rfl:   •  Garlic 002 MG TABS, Take by mouth, Disp: , Rfl:   •  hydrochlorothiazide (HYDRODIURIL) 25 mg tablet, Take 25 mg by mouth daily, Disp: , Rfl:   •  Melatonin 5 MG TABS, Take 1 tablet by mouth daily at bedtime as needed, Disp: , Rfl:   •  Melatonin 5 MG TABS, Take by mouth, Disp: , Rfl:   •  pantoprazole (PROTONIX) 40 mg tablet, Take 1 tablet (40 mg total) by mouth daily in the early morning, Disp: 30 tablet, Rfl: 0  •  Pantoprazole Sodium (PROTONIX PO), Take by mouth, Disp: , Rfl:   •  sodium chloride (OCEAN) 0 65 % nasal spray, 1 spray into each nostril as needed for congestion (Patient not taking: Reported on 12/21/2021 ), Disp: 60 mL, Rfl: 0    Historical Information   Past Medical History:   Diagnosis Date   • CVA (cerebral vascular accident) ([facility] [address] )    • Hypertension      Past Surgical History:   Procedure Laterality Date   • CAROTID ENDARTARECTOMY Left 6/28/2019    Procedure: ENDARTERECTOMY ARTERY CAROTID WITH PATCH ANGIOPLASTY;  Surgeon: Jackie Solis MD; Location: [facility];  Service: Vascular   • IR LOWER EXTREMITY ANGIOGRAM  11/25/2020   • TOE AMPUTATION Left 11/27/2020    Procedure: AMPUTATION 5th TOE left foot;  Surgeon: Harjit Zhang DPM;  Location: [facility];  Service: Podiatry     Social History   Social History     Substance and Sexual Activity   Alcohol Use Not Currently     Social History     Substance and Sexual Activity   Drug Use Never     Social History     Tobacco Use   Smoking Status Former Smoker   • Packs/day: 0 25   • Years: 50 00   • Pack years: 12 50   • Types: Cigarettes   Smokeless Tobacco Never Used     Family History: No family history on file  Meds/Allergies   (Not in a hospital admission)    No Known Allergies    Objective   Vitals: Blood pressure (!) 98/49, pulse 64, temperature (!) 101 4 °F (38 6 °C), temperature source Rectal, resp  rate 16, SpO2 96 %  Physical Exam  Vitals and nursing note reviewed  Constitutional:       General: She is not in acute distress  HENT:      Head: Normocephalic and atraumatic  Right Ear: External ear normal       Left Ear: External ear normal       Nose: Nose normal       Mouth/Throat:      Pharynx: Oropharynx is clear  Eyes:      Extraocular Movements: Extraocular movements intact  Pupils: Pupils are equal, round, and reactive to light  Cardiovascular:      Rate and Rhythm: Normal rate and regular rhythm  Pulses: Normal pulses  Heart sounds: Normal heart sounds  No murmur heard  No friction rub  No gallop  Pulmonary:      Effort: Pulmonary effort is normal  No respiratory distress  Breath sounds: Rhonchi present  No wheezing or rales  Comments: LLL rhonchi  Abdominal:      General: Abdomen is flat  There is no distension  Palpations: Abdomen is soft  Tenderness: There is no abdominal tenderness  There is no guarding or rebound  Musculoskeletal:         General: No tenderness or deformity  Normal range of motion        Cervical back: Normal range of motion  Right lower leg: No edema  Left lower leg: No edema  Skin:     General: Skin is warm and dry  Capillary Refill: Capillary refill takes less than 2 seconds  Findings: No rash  Comments: Abrasions noted to R knee no active bleeding or lacerations   Neurological:      General: No focal deficit present  Mental Status: She is alert and oriented to person, place, and time  Gait: Gait normal    Psychiatric:         Mood and Affect: Mood normal             Lab Results: Lab Results: I have personally reviewed pertinent lab results  Imaging: I have personally reviewed pertinent reports      EKG, Pathology, and Other Studies: I have personally reviewed pertinent films in PACS    Labs Reviewed   CBC AND DIFFERENTIAL - Abnormal       Result Value Ref Range Status    WBC 18 49 (*) 4 31 - 10 16 Thousand/uL Final    RBC 3 31 (*) 3 81 - 5 12 Million/uL Final    Hemoglobin 7 7 (*) 11 5 - 15 4 g/dL Final    Hematocrit 25 8 (*) 34 8 - 46 1 % Final    MCV 78 (*) 82 - 98 fL Final    MCH 23 3 (*) 26 8 - 34 3 pg Final    MCHC 29 8 (*) 31 4 - 37 4 g/dL Final    RDW 16 4 (*) 11 6 - 15 1 % Final    MPV 11 3  8 9 - 12 7 fL Final    Platelets 124  337 - 390 Thousands/uL Final    nRBC 0  /100 WBCs Final    Neutrophils Relative 87 (*) 43 - 75 % Final    Immat GRANS % 1  0 - 2 % Final    Lymphocytes Relative 3 (*) 14 - 44 % Final    Monocytes Relative 9  4 - 12 % Final    Eosinophils Relative 0  0 - 6 % Final    Basophils Relative 0  0 - 1 % Final    Neutrophils Absolute 16 18 (*) 1 85 - 7 62 Thousands/µL Final    Immature Grans Absolute 0 16  0 00 - 0 20 Thousand/uL Final    Lymphocytes Absolute 0 49 (*) 0 60 - 4 47 Thousands/µL Final    Monocytes Absolute 1 61 (*) 0 17 - 1 22 Thousand/µL Final    Eosinophils Absolute 0 01  0 00 - 0 61 Thousand/µL Final    Basophils Absolute 0 04  0 00 - 0 10 Thousands/µL Final   COMPREHENSIVE METABOLIC PANEL - Abnormal    Sodium 131 (*) 135 - 147 mmol/L Final    Potassium 3 8  3 5 - 5 3 mmol/L Final    Chloride 103  96 - 108 mmol/L Final    CO2 14 (*) 21 - 32 mmol/L Final    ANION GAP 14 (*) 4 - 13 mmol/L Final    BUN 16  5 - 25 mg/dL Final    Creatinine 1 48 (*) 0 60 - 1 30 mg/dL Final    Comment: Standardized to IDMS reference method    Glucose 137  65 - 140 mg/dL Final    Comment: If the patient is fasting, the ADA then defines impaired fasting glucose as > 100 mg/dL and diabetes as > or equal to 123 mg/dL  Specimen collection should occur prior to Sulfasalazine administration due to the potential for falsely depressed results  Specimen collection should occur prior to Sulfapyridine administration due to the potential for falsely elevated results  Calcium 8 8  8 3 - 10 1 mg/dL Final    Corrected Calcium 9 8  8 3 - 10 1 mg/dL Final    AST 62 (*) 5 - 45 U/L Final    Comment: Specimen collection should occur prior to Sulfasalazine administration due to the potential for falsely depressed results  ALT 39  12 - 78 U/L Final    Comment: Specimen collection should occur prior to Sulfasalazine and/or Sulfapyridine administration due to the potential for falsely depressed results  Alkaline Phosphatase 168 (*) 46 - 116 U/L Final    Total Protein 7 3  6 4 - 8 4 g/dL Final    Albumin 2 8 (*) 3 5 - 5 0 g/dL Final    Total Bilirubin 0 89  0 20 - 1 00 mg/dL Final    Comment: Use of this assay is not recommended for patients undergoing treatment with eltrombopag due to the potential for falsely elevated results      eGFR 32  ml/min/1 73sq m Final    Narrative:     [place] guidelines for Chronic Kidney Disease (CKD):   •  Stage 1 with normal or high GFR (GFR > 90 mL/min/1 73 square meters)  •  Stage 2 Mild CKD (GFR = 60-89 mL/min/1 73 square meters)  •  Stage 3A Moderate CKD (GFR = 45-59 mL/min/1 73 square meters)  •  Stage 3B Moderate CKD (GFR = 30-44 mL/min/1 73 square meters)  •  Stage 4 Severe CKD (GFR = 15-29 mL/min/1 73 square meters)  •  Stage 5 End Stage CKD (GFR <15 mL/min/1 73 square meters)  Note: GFR calculation is accurate only with a steady state creatinine   LACTIC ACID, PLASMA - Abnormal    LACTIC ACID 5 6 (*) 0 5 - 2 0 mmol/L Final    Narrative:     Result may be elevated if tourniquet was used during collection  PROCALCITONIN TEST - Abnormal    Procalcitonin 0 28 (*) <=0 25 ng/ml Final    Comment: Suspected Lower Respiratory Tract Infection (LRTI):  - LESS than or EQUAL to 0 25 ng/mL:   low likelihood for bacterial LRTI; antibiotics DISCOURAGED   - GREATER than 0 25 ng/mL:   increased likelihood for bacterial LRTI; antibiotics ENCOURAGED  Suspected Sepsis:  - Strongly consider initiating antibiotics in ALL UNSTABLE patients  - LESS than or EQUAL to 0 5 ng/mL:   low likelihood for bacterial sepsis; antibiotics DISCOURAGED   - GREATER than 0 5 ng/mL:   increased likelihood for bacterial sepsis; antibiotics ENCOURAGED   - GREATER than 2 ng/mL:   high risk for severe sepsis / septic shock; antibiotics strongly ENCOURAGED  Decisions on antibiotic use should not be based solely on Procalcitonin (PCT) levels  If PCT is low but uncertainty exists with stopping antibiotics, repeat PCT in 6-24 hours to confirm the low level  If antibiotics are administered (regardless if initial PCT was high or low), repeat PCT every 1-2 days to consider early antibiotic cessation (when GREATER than 80% decrease from the peak OR when PCT drops below designated cutoffs, whichever comes first), so long as the infection is NOT one that typically requires prolonged treatment durations (e g , bone/joint infections, endocarditis, Staph  aureus bacteremia)      Situations of FALSE-POSITIVE Procalcitonin values:  1) Newborns < 67 hours old  2) Massive stress from severe trauma / burns, major surgery, acute pancreatitis, cardiogenic / hemorrhagic shock, sickle cell crisis, or other organ perfusion abnormalities  3) Malaria and some Candidal infections  4) Treatment with agents that stimulate cytokines (e g , OKT3, anti-lymphocyte globulins, alemtuzumab, IL-2, granulocyte transfusion [NOT GCSFs])  5) Chronic renal disease causes elevated baseline levels (consider GREATER than 0 75 ng/mL as an abnormal cut-off); initiating HD/CRRT may cause transient decreases  6) Paraneoplastic syndromes from medullary thyroid or SCLC, some forms of vasculitis, and acute [id number]-[id number] disease    Situations of FALSE-NEGATIVE Procalcitonin values:  1) Too early in clinical course for PCT to have reached its peak (may repeat in 6-24 hours to confirm low level)  2) Localized infection WITHOUT systemic (SIRS / sepsis) response (e g , an abscess, osteomyelitis, cystitis)  3) Mycobacteria (e g , Tuberculosis, MAC)  4) Cystic fibrosis exacerbations     PROTIME-INR - Abnormal    Protime 14 6 (*) 11 6 - 14 5 seconds Final    INR 1 12  0 84 - 1 19 Final   APTT - Abnormal    PTT 21 (*) 23 - 37 seconds Final    Comment: Therapeutic Heparin Range =  60-90 seconds   COVID19, INFLUENZA A/B, RSV PCR, [facility] - Normal    SARS-CoV-2 Negative  Negative Final    Comment:      INFLUENZA A PCR Negative  Negative Final    Comment:      INFLUENZA B PCR Negative  Negative Final    Comment:      RSV PCR Negative  Negative Final    Comment:      Narrative:     FOR PEDIATRIC PATIENTS - copy/paste COVID Guidelines URL to browser: [email or web address]    SARS-CoV-2 assay is a Nucleic Acid Amplification assay intended for the  qualitative detection of nucleic acid from SARS-CoV-2 in nasopharyngeal  swabs  Results are for the presumptive identification of SARS-CoV-2 RNA  Positive results are indicative of infection with SARS-CoV-2, the virus  causing COVID-19, but do not rule out bacterial infection or co-infection  with other viruses   Laboratories within the [place] and its  territories are required to report all positive results to the appropriate  public health authorities  Negative results do not preclude SARS-CoV-2  infection and should not be used as the sole basis for treatment or other  patient management decisions  Negative results must be combined with  clinical observations, patient history, and epidemiological information  This test has not been FDA cleared or approved  This test has been authorized by FDA under an Emergency Use Authorization  (EUA)  This test is only authorized for the duration of time the  declaration that circumstances exist justifying the authorization of the  emergency use of an in vitro diagnostic tests for detection of SARS-CoV-2  virus and/or diagnosis of COVID-19 infection under section 564(b)(1) of  the Act, 21 U  S C  601IFN-6(N)(2), unless the authorization is terminated  or revoked sooner  The test has been validated but independent review by FDA  and CLIA is pending  Test performed using [employer] GeneXpert: This RT-PCR assay targets N2,  a region unique to SARS-CoV-2  A conserved region in the E-gene was chosen  for pan-Sarbecovirus detection which includes SARS-CoV-2  According to CMS-2020-01-R, this platform meets the definition of high-throughput technology  HS TROPONIN I 0HR - Normal    hs TnI 0hr 11  "Refer to ACS Flowchart"- see link ng/L Final    Comment:                                              Initial (time 0) result  If >=50 ng/L, Myocardial injury suggested ;  Type of myocardial injury and treatment strategy  to be determined  If 5-49 ng/L, a delta result at 2 hours and or 4 hours will be needed to further evaluate  If <4 ng/L, and chest pain has been >3 hours since onset, patient may qualify for discharge based on the HEART score in the ED  If <5 ng/L and <3hours since onset of chest pain, a delta result at 2 hours will be needed to further evaluate  HS Troponin 99th Percentile URL of a Health Population=12 ng/L with a 95% Confidence Interval of 8-18 ng/L      Second Troponin (time 2 hours)  If calculated delta >= 20 ng/L,  Myocardial injury suggested ; Type of myocardial injury and treatment strategy to be determined  If 5-49 ng/L and the calculated delta is 5-19 ng/L, consult medical service for evaluation  Continue evaluation for ischemia on ecg and other possible etiology and repeat hs troponin at 4 hours  If delta is <5 ng/L at 2 hours, consider discharge based on risk stratification via the HEART score (if in ED), or VIOLETTE risk score in IP/Observation  HS Troponin 99th Percentile URL of a Health Population=12 ng/L with a 95% Confidence Interval of 8-18 ng/L    HS TROPONIN I 2HR - Normal    hs TnI 2hr 15  "Refer to ACS Flowchart"- see link ng/L Final    Comment:                                              Initial (time 0) result  If >=50 ng/L, Myocardial injury suggested ;  Type of myocardial injury and treatment strategy  to be determined  If 5-49 ng/L, a delta result at 2 hours and or 4 hours will be needed to further evaluate  If <4 ng/L, and chest pain has been >3 hours since onset, patient may qualify for discharge based on the HEART score in the ED  If <5 ng/L and <3hours since onset of chest pain, a delta result at 2 hours will be needed to further evaluate  HS Troponin 99th Percentile URL of a Health Population=12 ng/L with a 95% Confidence Interval of 8-18 ng/L  Second Troponin (time 2 hours)  If calculated delta >= 20 ng/L,  Myocardial injury suggested ; Type of myocardial injury and treatment strategy to be determined  If 5-49 ng/L and the calculated delta is 5-19 ng/L, consult medical service for evaluation  Continue evaluation for ischemia on ecg and other possible etiology and repeat hs troponin at 4 hours  If delta is <5 ng/L at 2 hours, consider discharge based on risk stratification via the HEART score (if in ED), or VIOLETTE risk score in IP/Observation      HS Troponin 99th Percentile URL of a Health Population=12 ng/L with a 95% Confidence Interval of 8-18 ng/L  Delta 2hr hsTnI 4  <20 ng/L Final   LACTIC ACID 2 HOUR - Normal    LACTIC ACID 1 6  0 5 - 2 0 mmol/L Final    Narrative:     Result may be elevated if tourniquet was used during collection  BLOOD CULTURE   BLOOD CULTURE   UA W REFLEX TO MICROSCOPIC WITH REFLEX TO CULTURE   HS TROPONIN I 4HR      XR chest portable   ED Interpretation   Possible LLL infiltrate with blurring of L costophrenic angle, correlates to clinical exam and picture           Medications   ceftriaxone (ROCEPHIN) 2 g/50 mL in dextrose IVPB (0 mg Intravenous Stopped 22 1733)   multi-electrolyte (ISOLYTE-S PH 7 4) bolus 1,000 mL (0 mL Intravenous Stopped 22 1654)   acetaminophen (TYLENOL) tablet 975 mg (975 mg Oral Given 22 1543)   multi-electrolyte (ISOLYTE-S PH 7 4) bolus 500 mL (0 mL Intravenous Stopped 22 174)   multi-electrolyte (ISOLYTE-S PH 7 4) bolus 500 mL (500 mL Intravenous New Bag 22 174)        ED Course as of 22 1850      1532 LACTIC ACID(!!): 5 6  Sepsis alert called, 30cc/kg bolus given = 2 5L   1548 WBC(!): 18 49   1751 LACTIC ACID: 1 6   1751 Blood Pressure(!): 84/47  Additional 500cc ordered          Procedures          Procedure Note: EKG  Date/Time: 22 6:50 PM   Interpreted by: Ross Castleman  Indications / Diagnosis: hypotension, presyncope  ECG reviewed by me, the ED Provider: yes   The EKG demonstrates:  Rhythm: atrial fibrillation  Intervals: normal intervals  Axis: normal axis  QRS/Blocks: normal QRS  ST Changes: No acute ST Changes, no STD/ABRAHAN  Assessment/Plan     ED Medical Decision Makin-year-old female presenting with lightheadedness, noted hypotensive per EMS, found febrile in ED  Concern for sepsis, started septic workup, start antibiotics, 30 cc/kilogram fluid bolus with lactate 5 6  Sepsis alert called  New onset a fib noted, likely 2/2 sepsis   Hgb drop to 7 7 from 10 6 9 months ago, daughter states history of prior UGIB, most likely cause of drop, no other source suspected  Pt remained stable in ED, intermittent hypotension with MAP around 60, additional 500cc bolus given, now normotensive, AAOx3, no O2 requirement  Discussed with [facility] for admission  Time reflects when diagnosis was documented in both MDM as applicable and the Disposition within this note     Time User Action Codes Description Comment    11/7/2022  5:19 PM Clerance Chick Add [A41 9,  R65 21] Septic shock ([facility] [address] )     11/7/2022  5:19 PM Clerance Chick Add [I48 91] Atrial fibrillation ([facility] [address] )     11/7/2022  5:19 PM Clerance Chick Add [D62] ABLA (acute blood loss anemia)     11/7/2022  5:19 PM Clerance Chick Remove [D62] ABLA (acute blood loss anemia)     11/7/2022  5:19 PM Clerance Chick Add [D64 9] Anemia       ED Disposition     ED Disposition   Admit    Condition   Stable    Date/Time   Mon Nov 7, 2022  6:37 PM    Comment   Case was discussed with Dr Trang Jeffries and the patient's admission status was agreed to be Admission Status: inpatient status to the service of Dr Trang Jeffries              Follow-up Information    None        New Prescriptions    No medications on file          Current Discharge Medication List      CONTINUE these medications which have NOT CHANGED    Details   !! aspirin 81 mg chewable tablet Chew 81 mg daily      !! aspirin 81 mg chewable tablet Chew 81 mg daily      !! atorvastatin (LIPITOR) 80 mg tablet TAKE 1 TABLET BY MOUTH EVERY DAY  Qty: 90 tablet, Refills: 3    Associated Diagnoses: Mixed hyperlipidemia      !! atorvastatin (LIPITOR) 80 mg tablet Take 80 mg by mouth daily      !! carvedilol (COREG) 3 125 mg tablet Take 1 tablet (3 125 mg total) by mouth daily  Refills: 0    Associated Diagnoses: Essential hypertension      !! carvedilol (COREG) 3 125 mg tablet Take 3 125 mg by mouth daily      !! ferrous sulfate 325 (65 Fe) mg tablet Take 1 tablet (325 mg total) by mouth 2 (two) times a day with meals  Qty: 60 tablet, Refills: 3 Associated Diagnoses: Anemia, unspecified      !! ferrous sulfate 325 (65 Fe) mg tablet Take 325 mg by mouth 2 (two) times a day with meals      fluticasone (FLONASE) 50 mcg/act nasal spray 1 spray into each nostril daily  Qty: 16 g, Refills: 0    Associated Diagnoses: Acute non-recurrent maxillary sinusitis      fluticasone (VERAMYST) 27 5 MCG/SPRAY nasal spray 2 sprays into each nostril daily      !! Garlic 952 MG TABS Take 100 mg by mouth daily      !! Garlic 069 MG TABS Take by mouth      hydrochlorothiazide (HYDRODIURIL) 25 mg tablet Take 25 mg by mouth daily      !! Melatonin 5 MG TABS Take 1 tablet by mouth daily at bedtime as needed      !! Melatonin 5 MG TABS Take by mouth      !! pantoprazole (PROTONIX) 40 mg tablet Take 1 tablet (40 mg total) by mouth daily in the early morning  Qty: 30 tablet, Refills: 0    Associated Diagnoses: CVA (cerebral vascular accident) ([facility] [address] ); Mixed hyperlipidemia      !! Pantoprazole Sodium (PROTONIX PO) Take by mouth      sodium chloride (OCEAN) 0 65 % nasal spray 1 spray into each nostril as needed for congestion  Qty: 60 mL, Refills: 0    Associated Diagnoses: Acute non-recurrent maxillary sinusitis       ! ! - Potential duplicate medications found  Please discuss with provider  Portions of the record may have been created with voice recognition software  Occasional wrong word or "sound a like" substitutions may have occurred due to the inherent limitations of voice recognition software  Read the chart carefully and recognize, using context, where substitutions have occurred         Carlos Henderson MD  11/07/22 [id number]

## 2022-11-07 NOTE — SEPSIS NOTE
Sepsis Note   Jenna Hamm 80 y o  female MRN: [id number]  Unit/Bed#: ED 20 Encounter: [id number]       qSOFA     Row Name 11/07/22 1600 11/07/22 1502             Altered mental status GCS < 15 -- --       Respiratory Rate > / =68 0 0       Systolic BP < / =843 0 0       Q Sofa Score 0 0                  Initial Sepsis Screening     Row Name 11/07/22 1644                Is the patient's history suggestive of a new or worsening infection? Yes (Proceed)  -NA        Suspected source of infection pneumonia  -NA        Are two or more of the following signs & symptoms of infection both present and new to the patient? Yes (Proceed)  -NA        Indicate SIRS criteria Hyperthemia > 38 3C (100 9F); Leukocytosis (WBC > 90683 IJL)  -NA        If the answer is yes to both questions, suspicion of sepsis is present --        If severe sepsis is present AND tissue hypoperfusion perists in the hour after fluid resuscitation or lactate > 4, the patient meets criteria for SEPTIC SHOCK --        Are any of the following organ dysfunction criteria present within 6 hours of suspected infection and SIRS criteria that are NOT considered to be chronic conditions?  Yes  -NA        Organ dysfunction Lactate >/equal 4 0 mmol/L (MEETS CRITERIA FOR SEPTIC SHOCK)  -NA        Date of presentation of severe sepsis 11/07/22  -NA        Time of presentation of severe sepsis 1644  -NA        Tissue hypoperfusion persists in the hour after crystalloid fluid administration, evidenced, by either: --        Was hypotension present within one hour of the conclusion of crystalloid fluid administration? --        Date of presentation of septic shock --        Time of presentation of septic shock --              User Key  (r) = Recorded By, (t) = Taken By, (c) = Cosigned By    [address] Name Provider Type    NA Shena Reyes MD Resident

## 2022-11-07 NOTE — ASSESSMENT & PLAN NOTE
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

## 2022-11-08 PROBLEM — I49.1 PAC (PREMATURE ATRIAL CONTRACTION): Status: ACTIVE | Noted: 2022-11-07

## 2022-11-08 PROBLEM — D64.9 ACUTE ON CHRONIC ANEMIA: Status: ACTIVE | Noted: 2019-06-24

## 2022-11-08 LAB
ABO GROUP BLD: NORMAL
ALBUMIN SERPL BCP-MCNC: 2.3 G/DL (ref 3.5–5)
ALP SERPL-CCNC: 126 U/L (ref 46–116)
ALT SERPL W P-5'-P-CCNC: 40 U/L (ref 12–78)
ANION GAP SERPL CALCULATED.3IONS-SCNC: 8 MMOL/L (ref 4–13)
AST SERPL W P-5'-P-CCNC: 118 U/L (ref 5–45)
ATRIAL RATE: 71 BPM
BASOPHILS # BLD AUTO: 0.02 THOUSANDS/ÂΜL (ref 0–0.1)
BASOPHILS NFR BLD AUTO: 0 % (ref 0–1)
BILIRUB SERPL-MCNC: 0.42 MG/DL (ref 0.2–1)
BLD GP AB SCN SERPL QL: NEGATIVE
BUN SERPL-MCNC: 15 MG/DL (ref 5–25)
CALCIUM ALBUM COR SERPL-MCNC: 9.4 MG/DL (ref 8.3–10.1)
CALCIUM SERPL-MCNC: 8 MG/DL (ref 8.3–10.1)
CHLORIDE SERPL-SCNC: 104 MMOL/L (ref 96–108)
CK MB SERPL-MCNC: 1.2 % (ref 0–2.5)
CK MB SERPL-MCNC: 3.9 NG/ML (ref 0–5)
CO2 SERPL-SCNC: 23 MMOL/L (ref 21–32)
CREAT SERPL-MCNC: 1.09 MG/DL (ref 0.6–1.3)
EOSINOPHIL # BLD AUTO: 0.06 THOUSAND/ÂΜL (ref 0–0.61)
EOSINOPHIL NFR BLD AUTO: 0 % (ref 0–6)
ERYTHROCYTE [DISTWIDTH] IN BLOOD BY AUTOMATED COUNT: 16.1 % (ref 11.6–15.1)
FERRITIN SERPL-MCNC: 22 NG/ML (ref 8–388)
FOLATE SERPL-MCNC: 17.9 NG/ML (ref 3.1–17.5)
GFR SERPL CREATININE-BSD FRML MDRD: 47 ML/MIN/1.73SQ M
GLUCOSE SERPL-MCNC: 86 MG/DL (ref 65–140)
HCT VFR BLD AUTO: 22.1 % (ref 34.8–46.1)
HCT VFR BLD AUTO: 22.7 % (ref 34.8–46.1)
HCT VFR BLD AUTO: 23.6 % (ref 34.8–46.1)
HCT VFR BLD AUTO: 23.7 % (ref 34.8–46.1)
HGB BLD-MCNC: 6.8 G/DL (ref 11.5–15.4)
HGB BLD-MCNC: 6.8 G/DL (ref 11.5–15.4)
HGB BLD-MCNC: 7.2 G/DL (ref 11.5–15.4)
HGB BLD-MCNC: 7.4 G/DL (ref 11.5–15.4)
IMM GRANULOCYTES # BLD AUTO: 0.06 THOUSAND/UL (ref 0–0.2)
IMM GRANULOCYTES NFR BLD AUTO: 0 % (ref 0–2)
IRON SATN MFR SERPL: 4 % (ref 15–50)
IRON SERPL-MCNC: 10 UG/DL (ref 50–170)
LYMPHOCYTES # BLD AUTO: 1.68 THOUSANDS/ÂΜL (ref 0.6–4.47)
LYMPHOCYTES NFR BLD AUTO: 12 % (ref 14–44)
MAGNESIUM SERPL-MCNC: 2.1 MG/DL (ref 1.6–2.6)
MCH RBC QN AUTO: 22.8 PG (ref 26.8–34.3)
MCHC RBC AUTO-ENTMCNC: 30 G/DL (ref 31.4–37.4)
MCV RBC AUTO: 76 FL (ref 82–98)
MONOCYTES # BLD AUTO: 1.43 THOUSAND/ÂΜL (ref 0.17–1.22)
MONOCYTES NFR BLD AUTO: 10 % (ref 4–12)
NEUTROPHILS # BLD AUTO: 11.28 THOUSANDS/ÂΜL (ref 1.85–7.62)
NEUTS SEG NFR BLD AUTO: 78 % (ref 43–75)
NRBC BLD AUTO-RTO: 0 /100 WBCS
P AXIS: 60 DEGREES
PHOSPHATE SERPL-MCNC: 2.3 MG/DL (ref 2.3–4.1)
PLATELET # BLD AUTO: 260 THOUSANDS/UL (ref 149–390)
PMV BLD AUTO: 11.2 FL (ref 8.9–12.7)
POTASSIUM SERPL-SCNC: 3.3 MMOL/L (ref 3.5–5.3)
PR INTERVAL: 156 MS
PROCALCITONIN SERPL-MCNC: 1.55 NG/ML
PROT SERPL-MCNC: 6.3 G/DL (ref 6.4–8.4)
QRS AXIS: 2 DEGREES
QRSD INTERVAL: 82 MS
QT INTERVAL: 388 MS
QTC INTERVAL: 421 MS
RBC # BLD AUTO: 2.98 MILLION/UL (ref 3.81–5.12)
RH BLD: POSITIVE
SODIUM SERPL-SCNC: 135 MMOL/L (ref 135–147)
SPECIMEN EXPIRATION DATE: NORMAL
T WAVE AXIS: 61 DEGREES
TIBC SERPL-MCNC: 264 UG/DL (ref 250–450)
VENTRICULAR RATE: 71 BPM
VIT B12 SERPL-MCNC: 533 PG/ML (ref 100–900)
WBC # BLD AUTO: 14.53 THOUSAND/UL (ref 4.31–10.16)

## 2022-11-08 PROCEDURE — 30233N1 TRANSFUSION OF NONAUTOLOGOUS RED BLOOD CELLS INTO PERIPHERAL VEIN, PERCUTANEOUS APPROACH: ICD-10-PCS | Performed by: INTERNAL MEDICINE

## 2022-11-08 RX ORDER — LEVALBUTEROL 1.25 MG/.5ML
1.25 SOLUTION, CONCENTRATE RESPIRATORY (INHALATION)
Status: DISCONTINUED | OUTPATIENT
Start: 2022-11-08 | End: 2022-11-10 | Stop reason: HOSPADM

## 2022-11-08 RX ORDER — POTASSIUM CHLORIDE 20 MEQ/1
40 TABLET, EXTENDED RELEASE ORAL ONCE
Status: COMPLETED | OUTPATIENT
Start: 2022-11-08 | End: 2022-11-08

## 2022-11-08 RX ORDER — SODIUM CHLORIDE FOR INHALATION 0.9 %
3 VIAL, NEBULIZER (ML) INHALATION
Status: DISCONTINUED | OUTPATIENT
Start: 2022-11-08 | End: 2022-11-10 | Stop reason: HOSPADM

## 2022-11-08 RX ADMIN — ATORVASTATIN CALCIUM 80 MG: 80 TABLET, FILM COATED ORAL at 09:50

## 2022-11-08 RX ADMIN — ISODIUM CHLORIDE 3 ML: 0.03 SOLUTION RESPIRATORY (INHALATION) at 09:08

## 2022-11-08 RX ADMIN — BACITRACIN 1 SMALL APPLICATION: 500 OINTMENT TOPICAL at 09:51

## 2022-11-08 RX ADMIN — POTASSIUM CHLORIDE 40 MEQ: 1500 TABLET, EXTENDED RELEASE ORAL at 09:50

## 2022-11-08 RX ADMIN — LEVALBUTEROL 1.25 MG: 1.25 SOLUTION, CONCENTRATE RESPIRATORY (INHALATION) at 00:42

## 2022-11-08 RX ADMIN — LEVALBUTEROL 1.25 MG: 1.25 SOLUTION, CONCENTRATE RESPIRATORY (INHALATION) at 09:08

## 2022-11-08 RX ADMIN — ISODIUM CHLORIDE 3 ML: 0.03 SOLUTION RESPIRATORY (INHALATION) at 00:42

## 2022-11-08 RX ADMIN — CEFTRIAXONE SODIUM 1000 MG: 10 INJECTION, POWDER, FOR SOLUTION INTRAVENOUS at 17:08

## 2022-11-08 RX ADMIN — ASPIRIN 81 MG CHEWABLE TABLET 81 MG: 81 TABLET CHEWABLE at 09:50

## 2022-11-08 RX ADMIN — LEVALBUTEROL 1.25 MG: 1.25 SOLUTION, CONCENTRATE RESPIRATORY (INHALATION) at 20:10

## 2022-11-08 RX ADMIN — ISODIUM CHLORIDE 3 ML: 0.03 SOLUTION RESPIRATORY (INHALATION) at 14:01

## 2022-11-08 RX ADMIN — ISODIUM CHLORIDE 3 ML: 0.03 SOLUTION RESPIRATORY (INHALATION) at 20:10

## 2022-11-08 RX ADMIN — LEVALBUTEROL 1.25 MG: 1.25 SOLUTION, CONCENTRATE RESPIRATORY (INHALATION) at 14:01

## 2022-11-08 RX ADMIN — BACITRACIN 1 SMALL APPLICATION: 500 OINTMENT TOPICAL at 17:09

## 2022-11-08 RX ADMIN — Medication 3 MG: at 21:57

## 2022-11-08 RX ADMIN — HEPARIN SODIUM 5000 UNITS: 5000 INJECTION INTRAVENOUS; SUBCUTANEOUS at 15:12

## 2022-11-08 NOTE — UTILIZATION REVIEW
Initial Clinical Review    Admission: Date/Time/Statement:   Admission Orders (From admission, onward)     Ordered        11/07/22 1837  INPATIENT ADMISSION  Once                      Orders Placed This Encounter   Procedures   • INPATIENT ADMISSION     Standing Status:   Standing     Number of Occurrences:   1     Order Specific Question:   Level of Care     Answer:   Level 2 Stepdown / HOT [14]     Order Specific Question:   Estimated length of stay     Answer:   More than 2 Midnights     Order Specific Question:   Certification     Answer:   I certify that inpatient services are medically necessary for this patient for a duration of greater than two midnights  See H&P and MD Progress Notes for additional information about the patient's course of treatment  ED Arrival Information     Expected   -    Arrival   11/7/2022 14:46    Acuity   Urgent            Means of arrival   Ambulance    Escorted by   [facility]    Service   Hospitalist    Admission type   Emergency            Arrival complaint   Hypotension           Chief Complaint   Patient presents with   • Hypotension     Pt states that she fell in her kitchen and was down for about 1 hour prior to someone helping her; pt fell onto knees and c/o dizziness; hypotensive for EMS       Initial Presentation: 80 y o  female with PMH of CVA, hypertension and iron deficiency anemia presents with c/o lightheadedness and presyncope, falling onto her knees and unable to get back up  In ED, temp 101 4, WBC 18 49, lactic acid 5 6, CXR revealed possible LL lobe pneumonia, IV ABX started, IVF given dt hypotension  Admit Inpatient Level 2 stepdown unit, telemetry d/t Severe Sepsis, ADIS, Hypotension: continue IV ABX, fu on final CXR and blood cxs  Check CPK level and monitor renal function, hold lisinopril and HCTZ   Continue to monitor hgb and continue iron supplementation, monitor hypotension, start nicotine patch, PT/OT eval, inc spirometry, continue home ASA and statin, CM for dispo planning  Date: 11/8   Day 2:   Pt offers no new complaints today  Pt states of taking iron supplementation 3x per wk dt side effects of constipation  Hgb continues to drop, transfuse 1 unit PRBCs, freq hgb checks, possible EGD/colonoscopy if continues to drop  Continue IV ABX, fu on blood cxs, trend temps WBC VS and procal  H&H q6h and transfuse <7  Monitor BMP, monitor BP and continue telemetry, PT/OT eval pending      ED Triage Vitals   Temperature Pulse Respirations Blood Pressure SpO2   11/07/22 1502 11/07/22 1502 11/07/22 1502 11/07/22 1502 11/07/22 1502   (!) 101 4 °F (38 6 °C) 56 16 101/58 99 %      Temp Source Heart Rate Source Patient Position - Orthostatic VS BP Location FiO2 (%)   11/07/22 1502 11/07/22 2000 11/07/22 2000 11/07/22 2000 --   Rectal Monitor Lying Right arm       Pain Score       11/07/22 1543       Med Not Given for Pain - for MAR use only          Wt Readings from Last 1 Encounters:   11/07/22 81 2 kg (179 lb 0 2 oz)     Additional Vital Signs:   Date/Time Temp Pulse Resp BP MAP (mmHg) SpO2 O2 Device   11/08/22 0908 -- -- -- -- -- 96 % None (Room air)   11/08/22 0830 -- 68 19 118/56 81 98 % None (Room air)   11/08/22 0806 98 5 °F (36 9 °C) -- -- -- -- -- --   11/08/22 0726 -- 84 17 134/61 88 97 % None (Room air)   11/08/22 0500 -- 70 20 108/54 78 98 % None (Room air)   11/08/22 0045 -- 60 18 136/60 87 100 % None (Room air)   11/07/22 2300 -- 58 17 105/50 -- 98 % None (Room air)   11/07/22 2115 -- 62 17 104/46 Abnormal  67 96 % None (Room air)   11/07/22 2000 -- 64 20 99/50 70 97 % None (Room air)   11/07/22 1900 -- 68 16 102/51 73 97 % None (Room air)   11/07/22 1830 -- 64 16 98/49 Abnormal  71 96 % None (Room air)   11/07/22 1815 -- 66 16 87/45 Abnormal  63 Abnormal  95 % --   11/07/22 1800 -- 66 16 95/47 Abnormal  68 97 % None (Room air)   11/07/22 1745 -- 66 16 82/46 Abnormal  59 Abnormal  96 % --   11/07/22 1730 -- 66 16 84/47 Abnormal  63 Abnormal  95 % None (Room air)   11/07/22 1700 -- 76 16 108/65 79 96 % None (Room air)   11/07/22 1600 -- 78 16 112/52 74 97 % --   11/07/22 1502 101 4 °F (38 6 °C) Abnormal  56 16 101/58 -- 99 % None (Room air)       Pertinent Labs/Diagnostic Test Results:   11/7 EKG: Normal sinus rhythm with frequent Premature atrial complexes  Abnormal ECG    XR chest portable   ED Interpretation by Maya Cohen MD (11/07 1650)   Possible LLL infiltrate with blurring of L costophrenic angle, correlates to clinical exam and picture      Final Result by Gwyn Jimenez MD (11/08 0730)      Minimal infiltrate or atelectasis medially at the left lung base              Workstation performed: [id number]           Results from last 7 days   Lab Units 11/07/22  1546   SARS-COV-2  Negative     Results from last 7 days   Lab Units 11/08/22  0824 11/08/22  0526 11/07/22  1947 11/07/22  1521   WBC Thousand/uL  --  14 53*  --  18 49*   HEMOGLOBIN g/dL 7 2* 6 8*  --  7 7*   HEMATOCRIT % 23 6* 22 7*  --  25 8*   PLATELETS Thousands/uL  --  260 252 302   NEUTROS ABS Thousands/µL  --  11 28*  --  16 18*         Results from last 7 days   Lab Units 11/08/22  0526 11/07/22  1521   SODIUM mmol/L 135 131*   POTASSIUM mmol/L 3 3* 3 8   CHLORIDE mmol/L 104 103   CO2 mmol/L 23 14*   ANION GAP mmol/L 8 14*   BUN mg/dL 15 16   CREATININE mg/dL 1 09 1 48*   EGFR ml/min/1 73sq m 47 32   CALCIUM mg/dL 8 0* 8 8   MAGNESIUM mg/dL 2 1  --    PHOSPHORUS mg/dL 2 3  --      Results from last 7 days   Lab Units 11/08/22  0526 11/07/22  1521   AST U/L 118* 62*   ALT U/L 40 39   ALK PHOS U/L 126* 168*   TOTAL PROTEIN g/dL 6 3* 7 3   ALBUMIN g/dL 2 3* 2 8*   TOTAL BILIRUBIN mg/dL 0 42 0 89         Results from last 7 days   Lab Units 11/08/22  0526 11/07/22  1521   GLUCOSE RANDOM mg/dL 86 137     Results from last 7 days   Lab Units 11/07/22  1521   CK TOTAL U/L 318*   CK MB INDEX % 1 2   CK MB ng/mL 3 9     Results from last 7 days   Lab Units 11/07/22  1947 11/07/22  1700 11/07/22  1521   HS TNI 0HR ng/L  --   --  11   HS TNI 2HR ng/L  --  15  --    HSTNI D2 ng/L  --  4  --    HS TNI 4HR ng/L 22  --   --    HSTNI D4 ng/L 11  --   --      Results from last 7 days   Lab Units 11/07/22  1521   PROTIME seconds 14 6*   INR  1 12   PTT seconds 21*     Results from last 7 days   Lab Units 11/08/22  0526 11/07/22  1947 11/07/22  1521   PROCALCITONIN ng/ml 1 55* 1 04* 0 28*     Results from last 7 days   Lab Units 11/07/22  1702 11/07/22  1521   LACTIC ACID mmol/L 1 6 5 6*     Results from last 7 days   Lab Units 11/08/22  0526   FERRITIN ng/mL 22     Results from last 7 days   Lab Units 11/07/22  1546   INFLUENZA A PCR  Negative   INFLUENZA B PCR  Negative   RSV PCR  Negative     Results from last 7 days   Lab Units 11/07/22  1558   BLOOD CULTURE  Received in Microbiology Lab  Culture in Progress  Received in Microbiology Lab  Culture in Progress       ED Treatment:   Medication Administration from 11/07/2022 1446 to 11/08/2022 1003       Date/Time Order Dose Route Action     11/07/2022 1609 ceftriaxone (ROCEPHIN) 2 g/50 mL in dextrose IVPB 2,000 mg Intravenous New Bag     11/07/2022 1543 multi-electrolyte (ISOLYTE-S PH 7 4) bolus 1,000 mL 1,000 mL Intravenous New Bag     11/07/2022 1543 acetaminophen (TYLENOL) tablet 975 mg 975 mg Oral Given     11/07/2022 1654 multi-electrolyte (ISOLYTE-S PH 7 4) bolus 500 mL 500 mL Intravenous New Bag     11/07/2022 1746 multi-electrolyte (ISOLYTE-S PH 7 4) bolus 500 mL 500 mL Intravenous New Bag     11/08/2022 0951 bacitracin topical ointment 1 small application 1 small application Topical Given     11/07/2022 1936 bacitracin topical ointment 1 small application 1 small application Topical Given     11/08/2022 0950 aspirin chewable tablet 81 mg 81 mg Oral Given     11/08/2022 0950 atorvastatin (LIPITOR) tablet 80 mg 80 mg Oral Given     11/07/2022 2117 melatonin tablet 3 mg 3 mg Oral Given     11/07/2022 2117 azithromycin (ZITHROMAX) tablet 500 mg 500 mg Oral Given     11/07/2022 1946 sodium chloride 0 9 % infusion 100 mL/hr Intravenous New Bag     11/08/2022 0908 levalbuterol (Cletis Clack) inhalation solution 1 25 mg 1 25 mg Nebulization Given     11/08/2022 0042 levalbuterol (XOPENEX) inhalation solution 1 25 mg 1 25 mg Nebulization Given     11/08/2022 0908 sodium chloride 0 9 % inhalation solution 3 mL 3 mL Nebulization Given     11/08/2022 0042 sodium chloride 0 9 % inhalation solution 3 mL 3 mL Nebulization Given     11/08/2022 0950 potassium chloride (K-DUR,KLOR-CON) CR tablet 40 mEq 40 mEq Oral Given        Past Medical History:   Diagnosis Date   • CVA (cerebral vascular accident) ([facility] )    • Hypertension      Present on Admission:  • Acute on chronic anemia  • ADIS (acute kidney injury) ([facility] )  • Essential hypertension  • Stage 3a chronic kidney disease (HCC)      Admitting Diagnosis: Hypotension [I95 9]  Age/Sex: 80 y o  female  Admission Orders:  Scheduled Medications:  aspirin, 81 mg, Oral, Daily  atorvastatin, 80 mg, Oral, Daily  bacitracin, 1 small application, Topical, BID  cefTRIAXone, 1,000 mg, Intravenous, Q24H  ferrous sulfate, 325 mg, Oral, Daily With Breakfast  heparin (porcine), 5,000 Units, Subcutaneous, Q8H RAYNA  levalbuterol, 1 25 mg, Nebulization, TID  melatonin, 3 mg, Oral, HS  nicotine, 14 mg, Transdermal, Daily  pantoprazole, 40 mg, Oral, Early Morning  sodium chloride, 3 mL, Nebulization, TID      Continuous IV Infusions:     sodium chloride 0 9 % infusion  Rate: 75 mL/hr Dose: 75 mL/hr  Freq: Continuous Route: IV  Last Dose: Stopped (11/08/22 0754)  Start: 11/07/22 1915 End: 11/08/22 0751    PRN Meds:  acetaminophen, 650 mg, Oral, Q6H PRN  benzonatate, 100 mg, Oral, TID PRN  ondansetron, 4 mg, Intravenous, Q6H PRN      scd    Network Utilization Review Department  ATTENTION: Please call with any questions or concerns to [phone] and carefully listen to the prompts so that you are directed to the right person   All voicemails are confidential   Farrel Bodily all requests for admission clinical reviews, approved or denied determinations and any other requests to dedicated fax number below belonging to the campus where the patient is receiving treatment   List of dedicated fax numbers for the Facilities:  [address] DENIALS (Administrative/Medical Necessity) [phone]   [address] (Maternity/NICU/Pediatrics) [phone]   914 Radha Sinlcair [phone]   [address] [phone]   [id number] [place] [address] 28 [phone]   1555 [facility] 134 815 [facility] [phone]

## 2022-11-08 NOTE — ASSESSMENT & PLAN NOTE
Hyponatremia with metabolic/lactic acidosis  Likely secondary to hypotension and severe sepsis  Hold lisinopril and HCTZ  IV fluid for hydration  Check CPK level  Monitor

## 2022-11-08 NOTE — ASSESSMENT & PLAN NOTE
New AFib seen on EKG with controlled heart rate  Cardiac echo on January 2022 with Preserved biventricular function  No significant valve disease    Suspect secondary to underlying sepsis and possible pneumonia  Monitor on tele

## 2022-11-08 NOTE — PROGRESS NOTES
[address] [facility]  Progress Note Mart Moran 1940, 80 y o  female MRN: [id number]  Unit/Bed#: ED 20 Encounter: [id number]  Primary Care Provider: Palmer Mckeon DO   Date and time admitted to hospital: 11/7/2022  2:46 PM    * Severe sepsis [facility])  Assessment & Plan  Patient presented with presyncope and fall at her facility  On presentation found to be febrile with leukocytosis and lactic acidosis  Initially Hypotensive which improved with IV fluids  · Suspect due to pneumonia as patient reports 3 days of URI symptoms/cough and CXR with minimal infiltrate medially at the left lung base  · No dysuria or abdominal pain, no diarrhea  Negative COVID/flu/RSV PCR  UA pending  · Continue with IV ceftriaxone, day 2  · Lactic acid normalized s/p IV fluid  · Follow-up blood cultures x 2   · Trend temps, WBC, hemodynamics, procalcitonin     Acute on chronic anemia  Assessment & Plan  Patient with 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 treated previously with argon plasma coagulation in 2016  Also with underlying iron deficiency anemia on iron supplementation  baeline hemoglobin appears to be between 8-10    · Hemoglobin 7 7 admission and down-trending   · Iron panel: Fe 10, ferritin 22, TIBC 264, iron saturation 4%  · Defer IV Venofer given underlying infection, continue with PO supplementation     · FOBT ordered and pending   · Trend H&H q6h and and transfuse if < 7  · Will consult GI if hemoglobin continues to decline and/or evidence of active bleeding      ADIS (acute kidney injury) ([facility] )  Assessment & Plan  POA along with hyponatremia with metabolic/lactic acidosis  · Multifactorial in the setting of hypotension/severe sepsis, acute anemia   · Creatinine improved status post IV fluids  · Continue to hold home lisinopril and HCTZ  · CPK level mildly elevated   · Monitor BMP    PAC (premature atrial contraction)  Assessment & Plan  Initial concern for Afib on admission EKG with controlled heart rate  Cardiology reads as frequent PACs  · Cardiac echo in January 2022 with preserved biventricular function  No significant valve disease  · Maintained on Coreg outpatient, which is currently on hold due to hypotension   · Check repeat EKG this morning   · Monitor on telemetry - currently NSR    Hypotension  Assessment & Plan  POA suspect due to severe sepsis vs acute anemia, see plans above   · Underlying history of essential hypertension - lisinopril, HCTZ and Coreg on hold   · Resolved status post IV fluid resuscitation   · Monitor routinely     Ambulatory dysfunction  Assessment & Plan  Presenting status post fall from standing at nursing facility  No head strike or LOC  · Fall precautions   · PT/OT evaluations pending     Tobacco abuse  Assessment & Plan  · Nicotine patch on board   · Encourage cessation     History of CVA (cerebrovascular accident)  Assessment & Plan  Hx L MCA CVA in June 2019  · Continue aspirin statin    Stage 3a chronic kidney disease [facility])  Assessment & Plan  Lab Results   Component Value Date    EGFR 47 11/08/2022    EGFR 32 11/07/2022    EGFR 40 01/20/2022    CREATININE 1 09 11/08/2022    CREATININE 1 48 (H) 11/07/2022    CREATININE 1 25 01/20/2022     · ADIS POA, see plan above  · Baseline creatinine approximately 0 8-1 0 per chart review   · Avoid nephrotoxins and hypotension   · Monitor BMP    Essential hypertension  Assessment & Plan  With hypotension on admission as above, now improved   · Resume home antihypertensives when appropriate   · Monitor BP routinely       VTE Pharmacologic Prophylaxis: VTE Score: 8 High Risk (Score >/= 5) - Pharmacological DVT Prophylaxis Ordered: heparin  Sequential Compression Devices Ordered  Patient Centered Rounds: I performed bedside rounds with nursing staff today    Discussions with Specialists or Other Care Team Provider: ED RN    Education and Discussions with Family / Patient: Patient declined call to contact person  Time Spent for Care: 30 minutes  More than 50% of total time spent on counseling and coordination of care as described above  Current Length of Stay: 1 day(s)  Current Patient Status: Inpatient   Certification Statement: The patient will continue to require additional inpatient hospital stay due to IV antibiotic, anemia workup, pending clinincal improvement   Discharge Plan: Anticipate discharge in 24-48 hrs to discharge location to be determined pending rehab evaluations  Code Status: Level 1 - Full Code    Subjective:   Patient reports feeling better since presentation  She currently denies chest pain, shortness a breath, lightheadedness or dizziness  She tells me at home she takes her iron supplementation 3 times a week on  due to side effect of constipation  Discussed declining hemoglobin with need for frequent checks and potentially EGD/colonoscopy if continues to decline  Patient denies overt evidence of blood in stool  Objective:     Vitals:   Temp (24hrs), Av °F (37 8 °C), Min:98 5 °F (36 9 °C), Max:101 4 °F (38 6 °C)    Temp:  [98 5 °F (36 9 °C)-101 4 °F (38 6 °C)] 98 5 °F (36 9 °C)  HR:  [56-84] 68  Resp:  [16-20] 19  BP: ()/(45-65) 118/56  SpO2:  [95 %-100 %] 96 %  Body mass index is 36 16 kg/m²  Input and Output Summary (last 24 hours): Intake/Output Summary (Last 24 hours) at 2022 1047  Last data filed at 2022 0754  Gross per 24 hour   Intake 3050 ml   Output --   Net 3050 ml       Physical Exam:   Physical Exam  Vitals and nursing note reviewed  Constitutional:       General: She is not in acute distress  Cardiovascular:      Rate and Rhythm: Normal rate and regular rhythm  Pulmonary:      Effort: Pulmonary effort is normal  No respiratory distress  Breath sounds: No wheezing, rhonchi or rales  Comments: On room air  Musculoskeletal:      Right lower leg: No edema  Left lower leg: No edema     Skin: Comments: Abrasions noted on distal RLE   Neurological:      General: No focal deficit present  Mental Status: She is alert and oriented to person, place, and time  Mental status is at baseline  Additional Data:     Labs:  Results from last 7 days   Lab Units 11/08/22  0824 11/08/22  0526   WBC Thousand/uL  --  14 53*   HEMOGLOBIN g/dL 7 2* 6 8*   HEMATOCRIT % 23 6* 22 7*   PLATELETS Thousands/uL  --  260   NEUTROS PCT %  --  78*   LYMPHS PCT %  --  12*   MONOS PCT %  --  10   EOS PCT %  --  0     Results from last 7 days   Lab Units 11/08/22  0526   SODIUM mmol/L 135   POTASSIUM mmol/L 3 3*   CHLORIDE mmol/L 104   CO2 mmol/L 23   BUN mg/dL 15   CREATININE mg/dL 1 09   ANION GAP mmol/L 8   CALCIUM mg/dL 8 0*   ALBUMIN g/dL 2 3*   TOTAL BILIRUBIN mg/dL 0 42   ALK PHOS U/L 126*   ALT U/L 40   AST U/L 118*   GLUCOSE RANDOM mg/dL 86     Results from last 7 days   Lab Units 11/07/22  1521   INR  1 12             Results from last 7 days   Lab Units 11/08/22  0526 11/07/22  1947 11/07/22  1702 11/07/22  1521   LACTIC ACID mmol/L  --   --  1 6 5 6*   PROCALCITONIN ng/ml 1 55* 1 04*  --  0 28*       Lines/Drains:  Invasive Devices  Report    Peripheral Intravenous Line  Duration           Peripheral IV 11/07/22 Left Arm <1 day                  Telemetry:  Telemetry Orders (From admission, onward)             48 Hour Telemetry Monitoring  Continuous x 48 hours        References:    Telemetry Guidelines   Question:  Reason for 48 Hour Telemetry  Answer:  Arrhythmias Requiring Medical Therapy (eg  SVT, Vtach/fib, Bradycardia, Uncontrolled A-fib)                 Telemetry Reviewed: Normal Sinus Rhythm  Indication for Continued Telemetry Use: Arrthymias requiring medical therapy             Imaging: Reviewed radiology reports from this admission including: chest xray    Recent Cultures (last 7 days):   Results from last 7 days   Lab Units 11/07/22  1558   BLOOD CULTURE  Received in Microbiology Lab   Culture in Progress  Received in Microbiology Lab  Culture in Progress  Last 24 Hours Medication List:   Current Facility-Administered Medications   Medication Dose Route Frequency Provider Last Rate   • acetaminophen  650 mg Oral Q6H PRN Rhoderick Lipoma, DO     • aspirin  81 mg Oral Daily Rhoderick Lipoma, DO     • atorvastatin  80 mg Oral Daily Rhoderick Lipoma, DO     • bacitracin  1 small application Topical BID Rhoderick Lipoma, DO     • benzonatate  100 mg Oral TID PRN Rhoderick Lipoma, DO     • cefTRIAXone  1,000 mg Intravenous Q24H Rhoderick Lipoma, DO     • ferrous sulfate  325 mg Oral Daily With Breakfast Rhoderick Lipoma, DO     • heparin (porcine)  5,000 Units Subcutaneous [facility] Rhoderick Lipoma, DO     • levalbuterol  1 25 mg Nebulization TID Rhoderick Lipoma, DO     • melatonin  3 mg Oral HS Rhoderick Lipoma, DO     • nicotine  14 mg Transdermal Daily Rhoderick Lipoma, DO     • ondansetron  4 mg Intravenous Q6H PRN Rhoderick Lipoma, DO     • pantoprazole  40 mg Oral Early Morning Rhoderick Lipoma, DO     • sodium chloride  3 mL Nebulization TID Rhoderick Lipoma, DO          Today, Patient Was Seen By: Jose Raul Aden    **Please Note: This note may have been constructed using a voice recognition system  **

## 2022-11-08 NOTE — ED NOTES
Per discussion with [facility] AP, will hold off on blood transfusion at this time and trend serial H&H       Sergio Stokes RN  11/08/22 5511

## 2022-11-08 NOTE — ASSESSMENT & PLAN NOTE
POA suspect due to severe sepsis vs acute anemia, see plans above   · Underlying history of essential hypertension - lisinopril, HCTZ and Coreg on hold   · Resolved status post IV fluid resuscitation   · Monitor routinely

## 2022-11-08 NOTE — RESPIRATORY THERAPY NOTE
RT Protocol Note  Lidia Butts 80 y o  female MRN: [id number]  Unit/Bed#: ED 20 Encounter: [id number]    Assessment    Principal Problem:    Severe sepsis ([facility] )  Active Problems:    Iron deficiency anemia    ADIS (acute kidney injury) ([place] [address] )    History of CVA (cerebrovascular accident)    Hypotension    Ambulatory dysfunction    Atrial fibrillation ([place] [address] )    Tobacco abuse      Home Pulmonary Medications:  No home respiratory medications per Pt  And chart hx  Past Medical History:   Diagnosis Date    CVA (cerebral vascular accident) ([place] [address] )     Hypertension      Social History     Socioeconomic History    Marital status: Single     Spouse name: Not on file    Number of children: Not on file    Years of education: Not on file    Highest education level: Not on file   Occupational History    Not on file   Tobacco Use    Smoking status: Former Smoker     Packs/day: 0 25     Years: 50 00     Pack years: 12 50     Types: Cigarettes    Smokeless tobacco: Never Used   Vaping Use    Vaping Use: Never used   Substance and Sexual Activity    Alcohol use: Not Currently    Drug use: Never    Sexual activity: Not Currently   Other Topics Concern    Not on file   Social History Narrative    Not on file     Social Determinants of Health     Financial Resource Strain: Not on file   Food Insecurity: Not on file   Transportation Needs: Not on file   Physical Activity: Not on file   Stress: Not on file   Social Connections: Not on file   Intimate Partner Violence: Not on file   Housing Stability: Not on file       Subjective         Objective    Physical Exam:   Assessment Type: (P) Assess only  Bilateral Breath Sounds: (P) Expiratory wheezes    Vitals:  Blood pressure 136/60, pulse 60, temperature (!) 101 4 °F (38 6 °C), temperature source Rectal, resp  rate 18, height 4' 11" (1 499 m), weight 81 2 kg (179 lb 0 2 oz), SpO2 100 %  Imaging and other studies: I have personally reviewed pertinent reports  Plan    Respiratory Plan: (P) No distress/Pulmonary history        Resp Comments: (P) Pt  evaluated for respiratory protocol  BS=well aerated with expiratory wheezes  Pt  in no distress on RMA  SpO2=98%  Pt  uses no respiratory medications at home but TID UDNs ordered secondary to wheezes  No further respiratory intervention indicated at this time  Will continue to monitor and titrate care accordingly

## 2022-11-08 NOTE — H&P
[facility]  H&P- Keith Pa 1940, 80 y o  female MRN: [id number]  Unit/Bed#: ED 20 Encounter: [id number]  Primary Care Provider: Sera Steele DO   Date and time admitted to hospital: 11/7/2022  2:46 PM    * Severe sepsis [facility])  Assessment & Plan  Patient presented with presyncope and fall at her facility  Found to be febrile with leukocytosis and lactic acidosis  Hypotensive responding to IV fluid  Patient with to 3 days of URI symptoms and cough  No dysuria or abdominal pain, no diarrhea  Chest x-ray personally reviewed with possible left lower lobe pneumonia  Negative COVID/flu/RSV PCR  Continue with IV ceftriaxone  Lactic acid has normalized with IV fluid  Follow on chest x-ray report and blood culture      ADIS (acute kidney injury) ([facility] )  Assessment & Plan  Hyponatremia with metabolic/lactic acidosis  Likely secondary to hypotension and severe sepsis  Hold lisinopril and HCTZ  IV fluid for hydration  Check CPK level  Monitor    Atrial fibrillation [facility])  Assessment & Plan  New AFib seen on EKG with controlled heart rate  Cardiac echo on January 2022 with Preserved biventricular function  No significant valve disease    Suspect secondary to underlying sepsis and possible pneumonia  Monitor on tele    Hypotension  Assessment & Plan  Secondary to severe sepsis  Underlying history of essential hypertension  Hold lisinopril, HCTZ and Coreg  IV fluid for hydration  Continue with IV antibiotic  Monitor      Tobacco abuse  Assessment & Plan  Start nicotine patch    Ambulatory dysfunction  Assessment & Plan  Be dysfunction with fall at her facility  PT OT eval    History of CVA (cerebrovascular accident)  Assessment & Plan  Hx L MCA CVA June '19  Continue aspirin statin    Iron deficiency anemia  Assessment & Plan  Patient with 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 treated previously with argon plasma coagulation  Underlying iron deficiency anemia  Hemoglobin 7 7 admission  Check heme test stool  Monitor  Continue iron supplement  May need IV iron    VTE Pharmacologic Prophylaxis: VTE Score: 8 High Risk (Score >/= 5) - Pharmacological DVT Prophylaxis Ordered: heparin  Sequential Compression Devices Ordered  Code Status: Level 1 - Full Code   Discussion with family: Updated  (daughter) at bedside  Anticipated Length of Stay: Patient will be admitted on an inpatient basis with an anticipated length of stay of greater than 2 midnights secondary to Management of severe sepsis  Total Time for Visit, including Counseling / Coordination of Care: 60 minutes Greater than 50% of this total time spent on direct patient counseling and coordination of care  Chief Complaint:   I felt dizzy and I fell    History of Present Illness:  Irwin Huber is a 80 y o  female with a PMH of CVA, hypertension and iron deficiency anemia who presents after episode of lightheadedness and presyncope  Patient stated that she was in her kitchen when she started to feel lightheaded and fell on her knees in and was unable to get up until someone came and helped her    Patient with active tobacco smoking, denied dysuria or diarrhea, no abdominal pain, no chest pain or shortness of breath  She admitted for cough and URI symptoms for the past 2- 3 days associated with cough  She found to be hypotensive by EMS with systolic blood pressure in the 80s  Evaluated in the emergency room found to be febrile with leukocytosis and lactic acidosis  Possible left lower lobe infiltrations on chest x-ray  She also developed hypotensive in ED the responded to IV fluid    Patient received IV fluid and IV antibiotic with improvement in her symptoms  Lactic acid level    She was also found to have a new onset AFib on EKG with heart rate of 85 likely secondary to sepsis    Review of Systems:  Review of Systems   Constitutional: Negative for appetite change, chills and fever     HENT: Positive for sore throat  Respiratory: Positive for cough  Negative for chest tightness and shortness of breath  Cardiovascular: Positive for palpitations  Negative for chest pain and leg swelling  Gastrointestinal: Negative for abdominal pain, blood in stool, diarrhea, nausea and vomiting  Genitourinary: Negative for dysuria  Neurological: Positive for dizziness and light-headedness  Negative for speech difficulty  All other systems reviewed and are negative  Past Medical and Surgical History:   Past Medical History:   Diagnosis Date   • CVA (cerebral vascular accident) [facility])    • Hypertension        Past Surgical History:   Procedure Laterality Date   • CAROTID ENDARTARECTOMY Left 6/28/2019    Procedure: ENDARTERECTOMY ARTERY CAROTID WITH PATCH ANGIOPLASTY;  Surgeon: Sudhakar Solis MD;  [facility];  Service: Vascular   • IR LOWER EXTREMITY ANGIOGRAM  11/25/2020   • TOE AMPUTATION Left 11/27/2020    Procedure: AMPUTATION 5th TOE left foot;  Surgeon: Peggy Presley DPM;  Location: [facility];  Service: Podiatry       Meds/Allergies:  Prior to Admission medications    Medication Sig Start Date End Date Taking?  Authorizing Provider   aspirin 81 mg chewable tablet Chew 81 mg daily    Historical Provider, MD   aspirin 81 mg chewable tablet Chew 81 mg daily    Historical Provider, MD   atorvastatin (LIPITOR) 80 mg tablet TAKE 1 TABLET BY MOUTH EVERY DAY 7/12/21   Suze Strong MD   atorvastatin (LIPITOR) 80 mg tablet Take 80 mg by mouth daily    Historical Provider, MD   carvedilol (COREG) 3 125 mg tablet Take 1 tablet (3 125 mg total) by mouth daily 11/30/20   BOINTA Kim   carvedilol (COREG) 3 125 mg tablet Take 3 125 mg by mouth daily    Historical Provider, MD   ferrous sulfate 325 (65 Fe) mg tablet Take 1 tablet (325 mg total) by mouth 2 (two) times a day with meals  Patient taking differently: Take 325 mg by mouth 3x a week monday ,wensday and friday 7/18/19   Alfredo Vigil MD   ferrous sulfate 325 (65 Fe) mg tablet Take 325 mg by mouth 2 (two) times a day with meals    Historical Provider, MD   fluticasone (FLONASE) 50 mcg/act nasal spray 1 spray into each nostril daily  Patient not taking: Reported on 12/21/2021  10/17/20   Colette Ruvalcaba MD   fluticasone (VERAMYST) 27 5 MCG/SPRAY nasal spray 2 sprays into each nostril daily    Historical Provider, MD   Garlic 025 MG TABS Take 100 mg by mouth daily    Historical Provider, MD   Garlic 929 MG TABS Take by mouth    Historical Provider, MD   hydrochlorothiazide (HYDRODIURIL) 25 mg tablet Take 25 mg by mouth daily 10/19/20   Historical Provider, MD   Melatonin 5 MG TABS Take 1 tablet by mouth daily at bedtime as needed    Historical Provider, MD   Melatonin 5 MG TABS Take by mouth    Historical Provider, MD   pantoprazole (PROTONIX) 40 mg tablet Take 1 tablet (40 mg total) by mouth daily in the early morning 7/19/19   Paris Fagan MD   Pantoprazole Sodium (PROTONIX PO) Take by mouth    Historical Provider, MD   sodium chloride (OCEAN) 0 65 % nasal spray 1 spray into each nostril as needed for congestion  Patient not taking: Reported on 12/21/2021  10/17/20   Colette Ruvalcaba MD     I have reviewed home medications with patient personally  Allergies: No Known Allergies    Social History:  Marital Status: Single     Substance Use History:   Social History     Substance and Sexual Activity   Alcohol Use Not Currently     Social History     Tobacco Use   Smoking Status Former Smoker   • Packs/day: 0 25   • Years: 50 00   • Pack years: 12 50   • Types: Cigarettes   Smokeless Tobacco Never Used     Social History     Substance and Sexual Activity   Drug Use Never       Family History:    No family history on file    Physical Exam:     Vitals:   Blood Pressure: 102/51 (11/07/22 1900)  Pulse: 68 (11/07/22 1900)  Temperature: (!) 101 4 °F (38 6 °C) (11/07/22 1502)  Temp Source: Rectal (11/07/22 1502)  Respirations: 16 (11/07/22 1900)  SpO2: 97 % (11/07/22 1900)    Physical Exam  Vitals reviewed  Constitutional:       Appearance: Normal appearance  She is ill-appearing  HENT:      Head: Normocephalic and atraumatic  Mouth/Throat:      Mouth: Mucous membranes are moist       Pharynx: No oropharyngeal exudate  Eyes:      General: No scleral icterus  Extraocular Movements: Extraocular movements intact  Conjunctiva/sclera: Conjunctivae normal       Pupils: Pupils are equal, round, and reactive to light  Cardiovascular:      Rate and Rhythm: Normal rate and regular rhythm  Pulses: Normal pulses  Heart sounds: Normal heart sounds  No murmur heard  Pulmonary:      Effort: Pulmonary effort is normal  No respiratory distress  Breath sounds: Wheezing present  Comments: Decreased breath sounds bilateral at the bases with mild expiratory wheezing  Abdominal:      General: Bowel sounds are normal  There is no distension  Palpations: Abdomen is soft  Tenderness: There is no abdominal tenderness  Musculoskeletal:      Cervical back: Normal range of motion and neck supple  Right lower leg: No edema  Left lower leg: No edema  Skin:     General: Skin is warm and dry  Coloration: Skin is pale  Findings: Bruising (Right lower extremity) present  Neurological:      General: No focal deficit present  Mental Status: She is alert and oriented to person, place, and time  Cranial Nerves: No cranial nerve deficit     Psychiatric:         Mood and Affect: Mood normal             Additional Data:     Lab Results:  Results from last 7 days   Lab Units 11/07/22  1521   WBC Thousand/uL 18 49*   HEMOGLOBIN g/dL 7 7*   HEMATOCRIT % 25 8*   PLATELETS Thousands/uL 302   NEUTROS PCT % 87*   LYMPHS PCT % 3*   MONOS PCT % 9   EOS PCT % 0     Results from last 7 days   Lab Units 11/07/22  1521   SODIUM mmol/L 131*   POTASSIUM mmol/L 3 8   CHLORIDE mmol/L 103   CO2 mmol/L 14*   BUN mg/dL 16   CREATININE mg/dL 1 48*   ANION GAP mmol/L 14*   CALCIUM mg/dL 8 8   ALBUMIN g/dL 2 8*   TOTAL BILIRUBIN mg/dL 0 89   ALK PHOS U/L 168*   ALT U/L 39   AST U/L 62*   GLUCOSE RANDOM mg/dL 137     Results from last 7 days   Lab Units 11/07/22  1521   INR  1 12             Results from last 7 days   Lab Units 11/07/22  1702 11/07/22  1521   LACTIC ACID mmol/L 1 6 5 6*   PROCALCITONIN ng/ml  --  0 28*       Imaging:  Reviewed  XR chest portable   ED Interpretation by Barbra Swain MD (11/07 1650)   Possible LLL infiltrate with blurring of L costophrenic angle, correlates to clinical exam and picture          EKG and Other Studies Reviewed on Admission:   · EKG:  Personally reviewed shows atrial fibrillation at 85 beats per minute    ** Please Note: This note has been constructed using a voice recognition system   **

## 2022-11-08 NOTE — UTILIZATION REVIEW
NOTIFICATION OF INPATIENT ADMISSION   AUTHORIZATION REQUEST   SERVICING FACILITY:   [facility]  Address: [address] [place] [address], [address] [place] [address]  Tax ID: [id number]  NPI: [id number] ATTENDING PROVIDER:  Attending Name and NPI#: Jessicakelin Sue [[id number]]  Address: [address] [place] [address], [address] [place] [address]  Phone: [phone]   ADMISSION INFORMATION:  Place of [facility]patient [facility]  [address]  Place of Service Code: 21  Inpatient Admission Date/Time: 11/7/22  6:37 PM  Discharge Date/Time: No discharge date for patient encounter  Admitting Diagnosis Code/Description:  Hypotension [I95 9]     UTILIZATION REVIEW CONTACT:  Matti Gonzales, Utilization   Network Utilization Review Department  Phone: [phone]  Fax: [phone]  Email: [email or web address]  Contact for approvals/pending authorizations, clinical reviews, and discharge  PHYSICIAN ADVISORY SERVICES:  Medical Necessity Denial & [facility]sv[facility] Review  Phone: [phone]  Fax: [phone]  Email: [email or web address]

## 2022-11-08 NOTE — ASSESSMENT & PLAN NOTE
Patient presented with presyncope and fall at her facility  On presentation found to be febrile with leukocytosis and lactic acidosis  Initially Hypotensive which improved with IV fluids  · Suspect due to pneumonia as patient reports 3 days of URI symptoms/cough and CXR with minimal infiltrate medially at the left lung base  · No dysuria or abdominal pain, no diarrhea  Negative COVID/flu/RSV PCR  UA pending     · Continue with IV ceftriaxone, day 2  · Lactic acid normalized s/p IV fluid  · Follow-up blood cultures x 2   · Trend temps, WBC, hemodynamics, procalcitonin

## 2022-11-08 NOTE — ASSESSMENT & PLAN NOTE
With hypotension on admission as above, now improved   · Resume home antihypertensives when appropriate   · Monitor BP routinely

## 2022-11-08 NOTE — ASSESSMENT & PLAN NOTE
Initial concern for Afib on admission EKG with controlled heart rate  Cardiology reads as frequent PACs  · Cardiac echo in January 2022 with preserved biventricular function  No significant valve disease    · Maintained on Coreg outpatient, which is currently on hold due to hypotension   · Check repeat EKG this morning   · Monitor on telemetry - currently NSR

## 2022-11-08 NOTE — ASSESSMENT & PLAN NOTE
Lab Results   Component Value Date    EGFR 47 11/08/2022    EGFR 32 11/07/2022    EGFR 40 01/20/2022    CREATININE 1 09 11/08/2022    CREATININE 1 48 (H) 11/07/2022    CREATININE 1 25 01/20/2022     · ADIS POA, see plan above  · Baseline creatinine approximately 0 8-1 0 per chart review   · Avoid nephrotoxins and hypotension   · Monitor BMP

## 2022-11-08 NOTE — ASSESSMENT & PLAN NOTE
Secondary to severe sepsis  Underlying history of essential hypertension  Hold lisinopril, HCTZ and Coreg  IV fluid for hydration  Continue with IV antibiotic  Monitor

## 2022-11-08 NOTE — ASSESSMENT & PLAN NOTE
Presenting status post fall from standing at nursing facility  No head strike or LOC    · Fall precautions   · PT/OT evaluations pending

## 2022-11-08 NOTE — ASSESSMENT & PLAN NOTE
Patient with 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 treated previously with argon plasma coagulation in 2016  Also with underlying iron deficiency anemia on iron supplementation  baeline hemoglobin appears to be between 8-10    · Hemoglobin 7 7 admission and down-trending   · Iron panel: Fe 10, ferritin 22, TIBC 264, iron saturation 4%  · Defer IV Venofer given underlying infection, continue with PO supplementation     · FOBT ordered and pending   · Trend H&H q6h and and transfuse if < 7  · Will consult GI if hemoglobin continues to decline and/or evidence of active bleeding

## 2022-11-08 NOTE — ASSESSMENT & PLAN NOTE
POA along with hyponatremia with metabolic/lactic acidosis  · Multifactorial in the setting of hypotension/severe sepsis, acute anemia   · Creatinine improved status post IV fluids  · Continue to hold home lisinopril and HCTZ  · CPK level mildly elevated   · Monitor BMP

## 2022-11-08 NOTE — CASE MANAGEMENT
Case Management Assessment & Discharge Planning Note    Patient name Sivakumar Martin  Location ED 20/ED 20 MRN [id number]  : 1940 Date 2022       Current Admission Date: 2022  Current Admission Diagnosis:Severe sepsis [facility])   Patient Active Problem List    Diagnosis Date Noted   • Severe sepsis ([facility] ) 2022   • Hypotension 2022   • Ambulatory dysfunction 2022   • PAC (premature atrial contraction) 2022   • Tobacco abuse 2022   • Symptomatic bradycardia 2022   • Syncope 2022   • History of CVA (cerebrovascular accident) 2022   • Mixed hyperlipidemia 2022   • Stage 3a chronic kidney disease ([facility] [address] ) 12/15/2020   • ADIS (acute kidney injury) ([facility] [address] ) 2020   • Acute osteomyelitis of fifth toe of left foot ([facility] [address] ) 2020   • Right internal carotid occlusion 2020   • History of left-sided carotid endarterectomy 08/15/2019   • Impaired mobility and ADLs 2019   • Acute on chronic anemia 2019   • Abnormal CT scan 2019   • Symptomatic stenosis of left carotid artery 2019   • Hyperlipidemia 2015   • Cataracts, bilateral 10/26/2015   • Essential hypertension 10/26/2015      LOS (days): 1  Geometric Mean LOS (GMLOS) (days):   Days to GMLOS:     OBJECTIVE:    Risk of Unplanned Readmission Score: 16 37    Current admission status: Inpatient       Preferred Pharmacy:   [facility] [address]  Phone: [phone] Fax: [phone]    UNKNOWN - FOLLOW UP PRIOR TO DISCHARGE TO E-PRESCRIBE  No address on file      Primary Care Provider: Karen Leos DO    Primary Insurance: Britney Vasquez [facility]  Secondary Insurance:     ASSESSMENT:  [address], [address] Representative - Child   Primary Phone: [phone] (Home)               Readmission Root Cause  30 Day Readmission: No    Patient Information  Admitted from[de-identified] Home  Mental Status: Alert  During Assessment patient was accompanied by: Daughter  Assessment information provided by[de-identified] Patient, Daughter  Primary Caregiver: Family  Caregiver's Name[de-identified] Susu Ordonez Relationship to Patient[de-identified] Family Member  Caregiver's Telephone Number[de-identified] [phone]  Support Systems: [address] of Residence: [address] [place] [address] do you live in?: [email or web address] entry access options   Select all that apply : Ramp, Elevator  Type of Current Residence: Other (Comment) ([facility])  In the last 12 months, was there a time when you were not able to pay the mortgage or rent on time?: No  In the last 12 months, how many places have you lived?: 1  In the last 12 months, was there a time when you did not have a steady place to sleep or slept in a shelter (including now)?: No  Homeless/housing insecurity resource given?: No  Living Arrangements: Lives Alone    Activities of Daily Living Prior to Admission  Functional Status: Assistance  Completes ADLs independently?: No  Level of ADL dependence: Assistance  Ambulates independently?: Yes  Does patient use assisted devices?: Yes  Assisted Devices (DME) used: Quad Cane  Does patient currently own DME?: Yes  What DME does the patient currently own?: [employer]  Does patient have a history of Outpatient Therapy (PT/OT)?: Yes  Does the patient have a history of Short-Term Rehab?: Yes ([facility])  Does patient have a history of HHC?: No  Does patient currently have [address]?: No    Patient Information Continued  Income Source: Pension/detention  Does patient have prescription coverage?: Yes  Within the past 12 months, you worried that your food would run out before you got the money to buy more : Never true  Within the past 12 months, the food you bought just didn't last and you didn't have money to get more : Never true  Food insecurity resource given?: No  Does patient receive dialysis treatments?: No  Does patient have a history of substance abuse?: No  Does patient have a history of Mental Health Diagnosis?: No    Means of Transportation  Means of Transport to Appts[de-identified] Family transport  In the past 12 months, has lack of transportation kept you from medical appointments or from getting medications?: No  In the past 12 months, has lack of transportation kept you from meetings, work, or from getting things needed for daily living?: No  Was application for public transport provided?: No    DISCHARGE DETAILS:    Discharge planning discussed with[de-identified] Patient and daughter Kaela [place] of Choice: Yes  Comments - Freedom of Choice: Pt states that if she is rec'd for rehab at d/ she would prefer [facility] as oppsosed to [address] [place]  CM contacted family/caregiver?: Yes     Contacts  Patient Contacts: [place]  Relationship to Patient[de-identified] Family  Contact Method: In Person  Reason/Outcome: Continuity of Care, Emergency Contact, Discharge Planning        Additional Comments: CM met with pt and daughter Amy Pulido at bedside to complete Open  Pt was able to answer most questions but was corrected several times by family  Pt lives alone in a senior [facility]  Pt's family comes several times a week to assist with bathing, meal prep, laundry, and cleaning  Pt is able to ambulate with a quad cane  Pt reports that she was at [facility] in 2019 s/p stroke  Pt would prefer to go home at d/ as opposed to STR  Pt states that she would be open to [facility] if it were recommended

## 2022-11-09 PROBLEM — I95.9 HYPOTENSION: Status: RESOLVED | Noted: 2022-11-07 | Resolved: 2022-11-09

## 2022-11-09 PROBLEM — I49.1 PAC (PREMATURE ATRIAL CONTRACTION): Status: RESOLVED | Noted: 2022-11-07 | Resolved: 2022-11-09

## 2022-11-09 PROBLEM — N17.9 AKI (ACUTE KIDNEY INJURY) (HCC): Status: RESOLVED | Noted: 2020-11-24 | Resolved: 2022-11-09

## 2022-11-09 LAB
ABO GROUP BLD BPU: NORMAL
ALBUMIN SERPL BCP-MCNC: 2.4 G/DL (ref 3.5–5)
ALP SERPL-CCNC: 124 U/L (ref 46–116)
ALT SERPL W P-5'-P-CCNC: 45 U/L (ref 12–78)
ANION GAP SERPL CALCULATED.3IONS-SCNC: 6 MMOL/L (ref 4–13)
AST SERPL W P-5'-P-CCNC: 168 U/L (ref 5–45)
BILIRUB SERPL-MCNC: 0.93 MG/DL (ref 0.2–1)
BILIRUB UR QL STRIP: NEGATIVE
BPU ID: NORMAL
BUN SERPL-MCNC: 12 MG/DL (ref 5–25)
CALCIUM ALBUM COR SERPL-MCNC: 10.1 MG/DL (ref 8.3–10.1)
CALCIUM SERPL-MCNC: 8.8 MG/DL (ref 8.3–10.1)
CHLORIDE SERPL-SCNC: 107 MMOL/L (ref 96–108)
CLARITY UR: CLEAR
CO2 SERPL-SCNC: 23 MMOL/L (ref 21–32)
COLOR UR: NORMAL
CREAT SERPL-MCNC: 0.97 MG/DL (ref 0.6–1.3)
CROSSMATCH: NORMAL
ERYTHROCYTE [DISTWIDTH] IN BLOOD BY AUTOMATED COUNT: 16.9 % (ref 11.6–15.1)
GFR SERPL CREATININE-BSD FRML MDRD: 54 ML/MIN/1.73SQ M
GLUCOSE SERPL-MCNC: 83 MG/DL (ref 65–140)
GLUCOSE UR STRIP-MCNC: NEGATIVE MG/DL
HCT VFR BLD AUTO: 22.2 % (ref 34.8–46.1)
HCT VFR BLD AUTO: 25.7 % (ref 34.8–46.1)
HCT VFR BLD AUTO: 27.9 % (ref 34.8–46.1)
HEMOCCULT STL QL: NEGATIVE
HEMOCCULT STL QL: NORMAL
HEMOCCULT STL QL: NORMAL
HGB BLD-MCNC: 6.5 G/DL (ref 11.5–15.4)
HGB BLD-MCNC: 7.8 G/DL (ref 11.5–15.4)
HGB BLD-MCNC: 8.6 G/DL (ref 11.5–15.4)
HGB UR QL STRIP.AUTO: NEGATIVE
KETONES UR STRIP-MCNC: NEGATIVE MG/DL
LEUKOCYTE ESTERASE UR QL STRIP: NEGATIVE
MCH RBC QN AUTO: 23.9 PG (ref 26.8–34.3)
MCHC RBC AUTO-ENTMCNC: 30.4 G/DL (ref 31.4–37.4)
MCV RBC AUTO: 79 FL (ref 82–98)
NITRITE UR QL STRIP: NEGATIVE
PH UR STRIP.AUTO: 6 [PH]
PLATELET # BLD AUTO: 252 THOUSANDS/UL (ref 149–390)
PMV BLD AUTO: 11.5 FL (ref 8.9–12.7)
POTASSIUM SERPL-SCNC: 3.7 MMOL/L (ref 3.5–5.3)
PROCALCITONIN SERPL-MCNC: 1.23 NG/ML
PROT SERPL-MCNC: 5.8 G/DL (ref 6.4–8.4)
PROT UR STRIP-MCNC: NEGATIVE MG/DL
RBC # BLD AUTO: 3.27 MILLION/UL (ref 3.81–5.12)
SODIUM SERPL-SCNC: 136 MMOL/L (ref 135–147)
SP GR UR STRIP.AUTO: 1.01 (ref 1–1.03)
UNIT DISPENSE STATUS: NORMAL
UNIT PRODUCT CODE: NORMAL
UNIT PRODUCT VOLUME: 350 ML
UNIT RH: NORMAL
UROBILINOGEN UR STRIP-ACNC: <2 MG/DL
WBC # BLD AUTO: 10.66 THOUSAND/UL (ref 4.31–10.16)

## 2022-11-09 RX ORDER — CEFDINIR 300 MG/1
300 CAPSULE ORAL EVERY 12 HOURS SCHEDULED
Status: DISCONTINUED | OUTPATIENT
Start: 2022-11-09 | End: 2022-11-10 | Stop reason: HOSPADM

## 2022-11-09 RX ORDER — HYDROCHLOROTHIAZIDE 25 MG/1
25 TABLET ORAL DAILY
Status: DISCONTINUED | OUTPATIENT
Start: 2022-11-09 | End: 2022-11-10 | Stop reason: HOSPADM

## 2022-11-09 RX ORDER — CARVEDILOL 3.12 MG/1
3.12 TABLET ORAL DAILY
Status: DISCONTINUED | OUTPATIENT
Start: 2022-11-09 | End: 2022-11-10 | Stop reason: HOSPADM

## 2022-11-09 RX ADMIN — CEFDINIR 300 MG: 300 CAPSULE ORAL at 21:08

## 2022-11-09 RX ADMIN — LEVALBUTEROL 1.25 MG: 1.25 SOLUTION, CONCENTRATE RESPIRATORY (INHALATION) at 14:44

## 2022-11-09 RX ADMIN — PANTOPRAZOLE SODIUM 40 MG: 40 TABLET, DELAYED RELEASE ORAL at 05:09

## 2022-11-09 RX ADMIN — CARVEDILOL 3.12 MG: 3.12 TABLET, FILM COATED ORAL at 11:55

## 2022-11-09 RX ADMIN — FERROUS SULFATE TAB 325 MG (65 MG ELEMENTAL FE) 325 MG: 325 (65 FE) TAB at 08:06

## 2022-11-09 RX ADMIN — BACITRACIN 1 SMALL APPLICATION: 500 OINTMENT TOPICAL at 08:06

## 2022-11-09 RX ADMIN — ASPIRIN 81 MG CHEWABLE TABLET 81 MG: 81 TABLET CHEWABLE at 08:06

## 2022-11-09 RX ADMIN — Medication 3 MG: at 21:08

## 2022-11-09 RX ADMIN — CEFDINIR 300 MG: 300 CAPSULE ORAL at 11:57

## 2022-11-09 RX ADMIN — ISODIUM CHLORIDE 3 ML: 0.03 SOLUTION RESPIRATORY (INHALATION) at 14:44

## 2022-11-09 RX ADMIN — ATORVASTATIN CALCIUM 80 MG: 80 TABLET, FILM COATED ORAL at 08:06

## 2022-11-09 RX ADMIN — HEPARIN SODIUM 5000 UNITS: 5000 INJECTION INTRAVENOUS; SUBCUTANEOUS at 13:16

## 2022-11-09 RX ADMIN — ISODIUM CHLORIDE 3 ML: 0.03 SOLUTION RESPIRATORY (INHALATION) at 08:10

## 2022-11-09 RX ADMIN — HEPARIN SODIUM 5000 UNITS: 5000 INJECTION INTRAVENOUS; SUBCUTANEOUS at 21:08

## 2022-11-09 RX ADMIN — HEPARIN SODIUM 5000 UNITS: 5000 INJECTION INTRAVENOUS; SUBCUTANEOUS at 05:09

## 2022-11-09 RX ADMIN — LEVALBUTEROL 1.25 MG: 1.25 SOLUTION, CONCENTRATE RESPIRATORY (INHALATION) at 21:47

## 2022-11-09 RX ADMIN — HYDROCHLOROTHIAZIDE 25 MG: 25 TABLET ORAL at 11:55

## 2022-11-09 RX ADMIN — ISODIUM CHLORIDE 3 ML: 0.03 SOLUTION RESPIRATORY (INHALATION) at 21:54

## 2022-11-09 RX ADMIN — BACITRACIN 1 SMALL APPLICATION: 500 OINTMENT TOPICAL at 17:13

## 2022-11-09 RX ADMIN — LEVALBUTEROL 1.25 MG: 1.25 SOLUTION, CONCENTRATE RESPIRATORY (INHALATION) at 08:10

## 2022-11-09 NOTE — ASSESSMENT & PLAN NOTE
Lab Results   Component Value Date    EGFR 54 11/09/2022    EGFR 47 11/08/2022    EGFR 32 11/07/2022    CREATININE 0 97 11/09/2022    CREATININE 1 09 11/08/2022    CREATININE 1 48 (H) 11/07/2022     · With superimposed ADIS on admission; resolved  · Baseline creatinine approximately 0 8-1 0 per chart review   · Avoid nephrotoxins, NSAIDs, and hypotension   · Monitor BMP

## 2022-11-09 NOTE — PROGRESS NOTES
[address] [facility]  Progress Note Peter Cabral 1940, 80 y o  female MRN: [id number]  Unit/Bed#: [facility] 522-01 Encounter: [id number]  Primary Care Provider: Becky Pedro DO   Date and time admitted to hospital: 11/7/2022  2:46 PM    * Severe sepsis [facility])  Assessment & Plan  Patient presented with presyncope and fall at home  On presentation found to be febrile with leukocytosis and lactic acidosis  Initially hypotensive which improved with 2L bolus in the ED  · Suspect due to pneumonia as patient reports 3 days of URI symptoms/cough and CXR with minimal infiltrate medially at the left lung base  · No dysuria or abdominal pain, no diarrhea  · Negative COVID/flu/RSV PCR  · UA, urine strep and legionella pending  · Lactic acid normalized s/p IV fluid  · Blood cultures NGTD x24hrs   · Trend fever and WBC curve; overall improved since admission  · Procalcitonin trending down  · Given significant improvement in symptoms, received 2 days of Rocephin then switched to Cefdinir on 11/9 to complete a 5-day course of abx  Currently D#3  Acute on chronic anemia  Assessment & Plan  Patient with 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 treated previously with argon plasma coagulation in 2016  Also with underlying iron deficiency anemia on iron supplementation  baseline hemoglobin appears to be between 8-10  · Hemoglobin 7 7; dropped to 6 5 overnight requiring 1u pRBC  · Suspect in the setting of underlying anemia and acute infection  · Iron panel: Fe 10, ferritin 22, TIBC 264, iron saturation 4%; continue with PO supplementation     · FOBT negative  · Trend H&H q8h and and transfuse if < 7  · D/w GI; no need for scope currently given no signs of overt bleeding   Can follow up as an OP    Stage 3a chronic kidney disease [facility])  Assessment & Plan  Lab Results   Component Value Date    EGFR 54 11/09/2022    EGFR 47 11/08/2022    EGFR 32 11/07/2022    CREATININE 0 97 11/09/2022 CREATININE 1 09 11/08/2022    CREATININE 1 48 (H) 11/07/2022     · With superimposed ADIS on admission; resolved  · Baseline creatinine approximately 0 8-1 0 per chart review   · Avoid nephrotoxins, NSAIDs, and hypotension   · Monitor BMP    Essential hypertension  Assessment & Plan  With hypotension on admission as above, now back to baseline  · Resume carvedilol and HCTZ per PTA regimen with strict hold parameters  · Monitor BP routinely     History of CVA (cerebrovascular accident)  Assessment & Plan  Hx L MCA CVA in June 2019  · Continue aspirin & statin    Tobacco abuse  Assessment & Plan  · Nicotine patch on board   · Encourage cessation     PAC (premature atrial contraction)  Assessment & Plan  Initial concern for Afib on admission EKG with controlled heart rate  Cardiology reads as frequent PACs  · Cardiac echo in January 2022 with preserved biventricular function  No significant valve disease  · Maintained on Coreg outpatient, continue while IP  · Check repeat EKG on 11/8 reading NSR  · No events overnight on telemetry; will discontinue    Ambulatory dysfunction  Assessment & Plan  Presenting status post fall from standing at nursing facility  No head strike or LOC    · Fall precautions   · PT/OT evaluations pending     Hypotension-resolved as of 11/9/2022  Assessment & Plan  POA suspect due to severe sepsis vs acute anemia; resolved s/p IV fluid resuscitation  · Underlying history of essential hypertension; restarted on carvedilol and HCTZ per PTA regimen  · Monitor routinely     ADIS (acute kidney injury) (HCC)-resolved as of 11/9/2022  Assessment & Plan  POA along with hyponatremia with metabolic/lactic acidosis; resolved  · Multifactorial in the setting of hypotension/severe sepsis, acute anemia   · CPK level mildly elevated   · Creatinine improved status post IV fluids  · Monitor BMP      VTE Pharmacologic Prophylaxis: VTE Score: 8 High Risk (Score >/= 5) - Pharmacological DVT Prophylaxis Ordered: heparin  Sequential Compression Devices Ordered  Patient Centered Rounds: I performed bedside rounds with nursing staff today  Discussions with Specialists or Other Care Team Provider: GI, case management  Education and Discussions with Family / Patient: Attempted to update  (daughter) via phone  Unable to contact  Time Spent for Care: 20 minutes  More than 50% of total time spent on counseling and coordination of care as described above  Current Length of Stay: 2 day(s)  Current Patient Status: Inpatient   Certification Statement: The patient will continue to require additional inpatient hospital stay due to further monitoring of hgb today  Discharge Plan: Anticipate discharge tomorrow to discharge location to be determined pending rehab evaluations  Code Status: Level 1 - Full Code    Subjective:   Pt examined today sitting OOB in chair  Reports she's feeling great today and has regained her energy  States she got a good night's rest  Discussed that I would have GI evaluate her given acute drop in Hgb and she was agreeable  Otherwise states she is hoping to be discharged soon  Objective:     Vitals:   Temp (24hrs), Av 4 °F (36 9 °C), Min:97 6 °F (36 4 °C), Max:98 9 °F (37 2 °C)    Temp:  [97 6 °F (36 4 °C)-98 9 °F (37 2 °C)] 98 6 °F (37 °C)  HR:  [71-92] 77  Resp:  [18-20] 18  BP: ()/(49-93) 119/58  SpO2:  [94 %-100 %] 99 %  Body mass index is 30 01 kg/m²  Input and Output Summary (last 24 hours): Intake/Output Summary (Last 24 hours) at 2022 1440  Last data filed at 2022 0900  Gross per 24 hour   Intake 520 ml   Output 325 ml   Net 195 ml       Physical Exam:   Physical Exam  Constitutional:       General: She is not in acute distress  Appearance: Normal appearance  Cardiovascular:      Rate and Rhythm: Normal rate and regular rhythm  Pulses: Normal pulses  Heart sounds: Normal heart sounds  No murmur heard    Pulmonary:      Effort: Pulmonary effort is normal  No respiratory distress  Breath sounds: Normal breath sounds  Abdominal:      General: Bowel sounds are normal  There is no distension  Palpations: Abdomen is soft  Tenderness: There is no abdominal tenderness  Musculoskeletal:         General: Normal range of motion  Skin:     General: Skin is warm and dry  Capillary Refill: Capillary refill takes less than 2 seconds  Neurological:      Mental Status: She is alert and oriented to person, place, and time  Mental status is at baseline  Motor: No weakness  Additional Data:     Labs:  Results from last 7 days   Lab Units 11/09/22  0503 11/08/22  0824 11/08/22  0526   WBC Thousand/uL 10 66*  --  14 53*   HEMOGLOBIN g/dL 7 8*   < > 6 8*   HEMATOCRIT % 25 7*   < > 22 7*   PLATELETS Thousands/uL 252  --  260   NEUTROS PCT %  --   --  78*   LYMPHS PCT %  --   --  12*   MONOS PCT %  --   --  10   EOS PCT %  --   --  0    < > = values in this interval not displayed       Results from last 7 days   Lab Units 11/09/22  0503   SODIUM mmol/L 136   POTASSIUM mmol/L 3 7   CHLORIDE mmol/L 107   CO2 mmol/L 23   BUN mg/dL 12   CREATININE mg/dL 0 97   ANION GAP mmol/L 6   CALCIUM mg/dL 8 8   ALBUMIN g/dL 2 4*   TOTAL BILIRUBIN mg/dL 0 93   ALK PHOS U/L 124*   ALT U/L 45   AST U/L 168*   GLUCOSE RANDOM mg/dL 83     Results from last 7 days   Lab Units 11/07/22  1521   INR  1 12             Results from last 7 days   Lab Units 11/09/22  0503 11/08/22  0526 11/07/22  1947 11/07/22  1702 11/07/22  1521   LACTIC ACID mmol/L  --   --   --  1 6 5 6*   PROCALCITONIN ng/ml 1 23* 1 55* 1 04*  --  0 28*       Lines/Drains:  Invasive Devices  Report    Peripheral Intravenous Line  Duration           Peripheral IV 11/07/22 Left;Ventral (anterior) Forearm 1 day                      Imaging: Reviewed radiology reports from this admission including: chest xray    Recent Cultures (last 7 days):   Results from last 7 days   Lab Units 11/07/22  1558   BLOOD CULTURE  No Growth at 24 hrs  No Growth at 24 hrs  Last 24 Hours Medication List:   Current Facility-Administered Medications   Medication Dose Route Frequency Provider Last Rate   • acetaminophen  650 mg Oral Q6H PRN Gina Finnegan, DO     • aspirin  81 mg Oral Daily Gina Finnegan, DO     • atorvastatin  80 mg Oral Daily Gina Finnegan, DO     • bacitracin  1 small application Topical BID Gina Finnegan, DO     • benzonatate  100 mg Oral TID PRN Gina Finnegan DO     • carvedilol  3 125 mg Oral Daily Elder Aguayo, MARVANP     • cefdinir  300 mg Oral Q12H [address], CRNP     • ferrous sulfate  325 mg Oral Daily With Breakfast Gina Finnegan DO     • heparin (porcine)  5,000 Units Subcutaneous [facility] Gina Finnegan, DO     • hydrochlorothiazide  25 mg Oral Daily Elder Aguayo, MARVANP     • levalbuterol  1 25 mg Nebulization TID Gina Finnegan, DO     • melatonin  3 mg Oral HS Gina Finnegan, DO     • nicotine  14 mg Transdermal Daily Gina Finnegan DO     • ondansetron  4 mg Intravenous Q6H PRN Gina Finnegan DO     • pantoprazole  40 mg Oral Early Morning Gina Finnegan DO     • sodium chloride  3 mL Nebulization TID Gina Finnegan DO          Today, Patient Was Seen By: Elder Aguayo    **Please Note: This note may have been constructed using a voice recognition system  **

## 2022-11-09 NOTE — ASSESSMENT & PLAN NOTE
Patient presented with presyncope and fall at home  On presentation found to be febrile with leukocytosis and lactic acidosis  Initially hypotensive which improved with 2L bolus in the ED  · Suspect due to pneumonia as patient reports 3 days of URI symptoms/cough and CXR with minimal infiltrate medially at the left lung base  · No dysuria or abdominal pain, no diarrhea  · Negative COVID/flu/RSV PCR  · UA, urine strep and legionella pending  · Lactic acid normalized s/p IV fluid  · Blood cultures NGTD x24hrs   · Trend fever and WBC curve; overall improved since admission  · Procalcitonin trending down  · Given significant improvement in symptoms, received 2 days of Rocephin then switched to Cefdinir on 11/9 to complete a 5-day course of abx  Currently D#3

## 2022-11-09 NOTE — ASSESSMENT & PLAN NOTE
Initial concern for Afib on admission EKG with controlled heart rate  Cardiology reads as frequent PACs  · Cardiac echo in January 2022 with preserved biventricular function  No significant valve disease    · Maintained on Coreg outpatient, continue while IP  · Check repeat EKG on 11/8 reading NSR  · No events overnight on telemetry; will discontinue

## 2022-11-09 NOTE — PHYSICAL THERAPY NOTE
Physical Therapy Evaluation     Patient's Name: Santiago Waters    Admitting Diagnosis  Atrial fibrillation ([place] [address] ) [I48 91]  Anemia [D64 9]  Hypotension [I95 9]  Septic shock ([place] [address] ) [A41 9, R65 21]    Problem List  Patient Active Problem List   Diagnosis    Cataracts, bilateral    Essential hypertension    Hyperlipidemia    Abnormal CT scan    Symptomatic stenosis of left carotid artery    Acute on chronic anemia    Impaired mobility and ADLs    History of left-sided carotid endarterectomy    Right internal carotid occlusion    Acute osteomyelitis of fifth toe of left foot (HCC)    Stage 3a chronic kidney disease (HCC)    Symptomatic bradycardia    Syncope    History of CVA (cerebrovascular accident)    Mixed hyperlipidemia    Severe sepsis ([place] [address] )    Ambulatory dysfunction    PAC (premature atrial contraction)    Tobacco abuse       Past Medical History  Past Medical History:   Diagnosis Date    CVA (cerebral vascular accident) ([place] [address] )     Hypertension        Past Surgical History  Past Surgical History:   Procedure Laterality Date    CAROTID ENDARTARECTOMY Left 6/28/2019    Procedure: ENDARTERECTOMY ARTERY CAROTID WITH PATCH ANGIOPLASTY;  Surgeon: Nasir Solis MD;  [facility];  Service: Vascular    IR LOWER EXTREMITY ANGIOGRAM  11/25/2020    TOE AMPUTATION Left 11/27/2020    Procedure: AMPUTATION 5th TOE left foot;  Surgeon: Tiffanie Peñaloza DPM;  Location: [facility];  Service: Podiatry          11/09/22 0917   PT Last Visit   PT Visit Date 11/09/22   Note Type   Note type Evaluation   Pain Assessment   Pain Assessment Tool 0-10   Pain Score No Pain   Restrictions/Precautions   Weight Bearing Precautions Per Order No   Other Precautions Chair Alarm; Bed Alarm;Multiple lines;Telemetry; Fall Risk   Home Living   Type of Home Apartment  ([facility] high rise)   Home Layout One level;Ramped entrance;Elevator   Bathroom Shower/Tub Tub/shower unit   Bathroom Toilet Raised   Bathroom Equipment Tub transfer bench;Grab bars in shower;Grab bars around toilet   Home Equipment Walker;Cane  (hurrycane - reports use PTA)   Prior Function   Level of [place] Needs assistance with functional mobility; Needs assistance with IADLS; Independent with functional mobility   Lives With Alone   Receives Help From Family  (checks in multiple times per week to assist with ADLs and IADLs)   Falls in the last 6 months 1 to 4  (1 fall)   Vocational Retired   General   Family/Caregiver Present No   Cognition   Overall Cognitive Status WFL   Arousal/Participation Alert   Attention Attends with cues to redirect   Orientation Level Oriented X4   Memory Decreased recall of precautions   Following Commands Follows one step commands without difficulty   Comments Some decreased safety awareness and insight  Subjective   Subjective Pleasant and agreeable to participate in therapy session  RLE Assessment   RLE Assessment   (functionally 3+/5)   LLE Assessment   LLE Assessment   (functionally 3+/5)   Bed Mobility   Supine to Sit 5  Supervision   Additional items HOB elevated; Increased time required   Additional Comments Left OOB in chair upon PT arrival    Transfers   Sit to Stand 5  Supervision   Additional items Increased time required;Verbal cues   Stand to Sit 5  Supervision   Additional items Increased time required;Verbal cues   Additional Comments Transfers with RW   Ambulation/Elevation   Gait pattern Excessively slow; Ataxia; Decreased foot clearance; Improper Weight shift  (some unsteadiness)   Gait Assistance   (CGA)   Additional items Assist x 1;Verbal cues; Tactile cues   Assistive Device Rolling walker   Distance 30 ft x 2   Balance   Static Sitting Fair +   Dynamic Sitting Fair   Static Standing Fair -   Dynamic Standing Poor +   Ambulatory Poor +   Activity Tolerance   Activity Tolerance Patient limited by fatigue   Medical Staff Made Aware OT, OTS   Nurse Made Aware RN cleared pt to be seen by PT   Assessment   Prognosis Good   Problem List Decreased strength;Decreased endurance; Impaired balance;Decreased mobility; Decreased coordination;Decreased safety awareness   Assessment Pt seen for high complexity PT evaluation due to decrease in functional mobility status compared to baseline  Pt with active PT eval/treat orders at this time  Pt is a 80 y o  F who presented to [facility] with presyncope on 11/7/22  Pt primary dx is severe sepsis  Pt  has a past medical history of CVA (cerebral vascular accident) ([address] ) and Hypertension  Pt resides alone in [facility] with elevator to enter  Pt presents with decreased strength, balance, endurance that contribute to limitations in bed mobility, functional transfers, functional mobility  Pt requires supervision for bed mobility and STS and CGA for ambulation at this time  Pt left upright in bedside chair with all needs in reach  Pt will benefit from skilled therapy in order to address current impairments and functional limitations  PT to follow pt and recommending HHPT  The patient's AM-PAC Basic Mobility Inpatient Short Form Raw Score is 18  A Raw score of greater than 16 suggests the patient may benefit from discharge to home  Please also refer to the recommendation of the Physical Therapist for safe discharge planning  Barriers to Discharge None   Goals   Patient Goals to go home   STG Expiration Date 11/23/22   Short Term Goal #1 1  Pt will demonstrate ability to perform all aspects of bed mobility with I in order to increase independence and decrease burden on caregivers  2  Pt will demonstrate ability to perform functional transfers with Mod I in order to increase independence and decrease burden on caregivers  3  Pt will demonstrate ability to ambulate 200 ft with least restrictive AD with Mod I in order to return to mobility safely  4  Pt will demonstrate ability to negotiate full flight steps with/without HR and Mod I in order to return to household/community mobility safely   5  Pt will demonstrate improved balance by one grade order to decrease risk of falls  6  Pt will increase b/l LE strength by 1 grade in order to increase ease of functional mobility and transfers  Plan   Treatment/Interventions Functional transfer training;LE strengthening/ROM; Therapeutic exercise; Endurance training;Patient/family training;Equipment eval/education; Bed mobility;Gait training;Spoke to nursing   PT Frequency 2-3x/wk   Recommendation   PT Discharge Recommendation Home with home health rehabilitation   Equipment Recommended [facility] Recommended Wheeled walker   Change/add to [employer]?  No   AM-PAC Basic Mobility Inpatient   Turning in Bed Without Bedrails 3   Lying on Back to Sitting on Edge of Flat Bed 3   Moving Bed to Chair 3   Standing Up From Chair 3   Walk in Room 3   Climb 3-5 Stairs 3   Basic Mobility Inpatient Raw Score 18   Basic Mobility Standardized Score 41 05   Highest Level Of Mobility   JH-HLM Goal 6: Walk 10 steps or more   JH-HLM Achieved 7: Walk 25 feet or more   Modified Fowler Scale   Modified Mirian Scale 4         Maggi Mcgrath, PT, DPT

## 2022-11-09 NOTE — QUICK NOTE
Repeat H&H back with hgb 6 8  Blood consent obtained with daughter at bedside  Discussed pt's hemoglobin trend has been steadily hovering around 7, and that pt is not symptomatic at this time  It appears 1unit PRBC was ordered this morning in the ED but the pt never received the unit  Daughter, pt and myself in agreement to hold off on transfusion until repeat H&H at midnight to further trend the hgb

## 2022-11-09 NOTE — ASSESSMENT & PLAN NOTE
POA suspect due to severe sepsis vs acute anemia; resolved s/p IV fluid resuscitation  · Underlying history of essential hypertension; restarted on carvedilol and HCTZ per PTA regimen  · Monitor routinely

## 2022-11-09 NOTE — ASSESSMENT & PLAN NOTE
POA along with hyponatremia with metabolic/lactic acidosis; resolved  · Multifactorial in the setting of hypotension/severe sepsis, acute anemia   · CPK level mildly elevated   · Creatinine improved status post IV fluids  · Monitor BMP

## 2022-11-09 NOTE — ASSESSMENT & PLAN NOTE
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

## 2022-11-09 NOTE — ASSESSMENT & PLAN NOTE
Sepsis present on admission resolved  Likely due to pneumonia  Blood cultures negative x2  Urine Legionella antigen negative, urine strep antigen negative  Clinically improving  Continue p o   Cefdinir to complete 5 day course of antibiotics  Supportive care

## 2022-11-09 NOTE — PLAN OF CARE
Problem: PHYSICAL THERAPY ADULT  Goal: Performs mobility at highest level of function for planned discharge setting  See evaluation for individualized goals  Description: Treatment/Interventions: Functional transfer training, LE strengthening/ROM, Therapeutic exercise, Endurance training, Patient/family training, Equipment eval/education, Bed mobility, Gait training, Spoke to nursing  Equipment Recommended: Savannah Galindo       See flowsheet documentation for full assessment, interventions and recommendations  Note: Prognosis: Good  Problem List: Decreased strength, Decreased endurance, Impaired balance, Decreased mobility, Decreased coordination, Decreased safety awareness  Assessment: Pt seen for high complexity PT evaluation due to decrease in functional mobility status compared to baseline  Pt with active PT eval/treat orders at this time  Pt is a 80 y o  F who [facility] to [facility] with presyncope on 11/7/22  Pt primary dx is severe sepsis  Pt  has a past medical history of CVA (cerebral vascular accident) ([facility] [address] ) and Hypertension  Pt resides alone in [facility] with elevator to enter  Pt presents with decreased strength, balance, endurance that contribute to limitations in bed mobility, functional transfers, functional mobility  Pt requires supervision for bed mobility and STS and CGA for ambulation at this time  Pt left upright in bedside chair with all needs in reach  Pt will benefit from skilled therapy in order to address current impairments and functional limitations  PT to follow pt and recommending HHPT  The patient's AM-PAC Basic Mobility Inpatient Short Form Raw Score is 18  A Raw score of greater than 16 suggests the patient may benefit from discharge to home  Please also refer to the recommendation of the Physical Therapist for safe discharge planning    Barriers to Discharge: None     PT Discharge Recommendation: Home with home health rehabilitation    See flowsheet documentation for full assessment

## 2022-11-09 NOTE — ASSESSMENT & PLAN NOTE
With hypotension on admission as above, now back to baseline  · Resume carvedilol and HCTZ per PTA regimen with strict hold parameters  · Monitor BP routinely

## 2022-11-09 NOTE — OCCUPATIONAL THERAPY NOTE
Occupational Therapy Evaluation     Patient Name: Cha Godfrey  [id number] Date: 11/9/2022  Problem List  Principal Problem:    Severe sepsis ([facility] [address] )  Active Problems:    Essential hypertension    Acute on chronic anemia    Stage 3a chronic kidney disease ([facility] )    History of CVA (cerebrovascular accident)    Ambulatory dysfunction    Tobacco abuse    Past Medical History  Past Medical History:   Diagnosis Date    CVA (cerebral vascular accident) ([facility] )     Hypertension      Past Surgical History  Past Surgical History:   Procedure Laterality Date    CAROTID ENDARTARECTOMY Left 6/28/2019    Procedure: ENDARTERECTOMY ARTERY CAROTID WITH PATCH ANGIOPLASTY;  Surgeon: Chitra Solis MD;  [facility];  Service: Vascular    IR LOWER EXTREMITY ANGIOGRAM  11/25/2020    TOE AMPUTATION Left 11/27/2020    Procedure: AMPUTATION 5th TOE left foot;  Surgeon: Ayaz Ledesma DPM;  Location[facility];  Service: Podiatry         11/09/22 0915   OT Last Visit   OT Visit Date 11/09/22   Note Type   Note type Evaluation   Pain Assessment   Pain Assessment Tool 0-10   Pain Score No Pain   Restrictions/Precautions   Weight Bearing Precautions Per Order No   Other Precautions Chair Alarm;Multiple lines;Telemetry; Fall Risk  (SCD)   Home Living   Type of Home Apartment  ([facility])   Home Layout One level;Ramped entrance;Elevator;Performs ADLs on one level; Able to live on main level with bedroom/bathroom; Access   Bathroom Shower/Tub Tub/shower unit   [employer] Raised   Bathroom Equipment Tub transfer bench;Grab bars in shower;Grab bars around toilet   [address] Cane;Walker  (hurry cane)   Additional Comments Pt reports that she has a RW + hurry cane - uses hurry cane inside and outside  Prior Function   Level of [place] Needs assistance with ADLs; Needs assistance with IADLS; Independent with functional mobility  (Use of hurry cane for fnxl mobility)   Lives With (S) Alone   Receives Help From Family; Other (Comment)  (Local children + grandchildren that visit daily)   IADLs Family/Friend/Other provides transportation; Family/Friend/Other provides meals; Family/Friend/Other provides medication management   Falls in the last 6 months 1 to 4  (1 that led to admission)   Vocational Retired   Lifestyle   Autonomy Pta, pt required assistance for ADLs, IADLs, and use of hurry cane during fnxl mobility  AE includes RW + hurry cane (use of hurry cane inside and outside)  -  (family assists)   Reciprocal Relationships Pt has social supports including family (children and grandchildren)  Service to Others Pt is retired  Intrinsic Gratification To play bingo with neighbors at the [facility]   Subjective   Subjective "I feel good I can do this"   ADL   Where Assessed Edge of bed   Eating Assistance 6  Modified independent   Grooming Assistance 5  Supervision/Setup   [address] [facility] 4  Minimal Assistance   Additional Comments At baseline, pt receives assistance from family for ADLs + IADLs  Bed Mobility   Rolling R 5  Supervision   Additional items Verbal cues   Supine to Sit 5  Supervision   Additional items Verbal cues   Additional Comments Pt was found supine in bed and left in bedside chair w/chair alarm intact and call bell in reach  Transfers   Sit to Stand 5  Supervision   Additional items Verbal cues; Increased time required   Stand to Sit 5  Supervision   Additional items Verbal cues; Increased time required; Impulsive  (Pt noted w/slight impulsivity during stand>sit transition requiring min vc)   Additional Comments Pt required use of the RW for support during transitions  Pt demonstrates F safety awareness and insight into condition  Functional Mobility   Functional Mobility 5  Supervision   Additional Comments CGA w/RW during fnxl mobility in hallway - pt able to ambulate household distance  Pt requires min vc for safety and hand placement w/RW  Additional items Rolling walker   Balance   Static Sitting Good   Dynamic Sitting Fair +   Static Standing Fair   Dynamic Standing Iain [place]field [phone] -   Activity Tolerance   Activity Tolerance Patient tolerated treatment well   Medical Staff Made Aware DPT Zelda Romano, OT Claudell Loyal   Nurse Made Aware RN aware   RUE Assessment   RUE Assessment WFL   LUE Assessment   LUE Assessment WFL   Hand Function   Gross Motor Coordination Functional   Fine Motor Coordination Functional   Psychosocial   Psychosocial (WDL) WDL   Cognition   Overall Cognitive Status WFL   Arousal/Participation Alert; Responsive; Cooperative   Attention Attends with cues to redirect   Orientation Level Oriented X4   Memory Within functional limits; Other (Comment)  (needs to be cued to remain aware of safety awareness and hand placement w/RW )   Following Commands Follows one step commands without difficulty   Comments Pt is able to follow conversation and attend to min vc for safety and hand placement w/RW during transitions + fnxl mobility  Pt required min vc for stand>sit transition to descend slowly - pt follows  Pt demonstrates F safety awareness and insight into condition  Assessment   Prognosis Fair   Assessment Pt is a 25-year-old female admitted to [facility] on 11/7/22 presenting after episode of lightheadedness and presyncope  No head strike or LOC  OT eval orders active  Pt  has a past medical history of CVA (cerebral vascular accident) ([address] ) and Hypertension  Pta, pt was requiring assistance for ADLs and IADLs  Pt uses hurry cane for fnxl mobility  Pt is retired  Pt does not drive (family transports)  AE includes RW + hurry cane  Pt lives in a 1-level apt ([facility] high rise apt) alone w/ramped entrance + elevator   Bathroom set-up includes tub/shower unit + raised toilet  DME includes tub transfer bench + gb in shower + near toilet  Presently, pt is MOD I for eating, S for grooming, MIN A for UB bathing/dressing + LB bathing/dressing + toileting assistance + functional assistance, S for bed mobility, S for sit>stand +stand>sit (w/increased time, min vc + RW), and CGA for functional mobility (w/increased time, min vc + RW)  Pt reports that her children and grandchildren will be available to assist pt upon d/c  The patient's raw score on the AM-PAC Daily Activity inpatient short form is 21, standardized score is 44 27, greater than 39 4  Patients at this level are likely to benefit from discharge to home  Please refer to the recommendation of the Occupational Therapist for safe discharge planning  Recommendation is for pt to receive home health rehabilitation upon d/c  Pt demonstrates F safety awareness and insight into condition + adequate and appropriate familial support + within baseline functioning as the pt does receive assistance from family for fnxl tasks at this time  Therefore, from an OT standpoint, acute OT orders are not indicated at this time  D/c OT orders, please consult OT if re-eval is needed     Goals   Patient Goals "to get back home"   Recommendation   OT Discharge Recommendation Home with home health rehabilitation   AM-[facility] Daily Activity Inpatient   Lower Body Dressing 3   Bathing 3   Toileting 3   Upper Body Dressing 4   Grooming 4   Eating 4   Daily Activity Raw Score 21   Daily Activity Standardized Score (Calc for Raw Score >=11) 44 27   AM-PAC Applied Cognition Inpatient   Following a Speech/Presentation 4   Understanding Ordinary Conversation 4   Taking Medications 4   Remembering Where Things Are Placed or Put Away 4   Remembering List of 4-5 Errands 3   Taking Care of Complicated Tasks 3   Applied Cognition Raw Score 22   Applied Cognition Standardized Score 47 83   Modified [place] Scale   Modified [place] Scale 4 End of Consult   Education Provided Yes   Patient Position at End of Consult Bedside chair;Bed/Chair alarm activated; All needs within reach   Nurse Communication Nurse aware of consult     SIENNA Lainez

## 2022-11-09 NOTE — CONSULTS
Consultation - [facility]  Audrey Mckenzie 80 y o  female MRN: [id number]  Unit/Bed#: [facility] 522-01 Encounter: [id number]              Inpatient consult to gastroenterology     Performed by  Vane Gaviria DO     Authorized by Ti Chung              Reason for Consult / Principal Problem:     Iron deficiency Anemia      ASSESSMENT AND PLAN:      25-year-old female with past medical history of iron deficiency anemia, GERD, AVM disease treated with APC in the past, CVA on aspirin who was admitted to hospital for episode of presyncope, sepsis secondary to pneumonia, possible new onset atrial fibrillation  GI is consulted for iron deficiency anemia  1  Iron deficiency anemia  Patient's baseline hemoglobin is 8 to 10  Hemoglobin on initial presentation was 7 7  Then decreased to 6 5  Iron saturation of 4%  Appears dilutional after IV rescucitation  She denies any symptoms of overt GI bleeding  Appears hemodynamically stable currently  Given lack of overt GI bleeding suspect chronic anemia likely secondary to possible AVM disease as noted on her previous endoscopies  For primary team she will not require anticoagulation as AFib has been ruled out  · Recommend IV iron supplementation  No indication for inpatient endoscopy at this time given stability and response to transfusion  · Acceptable start anticoagulation if indicated  · Monitor hemoglobin and transfuse for less than 7    · Can follow-up as an outpatient in discussed repeat EGD with push enteroscopy if patient requires anticoagulation  2  History of AVM disease  Noted on prior endoscopy in 2019 with small AVM in stomach, 5 in the duodenum both of which treated with APC  · Plan for outpatient follow-up to discuss need for repeat endoscopy if patient requires anticoagulation  3  GERD  Symptoms currently stable  · Continue Protonix 40 mg daily    Rest of care per primary team   Thank you for this consultation  ______________________________________________________________________    HPI:  49-year-old female with past medical history of iron deficiency anemia, GERD, AVM disease treated with APC in the past, CVA on aspirin who was admitted to hospital for episode of presyncope, sepsis secondary to pneumonia, possible new onset atrial fibrillation  GI is consulted for iron deficiency anemia  She reports having presyncopal episode  She was having few day history of productive cough  She denies any nausea, vomiting, hematemesis, coffee-ground emesis, abdominal pain, constipation, diarrhea, melena or hematochezia  She had prior EGD/colonoscopy in 2019 with evidence of AVM disease and stomach and duodenum treated with APC  Currently feels improved and requested to go home  REVIEW OF SYSTEMS:    Review of Systems   Constitutional: Negative for chills and fever  HENT: Negative for congestion and sinus pressure  Respiratory: Negative for cough and shortness of breath  Cardiovascular: Negative for chest pain, palpitations and leg swelling  Gastrointestinal: Negative for abdominal pain, diarrhea, nausea and vomiting  Genitourinary: Negative for dysuria and hematuria  Musculoskeletal: Negative for arthralgias and back pain  Skin: Negative for color change and rash  Neurological: Negative for dizziness and headaches  Psychiatric/Behavioral: Negative for agitation and confusion  All other systems reviewed and are negative           Historical Information   Past Medical History:   Diagnosis Date   • CVA (cerebral vascular accident) [facility])    • Hypertension      Past Surgical History:   Procedure Laterality Date   • CAROTID ENDARTARECTOMY Left 6/28/2019    Procedure: ENDARTERECTOMY ARTERY CAROTID WITH PATCH ANGIOPLASTY;  Surgeon: Chantal Solis MD;  [facility];  Service: Vascular   • IR LOWER EXTREMITY ANGIOGRAM  11/25/2020   • TOE AMPUTATION Left 11/27/2020    Procedure: AMPUTATION 5th TOE left foot;  Surgeon: Shira Alcala DPM;  [facility];  Service: Podiatry     Social History   Social History     Substance and Sexual Activity   Alcohol Use Not Currently     Social History     Substance and Sexual Activity   Drug Use Never     Social History     Tobacco Use   Smoking Status Former Smoker   • Packs/day: 0 25   • Years: 50 00   • Pack years: 12 50   • Types: Cigarettes   Smokeless Tobacco Never Used     No family history on file      Meds/Allergies     Medications Prior to Admission   Medication   • aspirin 81 mg chewable tablet   • aspirin 81 mg chewable tablet   • atorvastatin (LIPITOR) 80 mg tablet   • atorvastatin (LIPITOR) 80 mg tablet   • carvedilol (COREG) 3 125 mg tablet   • carvedilol (COREG) 3 125 mg tablet   • ferrous sulfate 325 (65 Fe) mg tablet   • ferrous sulfate 325 (65 Fe) mg tablet   • fluticasone (FLONASE) 50 mcg/act nasal spray   • fluticasone (VERAMYST) 27 5 MCG/SPRAY nasal spray   • Garlic 499 MG TABS   • Garlic 909 MG TABS   • hydrochlorothiazide (HYDRODIURIL) 25 mg tablet   • Melatonin 5 MG TABS   • Melatonin 5 MG TABS   • pantoprazole (PROTONIX) 40 mg tablet   • Pantoprazole Sodium (PROTONIX PO)   • sodium chloride (OCEAN) 0 65 % nasal spray     Current Facility-Administered Medications   Medication Dose Route Frequency   • acetaminophen (TYLENOL) tablet 650 mg  650 mg Oral Q6H PRN   • aspirin chewable tablet 81 mg  81 mg Oral Daily   • atorvastatin (LIPITOR) tablet 80 mg  80 mg Oral Daily   • bacitracin topical ointment 1 small application  1 small application Topical BID   • benzonatate (TESSALON PERLES) capsule 100 mg  100 mg Oral TID PRN   • carvedilol (COREG) tablet 3 125 mg  3 125 mg Oral Daily   • cefdinir (OMNICEF) capsule 300 mg  300 mg Oral Q12H [address]   • ferrous sulfate tablet 325 mg  325 mg Oral Daily With Breakfast   • heparin (porcine) subcutaneous injection 5,000 Units  5,000 Units Subcutaneous Q8H [address]   • hydrochlorothiazide (HYDRODIURIL) tablet 25 mg  25 mg Oral Daily   • levalbuterol (XOPENEX) inhalation solution 1 25 mg  1 25 mg Nebulization TID   • melatonin tablet 3 mg  3 mg Oral HS   • nicotine (NICODERM CQ) 14 mg/24hr TD 24 hr patch 14 mg  14 mg Transdermal Daily   • ondansetron (ZOFRAN) injection 4 mg  4 mg Intravenous Q6H PRN   • pantoprazole (PROTONIX) EC tablet 40 mg  40 mg Oral Early Morning   • sodium chloride 0 9 % inhalation solution 3 mL  3 mL Nebulization TID       No Known Allergies        Objective     Blood pressure 119/58, pulse 77, temperature 98 6 °F (37 °C), temperature source Oral, resp  rate 18, height 4' 11" (1 499 m), weight 67 4 kg (148 lb 9 6 oz), SpO2 99 %  Body mass index is 30 01 kg/m²  Intake/Output Summary (Last 24 hours) at 11/9/2022 1257  Last data filed at 11/9/2022 0900  Gross per 24 hour   Intake 520 ml   Output 325 ml   Net 195 ml         PHYSICAL EXAM:      Physical Exam  Vitals and nursing note reviewed  Constitutional:       General: She is not in acute distress  Appearance: Normal appearance  She is well-developed  She is obese  She is not ill-appearing  HENT:      Head: Normocephalic and atraumatic  Mouth/Throat:      Mouth: Mucous membranes are moist    Eyes:      Extraocular Movements: Extraocular movements intact  Conjunctiva/sclera: Conjunctivae normal       Pupils: Pupils are equal, round, and reactive to light  Cardiovascular:      Rate and Rhythm: Normal rate and regular rhythm  Pulses: Normal pulses  Heart sounds: Normal heart sounds  No murmur heard  No friction rub  No gallop  Pulmonary:      Effort: Pulmonary effort is normal  No respiratory distress  Breath sounds: Normal breath sounds  No wheezing or rales  Abdominal:      General: Abdomen is flat  Bowel sounds are normal  There is no distension  Palpations: Abdomen is soft  Tenderness: There is no abdominal tenderness  There is no guarding  Musculoskeletal:      Cervical back: Neck supple        Right lower leg: No edema  Left lower leg: No edema  Skin:     General: Skin is warm and dry  Neurological:      General: No focal deficit present  Mental Status: She is alert and oriented to person, place, and time  Psychiatric:         Mood and Affect: Mood normal          Behavior: Behavior normal           Lab Results:   Admission on 11/07/2022   Component Date Value   • WBC 11/07/2022 18 49 (A)   • RBC 11/07/2022 3 31 (A)   • Hemoglobin 11/07/2022 7 7 (A)   • Hematocrit 11/07/2022 25 8 (A)   • MCV 11/07/2022 78 (A)   • MCH 11/07/2022 23 3 (A)   • MCHC 11/07/2022 29 8 (A)   • RDW 11/07/2022 16 4 (A)   • MPV 11/07/2022 11 3    • Platelets 29/54/8491 302    • nRBC 11/07/2022 0    • Neutrophils Relative 11/07/2022 87 (A)   • Immat GRANS % 11/07/2022 1    • Lymphocytes Relative 11/07/2022 3 (A)   • Monocytes Relative 11/07/2022 9    • Eosinophils Relative 11/07/2022 0    • Basophils Relative 11/07/2022 0    • Neutrophils Absolute 11/07/2022 16 18 (A)   • Immature Grans Absolute 11/07/2022 0 16    • Lymphocytes Absolute 11/07/2022 0 49 (A)   • Monocytes Absolute 11/07/2022 1 61 (A)   • Eosinophils Absolute 11/07/2022 0 01    • Basophils Absolute 11/07/2022 0 04    • Sodium 11/07/2022 131 (A)   • Potassium 11/07/2022 3 8    • Chloride 11/07/2022 103    • CO2 11/07/2022 14 (A)   • ANION GAP 11/07/2022 14 (A)   • BUN 11/07/2022 16    • Creatinine 11/07/2022 1 48 (A)   • Glucose 11/07/2022 137    • Calcium 11/07/2022 8 8    • Corrected Calcium 11/07/2022 9 8    • AST 11/07/2022 62 (A)   • ALT 11/07/2022 39    • Alkaline Phosphatase 11/07/2022 168 (A)   • Total Protein 11/07/2022 7 3    • Albumin 11/07/2022 2 8 (A)   • Total Bilirubin 11/07/2022 0 89    • eGFR 11/07/2022 32    • LACTIC ACID 11/07/2022 5 6 (A)   • Procalcitonin 11/07/2022 0 28 (A)   • Protime 11/07/2022 14 6 (A)   • INR 11/07/2022 1 12    • PTT 11/07/2022 21 (A)   • Blood Culture 11/07/2022 No Growth at 24 hrs      • Blood Culture 11/07/2022 No Growth at 24 hrs      • SARS-CoV-2 11/07/2022 Negative    • INFLUENZA A PCR 11/07/2022 Negative    • INFLUENZA B PCR 11/07/2022 Negative    • RSV PCR 11/07/2022 Negative    • hs TnI 0hr 11/07/2022 11    • Ventricular Rate 11/07/2022 83    • Atrial Rate 11/07/2022 202    • QRSD Interval 11/07/2022 78    • QT Interval 11/07/2022 360    • QTC Interval 11/07/2022 423    • QRS Axis 11/07/2022 -20    • T Wave Axis 11/07/2022 67    • hs TnI 2hr 11/07/2022 15    • Delta 2hr hsTnI 11/07/2022 4    • hs TnI 4hr 11/07/2022 22    • Delta 4hr hsTnI 11/07/2022 11    • LACTIC ACID 11/07/2022 1 6    • Total CK 11/07/2022 318 (A)   • Procalcitonin 11/07/2022 1 04 (A)   • Platelets 67/70/5776 252    • MPV 11/07/2022 11 3    • Fecal Occult Blood Diagn* 11/09/2022 Negative    • Fecal Occult Blood Diagn* 11/09/2022 Test not performed    • Fecal Occult Blood Diagn* 11/09/2022 Test not performed    • CK-MB Index 11/07/2022 1 2    • CK-MB 11/07/2022 3 9    • Procalcitonin 11/08/2022 1 55 (A)   • Sodium 11/08/2022 135    • Potassium 11/08/2022 3 3 (A)   • Chloride 11/08/2022 104    • CO2 11/08/2022 23    • ANION GAP 11/08/2022 8    • BUN 11/08/2022 15    • Creatinine 11/08/2022 1 09    • Glucose 11/08/2022 86    • Calcium 11/08/2022 8 0 (A)   • Corrected Calcium 11/08/2022 9 4    • AST 11/08/2022 118 (A)   • ALT 11/08/2022 40    • Alkaline Phosphatase 11/08/2022 126 (A)   • Total Protein 11/08/2022 6 3 (A)   • Albumin 11/08/2022 2 3 (A)   • Total Bilirubin 11/08/2022 0 42    • eGFR 11/08/2022 47    • WBC 11/08/2022 14 53 (A)   • RBC 11/08/2022 2 98 (A)   • Hemoglobin 11/08/2022 6 8 (A)   • Hematocrit 11/08/2022 22 7 (A)   • MCV 11/08/2022 76 (A)   • MCH 11/08/2022 22 8 (A)   • MCHC 11/08/2022 30 0 (A)   • RDW 11/08/2022 16 1 (A)   • MPV 11/08/2022 11 2    • Platelets 97/34/9119 260    • nRBC 11/08/2022 0    • Neutrophils Relative 11/08/2022 78 (A)   • Immat GRANS % 11/08/2022 0    • Lymphocytes Relative 11/08/2022 12 (A)   • Monocytes Relative 11/08/2022 10    • Eosinophils Relative 11/08/2022 0    • Basophils Relative 11/08/2022 0    • Neutrophils Absolute 11/08/2022 11 28 (A)   • Immature Grans Absolute 11/08/2022 0 06    • Lymphocytes Absolute 11/08/2022 1 68    • Monocytes Absolute 11/08/2022 1 43 (A)   • Eosinophils Absolute 11/08/2022 0 06    • Basophils Absolute 11/08/2022 0 02    • Magnesium 11/08/2022 2 1    • Phosphorus 11/08/2022 2 3    • ABO Grouping 11/08/2022 O    • Rh Factor 11/08/2022 Positive    • Antibody Screen 11/08/2022 Negative    • Specimen Expiration Date 11/08/2022 [id number]    • Unit Product Code 11/09/2022 [id number]    • Unit Number 11/09/2022 [id number]    • Unit ABO 11/09/2022 O    • Unit [facility] 11/09/2022 POS    • Crossmatch 11/09/2022 Compatible    • Unit Dispense Status 11/09/2022 Presumed Trans    • Unit Product Volume 11/09/2022 350    • Hemoglobin 11/08/2022 7 2 (A)   • Hematocrit 11/08/2022 23 6 (A)   • Folate 11/08/2022 17 9 (A)   • Vitamin B-12 11/08/2022 533    • Iron Saturation 11/08/2022 4 (A)   • TIBC 11/08/2022 264    • Iron 11/08/2022 10 (A)   • Ferritin 11/08/2022 22    • Hemoglobin 11/08/2022 7 4 (A)   • Hematocrit 11/08/2022 23 7 (A)   • Ventricular Rate 11/08/2022 71    • Atrial Rate 11/08/2022 71    • IN Interval 11/08/2022 156    • QRSD Interval 11/08/2022 82    • QT Interval 11/08/2022 388    • QTC Interval 11/08/2022 421    • P Axis 11/08/2022 60    • QRS Axis 11/08/2022 2    • T Wave Axis 11/08/2022 61    • Hemoglobin 11/08/2022 6 8 (A)   • Hematocrit 11/08/2022 22 1 (A)   • Hemoglobin 11/08/2022 6 5 (A)   • Hematocrit 11/08/2022 22 2 (A)   • WBC 11/09/2022 10 66 (A)   • RBC 11/09/2022 3 27 (A)   • Hemoglobin 11/09/2022 7 8 (A)   • Hematocrit 11/09/2022 25 7 (A)   • MCV 11/09/2022 79 (A)   • MCH 11/09/2022 23 9 (A)   • MCHC 11/09/2022 30 4 (A)   • RDW 11/09/2022 16 9 (A)   • Platelets 64/83/7228 252    • MPV 11/09/2022 11 5    • Sodium 11/09/2022 136    • Potassium 11/09/2022 3 7    • Chloride 11/09/2022 107    • CO2 11/09/2022 23    • ANION GAP 11/09/2022 6    • BUN 11/09/2022 12    • Creatinine 11/09/2022 0 97    • Glucose 11/09/2022 83    • Calcium 11/09/2022 8 8    • Corrected Calcium 11/09/2022 10 1    • AST 11/09/2022 168 (A)   • ALT 11/09/2022 45    • Alkaline Phosphatase 11/09/2022 124 (A)   • Total Protein 11/09/2022 5 8 (A)   • Albumin 11/09/2022 2 4 (A)   • Total Bilirubin 11/09/2022 0 93    • eGFR 11/09/2022 54    • Procalcitonin 11/09/2022 1 23 (A)       Imaging Studies: I have personally reviewed pertinent imaging studies  [address] [facility]    Gastroenterology Fellow  PGY-4  Available via [employer]  11/9/2022 12:57 PM

## 2022-11-10 ENCOUNTER — HOME HEALTH ADMISSION (OUTPATIENT)
Dept: HOME HEALTH SERVICES | Facility: HOME HEALTHCARE | Age: 82
End: 2022-11-10

## 2022-11-10 VITALS
OXYGEN SATURATION: 96 % | BODY MASS INDEX: 29.96 KG/M2 | DIASTOLIC BLOOD PRESSURE: 67 MMHG | WEIGHT: 148.6 LBS | TEMPERATURE: 98.6 F | RESPIRATION RATE: 18 BRPM | HEART RATE: 80 BPM | SYSTOLIC BLOOD PRESSURE: 130 MMHG | HEIGHT: 59 IN

## 2022-11-10 LAB
BASOPHILS # BLD AUTO: 0.06 THOUSANDS/ÂΜL (ref 0–0.1)
BASOPHILS NFR BLD AUTO: 1 % (ref 0–1)
EOSINOPHIL # BLD AUTO: 0.43 THOUSAND/ÂΜL (ref 0–0.61)
EOSINOPHIL NFR BLD AUTO: 5 % (ref 0–6)
ERYTHROCYTE [DISTWIDTH] IN BLOOD BY AUTOMATED COUNT: 16.6 % (ref 11.6–15.1)
HCT VFR BLD AUTO: 25.6 % (ref 34.8–46.1)
HCT VFR BLD AUTO: 27 % (ref 34.8–46.1)
HGB BLD-MCNC: 7.9 G/DL (ref 11.5–15.4)
HGB BLD-MCNC: 8 G/DL (ref 11.5–15.4)
IMM GRANULOCYTES # BLD AUTO: 0.04 THOUSAND/UL (ref 0–0.2)
IMM GRANULOCYTES NFR BLD AUTO: 1 % (ref 0–2)
L PNEUMO1 AG UR QL IA.RAPID: NEGATIVE
LYMPHOCYTES # BLD AUTO: 1.35 THOUSANDS/ÂΜL (ref 0.6–4.47)
LYMPHOCYTES NFR BLD AUTO: 17 % (ref 14–44)
MCH RBC QN AUTO: 23.6 PG (ref 26.8–34.3)
MCHC RBC AUTO-ENTMCNC: 29.6 G/DL (ref 31.4–37.4)
MCV RBC AUTO: 80 FL (ref 82–98)
MONOCYTES # BLD AUTO: 1 THOUSAND/ÂΜL (ref 0.17–1.22)
MONOCYTES NFR BLD AUTO: 12 % (ref 4–12)
NEUTROPHILS # BLD AUTO: 5.32 THOUSANDS/ÂΜL (ref 1.85–7.62)
NEUTS SEG NFR BLD AUTO: 64 % (ref 43–75)
NRBC BLD AUTO-RTO: 0 /100 WBCS
PLATELET # BLD AUTO: 268 THOUSANDS/UL (ref 149–390)
PMV BLD AUTO: 11.5 FL (ref 8.9–12.7)
RBC # BLD AUTO: 3.39 MILLION/UL (ref 3.81–5.12)
S PNEUM AG UR QL: NEGATIVE
WBC # BLD AUTO: 8.2 THOUSAND/UL (ref 4.31–10.16)

## 2022-11-10 RX ORDER — CEFDINIR 300 MG/1
300 CAPSULE ORAL EVERY 12 HOURS SCHEDULED
Qty: 6 CAPSULE | Refills: 0 | Status: CANCELLED | OUTPATIENT
Start: 2022-11-10 | End: 2022-11-13

## 2022-11-10 RX ORDER — CEFDINIR 300 MG/1
300 CAPSULE ORAL EVERY 12 HOURS SCHEDULED
Qty: 3 CAPSULE | Refills: 0 | Status: SHIPPED | OUTPATIENT
Start: 2022-11-10 | End: 2022-11-12

## 2022-11-10 RX ORDER — BENZONATATE 100 MG/1
100 CAPSULE ORAL 3 TIMES DAILY PRN
Qty: 20 CAPSULE | Refills: 0 | Status: SHIPPED | OUTPATIENT
Start: 2022-11-10

## 2022-11-10 RX ORDER — GUAIFENESIN/DEXTROMETHORPHAN 100-10MG/5
5 SYRUP ORAL 3 TIMES DAILY PRN
Qty: 354 ML | Refills: 0 | Status: SHIPPED | OUTPATIENT
Start: 2022-11-10

## 2022-11-10 RX ADMIN — LEVALBUTEROL 1.25 MG: 1.25 SOLUTION, CONCENTRATE RESPIRATORY (INHALATION) at 08:16

## 2022-11-10 RX ADMIN — ASPIRIN 81 MG CHEWABLE TABLET 81 MG: 81 TABLET CHEWABLE at 08:09

## 2022-11-10 RX ADMIN — FERROUS SULFATE TAB 325 MG (65 MG ELEMENTAL FE) 325 MG: 325 (65 FE) TAB at 08:09

## 2022-11-10 RX ADMIN — BACITRACIN 1 SMALL APPLICATION: 500 OINTMENT TOPICAL at 08:09

## 2022-11-10 RX ADMIN — ATORVASTATIN CALCIUM 80 MG: 80 TABLET, FILM COATED ORAL at 08:09

## 2022-11-10 RX ADMIN — ISODIUM CHLORIDE 3 ML: 0.03 SOLUTION RESPIRATORY (INHALATION) at 08:16

## 2022-11-10 RX ADMIN — HEPARIN SODIUM 5000 UNITS: 5000 INJECTION INTRAVENOUS; SUBCUTANEOUS at 05:06

## 2022-11-10 RX ADMIN — HYDROCHLOROTHIAZIDE 25 MG: 25 TABLET ORAL at 08:09

## 2022-11-10 RX ADMIN — CARVEDILOL 3.12 MG: 3.12 TABLET, FILM COATED ORAL at 08:09

## 2022-11-10 RX ADMIN — CEFDINIR 300 MG: 300 CAPSULE ORAL at 08:09

## 2022-11-10 RX ADMIN — PANTOPRAZOLE SODIUM 40 MG: 40 TABLET, DELAYED RELEASE ORAL at 05:06

## 2022-11-10 NOTE — NURSING NOTE
Patient is A&O X 4, discharge instruction was provided to patient and patient's daughter, daughter verbalized understanding  lumbar spine/s/p fall in May 2019

## 2022-11-10 NOTE — RESTORATIVE TECHNICIAN NOTE
Restorative Technician Note      Patient Name: Nayla Farrell     Note Type: Mobility  Activity Performed: Ambulated  Assistive Device: Roller walker  Patient Position at End of Consult: Bedside chair;  All needs within reach; Bed/Chair alarm activated

## 2022-11-10 NOTE — ASSESSMENT & PLAN NOTE
Initial concern for Afib on admission EKG with controlled heart rate  Cardiology reads as frequent PACs  · Cardiac echo in January 2022 with preserved biventricular function  No significant valve disease

## 2022-11-10 NOTE — DISCHARGE SUMMARY
[facility]  Discharge- Mynor Files 1940, 80 y o  female MRN: [id number]  Unit/Bed#: [facility] 522-01 Encounter: [id number]  Primary Care Provider: González Franklin DO   Date and time admitted to hospital: 11/7/2022  2:46 PM    * Severe sepsis [facility])  Assessment & Plan  Sepsis present on admission resolved  Likely due to pneumonia  Blood cultures negative x2  Urine Legionella antigen negative, urine strep antigen negative  Clinically improving  Continue p o  Cefdinir to complete 5 day course of antibiotics  Supportive care      Tobacco abuse  Assessment & Plan  · Smoking cessation discussed and encouraged    PAC (premature atrial contraction)  Assessment & Plan  Initial concern for Afib on admission EKG with controlled heart rate  Cardiology reads as frequent PACs  · Cardiac echo in January 2022 with preserved biventricular function  No significant valve disease  Ambulatory dysfunction  Assessment & Plan  · Fall precautions  · Safe ambulation  · Physical therapy    History of CVA (cerebrovascular accident)  Assessment & Plan  Hx L MCA CVA in June 2019  · Continue aspirin & statin    Stage 3a chronic kidney disease 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 treated previously with argon plasma coagulation in 2016  Also with underlying iron deficiency anemia on iron supplementation  baseline hemoglobin appears to be between 8-10    · Status post 1 unit packed cell transfusion  · Continue iron supplementation  · GI input noted  · GI plans for outpatient follow-up and endoscopic evaluation noted    Essential hypertension  Assessment & Plan  · Monitor blood pressures  · Avoid hypotension            Discharge Summary - [facility]    Patient Information: Nicolette Brown 80 y o  female MRN: [id number]  [facility] Encounter: [id number]    Discharging Physician / Practitioner: Hair Dias MD  PCP: Nicola Pollard DO  Admission Date: 11/7/2022  Discharge Date: 11/10/22    Disposition:     Home    Reason for Admission: "I felt dizzy and I fell"    Discharge Diagnoses:     Principal Problem:    Severe [facility] ([facility] )  Active Problems:    Essential hypertension    Acute on chronic anemia    Stage 3a chronic kidney disease ([facility] )    History of CVA (cerebrovascular accident)    Ambulatory dysfunction    PAC (premature atrial contraction)    Tobacco abuse  Resolved Problems:    ADIS (acute kidney injury) ([facility] )    Hypotension      Consultations During Hospital Stay:  · GI    Procedures Performed:     · Chest x-ray minimal infiltrate atelectasis left lung base      Hospital Course:     Nicolette Brown is a 80 y o  female patient who originally presented to the hospital on 11/7/2022 due to generalized weakness, fall  Patient was noted to have severe sepsis likely due to pneumonia  She received IV fluids antibiotics  She has clinically improving symptomatically improving she has been transition to p o  Cefdinir to complete 5 day course of antibiotics  She has history of chronic anemia due to AV malformations in GI bleed  She received 1 unit packed cells while hospitalized  She was seen in consultation with GI she is recommended p o  iron supplementation with plans for outpatient GI follow-up for possible endoscopic evaluation  Her chronic conditions remained stable, no medication changes at discharge  She has symptomatically improved hemodynamically stable and is deemed ready for discharge today  Kindly review the chart for details      Condition at Discharge: fair     Discharge Day Visit / Exam:     Subjective: Comfortably sitting up in chair  Reports feeling better  Reports improved shortness of breath or chest pain  Reports minimal cough with scanty mucus production  Reports improved appetite  Denies melena hematochezia hematemesis  History chart labs medications reviewed  Agreeable to discharge plan    Vitals: Blood Pressure: 130/67 (11/10/22 0809)  Pulse: 80 (11/10/22 0809)  Temperature: 98 6 °F (37 °C) (11/10/22 0700)  Temp Source: Oral (11/10/22 0700)  Respirations: 18 (11/09/22 2154)  Height: 4' 11" (149 9 cm) (11/08/22 1531)  Weight - Scale: 67 4 kg (148 lb 9 6 oz) (11/08/22 1531)  SpO2: 96 % (11/09/22 2154)  Exam:   Physical Exam    Comfortably sitting up in chair  Neck supple  Lungs diminished breath sounds bases  Heart sounds S1 and S2 noted  Abdomen soft nontender  Awake obeys simple commands  No pedal edema  No rash    Discharge instructions/Information to patient and family:   See after visit summary for information provided to patient and family  Discharge plan discussed the patient, daughter at bedside updated in detail questions answered  Outpatient follow-up with primary care physician, GI    Provisions for Follow-Up Care:  See after visit summary for information related to follow-up care and any pertinent home health orders  Planned Readmission: no     Discharge Statement:  I spent 45 minutes discharging the patient  This time was spent on the day of discharge  I had direct contact with the patient on the day of discharge  Greater than 50% of the total time was spent examining patient, answering all patient questions, arranging and discussing plan of care with patient as well as directly providing post-discharge instructions  Additional time then spent on discharge activities  Discharge Medications:  See after visit summary for reconciled discharge medications provided to patient and family        ** Please Note: This note has been constructed using a voice recognition system **

## 2022-11-10 NOTE — ASSESSMENT & PLAN NOTE
Lab Results   Component Value Date    EGFR 54 11/09/2022    EGFR 47 11/08/2022    EGFR 32 11/07/2022    CREATININE 0 97 11/09/2022    CREATININE 1 09 11/08/2022    CREATININE 1 48 (H) 11/07/2022     · Monitor kidney function closely  · Avoid nephrotoxins

## 2022-11-10 NOTE — CASE MANAGEMENT
Case Management Discharge Note    Patient name Pat Guardado  [facility] [id number]  : 1940 Date 11/10/2022       Current Admission Date: 2022  Current Admission Diagnosis:Severe sepsis ([address] )      LOS (days): 3  Geometric Mean LOS (GMLOS) (days): 5 00  Days to GMLOS:2 3     OBJECTIVE:  Risk of Unplanned Readmission Score: 14 65   Current admission status: Inpatient   Primary Insurance: [employer] [place] [address], S W :    Discharge planning discussed with[de-identified] Patient  Freedom of Choice: Yes  CM contacted family/caregiver?: Yes  Were Treatment Team discharge recommendations reviewed with patient/caregiver?: Yes  Did patient/caregiver verbalize understanding of patient care needs?: Yes  Were patient/caregiver advised of the risks associated with not following Treatment Team discharge recommendations?: Yes    Contacts  Patient Contacts: Gallito John (pt's daughter)  Relationship to Patient[de-identified] Family  Contact Method: Phone  Phone Number: [phone]  Reason/Outcome: Emergency [address]         Is the patient interested in Liana Frank at discharge?: Yes  [address] requested[de-identified] Nursing, Physical Therapy, Occupational [address] Name[de-identified] [facility] Provider[de-identified] PCP  Home Health Services Needed[de-identified] Other (comment), Evaluate Functional Status and Safety, Gait/ADL Training, Strengthening/Theraputic Exercises to Improve Function (Afib, CKD, Hx of CVA)  Homebound Criteria Met[de-identified] Requires the Assistance of Another Person for Safe Ambulation or to Leave the Home  Supporting Clincal Findings[de-identified] Limited Endurance    [facility]  Discharge Destination Plan[de-identified] Home with [place] at Discharge : Family    Additional Comments: Pt is cleared for d/c by MERRY Ramirez  RN Bhanu Johnson was notified of pt's d/c order   Pt is accepted for services by [facility] for her aftercare plan  The pt and her daughter Russ Gifford were both informed of d/c  Family will transport pt home later this day, pickup time is TBD  No IMM required due to pt's LOS < 3 days  No chart copy required from RN  CM to follow

## 2022-11-10 NOTE — ASSESSMENT & PLAN NOTE
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

## 2022-11-11 ENCOUNTER — HOME CARE VISIT (OUTPATIENT)
Dept: HOME HEALTH SERVICES | Facility: HOME HEALTHCARE | Age: 82
End: 2022-11-11

## 2022-11-11 VITALS
TEMPERATURE: 97.8 F | DIASTOLIC BLOOD PRESSURE: 68 MMHG | SYSTOLIC BLOOD PRESSURE: 128 MMHG | OXYGEN SATURATION: 98 % | HEART RATE: 88 BPM | RESPIRATION RATE: 16 BRPM

## 2022-11-11 RX ORDER — HYDROCHLOROTHIAZIDE 25 MG/1
25 TABLET ORAL DAILY
Qty: 30 TABLET | Refills: 0 | Status: SHIPPED | OUTPATIENT
Start: 2022-11-11 | End: 2022-12-11

## 2022-11-11 RX ORDER — CARVEDILOL 3.12 MG/1
3.12 TABLET ORAL DAILY
Qty: 30 TABLET | Refills: 0 | Status: SHIPPED | OUTPATIENT
Start: 2022-11-11 | End: 2022-12-11

## 2022-11-11 RX ORDER — PANTOPRAZOLE SODIUM 40 MG/1
40 TABLET, DELAYED RELEASE ORAL
Qty: 30 TABLET | Refills: 0 | Status: SHIPPED | OUTPATIENT
Start: 2022-11-11

## 2022-11-11 NOTE — UTILIZATION REVIEW
NOTIFICATION OF ADMISSION DISCHARGE   This is a Notification of Discharge from [facility]  Please be advised that this patient has been discharge from our facility  Below you will find the admission and discharge date and time including the patient’s disposition  UTILIZATION REVIEW CONTACT:  Marcela Laguerre  Utilization   Network Utilization Review Department  Phone: [phone] x carefully listen to the prompts  All voicemails are confidential   Email: [email or web address]     ADMISSION INFORMATION  PRESENTATION DATE: 11/7/2022  2:46 PM  OBERVATION ADMISSION DATE:   INPATIENT ADMISSION DATE: 11/7/22  6:37 PM   DISCHARGE DATE: 11/10/2022  1:28 PM  DISPOSITION: Home/Self Care Home/Self Care      IMPORTANT INFORMATION:  Send all requests for admission clinical reviews, approved or denied determinations and any other requests to dedicated fax number below belonging to the campus where the patient is receiving treatment   List of dedicated fax numbers:  [address] DENIALS (Administrative/Medical Necessity) [phone]   [address] (Maternity/NICU/Pediatrics) [phone]   [facility] [phone]   [address] [phone]   [address] [phone]   [facility] [phone]   [address] [place] [address] [phone]   [address] [place] [address] [phone]   [facility]  [phone]   [facility] [phone]   [address] [place] [address] 850 [facility] [phone]

## 2022-11-12 LAB
BACTERIA BLD CULT: NORMAL
BACTERIA BLD CULT: NORMAL

## 2022-11-12 NOTE — CASE COMMUNICATION
Kaycee Womack, DO is pcp not able to tag  [facility] has admitted your patient to [address] [place] [address] service with the following disciplines:      SN, PT and MSW  Patient refused need for OT and HHA  Response needed, please respond via  Tiger text or call daughter directly about medication issues  Primary focus of home health care Pulm assess  Patient stated goals of care get stronger remain at home  Anticipated visit pattern and next visit  date 11/15/22    1w1 2w2 1w2  See medication list - meds in home differ from AVS yes, patient needs refill on carvedilol 3 125 smaller dose ordered by hospital since in home only has 25 mg dosing tablets  Also patient has combo lisinopril hctz medication when hospital changed it to only hctz daily which needs to be filled and sent to pharm  Pavel Birch also pantoprazole needs to be filled none in home  called md office spoke to jim in office and  she stated a note would be sent to md about medication issues  SN reviewed with patient and filled mediplanners with meds in home  daughter to  other meds at pharm once ordered   Significant clinical findings patient lives alone [facility] requesting assisstance MSW ordered for hha eval and meal delivery eval  Potential barriers to goal achievement patient very optimistic but sn feels she gets lonely and needs assisstance  early am and late at night  Other pertinent information daughters live close by per patient     Thank you for allowing us to participate in the care of your patient        Merle Bruce RN A

## 2022-11-14 ENCOUNTER — HOME CARE VISIT (OUTPATIENT)
Dept: HOME HEALTH SERVICES | Facility: HOME HEALTHCARE | Age: 82
End: 2022-11-14

## 2022-11-14 NOTE — CASE COMMUNICATION
Pt was evaluated and would benefit from course of skilled home PT with focus on BLE strengthening, transfer training  balance training, and GT with SPC for improved safety and independence with functional mobility in the home and building  Plan for PT 2w1, 1w1, then 2w1 before DC to independent HEP when home PT goals met

## 2022-11-15 ENCOUNTER — TELEPHONE (OUTPATIENT)
Dept: GASTROENTEROLOGY | Facility: CLINIC | Age: 82
End: 2022-11-15

## 2022-11-15 ENCOUNTER — HOME CARE VISIT (OUTPATIENT)
Dept: HOME HEALTH SERVICES | Facility: HOME HEALTHCARE | Age: 82
End: 2022-11-15

## 2022-11-15 VITALS
RESPIRATION RATE: 20 BRPM | HEART RATE: 76 BPM | OXYGEN SATURATION: 99 % | SYSTOLIC BLOOD PRESSURE: 150 MMHG | TEMPERATURE: 95.7 F | DIASTOLIC BLOOD PRESSURE: 88 MMHG

## 2022-11-15 NOTE — TELEPHONE ENCOUNTER
----- Message from Juan F Lucas DO sent at 11/9/2022  5:26 PM EST -----  Regarding: Follow-up  Please schedule follow-up for patient discussed anemia and need for endoscopy  Thank you

## 2022-11-15 NOTE — TELEPHONE ENCOUNTER
I contacted the patient and scheduled follow up appointment  The patient was agreeable to day and time and I mailed an appointment reminder to her home address

## 2022-11-16 ENCOUNTER — HOME CARE VISIT (OUTPATIENT)
Dept: HOME HEALTH SERVICES | Facility: HOME HEALTHCARE | Age: 82
End: 2022-11-16

## 2022-11-17 ENCOUNTER — HOME CARE VISIT (OUTPATIENT)
Dept: HOME HEALTH SERVICES | Facility: HOME HEALTHCARE | Age: 82
End: 2022-11-17

## 2022-11-17 VITALS
OXYGEN SATURATION: 100 % | TEMPERATURE: 98 F | RESPIRATION RATE: 22 BRPM | DIASTOLIC BLOOD PRESSURE: 86 MMHG | HEART RATE: 80 BPM | SYSTOLIC BLOOD PRESSURE: 140 MMHG

## 2022-11-18 VITALS — OXYGEN SATURATION: 99 % | HEART RATE: 60 BPM

## 2022-11-21 ENCOUNTER — HOME CARE VISIT (OUTPATIENT)
Dept: HOME HEALTH SERVICES | Facility: HOME HEALTHCARE | Age: 82
End: 2022-11-21

## 2022-11-21 VITALS — OXYGEN SATURATION: 99 % | HEART RATE: 74 BPM

## 2022-11-22 ENCOUNTER — HOME CARE VISIT (OUTPATIENT)
Dept: HOME HEALTH SERVICES | Facility: HOME HEALTHCARE | Age: 82
End: 2022-11-22

## 2022-11-22 VITALS
TEMPERATURE: 97.2 F | SYSTOLIC BLOOD PRESSURE: 126 MMHG | RESPIRATION RATE: 18 BRPM | HEART RATE: 88 BPM | OXYGEN SATURATION: 97 % | DIASTOLIC BLOOD PRESSURE: 78 MMHG

## 2022-11-25 ENCOUNTER — HOME CARE VISIT (OUTPATIENT)
Dept: HOME HEALTH SERVICES | Facility: HOME HEALTHCARE | Age: 82
End: 2022-11-25

## 2022-11-28 ENCOUNTER — HOME CARE VISIT (OUTPATIENT)
Dept: HOME HEALTH SERVICES | Facility: HOME HEALTHCARE | Age: 82
End: 2022-11-28

## 2022-11-28 VITALS — HEART RATE: 63 BPM | OXYGEN SATURATION: 99 %

## 2022-12-01 ENCOUNTER — HOME CARE VISIT (OUTPATIENT)
Dept: HOME HEALTH SERVICES | Facility: HOME HEALTHCARE | Age: 82
End: 2022-12-01

## 2022-12-01 VITALS
TEMPERATURE: 98.1 F | SYSTOLIC BLOOD PRESSURE: 124 MMHG | OXYGEN SATURATION: 99 % | DIASTOLIC BLOOD PRESSURE: 78 MMHG | HEART RATE: 63 BPM

## 2022-12-02 VITALS — DIASTOLIC BLOOD PRESSURE: 78 MMHG | HEART RATE: 63 BPM | SYSTOLIC BLOOD PRESSURE: 124 MMHG | OXYGEN SATURATION: 99 %

## 2022-12-07 ENCOUNTER — OFFICE VISIT (OUTPATIENT)
Dept: GASTROENTEROLOGY | Facility: CLINIC | Age: 82
End: 2022-12-07

## 2022-12-07 VITALS
TEMPERATURE: 97.7 F | WEIGHT: 138 LBS | DIASTOLIC BLOOD PRESSURE: 84 MMHG | HEIGHT: 59 IN | SYSTOLIC BLOOD PRESSURE: 120 MMHG | BODY MASS INDEX: 27.82 KG/M2

## 2022-12-07 DIAGNOSIS — D50.0 IRON DEFICIENCY ANEMIA DUE TO CHRONIC BLOOD LOSS: Primary | ICD-10-CM

## 2022-12-07 NOTE — PROGRESS NOTES
[facility] [facility] - Outpatient Consultation  Mao Stall 80 y o  female MRN: [id number]  Encounter: [id number]      PCP: Amna Dodson DO  Referring: Amna Dodson DO  [facility],  [address] [place] [address]       ASSESSMENT AND PLAN:    80-year-old female with past medical history of CVA on aspirin, gastric and small bowel AVMs who was recently admitted in the hospital for presyncope and sepsis secondary to pneumonia, I was consulted for iron deficiency anemia  She is here for hospital follow-up  1  Iron deficiency anemia due to chronic blood loss  2  Small bowel AVM    Patient had EGD in 2019 which showed small nonbleeding AVM in the gastric body and in the duodenum treated with APC  Colonoscopy at the time showed 1 subcentimeter tubular adenoma, mild diverticulosis and small internal hemorrhoids    · Hemoglobin 9-10    Recent admission in the hospital with hemoglobin of 7 7 with a drop and received 1 unit packed RBC with appropriate improvement  · Unfortunately no repeat hemoglobins available for review  · We discussed that she is going to need a repeat CT to evaluate hemoglobin  · We will also repeat iron studies to ensure patient has responded to iron supplements  · Since she is having abdominal discomfort with taking iron twice a day, we discussed that she can start taking iron with vitamin C daily instead of twice daily  · If her hemoglobin continues to remain low and she has no improvement with iron supplements she will need repeat EGD to evaluate for any bleeding AVMs    - CBC and differential; Future    Follow-up in clinic in 2 to 3 months    Rich Collins MD   Gastroenterology Fellow   ______________________________________________________________________    CC:  Chief Complaint   Patient presents with   • Anemia     Patient states that she wants to talk about a EGD       HPI: 80-year-old female with past medical history of CVA on aspirin, gastric and small bowel AVMs who was recently admitted in the hospital for presyncope and sepsis secondary to pneumonia, I was consulted for iron deficiency anemia  She is here for hospital follow-up  Patient baseline hemoglobin was 9-10 and on admission to the hospital was 7 7 which decreased to 6 8 and she was given 1 unit of packed RBC and hemoglobin responded appropriately  She was not having any overt signs of bleeding therefore decision made to see her as outpatient and decide if she need any endoscopies  She states that she continues to not see any overt signs of bleeding but while she has been on iron supplements her stool has become dark  She denies any dizziness and states now she is feeling much better  REVIEW OF SYSTEMS:    CONSTITUTIONAL: Denies any fever, chills, rigors, and weight loss  HEENT: No earache or tinnitus  Denies hearing loss or visual disturbances  CARDIOVASCULAR: No chest pain or palpitations  RESPIRATORY: Denies any cough, hemoptysis, shortness of breath or dyspnea on exertion  GASTROINTESTINAL: As noted in the History of Present Illness  GENITOURINARY: No problems with urination  Denies any hematuria or dysuria  NEUROLOGIC: No dizziness or vertigo, denies headaches  MUSCULOSKELETAL: Denies any muscle or joint pain  SKIN: Denies skin rashes or itching  ENDOCRINE: Denies excessive thirst  Denies intolerance to heat or cold  PSYCHOSOCIAL: Denies depression or anxiety  Denies any recent memory loss         Historical Information   Past Medical History:   Diagnosis Date   • [facility] vascular [facility])    • Hypertension      Past Surgical History:   Procedure Laterality Date   • CAROTID ENDARTARECTOMY Left 6/28/2019    Procedure: ENDARTERECTOMY ARTERY CAROTID WITH PATCH ANGIOPLASTY;  Surgeon: Svitlana Solis MD;  [facility];  Service: Vascular   • IR LOWER EXTREMITY ANGIOGRAM  11/25/2020   • TOE AMPUTATION Left 11/27/2020    Procedure: AMPUTATION 5th TOE left foot;  Surgeon: Rubia Schmitz DPM;  Location: [facility];  Service: Podiatry     Social History   Social History     Substance and Sexual Activity   Alcohol Use Not Currently     Social History     Substance and Sexual Activity   Drug Use Never     Social History     Tobacco Use   Smoking Status Former   • Packs/day: 0 25   • Years: 50 00   • Pack years: 12 50   • Types: Cigarettes   Smokeless Tobacco Never     History reviewed  No pertinent family history  Meds/Allergies       Current Outpatient Medications:   •  ascorbic acid (VITAMIN C) 250 MG CHEW  •  aspirin 81 mg chewable tablet  •  atorvastatin (LIPITOR) 80 mg tablet  •  benzonatate (TESSALON PERLES) 100 mg capsule  •  carvedilol (COREG) 3 125 mg tablet  •  dextromethorphan-guaiFENesin (ROBITUSSIN DM)  mg/5 mL syrup  •  ferrous sulfate 325 (65 Fe) mg tablet  •  fluticasone (VERAMYST) 27 5 MCG/SPRAY nasal spray  •  Garlic 859 MG TABS  •  hydrochlorothiazide (HYDRODIURIL) 25 mg tablet  •  Melatonin 5 MG TABS  •  Multiple Vitamins-Minerals (MULTIVITAMIN GUMMIES ADULT PO)  •  pantoprazole (PROTONIX) 40 mg tablet    No Known Allergies        Objective     Blood pressure 120/84, temperature 97 7 °F (36 5 °C), temperature source Tympanic, height 4' 11" (1 499 m), weight 62 6 kg (138 lb)  Body mass index is 27 87 kg/m²  PHYSICAL EXAM:      General Appearance:   Alert, cooperative, no distress   HEENT:   Normocephalic, atraumatic, anicteric  Neck:  Supple, symmetrical, trachea midline   Lungs:   Clear to auscultation bilaterally; no rales, rhonchi or wheezing; respirations unlabored    Heart[de-identified]   Regular rate and rhythm; no murmur, rub, or gallop     Abdomen:   Soft, non-tender, non-distended; normal bowel sounds; no masses, no organomegaly    Genitalia:   Deferred    Rectal:   Deferred    Extremities:  No cyanosis, clubbing or edema    Pulses:  2+ and symmetric    Skin:  No jaundice, rashes, or lesions    Lymph nodes:  No palpable cervical lymphadenopathy        Lab Results:     Lab Results   Component Value Date    WBC 8 20 11/10/2022    HGB 8 0 (L) 11/10/2022    HCT 27 0 (L) 11/10/2022    MCV 80 (L) 11/10/2022     11/10/2022       Lab Results   Component Value Date     01/04/2016    K 3 7 11/09/2022     11/09/2022    CO2 23 11/09/2022    ANIONGAP 8 01/04/2016    BUN 12 11/09/2022    CREATININE 0 97 11/09/2022    GLUCOSE 146 (H) 01/19/2022    GLUF 87 07/22/2019    CALCIUM 8 8 11/09/2022    CORRECTEDCA 10 1 11/09/2022     (H) 11/09/2022    ALT 45 11/09/2022    ALKPHOS 124 (H) 11/09/2022    PROT 8 1 10/29/2015    BILITOT 0 48 10/29/2015    EGFR 54 11/09/2022       Lab Results   Component Value Date    INR 1 12 11/07/2022    INR 1 06 01/19/2022    INR 1 24 (H) 06/29/2019    PROTIME 14 6 (H) 11/07/2022    PROTIME 13 4 01/19/2022    PROTIME 15 2 (H) 06/29/2019         Radiology Results:   XR chest portable    Result Date: 11/8/2022  Narrative: CHEST INDICATION:   cough, sepsis w/u  COMPARISON:  01/19/2022 EXAM PERFORMED/VIEWS:  XR CHEST PORTABLE 1 image FINDINGS: Cardiomediastinal silhouette appears unremarkable  The right lung is clear  Minimal infiltrate or atelectasis in the medial aspect of the left lung base  Pulmonary vessels are normal   No pneumothorax or pleural effusion  Osseous structures appear within normal limits for patient age  Impression: Minimal infiltrate or atelectasis medially at the left lung base  Workstation performed: [id number]       Portions of the record may have been created with voice recognition software  Occasional wrong word or "sound a like" substitutions may have occurred due to the inherent limitations of voice recognition software  Read the chart carefully and recognize, using context, where substitutions have occurred

## 2022-12-08 ENCOUNTER — TELEPHONE (OUTPATIENT)
Dept: LAB | Facility: HOSPITAL | Age: 82
End: 2022-12-08

## 2022-12-08 ENCOUNTER — HOME CARE VISIT (OUTPATIENT)
Dept: HOME HEALTH SERVICES | Facility: HOME HEALTHCARE | Age: 82
End: 2022-12-08

## 2022-12-08 VITALS
RESPIRATION RATE: 16 BRPM | TEMPERATURE: 98.8 F | DIASTOLIC BLOOD PRESSURE: 80 MMHG | HEART RATE: 60 BPM | SYSTOLIC BLOOD PRESSURE: 150 MMHG | OXYGEN SATURATION: 99 %

## 2022-12-14 ENCOUNTER — APPOINTMENT (OUTPATIENT)
Dept: LAB | Facility: HOSPITAL | Age: 82
End: 2022-12-14

## 2022-12-14 DIAGNOSIS — D50.0 IRON DEFICIENCY ANEMIA DUE TO CHRONIC BLOOD LOSS: ICD-10-CM

## 2022-12-14 LAB
BASOPHILS # BLD AUTO: 0.04 THOUSANDS/ÂΜL (ref 0–0.1)
BASOPHILS NFR BLD AUTO: 1 % (ref 0–1)
EOSINOPHIL # BLD AUTO: 0.33 THOUSAND/ÂΜL (ref 0–0.61)
EOSINOPHIL NFR BLD AUTO: 5 % (ref 0–6)
ERYTHROCYTE [DISTWIDTH] IN BLOOD BY AUTOMATED COUNT: 23.2 % (ref 11.6–15.1)
FERRITIN SERPL-MCNC: 32 NG/ML (ref 8–388)
HCT VFR BLD AUTO: 38.1 % (ref 34.8–46.1)
HGB BLD-MCNC: 11.6 G/DL (ref 11.5–15.4)
IMM GRANULOCYTES # BLD AUTO: 0.03 THOUSAND/UL (ref 0–0.2)
IMM GRANULOCYTES NFR BLD AUTO: 0 % (ref 0–2)
IRON SATN MFR SERPL: 82 % (ref 15–50)
IRON SERPL-MCNC: 259 UG/DL (ref 50–170)
LYMPHOCYTES # BLD AUTO: 1.21 THOUSANDS/ÂΜL (ref 0.6–4.47)
LYMPHOCYTES NFR BLD AUTO: 16 % (ref 14–44)
MCH RBC QN AUTO: 25.4 PG (ref 26.8–34.3)
MCHC RBC AUTO-ENTMCNC: 30.4 G/DL (ref 31.4–37.4)
MCV RBC AUTO: 84 FL (ref 82–98)
MONOCYTES # BLD AUTO: 0.64 THOUSAND/ÂΜL (ref 0.17–1.22)
MONOCYTES NFR BLD AUTO: 9 % (ref 4–12)
NEUTROPHILS # BLD AUTO: 5.16 THOUSANDS/ÂΜL (ref 1.85–7.62)
NEUTS SEG NFR BLD AUTO: 69 % (ref 43–75)
NRBC BLD AUTO-RTO: 0 /100 WBCS
PLATELET # BLD AUTO: 255 THOUSANDS/UL (ref 149–390)
PMV BLD AUTO: 12 FL (ref 8.9–12.7)
RBC # BLD AUTO: 4.56 MILLION/UL (ref 3.81–5.12)
TIBC SERPL-MCNC: 316 UG/DL (ref 250–450)
WBC # BLD AUTO: 7.41 THOUSAND/UL (ref 4.31–10.16)

## 2023-01-09 PROBLEM — R65.20 SEVERE SEPSIS (HCC): Status: RESOLVED | Noted: 2022-11-07 | Resolved: 2023-01-09

## 2023-01-09 PROBLEM — A41.9 SEVERE SEPSIS (HCC): Status: RESOLVED | Noted: 2022-11-07 | Resolved: 2023-01-09

## 2023-11-10 NOTE — PROGRESS NOTES
Daily Speech Treatment Note    Today's date: 10/15/2019   Patients name: Giuliano Banuelos  : 1940  MRN: [id number]  Safety measures: fall risk, CVA  Referring provider: Maira Martinez MD    Primary Diagnosis/Billing code: I35 4  Secondary Diagnosis/ Billing code: I63 9, R47 01    Visit Tracking:  -Referring provider: Epic  -Billing guidelines: CMS  -Visit #16  -[place] Devi  -RE due 2019    Subjective/Behavioral:  "Hi "    Objective/Assessment:  Patient reports she forgot homework  Short-term goals:  1  Patient will facilitate planning by completing thought organization tasks (e g , sequencing, deduction puzzles, etc ) with 80% accuracy to facilitate increased executive functioning, working memory, problem solving, and processing skills, to be achieved in 4-6 weeks  NEW GOAL    -To target sequential thought, patient was asked to fill in the missing letters to complete the words given  Task completed in /40 opp (65%acc) increasing to /40 (100%acc) with moderate semantic and phonemic cues  -To target sequential thought, patient was asked to unscramble words  Task completed in /40 opp (92%acc) increasing to 40/40 (100%acc) with min semantic and phonemic cues  2  Patient will complete auditory immediate and short term memory tasks to 80% accuracy to facilitate increased ability to retell narratives and recall information within functional living environment, to be achieved in 4-6 weeks  To target immediate memory, patient was instructed to follow oral/written directions  She was encouraged to utilize repeat/rehearse and visualization internal memory strategies  She followed 1 step 2 components in /10 opp (70%acc) increased success to 10/10 (100%acc) with repetition of directions  She then followed 2 step 4 component directions in 7/10 opp (70%acc) increased to 10/10 (100%acc) with repetition of directions and min verbal cues       3  Patient will complete complex auditory attention processing tasks (e g , sentence unscramble, ranking numbers/words, etc ) to improve working memory with 80% accuracy, to be achieved in 4-6 weeks  NEW GOAL    To target auditory attention/processing clinician read scrambled sentences and patient was asked to put the words in the correct order  Task completed in 41/50 opp (82%acc) increasing to 50/50 (100%acc) with min verbal cues and repetition  5  Patient will demonstrate alternating attention by being able to shift focus of attention between two tasks with min cues in a distracting environment with 80% accuracy, to be achieved in 4-6 weeks  NEW GOAL      Plan:  -Patient was provided with home exercises/activities to target goals in plan of care at the end of today's session   -Continue with current plan of care  Communicate risk of Fall with Harm to all staff, patient, and family/Provide visual cue: red socks, yellow wristband, yellow gown, etc/Reinforce activity limits and safety measures with patient and family/Bed in lowest position, wheels locked, appropriate side rails in place/Call bell, personal items and telephone in reach/Instruct patient to call for assistance before getting out of bed/chair/stretcher/Non-slip footwear applied when patient is off stretcher/[place] to call system/Physically safe environment - no spills, clutter or unnecessary equipment/Purposeful Proactive Rounding/Room/bathroom lighting operational, light cord in reach

## 2025-04-11 ENCOUNTER — OFFICE VISIT (OUTPATIENT)
Dept: PODIATRY | Facility: CLINIC | Age: 85
End: 2025-04-11
Payer: COMMERCIAL

## 2025-04-11 VITALS — BODY MASS INDEX: 27.62 KG/M2 | WEIGHT: 137 LBS | HEIGHT: 59 IN

## 2025-04-11 DIAGNOSIS — Z79.01 LONG TERM CURRENT USE OF ANTICOAGULANT: ICD-10-CM

## 2025-04-11 DIAGNOSIS — Z89.422: ICD-10-CM

## 2025-04-11 DIAGNOSIS — L84 CALLUS: ICD-10-CM

## 2025-04-11 DIAGNOSIS — R60.1 GENERALIZED EDEMA: ICD-10-CM

## 2025-04-11 DIAGNOSIS — Z86.73 HISTORY OF CVA (CEREBROVASCULAR ACCIDENT): Primary | ICD-10-CM

## 2025-04-11 DIAGNOSIS — M24.50 CONTRACTURE OF JOINT: ICD-10-CM

## 2025-04-11 DIAGNOSIS — B35.1 ONYCHOMYCOSIS: ICD-10-CM

## 2025-04-11 PROCEDURE — 11721 DEBRIDE NAIL 6 OR MORE: CPT | Performed by: PODIATRIST

## 2025-04-11 PROCEDURE — 11056 PARNG/CUTG B9 HYPRKR LES 2-4: CPT | Performed by: PODIATRIST

## 2025-04-11 PROCEDURE — RECHECK: Performed by: PODIATRIST

## 2025-04-11 RX ORDER — LISINOPRIL 5 MG/1
5 TABLET ORAL DAILY
COMMUNITY

## 2025-04-11 NOTE — PROGRESS NOTES
Name: Marlene Delarosa      : 1940      MRN: [id number]  Encounter Provider: Odell Cadet DPM  Encounter Date: 2025   Encounter department: [facility] [place]  :  Assessment & Plan  History of CVA (cerebrovascular accident)         Long term current use of anticoagulant         Callus       Debride Tyloma to dermal layer x 2 with the use of a 312 blade and sharp dissection.   Pt was instructed to use lotion once a day on both feet such as cerave, Cetaphil or similar thick style of lotion.   Contracture of joint         Onychomycosis       Debride mycotic nails and thin the nail plates x 9 with the use of a nail nipper manually and an electric Dremel bur was used to reduce the thickness of the nail beds and smoothed the distal aspect of the nails.   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 Patient has Q7  findings and is recommended for at risk foot care every 9-10 weeks.    Return in about 10 weeks (around 2025).     History of Present Illness   HPI  Marlene Delarosa is a 84 y.o. female who presents with chief complaint of painful thick nails on both feet and painful calluses also present on the bottom of both feet.  Her niece was present in the room for today's visit.Patient presents for at-risk foot care.  Patient has no acute concerns today.  Patient has significant lower extremity risk due to neuropathy, parasthesia, edema, and trophic skin changes to the lower extremity.   History obtained from: patient    Review of Systems  Medical History Reviewed by provider this encounter:     .  Current Outpatient Medications on File Prior to Visit   Medication Sig Dispense Refill    ascorbic acid (VITAMIN C) 250 MG CHEW Chew 500 mg daily. Indications: Inadequate Vitamin C      dextromethorphan-guaiFENesin (ROBITUSSIN DM)  mg/5 mL syrup Take 5 mL by  "mouth 3 (three) times a day as needed for cough 354 mL 0    ferrous sulfate 325 (65 Fe) mg tablet Take 1 tablet (325 mg total) by mouth 2 (two) times a day with meals 60 tablet 3    fluticasone (VERAMYST) 27.5 MCG/SPRAY nasal spray 2 sprays into each nostril daily      Garlic 100 MG TABS Take 100 mg by mouth daily      lisinopril (ZESTRIL) 5 mg tablet Take 5 mg by mouth daily      Melatonin 5 MG TABS Take 1 tablet by mouth daily at bedtime as needed      Multiple Vitamins-Minerals (MULTIVITAMIN GUMMIES ADULT PO) Take 2 tablets by mouth daily. Indications: multivitamin      pantoprazole (PROTONIX) 40 mg tablet Take 1 tablet (40 mg total) by mouth daily in the early morning 30 tablet 0    aspirin 81 mg chewable tablet Chew 81 mg daily      atorvastatin (LIPITOR) 80 mg tablet TAKE 1 TABLET BY MOUTH EVERY DAY 90 tablet 3    benzonatate (TESSALON PERLES) 100 mg capsule Take 1 capsule (100 mg total) by mouth 3 (three) times a day as needed for cough 20 capsule 0    carvedilol (COREG) 3.125 mg tablet Take 1 tablet (3.125 mg total) by mouth daily 30 tablet 0    hydrochlorothiazide (HYDRODIURIL) 25 mg tablet Take 1 tablet (25 mg total) by mouth daily 30 tablet 0     No current facility-administered medications on file prior to visit.      Social History     Tobacco Use    Smoking status: Former     Current packs/day: 0.25     Average packs/day: 0.3 packs/day for 50.0 years (12.5 ttl pk-yrs)     Types: Cigarettes    Smokeless tobacco: Never   Vaping Use    Vaping status: Never Used   Substance and Sexual Activity    Alcohol use: Not Currently    Drug use: Never    Sexual activity: Not Currently        Objective   Ht 4' 11\" (1.499 m) Comment: verbal  Wt 62.1 kg (137 lb)   BMI 27.67 kg/m²      Physical Exam  Vascular status is a faint 1/4 DP PT negative digital hair normal distal cooling slightly delayed capillary refill with edema present bilaterally.  Capillary refill is approximately 3 to 4 seconds and the edema is +2 " pitting.    Derm nails are brittle elongated hypertrophic white-yellow discoloration with subungual debris x 9.  There is an increased thickness in the nails of approximately 2 to 3 mm with excessive length to the nails of approximately 5 mm.  Hypertrophic tissue is present on the plantar aspect of the left foot with a central IPK and on the plantar aspect of the right foot also with a central IPK.  No dried blood and no trophic changes noted beneath the lesions.  There is loss of turgor noted bilaterally and thinning of the skin.  Scars present in the area on the left foot where the digit had been amputated at the MPJ.    Ortho hammertoe deformities are present digits 2 through 4 bilaterally with the last digit being in adductovarus position.    Neuro is intact and equal bilaterally for light touch.  Administrative Statements   I have spent a total time of 15 minutes in caring for this patient on the day of the visit/encounter including Risks and benefits of tx options, Instructions for management, Patient and family education, Importance of tx compliance, Risk factor reductions, Counseling / Coordination of care, Documenting in the medical record, and Obtaining or reviewing history  .

## 2025-07-11 ENCOUNTER — PROCEDURE VISIT (OUTPATIENT)
Dept: PODIATRY | Facility: CLINIC | Age: 85
End: 2025-07-11
Payer: COMMERCIAL

## 2025-07-11 DIAGNOSIS — Z79.01 LONG TERM CURRENT USE OF ANTICOAGULANT: ICD-10-CM

## 2025-07-11 DIAGNOSIS — M79.675 PAIN IN TOES OF BOTH FEET: ICD-10-CM

## 2025-07-11 DIAGNOSIS — M24.50 CONTRACTURE OF JOINT: ICD-10-CM

## 2025-07-11 DIAGNOSIS — L97.511 ULCER OF TOE, RIGHT, LIMITED TO BREAKDOWN OF SKIN (HCC): ICD-10-CM

## 2025-07-11 DIAGNOSIS — M79.674 PAIN IN TOES OF BOTH FEET: ICD-10-CM

## 2025-07-11 DIAGNOSIS — Z89.422: ICD-10-CM

## 2025-07-11 DIAGNOSIS — L84 CALLUS: ICD-10-CM

## 2025-07-11 DIAGNOSIS — S90.122A CONTUSION OF LESSER TOE OF LEFT FOOT WITHOUT DAMAGE TO NAIL, INITIAL ENCOUNTER: ICD-10-CM

## 2025-07-11 DIAGNOSIS — B35.1 ONYCHOMYCOSIS: Primary | ICD-10-CM

## 2025-07-11 DIAGNOSIS — S90.121A CONTUSION OF LESSER TOE OF RIGHT FOOT WITHOUT DAMAGE TO NAIL, INITIAL ENCOUNTER: ICD-10-CM

## 2025-07-11 DIAGNOSIS — M20.41 HAMMER TOES OF BOTH FEET: ICD-10-CM

## 2025-07-11 DIAGNOSIS — M20.42 HAMMER TOES OF BOTH FEET: ICD-10-CM

## 2025-07-11 PROCEDURE — 99212 OFFICE O/P EST SF 10 MIN: CPT | Performed by: PODIATRIST

## 2025-07-11 PROCEDURE — 11721 DEBRIDE NAIL 6 OR MORE: CPT | Performed by: PODIATRIST

## 2025-07-11 PROCEDURE — 11056 PARNG/CUTG B9 HYPRKR LES 2-4: CPT | Performed by: PODIATRIST

## 2025-07-11 NOTE — PROGRESS NOTES
Name: Marlene Delarosa      : 1940      MRN: [id number]  Encounter Provider: Odell Cadet DPM  Encounter Date: 2025   Encounter department: [facility] [place]  :  Assessment & Plan  Onychomycosis       Debride mycotic nails and thin the nail plates x 9 with the use of a nail nipper manually and an electric Dremel bur was used to reduce the thickness of the nail beds and smoothed the distal aspect of the nails.   Callus       Debride Tyloma to dermal layer x 2 with the use of a 312 blade and sharp dissection.   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  Contusion of lesser toe of right foot without damage to nail, initial encounter       She is to continue applying Band-Aids daily for the next 10 days to the right and left second digits.  Ulcer of toe, right, limited to breakdown of skin (HCC)         Discussed proper shoe gear, daily inspections of feet, and general foot health with patient. Patient has Q8  findings and is recommended for at risk foot care every 9-10 weeks.    Patients most recent complete clinical foot exam was on: 2025    Return in about 10 weeks (around 2025).     History of Present Illness   HPI  Marlene Delarosa is a 85 y.o. female who presents with chief complaint of painful thick nails on both feet and painful calluses also on both feet.  Patient relates that she had taken a fall couple days to a week or so ago and had injured both second toes by rubbing them across the carpet.  Her neighbors have been helping her dress with triple antibiotic and Band-Aids.  No change in medical history or medications since her last visit  and she is in good spirits.  History obtained from: patient    Review of Systems  Medical History Reviewed by provider this encounter:     .  Medications Ordered Prior to Encounter[1]   Social History[2]     Objective   There were no vitals taken for this visit.     Physical Exam  Vascular status is a faint 1/4 DP PT negative digital hair, normal distal cooling, slightly delayed capillary refill with edema present bilaterally.  Capillary refill is approximately 3 to 4 seconds and the edema is +2 pitting.     Derm nails are brittle elongated hypertrophic white-yellow discoloration with subungual debris x 9.  There is an increased thickness in the nails of approximately 2 to 3 mm.  Hypertrophic tissue is present on the plantar aspect of the left first MPJ with a central IPK and on the plantar aspect of the right third toe right foot also with a central IPK.  No dried blood and no trophic changes noted beneath the lesions.  There is loss of turgor noted bilaterally and thinning of the skin.  There are eschars present on the second digits bilaterally on the dorsal aspect of the PIPJ's.  On the right foot there is a small superficial ulcer measuring approximately 1 cm x .5 cm with no depth.  There is no signs of any infection erythema or drainage.  There is eschar present over the surface of the second digit.  There is dry eschar present on the second digit on the left foot also with no openings and no ulceration seen.    Ortho hammertoe deformities are present on digits 2,3 and 4 on the right foot and 2 and 4 on the left foot.         [1]   Current Outpatient Medications on File Prior to Visit   Medication Sig Dispense Refill    ascorbic acid (VITAMIN C) 250 MG CHEW Chew 500 mg daily. Indications: Inadequate Vitamin C      aspirin 81 mg chewable tablet Chew 81 mg daily      atorvastatin (LIPITOR) 80 mg tablet TAKE 1 TABLET BY MOUTH EVERY DAY 90 tablet 3    benzonatate (TESSALON PERLES) 100 mg capsule Take 1 capsule (100  mg total) by mouth 3 (three) times a day as needed for cough 20 capsule 0    dextromethorphan-guaiFENesin (ROBITUSSIN DM)  mg/5 mL syrup Take 5 mL by mouth 3 (three) times a day as needed for cough 354 mL 0    ferrous sulfate 325 (65 Fe) mg tablet Take 1 tablet (325 mg total) by mouth 2 (two) times a day with meals 60 tablet 3    fluticasone (VERAMYST) 27.5 MCG/SPRAY nasal spray 2 sprays into each nostril daily      Garlic 100 MG TABS Take 100 mg by mouth daily      lisinopril (ZESTRIL) 5 mg tablet Take 5 mg by mouth daily      Melatonin 5 MG TABS Take 1 tablet by mouth daily at bedtime as needed      Multiple Vitamins-Minerals (MULTIVITAMIN GUMMIES ADULT PO) Take 2 tablets by mouth daily. Indications: multivitamin      pantoprazole (PROTONIX) 40 mg tablet Take 1 tablet (40 mg total) by mouth daily in the early morning 30 tablet 0    carvedilol (COREG) 3.125 mg tablet Take 1 tablet (3.125 mg total) by mouth daily 30 tablet 0    hydrochlorothiazide (HYDRODIURIL) 25 mg tablet Take 1 tablet (25 mg total) by mouth daily 30 tablet 0     No current facility-administered medications on file prior to visit.   [2]   Social History  Tobacco Use    Smoking status: Former     Current packs/day: 0.25     Average packs/day: 0.3 packs/day for 50.0 years (12.5 ttl pk-yrs)     Types: Cigarettes    Smokeless tobacco: Never   Vaping Use    Vaping status: Never Used   Substance and Sexual Activity    Alcohol use: Not Currently    Drug use: Never    Sexual activity: Not Currently

## (undated) DEVICE — 3M™ IOBAN™ 2 ANTIMICROBIAL INCISE DRAPE 6640EZ: Brand: IOBAN™ 2

## (undated) DEVICE — GLOVE INDICATOR PI UNDERGLOVE SZ 6.5 BLUE

## (undated) DEVICE — SUT SILK 4-0 18 IN A183H

## (undated) DEVICE — SUT VICRYL 3-0 SH 27 IN J416H

## (undated) DEVICE — CUFF TOURNIQUET 18 X 4 IN QUICK CONNECT DISP 1 BLADDER

## (undated) DEVICE — UNIVERSAL MAJOR EXTREMITY,KIT: Brand: CARDINAL HEALTH

## (undated) DEVICE — INTENDED FOR TISSUE SEPARATION, AND OTHER PROCEDURES THAT REQUIRE A SHARP SURGICAL BLADE TO PUNCTURE OR CUT.: Brand: BARD-PARKER ® CARBON RIB-BACK BLADES

## (undated) DEVICE — 2108 SERIES SAGITTAL BLADE (18.6 X 0.64 X 61.1MM)

## (undated) DEVICE — NEEDLE 25G X 1 1/2

## (undated) DEVICE — THYROID SHEET: Brand: CONVERTORS

## (undated) DEVICE — SUT VICRYL 3-0 REEL 54 IN J285G

## (undated) DEVICE — GLOVE SRG BIOGEL ORTHOPEDIC 6.5

## (undated) DEVICE — GLOVE SRG BIOGEL ECLIPSE 6

## (undated) DEVICE — DRAPE PROBE NEO-PROBE/ULTRASOUND

## (undated) DEVICE — CHLORAPREP HI-LITE 26ML ORANGE

## (undated) DEVICE — INTENDED FOR TISSUE SEPARATION, AND OTHER PROCEDURES THAT REQUIRE A SHARP SURGICAL BLADE TO PUNCTURE OR CUT.: Brand: BARD-PARKER SAFETY BLADES SIZE 15, STERILE

## (undated) DEVICE — BETHL CAROTID ENDARTERECTOMY: Brand: CARDINAL HEALTH

## (undated) DEVICE — SUT SILK 2-0 18 IN A185H

## (undated) DEVICE — SILVER-COATED ANTIBACTERIAL BARRIER DRESSING: Brand: ACTICOAT SURGIC 10X12CM 5PK US

## (undated) DEVICE — SUT MONOCRYL 4-0 PS-2 18 IN Y496G

## (undated) DEVICE — PENCIL ELECTROSURG E-Z CLEAN -0035H

## (undated) DEVICE — BAG DECANTER

## (undated) DEVICE — CAROTID ARTERY SHUNT KIT,RADIOPAQUE LINE, STRAIGHT: Brand: ARGYLE

## (undated) DEVICE — SUT PROLENE 6-0 BV130 30 IN 8709H

## (undated) DEVICE — ADHESIVE SKN CLSR HISTOACRYL FLEX 0.5ML LF

## (undated) DEVICE — ACE WRAP 4 IN UNSTERILE

## (undated) DEVICE — PROXIMATE PLUS MD MULTI-DIRECTIONAL RELEASE SKIN STAPLERS CONTAINS 35 STAINLESS STEEL STAPLES APPROXIMATE CLOSED DIMENSIONS: 6.9MM X 3.9MM WIDE: Brand: PROXIMATE

## (undated) DEVICE — DRAPE SURGIKIT SADDLE BAG

## (undated) DEVICE — IV CATH INTROCAN 18G X 1 1/4 SAFETY

## (undated) DEVICE — TRAY FOLEY 16FR URIMETER SURESTEP

## (undated) DEVICE — SUT SILK 3-0 18 IN A184H

## (undated) DEVICE — FLOSEAL HEMOSTATIC MATRIX, 5 ML: Brand: FLOSEAL

## (undated) DEVICE — KERLIX BANDAGE ROLL: Brand: KERLIX

## (undated) DEVICE — PETRI DISH STERILE

## (undated) DEVICE — 3000CC GUARDIAN II: Brand: GUARDIAN

## (undated) DEVICE — BANDAGE, ESMARK LF STR 6"X9' (20/CS): Brand: CYPRESS

## (undated) DEVICE — SURGICEL 4 X 8

## (undated) DEVICE — GAUZE SPONGES,16 PLY: Brand: CURITY

## (undated) DEVICE — VESSEL LOOPS X-RAY DETECTABLE: Brand: DEROYAL

## (undated) DEVICE — LIGACLIP MCA MULTIPLE CLIP APPLIERS, 20 MEDIUM CLIPS: Brand: LIGACLIP

## (undated) DEVICE — PLUMEPEN PRO 10FT